# Patient Record
Sex: MALE | Race: WHITE | NOT HISPANIC OR LATINO
[De-identification: names, ages, dates, MRNs, and addresses within clinical notes are randomized per-mention and may not be internally consistent; named-entity substitution may affect disease eponyms.]

---

## 2018-10-23 ENCOUNTER — APPOINTMENT (OUTPATIENT)
Dept: FAMILY MEDICINE | Facility: CLINIC | Age: 68
End: 2018-10-23
Payer: MEDICARE

## 2018-10-23 VITALS
WEIGHT: 242 LBS | OXYGEN SATURATION: 96 % | BODY MASS INDEX: 32.78 KG/M2 | DIASTOLIC BLOOD PRESSURE: 82 MMHG | TEMPERATURE: 98.7 F | SYSTOLIC BLOOD PRESSURE: 120 MMHG | HEIGHT: 72 IN | HEART RATE: 81 BPM

## 2018-10-23 DIAGNOSIS — Z78.9 OTHER SPECIFIED HEALTH STATUS: ICD-10-CM

## 2018-10-23 PROCEDURE — 99203 OFFICE O/P NEW LOW 30 MIN: CPT

## 2018-10-23 NOTE — COUNSELING
[Weight management counseling provided] : Weight management [Healthy eating counseling provided] : healthy eating [Activity counseling provided] : activity [Keep Food Diary] : Keep food diary [Low Fat Diet] : Low fat diet [Decrease Portions] : Decrease food portions [Low Salt Diet] : Low salt diet [Walking] : Walking [Good understanding] : Patient has a good understanding of lifestyle changes and the steps needed to achieve self management goals

## 2018-10-23 NOTE — REVIEW OF SYSTEMS
[Muscle Pain] : muscle pain [Back Pain] : back pain [Joint Swelling] : joint swelling [Negative] : Heme/Lymph

## 2018-10-23 NOTE — HISTORY OF PRESENT ILLNESS
[FreeTextEntry8] : Encounter conducted in Polish.\par  cc: establish himself as a new patient , c/o back pain \par Patient came to  establish himself as a new patient , he  c/o back pain LS and  left hip for the past 1 week, denies injury, 4-6/10, dull , movement makes it worse. Recently patient has more sedentary life style. patient did not take any meds OTC. Patient denies CP,SOB,Abd pain. Last time seen by PCP last year.

## 2018-10-23 NOTE — PHYSICAL EXAM
[Normal Sclera/Conjunctiva] : normal sclera/conjunctiva [No Respiratory Distress] : no respiratory distress  [Clear to Auscultation] : lungs were clear to auscultation bilaterally [No Accessory Muscle Use] : no accessory muscle use [Normal Percussion] : the chest was normal to percussion [Normal Rate] : normal rate  [Soft] : abdomen soft [Non Tender] : non-tender [Non-distended] : non-distended [No Masses] : no abdominal mass palpated [No HSM] : no HSM [Normal Bowel Sounds] : normal bowel sounds [Normal Supraclavicular Nodes] : no supraclavicular lymphadenopathy [Normal Anterior Cervical Nodes] : no anterior cervical lymphadenopathy [No Joint Swelling] : no joint swelling [Grossly Normal Strength/Tone] : grossly normal strength/tone [Normal Gait] : normal gait [Coordination Grossly Intact] : coordination grossly intact [Alert and Oriented x3] : oriented to person, place, and time [de-identified] : obese

## 2018-10-25 LAB
25(OH)D3 SERPL-MCNC: 48.3 NG/ML
ALBUMIN SERPL ELPH-MCNC: 4.4 G/DL
ALP BLD-CCNC: 79 U/L
ALT SERPL-CCNC: 19 U/L
ANION GAP SERPL CALC-SCNC: 12 MMOL/L
APPEARANCE: CLEAR
AST SERPL-CCNC: 33 U/L
BASOPHILS # BLD AUTO: 0.03 K/UL
BASOPHILS NFR BLD AUTO: 0.4 %
BILIRUB SERPL-MCNC: 0.8 MG/DL
BILIRUBIN URINE: NEGATIVE
BLOOD URINE: NEGATIVE
BUN SERPL-MCNC: 13 MG/DL
CALCIUM SERPL-MCNC: 9.3 MG/DL
CHLORIDE SERPL-SCNC: 104 MMOL/L
CHOLEST SERPL-MCNC: 212 MG/DL
CHOLEST/HDLC SERPL: 3.9 RATIO
CO2 SERPL-SCNC: 27 MMOL/L
COLOR: YELLOW
CREAT SERPL-MCNC: 0.93 MG/DL
CREAT SPEC-SCNC: 212 MG/DL
EOSINOPHIL # BLD AUTO: 0.06 K/UL
EOSINOPHIL NFR BLD AUTO: 0.8 %
FOLATE SERPL-MCNC: 16.2 NG/ML
GLUCOSE QUALITATIVE U: NEGATIVE MG/DL
GLUCOSE SERPL-MCNC: 96 MG/DL
HAV IGM SER QL: NONREACTIVE
HBA1C MFR BLD HPLC: 5.3 %
HBV CORE IGM SER QL: NONREACTIVE
HBV SURFACE AG SER QL: NONREACTIVE
HCT VFR BLD CALC: 47 %
HCV AB SER QL: NONREACTIVE
HCV S/CO RATIO: 0.23 S/CO
HDLC SERPL-MCNC: 54 MG/DL
HGB BLD-MCNC: 15.7 G/DL
HIV1+2 AB SPEC QL IA.RAPID: NONREACTIVE
IMM GRANULOCYTES NFR BLD AUTO: 0.4 %
KETONES URINE: NEGATIVE
LDLC SERPL CALC-MCNC: 140 MG/DL
LEUKOCYTE ESTERASE URINE: NEGATIVE
LYMPHOCYTES # BLD AUTO: 1.75 K/UL
LYMPHOCYTES NFR BLD AUTO: 23.5 %
MAN DIFF?: NORMAL
MCHC RBC-ENTMCNC: 32.2 PG
MCHC RBC-ENTMCNC: 33.4 GM/DL
MCV RBC AUTO: 96.5 FL
MICROALBUMIN 24H UR DL<=1MG/L-MCNC: 1.8 MG/DL
MICROALBUMIN/CREAT 24H UR-RTO: 9 MG/G
MONOCYTES # BLD AUTO: 0.65 K/UL
MONOCYTES NFR BLD AUTO: 8.7 %
NEUTROPHILS # BLD AUTO: 4.93 K/UL
NEUTROPHILS NFR BLD AUTO: 66.2 %
NITRITE URINE: NEGATIVE
PH URINE: 7
PLATELET # BLD AUTO: 231 K/UL
POTASSIUM SERPL-SCNC: 4.5 MMOL/L
PROT SERPL-MCNC: 8.1 G/DL
PROTEIN URINE: NEGATIVE MG/DL
PSA FREE FLD-MCNC: 19.9
PSA FREE SERPL-MCNC: 0.6 NG/ML
PSA SERPL-MCNC: 3.01 NG/ML
PSA SERPL-MCNC: 3.01 NG/ML
RBC # BLD: 4.87 M/UL
RBC # FLD: 12.5 %
SODIUM SERPL-SCNC: 143 MMOL/L
SPECIFIC GRAVITY URINE: 1.03
T3FREE SERPL-MCNC: 2.81 PG/ML
T4 FREE SERPL-MCNC: 1.5 NG/DL
THYROPEROXIDASE AB SERPL IA-ACNC: 21.9 IU/ML
TRIGL SERPL-MCNC: 90 MG/DL
TSH SERPL-ACNC: 1.05 UIU/ML
URATE SERPL-MCNC: 6.5 MG/DL
UROBILINOGEN URINE: 1 MG/DL
VIT B12 SERPL-MCNC: 782 PG/ML
WBC # FLD AUTO: 7.45 K/UL

## 2018-11-20 ENCOUNTER — APPOINTMENT (OUTPATIENT)
Dept: FAMILY MEDICINE | Facility: CLINIC | Age: 68
End: 2018-11-20
Payer: MEDICARE

## 2018-11-20 VITALS
OXYGEN SATURATION: 98 % | RESPIRATION RATE: 16 BRPM | HEART RATE: 92 BPM | DIASTOLIC BLOOD PRESSURE: 78 MMHG | TEMPERATURE: 98.4 F | SYSTOLIC BLOOD PRESSURE: 152 MMHG

## 2018-11-20 PROCEDURE — 99214 OFFICE O/P EST MOD 30 MIN: CPT | Mod: 25

## 2018-11-20 PROCEDURE — 96372 THER/PROPH/DIAG INJ SC/IM: CPT

## 2018-11-20 RX ORDER — METHYLPRED ACET/NACL,ISO-OS/PF 40 MG/ML
40 VIAL (ML) INJECTION
Qty: 1 | Refills: 0 | Status: COMPLETED | OUTPATIENT
Start: 2018-11-20

## 2018-11-20 RX ADMIN — METHYLPREDNISOLONE ACETATE 0 MG/ML: 40 INJECTION, SUSPENSION INTRA-ARTICULAR; INTRALESIONAL; INTRAMUSCULAR; SOFT TISSUE at 00:00

## 2018-11-20 NOTE — HISTORY OF PRESENT ILLNESS
[FreeTextEntry1] : cc: f/u back pain , thyroid , blood pressure  [de-identified] : Patient came to f/u on his back pain - which is not improving, dull pain, worse with movement, sharp , 6/10. Patient denies CP,SOB, no abd pain, no BM or urination problem. Patient took Mobic, warm,cold compresses, no improvement. patient took in the past Hydrocodone PRN. Last blood work d/w the pt.  at length. needs Xanax to be renewed, takes PRN. patient needs medication to be renewed for his thyroid and blood pressure.

## 2018-11-20 NOTE — PHYSICAL EXAM
[No Respiratory Distress] : no respiratory distress  [Clear to Auscultation] : lungs were clear to auscultation bilaterally [No Accessory Muscle Use] : no accessory muscle use [Normal Percussion] : the chest was normal to percussion [Normal Rate] : normal rate  [Regular Rhythm] : with a regular rhythm [Normal S1, S2] : normal S1 and S2 [No Varicosities] : no varicosities [No Edema] : there was no peripheral edema [Soft] : abdomen soft [Non Tender] : non-tender [Non-distended] : non-distended [No Masses] : no abdominal mass palpated [No HSM] : no HSM [Normal Bowel Sounds] : normal bowel sounds [Normal Supraclavicular Nodes] : no supraclavicular lymphadenopathy [Normal Axillary Nodes] : no axillary lymphadenopathy [Normal Anterior Cervical Nodes] : no anterior cervical lymphadenopathy [No Joint Swelling] : no joint swelling [Grossly Normal Strength/Tone] : grossly normal strength/tone [Normal Gait] : normal gait [Coordination Grossly Intact] : coordination grossly intact [No Focal Deficits] : no focal deficits [Alert and Oriented x3] : oriented to person, place, and time [de-identified] : obese

## 2018-11-20 NOTE — COUNSELING
[Weight management counseling provided] : Weight management [Healthy eating counseling provided] : healthy eating [Activity counseling provided] : activity [Keep Food Diary] : Keep food diary [Low Fat Diet] : Low fat diet [Walking] : Walking [Good understanding] : Patient has a good understanding of lifestyle changes and the steps needed to achieve self management goals

## 2018-12-03 ENCOUNTER — FORM ENCOUNTER (OUTPATIENT)
Age: 68
End: 2018-12-03

## 2018-12-04 ENCOUNTER — APPOINTMENT (OUTPATIENT)
Dept: MRI IMAGING | Facility: CLINIC | Age: 68
End: 2018-12-04
Payer: MEDICARE

## 2018-12-04 ENCOUNTER — OUTPATIENT (OUTPATIENT)
Dept: OUTPATIENT SERVICES | Facility: HOSPITAL | Age: 68
LOS: 1 days | End: 2018-12-04
Payer: MEDICARE

## 2018-12-04 DIAGNOSIS — Z00.8 ENCOUNTER FOR OTHER GENERAL EXAMINATION: ICD-10-CM

## 2018-12-04 PROCEDURE — 72148 MRI LUMBAR SPINE W/O DYE: CPT | Mod: 26

## 2018-12-04 PROCEDURE — 72148 MRI LUMBAR SPINE W/O DYE: CPT

## 2018-12-10 ENCOUNTER — APPOINTMENT (OUTPATIENT)
Dept: ORTHOPEDIC SURGERY | Facility: CLINIC | Age: 68
End: 2018-12-10
Payer: MEDICARE

## 2018-12-10 VITALS
WEIGHT: 240 LBS | BODY MASS INDEX: 31.81 KG/M2 | HEIGHT: 73 IN | DIASTOLIC BLOOD PRESSURE: 89 MMHG | SYSTOLIC BLOOD PRESSURE: 151 MMHG | HEART RATE: 82 BPM

## 2018-12-10 PROCEDURE — 99204 OFFICE O/P NEW MOD 45 MIN: CPT

## 2018-12-10 NOTE — PHYSICAL EXAM
[Obese] : obese [Limited] : is limited [Poor Appearance] : well-appearing [Acute Distress] : not in acute distress [Abl Mood] : in a normal mood [Abl Affect] : with normal affect [Poor Coordination] : normal coordination [Disorientation] : oriented x 3 [Normal] : normal [Painful] : not painful [Summers's Sign] : negative Summers's sign [SLR] : negative straight leg raise [LE] : Sensory: Intact in bilateral lower extremities [1+] : left ankle jerk 1+ [Plantar Reflex Right Only] : absent on the right [Plantar Reflex Left Only] : absent on the left [DTR Reflexes Clonus Of Right Ankle (___ Beats)] : absent on the right [DTR Reflexes Clonus Of Left Ankle (___ Beats)] : absent on the left [DP] : dorsalis pedis 2+ and symmetric bilaterally [PT] : posterior tibial 2+ and symmetric bilaterally [FreeTextEntry2] : The pt is awake, alert and oriented to self, place and time, is comfortable and in no acute distress. Inspection of neck, back and lower extremities bilaterally reveals no rashes or ecchymotic lesions.  There is no obvious abnormal spinal curvature in the sagittal and coronal planes. There is no tenderness over the cervical, thoracic spine, or the paraspinal or upper and lower extremities musculature.Midline lumbar tenderness. There is no sacroiliac tenderness. No greater trochanteric tenderness bilaterally. No atrophy or abnormal movements noted in the upper or lower extremities. There is no swelling noted in the upper or lower extremities bilaterally. No cervical lymphadenopathy noted anteriorly. No joint laxity noted in the upper and lower extremity joints bilaterally.\par Hip range of motion is degrees internal rotation 30° external rotation without pain. Full range of motion of the shoulders bilaterally with no significant pain\par There is no groin pain with hip internal rotation and a negative SRINI test bilaterally.  [de-identified] : He can forward flex to his knees with some increased back pain. Extension is 30° with less pain. [de-identified] : EXAM: MR SPINE LUMBAR \par \par PROCEDURE DATE: 12/04/2018 \par \par \par INTERPRETATION: \par LUMBOSACRAL SPINE MRI \par \par CLINICAL INFORMATION: No back pain x2 months, worse with movement. Not \par improving. \par TECHNIQUE: Multiplanar, multisequence MR imaging was obtained of the \par lumbosacral spine. \par \par FINDINGS: \par \par DISC LEVEL EVALUATION: \par \par L1/L2: Disc bulge and bilateral facet arthrosis resulting in mild bilateral \par foraminal narrowing. No spinal canal narrowing. \par L2/L3: Disc bulge with narrowing of the left intervertebral disc space with \par osteophyte formation due to dextroconvexity and bilateral facet arthrosis \par and thickening of the ligamentum flavum resulting in mild left lateral \par recess narrowing and left foraminal narrowing. \par L3/L4: Diffuse disc bulge with leftward lateral osteophyte formation and \par bilateral facet arthrosis and thickening of the ligamentum flavum resulting \par mild spinal canal narrowing and mild right and moderate left foraminal \par narrowing. Disc narrows the bilateral lateral recesses, left greater than \par right. \par L4/L5: Disc osteophyte complex with right and leftward osteophyte formation \par and bilateral facet arthrosis, hypertrophic on the right, and thickening of \par the ligamentum flavum resulting in effacement of the right lateral recess \par and severe right foraminal narrowing, contacting the exiting right L4 nerve \par root. \par L5/S1: Disc bulge and severe right facet arthrosis, resulting in moderate to \par severe right foraminal narrowing. No left foraminal narrowing or spinal \par canal narrowing. \par \par SPINAL ALIGNMENT: There is dextroconvexity of the lumbar spine centered at \par L2-L3. There is tilting of the L5 vertebral body resulting in \par abutment/pseudoarticulation of the right L5 transverse process with the \par sacrum. There is mild rightward lateral listhesis of L4 on L5. There is mild \par anterolisthesis of L3 on L4 and mild retrolisthesis of L5 on S1. There is \par multilevel loss of intravertebral disc height with endplate degenerative \par changes and osteophyte formation. \par DISTAL CORD AND CONUS: Distal cord terminates at L1-L2. Distal cord and \par conus are unremarkable in appearance. \par SI JOINTS: Mild degenerative changes of the sacroiliac joints. \par MARROW: There is an incomplete horizontally oriented low signal focus within \par the superior aspect of the L5 vertebral body with surrounding edema, \par consistent with an incomplete fracture (series 2, image 11). There is no \par significant depression of the superior endplate. Degenerative edema is \par visualized within the superior left endplate of the L3 vertebral body. \par PARASPINAL MUSCLE AND SOFT TISSUES: Unremarkable. \par INTRAABDOMINAL/INTRAPELVIC SOFT TISSUES: Partially visualized bilateral \par renal cysts. \par \par IMPRESSION: \par Incomplete fracture/stress reaction of the right side of the L5 vertebral \par body with edema. No loss of vertebral body height. \par Dextrocurvature of the spine and lumbar spondylosis and facet arthrosis, \par most notably at L4-L5, resulting in severe right foraminal narrowing, and at \par L5/S1, resulting in moderate to severe right foraminal narrowing. \par \par RELL ALICEA M.D., ATTENDING RADIOLOGIST \par This document has been electronically signed. Dec 6 2018 1:16PM

## 2018-12-10 NOTE — CONSULT LETTER
[Dear  ___] : Dear  [unfilled], [I had the pleasure of evaluating your patient, [unfilled].] : I had the pleasure of evaluating your patient, [unfilled]. [FreeTextEntry2] : Kaila Spence [FreeTextEntry1] : Thank you for this referral. I have enclosed my note for your review. Please feel free to contact my office if you have additional questions regarding this patient.\par \par Regards,\par Israel Anguiano MD, FACS, FAAOS\par \par  of Orthopaedic Surgery\par [facility] [employer] [facility]\par Spinal Reconstruction Surgery\par Minimally Invasive Spinal Surgery\par [facility]

## 2018-12-10 NOTE — DISCUSSION/SUMMARY
[Medication Risks Reviewed] : Medication risks reviewed [de-identified] : The patient presents with atraumatic fracture of his L5 and edema in the L3 vertebral body suspicious for fracture. Given his age and his physical stature I'm concerned about this being pathologic fractures. I am recommending a bone scan to better assess his skeletal system and a referral to a hematologist oncologist was also provided to assess for any underlying malignancy and metastatic condition. He does not report any constitutional symptoms of present and has not reported any night pain. However given the presenting history I strongly recommended a tumor workup. For his back pain provided and a prescription for a lumbosacral orthosis.\par \par I will see him back after the imaging studies are available.\par \par The patient was educated regarding their condition, treatment options as well as prescribed course of treatment. \par Risks and benefits as well as alternatives to the proposed treatment were also provided to the patient \par They were given the opportunity to have all their questions answered to their satisfaction.\par \par Vital signs were reviewed with the patient and the patient was instructed to followup with their primary care provider for further management.\par \par Healthy lifestyle recommendations were also made including a tobacco free lifestyle, proper diet, and weight control.

## 2018-12-11 ENCOUNTER — RX RENEWAL (OUTPATIENT)
Age: 68
End: 2018-12-11

## 2018-12-12 ENCOUNTER — APPOINTMENT (OUTPATIENT)
Dept: FAMILY MEDICINE | Facility: CLINIC | Age: 68
End: 2018-12-12
Payer: MEDICARE

## 2018-12-12 VITALS
HEIGHT: 73 IN | WEIGHT: 240 LBS | TEMPERATURE: 98.7 F | SYSTOLIC BLOOD PRESSURE: 154 MMHG | OXYGEN SATURATION: 97 % | DIASTOLIC BLOOD PRESSURE: 82 MMHG | BODY MASS INDEX: 31.81 KG/M2 | HEART RATE: 88 BPM

## 2018-12-12 PROCEDURE — 99214 OFFICE O/P EST MOD 30 MIN: CPT

## 2018-12-12 RX ORDER — PREDNISONE 20 MG/1
20 TABLET ORAL DAILY
Qty: 5 | Refills: 0 | Status: COMPLETED | COMMUNITY
Start: 2018-11-20 | End: 2018-12-12

## 2018-12-12 NOTE — PHYSICAL EXAM
[No Respiratory Distress] : no respiratory distress  [Clear to Auscultation] : lungs were clear to auscultation bilaterally [No Accessory Muscle Use] : no accessory muscle use [Normal Percussion] : the chest was normal to percussion [Normal Rate] : normal rate  [Regular Rhythm] : with a regular rhythm [Normal S1, S2] : normal S1 and S2 [No Varicosities] : no varicosities [No Edema] : there was no peripheral edema [Soft] : abdomen soft [Non Tender] : non-tender [Non-distended] : non-distended [No Masses] : no abdominal mass palpated [No HSM] : no HSM [Normal Bowel Sounds] : normal bowel sounds [Normal Supraclavicular Nodes] : no supraclavicular lymphadenopathy [Normal Axillary Nodes] : no axillary lymphadenopathy [Normal Posterior Cervical Nodes] : no posterior cervical lymphadenopathy [Normal Anterior Cervical Nodes] : no anterior cervical lymphadenopathy [No Joint Swelling] : no joint swelling [Grossly Normal Strength/Tone] : grossly normal strength/tone [Normal Gait] : normal gait [Alert and Oriented x3] : oriented to person, place, and time [de-identified] : obese  [de-identified] : + LS spine tenderness

## 2018-12-12 NOTE — HISTORY OF PRESENT ILLNESS
[FreeTextEntry1] : cc: f/u on his elevated blood pressure, back pain  [de-identified] : Patient came to f/u on his Blood pressure - he increase Ramipril to 10 mg daily , denies HA,no CP , tolerates well. Patient seen by Ortho Spine - BAck pain -  Path Fx L5 -awaiting for bone scan,  malignancy work up needed, wearing  lumbosacral orthosis. Patient had colonoscopy 8-9 years ago, + on and off constipation, no diarrhea, no abd pain. Patient denies CP , SOB, abd pain. Patient still c/o back pain, sharp 8/10, worse when he changes position from sitting to standing - no bowel or urination changes.

## 2018-12-14 ENCOUNTER — CHART COPY (OUTPATIENT)
Age: 68
End: 2018-12-14

## 2018-12-17 ENCOUNTER — OUTPATIENT (OUTPATIENT)
Dept: OUTPATIENT SERVICES | Facility: HOSPITAL | Age: 68
LOS: 1 days | Discharge: HOME | End: 2018-12-17

## 2018-12-17 DIAGNOSIS — C80.1 MALIGNANT (PRIMARY) NEOPLASM, UNSPECIFIED: ICD-10-CM

## 2018-12-21 ENCOUNTER — APPOINTMENT (OUTPATIENT)
Dept: SURGERY | Facility: CLINIC | Age: 68
End: 2018-12-21
Payer: MEDICARE

## 2018-12-21 VITALS — BODY MASS INDEX: 29.84 KG/M2 | WEIGHT: 240 LBS | HEIGHT: 75 IN

## 2018-12-21 DIAGNOSIS — Z12.11 ENCOUNTER FOR SCREENING FOR MALIGNANT NEOPLASM OF COLON: ICD-10-CM

## 2018-12-21 PROCEDURE — 99202 OFFICE O/P NEW SF 15 MIN: CPT

## 2018-12-25 ENCOUNTER — FORM ENCOUNTER (OUTPATIENT)
Age: 68
End: 2018-12-25

## 2018-12-26 ENCOUNTER — OUTPATIENT (OUTPATIENT)
Dept: OUTPATIENT SERVICES | Facility: HOSPITAL | Age: 68
LOS: 1 days | End: 2018-12-26
Payer: COMMERCIAL

## 2018-12-26 ENCOUNTER — APPOINTMENT (OUTPATIENT)
Dept: CT IMAGING | Facility: IMAGING CENTER | Age: 68
End: 2018-12-26
Payer: MEDICARE

## 2018-12-26 ENCOUNTER — APPOINTMENT (OUTPATIENT)
Dept: RADIOLOGY | Facility: IMAGING CENTER | Age: 68
End: 2018-12-26
Payer: MEDICARE

## 2018-12-26 DIAGNOSIS — S32.059A UNSPECIFIED FRACTURE OF FIFTH LUMBAR VERTEBRA, INITIAL ENCOUNTER FOR CLOSED FRACTURE: ICD-10-CM

## 2018-12-26 PROCEDURE — 74177 CT ABD & PELVIS W/CONTRAST: CPT | Mod: 26

## 2018-12-26 PROCEDURE — 77080 DXA BONE DENSITY AXIAL: CPT | Mod: 26

## 2018-12-26 PROCEDURE — 71260 CT THORAX DX C+: CPT | Mod: 26

## 2018-12-26 PROCEDURE — 82565 ASSAY OF CREATININE: CPT

## 2018-12-26 PROCEDURE — 77080 DXA BONE DENSITY AXIAL: CPT

## 2018-12-26 PROCEDURE — 71260 CT THORAX DX C+: CPT

## 2018-12-26 PROCEDURE — 74177 CT ABD & PELVIS W/CONTRAST: CPT

## 2018-12-31 ENCOUNTER — OUTPATIENT (OUTPATIENT)
Dept: OUTPATIENT SERVICES | Facility: HOSPITAL | Age: 68
LOS: 1 days | End: 2018-12-31
Payer: COMMERCIAL

## 2018-12-31 ENCOUNTER — APPOINTMENT (OUTPATIENT)
Dept: NUCLEAR MEDICINE | Facility: IMAGING CENTER | Age: 68
End: 2018-12-31
Payer: MEDICARE

## 2018-12-31 DIAGNOSIS — S32.059A UNSPECIFIED FRACTURE OF FIFTH LUMBAR VERTEBRA, INITIAL ENCOUNTER FOR CLOSED FRACTURE: ICD-10-CM

## 2018-12-31 PROCEDURE — 78306 BONE IMAGING WHOLE BODY: CPT | Mod: 26

## 2018-12-31 PROCEDURE — 78320: CPT | Mod: 26

## 2018-12-31 PROCEDURE — 78999 UNLISTED MISC PX DX NUC MED: CPT

## 2018-12-31 PROCEDURE — 78306 BONE IMAGING WHOLE BODY: CPT

## 2018-12-31 PROCEDURE — A9561: CPT

## 2019-01-09 ENCOUNTER — APPOINTMENT (OUTPATIENT)
Dept: FAMILY MEDICINE | Facility: CLINIC | Age: 69
End: 2019-01-09

## 2019-01-14 ENCOUNTER — RESULT REVIEW (OUTPATIENT)
Age: 69
End: 2019-01-14

## 2019-01-14 ENCOUNTER — APPOINTMENT (OUTPATIENT)
Dept: HEMATOLOGY ONCOLOGY | Facility: CLINIC | Age: 69
End: 2019-01-14
Payer: MEDICARE

## 2019-01-14 VITALS
BODY MASS INDEX: 29.58 KG/M2 | SYSTOLIC BLOOD PRESSURE: 141 MMHG | WEIGHT: 237.88 LBS | DIASTOLIC BLOOD PRESSURE: 92 MMHG | HEIGHT: 75 IN | OXYGEN SATURATION: 96 % | TEMPERATURE: 98.3 F | HEART RATE: 83 BPM | RESPIRATION RATE: 17 BRPM

## 2019-01-14 LAB
BASOPHILS # BLD AUTO: 0.1 K/UL — SIGNIFICANT CHANGE UP (ref 0–0.2)
BASOPHILS NFR BLD AUTO: 1.1 % — SIGNIFICANT CHANGE UP (ref 0–2)
EOSINOPHIL # BLD AUTO: 0.1 K/UL — SIGNIFICANT CHANGE UP (ref 0–0.5)
EOSINOPHIL NFR BLD AUTO: 1.6 % — SIGNIFICANT CHANGE UP (ref 0–6)
HCT VFR BLD CALC: 45.3 % — SIGNIFICANT CHANGE UP (ref 39–50)
HGB BLD-MCNC: 15.4 G/DL — SIGNIFICANT CHANGE UP (ref 13–17)
LYMPHOCYTES # BLD AUTO: 2.4 K/UL — SIGNIFICANT CHANGE UP (ref 1–3.3)
LYMPHOCYTES # BLD AUTO: 30.1 % — SIGNIFICANT CHANGE UP (ref 13–44)
MCHC RBC-ENTMCNC: 31.9 PG — SIGNIFICANT CHANGE UP (ref 27–34)
MCHC RBC-ENTMCNC: 34 G/DL — SIGNIFICANT CHANGE UP (ref 32–36)
MCV RBC AUTO: 93.9 FL — SIGNIFICANT CHANGE UP (ref 80–100)
MONOCYTES # BLD AUTO: 0.8 K/UL — SIGNIFICANT CHANGE UP (ref 0–0.9)
MONOCYTES NFR BLD AUTO: 10 % — SIGNIFICANT CHANGE UP (ref 2–14)
NEUTROPHILS # BLD AUTO: 4.6 K/UL — SIGNIFICANT CHANGE UP (ref 1.8–7.4)
NEUTROPHILS NFR BLD AUTO: 57.3 % — SIGNIFICANT CHANGE UP (ref 43–77)
PLATELET # BLD AUTO: 224 K/UL — SIGNIFICANT CHANGE UP (ref 150–400)
RBC # BLD: 4.83 M/UL — SIGNIFICANT CHANGE UP (ref 4.2–5.8)
RBC # FLD: 11 % — SIGNIFICANT CHANGE UP (ref 10.3–14.5)
WBC # BLD: 8 K/UL — SIGNIFICANT CHANGE UP (ref 3.8–10.5)
WBC # FLD AUTO: 8 K/UL — SIGNIFICANT CHANGE UP (ref 3.8–10.5)

## 2019-01-14 PROCEDURE — 99204 OFFICE O/P NEW MOD 45 MIN: CPT

## 2019-01-15 LAB
ALBUMIN SERPL ELPH-MCNC: 4.4 G/DL
ALP BLD-CCNC: 78 U/L
ALT SERPL-CCNC: 14 U/L
ANION GAP SERPL CALC-SCNC: 11 MMOL/L
AST SERPL-CCNC: 19 U/L
BILIRUB SERPL-MCNC: 0.8 MG/DL
BUN SERPL-MCNC: 10 MG/DL
CALCIUM SERPL-MCNC: 9.5 MG/DL
CHLORIDE SERPL-SCNC: 101 MMOL/L
CO2 SERPL-SCNC: 29 MMOL/L
CREAT SERPL-MCNC: 0.93 MG/DL
DEPRECATED KAPPA LC FREE/LAMBDA SER: 1.19 RATIO
GLUCOSE SERPL-MCNC: 110 MG/DL
IGA SER QL IEP: 353 MG/DL
IGG SER QL IEP: 1312 MG/DL
IGM SER QL IEP: 135 MG/DL
KAPPA LC CSF-MCNC: 1.9 MG/DL
KAPPA LC SERPL-MCNC: 2.26 MG/DL
POTASSIUM SERPL-SCNC: 4.4 MMOL/L
PROT SERPL-MCNC: 7.4 G/DL
SODIUM SERPL-SCNC: 141 MMOL/L

## 2019-01-15 NOTE — HISTORY OF PRESENT ILLNESS
[de-identified] : 67yo M here for evaluation of vertebral fracture. \par \par Pt reports that his back has been hurting since October when he came back from [place]. Sitting to standing is difficult and causes sharp pain. No numbness or tingling. He had an MRI done 12/4/18: There is an incomplete horizontally oriented low signal focus within the superior aspect of the L5 vertebral body with surrounding edema, consistent with an incomplete fracture. There is no significant depression of the superior endplate. Degenerative edema is visualized within the superior left endplate of the L3 vertebral body.\par He saw Dr. Anguiano from ortho who is concerned about this being pathologic fractures. He has had CT C/A/P which was unremarkable except for an ascending aortic aneurysm. PSA 3.01. He is scheduled for colonoscopy on 2/5/19. \par \par He had a bone scan: Focus of increased tracer uptake in the right side of L5 vertebra likely correspond to incomplete fracture/stress reaction seen on MRI, and adjacent degenerative changes. No other osseous lesions are identified. Degenerative disease in the spine and major joints.\par \par Pt reports that he fell on marble steps onto his back in 2017. No imaging was done at that time. It would bother him periodically before.  \par He is a  for 18 years, carries luggage for passengers. Otherwise denies any heavy lifting. 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

## 2019-01-15 NOTE — PHYSICAL EXAM
[Restricted in physically strenuous activity but ambulatory and able to carry out work of a light or sedentary nature] : Status 1- Restricted in physically strenuous activity but ambulatory and able to carry out work of a light or sedentary nature, e.g., light house work, office work [Normal] : affect appropriate [de-identified] : midline tenderness lunbar spine

## 2019-01-15 NOTE — ASSESSMENT
[FreeTextEntry1] : 67yo M here for evaluation of vertebral fracture. \par CT C/A/P which was unremarkable except for an ascending aortic aneurysm. PSA 3.01. He is scheduled for colonoscopy on 2/5/19. Will check SPEP, quantitative immunoglobulins, immunofixation to assess for myeloma. If negative, consider bone biopsy at fracture site.

## 2019-01-17 LAB
ALBUMIN MFR SERPL ELPH: 57.1 %
ALBUMIN SERPL-MCNC: 4.2 G/DL
ALBUMIN/GLOB SERPL: 1.3 RATIO
ALPHA1 GLOB MFR SERPL ELPH: 3.7 %
ALPHA1 GLOB SERPL ELPH-MCNC: 0.3 G/DL
ALPHA2 GLOB MFR SERPL ELPH: 8.8 %
ALPHA2 GLOB SERPL ELPH-MCNC: 0.7 G/DL
B-GLOBULIN MFR SERPL ELPH: 12.5 %
B-GLOBULIN SERPL ELPH-MCNC: 0.9 G/DL
GAMMA GLOB FLD ELPH-MCNC: 1.3 G/DL
GAMMA GLOB MFR SERPL ELPH: 17.9 %
INTERPRETATION SERPL IEP-IMP: NORMAL
M PROTEIN SPEC IFE-MCNC: NORMAL
PROT SERPL-MCNC: 7.4 G/DL
PROT SERPL-MCNC: 7.4 G/DL

## 2019-02-04 ENCOUNTER — TRANSCRIPTION ENCOUNTER (OUTPATIENT)
Age: 69
End: 2019-02-04

## 2019-02-05 ENCOUNTER — OUTPATIENT (OUTPATIENT)
Dept: OUTPATIENT SERVICES | Facility: HOSPITAL | Age: 69
LOS: 1 days | End: 2019-02-05
Payer: MEDICARE

## 2019-02-05 DIAGNOSIS — Z12.11 ENCOUNTER FOR SCREENING FOR MALIGNANT NEOPLASM OF COLON: ICD-10-CM

## 2019-02-05 DIAGNOSIS — K59.00 CONSTIPATION, UNSPECIFIED: ICD-10-CM

## 2019-02-05 PROCEDURE — 45378 DIAGNOSTIC COLONOSCOPY: CPT

## 2019-02-05 PROCEDURE — G0121: CPT

## 2019-02-05 RX ORDER — SODIUM CHLORIDE 9 MG/ML
1000 INJECTION INTRAMUSCULAR; INTRAVENOUS; SUBCUTANEOUS
Qty: 0 | Refills: 0 | Status: DISCONTINUED | OUTPATIENT
Start: 2019-02-05 | End: 2019-02-20

## 2019-02-05 RX ADMIN — SODIUM CHLORIDE 75 MILLILITER(S): 9 INJECTION INTRAMUSCULAR; INTRAVENOUS; SUBCUTANEOUS at 13:41

## 2019-02-10 ENCOUNTER — RX RENEWAL (OUTPATIENT)
Age: 69
End: 2019-02-10

## 2019-06-26 ENCOUNTER — APPOINTMENT (OUTPATIENT)
Dept: ORTHOPEDIC SURGERY | Facility: CLINIC | Age: 69
End: 2019-06-26

## 2019-07-03 ENCOUNTER — APPOINTMENT (OUTPATIENT)
Dept: ORTHOPEDIC SURGERY | Facility: CLINIC | Age: 69
End: 2019-07-03
Payer: MEDICARE

## 2019-07-03 VITALS
HEIGHT: 75 IN | SYSTOLIC BLOOD PRESSURE: 155 MMHG | HEART RATE: 79 BPM | BODY MASS INDEX: 30.09 KG/M2 | WEIGHT: 242 LBS | DIASTOLIC BLOOD PRESSURE: 100 MMHG

## 2019-07-03 PROCEDURE — 72100 X-RAY EXAM L-S SPINE 2/3 VWS: CPT

## 2019-07-03 PROCEDURE — 99213 OFFICE O/P EST LOW 20 MIN: CPT

## 2019-07-03 NOTE — PHYSICAL EXAM
[Obese] : obese [Normal] : normal [Limited] : is limited [LE] : Sensory: Intact in bilateral lower extremities [1+] : left ankle jerk 1+ [DP] : dorsalis pedis 2+ and symmetric bilaterally [PT] : posterior tibial 2+ and symmetric bilaterally [Poor Appearance] : well-appearing [Acute Distress] : not in acute distress [Abl Mood] : in a normal mood [Abl Affect] : with normal affect [Poor Coordination] : normal coordination [Painful] : not painful [Disorientation] : oriented x 3 [Summers's Sign] : negative Summers's sign [Plantar Reflex Right Only] : absent on the right [SLR] : negative straight leg raise [Plantar Reflex Left Only] : absent on the left [DTR Reflexes Clonus Of Right Ankle (___ Beats)] : absent on the right [DTR Reflexes Clonus Of Left Ankle (___ Beats)] : absent on the left [FreeTextEntry2] : The pt is awake, alert and oriented to self, place and time, is comfortable and in no acute distress. Inspection of neck, back and lower extremities bilaterally reveals no rashes or ecchymotic lesions.  There is no obvious abnormal spinal curvature in the sagittal and coronal planes. There is no tenderness over the cervical, thoracic spine, or the paraspinal or upper and lower extremities musculature.Midline lumbar tenderness. There is no sacroiliac tenderness. No greater trochanteric tenderness bilaterally. No atrophy or abnormal movements noted in the upper or lower extremities. There is no swelling noted in the upper or lower extremities bilaterally. No cervical lymphadenopathy noted anteriorly. No joint laxity noted in the upper and lower extremity joints bilaterally.\par Hip range of motion is degrees internal rotation 30° external rotation without pain. Full range of motion of the shoulders bilaterally with no significant pain\par There is no groin pain with hip internal rotation and a negative SRINI test bilaterally.  [de-identified] : He can forward flex to his knees with some increased back pain. Extension is 30° with less pain. [de-identified] : AP and lateral image lumbar spine obtained today demonstrate right-sided lumbar scoliosis from T12-L5 which is essentially unchanged from prior imaging studies for normal lumbar lordosis with significant degeneration of a spinal for final levels with anterior osteophytes. No obvious acute interval changes.\par \par ______________\par \par EXAM: NM BONE IMG WHOLE BODY \par \par EXAM: NM BONE SPECT CT \par \par PROCEDURE DATE: 12/31/2018 \par \par INTERPRETATION: CLINICAL INFORMATION: 68 year old male with intermittent \par low back pain for 2 months, incomplete L5 fracture/stress reaction on MRI, \par referred for further evaluation \par \par RADIOPHARMACEUTICAL: 22.0 mCi Tc-99m HDP, I.V. \par \par TECHNIQUE: Whole-body planar images were obtained in the anterior and \par posterior projections approximately 2-3 hours after tracer injection. Static \par views of the chest in 4 oblique projections and SPECT/CT of the lumbar spine \par and pelvis were also performed. The CT protocol was optimized for SPECT \par attenuation correction and to provide anatomic detail for localization of \par SPECT abnormalities. The CT protocol was not designed to produce and cannot \par replace state-of- the-art diagnostic CT images with specific imaging \par protocols for different body parts and indications. \par \par COMPARISON: No previous bone scan for comparison \par \par FINDINGS: There is a focus of increased tracer accumulation corresponding to \par mild sclerosis in the right lateral vertebral body of L5 with adjacent \par degenerative changes on the CT component of the study. There is degenerative \par disease in multiple levels of the spine and major joints. There is \par physiologic tracer distribution in the remainder of the visualized skeleton. \par \par Both kidneys are visualized and appear symmetric. \par \par IMPRESSION: Bone scan demonstrates: \par \par Focus of increased tracer uptake in the right side of L5 vertebra likely \par correspond to incomplete fracture/stress reaction seen on MRI, and adjacent \par degenerative changes. \par \par No other osseous lesions are identified. \par \par Degenerative disease in the spine and major joints. \par \par MARNIE BABB M.D., NUCLEAR MEDICINE ATTENDING \par This document has been electronically signed. Dec 31 2018 2:22PM \par \par __________\par \par EXAM: MR SPINE LUMBAR \par \par PROCEDURE DATE: 12/04/2018 \par \par \par INTERPRETATION: \par LUMBOSACRAL SPINE MRI \par \par CLINICAL INFORMATION: No back pain x2 months, worse with movement. Not \par improving. \par TECHNIQUE: Multiplanar, multisequence MR imaging was obtained of the \par lumbosacral spine. \par \par FINDINGS: \par \par DISC LEVEL EVALUATION: \par \par L1/L2: Disc bulge and bilateral facet arthrosis resulting in mild bilateral \par foraminal narrowing. No spinal canal narrowing. \par L2/L3: Disc bulge with narrowing of the left intervertebral disc space with \par osteophyte formation due to dextroconvexity and bilateral facet arthrosis \par and thickening of the ligamentum flavum resulting in mild left lateral \par recess narrowing and left foraminal narrowing. \par L3/L4: Diffuse disc bulge with leftward lateral osteophyte formation and \par bilateral facet arthrosis and thickening of the ligamentum flavum resulting \par mild spinal canal narrowing and mild right and moderate left foraminal \par narrowing. Disc narrows the bilateral lateral recesses, left greater than \par right. \par L4/L5: Disc osteophyte complex with right and leftward osteophyte formation \par and bilateral facet arthrosis, hypertrophic on the right, and thickening of \par the ligamentum flavum resulting in effacement of the right lateral recess \par and severe right foraminal narrowing, contacting the exiting right L4 nerve \par root. \par L5/S1: Disc bulge and severe right facet arthrosis, resulting in moderate to \par severe right foraminal narrowing. No left foraminal narrowing or spinal \par canal narrowing. \par \par SPINAL ALIGNMENT: There is dextroconvexity of the lumbar spine centered at \par L2-L3. There is tilting of the L5 vertebral body resulting in \par abutment/pseudoarticulation of the right L5 transverse process with the \par sacrum. There is mild rightward lateral listhesis of L4 on L5. There is mild \par anterolisthesis of L3 on L4 and mild retrolisthesis of L5 on S1. There is \par multilevel loss of intravertebral disc height with endplate degenerative \par changes and osteophyte formation. \par DISTAL CORD AND CONUS: Distal cord terminates at L1-L2. Distal cord and \par conus are unremarkable in appearance. \par SI JOINTS: Mild degenerative changes of the sacroiliac joints. \par MARROW: There is an incomplete horizontally oriented low signal focus within \par the superior aspect of the L5 vertebral body with surrounding edema, \par consistent with an incomplete fracture (series 2, image 11). There is no \par significant depression of the superior endplate. Degenerative edema is \par visualized within the superior left endplate of the L3 vertebral body. \par PARASPINAL MUSCLE AND SOFT TISSUES: Unremarkable. \par INTRAABDOMINAL/INTRAPELVIC SOFT TISSUES: Partially visualized bilateral \par renal cysts. \par \par IMPRESSION: \par Incomplete fracture/stress reaction of the right side of the L5 vertebral \par body with edema. No loss of vertebral body height. \par Dextrocurvature of the spine and lumbar spondylosis and facet arthrosis, \par most notably at L4-L5, resulting in severe right foraminal narrowing, and at \par L5/S1, resulting in moderate to severe right foraminal narrowing. \par \par RELL ALICEA M.D., ATTENDING RADIOLOGIST \par This document has been electronically signed. Dec 6 2018 1:16PM \par \par ______\par \par EXAM: BONE DENSITY - CENTRAL \par \par PROCEDURE DATE: 12/26/2018 \par \par INTERPRETATION: Clinical indication: Spinal fracture. Screening for \par osteoporosis. \par \par Thank you for referring your patient for bone densitometry on the [employer] \par [employer]. The results are expressed as a T-score, or the standard deviation \par from the mean young normal value, which is the basis for the WHO diagnostic \par criteria for bone density. Please note that the criteria have not been \par validated for males or premenopausal females. \par \par COMPARISON: None. \par \par RESULTS: \par Femoral neck: -1.0, normal. \par Total hip: 0.1, normal. \par Spine: 1.2, normal. \par \par IMPRESSION: Normal. \par \par FRACTURE RISK: The risk of fracture is average. \par \par TREATMENT RECOMMENDATIONS: Based on NOF treatment guidelines medical \par therapy is not recommended at this time. \par \par All treatment decisions require clinical judgment and consideration of \par individual patient factors, including patient preferences, comorbidities, \par previous drug use, risk factors not captured in the FRAX model (e.g. \par frailty, falls, Vitamin D deficiency, increased bone turnover, interval \par significant decline in bone density) and possible under or over estimation \par of fracture risk by FRAX. \par \par In addition the NOF Guide recommends that FDA-approved medical therapies be \par considered in postmenopausal woman and men age >= 50 years with a: \par * hip or vertebral (clinical or morphometric) fracture. \par * T-score of <=-2.5 at the spine or hip. \par * 10 years fracture probability by FRAX of >= 3.0% for hip fracture or >= \par 20% for major osteoporotic fracture. \par \par FOLLOW-UP: A repeat examination is recommended in 3 to 5 years. \par \par AUGUST MCQUEEN M.D., ATTENDING RADIOLOGIST Phone #: [phone] \par This document has been electronically signed. Dec 26 2018 5:33PM

## 2019-07-03 NOTE — HISTORY OF PRESENT ILLNESS
[Worsening] : worsening [9] : a current pain level of 9/10 [de-identified] : Patient is here today for re evaluation on his chronic back pain.Patient went to oncology and bone density back to discuss his options.\par Still has right low back pain.\par Had hemeonc evaluation, apparently wnl.

## 2019-07-03 NOTE — DISCUSSION/SUMMARY
[Medication Risks Reviewed] : Medication risks reviewed [de-identified] : Recommend MRI lumbar spine to assess for resolution of L5 fracture. Workup was negative for oncologic pathology\par If fracture still persists recommend L5 kyphoplasty with ablation. If fracture resolved start PT\par Rx for percocet given\par \par [place]  (Presription Monitoring Program) registry reviewed for this patient

## 2019-07-17 ENCOUNTER — OUTPATIENT (OUTPATIENT)
Dept: OUTPATIENT SERVICES | Facility: HOSPITAL | Age: 69
LOS: 1 days | End: 2019-07-17
Payer: COMMERCIAL

## 2019-07-17 ENCOUNTER — APPOINTMENT (OUTPATIENT)
Dept: MRI IMAGING | Facility: CLINIC | Age: 69
End: 2019-07-17
Payer: MEDICARE

## 2019-07-17 DIAGNOSIS — S32.059A UNSPECIFIED FRACTURE OF FIFTH LUMBAR VERTEBRA, INITIAL ENCOUNTER FOR CLOSED FRACTURE: ICD-10-CM

## 2019-07-17 PROCEDURE — 72148 MRI LUMBAR SPINE W/O DYE: CPT

## 2019-07-17 PROCEDURE — 72148 MRI LUMBAR SPINE W/O DYE: CPT | Mod: 26

## 2019-08-05 ENCOUNTER — APPOINTMENT (OUTPATIENT)
Dept: ORTHOPEDIC SURGERY | Facility: CLINIC | Age: 69
End: 2019-08-05
Payer: MEDICARE

## 2019-08-05 VITALS
HEART RATE: 91 BPM | DIASTOLIC BLOOD PRESSURE: 84 MMHG | BODY MASS INDEX: 30.09 KG/M2 | SYSTOLIC BLOOD PRESSURE: 149 MMHG | WEIGHT: 242 LBS | HEIGHT: 75 IN

## 2019-08-05 PROCEDURE — 99214 OFFICE O/P EST MOD 30 MIN: CPT

## 2019-08-05 NOTE — DISCUSSION/SUMMARY
[Medication Risks Reviewed] : Medication risks reviewed [de-identified] : The patient did continues to complain of back pain. He is not interested in any spinal surgical intervention. MRI lumbar spine does not reveal any acute pathology but there is focal degenerative scoliosis and lumbar spine as well as degeneration foraminal stenosis. In the absence of any surgical interest by the patient I recommended continued nonsurgical management. Evaluation by a pain management specialist was recommended for continued medications as well as injections as appropriate. A refill of oxycodone was provided but I recommended that he see a pain management specialist for more medication since this office is a surgical practice and more long-term pain management can be pursued by a pain specialist.\par \par [place]  (Pres[place] Monitoring [place]) registry reviewed for this patient\par \par The patient was educated regarding their condition, treatment options as well as prescribed course of treatment. \par Risks and benefits as well as alternatives to the proposed treatment were also provided to the patient \par They were given the opportunity to have all their questions answered to their satisfaction.\par \par Vital signs were reviewed with the patient and the patient was instructed to followup with their primary care provider for further management.\par \par Healthy lifestyle recommendations were also made including a tobacco free lifestyle, proper diet, and weight control.

## 2019-08-05 NOTE — PHYSICAL EXAM
[Obese] : obese [Normal] : normal [Limited] : is limited [LE] : Sensory: Intact in bilateral lower extremities [1+] : left ankle jerk 1+ [DP] : dorsalis pedis 2+ and symmetric bilaterally [PT] : posterior tibial 2+ and symmetric bilaterally [de-identified] : EXAM: MR SPINE LUMBAR \par \par PROCEDURE DATE: 07/17/2019 \par \par INTERPRETATION: \par MR lumbar without gadolinium \par \par CLINICAL INFORMATION: Persistent RIGHT-sided low back pain for years \par Lumbar spondylosis. \par \par TECHNIQUE: Sagittal and axial T1-weighted images, sagittal STIR images, \par sagittal T2-weighted images and coronal T1 and axial T2-weighted images of \par the lumbar spine were obtained. \par \par FINDINGS: MRI dated 12/04/2018 is available for review. \par \par Lumbar vertebral alignment is significant for dextroscoliosis with apex at \par L2. Grade 1 anterior spondylolisthesis noted at L3-4 on a degenerative basis \par due to facet osteophytic hypertrophy. Lumbar vertebral body heights are \par maintained. Marrow signal intensity within lumbar vertebral bodies and \par posterior elements is unremarkable. No osseous expansion, epidural disease \par or paraspinal abnormality is found. \par \par Lumbar intervertebral discs show multilevel degenerative disc disease and \par spondylosis at L1-2 through L5-S1 with loss of disc height and signal within \par the nucleus pulposus and associated degenerative endplate changes. Disc \par bulges at these levels flatten the ventral thecal sac and narrow the \par BILATERAL neural foramina. Mild central stenosis noted at L3-4 and L4-5 on a \par degenerative basis due to disc bulge, facet osteophytic hypertrophy and \par redundancy of ligamentum flavum. Moderate RIGHT-sided L5-S1 facet \par osteophytic hypertrophy is noted. The distal cord maintains intact \par morphology. Distal cord signal intensity is preserved. The conus is \par normally positioned at the L1 level. Nerve roots of the cauda equina appear \par intact. \par \par \par IMPRESSION: Dextroscoliosis with apex at L2. Grade 1 anterior \par spondylolisthesis noted at L3-4 on a degenerative basis. Multilevel \par degenerative disc disease and spondylosis at L1-2 through L5-S1 with bulges \par at these levels that flatten the ventral thecal sac and narrow the BILATERAL \par neural foramina. Mild central stenosis noted at L3-4 and L4-5 on a \par degenerative basis. Moderate RIGHT-sided L5-S1 facet osteophytic hypertrophy. \par \par WAYNE HAY M.D., ATTENDING RADIOLOGIST \par This document has been electronically signed. Jul 20 2019 5:20PM  [Acute Distress] : not in acute distress [Poor Appearance] : well-appearing [Abl Mood] : in a normal mood [Abl Affect] : with normal affect [Disorientation] : oriented x 3 [Poor Coordination] : normal coordination [Painful] : not painful [Summers's Sign] : negative Summers's sign [SLR] : negative straight leg raise [Plantar Reflex Left Only] : absent on the left [Plantar Reflex Right Only] : absent on the right [DTR Reflexes Clonus Of Right Ankle (___ Beats)] : absent on the right [DTR Reflexes Clonus Of Left Ankle (___ Beats)] : absent on the left [de-identified] : He can forward flex to his knees with some increased back pain. Extension is 30° with less pain. [FreeTextEntry2] : The pt is awake, alert and oriented to self, place and time, is comfortable and in no acute distress. Inspection of neck, back and lower extremities bilaterally reveals no rashes or ecchymotic lesions.  There is no obvious abnormal spinal curvature in the sagittal and coronal planes. There is no tenderness over the cervical, thoracic spine, or the paraspinal or upper and lower extremities musculature.Midline lumbar tenderness. There is no sacroiliac tenderness. No greater trochanteric tenderness bilaterally. No atrophy or abnormal movements noted in the upper or lower extremities. There is no swelling noted in the upper or lower extremities bilaterally. No cervical lymphadenopathy noted anteriorly. No joint laxity noted in the upper and lower extremity joints bilaterally.\par Hip range of motion is degrees internal rotation 30° external rotation without pain. Full range of motion of the shoulders bilaterally with no significant pain\par There is no groin pain with hip internal rotation and a negative SRINI test bilaterally.

## 2019-08-05 NOTE — HISTORY OF PRESENT ILLNESS
[9] : a current pain level of 9/10 [Daily] : ~He/She~ states the symptoms seem to be occuring daily [de-identified] : any type of activity [de-identified] : Patient is here today to review mri lumbar spine 7/17/19. Is workinmg, Could not take medications  [de-identified] : oxycodone alert and oriented x 3

## 2020-08-05 ENCOUNTER — APPOINTMENT (OUTPATIENT)
Dept: FAMILY MEDICINE | Facility: CLINIC | Age: 70
End: 2020-08-05

## 2020-08-24 ENCOUNTER — LABORATORY RESULT (OUTPATIENT)
Age: 70
End: 2020-08-24

## 2020-08-24 ENCOUNTER — APPOINTMENT (OUTPATIENT)
Dept: FAMILY MEDICINE | Facility: CLINIC | Age: 70
End: 2020-08-24
Payer: MEDICARE

## 2020-08-24 ENCOUNTER — NON-APPOINTMENT (OUTPATIENT)
Age: 70
End: 2020-08-24

## 2020-08-24 VITALS — DIASTOLIC BLOOD PRESSURE: 98 MMHG | SYSTOLIC BLOOD PRESSURE: 148 MMHG

## 2020-08-24 VITALS
SYSTOLIC BLOOD PRESSURE: 154 MMHG | HEIGHT: 73 IN | HEART RATE: 83 BPM | RESPIRATION RATE: 16 BRPM | TEMPERATURE: 98.6 F | DIASTOLIC BLOOD PRESSURE: 104 MMHG | BODY MASS INDEX: 34.72 KG/M2 | WEIGHT: 262 LBS | OXYGEN SATURATION: 96 %

## 2020-08-24 DIAGNOSIS — Z23 ENCOUNTER FOR IMMUNIZATION: ICD-10-CM

## 2020-08-24 PROCEDURE — 36415 COLL VENOUS BLD VENIPUNCTURE: CPT

## 2020-08-24 PROCEDURE — G0009: CPT

## 2020-08-24 PROCEDURE — 96372 THER/PROPH/DIAG INJ SC/IM: CPT

## 2020-08-24 PROCEDURE — 99214 OFFICE O/P EST MOD 30 MIN: CPT | Mod: 25

## 2020-08-24 PROCEDURE — 90732 PPSV23 VACC 2 YRS+ SUBQ/IM: CPT

## 2020-08-24 PROCEDURE — G0439: CPT

## 2020-08-24 PROCEDURE — 93000 ELECTROCARDIOGRAM COMPLETE: CPT

## 2020-08-24 RX ORDER — POLYETHYLENE GLYCOL 3350, SODIUM SULFATE, SODIUM CHLORIDE, POTASSIUM CHLORIDE, ASCORBIC ACID, SODIUM ASCORBATE 7.5-2.691G
100 KIT ORAL
Qty: 1 | Refills: 0 | Status: COMPLETED | COMMUNITY
Start: 2018-12-21 | End: 2020-08-24

## 2020-08-24 RX ORDER — RAMIPRIL 10 MG/1
10 CAPSULE ORAL DAILY
Refills: 0 | Status: COMPLETED | COMMUNITY
Start: 2018-11-20 | End: 2020-08-24

## 2020-08-24 RX ORDER — OXYCODONE AND ACETAMINOPHEN 5; 325 MG/1; MG/1
5-325 TABLET ORAL EVERY 8 HOURS
Qty: 21 | Refills: 0 | Status: COMPLETED | COMMUNITY
Start: 2018-11-20 | End: 2020-08-24

## 2020-08-24 RX ORDER — CYANOCOBALAMIN 1000 UG/ML
1000 INJECTION INTRAMUSCULAR; SUBCUTANEOUS
Qty: 0 | Refills: 0 | Status: COMPLETED | OUTPATIENT
Start: 2020-08-24

## 2020-08-24 RX ADMIN — CYANOCOBALAMIN 0 MCG/ML: 1000 INJECTION INTRAMUSCULAR; SUBCUTANEOUS at 00:00

## 2020-08-25 LAB
25(OH)D3 SERPL-MCNC: 49.4 NG/ML
ALBUMIN SERPL ELPH-MCNC: 4.5 G/DL
ALP BLD-CCNC: 73 U/L
ALT SERPL-CCNC: 13 U/L
ANION GAP SERPL CALC-SCNC: 15 MMOL/L
APPEARANCE: CLEAR
AST SERPL-CCNC: 21 U/L
B BURGDOR AB SER-IMP: NEGATIVE
B BURGDOR IGG+IGM SER QL IB: NORMAL
B BURGDOR IGG+IGM SER QL: 0.76 INDEX
BASOPHILS # BLD AUTO: 0.06 K/UL
BASOPHILS NFR BLD AUTO: 0.7 %
BILIRUB SERPL-MCNC: 0.7 MG/DL
BILIRUBIN URINE: NEGATIVE
BLOOD URINE: ABNORMAL
BUN SERPL-MCNC: 14 MG/DL
CALCIUM SERPL-MCNC: 9.5 MG/DL
CHLORIDE SERPL-SCNC: 105 MMOL/L
CHOLEST SERPL-MCNC: 201 MG/DL
CHOLEST/HDLC SERPL: 3.3 RATIO
CO2 SERPL-SCNC: 21 MMOL/L
COLOR: YELLOW
CREAT SERPL-MCNC: 0.96 MG/DL
CREAT SPEC-SCNC: 200 MG/DL
EOSINOPHIL # BLD AUTO: 0.05 K/UL
EOSINOPHIL NFR BLD AUTO: 0.6 %
ESTIMATED AVERAGE GLUCOSE: 105 MG/DL
FOLATE SERPL-MCNC: 10.7 NG/ML
GLUCOSE QUALITATIVE U: NEGATIVE
GLUCOSE SERPL-MCNC: 102 MG/DL
HBA1C MFR BLD HPLC: 5.3 %
HCT VFR BLD CALC: 46 %
HDLC SERPL-MCNC: 61 MG/DL
HGB BLD-MCNC: 15.4 G/DL
IMM GRANULOCYTES NFR BLD AUTO: 0.5 %
KETONES URINE: NEGATIVE
LDLC SERPL CALC-MCNC: 128 MG/DL
LEUKOCYTE ESTERASE URINE: NEGATIVE
LYMPHOCYTES # BLD AUTO: 1.67 K/UL
LYMPHOCYTES NFR BLD AUTO: 19.2 %
MAN DIFF?: NORMAL
MCHC RBC-ENTMCNC: 32.9 PG
MCHC RBC-ENTMCNC: 33.5 GM/DL
MCV RBC AUTO: 98.3 FL
MICROALBUMIN 24H UR DL<=1MG/L-MCNC: 3.4 MG/DL
MICROALBUMIN/CREAT 24H UR-RTO: 17 MG/G
MONOCYTES # BLD AUTO: 0.78 K/UL
MONOCYTES NFR BLD AUTO: 9 %
NEUTROPHILS # BLD AUTO: 6.08 K/UL
NEUTROPHILS NFR BLD AUTO: 70 %
NITRITE URINE: NEGATIVE
PH URINE: 5.5
PLATELET # BLD AUTO: 195 K/UL
POTASSIUM SERPL-SCNC: 4.7 MMOL/L
PROT SERPL-MCNC: 7.4 G/DL
PROTEIN URINE: NORMAL
PSA SERPL-MCNC: 3.26 NG/ML
RBC # BLD: 4.68 M/UL
RBC # FLD: 12.1 %
SARS-COV-2 IGG SERPL IA-ACNC: <3.8 AU/ML
SARS-COV-2 IGG SERPL QL IA: NEGATIVE
SODIUM SERPL-SCNC: 141 MMOL/L
SPECIFIC GRAVITY URINE: 1.02
TRIGL SERPL-MCNC: 62 MG/DL
TSH SERPL-ACNC: 0.68 UIU/ML
URATE SERPL-MCNC: 7.4 MG/DL
UROBILINOGEN URINE: NORMAL
VIT B12 SERPL-MCNC: >2000 PG/ML
WBC # FLD AUTO: 8.68 K/UL

## 2020-08-25 RX ORDER — RAMIPRIL 5 MG/1
5 CAPSULE ORAL
Qty: 30 | Refills: 0 | Status: COMPLETED | COMMUNITY
Start: 2020-08-07

## 2020-08-28 PROBLEM — Z23 NEED FOR PNEUMOCOCCAL VACCINATION: Status: ACTIVE | Noted: 2020-08-24

## 2020-08-28 RX ORDER — OXYCODONE AND ACETAMINOPHEN 5; 325 MG/1; MG/1
5-325 TABLET ORAL
Qty: 30 | Refills: 0 | Status: COMPLETED | COMMUNITY
Start: 2019-07-03 | End: 2020-08-28

## 2020-08-28 NOTE — COUNSELING
[Fall prevention counseling provided] : Fall prevention counseling provided [No throw rugs] : No throw rugs [Adequate lighting] : Adequate lighting [Use recommended devices] : Use recommended devices [Use proper foot wear] : Use proper foot wear [Potential consequences of obesity discussed] : Potential consequences of obesity discussed [Benefits of weight loss discussed] : Benefits of weight loss discussed [Encouraged to increase physical activity] : Encouraged to increase physical activity [Encouraged to maintain food diary] : Encouraged to maintain food diary [Encouraged to use exercise tracking device] : Encouraged to use exercise tracking device [Weigh Self Weekly] : weigh self weekly [Decrease Portions] : decrease portions [Keep Food Diary] : keep food diary [____ min/wk Activity] : [unfilled] min/wk activity [Good understanding] : Patient has a good understanding of lifestyle changes and steps needed to achieve self management goal

## 2020-08-28 NOTE — REVIEW OF SYSTEMS
[Back Pain] : back pain [Negative] : Heme/Lymph [Joint Pain] : no joint pain [Joint Stiffness] : no joint stiffness [Muscle Pain] : no muscle pain [Joint Swelling] : no joint swelling [Muscle Weakness] : no muscle weakness

## 2020-08-28 NOTE — PHYSICAL EXAM
[No Acute Distress] : no acute distress [No Varicosities] : no varicosities [No Edema] : there was no peripheral edema [No CVA Tenderness] : no CVA  tenderness [No Spinal Tenderness] : no spinal tenderness [Deep Tendon Reflexes (DTR)] : deep tendon reflexes were 2+ and symmetric [Alert and Oriented x3] : oriented to person, place, and time [Normal] : affect was normal and insight and judgment were intact [de-identified] : erythematosus , macular rash on the face  [de-identified] : obese

## 2020-08-28 NOTE — HEALTH RISK ASSESSMENT
[Good] : ~his/her~  mood as  good [1 or 2 (0 pts)] : 1 or 2 (0 points) [Yes] : Yes [2 - 4 times a month (2 pts)] : 2-4 times a month (2 points) [No] : In the past 12 months have you used drugs other than those required for medical reasons? No [Never (0 pts)] : Never (0 points) [0] : 2) Feeling down, depressed, or hopeless: Not at all (0) [No falls in past year] : Patient reported no falls in the past year [Patient reported bone density results were normal] : Patient reported bone density results were normal [] :  [Feels Safe at Home] : Feels safe at home [Sexually Active] : sexually active [Independent] : managing medications [Smoke Detector] : smoke detector [Carbon Monoxide Detector] : carbon monoxide detector [Seat Belt] :  uses seat belt [Sunscreen] : uses sunscreen [With Patient/Caregiver] : With Patient/Caregiver [Aggressive treatment] : aggressive treatment [] : No [FreeTextEntry1] : back pain , skin problem  [Audit-CScore] : 2 [de-identified] : Ortho [de-identified] : No [de-identified] : walking  [de-identified] : regular  [[id number]] : 0 [Change in mental status noted] : No change in mental status noted [Language] : denies difficulty with language [Reports changes in hearing] : Reports no changes in hearing [Reports changes in dental health] : Reports no changes in dental health [Reports changes in vision] : Reports no changes in vision [ColonoscopyDate] : 02/19 [PapSmearDate] : NA [BoneDensityDate] : 12/18 [MammogramDate] : NA [HepatitisCDate] : 10/18 [HIVDate] : 10/18 [AdvancecareDate] : 08/20

## 2020-08-28 NOTE — HEALTH RISK ASSESSMENT
[Good] : ~his/her~  mood as  good [1 or 2 (0 pts)] : 1 or 2 (0 points) [2 - 4 times a month (2 pts)] : 2-4 times a month (2 points) [Yes] : Yes [No] : In the past 12 months have you used drugs other than those required for medical reasons? No [Never (0 pts)] : Never (0 points) [No falls in past year] : Patient reported no falls in the past year [0] : 1) Little interest or pleasure doing things: Not at all (0) [Patient reported bone density results were normal] : Patient reported bone density results were normal [] :  [Feels Safe at Home] : Feels safe at home [Sexually Active] : sexually active [Independent] : using transportation [Smoke Detector] : smoke detector [Carbon Monoxide Detector] : carbon monoxide detector [Seat Belt] :  uses seat belt [Sunscreen] : uses sunscreen [With Patient/Caregiver] : With Patient/Caregiver [Aggressive treatment] : aggressive treatment [] : No [FreeTextEntry1] : back pain , skin problem  [Audit-CScore] : 2 [de-identified] : No [de-identified] : Ortho [de-identified] : regular  [[id number]] : 0 [de-identified] : walking  [Change in mental status noted] : No change in mental status noted [Language] : denies difficulty with language [Reports changes in hearing] : Reports no changes in hearing [Reports changes in dental health] : Reports no changes in dental health [Reports changes in vision] : Reports no changes in vision [BoneDensityDate] : 12/18 [MammogramDate] : NA [ColonoscopyDate] : 02/19 [PapSmearDate] : NA [HepatitisCDate] : 10/18 [HIVDate] : 10/18 [AdvancecareDate] : 08/20

## 2020-08-28 NOTE — HISTORY OF PRESENT ILLNESS
[de-identified] : Patient came  for physical. Denies CP,SOB,Abd pain, no N,V,C,D.Pt c/o chronic back pain, seen Ortho in the past , no improvement , he c/o dull, LS pain, 6/10 - non radiating .Pt c/o rash on his face ,not improving , on and off worse. Patient feels Anxious due to COVID situation, NS,NH.   [FreeTextEntry1] : cc: physical , skin problem  , Anxiety

## 2020-08-28 NOTE — HISTORY OF PRESENT ILLNESS
[FreeTextEntry1] : cc: physical , skin problem  , Anxiety [de-identified] : Patient came  for physical. Denies CP,SOB,Abd pain, no N,V,C,D.Pt c/o chronic back pain, seen Ortho in the past , no improvement , he c/o dull, LS pain, 6/10 - non radiating .Pt c/o rash on his face ,not improving , on and off worse. Patient feels Anxious due to COVID situation, NS,NH.

## 2020-08-28 NOTE — REVIEW OF SYSTEMS
[Back Pain] : back pain [Negative] : Heme/Lymph [Joint Pain] : no joint pain [Muscle Pain] : no muscle pain [Joint Stiffness] : no joint stiffness [Joint Swelling] : no joint swelling [Muscle Weakness] : no muscle weakness

## 2020-08-28 NOTE — COUNSELING
[Fall prevention counseling provided] : Fall prevention counseling provided [No throw rugs] : No throw rugs [Adequate lighting] : Adequate lighting [Use proper foot wear] : Use proper foot wear [Use recommended devices] : Use recommended devices [Potential consequences of obesity discussed] : Potential consequences of obesity discussed [Benefits of weight loss discussed] : Benefits of weight loss discussed [Encouraged to increase physical activity] : Encouraged to increase physical activity [Encouraged to use exercise tracking device] : Encouraged to use exercise tracking device [Encouraged to maintain food diary] : Encouraged to maintain food diary [Weigh Self Weekly] : weigh self weekly [Decrease Portions] : decrease portions [Keep Food Diary] : keep food diary [____ min/wk Activity] : [unfilled] min/wk activity [Good understanding] : Patient has a good understanding of lifestyle changes and steps needed to achieve self management goal

## 2020-08-28 NOTE — DATA REVIEWED
[FreeTextEntry1] : last blood work d/w the pt at length\par \par \par EKG - NSR at 80 bpm,  + Q waves

## 2020-08-28 NOTE — PHYSICAL EXAM
[No Acute Distress] : no acute distress [No Varicosities] : no varicosities [No Edema] : there was no peripheral edema [No CVA Tenderness] : no CVA  tenderness [No Spinal Tenderness] : no spinal tenderness [Deep Tendon Reflexes (DTR)] : deep tendon reflexes were 2+ and symmetric [Alert and Oriented x3] : oriented to person, place, and time [Normal] : affect was normal and insight and judgment were intact [de-identified] : erythematosus , macular rash on the face  [de-identified] : obese

## 2020-09-10 ENCOUNTER — APPOINTMENT (OUTPATIENT)
Dept: CARDIOLOGY | Facility: CLINIC | Age: 70
End: 2020-09-10

## 2020-09-30 ENCOUNTER — APPOINTMENT (OUTPATIENT)
Dept: FAMILY MEDICINE | Facility: CLINIC | Age: 70
End: 2020-09-30

## 2020-10-28 ENCOUNTER — RX RENEWAL (OUTPATIENT)
Age: 70
End: 2020-10-28

## 2020-11-03 ENCOUNTER — APPOINTMENT (OUTPATIENT)
Dept: CARDIOLOGY | Facility: CLINIC | Age: 70
End: 2020-11-03
Payer: MEDICARE

## 2020-11-03 ENCOUNTER — NON-APPOINTMENT (OUTPATIENT)
Age: 70
End: 2020-11-03

## 2020-11-03 VITALS
SYSTOLIC BLOOD PRESSURE: 136 MMHG | RESPIRATION RATE: 16 BRPM | HEIGHT: 73 IN | WEIGHT: 249 LBS | OXYGEN SATURATION: 96 % | HEART RATE: 77 BPM | BODY MASS INDEX: 33 KG/M2 | TEMPERATURE: 97.2 F | DIASTOLIC BLOOD PRESSURE: 96 MMHG

## 2020-11-03 VITALS — SYSTOLIC BLOOD PRESSURE: 139 MMHG | DIASTOLIC BLOOD PRESSURE: 82 MMHG | HEART RATE: 76 BPM

## 2020-11-03 VITALS — DIASTOLIC BLOOD PRESSURE: 82 MMHG | SYSTOLIC BLOOD PRESSURE: 136 MMHG

## 2020-11-03 PROCEDURE — 93306 TTE W/DOPPLER COMPLETE: CPT

## 2020-11-03 PROCEDURE — 93000 ELECTROCARDIOGRAM COMPLETE: CPT

## 2020-11-03 PROCEDURE — 99204 OFFICE O/P NEW MOD 45 MIN: CPT

## 2020-11-03 PROCEDURE — 99072 ADDL SUPL MATRL&STAF TM PHE: CPT

## 2020-11-10 ENCOUNTER — APPOINTMENT (OUTPATIENT)
Dept: CARDIOLOGY | Facility: CLINIC | Age: 70
End: 2020-11-10
Payer: MEDICARE

## 2020-11-10 PROCEDURE — 93880 EXTRACRANIAL BILAT STUDY: CPT

## 2020-11-10 PROCEDURE — 99072 ADDL SUPL MATRL&STAF TM PHE: CPT

## 2020-11-15 ENCOUNTER — RX RENEWAL (OUTPATIENT)
Age: 70
End: 2020-11-15

## 2020-11-18 ENCOUNTER — APPOINTMENT (OUTPATIENT)
Dept: CARDIOLOGY | Facility: CLINIC | Age: 70
End: 2020-11-18
Payer: MEDICARE

## 2020-11-18 PROCEDURE — 93015 CV STRESS TEST SUPVJ I&R: CPT

## 2020-11-24 ENCOUNTER — APPOINTMENT (OUTPATIENT)
Dept: FAMILY MEDICINE | Facility: CLINIC | Age: 70
End: 2020-11-24
Payer: MEDICARE

## 2020-11-24 VITALS
OXYGEN SATURATION: 97 % | BODY MASS INDEX: 33.53 KG/M2 | HEIGHT: 73 IN | TEMPERATURE: 97.9 F | SYSTOLIC BLOOD PRESSURE: 142 MMHG | RESPIRATION RATE: 16 BRPM | HEART RATE: 84 BPM | DIASTOLIC BLOOD PRESSURE: 84 MMHG | WEIGHT: 253 LBS

## 2020-11-24 PROCEDURE — 99215 OFFICE O/P EST HI 40 MIN: CPT | Mod: 25

## 2020-11-24 PROCEDURE — 36415 COLL VENOUS BLD VENIPUNCTURE: CPT

## 2020-11-24 NOTE — HISTORY OF PRESENT ILLNESS
[FreeTextEntry1] : cc: f/u on his thyroid,  blood pressure, back pain , weight , blood in urine  [de-identified] : Patient presented  to f/u on his  pressure , he takes  Ramipril to 10 mg daily, BP controlled, Denies HA, CP.  Patient  c/o back pain 8/10 , , sharp 8/10, worse when he changes position from sitting to standing, pain radiates to hil LE on and off  - no bowel or urination changes, seen in the past by Ortho spine ,no recently, take Oxycodone PRN for severe pain 10/10 - needs renewal.Pt was seen by Cardiol recently.Patient gained  4 lb.patient did not repeat urine or did not get US renal and bladder- aware of importance to do so .

## 2020-11-24 NOTE — PHYSICAL EXAM
[No Respiratory Distress] : no respiratory distress  [Clear to Auscultation] : lungs were clear to auscultation bilaterally [No Accessory Muscle Use] : no accessory muscle use [Normal Percussion] : the chest was normal to percussion [Normal Rate] : normal rate  [Regular Rhythm] : with a regular rhythm [Normal S1, S2] : normal S1 and S2 [No Varicosities] : no varicosities [No Edema] : there was no peripheral edema [Soft] : abdomen soft [Non Tender] : non-tender [Non-distended] : non-distended [No Masses] : no abdominal mass palpated [No HSM] : no HSM [Normal Bowel Sounds] : normal bowel sounds [Normal Supraclavicular Nodes] : no supraclavicular lymphadenopathy [Normal Axillary Nodes] : no axillary lymphadenopathy [Normal Posterior Cervical Nodes] : no posterior cervical lymphadenopathy [Normal Anterior Cervical Nodes] : no anterior cervical lymphadenopathy [No Joint Swelling] : no joint swelling [Grossly Normal Strength/Tone] : grossly normal strength/tone [Normal Gait] : normal gait [Speech Grossly Normal] : speech grossly normal [Alert and Oriented x3] : oriented to person, place, and time [de-identified] : obese  [de-identified] : + LS spine tenderness

## 2020-11-24 NOTE — HEALTH RISK ASSESSMENT
[Yes] : Yes [2 - 4 times a month (2 pts)] : 2-4 times a month (2 points) [1 or 2 (0 pts)] : 1 or 2 (0 points) [Never (0 pts)] : Never (0 points) [] : No [Audit-CScore] : 2 [de-identified] : walking

## 2020-11-24 NOTE — COUNSELING
[Potential consequences of obesity discussed] : Potential consequences of obesity discussed [Benefits of weight loss discussed] : Benefits of weight loss discussed [Encouraged to maintain food diary] : Encouraged to maintain food diary [Encouraged to increase physical activity] : Encouraged to increase physical activity [Encouraged to use exercise tracking device] : Encouraged to use exercise tracking device [Weigh Self Weekly] : weigh self weekly [Decrease Portions] : decrease portions [____ min/wk Activity] : [unfilled] min/wk activity [Keep Food Diary] : keep food diary [Activity counseling provided] : activity [Walking] : Walking [Good understanding] : Patient has a good understanding of lifestyle changes and the steps needed to achieve self management goals

## 2020-11-25 LAB
ALBUMIN SERPL ELPH-MCNC: 4.4 G/DL
ALP BLD-CCNC: 75 U/L
ALT SERPL-CCNC: 12 U/L
ANION GAP SERPL CALC-SCNC: 11 MMOL/L
APPEARANCE: CLEAR
AST SERPL-CCNC: 21 U/L
BACTERIA: NEGATIVE
BASOPHILS # BLD AUTO: 0.05 K/UL
BASOPHILS NFR BLD AUTO: 0.6 %
BILIRUB SERPL-MCNC: 0.6 MG/DL
BILIRUBIN URINE: NEGATIVE
BLOOD URINE: NEGATIVE
BUN SERPL-MCNC: 15 MG/DL
CALCIUM SERPL-MCNC: 9.8 MG/DL
CHLORIDE SERPL-SCNC: 105 MMOL/L
CO2 SERPL-SCNC: 25 MMOL/L
COLOR: YELLOW
CREAT SERPL-MCNC: 0.99 MG/DL
EOSINOPHIL # BLD AUTO: 0.04 K/UL
EOSINOPHIL NFR BLD AUTO: 0.5 %
GLUCOSE QUALITATIVE U: NEGATIVE
GLUCOSE SERPL-MCNC: 92 MG/DL
HCT VFR BLD CALC: 44.9 %
HGB BLD-MCNC: 15.1 G/DL
HYALINE CASTS: 1 /LPF
IMM GRANULOCYTES NFR BLD AUTO: 0.6 %
KETONES URINE: NEGATIVE
LEUKOCYTE ESTERASE URINE: NEGATIVE
LYMPHOCYTES # BLD AUTO: 1.65 K/UL
LYMPHOCYTES NFR BLD AUTO: 20.9 %
MAN DIFF?: NORMAL
MCHC RBC-ENTMCNC: 33.3 PG
MCHC RBC-ENTMCNC: 33.6 GM/DL
MCV RBC AUTO: 98.9 FL
MICROSCOPIC-UA: NORMAL
MONOCYTES # BLD AUTO: 0.86 K/UL
MONOCYTES NFR BLD AUTO: 10.9 %
NEUTROPHILS # BLD AUTO: 5.25 K/UL
NEUTROPHILS NFR BLD AUTO: 66.5 %
NITRITE URINE: NEGATIVE
PH URINE: 6.5
PLATELET # BLD AUTO: 199 K/UL
POTASSIUM SERPL-SCNC: 5.1 MMOL/L
PROT SERPL-MCNC: 7.2 G/DL
PROTEIN URINE: NORMAL
RBC # BLD: 4.54 M/UL
RBC # FLD: 11.7 %
RED BLOOD CELLS URINE: 11 /HPF
SODIUM SERPL-SCNC: 141 MMOL/L
SPECIFIC GRAVITY URINE: 1.02
SQUAMOUS EPITHELIAL CELLS: 2 /HPF
T3FREE SERPL-MCNC: 2.67 PG/ML
T4 FREE SERPL-MCNC: 1.4 NG/DL
TSH SERPL-ACNC: 0.65 UIU/ML
UROBILINOGEN URINE: NORMAL
WBC # FLD AUTO: 7.9 K/UL
WHITE BLOOD CELLS URINE: 4 /HPF

## 2020-11-25 RX ORDER — OXYCODONE HYDROCHLORIDE 5 MG/1
5 CAPSULE ORAL 3 TIMES DAILY
Qty: 21 | Refills: 0 | Status: COMPLETED | COMMUNITY
Start: 2020-08-28 | End: 2020-11-25

## 2020-11-27 LAB — BACTERIA UR CULT: NORMAL

## 2020-12-02 ENCOUNTER — APPOINTMENT (OUTPATIENT)
Dept: ORTHOPEDIC SURGERY | Facility: CLINIC | Age: 70
End: 2020-12-02
Payer: MEDICARE

## 2020-12-02 VITALS
DIASTOLIC BLOOD PRESSURE: 83 MMHG | SYSTOLIC BLOOD PRESSURE: 162 MMHG | TEMPERATURE: 97.1 F | HEART RATE: 85 BPM | BODY MASS INDEX: 33.13 KG/M2 | WEIGHT: 250 LBS | HEIGHT: 73 IN

## 2020-12-02 DIAGNOSIS — M41.80 OTHER FORMS OF SCOLIOSIS, SITE UNSPECIFIED: ICD-10-CM

## 2020-12-02 DIAGNOSIS — M51.37 OTHER INTERVERTEBRAL DISC DEGENERATION, LUMBOSACRAL REGION: ICD-10-CM

## 2020-12-02 PROCEDURE — 99072 ADDL SUPL MATRL&STAF TM PHE: CPT

## 2020-12-02 PROCEDURE — 99214 OFFICE O/P EST MOD 30 MIN: CPT

## 2020-12-02 NOTE — PHYSICAL EXAM
[Obese] : obese [Normal] : normal [Limited] : is limited [LE] : Sensory: Intact in bilateral lower extremities [1+] : left ankle jerk 1+ [DP] : dorsalis pedis 2+ and symmetric bilaterally [PT] : posterior tibial 2+ and symmetric bilaterally [de-identified] : EXAM: MR SPINE LUMBAR \par \par PROCEDURE DATE: 07/17/2019 \par \par INTERPRETATION: \par MR lumbar without gadolinium \par \par CLINICAL INFORMATION: Persistent RIGHT-sided low back pain for years \par Lumbar spondylosis. \par \par TECHNIQUE: Sagittal and axial T1-weighted images, sagittal STIR images, \par sagittal T2-weighted images and coronal T1 and axial T2-weighted images of \par the lumbar spine were obtained. \par \par FINDINGS: MRI dated 12/04/2018 is available for review. \par \par Lumbar vertebral alignment is significant for dextroscoliosis with apex at \par L2. Grade 1 anterior spondylolisthesis noted at L3-4 on a degenerative basis \par due to facet osteophytic hypertrophy. Lumbar vertebral body heights are \par maintained. Marrow signal intensity within lumbar vertebral bodies and \par posterior elements is unremarkable. No osseous expansion, epidural disease \par or paraspinal abnormality is found. \par \par Lumbar intervertebral discs show multilevel degenerative disc disease and \par spondylosis at L1-2 through L5-S1 with loss of disc height and signal within \par the nucleus pulposus and associated degenerative endplate changes. Disc \par bulges at these levels flatten the ventral thecal sac and narrow the \par BILATERAL neural foramina. Mild central stenosis noted at L3-4 and L4-5 on a \par degenerative basis due to disc bulge, facet osteophytic hypertrophy and \par redundancy of ligamentum flavum. Moderate RIGHT-sided L5-S1 facet \par osteophytic hypertrophy is noted. The distal cord maintains intact \par morphology. Distal cord signal intensity is preserved. The conus is \par normally positioned at the L1 level. Nerve roots of the cauda equina appear \par intact. \par \par IMPRESSION: Dextroscoliosis with apex at L2. Grade 1 anterior \par spondylolisthesis noted at L3-4 on a degenerative basis. Multilevel \par degenerative disc disease and spondylosis at L1-2 through L5-S1 with bulges \par at these levels that flatten the ventral thecal sac and narrow the BILATERAL \par neural foramina. Mild central stenosis noted at L3-4 and L4-5 on a \par degenerative basis. Moderate RIGHT-sided L5-S1 facet osteophytic hypertrophy. \par \par WAYNE HAY M.D., ATTENDING RADIOLOGIST \par This document has been electronically signed. Jul 20 2019 5:20PM  [Poor Appearance] : well-appearing [Acute Distress] : not in acute distress [Abl Mood] : in a normal mood [Abl Affect] : with normal affect [Poor Coordination] : normal coordination [Disorientation] : oriented x 3 [Painful] : not painful [Summers's Sign] : negative Summers's sign [SLR] : negative straight leg raise [Plantar Reflex Right Only] : absent on the right [Plantar Reflex Left Only] : absent on the left [DTR Reflexes Clonus Of Right Ankle (___ Beats)] : absent on the right [DTR Reflexes Clonus Of Left Ankle (___ Beats)] : absent on the left [FreeTextEntry2] : The pt is awake, alert and oriented to self, place and time, is comfortable and in no acute distress. Inspection of neck, back and lower extremities bilaterally reveals no rashes or ecchymotic lesions.  There is no obvious abnormal spinal curvature in the sagittal and coronal planes. There is no tenderness over the cervical, thoracic spine, or the paraspinal or upper and lower extremities musculature.Midline lumbar tenderness. There is no sacroiliac tenderness. No greater trochanteric tenderness bilaterally. No atrophy or abnormal movements noted in the upper or lower extremities. There is no swelling noted in the upper or lower extremities bilaterally. No cervical lymphadenopathy noted anteriorly. No joint laxity noted in the upper and lower extremity joints bilaterally.\par Hip range of motion is degrees internal rotation 30° external rotation without pain. Full range of motion of the shoulders bilaterally with no significant pain\par There is no groin pain with hip internal rotation and a negative SRINI test bilaterally.  [de-identified] : He can forward flex to his knees with some increased back pain. Extension is 30° with less pain.

## 2020-12-02 NOTE — DISCUSSION/SUMMARY
[Medication Risks Reviewed] : Medication risks reviewed [de-identified] : The patient has a known diagnosis of disc degeneration primarily at the L4-5 and L5-S1 levels and this protrusions.  No obvious fractures have been noted in the past on MRI.  Given his current presentation primarily axial low back pain recommended continued conservative treatment.  He will follow-up with his pain management specialist for medications and injections if necessary.  A prescription for physical therapy was provided today.  Over the long-term he understands that a weight loss and a self-directed exercise program is the most beneficial for him.  He may eventually need lumbar spinal fusion surgery at L4-5 and L5-S1 with decompression if the symptoms persist.\par \par The patient was educated regarding their condition, treatment options as well as prescribed course of treatment. \par Risks and benefits as well as alternatives to the proposed treatment were also provided to the patient \par They were given the opportunity to have all their questions answered to their satisfaction.\par \par Vital signs were reviewed with the patient and the patient was instructed to followup with their primary care provider for further management.\par \par Healthy lifestyle recommendations were also made including a tobacco free lifestyle, proper diet, and weight control.

## 2020-12-02 NOTE — HISTORY OF PRESENT ILLNESS
[5] : a current pain level of 5/10 [Daily] : ~He/She~ states the symptoms seem to be occuring daily [Prolonged Sitting] : worsened by prolonged sitting [Sitting] : worsened by sitting [Rest] : relieved by rest [Stable] : stable [___ mths] : [unfilled] month(s) ago [de-identified] : Patient is here today due to chronic low back pain. Patient states no recent injury and his doctor recommended him to come here.\par Was in [place] for a few months, swam there\par [place] the [place] a couple of months ago\par Overall is a little better than last year\par Painkillers, on oxycodone [de-identified] : driving sleeping  [de-identified] : oxycodone

## 2020-12-09 ENCOUNTER — APPOINTMENT (OUTPATIENT)
Dept: ULTRASOUND IMAGING | Facility: CLINIC | Age: 70
End: 2020-12-09
Payer: MEDICARE

## 2020-12-09 ENCOUNTER — OUTPATIENT (OUTPATIENT)
Dept: OUTPATIENT SERVICES | Facility: HOSPITAL | Age: 70
LOS: 1 days | End: 2020-12-09
Payer: MEDICARE

## 2020-12-09 DIAGNOSIS — R31.29 OTHER MICROSCOPIC HEMATURIA: ICD-10-CM

## 2020-12-09 PROCEDURE — 76770 US EXAM ABDO BACK WALL COMP: CPT | Mod: 26

## 2020-12-09 PROCEDURE — 76770 US EXAM ABDO BACK WALL COMP: CPT

## 2020-12-16 PROBLEM — Z12.11 ENCOUNTER FOR SCREENING COLONOSCOPY: Status: RESOLVED | Noted: 2018-12-21 | Resolved: 2020-12-16

## 2020-12-22 ENCOUNTER — RX RENEWAL (OUTPATIENT)
Age: 70
End: 2020-12-22

## 2021-01-25 ENCOUNTER — APPOINTMENT (OUTPATIENT)
Dept: UROLOGY | Facility: CLINIC | Age: 71
End: 2021-01-25
Payer: MEDICARE

## 2021-01-25 VITALS — HEART RATE: 84 BPM | TEMPERATURE: 97.1 F | SYSTOLIC BLOOD PRESSURE: 151 MMHG | DIASTOLIC BLOOD PRESSURE: 92 MMHG

## 2021-01-25 DIAGNOSIS — R31.9 HEMATURIA, UNSPECIFIED: ICD-10-CM

## 2021-01-25 PROCEDURE — 99072 ADDL SUPL MATRL&STAF TM PHE: CPT

## 2021-01-25 PROCEDURE — 51798 US URINE CAPACITY MEASURE: CPT | Mod: 59

## 2021-01-25 PROCEDURE — 99204 OFFICE O/P NEW MOD 45 MIN: CPT | Mod: 25

## 2021-01-25 PROCEDURE — 51741 ELECTRO-UROFLOWMETRY FIRST: CPT

## 2021-01-25 RX ORDER — ALPRAZOLAM 0.25 MG/1
0.25 TABLET ORAL
Qty: 30 | Refills: 0 | Status: COMPLETED | COMMUNITY
Start: 2018-11-20 | End: 2021-01-25

## 2021-01-25 RX ORDER — DULOXETINE HYDROCHLORIDE 20 MG/1
20 CAPSULE, DELAYED RELEASE PELLETS ORAL
Qty: 90 | Refills: 3 | Status: COMPLETED | COMMUNITY
Start: 2020-08-24 | End: 2021-01-25

## 2021-01-25 RX ORDER — ALPRAZOLAM 0.25 MG/1
0.25 TABLET ORAL DAILY
Qty: 30 | Refills: 0 | Status: COMPLETED | COMMUNITY
Start: 2018-11-20 | End: 2021-01-25

## 2021-01-25 RX ORDER — OXYCODONE 10 MG/1
10 TABLET ORAL 3 TIMES DAILY
Qty: 30 | Refills: 0 | Status: COMPLETED | COMMUNITY
Start: 2020-11-24 | End: 2021-01-25

## 2021-01-25 RX ORDER — METRONIDAZOLE 7.5 MG/G
0.75 CREAM TOPICAL TWICE DAILY
Qty: 1 | Refills: 2 | Status: COMPLETED | COMMUNITY
Start: 2020-08-24 | End: 2021-01-25

## 2021-01-25 RX ORDER — ASPIRIN 81 MG/1
81 TABLET, COATED ORAL
Refills: 0 | Status: COMPLETED | COMMUNITY
Start: 2020-11-03 | End: 2021-01-25

## 2021-01-25 RX ORDER — MELOXICAM 15 MG/1
15 TABLET ORAL
Qty: 30 | Refills: 0 | Status: COMPLETED | COMMUNITY
Start: 2018-10-23 | End: 2021-01-25

## 2021-01-25 NOTE — ASSESSMENT
[FreeTextEntry1] : Hematuria\par \par Needs cystoscopy\par \par Needs CT urogram\par \par Total time of visit 60 min,  greater than 50% of that time was spent in counseling patient about the problems listed in plan and coordinating of care. Specifically causes, evaluation, treatment options, risks, complications, and benefits of  available therapies were discussed. Questions were answered. \par \par The submitted E/M billing level for this visit reflects the total time spent on the day of the visit including face-to-face time spent with the patient, non-face-to-face review of medical records and relevant information, documentation, and asynchronous communication with the patient after a visit via phone, email, or patient’s EHR portal after the visit.  \par The medical records reviewed are either scanned into the chart or reviewed with the patient using a patient’s electronic medical records portal for patients with records not available to [facility] via electronic transmission platforms from other institutions and labs. \par Time spend counseling and performing coordination of care was also included in determining the appropriate EM billing level.\par \par I have reviewed and verified information regarding the chief complaint and history recorded by the ancillary staff and/or the patient. I have independently reviewed and interpreted tests performed by other physicians and facilities as necessary. \par \par I have discussed with the patient differential diagnosis, reason for auxiliary tests if ordered, risks, benefits, alternatives, and complications of each form of therapy were discussed.\par

## 2021-01-25 NOTE — PHYSICAL EXAM
[General Appearance - Well Developed] : well developed [General Appearance - Well Nourished] : well nourished [Normal Appearance] : normal appearance [Well Groomed] : well groomed [General Appearance - In No Acute Distress] : no acute distress [Edema] : no peripheral edema [Respiration, Rhythm And Depth] : normal respiratory rhythm and effort [Exaggerated Use Of Accessory Muscles For Inspiration] : no accessory muscle use [Abdomen Soft] : soft [Abdomen Tenderness] : non-tender [Costovertebral Angle Tenderness] : no ~M costovertebral angle tenderness [Urethral Meatus] : meatus normal [Penis Abnormality] : normal uncircumcised penis [Scrotum] : the scrotum was normal [Testes Mass (___cm)] : there were no testicular masses [Prostate Size ___ gm] : prostate size [unfilled] gm [Normal Station and Gait] : the gait and station were normal for the patient's age [] : no rash [No Focal Deficits] : no focal deficits [Oriented To Time, Place, And Person] : oriented to person, place, and time [Affect] : the affect was normal [Mood] : the mood was normal [Not Anxious] : not anxious [No Palpable Adenopathy] : no palpable adenopathy [FreeTextEntry1] : enlarged prostate

## 2021-01-25 NOTE — HISTORY OF PRESENT ILLNESS
[FreeTextEntry1] : RACHEL PATEL, a 70 year old male, presented to the office for a follow up regarding his blood in the urine.\par \par Patient stated he had this problem many years ago and it went away and now was told it is back by his PCP.\par \par Patient states his urine is never discolored or red.\par \par No dysuria. 1 or 2x a night nocturia. \par \par Non smoker\par \par

## 2021-02-05 ENCOUNTER — RX RENEWAL (OUTPATIENT)
Age: 71
End: 2021-02-05

## 2021-02-12 ENCOUNTER — OUTPATIENT (OUTPATIENT)
Dept: OUTPATIENT SERVICES | Facility: HOSPITAL | Age: 71
LOS: 1 days | End: 2021-02-12
Payer: MEDICARE

## 2021-02-12 ENCOUNTER — RESULT REVIEW (OUTPATIENT)
Age: 71
End: 2021-02-12

## 2021-02-12 ENCOUNTER — APPOINTMENT (OUTPATIENT)
Dept: CT IMAGING | Facility: CLINIC | Age: 71
End: 2021-02-12
Payer: MEDICARE

## 2021-02-12 DIAGNOSIS — R31.9 HEMATURIA, UNSPECIFIED: ICD-10-CM

## 2021-02-12 DIAGNOSIS — N28.1 CYST OF KIDNEY, ACQUIRED: ICD-10-CM

## 2021-02-12 PROCEDURE — 74178 CT ABD&PLV WO CNTR FLWD CNTR: CPT | Mod: 26

## 2021-02-12 PROCEDURE — 82565 ASSAY OF CREATININE: CPT

## 2021-02-12 PROCEDURE — 74178 CT ABD&PLV WO CNTR FLWD CNTR: CPT

## 2021-02-16 ENCOUNTER — APPOINTMENT (OUTPATIENT)
Dept: UROLOGY | Facility: CLINIC | Age: 71
End: 2021-02-16
Payer: MEDICARE

## 2021-02-16 PROCEDURE — 99212 OFFICE O/P EST SF 10 MIN: CPT | Mod: 25

## 2021-02-16 PROCEDURE — 51741 ELECTRO-UROFLOWMETRY FIRST: CPT

## 2021-02-16 PROCEDURE — 99072 ADDL SUPL MATRL&STAF TM PHE: CPT

## 2021-02-16 PROCEDURE — 52000 CYSTOURETHROSCOPY: CPT

## 2021-02-16 PROCEDURE — 51798 US URINE CAPACITY MEASURE: CPT | Mod: 59

## 2021-03-15 ENCOUNTER — APPOINTMENT (OUTPATIENT)
Dept: FAMILY MEDICINE | Facility: CLINIC | Age: 71
End: 2021-03-15
Payer: MEDICARE

## 2021-03-15 ENCOUNTER — NON-APPOINTMENT (OUTPATIENT)
Age: 71
End: 2021-03-15

## 2021-03-15 VITALS
DIASTOLIC BLOOD PRESSURE: 80 MMHG | HEART RATE: 80 BPM | OXYGEN SATURATION: 98 % | RESPIRATION RATE: 15 BRPM | BODY MASS INDEX: 32.74 KG/M2 | SYSTOLIC BLOOD PRESSURE: 130 MMHG | HEIGHT: 73 IN | WEIGHT: 247 LBS | TEMPERATURE: 97.2 F

## 2021-03-15 DIAGNOSIS — Z00.00 ENCOUNTER FOR GENERAL ADULT MEDICAL EXAMINATION W/OUT ABNORMAL FINDINGS: ICD-10-CM

## 2021-03-15 DIAGNOSIS — U07.1 COVID-19: ICD-10-CM

## 2021-03-15 PROCEDURE — 99215 OFFICE O/P EST HI 40 MIN: CPT | Mod: 25

## 2021-03-15 PROCEDURE — 99072 ADDL SUPL MATRL&STAF TM PHE: CPT

## 2021-03-15 PROCEDURE — 36415 COLL VENOUS BLD VENIPUNCTURE: CPT

## 2021-03-15 NOTE — PHYSICAL EXAM
[Normal] : no acute distress, well nourished, well developed and well-appearing [Normal Percussion] : the chest was normal to percussion [Soft] : abdomen soft [Non Tender] : non-tender [Non-distended] : non-distended [No Masses] : no abdominal mass palpated [No HSM] : no HSM [Normal Bowel Sounds] : normal bowel sounds [Alert and Oriented x3] : oriented to person, place, and time

## 2021-03-15 NOTE — HEALTH RISK ASSESSMENT
[] : No [Yes] : Yes [2 - 4 times a month (2 pts)] : 2-4 times a month (2 points) [1 or 2 (0 pts)] : 1 or 2 (0 points) [Never (0 pts)] : Never (0 points) [Audit-CScore] : 2 [de-identified] : walking

## 2021-03-15 NOTE — HISTORY OF PRESENT ILLNESS
[FreeTextEntry1] : cc: f/u  thyroid,  pressure, back pain , weight  [de-identified] : Patient presented  to f/u on his blood  pressure, weight - lost 7 lb, thyroid, Doing well, l ,denies GODOY, CP.  Patient  c/o back pain  - no bowel or urination changes, seen in the past by Ortho spine ,no recently, takes Oxycodone PRN for severe pain 10/10 - needs renewal - patient has to go to [place] to take care of his mother, .Pt was seen by Cardiol and corrine MORTENSEN noted

## 2021-03-15 NOTE — REVIEW OF SYSTEMS
[Muscle Pain] : muscle pain [Back Pain] : back pain [Joint Swelling] : joint swelling [Negative] : Neurological

## 2021-03-21 DIAGNOSIS — R76.8 OTHER SPECIFIED ABNORMAL IMMUNOLOGICAL FINDINGS IN SERUM: ICD-10-CM

## 2021-03-21 LAB
25(OH)D3 SERPL-MCNC: 75.1 NG/ML
ALBUMIN SERPL ELPH-MCNC: 4.2 G/DL
ALP BLD-CCNC: 76 U/L
ALT SERPL-CCNC: 10 U/L
ANION GAP SERPL CALC-SCNC: 11 MMOL/L
APPEARANCE: CLEAR
AST SERPL-CCNC: 18 U/L
BASOPHILS # BLD AUTO: 0.03 K/UL
BASOPHILS NFR BLD AUTO: 0.4 %
BILIRUB SERPL-MCNC: 0.6 MG/DL
BILIRUBIN URINE: NEGATIVE
BLOOD URINE: NORMAL
BUN SERPL-MCNC: 13 MG/DL
CALCIUM SERPL-MCNC: 9.4 MG/DL
CHLORIDE SERPL-SCNC: 103 MMOL/L
CHOLEST SERPL-MCNC: 200 MG/DL
CO2 SERPL-SCNC: 25 MMOL/L
COLOR: NORMAL
COVID-19 NUCLEOCAPSID  GAM ANTIBODY INTERPRETATION: POSITIVE
COVID-19 SPIKE DOMAIN ANTIBODY INTERPRETATION: POSITIVE
CREAT SERPL-MCNC: 0.84 MG/DL
CREAT SPEC-SCNC: 64 MG/DL
EOSINOPHIL # BLD AUTO: 0.04 K/UL
EOSINOPHIL NFR BLD AUTO: 0.5 %
ESTIMATED AVERAGE GLUCOSE: 103 MG/DL
FOLATE SERPL-MCNC: 12.3 NG/ML
GLUCOSE QUALITATIVE U: NEGATIVE
GLUCOSE SERPL-MCNC: 93 MG/DL
HBA1C MFR BLD HPLC: 5.2 %
HCT VFR BLD CALC: 42.8 %
HDLC SERPL-MCNC: 62 MG/DL
HGB BLD-MCNC: 14.4 G/DL
IMM GRANULOCYTES NFR BLD AUTO: 0.7 %
KETONES URINE: NEGATIVE
LDLC SERPL CALC-MCNC: 115 MG/DL
LEUKOCYTE ESTERASE URINE: NEGATIVE
LYMPHOCYTES # BLD AUTO: 1.5 K/UL
LYMPHOCYTES NFR BLD AUTO: 17.9 %
MAN DIFF?: NORMAL
MCHC RBC-ENTMCNC: 33 PG
MCHC RBC-ENTMCNC: 33.6 GM/DL
MCV RBC AUTO: 97.9 FL
MICROALBUMIN 24H UR DL<=1MG/L-MCNC: 3.4 MG/DL
MICROALBUMIN/CREAT 24H UR-RTO: 53 MG/G
MONOCYTES # BLD AUTO: 0.79 K/UL
MONOCYTES NFR BLD AUTO: 9.4 %
NEUTROPHILS # BLD AUTO: 5.95 K/UL
NEUTROPHILS NFR BLD AUTO: 71.1 %
NITRITE URINE: NEGATIVE
NONHDLC SERPL-MCNC: 138 MG/DL
PH URINE: 6.5
PLATELET # BLD AUTO: 207 K/UL
POTASSIUM SERPL-SCNC: 4.2 MMOL/L
PROT SERPL-MCNC: 7.3 G/DL
PROTEIN URINE: NEGATIVE
RBC # BLD: 4.37 M/UL
RBC # FLD: 11.9 %
SARS-COV-2 AB SERPL IA-ACNC: 18.8 U/ML
SARS-COV-2 AB SERPL QL IA: 53 INDEX
SODIUM SERPL-SCNC: 139 MMOL/L
SPECIFIC GRAVITY URINE: 1.01
T3FREE SERPL-MCNC: 2.9 PG/ML
T4 FREE SERPL-MCNC: 1.7 NG/DL
THYROPEROXIDASE AB SERPL IA-ACNC: 23.8 IU/ML
TRIGL SERPL-MCNC: 118 MG/DL
TSH SERPL-ACNC: 1.12 UIU/ML
URATE SERPL-MCNC: 6.6 MG/DL
UROBILINOGEN URINE: NORMAL
VIT B12 SERPL-MCNC: 1141 PG/ML
WBC # FLD AUTO: 8.37 K/UL

## 2021-06-23 ENCOUNTER — NON-APPOINTMENT (OUTPATIENT)
Age: 71
End: 2021-06-23

## 2021-06-23 ENCOUNTER — APPOINTMENT (OUTPATIENT)
Dept: FAMILY MEDICINE | Facility: CLINIC | Age: 71
End: 2021-06-23
Payer: MEDICARE

## 2021-06-23 ENCOUNTER — EMERGENCY (EMERGENCY)
Facility: HOSPITAL | Age: 71
LOS: 1 days | Discharge: HOME | End: 2021-06-23
Attending: EMERGENCY MEDICINE | Admitting: EMERGENCY MEDICINE
Payer: COMMERCIAL

## 2021-06-23 VITALS
DIASTOLIC BLOOD PRESSURE: 80 MMHG | WEIGHT: 243 LBS | HEIGHT: 73 IN | OXYGEN SATURATION: 97 % | BODY MASS INDEX: 32.2 KG/M2 | SYSTOLIC BLOOD PRESSURE: 138 MMHG | HEART RATE: 90 BPM | RESPIRATION RATE: 15 BRPM | TEMPERATURE: 97.3 F

## 2021-06-23 VITALS
OXYGEN SATURATION: 96 % | TEMPERATURE: 98 F | HEIGHT: 75 IN | DIASTOLIC BLOOD PRESSURE: 91 MMHG | WEIGHT: 244.93 LBS | RESPIRATION RATE: 16 BRPM | HEART RATE: 83 BPM | SYSTOLIC BLOOD PRESSURE: 161 MMHG

## 2021-06-23 VITALS — RESPIRATION RATE: 16 BRPM | DIASTOLIC BLOOD PRESSURE: 95 MMHG | HEART RATE: 78 BPM | SYSTOLIC BLOOD PRESSURE: 153 MMHG

## 2021-06-23 DIAGNOSIS — R07.9 CHEST PAIN, UNSPECIFIED: ICD-10-CM

## 2021-06-23 DIAGNOSIS — E66.9 OBESITY, UNSPECIFIED: ICD-10-CM

## 2021-06-23 LAB
ALBUMIN SERPL ELPH-MCNC: 3.8 G/DL — SIGNIFICANT CHANGE UP (ref 3.3–5)
ALP SERPL-CCNC: 81 U/L — SIGNIFICANT CHANGE UP (ref 40–120)
ALT FLD-CCNC: 21 U/L — SIGNIFICANT CHANGE UP (ref 10–45)
ANION GAP SERPL CALC-SCNC: 9 MMOL/L — SIGNIFICANT CHANGE UP (ref 5–17)
AST SERPL-CCNC: 23 U/L — SIGNIFICANT CHANGE UP (ref 10–40)
BASOPHILS # BLD AUTO: 0.07 K/UL — SIGNIFICANT CHANGE UP (ref 0–0.2)
BASOPHILS NFR BLD AUTO: 0.7 % — SIGNIFICANT CHANGE UP (ref 0–2)
BILIRUB SERPL-MCNC: 0.8 MG/DL — SIGNIFICANT CHANGE UP (ref 0.2–1.2)
BUN SERPL-MCNC: 20 MG/DL — SIGNIFICANT CHANGE UP (ref 7–23)
CALCIUM SERPL-MCNC: 9 MG/DL — SIGNIFICANT CHANGE UP (ref 8.4–10.5)
CHLORIDE SERPL-SCNC: 103 MMOL/L — SIGNIFICANT CHANGE UP (ref 96–108)
CO2 SERPL-SCNC: 27 MMOL/L — SIGNIFICANT CHANGE UP (ref 22–31)
CREAT SERPL-MCNC: 1.03 MG/DL — SIGNIFICANT CHANGE UP (ref 0.5–1.3)
EOSINOPHIL # BLD AUTO: 0.06 K/UL — SIGNIFICANT CHANGE UP (ref 0–0.5)
EOSINOPHIL NFR BLD AUTO: 0.6 % — SIGNIFICANT CHANGE UP (ref 0–6)
GLUCOSE SERPL-MCNC: 102 MG/DL — HIGH (ref 70–99)
HCT VFR BLD CALC: 41.2 % — SIGNIFICANT CHANGE UP (ref 39–50)
HGB BLD-MCNC: 14.2 G/DL — SIGNIFICANT CHANGE UP (ref 13–17)
IMM GRANULOCYTES NFR BLD AUTO: 0.4 % — SIGNIFICANT CHANGE UP (ref 0–1.5)
LYMPHOCYTES # BLD AUTO: 1.74 K/UL — SIGNIFICANT CHANGE UP (ref 1–3.3)
LYMPHOCYTES # BLD AUTO: 18.1 % — SIGNIFICANT CHANGE UP (ref 13–44)
MCHC RBC-ENTMCNC: 33.6 PG — SIGNIFICANT CHANGE UP (ref 27–34)
MCHC RBC-ENTMCNC: 34.5 GM/DL — SIGNIFICANT CHANGE UP (ref 32–36)
MCV RBC AUTO: 97.6 FL — SIGNIFICANT CHANGE UP (ref 80–100)
MONOCYTES # BLD AUTO: 0.89 K/UL — SIGNIFICANT CHANGE UP (ref 0–0.9)
MONOCYTES NFR BLD AUTO: 9.3 % — SIGNIFICANT CHANGE UP (ref 2–14)
NEUTROPHILS # BLD AUTO: 6.79 K/UL — SIGNIFICANT CHANGE UP (ref 1.8–7.4)
NEUTROPHILS NFR BLD AUTO: 70.9 % — SIGNIFICANT CHANGE UP (ref 43–77)
NRBC # BLD: 0 /100 WBCS — SIGNIFICANT CHANGE UP (ref 0–0)
PLATELET # BLD AUTO: 199 K/UL — SIGNIFICANT CHANGE UP (ref 150–400)
POTASSIUM SERPL-MCNC: 4 MMOL/L — SIGNIFICANT CHANGE UP (ref 3.5–5.3)
POTASSIUM SERPL-SCNC: 4 MMOL/L — SIGNIFICANT CHANGE UP (ref 3.5–5.3)
PROT SERPL-MCNC: 7.8 G/DL — SIGNIFICANT CHANGE UP (ref 6–8.3)
RBC # BLD: 4.22 M/UL — SIGNIFICANT CHANGE UP (ref 4.2–5.8)
RBC # FLD: 12.2 % — SIGNIFICANT CHANGE UP (ref 10.3–14.5)
SODIUM SERPL-SCNC: 139 MMOL/L — SIGNIFICANT CHANGE UP (ref 135–145)
TROPONIN I SERPL-MCNC: <.017 NG/ML — LOW (ref 0.02–0.06)
WBC # BLD: 9.59 K/UL — SIGNIFICANT CHANGE UP (ref 3.8–10.5)
WBC # FLD AUTO: 9.59 K/UL — SIGNIFICANT CHANGE UP (ref 3.8–10.5)

## 2021-06-23 PROCEDURE — 74174 CTA ABD&PLVS W/CONTRAST: CPT | Mod: 26,MA

## 2021-06-23 PROCEDURE — 36415 COLL VENOUS BLD VENIPUNCTURE: CPT

## 2021-06-23 PROCEDURE — 85025 COMPLETE CBC W/AUTO DIFF WBC: CPT

## 2021-06-23 PROCEDURE — 99214 OFFICE O/P EST MOD 30 MIN: CPT | Mod: 25

## 2021-06-23 PROCEDURE — 99072 ADDL SUPL MATRL&STAF TM PHE: CPT

## 2021-06-23 PROCEDURE — 93010 ELECTROCARDIOGRAM REPORT: CPT

## 2021-06-23 PROCEDURE — 84484 ASSAY OF TROPONIN QUANT: CPT

## 2021-06-23 PROCEDURE — 74174 CTA ABD&PLVS W/CONTRAST: CPT | Mod: MA

## 2021-06-23 PROCEDURE — 93000 ELECTROCARDIOGRAM COMPLETE: CPT

## 2021-06-23 PROCEDURE — 99284 EMERGENCY DEPT VISIT MOD MDM: CPT | Mod: 25

## 2021-06-23 PROCEDURE — 71275 CT ANGIOGRAPHY CHEST: CPT | Mod: 26,MA

## 2021-06-23 PROCEDURE — 71275 CT ANGIOGRAPHY CHEST: CPT | Mod: MA

## 2021-06-23 PROCEDURE — 80053 COMPREHEN METABOLIC PANEL: CPT

## 2021-06-23 PROCEDURE — 93005 ELECTROCARDIOGRAM TRACING: CPT

## 2021-06-23 PROCEDURE — 99285 EMERGENCY DEPT VISIT HI MDM: CPT

## 2021-06-23 NOTE — ED PROVIDER NOTE - PROGRESS NOTE DETAILS
Spoke to Dr. AGGARWAL and discussed concern for malignancy in lung and kidney found on CTA. she will follow up with the pt in the AM and arrange follow up with the urologist and pulmonologist for evaluation. discussed results with pt. offered translation but pt became offended and stated he spoke english and doesn't need one

## 2021-06-23 NOTE — ED PROVIDER NOTE - NSFOLLOWUPINSTRUCTIONS_ED_ALL_ED_FT
Follow up with Dr. AGGARWAL tomorrow. Follow up with your urologist and pulmonologist to discuss your findings on CT which showed 8mm nodule in your lung and a growth on the kidney.     Stable 4.6 cm ascending thoracic aortic aneurysm without dissection.  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

## 2021-06-23 NOTE — ED PROVIDER NOTE - OBJECTIVE STATEMENT
71 yo male, hx htn, thoracic aortic aneurysm, sent to the ED by dr Sanchez for chest pain.  Patient co sharp chest pain which started at 8:30 am this morning radiating to his back.  Pain lasted about 4 hours and resolved on its own. Took two baby asa shortly after the pain started.    Known hx of thoracic aortic aneurysm, last checked on imaging 2018  at 4.5 cm.    Denies any numbness, tingling to the extremities, pain at this time, shortness of breath , n;v, or any other complaints. 69 yo male, hx htn, thoracic aortic aneurysm, sent to the ED by Dr. Sanchez for chest pain.  Patient co sharp chest pain which started at 8:30 am this morning radiating to his back.  Pain lasted about 4 hours and resolved on its own. Took two baby asa shortly after the pain started.    Known hx of thoracic aortic aneurysm, last checked on imaging 2018  at 4.5 cm.    Denies any numbness, tingling to the extremities, pain at this time, shortness of breath , n;v, or any other complaints. 69 yo male, hx htn, thoracic aortic aneurysm, sent to the ED by Dr. Sanchez for chest pain.  Patient co sharp chest pain which started at 8:30 am this morning radiating to his back.  Pain lasted about 4 hours and resolved on its own. Took two baby asa shortly after the pain started.   Reports the pain was with ROM of the body (twisting). Currently has lidocaine patch on.   Known hx of thoracic aortic aneurysm, last checked on imaging 2018  at 4.5 cm.    Denies any numbness, tingling to the extremities, pain at this time, shortness of breath , n;v, or any other complaints.

## 2021-06-23 NOTE — ED ADULT TRIAGE NOTE - CHIEF COMPLAINT QUOTE
Sent in by Dr Winslow for CT of chest. Pt started having chest pain this morning. Sent in by Dr Winslow for CT of chest. Pt started having chest pain this morning.  ISAR negative

## 2021-06-23 NOTE — ASSESSMENT
[FreeTextEntry1] : in lieu of concerning symptoms, chest pain with radiation to back, hx of aortic aneursym, advised prompt evaluation in ER. Called and notified [facility] of pt's arrival.

## 2021-06-23 NOTE — ED PROVIDER NOTE - CLINICAL SUMMARY MEDICAL DECISION MAKING FREE TEXT BOX
71 yo male, hx htn, thoracic aortic aneurysm, sent to the ED by Dr. Sanchez for chest pain.  Patient co sharp chest pain which started at 8:30 am this morning radiating to his back.  Pain lasted about 4 hours and resolved on its own. Took two baby asa shortly after the pain started.    Known hx of thoracic aortic aneurysm, last checked on imaging 2018  at 4.5 cm.    Denies any numbness, tingling to the extremities, pain at this time, shortness of breath , n;v, or any other complaints.  Due to hx and complaint, will check labs, EKG.,  CTA c/a/p 71 yo male, hx htn, thoracic aortic aneurysm, sent to the ED by Dr. Sanchez for chest pain.  Patient co sharp chest pain which started at 8:30 am this morning radiating to his back.  Pain lasted about 4 hours and resolved on its own. Took two baby asa shortly after the pain started.   Reports the pain was with ROM of the body (twisting). Currently has lidocaine patch on.   Known hx of thoracic aortic aneurysm, last checked on imaging 2018  at 4.5 cm.    Denies any numbness, tingling to the extremities, pain at this time, shortness of breath , n;v, or any other complaints.  Due to hx and complaint, will check labs, EKG.,  CTA c/a/p  Labs WNL. Patient signed out to incoming physician.  All decisions regarding the progression of care will be made at their discretion. F/U CTA.

## 2021-06-23 NOTE — ED ADULT NURSE NOTE - NSIMPLEMENTINTERV_GEN_ALL_ED
Implemented All Universal Safety Interventions:  [place] to call system. Call bell, personal items and telephone within reach. Instruct patient to call for assistance. Room bathroom lighting operational. Non-slip footwear when patient is off stretcher. Physically safe environment: no spills, clutter or unnecessary equipment. Stretcher in lowest position, wheels locked, appropriate side rails in place.

## 2021-06-23 NOTE — REVIEW OF SYSTEMS
[Chest Pain] : chest pain [Palpitations] : no palpitations [Shortness Of Breath] : no shortness of breath [Wheezing] : no wheezing [Cough] : no cough [Negative] : Heme/Lymph

## 2021-06-23 NOTE — PHYSICAL EXAM
[Well Nourished] : well nourished [EOMI] : extraocular movements intact [Supple] : supple [Clear to Auscultation] : lungs were clear to auscultation bilaterally [Normal S1, S2] : normal S1 and S2 [No Edema] : there was no peripheral edema [Non Tender] : non-tender [No Rash] : no rash [Normal Gait] : normal gait [Normal Affect] : the affect was normal [de-identified] : obese [de-identified] : pain in back

## 2021-06-23 NOTE — ED PROVIDER NOTE - ATTENDING CONTRIBUTION TO CARE
[place] with MONA Chang. 71 yo male, hx htn, thoracic aortic aneurysm, sent to the ED by Dr. Sanchez for chest pain.  Patient co sharp chest pain which started at 8:30 am this morning radiating to his back.  Pain lasted about 4 hours and resolved on its own. Took two baby asa shortly after the pain started.   Reports the pain was with ROM of the body (twisting). Currently has lidocaine patch on.   Known hx of thoracic aortic aneurysm, last checked on imaging 2018  at 4.5 cm.    Denies any numbness, tingling to the extremities, pain at this time, shortness of breath , n;v, or any other complaints.  Due to hx and complaint, will check labs, EKG.,  CTA c/a/p  Labs WNL. Patient signed out to incoming physician.  All decisions regarding the progression of care will be made at their discretion. F/U CTA.    I performed a face to face bedside interview with patient regarding history of present illness, review of symptoms and past medical history. I completed an independent physical exam.  I have discussed the patient's plan of care with Physician Assistant (PA). I agree with note as stated above, having amended the EMR as needed to reflect my findings.   This includes History of Present Illness, HIV, Past Medical/Surgical/Family/Social History, Allergies and Home Medications, Review of Systems, Physical Exam, and any Progress Notes during the time I functioned as the attending physician for this patient.

## 2021-06-23 NOTE — HISTORY OF PRESENT ILLNESS
[FreeTextEntry8] : acute chest pain, 8:30am this morning, radiating to the back, hx of thoracic aortic aneurysm, HTN\par \par Mr Lane Tucker is a 69 yo male presents today with wife for acute health concern. Pt reports that around 8:30am pt felt a sharp chest pain, radiating to the back, squeezing in nature, lingered for few minutes. Pt had called his PMD, recommended to take aspirin and go to ER. Pt reluctant to go to ER and thus here for evaluation. Pt reports the pain returned intermittently a few more minutes right after calling this office. Currently pain has subsided. Pt had an EKG which was baseline for pt, compared with previous EKG, no ST-T wave changes, no acute arrythmia. Pt has a long standing hx of thoracic aorta aneurysm, last imaged in 2018 at 4.5 cm, never followed up with vascular. Pt advised today in lieu of chest pain radiating to the back, and hx of thoracic aortic aneursym, concerned for enlargement, vs aortic disection. Pt advised to go to ER for prompt evaluation. Recommended as well to follow up with his cardiologist, Dr. Quezada. Pt states will go to [facility] ER.

## 2021-06-24 ENCOUNTER — APPOINTMENT (OUTPATIENT)
Dept: FAMILY MEDICINE | Facility: CLINIC | Age: 71
End: 2021-06-24
Payer: MEDICARE

## 2021-06-24 ENCOUNTER — NON-APPOINTMENT (OUTPATIENT)
Age: 71
End: 2021-06-24

## 2021-06-24 VITALS
HEART RATE: 79 BPM | BODY MASS INDEX: 31.81 KG/M2 | TEMPERATURE: 97.3 F | WEIGHT: 240 LBS | DIASTOLIC BLOOD PRESSURE: 80 MMHG | HEIGHT: 73 IN | OXYGEN SATURATION: 98 % | SYSTOLIC BLOOD PRESSURE: 140 MMHG | RESPIRATION RATE: 15 BRPM

## 2021-06-24 PROCEDURE — 99215 OFFICE O/P EST HI 40 MIN: CPT

## 2021-06-24 PROCEDURE — 99072 ADDL SUPL MATRL&STAF TM PHE: CPT

## 2021-06-25 ENCOUNTER — APPOINTMENT (OUTPATIENT)
Dept: PULMONOLOGY | Facility: CLINIC | Age: 71
End: 2021-06-25
Payer: MEDICARE

## 2021-06-25 VITALS
BODY MASS INDEX: 31.81 KG/M2 | SYSTOLIC BLOOD PRESSURE: 140 MMHG | RESPIRATION RATE: 15 BRPM | HEIGHT: 73 IN | WEIGHT: 240 LBS | DIASTOLIC BLOOD PRESSURE: 80 MMHG | TEMPERATURE: 97.8 F | HEART RATE: 81 BPM | OXYGEN SATURATION: 98 %

## 2021-06-25 PROBLEM — I10 ESSENTIAL (PRIMARY) HYPERTENSION: Chronic | Status: ACTIVE | Noted: 2021-06-23

## 2021-06-25 PROCEDURE — 99072 ADDL SUPL MATRL&STAF TM PHE: CPT

## 2021-06-25 PROCEDURE — 99204 OFFICE O/P NEW MOD 45 MIN: CPT

## 2021-06-25 NOTE — ASSESSMENT
[FreeTextEntry1] : 70 year old male Hx AAA, never smoker presents for evaluation 8 mm LORRIE GG nodule, enlarging on CTA\par \par Obtain PFT\par Obtain PET scan\par \par Follow up pending studies

## 2021-06-25 NOTE — HISTORY OF PRESENT ILLNESS
[de-identified] : Fu up from ed visit for chest pain. \par Had CTA  performed to evaluate patients thoracic Aneurysm.\par Aneurysm  is stable at 4.5 cm.\par Chest pain has resolved. He reported sharp chest pains that started at the sternum and radiated to his back. Pain was worse with twisting movements.\par \par Patient reports a significant amount of stress and anxiety. ARRON 7 -16. Exacerbated recently since mother's health has been deteriorating.Receptive to medical mgmt.\par \par Radiologist reported incidental findings of increasing lung nodule and renal mass. Masses described as suspicious for malignancy.\par He has a urologist and a pulmonologist was provided at visit to fu ASAP to r/o malignancy.\par He denies cough, sob, or weight loss.\par \par \par \par worsening anxiety\par

## 2021-06-25 NOTE — PROCEDURE
[FreeTextEntry1] : CT angio chest 6/23/21 personally reviewed Enlarging 8 mm LORRIE GG nodule \par Stable 4.6 cm  AAA\par mild expansion of right upper pole renal calyx, recommend Urology evaluation

## 2021-06-25 NOTE — HISTORY OF PRESENT ILLNESS
[Never] : never [TextBox_4] : Patient is a 70 year old male never smoker presents to [facility] for evaluation abnormal CT chest 6/23/21 revealing enlarging 8 mm LORRIE ground glass nodule.  Patient had CT angio done to evaluate ascending aortic aneurysm. He reports no respiratory symptoms.  He is here for these findings.

## 2021-06-28 ENCOUNTER — APPOINTMENT (OUTPATIENT)
Dept: UROLOGY | Facility: CLINIC | Age: 71
End: 2021-06-28
Payer: MEDICARE

## 2021-06-28 VITALS
SYSTOLIC BLOOD PRESSURE: 146 MMHG | HEART RATE: 84 BPM | OXYGEN SATURATION: 97 % | DIASTOLIC BLOOD PRESSURE: 89 MMHG | BODY MASS INDEX: 29.84 KG/M2 | TEMPERATURE: 97.3 F | WEIGHT: 240 LBS | HEIGHT: 75 IN

## 2021-06-28 LAB
APPEARANCE: CLEAR
BACTERIA: NEGATIVE
BILIRUB UR QL STRIP: NORMAL
BILIRUBIN URINE: NEGATIVE
BLOOD URINE: NORMAL
CLARITY UR: CLEAR
COLLECTION METHOD: NORMAL
COLOR: YELLOW
GLUCOSE QUALITATIVE U: NEGATIVE
GLUCOSE UR-MCNC: NORMAL
HCG UR QL: 0.2 EU/DL
HGB UR QL STRIP.AUTO: NORMAL
HYALINE CASTS: 2 /LPF
KETONES UR-MCNC: NORMAL
KETONES URINE: NEGATIVE
LEUKOCYTE ESTERASE UR QL STRIP: NORMAL
LEUKOCYTE ESTERASE URINE: NEGATIVE
MICROSCOPIC-UA: NORMAL
NITRITE UR QL STRIP: NORMAL
NITRITE URINE: NEGATIVE
PH UR STRIP: 5.5
PH URINE: 5.5
PROT UR STRIP-MCNC: NORMAL
PROTEIN URINE: NORMAL
PSA FREE FLD-MCNC: 14 %
PSA FREE SERPL-MCNC: 0.2 NG/ML
PSA SERPL-MCNC: 1.39 NG/ML
RED BLOOD CELLS URINE: 3 /HPF
SP GR UR STRIP: 1.02
SPECIFIC GRAVITY URINE: 1.02
SQUAMOUS EPITHELIAL CELLS: 2 /HPF
UROBILINOGEN URINE: NORMAL
WHITE BLOOD CELLS URINE: 8 /HPF

## 2021-06-28 PROCEDURE — 99215 OFFICE O/P EST HI 40 MIN: CPT

## 2021-06-28 PROCEDURE — 36415 COLL VENOUS BLD VENIPUNCTURE: CPT

## 2021-06-28 NOTE — HISTORY OF PRESENT ILLNESS
[FreeTextEntry1] : Here for follow up of microscopic hematuria \par - more hesitancy\par - not taking any alpha-blocker\par \par - since last visit \par - seen by pulmonologist \par - small lung nodules from 4 mm to 8 mm  awaiting PET \par \par aortic aneurysm - 4.6 cm now used to be 4.5 cm \par \par CT of abdomen in June 2021 as an incidental finding  showed minimum increase in size of simple cyst \par question about right upper tony attenuation and expansion on CT\par \par \par No blood in urine \par no UTI

## 2021-06-28 NOTE — ASSESSMENT
[FreeTextEntry1] : MEDINA/LUTS \par add alpha-blocker\par Continue proscar \par patient feels overall LUTS better \par \par Cr normal \par I have reviewed the CT scan from June 23 , 2021 angiogram and compared to urogram from Feb 2021 and CT of abdomen from 2018\par \par Patient has multiple renal cysts bilaterally with largest renal cyst on the left and interval increase over 3 years from \par   5.6 cm to 6.4  cm \par simple cyst by Bosniak criteria \par \par will repeat renal US in fall \par \par I am not convinced that the upper calyx increased attenuation is "real" versus averaging artifact. \par I will review with Dr. Canales - our urooncologist tomorrow \par \par \par Patient will have PET scan - TCC is 83% sensitive for TCC in upper tract by one study \par If PET positive will schedule upper tract ureteroscopy with possible biopsy \par \par will send for cytology \par \par reassured patient \par \par will check urine today \par \par defer to pulmonologist eval of lung nodules\par \par add alpha-blocker \par \par The submitted E/M billing level for this visit reflects the total time spent on the day of the visit including documentation in EMR, face-to-face time spent with the patient, non-face-to-face review of medical records and relevant information, review of laboratory results available via [facility] portal, as well using a patient’s electronic medical records portal for patients with records not available to [facility] directly. \par \par Time spend counseling and performing coordination of care was also included in determining the appropriate EM billing level.\par \par I have reviewed and verified information regarding the chief complaint and history recorded by the ancillary staff and/or the patient. I have independently reviewed and interpreted tests performed by other physicians and facilities as necessary. I have reviewed images pertinent to providing the care to  the patient.  \par \par I have discussed with the patient differential diagnosis, reason for auxiliary tests if ordered, risks, benefits, alternatives, and complications of each form of therapy included surgical therapy. Off label use of most of medications used in andrology was reviewed. \par \par \par \par  total time 45 min reviewing imaging studies

## 2021-07-06 ENCOUNTER — TRANSCRIPTION ENCOUNTER (OUTPATIENT)
Age: 71
End: 2021-07-06

## 2021-07-09 ENCOUNTER — APPOINTMENT (OUTPATIENT)
Age: 71
End: 2021-07-09
Payer: MEDICARE

## 2021-07-09 PROCEDURE — 94010 BREATHING CAPACITY TEST: CPT

## 2021-07-09 PROCEDURE — 94727 GAS DIL/WSHOT DETER LNG VOL: CPT

## 2021-07-09 PROCEDURE — 99072 ADDL SUPL MATRL&STAF TM PHE: CPT

## 2021-07-14 ENCOUNTER — APPOINTMENT (OUTPATIENT)
Dept: NUCLEAR MEDICINE | Facility: IMAGING CENTER | Age: 71
End: 2021-07-14
Payer: MEDICARE

## 2021-07-14 ENCOUNTER — OUTPATIENT (OUTPATIENT)
Dept: OUTPATIENT SERVICES | Facility: HOSPITAL | Age: 71
LOS: 1 days | End: 2021-07-14
Payer: MEDICARE

## 2021-07-14 DIAGNOSIS — Z00.8 ENCOUNTER FOR OTHER GENERAL EXAMINATION: ICD-10-CM

## 2021-07-14 DIAGNOSIS — R93.89 ABNORMAL FINDINGS ON DIAGNOSTIC IMAGING OF OTHER SPECIFIED BODY STRUCTURES: ICD-10-CM

## 2021-07-14 PROCEDURE — 78815 PET IMAGE W/CT SKULL-THIGH: CPT | Mod: 26,PI

## 2021-07-14 PROCEDURE — 78815 PET IMAGE W/CT SKULL-THIGH: CPT

## 2021-07-14 PROCEDURE — A9552: CPT

## 2021-07-16 ENCOUNTER — APPOINTMENT (OUTPATIENT)
Age: 71
End: 2021-07-16
Payer: MEDICARE

## 2021-07-16 VITALS
HEART RATE: 82 BPM | HEIGHT: 72 IN | OXYGEN SATURATION: 98 % | SYSTOLIC BLOOD PRESSURE: 130 MMHG | DIASTOLIC BLOOD PRESSURE: 74 MMHG | BODY MASS INDEX: 32.51 KG/M2 | WEIGHT: 240 LBS | TEMPERATURE: 97.7 F | RESPIRATION RATE: 16 BRPM

## 2021-07-16 DIAGNOSIS — R93.89 ABNORMAL FINDINGS ON DIAGNOSTIC IMAGING OF OTHER SPECIFIED BODY STRUCTURES: ICD-10-CM

## 2021-07-16 PROCEDURE — 99214 OFFICE O/P EST MOD 30 MIN: CPT

## 2021-07-16 PROCEDURE — 99072 ADDL SUPL MATRL&STAF TM PHE: CPT

## 2021-07-16 NOTE — ASSESSMENT
[FreeTextEntry1] : 70 year old male Hx AAA, never smoker presents for evaluation 8 mm LORRIE GG nodule, enlarging on CTA, non FDG avid concerning for slow growing malignancy \par \par Referral to Thoracic Surgery Dr. Verde August 20 2:30 \par \par Follow up Thoracic Surgery referral

## 2021-07-16 NOTE — PROCEDURE
[FreeTextEntry1] : CT angio chest 6/23/21 personally reviewed Enlarging 8 mm LORRIE GG nodule \par Stable 4.6 cm  AAA mild expansion of right upper pole renal calyx, recommend Urology evaluation\par \par PET CT 7/14/21 personally rviewed a non FDG avid 8 mm upper lobe pulmonary nodule increased in size since 2018 is concerning for slow growing malignancy no evidence of metastatic disease

## 2021-07-16 NOTE — HISTORY OF PRESENT ILLNESS
[Never] : never [TextBox_4] : Patient is a 70 year old male never smoker presents to [facility] for evaluation abnormal CT chest 6/23/21 revealing enlarging 8 mm LORRIE ground glass nodule.  Patient had CT angio done to evaluate ascending aortic aneurysm. He reports no respiratory symptoms.  He is here for these findings.

## 2021-07-18 PROBLEM — R91.1 PULMONARY NODULE, LEFT: Status: ACTIVE | Noted: 2021-07-18

## 2021-07-20 ENCOUNTER — NON-APPOINTMENT (OUTPATIENT)
Age: 71
End: 2021-07-20

## 2021-07-20 ENCOUNTER — APPOINTMENT (OUTPATIENT)
Dept: THORACIC SURGERY | Facility: CLINIC | Age: 71
End: 2021-07-20
Payer: MEDICARE

## 2021-07-20 VITALS
OXYGEN SATURATION: 96 % | WEIGHT: 240 LBS | HEIGHT: 75 IN | SYSTOLIC BLOOD PRESSURE: 135 MMHG | TEMPERATURE: 97.6 F | DIASTOLIC BLOOD PRESSURE: 82 MMHG | HEART RATE: 83 BPM | BODY MASS INDEX: 29.84 KG/M2

## 2021-07-20 DIAGNOSIS — R91.1 SOLITARY PULMONARY NODULE: ICD-10-CM

## 2021-07-20 PROCEDURE — 99205 OFFICE O/P NEW HI 60 MIN: CPT

## 2021-07-20 PROCEDURE — 99072 ADDL SUPL MATRL&STAF TM PHE: CPT

## 2021-07-20 RX ORDER — OXYCODONE 10 MG/1
10 TABLET ORAL 3 TIMES DAILY
Qty: 21 | Refills: 0 | Status: DISCONTINUED | COMMUNITY
Start: 2021-03-15 | End: 2021-07-20

## 2021-07-20 NOTE — HISTORY OF PRESENT ILLNESS
[FreeTextEntry1] : Mr. RACHEL PATEL, 70 year old male, never smoker, w/ hx of HTN, BPH, hypothyroidism, Vertebral fracture, Thoracic aortic aneurysm, who was found to have enlarging LORRIE ground glass nodule during CT evaluation of ascending aortic aneurysm. Referred to office by Dr. Lilly Yu.\par \par CT Angio Chest/Abd/Pelvis on 6/23/21:\par - 8 mm left upper lobe groundglass nodule, previously 4 mm in 2018. \par - Stable 4.6 cm ascending thoracic aortic aneurysm without dissection. \par - Increased attenuation and mild expansion of a right upper pole renal calyx. \par \par PFTs on 7/9/21: FVC 87%, FEV1 87%, DLCO 142%.\par \par PET/CT on 7/14/21:\par - Non-FDG-avid 8 mm left upper nodule is unchanged as compared to CT dated 6/23/2021, and is increased in size as compared to CT dated 12/26/2018, where it measures 0.5 cm (image 94).\par - Increased attenuation and mild expansion of a right upper pole renal calyx, unchanged as compared to CT dated 6/23/2021, not identified on CT dated 2/12/2021, is not well evaluated on PET due to physiologic excretion of radiotracer activity. CT urogram is recommended for further evaluation.\par \par OF NOTE: being followed by Dr. Lomas (URO) Last evaluated June 28, 2021.\par \par Presents to office today for surgical evaluation. Denies SOB, CP, cough.

## 2021-07-20 NOTE — ASSESSMENT
[FreeTextEntry1] : Mr. RACHEL PATEL, 70 year old male, never smoker, w/ hx of HTN, BPH, hypothyroidism, Vertebral fracture, Thoracic aortic aneurysm, who was found to have enlarging LORRIE ground glass nodule during CT evaluation of ascending aortic aneurysm. Referred to office by Dr. Lilly Yu.\par \par CT Angio Chest/Abd/Pelvis on 6/23/21:\par - 8 mm left upper lobe groundglass nodule, previously 4 mm in 2018. \par - Stable 4.6 cm ascending thoracic aortic aneurysm without dissection. \par - Increased attenuation and mild expansion of a right upper pole renal calyx. \par \par PFTs on 7/9/21: FVC 87%, FEV1 87%, DLCO 142%.\par \par PET/CT on 7/14/21:\par - Non-FDG-avid 8 mm left upper nodule is unchanged as compared to CT dated 6/23/2021, and is increased in size as compared to CT dated 12/26/2018, where it measures 0.5 cm (image 94).\par - Increased attenuation and mild expansion of a right upper pole renal calyx, unchanged as compared to CT dated 6/23/2021, not identified on CT dated 2/12/2021, is not well evaluated on PET due to physiologic excretion of radiotracer activity. CT urogram is recommended for further evaluation.\par \par I have reviewed the patient's medical records and diagnostic images at time of this office consultation and have made the following recommendation:\par 1. CT and PET scans reviewed, finding is suspicious for malignancy, discussed FNA (low yield) vs continuous CT (I do not recommend) monitoring vs surgical resection with patient and his wife, we agreed to proceed with Flex Bronch, Lt VATS Lung resection, with IR localization.\par 2. Brain MRI w/w/o contrast\par 3. Cardiac clearance, PST, COVID-testing prior to surgery.\par \par \par I personally performed the services described in the documentation, reviewed the documentation recorded by the scribe in my presence and it accurately and completely records my words and actions.\par \par I, Chente Ansari, KM, am scribing for and the presence of Dr. ELISE [facility], the following sections HISTORY OF PRESENT ILLNESS, PAST MEDICAL/FAMILY/SOCIAL HISTORY; REVIEW OF SYSTEMS; VITAL SIGNS; PHYSICAL EXAM; DISPOSITION.\par \par

## 2021-07-20 NOTE — CONSULT LETTER
[Consult Letter:] : I had the pleasure of evaluating your patient, [unfilled]. [( Thank you for referring [unfilled] for consultation for _____ )] : Thank you for referring [unfilled] for consultation for [unfilled] [Please see my note below.] : Please see my note below. [Consult Closing:] : Thank you very much for allowing me to participate in the care of this patient.  If you have any questions, please do not hesitate to contact me. [Sincerely,] : Sincerely, [FreeTextEntry2] : Dr. Lilly Yu (Pulm/Ref)\par Dr. Luis Lopez (Med)\par Dr. Storm Quezada (Cardiology)\par Dr. Joel Lomas (URO) [FreeTextEntry3] : Carter Verde MD, FACS \par Chief, Division of Thoracic Surgery \par Director, Minimally Invasive Thoracic Surgery \par Department of Cardiovascular and Thoracic Surgery \par [facility] \par , Cardiovascular and Thoracic Surgery\par

## 2021-07-20 NOTE — DATA REVIEWED
[FreeTextEntry1] : Independently reviewed the following imaging:\par - CT Angio Chest/Abdo/Pelvis on 6/23/21\par - PFTs on 7/9/21: FVC 87%, FEV1 87%, DLCO 142%.\par - PET/CT on 7/14/21

## 2021-07-27 ENCOUNTER — NON-APPOINTMENT (OUTPATIENT)
Age: 71
End: 2021-07-27

## 2021-07-27 ENCOUNTER — APPOINTMENT (OUTPATIENT)
Dept: INTERVENTIONAL RADIOLOGY/VASCULAR | Facility: CLINIC | Age: 71
End: 2021-07-27
Payer: MEDICARE

## 2021-07-27 ENCOUNTER — OUTPATIENT (OUTPATIENT)
Dept: OUTPATIENT SERVICES | Facility: HOSPITAL | Age: 71
LOS: 1 days | End: 2021-07-27
Payer: MEDICARE

## 2021-07-27 ENCOUNTER — APPOINTMENT (OUTPATIENT)
Dept: CARDIOLOGY | Facility: CLINIC | Age: 71
End: 2021-07-27
Payer: MEDICARE

## 2021-07-27 VITALS
OXYGEN SATURATION: 98 % | TEMPERATURE: 98.4 F | RESPIRATION RATE: 16 BRPM | HEART RATE: 76 BPM | HEIGHT: 75 IN | BODY MASS INDEX: 30.59 KG/M2 | DIASTOLIC BLOOD PRESSURE: 84 MMHG | SYSTOLIC BLOOD PRESSURE: 145 MMHG | WEIGHT: 246 LBS

## 2021-07-27 VITALS
SYSTOLIC BLOOD PRESSURE: 130 MMHG | DIASTOLIC BLOOD PRESSURE: 78 MMHG | WEIGHT: 250 LBS | RESPIRATION RATE: 16 BRPM | TEMPERATURE: 97 F | HEIGHT: 74 IN | OXYGEN SATURATION: 98 % | HEART RATE: 78 BPM

## 2021-07-27 VITALS — DIASTOLIC BLOOD PRESSURE: 70 MMHG | SYSTOLIC BLOOD PRESSURE: 122 MMHG | HEART RATE: 76 BPM

## 2021-07-27 DIAGNOSIS — R91.8 OTHER NONSPECIFIC ABNORMAL FINDING OF LUNG FIELD: ICD-10-CM

## 2021-07-27 DIAGNOSIS — R91.1 SOLITARY PULMONARY NODULE: ICD-10-CM

## 2021-07-27 DIAGNOSIS — G45.3 AMAUROSIS FUGAX: ICD-10-CM

## 2021-07-27 DIAGNOSIS — Z98.890 OTHER SPECIFIED POSTPROCEDURAL STATES: Chronic | ICD-10-CM

## 2021-07-27 LAB
ANION GAP SERPL CALC-SCNC: 15 MMOL/L — HIGH (ref 7–14)
BLD GP AB SCN SERPL QL: NEGATIVE — SIGNIFICANT CHANGE UP
BUN SERPL-MCNC: 20 MG/DL — SIGNIFICANT CHANGE UP (ref 7–23)
CALCIUM SERPL-MCNC: 9.6 MG/DL — SIGNIFICANT CHANGE UP (ref 8.4–10.5)
CHLORIDE SERPL-SCNC: 104 MMOL/L — SIGNIFICANT CHANGE UP (ref 98–107)
CO2 SERPL-SCNC: 20 MMOL/L — LOW (ref 22–31)
CREAT SERPL-MCNC: 1.15 MG/DL — SIGNIFICANT CHANGE UP (ref 0.5–1.3)
GLUCOSE SERPL-MCNC: 88 MG/DL — SIGNIFICANT CHANGE UP (ref 70–99)
HCT VFR BLD CALC: 41.8 % — SIGNIFICANT CHANGE UP (ref 39–50)
HGB BLD-MCNC: 14 G/DL — SIGNIFICANT CHANGE UP (ref 13–17)
MCHC RBC-ENTMCNC: 32.3 PG — SIGNIFICANT CHANGE UP (ref 27–34)
MCHC RBC-ENTMCNC: 33.5 GM/DL — SIGNIFICANT CHANGE UP (ref 32–36)
MCV RBC AUTO: 96.5 FL — SIGNIFICANT CHANGE UP (ref 80–100)
NRBC # BLD: 0 /100 WBCS — SIGNIFICANT CHANGE UP
NRBC # FLD: 0 K/UL — SIGNIFICANT CHANGE UP
PLATELET # BLD AUTO: 195 K/UL — SIGNIFICANT CHANGE UP (ref 150–400)
POTASSIUM SERPL-MCNC: 4.4 MMOL/L — SIGNIFICANT CHANGE UP (ref 3.5–5.3)
POTASSIUM SERPL-SCNC: 4.4 MMOL/L — SIGNIFICANT CHANGE UP (ref 3.5–5.3)
RBC # BLD: 4.33 M/UL — SIGNIFICANT CHANGE UP (ref 4.2–5.8)
RBC # FLD: 11.9 % — SIGNIFICANT CHANGE UP (ref 10.3–14.5)
RH IG SCN BLD-IMP: POSITIVE — SIGNIFICANT CHANGE UP
SODIUM SERPL-SCNC: 139 MMOL/L — SIGNIFICANT CHANGE UP (ref 135–145)
WBC # BLD: 9.3 K/UL — SIGNIFICANT CHANGE UP (ref 3.8–10.5)
WBC # FLD AUTO: 9.3 K/UL — SIGNIFICANT CHANGE UP (ref 3.8–10.5)

## 2021-07-27 PROCEDURE — 99443: CPT

## 2021-07-27 PROCEDURE — 93000 ELECTROCARDIOGRAM COMPLETE: CPT

## 2021-07-27 PROCEDURE — 99072 ADDL SUPL MATRL&STAF TM PHE: CPT

## 2021-07-27 PROCEDURE — 99214 OFFICE O/P EST MOD 30 MIN: CPT

## 2021-07-27 PROCEDURE — 93010 ELECTROCARDIOGRAM REPORT: CPT

## 2021-07-27 RX ORDER — ASPIRIN/CALCIUM CARB/MAGNESIUM 324 MG
1 TABLET ORAL
Qty: 0 | Refills: 0 | DISCHARGE

## 2021-07-27 RX ORDER — RAMIPRIL 5 MG
1 CAPSULE ORAL
Qty: 0 | Refills: 0 | DISCHARGE

## 2021-07-27 RX ORDER — SODIUM CHLORIDE 9 MG/ML
1000 INJECTION, SOLUTION INTRAVENOUS
Refills: 0 | Status: DISCONTINUED | OUTPATIENT
Start: 2021-08-04 | End: 2021-08-06

## 2021-07-27 NOTE — H&P PST ADULT - NSICDXPASTMEDICALHX_GEN_ALL_CORE_FT
PAST MEDICAL HISTORY:  AAA (abdominal aortic aneurysm) without rupture     HTN (hypertension)      PAST MEDICAL HISTORY:  Ascending aortic aneurysm     HTN (hypertension)     Hypothyroid     Obesity     Pulmonary nodule     Thoracic aortic aneurysm

## 2021-07-27 NOTE — H&P PST ADULT - NSICDXPROBLEM_GEN_ALL_CORE_FT
PROBLEM DIAGNOSES  Problem: Pulmonary nodules  Assessment and Plan:        PROBLEM DIAGNOSES  Problem: Pulmonary nodules  Assessment and Plan: Pt scheduled for surgery and preop instructions including instructions for taking Famotidine and for Chlorhexidine use in showering on the day of surgery, given verbally and with use of  written materials, and patient confirming understanding of such instructions using  teach back   method.  OR Booking notified of  no COVID vaccine   Pt has CC in chart   Pt to takeLevothyroxine , Ramipril am DOS

## 2021-07-27 NOTE — H&P PST ADULT - HISTORY OF PRESENT ILLNESS
Pt is a 71 yr old male scheduled for Flexible Bronchoscopy Left Video Assisted Thoracoscopy Lung resection with IR Localization with Dr Verde tentatively 8/4/21 - Pt found to have enlarging LORRIE nodule during CT evaluation of Ascending aortic aneurysm - Pt hx HTN, BPH, Hypothyroid , vertebral fracture , thoracic aortic aneurysm (stable) - Pt is poor historian   Pt admits to COVID 12/20   Pt has not had COVID vaccine - to have COVID preop test

## 2021-08-01 ENCOUNTER — APPOINTMENT (OUTPATIENT)
Dept: DISASTER EMERGENCY | Facility: CLINIC | Age: 71
End: 2021-08-01

## 2021-08-03 ENCOUNTER — TRANSCRIPTION ENCOUNTER (OUTPATIENT)
Age: 71
End: 2021-08-03

## 2021-08-03 PROBLEM — I71.2 THORACIC AORTIC ANEURYSM, WITHOUT RUPTURE: Chronic | Status: ACTIVE | Noted: 2021-07-27

## 2021-08-03 PROBLEM — E66.9 OBESITY, UNSPECIFIED: Chronic | Status: ACTIVE | Noted: 2021-07-27

## 2021-08-03 PROBLEM — E03.9 HYPOTHYROIDISM, UNSPECIFIED: Chronic | Status: ACTIVE | Noted: 2021-07-27

## 2021-08-04 ENCOUNTER — APPOINTMENT (OUTPATIENT)
Dept: THORACIC SURGERY | Facility: HOSPITAL | Age: 71
End: 2021-08-04
Payer: MEDICARE

## 2021-08-04 ENCOUNTER — INPATIENT (INPATIENT)
Facility: HOSPITAL | Age: 71
LOS: 6 days | Discharge: HOME | End: 2021-08-11
Attending: THORACIC SURGERY (CARDIOTHORACIC VASCULAR SURGERY) | Admitting: THORACIC SURGERY (CARDIOTHORACIC VASCULAR SURGERY)
Payer: MEDICARE

## 2021-08-04 ENCOUNTER — RESULT REVIEW (OUTPATIENT)
Age: 71
End: 2021-08-04

## 2021-08-04 VITALS
TEMPERATURE: 98 F | WEIGHT: 250 LBS | DIASTOLIC BLOOD PRESSURE: 79 MMHG | SYSTOLIC BLOOD PRESSURE: 128 MMHG | HEIGHT: 74 IN | RESPIRATION RATE: 16 BRPM | OXYGEN SATURATION: 96 % | HEART RATE: 78 BPM

## 2021-08-04 DIAGNOSIS — Z98.890 OTHER SPECIFIED POSTPROCEDURAL STATES: Chronic | ICD-10-CM

## 2021-08-04 DIAGNOSIS — R91.1 SOLITARY PULMONARY NODULE: ICD-10-CM

## 2021-08-04 LAB
RH IG SCN BLD-IMP: POSITIVE — SIGNIFICANT CHANGE UP
SARS-COV-2 N GENE NPH QL NAA+PROBE: NOT DETECTED

## 2021-08-04 PROCEDURE — 99233 SBSQ HOSP IP/OBS HIGH 50: CPT

## 2021-08-04 PROCEDURE — 88305 TISSUE EXAM BY PATHOLOGIST: CPT | Mod: 26

## 2021-08-04 PROCEDURE — 88313 SPECIAL STAINS GROUP 2: CPT | Mod: 26

## 2021-08-04 PROCEDURE — 32668 THORACOSCOPY W/W RESECT DIAG: CPT

## 2021-08-04 PROCEDURE — 32999 UNLISTED PX LUNGS & PLEURA: CPT | Mod: 26

## 2021-08-04 PROCEDURE — 88331 PATH CONSLTJ SURG 1 BLK 1SPC: CPT | Mod: 26

## 2021-08-04 PROCEDURE — 32674 THORACOSCOPY LYMPH NODE EXC: CPT

## 2021-08-04 PROCEDURE — 77012 CT SCAN FOR NEEDLE BIOPSY: CPT | Mod: 26

## 2021-08-04 PROCEDURE — 32663 THORACOSCOPY W/LOBECTOMY: CPT | Mod: LT

## 2021-08-04 PROCEDURE — 88307 TISSUE EXAM BY PATHOLOGIST: CPT | Mod: 26

## 2021-08-04 PROCEDURE — 71045 X-RAY EXAM CHEST 1 VIEW: CPT | Mod: 26

## 2021-08-04 PROCEDURE — 88309 TISSUE EXAM BY PATHOLOGIST: CPT | Mod: 26

## 2021-08-04 RX ORDER — HEPARIN SODIUM 5000 [USP'U]/ML
7500 INJECTION INTRAVENOUS; SUBCUTANEOUS EVERY 8 HOURS
Refills: 0 | Status: DISCONTINUED | OUTPATIENT
Start: 2021-08-04 | End: 2021-08-11

## 2021-08-04 RX ORDER — LEVOTHYROXINE SODIUM 125 MCG
25 TABLET ORAL DAILY
Refills: 0 | Status: DISCONTINUED | OUTPATIENT
Start: 2021-08-04 | End: 2021-08-11

## 2021-08-04 RX ORDER — FINASTERIDE 5 MG/1
5 TABLET, FILM COATED ORAL DAILY
Refills: 0 | Status: DISCONTINUED | OUTPATIENT
Start: 2021-08-04 | End: 2021-08-11

## 2021-08-04 RX ORDER — ONDANSETRON 8 MG/1
4 TABLET, FILM COATED ORAL EVERY 6 HOURS
Refills: 0 | Status: DISCONTINUED | OUTPATIENT
Start: 2021-08-04 | End: 2021-08-11

## 2021-08-04 RX ORDER — ALPRAZOLAM 0.25 MG
0.25 TABLET ORAL THREE TIMES A DAY
Refills: 0 | Status: DISCONTINUED | OUTPATIENT
Start: 2021-08-04 | End: 2021-08-06

## 2021-08-04 RX ORDER — METOCLOPRAMIDE HCL 10 MG
10 TABLET ORAL ONCE
Refills: 0 | Status: DISCONTINUED | OUTPATIENT
Start: 2021-08-04 | End: 2021-08-06

## 2021-08-04 RX ORDER — FENTANYL CITRATE 50 UG/ML
50 INJECTION INTRAVENOUS
Refills: 0 | Status: DISCONTINUED | OUTPATIENT
Start: 2021-08-04 | End: 2021-08-05

## 2021-08-04 RX ORDER — HYDROMORPHONE HYDROCHLORIDE 2 MG/ML
30 INJECTION INTRAMUSCULAR; INTRAVENOUS; SUBCUTANEOUS
Refills: 0 | Status: DISCONTINUED | OUTPATIENT
Start: 2021-08-04 | End: 2021-08-06

## 2021-08-04 RX ORDER — NALOXONE HYDROCHLORIDE 4 MG/.1ML
0.1 SPRAY NASAL
Refills: 0 | Status: DISCONTINUED | OUTPATIENT
Start: 2021-08-04 | End: 2021-08-06

## 2021-08-04 RX ORDER — GABAPENTIN 400 MG/1
100 CAPSULE ORAL ONCE
Refills: 0 | Status: COMPLETED | OUTPATIENT
Start: 2021-08-04 | End: 2021-08-04

## 2021-08-04 RX ORDER — ACETAMINOPHEN 500 MG
1000 TABLET ORAL ONCE
Refills: 0 | Status: COMPLETED | OUTPATIENT
Start: 2021-08-05 | End: 2021-08-05

## 2021-08-04 RX ORDER — HYDROMORPHONE HYDROCHLORIDE 2 MG/ML
0.5 INJECTION INTRAMUSCULAR; INTRAVENOUS; SUBCUTANEOUS
Refills: 0 | Status: DISCONTINUED | OUTPATIENT
Start: 2021-08-04 | End: 2021-08-06

## 2021-08-04 RX ORDER — ACETAMINOPHEN 500 MG
975 TABLET ORAL ONCE
Refills: 0 | Status: COMPLETED | OUTPATIENT
Start: 2021-08-04 | End: 2021-08-04

## 2021-08-04 RX ORDER — FAMOTIDINE 10 MG/ML
20 INJECTION INTRAVENOUS EVERY 12 HOURS
Refills: 0 | Status: DISCONTINUED | OUTPATIENT
Start: 2021-08-04 | End: 2021-08-11

## 2021-08-04 RX ORDER — ACETAMINOPHEN 500 MG
1000 TABLET ORAL ONCE
Refills: 0 | Status: COMPLETED | OUTPATIENT
Start: 2021-08-04 | End: 2021-08-04

## 2021-08-04 RX ORDER — ASPIRIN/CALCIUM CARB/MAGNESIUM 324 MG
81 TABLET ORAL DAILY
Refills: 0 | Status: DISCONTINUED | OUTPATIENT
Start: 2021-08-05 | End: 2021-08-11

## 2021-08-04 RX ORDER — ONDANSETRON 8 MG/1
4 TABLET, FILM COATED ORAL ONCE
Refills: 0 | Status: DISCONTINUED | OUTPATIENT
Start: 2021-08-04 | End: 2021-08-06

## 2021-08-04 RX ORDER — HEPARIN SODIUM 5000 [USP'U]/ML
5000 INJECTION INTRAVENOUS; SUBCUTANEOUS ONCE
Refills: 0 | Status: COMPLETED | OUTPATIENT
Start: 2021-08-04 | End: 2021-08-04

## 2021-08-04 RX ORDER — SENNA PLUS 8.6 MG/1
2 TABLET ORAL AT BEDTIME
Refills: 0 | Status: DISCONTINUED | OUTPATIENT
Start: 2021-08-04 | End: 2021-08-11

## 2021-08-04 RX ORDER — ACETAMINOPHEN 500 MG
1000 TABLET ORAL ONCE
Refills: 0 | Status: DISCONTINUED | OUTPATIENT
Start: 2021-08-05 | End: 2021-08-05

## 2021-08-04 RX ADMIN — SODIUM CHLORIDE 30 MILLILITER(S): 9 INJECTION, SOLUTION INTRAVENOUS at 16:46

## 2021-08-04 RX ADMIN — Medication 975 MILLIGRAM(S): at 10:42

## 2021-08-04 RX ADMIN — HEPARIN SODIUM 7500 UNIT(S): 5000 INJECTION INTRAVENOUS; SUBCUTANEOUS at 22:03

## 2021-08-04 RX ADMIN — HYDROMORPHONE HYDROCHLORIDE 0.5 MILLIGRAM(S): 2 INJECTION INTRAMUSCULAR; INTRAVENOUS; SUBCUTANEOUS at 22:01

## 2021-08-04 RX ADMIN — FAMOTIDINE 20 MILLIGRAM(S): 10 INJECTION INTRAVENOUS at 17:22

## 2021-08-04 RX ADMIN — HEPARIN SODIUM 5000 UNIT(S): 5000 INJECTION INTRAVENOUS; SUBCUTANEOUS at 10:41

## 2021-08-04 RX ADMIN — HYDROMORPHONE HYDROCHLORIDE 0.5 MILLIGRAM(S): 2 INJECTION INTRAMUSCULAR; INTRAVENOUS; SUBCUTANEOUS at 16:47

## 2021-08-04 RX ADMIN — Medication 400 MILLIGRAM(S): at 17:21

## 2021-08-04 RX ADMIN — GABAPENTIN 100 MILLIGRAM(S): 400 CAPSULE ORAL at 10:41

## 2021-08-04 RX ADMIN — HYDROMORPHONE HYDROCHLORIDE 30 MILLILITER(S): 2 INJECTION INTRAMUSCULAR; INTRAVENOUS; SUBCUTANEOUS at 19:20

## 2021-08-04 RX ADMIN — SODIUM CHLORIDE 30 MILLILITER(S): 9 INJECTION, SOLUTION INTRAVENOUS at 07:59

## 2021-08-04 RX ADMIN — Medication 0.25 MILLIGRAM(S): at 22:05

## 2021-08-04 RX ADMIN — FINASTERIDE 5 MILLIGRAM(S): 5 TABLET, FILM COATED ORAL at 22:03

## 2021-08-04 RX ADMIN — SENNA PLUS 2 TABLET(S): 8.6 TABLET ORAL at 22:04

## 2021-08-04 NOTE — ASU PATIENT PROFILE, ADULT - PMH
Ascending aortic aneurysm    HTN (hypertension)    Hypothyroid    Obesity    Pulmonary nodule    Thoracic aortic aneurysm

## 2021-08-04 NOTE — BRIEF OPERATIVE NOTE - OPERATION/FINDINGS
71M w/incidentally found enlarging Lorrie part-solid lesion who is now s/p IR-marking, flex bronch LORRIE wedge resection [frozen c/w NSCLC], completion LORRIE lobectomy w/MLND.

## 2021-08-04 NOTE — PATIENT PROFILE ADULT - DOMESTIC TRAVEL HIGH RISK QUESTION
Discharge criteria met. Post procedure dressing dry and intact. Sensory and motor function intact as per pre-procedure. Patient alert and oriented x3  Instructions and follow up reviewed with pt at patient at discharge. Discharged home transported by [phone] wheelchair, family waits in car per Covid-19 protocol.   Discharged @4033 No

## 2021-08-05 LAB
ANION GAP SERPL CALC-SCNC: 14 MMOL/L — SIGNIFICANT CHANGE UP (ref 7–14)
BUN SERPL-MCNC: 16 MG/DL — SIGNIFICANT CHANGE UP (ref 7–23)
CALCIUM SERPL-MCNC: 8.8 MG/DL — SIGNIFICANT CHANGE UP (ref 8.4–10.5)
CHLORIDE SERPL-SCNC: 99 MMOL/L — SIGNIFICANT CHANGE UP (ref 98–107)
CO2 SERPL-SCNC: 21 MMOL/L — LOW (ref 22–31)
CREAT SERPL-MCNC: 0.91 MG/DL — SIGNIFICANT CHANGE UP (ref 0.5–1.3)
GLUCOSE SERPL-MCNC: 132 MG/DL — HIGH (ref 70–99)
HCT VFR BLD CALC: 42.2 % — SIGNIFICANT CHANGE UP (ref 39–50)
HGB BLD-MCNC: 14.1 G/DL — SIGNIFICANT CHANGE UP (ref 13–17)
MAGNESIUM SERPL-MCNC: 1.8 MG/DL — SIGNIFICANT CHANGE UP (ref 1.6–2.6)
MCHC RBC-ENTMCNC: 32.9 PG — SIGNIFICANT CHANGE UP (ref 27–34)
MCHC RBC-ENTMCNC: 33.4 GM/DL — SIGNIFICANT CHANGE UP (ref 32–36)
MCV RBC AUTO: 98.4 FL — SIGNIFICANT CHANGE UP (ref 80–100)
NRBC # BLD: 0 /100 WBCS — SIGNIFICANT CHANGE UP
NRBC # FLD: 0 K/UL — SIGNIFICANT CHANGE UP
PHOSPHATE SERPL-MCNC: 4.3 MG/DL — SIGNIFICANT CHANGE UP (ref 2.5–4.5)
PLATELET # BLD AUTO: 224 K/UL — SIGNIFICANT CHANGE UP (ref 150–400)
POTASSIUM SERPL-MCNC: 4.3 MMOL/L — SIGNIFICANT CHANGE UP (ref 3.5–5.3)
POTASSIUM SERPL-SCNC: 4.3 MMOL/L — SIGNIFICANT CHANGE UP (ref 3.5–5.3)
RBC # BLD: 4.29 M/UL — SIGNIFICANT CHANGE UP (ref 4.2–5.8)
RBC # FLD: 11.8 % — SIGNIFICANT CHANGE UP (ref 10.3–14.5)
SODIUM SERPL-SCNC: 134 MMOL/L — LOW (ref 135–145)
WBC # BLD: 11.53 K/UL — HIGH (ref 3.8–10.5)
WBC # FLD AUTO: 11.53 K/UL — HIGH (ref 3.8–10.5)

## 2021-08-05 PROCEDURE — 71045 X-RAY EXAM CHEST 1 VIEW: CPT | Mod: 26

## 2021-08-05 PROCEDURE — 99233 SBSQ HOSP IP/OBS HIGH 50: CPT

## 2021-08-05 RX ORDER — ACETAMINOPHEN 500 MG
1000 TABLET ORAL ONCE
Refills: 0 | Status: COMPLETED | OUTPATIENT
Start: 2021-08-05 | End: 2021-08-06

## 2021-08-05 RX ORDER — LIDOCAINE 4 G/100G
1 CREAM TOPICAL ONCE
Refills: 0 | Status: DISCONTINUED | OUTPATIENT
Start: 2021-08-05 | End: 2021-08-05

## 2021-08-05 RX ORDER — GABAPENTIN 400 MG/1
100 CAPSULE ORAL EVERY 8 HOURS
Refills: 0 | Status: DISCONTINUED | OUTPATIENT
Start: 2021-08-05 | End: 2021-08-06

## 2021-08-05 RX ORDER — ACETAMINOPHEN 500 MG
1000 TABLET ORAL ONCE
Refills: 0 | Status: COMPLETED | OUTPATIENT
Start: 2021-08-05 | End: 2021-08-05

## 2021-08-05 RX ORDER — MAGNESIUM SULFATE 500 MG/ML
1 VIAL (ML) INJECTION ONCE
Refills: 0 | Status: COMPLETED | OUTPATIENT
Start: 2021-08-05 | End: 2021-08-05

## 2021-08-05 RX ORDER — LIDOCAINE 4 G/100G
1 CREAM TOPICAL ONCE
Refills: 0 | Status: COMPLETED | OUTPATIENT
Start: 2021-08-05 | End: 2021-08-05

## 2021-08-05 RX ORDER — KETOROLAC TROMETHAMINE 30 MG/ML
15 SYRINGE (ML) INJECTION ONCE
Refills: 0 | Status: DISCONTINUED | OUTPATIENT
Start: 2021-08-05 | End: 2021-08-05

## 2021-08-05 RX ADMIN — Medication 400 MILLIGRAM(S): at 10:48

## 2021-08-05 RX ADMIN — HYDROMORPHONE HYDROCHLORIDE 30 MILLILITER(S): 2 INJECTION INTRAMUSCULAR; INTRAVENOUS; SUBCUTANEOUS at 07:10

## 2021-08-05 RX ADMIN — Medication 81 MILLIGRAM(S): at 11:27

## 2021-08-05 RX ADMIN — GABAPENTIN 100 MILLIGRAM(S): 400 CAPSULE ORAL at 17:02

## 2021-08-05 RX ADMIN — Medication 15 MILLIGRAM(S): at 11:45

## 2021-08-05 RX ADMIN — Medication 15 MILLIGRAM(S): at 11:27

## 2021-08-05 RX ADMIN — SODIUM CHLORIDE 30 MILLILITER(S): 9 INJECTION, SOLUTION INTRAVENOUS at 10:17

## 2021-08-05 RX ADMIN — Medication 1000 MILLIGRAM(S): at 11:10

## 2021-08-05 RX ADMIN — FAMOTIDINE 20 MILLIGRAM(S): 10 INJECTION INTRAVENOUS at 17:23

## 2021-08-05 RX ADMIN — SENNA PLUS 2 TABLET(S): 8.6 TABLET ORAL at 23:09

## 2021-08-05 RX ADMIN — GABAPENTIN 100 MILLIGRAM(S): 400 CAPSULE ORAL at 23:09

## 2021-08-05 RX ADMIN — HEPARIN SODIUM 7500 UNIT(S): 5000 INJECTION INTRAVENOUS; SUBCUTANEOUS at 14:11

## 2021-08-05 RX ADMIN — HYDROMORPHONE HYDROCHLORIDE 30 MILLILITER(S): 2 INJECTION INTRAMUSCULAR; INTRAVENOUS; SUBCUTANEOUS at 19:39

## 2021-08-05 RX ADMIN — FINASTERIDE 5 MILLIGRAM(S): 5 TABLET, FILM COATED ORAL at 17:23

## 2021-08-05 RX ADMIN — LIDOCAINE 1 PATCH: 4 CREAM TOPICAL at 21:00

## 2021-08-05 RX ADMIN — HEPARIN SODIUM 7500 UNIT(S): 5000 INJECTION INTRAVENOUS; SUBCUTANEOUS at 05:30

## 2021-08-05 RX ADMIN — LIDOCAINE 1 PATCH: 4 CREAM TOPICAL at 22:00

## 2021-08-05 RX ADMIN — Medication 1000 MILLIGRAM(S): at 05:45

## 2021-08-05 RX ADMIN — FAMOTIDINE 20 MILLIGRAM(S): 10 INJECTION INTRAVENOUS at 05:30

## 2021-08-05 RX ADMIN — Medication 25 MICROGRAM(S): at 05:30

## 2021-08-05 RX ADMIN — Medication 400 MILLIGRAM(S): at 05:15

## 2021-08-05 RX ADMIN — LIDOCAINE 1 PATCH: 4 CREAM TOPICAL at 10:17

## 2021-08-05 RX ADMIN — HEPARIN SODIUM 7500 UNIT(S): 5000 INJECTION INTRAVENOUS; SUBCUTANEOUS at 23:13

## 2021-08-05 RX ADMIN — Medication 100 GRAM(S): at 06:07

## 2021-08-05 NOTE — DIETITIAN INITIAL EVALUATION ADULT. - HEIGHT FOR BMI (CENTIMETERS)
Pt reports sudden onset L flank pain started this am w/ nausea. Denies fever, dysuria. Denies or recent travel or sick contacts.    188

## 2021-08-05 NOTE — DIETITIAN INITIAL EVALUATION ADULT. - PERTINENT MEDS FT
MEDICATIONS  (STANDING):  ALPRAZolam 0.25 milliGRAM(s) Oral three times a day  aspirin enteric coated 81 milliGRAM(s) Oral daily  famotidine    Tablet 20 milliGRAM(s) Oral every 12 hours  finasteride 5 milliGRAM(s) Oral daily  heparin   Injectable 7500 Unit(s) SubCutaneous every 8 hours  HYDROmorphone PCA (1 mG/mL) 30 milliLiter(s) PCA Continuous PCA Continuous  lactated ringers. 1000 milliLiter(s) (30 mL/Hr) IV Continuous <Continuous>  levothyroxine 25 MICROGram(s) Oral daily  senna 2 Tablet(s) Oral at bedtime

## 2021-08-05 NOTE — DIETITIAN INITIAL EVALUATION ADULT. - OTHER INFO
72 y/o male admitted with dx LORRIE lung nodule s/p lobectomy. Visited with pt to obtain nutrition hx. Pt said he is eating well and denies food allergies, nausea/vomiting/diarrhea/constipation, or issues with chewing/swallowing; last BM 8/3. No weight changes PTA. Pt with BMI 32.1 indicating Obesity Classification I; discussed weight management once d/ag to assist with medical management of hx HTN. Suggest changing diet to Low Sodium in view of this medical condition. Pt without additional nutrition related issues or concerns at this time. RDN services to remain available as needed.

## 2021-08-06 ENCOUNTER — TRANSCRIPTION ENCOUNTER (OUTPATIENT)
Age: 71
End: 2021-08-06

## 2021-08-06 LAB
ANION GAP SERPL CALC-SCNC: 11 MMOL/L — SIGNIFICANT CHANGE UP (ref 7–14)
BUN SERPL-MCNC: 22 MG/DL — SIGNIFICANT CHANGE UP (ref 7–23)
CALCIUM SERPL-MCNC: 8.6 MG/DL — SIGNIFICANT CHANGE UP (ref 8.4–10.5)
CHLORIDE SERPL-SCNC: 101 MMOL/L — SIGNIFICANT CHANGE UP (ref 98–107)
CO2 SERPL-SCNC: 23 MMOL/L — SIGNIFICANT CHANGE UP (ref 22–31)
CREAT SERPL-MCNC: 0.97 MG/DL — SIGNIFICANT CHANGE UP (ref 0.5–1.3)
GLUCOSE SERPL-MCNC: 108 MG/DL — HIGH (ref 70–99)
HCT VFR BLD CALC: 38.2 % — LOW (ref 39–50)
HGB BLD-MCNC: 12.6 G/DL — LOW (ref 13–17)
MAGNESIUM SERPL-MCNC: 1.9 MG/DL — SIGNIFICANT CHANGE UP (ref 1.6–2.6)
MCHC RBC-ENTMCNC: 33 GM/DL — SIGNIFICANT CHANGE UP (ref 32–36)
MCHC RBC-ENTMCNC: 33 PG — SIGNIFICANT CHANGE UP (ref 27–34)
MCV RBC AUTO: 100 FL — SIGNIFICANT CHANGE UP (ref 80–100)
NRBC # BLD: 0 /100 WBCS — SIGNIFICANT CHANGE UP
NRBC # FLD: 0 K/UL — SIGNIFICANT CHANGE UP
PLATELET # BLD AUTO: 189 K/UL — SIGNIFICANT CHANGE UP (ref 150–400)
POTASSIUM SERPL-MCNC: 4 MMOL/L — SIGNIFICANT CHANGE UP (ref 3.5–5.3)
POTASSIUM SERPL-SCNC: 4 MMOL/L — SIGNIFICANT CHANGE UP (ref 3.5–5.3)
RBC # BLD: 3.82 M/UL — LOW (ref 4.2–5.8)
RBC # FLD: 11.8 % — SIGNIFICANT CHANGE UP (ref 10.3–14.5)
SODIUM SERPL-SCNC: 135 MMOL/L — SIGNIFICANT CHANGE UP (ref 135–145)
WBC # BLD: 9.78 K/UL — SIGNIFICANT CHANGE UP (ref 3.8–10.5)
WBC # FLD AUTO: 9.78 K/UL — SIGNIFICANT CHANGE UP (ref 3.8–10.5)

## 2021-08-06 PROCEDURE — 71045 X-RAY EXAM CHEST 1 VIEW: CPT | Mod: 26

## 2021-08-06 RX ORDER — OXYCODONE HYDROCHLORIDE 5 MG/1
10 TABLET ORAL
Refills: 0 | Status: DISCONTINUED | OUTPATIENT
Start: 2021-08-06 | End: 2021-08-11

## 2021-08-06 RX ORDER — GABAPENTIN 400 MG/1
200 CAPSULE ORAL EVERY 8 HOURS
Refills: 0 | Status: DISCONTINUED | OUTPATIENT
Start: 2021-08-06 | End: 2021-08-11

## 2021-08-06 RX ORDER — ACETAMINOPHEN 500 MG
1000 TABLET ORAL ONCE
Refills: 0 | Status: COMPLETED | OUTPATIENT
Start: 2021-08-06 | End: 2021-08-06

## 2021-08-06 RX ORDER — LIDOCAINE 4 G/100G
1 CREAM TOPICAL EVERY 24 HOURS
Refills: 0 | Status: DISCONTINUED | OUTPATIENT
Start: 2021-08-06 | End: 2021-08-06

## 2021-08-06 RX ORDER — KETOROLAC TROMETHAMINE 30 MG/ML
15 SYRINGE (ML) INJECTION EVERY 6 HOURS
Refills: 0 | Status: DISCONTINUED | OUTPATIENT
Start: 2021-08-06 | End: 2021-08-08

## 2021-08-06 RX ORDER — ALPRAZOLAM 0.25 MG
0.25 TABLET ORAL THREE TIMES A DAY
Refills: 0 | Status: DISCONTINUED | OUTPATIENT
Start: 2021-08-06 | End: 2021-08-11

## 2021-08-06 RX ORDER — ACETAMINOPHEN 500 MG
1000 TABLET ORAL ONCE
Refills: 0 | Status: DISCONTINUED | OUTPATIENT
Start: 2021-08-06 | End: 2021-08-06

## 2021-08-06 RX ORDER — POLYETHYLENE GLYCOL 3350 17 G/17G
17 POWDER, FOR SOLUTION ORAL DAILY
Refills: 0 | Status: DISCONTINUED | OUTPATIENT
Start: 2021-08-06 | End: 2021-08-11

## 2021-08-06 RX ORDER — IPRATROPIUM/ALBUTEROL SULFATE 18-103MCG
3 AEROSOL WITH ADAPTER (GRAM) INHALATION EVERY 6 HOURS
Refills: 0 | Status: DISCONTINUED | OUTPATIENT
Start: 2021-08-06 | End: 2021-08-11

## 2021-08-06 RX ORDER — LIDOCAINE 4 G/100G
1 CREAM TOPICAL EVERY 24 HOURS
Refills: 0 | Status: DISCONTINUED | OUTPATIENT
Start: 2021-08-06 | End: 2021-08-11

## 2021-08-06 RX ORDER — FUROSEMIDE 40 MG
10 TABLET ORAL ONCE
Refills: 0 | Status: COMPLETED | OUTPATIENT
Start: 2021-08-06 | End: 2021-08-06

## 2021-08-06 RX ORDER — OXYCODONE HYDROCHLORIDE 5 MG/1
5 TABLET ORAL
Refills: 0 | Status: DISCONTINUED | OUTPATIENT
Start: 2021-08-06 | End: 2021-08-11

## 2021-08-06 RX ORDER — LIDOCAINE 4 G/100G
1 CREAM TOPICAL DAILY
Refills: 0 | Status: DISCONTINUED | OUTPATIENT
Start: 2021-08-06 | End: 2021-08-11

## 2021-08-06 RX ADMIN — OXYCODONE HYDROCHLORIDE 10 MILLIGRAM(S): 5 TABLET ORAL at 12:31

## 2021-08-06 RX ADMIN — Medication 3 MILLILITER(S): at 16:18

## 2021-08-06 RX ADMIN — HEPARIN SODIUM 7500 UNIT(S): 5000 INJECTION INTRAVENOUS; SUBCUTANEOUS at 14:14

## 2021-08-06 RX ADMIN — GABAPENTIN 200 MILLIGRAM(S): 400 CAPSULE ORAL at 21:26

## 2021-08-06 RX ADMIN — SENNA PLUS 2 TABLET(S): 8.6 TABLET ORAL at 21:26

## 2021-08-06 RX ADMIN — GABAPENTIN 100 MILLIGRAM(S): 400 CAPSULE ORAL at 05:22

## 2021-08-06 RX ADMIN — Medication 1000 MILLIGRAM(S): at 12:00

## 2021-08-06 RX ADMIN — Medication 81 MILLIGRAM(S): at 11:29

## 2021-08-06 RX ADMIN — OXYCODONE HYDROCHLORIDE 10 MILLIGRAM(S): 5 TABLET ORAL at 21:26

## 2021-08-06 RX ADMIN — LIDOCAINE 1 PATCH: 4 CREAM TOPICAL at 11:30

## 2021-08-06 RX ADMIN — GABAPENTIN 200 MILLIGRAM(S): 400 CAPSULE ORAL at 14:14

## 2021-08-06 RX ADMIN — OXYCODONE HYDROCHLORIDE 10 MILLIGRAM(S): 5 TABLET ORAL at 16:34

## 2021-08-06 RX ADMIN — Medication 400 MILLIGRAM(S): at 17:34

## 2021-08-06 RX ADMIN — Medication 25 MICROGRAM(S): at 05:17

## 2021-08-06 RX ADMIN — Medication 3 MILLILITER(S): at 11:33

## 2021-08-06 RX ADMIN — Medication 15 MILLIGRAM(S): at 09:11

## 2021-08-06 RX ADMIN — OXYCODONE HYDROCHLORIDE 10 MILLIGRAM(S): 5 TABLET ORAL at 09:45

## 2021-08-06 RX ADMIN — OXYCODONE HYDROCHLORIDE 10 MILLIGRAM(S): 5 TABLET ORAL at 21:56

## 2021-08-06 RX ADMIN — HYDROMORPHONE HYDROCHLORIDE 30 MILLILITER(S): 2 INJECTION INTRAMUSCULAR; INTRAVENOUS; SUBCUTANEOUS at 07:48

## 2021-08-06 RX ADMIN — POLYETHYLENE GLYCOL 3350 17 GRAM(S): 17 POWDER, FOR SOLUTION ORAL at 11:30

## 2021-08-06 RX ADMIN — Medication 3 MILLILITER(S): at 22:16

## 2021-08-06 RX ADMIN — Medication 10 MILLIGRAM(S): at 09:10

## 2021-08-06 RX ADMIN — FAMOTIDINE 20 MILLIGRAM(S): 10 INJECTION INTRAVENOUS at 17:34

## 2021-08-06 RX ADMIN — OXYCODONE HYDROCHLORIDE 10 MILLIGRAM(S): 5 TABLET ORAL at 09:11

## 2021-08-06 RX ADMIN — LIDOCAINE 1 PATCH: 4 CREAM TOPICAL at 12:00

## 2021-08-06 RX ADMIN — LIDOCAINE 1 PATCH: 4 CREAM TOPICAL at 20:21

## 2021-08-06 RX ADMIN — FAMOTIDINE 20 MILLIGRAM(S): 10 INJECTION INTRAVENOUS at 05:22

## 2021-08-06 RX ADMIN — Medication 15 MILLIGRAM(S): at 13:00

## 2021-08-06 RX ADMIN — Medication 15 MILLIGRAM(S): at 12:31

## 2021-08-06 RX ADMIN — LIDOCAINE 1 PATCH: 4 CREAM TOPICAL at 00:52

## 2021-08-06 RX ADMIN — HEPARIN SODIUM 7500 UNIT(S): 5000 INJECTION INTRAVENOUS; SUBCUTANEOUS at 21:26

## 2021-08-06 RX ADMIN — FINASTERIDE 5 MILLIGRAM(S): 5 TABLET, FILM COATED ORAL at 17:35

## 2021-08-06 RX ADMIN — Medication 400 MILLIGRAM(S): at 05:10

## 2021-08-06 RX ADMIN — Medication 1000 MILLIGRAM(S): at 05:40

## 2021-08-06 RX ADMIN — Medication 400 MILLIGRAM(S): at 11:22

## 2021-08-06 RX ADMIN — Medication 15 MILLIGRAM(S): at 09:45

## 2021-08-06 RX ADMIN — HEPARIN SODIUM 7500 UNIT(S): 5000 INJECTION INTRAVENOUS; SUBCUTANEOUS at 05:18

## 2021-08-06 RX ADMIN — Medication 10 MILLIGRAM(S): at 12:32

## 2021-08-06 RX ADMIN — Medication 15 MILLIGRAM(S): at 17:35

## 2021-08-06 RX ADMIN — OXYCODONE HYDROCHLORIDE 10 MILLIGRAM(S): 5 TABLET ORAL at 16:20

## 2021-08-06 RX ADMIN — HYDROMORPHONE HYDROCHLORIDE 0.5 MILLIGRAM(S): 2 INJECTION INTRAMUSCULAR; INTRAVENOUS; SUBCUTANEOUS at 04:41

## 2021-08-06 RX ADMIN — OXYCODONE HYDROCHLORIDE 10 MILLIGRAM(S): 5 TABLET ORAL at 00:00

## 2021-08-06 NOTE — DISCHARGE NOTE PROVIDER - CARE PROVIDER_API CALL
Carter Verde)  Surgery; Thoracic Surgery  [phone] [place] [address], [place] [address]  Phone: [phone]  Fax: [phone]  Follow Up Time:    Carter Verde)  Surgery; Thoracic Surgery  [phone] [place] [address], [place] [address]  Phone: [phone]  Fax: [phone]  Follow Up Time: 2 weeks

## 2021-08-06 NOTE — DISCHARGE NOTE PROVIDER - NSDCFUADDAPPT_GEN_ALL_CORE_FT
Follow up with Dr. Verde in 2 weeks   Chest x-ray prior to appointment with Dr. Verde.   Follow up with primary care provider in one week  Follow up with Dr. Verde in 2 weeks [phone]  Have chest x-ray performed within 24hrs prior to follow-up appointment with Dr. Verde.   Left chest suture to be removed by Dr. Verde at follow-up appointment.  Follow up with primary care provider in one week

## 2021-08-06 NOTE — DISCHARGE NOTE PROVIDER - HOSPITAL COURSE
Pt is a 71 yr old male found to have enlarging LORRIE nodule during CT evaluation of Ascending aortic aneurysm - Pt hx HTN, BPH, Hypothyroid , vertebral fracture , thoracic aortic aneurysm (stable) - Pt is poor historian   Pt admits to COVID 12/20.  Patient s/p flex bronch, uniportal left vats, left upper lobe wedge resection with completion left upper lobectomy and MLND on 8/4/21.  Post op course, chest tube with air leak, patient stable for discharge home when air leak resolves and chest tube removed. Pt is a 71 yr old male found to have enlarging LORRIE nodule during CT evaluation of Ascending aortic aneurysm - Pt hx HTN, BPH, Hypothyroid , vertebral fracture , thoracic aortic aneurysm (stable) - Pt is poor historian   Pt admits to COVID 12/20.  Patient s/p flex bronch, uniportal left vats, left upper lobe wedge resection with completion left upper lobectomy and MLND on 8/4/21.  Post op course, chest tube with air leak and high chest tube output, patient given IV lasix to diuresis.  Patient stable for discharge home when chest tube output is less and air leak resolves and chest tube removed. 70 y/o male found to have an enlarging Left Upper Lobe nodule during CT evaluation of Ascending aortic aneurysm - Patient has medical history including HTN, BPH, Hypothyroid , vertebral fracture , thoracic aortic aneurysm (stable) - Pt is poor historian.  Pt also admits to COVID 12/20.  Patient presented to [facility] on 8/4/21, and underwent Flexible Bronchoscopy, Uniportal Left VATS, Left Upper Lobe Wedge Resection with completion Left Upper Lobectomy.  Post op course significant for chest tube with air leak and high chest tube output, but chest tube was removed on 8/11/21.  Patient discussed with Dr. Verde on multidisciplinary rounds this morning, and plan is for discharge home today.  He is hemodynamically stable and denies any chest pains or SOB. 72 y/o male found to have an enlarging Left Upper Lobe nodule during CT evaluation of Ascending aortic aneurysm - Patient has medical history including HTN, BPH, Hypothyroid , vertebral fracture , thoracic aortic aneurysm (stable) - Pt is poor historian.  Pt also admits to COVID 12/20.  Patient presented to [facility] on 8/4/21, and underwent Flexible Bronchoscopy, Uniportal Left VATS, Left Upper Lobe Wedge Resection with completion Left Upper Lobectomy.  Post op course significant for chest tube with air leak and high chest tube output, but chest tube was removed on 8/11/21.  CXR s/p chest tube removal reviewed with Radiology - no pneumothorax.  Patient discussed with Dr. Verde on multidisciplinary rounds this morning, and plan is for discharge home today.  He is hemodynamically stable and denies any chest pains or SOB.

## 2021-08-06 NOTE — DISCHARGE NOTE PROVIDER - NSDCMRMEDTOKEN_GEN_ALL_CORE_FT
ALPRAZolam 0.25 mg oral tablet: 1 tab(s) orally once a day  Izzy Aspirin: 81 mg  orally once a day LD 7/21  finasteride 5 mg oral tablet: 1 tab(s) orally once a day  levothyroxine 25 mcg (0.025 mg) oral tablet: 1 tab(s) orally once a day  oxyCODONE 10 mg oral tablet, extended release: 1 tab(s) orally every 8 hours as needed  ramipril 5 mg oral capsule: 1 cap(s) orally once a day  Vitamin D3 1250 mcg (50,000 intl units) oral capsule: 1 cap(s) orally once a week   ALPRAZolam 0.25 mg oral tablet: 1 tab(s) orally once a day  Izzy Aspirin: 81 mg  orally once a day LD 7/21  finasteride 5 mg oral tablet: 1 tab(s) orally once a day  levothyroxine 25 mcg (0.025 mg) oral tablet: 1 tab(s) orally once a day  oxyCODONE 5 mg oral tablet: 1 tab(s) orally every 4 to 6 hours, As Needed -for moderate pain MDD:6 tabs   ramipril 5 mg oral capsule: 1 cap(s) orally once a day  senna oral tablet: 2 tab(s) orally once a day MDD:2 tabs  Vitamin D3 1250 mcg (50,000 intl units) oral capsule: 1 cap(s) orally once a week

## 2021-08-06 NOTE — DISCHARGE NOTE PROVIDER - NSDCCPCAREPLAN_GEN_ALL_CORE_FT
PRINCIPAL DISCHARGE DIAGNOSIS  Diagnosis: Pulmonary nodules  Assessment and Plan of Treatment:

## 2021-08-06 NOTE — DISCHARGE NOTE PROVIDER - NSDCFUADDINST_GEN_ALL_CORE_FT
Please walk 4-5 x per day; increase as tolerated. You may climb stairs. Continue to use the incentive spirometer.   You may keep wounds uncovered. Please shower daily with soap and water. The suture will be removed in the office at the follow up appointment.   Please call the office at [phone] if you have fevers, chills, worsening shortness of breath, chest pain, warmth, redness or purulent discharge from the wound.  Continue to ambulate 4-5 x per day; increase as tolerated. You may climb stairs. Continue to use the incentive spirometer.   You may keep wounds uncovered. Please shower daily with soap and water.   Please call the office at [phone] if you have fever of 101oF or greater, chills, worsening shortness of breath, chest pain, warmth, redness or purulent discharge from the wound, or call 911 with any concerning symptom.

## 2021-08-07 LAB
ANION GAP SERPL CALC-SCNC: 13 MMOL/L — SIGNIFICANT CHANGE UP (ref 7–14)
BUN SERPL-MCNC: 23 MG/DL — SIGNIFICANT CHANGE UP (ref 7–23)
CALCIUM SERPL-MCNC: 8.8 MG/DL — SIGNIFICANT CHANGE UP (ref 8.4–10.5)
CHLORIDE SERPL-SCNC: 104 MMOL/L — SIGNIFICANT CHANGE UP (ref 98–107)
CO2 SERPL-SCNC: 22 MMOL/L — SIGNIFICANT CHANGE UP (ref 22–31)
CREAT SERPL-MCNC: 1.07 MG/DL — SIGNIFICANT CHANGE UP (ref 0.5–1.3)
GLUCOSE SERPL-MCNC: 121 MG/DL — HIGH (ref 70–99)
HCT VFR BLD CALC: 36 % — LOW (ref 39–50)
HGB BLD-MCNC: 11.8 G/DL — LOW (ref 13–17)
MCHC RBC-ENTMCNC: 32.5 PG — SIGNIFICANT CHANGE UP (ref 27–34)
MCHC RBC-ENTMCNC: 32.8 GM/DL — SIGNIFICANT CHANGE UP (ref 32–36)
MCV RBC AUTO: 99.2 FL — SIGNIFICANT CHANGE UP (ref 80–100)
NRBC # BLD: 0 /100 WBCS — SIGNIFICANT CHANGE UP
NRBC # FLD: 0 K/UL — SIGNIFICANT CHANGE UP
PLATELET # BLD AUTO: 186 K/UL — SIGNIFICANT CHANGE UP (ref 150–400)
POTASSIUM SERPL-MCNC: 4.4 MMOL/L — SIGNIFICANT CHANGE UP (ref 3.5–5.3)
POTASSIUM SERPL-SCNC: 4.4 MMOL/L — SIGNIFICANT CHANGE UP (ref 3.5–5.3)
RBC # BLD: 3.63 M/UL — LOW (ref 4.2–5.8)
RBC # FLD: 11.8 % — SIGNIFICANT CHANGE UP (ref 10.3–14.5)
SODIUM SERPL-SCNC: 139 MMOL/L — SIGNIFICANT CHANGE UP (ref 135–145)
WBC # BLD: 9.97 K/UL — SIGNIFICANT CHANGE UP (ref 3.8–10.5)
WBC # FLD AUTO: 9.97 K/UL — SIGNIFICANT CHANGE UP (ref 3.8–10.5)

## 2021-08-07 PROCEDURE — 71045 X-RAY EXAM CHEST 1 VIEW: CPT | Mod: 26

## 2021-08-07 RX ADMIN — LIDOCAINE 1 PATCH: 4 CREAM TOPICAL at 06:19

## 2021-08-07 RX ADMIN — OXYCODONE HYDROCHLORIDE 10 MILLIGRAM(S): 5 TABLET ORAL at 06:47

## 2021-08-07 RX ADMIN — Medication 3 MILLILITER(S): at 15:02

## 2021-08-07 RX ADMIN — OXYCODONE HYDROCHLORIDE 10 MILLIGRAM(S): 5 TABLET ORAL at 01:06

## 2021-08-07 RX ADMIN — Medication 15 MILLIGRAM(S): at 23:23

## 2021-08-07 RX ADMIN — LIDOCAINE 1 PATCH: 4 CREAM TOPICAL at 12:39

## 2021-08-07 RX ADMIN — OXYCODONE HYDROCHLORIDE 10 MILLIGRAM(S): 5 TABLET ORAL at 13:58

## 2021-08-07 RX ADMIN — HEPARIN SODIUM 7500 UNIT(S): 5000 INJECTION INTRAVENOUS; SUBCUTANEOUS at 06:16

## 2021-08-07 RX ADMIN — FAMOTIDINE 20 MILLIGRAM(S): 10 INJECTION INTRAVENOUS at 06:17

## 2021-08-07 RX ADMIN — HEPARIN SODIUM 7500 UNIT(S): 5000 INJECTION INTRAVENOUS; SUBCUTANEOUS at 21:15

## 2021-08-07 RX ADMIN — OXYCODONE HYDROCHLORIDE 10 MILLIGRAM(S): 5 TABLET ORAL at 00:36

## 2021-08-07 RX ADMIN — POLYETHYLENE GLYCOL 3350 17 GRAM(S): 17 POWDER, FOR SOLUTION ORAL at 12:38

## 2021-08-07 RX ADMIN — Medication 15 MILLIGRAM(S): at 12:37

## 2021-08-07 RX ADMIN — Medication 15 MILLIGRAM(S): at 06:17

## 2021-08-07 RX ADMIN — Medication 1 ENEMA: at 13:20

## 2021-08-07 RX ADMIN — Medication 3 MILLILITER(S): at 21:35

## 2021-08-07 RX ADMIN — Medication 15 MILLIGRAM(S): at 12:48

## 2021-08-07 RX ADMIN — Medication 81 MILLIGRAM(S): at 12:38

## 2021-08-07 RX ADMIN — OXYCODONE HYDROCHLORIDE 10 MILLIGRAM(S): 5 TABLET ORAL at 13:20

## 2021-08-07 RX ADMIN — Medication 15 MILLIGRAM(S): at 17:54

## 2021-08-07 RX ADMIN — GABAPENTIN 200 MILLIGRAM(S): 400 CAPSULE ORAL at 21:15

## 2021-08-07 RX ADMIN — FINASTERIDE 5 MILLIGRAM(S): 5 TABLET, FILM COATED ORAL at 17:54

## 2021-08-07 RX ADMIN — OXYCODONE HYDROCHLORIDE 10 MILLIGRAM(S): 5 TABLET ORAL at 21:15

## 2021-08-07 RX ADMIN — GABAPENTIN 200 MILLIGRAM(S): 400 CAPSULE ORAL at 06:17

## 2021-08-07 RX ADMIN — LIDOCAINE 1 PATCH: 4 CREAM TOPICAL at 00:45

## 2021-08-07 RX ADMIN — Medication 3 MILLILITER(S): at 09:08

## 2021-08-07 RX ADMIN — Medication 3 MILLILITER(S): at 04:02

## 2021-08-07 RX ADMIN — LIDOCAINE 1 PATCH: 4 CREAM TOPICAL at 19:35

## 2021-08-07 RX ADMIN — Medication 25 MICROGRAM(S): at 06:17

## 2021-08-07 RX ADMIN — OXYCODONE HYDROCHLORIDE 10 MILLIGRAM(S): 5 TABLET ORAL at 06:17

## 2021-08-07 RX ADMIN — GABAPENTIN 200 MILLIGRAM(S): 400 CAPSULE ORAL at 13:20

## 2021-08-07 RX ADMIN — OXYCODONE HYDROCHLORIDE 10 MILLIGRAM(S): 5 TABLET ORAL at 21:45

## 2021-08-07 RX ADMIN — HEPARIN SODIUM 7500 UNIT(S): 5000 INJECTION INTRAVENOUS; SUBCUTANEOUS at 13:19

## 2021-08-07 RX ADMIN — FAMOTIDINE 20 MILLIGRAM(S): 10 INJECTION INTRAVENOUS at 17:54

## 2021-08-08 PROCEDURE — 71045 X-RAY EXAM CHEST 1 VIEW: CPT | Mod: 26

## 2021-08-08 RX ADMIN — FINASTERIDE 5 MILLIGRAM(S): 5 TABLET, FILM COATED ORAL at 17:32

## 2021-08-08 RX ADMIN — LIDOCAINE 1 PATCH: 4 CREAM TOPICAL at 11:09

## 2021-08-08 RX ADMIN — GABAPENTIN 200 MILLIGRAM(S): 400 CAPSULE ORAL at 05:29

## 2021-08-08 RX ADMIN — LIDOCAINE 1 PATCH: 4 CREAM TOPICAL at 00:26

## 2021-08-08 RX ADMIN — OXYCODONE HYDROCHLORIDE 10 MILLIGRAM(S): 5 TABLET ORAL at 05:29

## 2021-08-08 RX ADMIN — Medication 15 MILLIGRAM(S): at 05:30

## 2021-08-08 RX ADMIN — LIDOCAINE 1 PATCH: 4 CREAM TOPICAL at 17:25

## 2021-08-08 RX ADMIN — FAMOTIDINE 20 MILLIGRAM(S): 10 INJECTION INTRAVENOUS at 17:32

## 2021-08-08 RX ADMIN — Medication 3 MILLILITER(S): at 10:55

## 2021-08-08 RX ADMIN — OXYCODONE HYDROCHLORIDE 10 MILLIGRAM(S): 5 TABLET ORAL at 05:59

## 2021-08-08 RX ADMIN — HEPARIN SODIUM 7500 UNIT(S): 5000 INJECTION INTRAVENOUS; SUBCUTANEOUS at 05:29

## 2021-08-08 RX ADMIN — Medication 3 MILLILITER(S): at 04:26

## 2021-08-08 RX ADMIN — Medication 81 MILLIGRAM(S): at 13:14

## 2021-08-08 RX ADMIN — POLYETHYLENE GLYCOL 3350 17 GRAM(S): 17 POWDER, FOR SOLUTION ORAL at 13:13

## 2021-08-08 RX ADMIN — OXYCODONE HYDROCHLORIDE 10 MILLIGRAM(S): 5 TABLET ORAL at 20:36

## 2021-08-08 RX ADMIN — GABAPENTIN 200 MILLIGRAM(S): 400 CAPSULE ORAL at 13:14

## 2021-08-08 RX ADMIN — Medication 25 MICROGRAM(S): at 05:29

## 2021-08-08 RX ADMIN — HEPARIN SODIUM 7500 UNIT(S): 5000 INJECTION INTRAVENOUS; SUBCUTANEOUS at 21:38

## 2021-08-08 RX ADMIN — LIDOCAINE 1 PATCH: 4 CREAM TOPICAL at 05:30

## 2021-08-08 RX ADMIN — HEPARIN SODIUM 7500 UNIT(S): 5000 INJECTION INTRAVENOUS; SUBCUTANEOUS at 13:14

## 2021-08-08 RX ADMIN — OXYCODONE HYDROCHLORIDE 10 MILLIGRAM(S): 5 TABLET ORAL at 21:06

## 2021-08-08 RX ADMIN — OXYCODONE HYDROCHLORIDE 5 MILLIGRAM(S): 5 TABLET ORAL at 13:14

## 2021-08-08 RX ADMIN — Medication 3 MILLILITER(S): at 16:12

## 2021-08-08 RX ADMIN — FAMOTIDINE 20 MILLIGRAM(S): 10 INJECTION INTRAVENOUS at 05:29

## 2021-08-08 RX ADMIN — GABAPENTIN 200 MILLIGRAM(S): 400 CAPSULE ORAL at 21:38

## 2021-08-08 RX ADMIN — Medication 3 MILLILITER(S): at 22:50

## 2021-08-09 PROCEDURE — 71045 X-RAY EXAM CHEST 1 VIEW: CPT | Mod: 26

## 2021-08-09 RX ORDER — POTASSIUM CHLORIDE 20 MEQ
20 PACKET (EA) ORAL ONCE
Refills: 0 | Status: COMPLETED | OUTPATIENT
Start: 2021-08-09 | End: 2021-08-09

## 2021-08-09 RX ORDER — FUROSEMIDE 40 MG
20 TABLET ORAL ONCE
Refills: 0 | Status: COMPLETED | OUTPATIENT
Start: 2021-08-09 | End: 2021-08-09

## 2021-08-09 RX ADMIN — Medication 3 MILLILITER(S): at 09:24

## 2021-08-09 RX ADMIN — LIDOCAINE 1 PATCH: 4 CREAM TOPICAL at 12:13

## 2021-08-09 RX ADMIN — Medication 20 MILLIEQUIVALENT(S): at 12:12

## 2021-08-09 RX ADMIN — GABAPENTIN 200 MILLIGRAM(S): 400 CAPSULE ORAL at 13:05

## 2021-08-09 RX ADMIN — OXYCODONE HYDROCHLORIDE 10 MILLIGRAM(S): 5 TABLET ORAL at 21:49

## 2021-08-09 RX ADMIN — HEPARIN SODIUM 7500 UNIT(S): 5000 INJECTION INTRAVENOUS; SUBCUTANEOUS at 05:46

## 2021-08-09 RX ADMIN — Medication 81 MILLIGRAM(S): at 12:11

## 2021-08-09 RX ADMIN — Medication 25 MICROGRAM(S): at 05:46

## 2021-08-09 RX ADMIN — FAMOTIDINE 20 MILLIGRAM(S): 10 INJECTION INTRAVENOUS at 17:40

## 2021-08-09 RX ADMIN — SENNA PLUS 2 TABLET(S): 8.6 TABLET ORAL at 21:44

## 2021-08-09 RX ADMIN — GABAPENTIN 200 MILLIGRAM(S): 400 CAPSULE ORAL at 05:46

## 2021-08-09 RX ADMIN — OXYCODONE HYDROCHLORIDE 10 MILLIGRAM(S): 5 TABLET ORAL at 22:20

## 2021-08-09 RX ADMIN — OXYCODONE HYDROCHLORIDE 5 MILLIGRAM(S): 5 TABLET ORAL at 09:50

## 2021-08-09 RX ADMIN — Medication 3 MILLILITER(S): at 15:21

## 2021-08-09 RX ADMIN — LIDOCAINE 1 PATCH: 4 CREAM TOPICAL at 18:58

## 2021-08-09 RX ADMIN — FINASTERIDE 5 MILLIGRAM(S): 5 TABLET, FILM COATED ORAL at 17:40

## 2021-08-09 RX ADMIN — Medication 3 MILLILITER(S): at 04:17

## 2021-08-09 RX ADMIN — Medication 20 MILLIGRAM(S): at 09:00

## 2021-08-09 RX ADMIN — HEPARIN SODIUM 7500 UNIT(S): 5000 INJECTION INTRAVENOUS; SUBCUTANEOUS at 13:04

## 2021-08-09 RX ADMIN — Medication 3 MILLILITER(S): at 21:50

## 2021-08-09 RX ADMIN — OXYCODONE HYDROCHLORIDE 5 MILLIGRAM(S): 5 TABLET ORAL at 08:59

## 2021-08-09 RX ADMIN — FAMOTIDINE 20 MILLIGRAM(S): 10 INJECTION INTRAVENOUS at 05:46

## 2021-08-09 RX ADMIN — HEPARIN SODIUM 7500 UNIT(S): 5000 INJECTION INTRAVENOUS; SUBCUTANEOUS at 21:44

## 2021-08-09 RX ADMIN — POLYETHYLENE GLYCOL 3350 17 GRAM(S): 17 POWDER, FOR SOLUTION ORAL at 17:26

## 2021-08-09 RX ADMIN — GABAPENTIN 200 MILLIGRAM(S): 400 CAPSULE ORAL at 21:44

## 2021-08-10 LAB
ALBUMIN FLD-MCNC: 2.3 G/DL — SIGNIFICANT CHANGE UP
B PERT IGG+IGM PNL SER: ABNORMAL
COLOR FLD: ABNORMAL
EOSINOPHIL # FLD: 25 % — SIGNIFICANT CHANGE UP
FLUID INTAKE SUBSTANCE CLASS: SIGNIFICANT CHANGE UP
FLUID SEGMENTED GRANULOCYTES: 0 % — SIGNIFICANT CHANGE UP
GLUCOSE FLD-MCNC: 94 MG/DL — SIGNIFICANT CHANGE UP
LDH SERPL L TO P-CCNC: 577 U/L — SIGNIFICANT CHANGE UP
LYMPHOCYTES # FLD: 63 % — SIGNIFICANT CHANGE UP
MONOS+MACROS # FLD: 12 % — SIGNIFICANT CHANGE UP
PROT FLD-MCNC: 3.8 G/DL — SIGNIFICANT CHANGE UP
RCV VOL RI: 9000 CELLS/UL — HIGH (ref 0–5)
SPECIMEN SOURCE FLD: SIGNIFICANT CHANGE UP
TOTAL NUCLEATED CELL COUNT, BODY FLUID: 1333 CELLS/UL — HIGH (ref 0–5)
TRIGL FLD-MCNC: 34 MG/DL — SIGNIFICANT CHANGE UP
TUBE TYPE: SIGNIFICANT CHANGE UP

## 2021-08-10 PROCEDURE — 71045 X-RAY EXAM CHEST 1 VIEW: CPT | Mod: 26

## 2021-08-10 RX ADMIN — OXYCODONE HYDROCHLORIDE 10 MILLIGRAM(S): 5 TABLET ORAL at 06:30

## 2021-08-10 RX ADMIN — OXYCODONE HYDROCHLORIDE 10 MILLIGRAM(S): 5 TABLET ORAL at 22:15

## 2021-08-10 RX ADMIN — FINASTERIDE 5 MILLIGRAM(S): 5 TABLET, FILM COATED ORAL at 17:51

## 2021-08-10 RX ADMIN — LIDOCAINE 1 PATCH: 4 CREAM TOPICAL at 00:15

## 2021-08-10 RX ADMIN — OXYCODONE HYDROCHLORIDE 10 MILLIGRAM(S): 5 TABLET ORAL at 21:45

## 2021-08-10 RX ADMIN — HEPARIN SODIUM 7500 UNIT(S): 5000 INJECTION INTRAVENOUS; SUBCUTANEOUS at 06:02

## 2021-08-10 RX ADMIN — Medication 25 MICROGRAM(S): at 06:02

## 2021-08-10 RX ADMIN — FAMOTIDINE 20 MILLIGRAM(S): 10 INJECTION INTRAVENOUS at 06:01

## 2021-08-10 RX ADMIN — Medication 3 MILLILITER(S): at 04:22

## 2021-08-10 RX ADMIN — HEPARIN SODIUM 7500 UNIT(S): 5000 INJECTION INTRAVENOUS; SUBCUTANEOUS at 13:33

## 2021-08-10 RX ADMIN — Medication 3 MILLILITER(S): at 15:55

## 2021-08-10 RX ADMIN — FAMOTIDINE 20 MILLIGRAM(S): 10 INJECTION INTRAVENOUS at 17:51

## 2021-08-10 RX ADMIN — OXYCODONE HYDROCHLORIDE 10 MILLIGRAM(S): 5 TABLET ORAL at 13:36

## 2021-08-10 RX ADMIN — GABAPENTIN 200 MILLIGRAM(S): 400 CAPSULE ORAL at 06:03

## 2021-08-10 RX ADMIN — Medication 3 MILLILITER(S): at 20:27

## 2021-08-10 RX ADMIN — LIDOCAINE 1 PATCH: 4 CREAM TOPICAL at 19:28

## 2021-08-10 RX ADMIN — OXYCODONE HYDROCHLORIDE 10 MILLIGRAM(S): 5 TABLET ORAL at 17:50

## 2021-08-10 RX ADMIN — GABAPENTIN 200 MILLIGRAM(S): 400 CAPSULE ORAL at 21:46

## 2021-08-10 RX ADMIN — GABAPENTIN 200 MILLIGRAM(S): 400 CAPSULE ORAL at 13:33

## 2021-08-10 RX ADMIN — OXYCODONE HYDROCHLORIDE 10 MILLIGRAM(S): 5 TABLET ORAL at 06:03

## 2021-08-10 RX ADMIN — LIDOCAINE 1 PATCH: 4 CREAM TOPICAL at 13:34

## 2021-08-10 RX ADMIN — Medication 3 MILLILITER(S): at 09:20

## 2021-08-10 RX ADMIN — HEPARIN SODIUM 7500 UNIT(S): 5000 INJECTION INTRAVENOUS; SUBCUTANEOUS at 21:46

## 2021-08-10 RX ADMIN — SENNA PLUS 2 TABLET(S): 8.6 TABLET ORAL at 21:45

## 2021-08-10 RX ADMIN — Medication 81 MILLIGRAM(S): at 13:32

## 2021-08-10 NOTE — PROGRESS NOTE ADULT - SUBJECTIVE AND OBJECTIVE BOX
RACHEL PATEL      71y   Male   [id number]         No Known Allergies             Daily Height in cm: 187.96 (04 Aug 2021 07:35)    Daily Drug Dosing Weight  Height (cm): 188 (04 Aug 2021 07:35)  Weight (kg): 113.4 (04 Aug 2021 07:35)  BMI (kg/m2): 32.1 (04 Aug 2021 07:35)  BSA (m2): 2.39 (04 Aug 2021 07:35)    HPI:  Pt is a 71 yr old male scheduled for Flexible Bronchoscopy Left Video Assisted Thoracoscopy Lung resection with IR Localization with Dr Verde tentatively 8/4/21 - Pt found to have enlarging LORRIE nodule during CT evaluation of Ascending aortic aneurysm - Pt hx HTN, BPH, Hypothyroid , vertebral fracture , thoracic aortic aneurysm (stable) - Pt is poor historian   Pt admits to COVID 12/20   Pt has not had COVID vaccine - to have COVID preop test   (27 Jul 2021 16:58)      Procedure:  IR-marking, flex bronch LORRIE wedge resection [frozen c/w NSCLC], completion LORRIE lobectomy w/MLND 8/4/2021                         Issues:              Lung nodule              Postop pain  Chest tube in place  Ascending aortic aneurysm  Hypothyroidism  HTN  BPH                 Home Medications:  ALPRAZolam 0.25 mg oral tablet: 1 tab(s) orally once a day (04 Aug 2021 07:55)  Izzy Aspirin: 81 mg  orally once a day LD 7/21 (04 Aug 2021 07:55)  finasteride 5 mg oral tablet: 1 tab(s) orally once a day (04 Aug 2021 07:55)  levothyroxine 25 mcg (0.025 mg) oral tablet: 1 tab(s) orally once a day (04 Aug 2021 07:55)  oxyCODONE 10 mg oral tablet, extended release: 1 tab(s) orally every 8 hours as needed (04 Aug 2021 07:55)  ramipril 5 mg oral capsule: 1 cap(s) orally once a day (04 Aug 2021 07:55)  Vitamin D3 1250 mcg (50,000 intl units) oral capsule: 1 cap(s) orally once a week (04 Aug 2021 07:55)      PAST MEDICAL & SURGICAL HISTORY:  HTN (hypertension)    Ascending aortic aneurysm    Thoracic aortic aneurysm    Obesity    Pulmonary nodule    Hypothyroid    H/O arthroscopy  right knee      Vital Signs Last 24 Hrs  T(C): 36.6 (04 Aug 2021 16:33), Max: 36.7 (04 Aug 2021 07:35)  T(F): 97.9 (04 Aug 2021 16:33), Max: 98 (04 Aug 2021 07:35)  HR: 94 (04 Aug 2021 16:33) (68 - 94)  BP: 152/90 (04 Aug 2021 16:33) (128/79 - 152/90)  BP(mean): 104 (04 Aug 2021 16:33) (104 - 104)  RR: 18 (04 Aug 2021 16:33) (16 - 18)  SpO2: 91% (04 Aug 2021 16:33) (91% - 97%)  I&O's Detail    CAPILLARY BLOOD GLUCOSE      Home Medications:  ALPRAZolam 0.25 mg oral tablet: 1 tab(s) orally once a day (04 Aug 2021 07:55)  Izzy Aspirin: 81 mg  orally once a day LD 7/21 (04 Aug 2021 07:55)  finasteride 5 mg oral tablet: 1 tab(s) orally once a day (04 Aug 2021 07:55)  levothyroxine 25 mcg (0.025 mg) oral tablet: 1 tab(s) orally once a day (04 Aug 2021 07:55)  oxyCODONE 10 mg oral tablet, extended release: 1 tab(s) orally every 8 hours as needed (04 Aug 2021 07:55)  ramipril 5 mg oral capsule: 1 cap(s) orally once a day (04 Aug 2021 07:55)  Vitamin D3 1250 mcg (50,000 intl units) oral capsule: 1 cap(s) orally once a week (04 Aug 2021 07:55)      MEDICATIONS  (STANDING):  HYDROmorphone PCA (1 mG/mL) 30 milliLiter(s) PCA Continuous PCA Continuous  lactated ringers. 1000 milliLiter(s) (30 mL/Hr) IV Continuous <Continuous>    MEDICATIONS  (PRN):  fentaNYL    Injectable 50 MICROGram(s) IV Push every 5 minutes PRN Severe Pain (7 - 10)  HYDROmorphone  Injectable 0.5 milliGRAM(s) IV Push every 10 minutes PRN Moderate Pain (4 - 6)  HYDROmorphone PCA (1 mG/mL) Rescue Clinician Bolus 0.5 milliGRAM(s) IV Push every 15 minutes PRN for Pain Scale GREATER THAN 6  metoclopramide Injectable 10 milliGRAM(s) IV Push once PRN Nausea and/or Vomiting  naloxone Injectable 0.1 milliGRAM(s) IV Push every 3 minutes PRN For ANY of the following changes in patient status:  A. RR LESS THAN 10 breaths per minute, B. Oxygen saturation LESS THAN 90%, C. Sedation score of 6  ondansetron Injectable 4 milliGRAM(s) IV Push every 6 hours PRN Nausea  ondansetron Injectable 4 milliGRAM(s) IV Push once PRN Nausea and/or Vomiting      Physical exam:                             General:               Pt is awake, alert,  appears to be in pain but not in  distress                                                  Neuro:                  Nonfocal                             Cardiovascular:   S1 & S2, regular                           Respiratory:         Air entry is fair and equal on both sides, has bilateral conducted sounds                           GI:                          Soft, nondistended and nontender, Bowel sounds active                            Ext:                        No cyanosis or edema     Labs:                                                                   CXR:      Plan:    General: 71yMale s/p R-marking, flex bronch LORRIE wedge resection [frozen c/w NSCLC], completion LORRIE lobectomy w / MLND 8/4/2021, experiencing  pain with deep breathing.                             Neuro:                                         Pain control with PCA  /  Tylenol PRN    Anxiety: Continue Xanax                            Cardiovascular:                                          HTN / Ascending aortic aneurysm: Continue hemodynamic monitoring and restart ACE                            Respiratory:                                         Pt is on 2L  nasal canula, wean off as tolerated                                          Comfortable, not in any distress.                                         Encourage incentive spirometry                                          Monitor chest tube output                                         Chest tube to  water seal, has small air leak                                                                 Continue bronchodilators, pulmonary toilet                            GI                                         On clear liquids, advance to regular diet as tolerated                                         Continue Zofran / Reglan for nausea - PRN	                                                                 Renal:                                         Continue LR  30cc/hr                                         Monitor I/Os and electrolytes                                                                                        Hem/ Onc:                                                                                  Monitor chest tube output &  signs of bleeding.                                          Follow CBC in AM                           Infectious disease:                                            Monitor for fever / leukocytosis.                                          All surgical incision / chest tube  sites look clean                            Endocrine      Hypothyroidism: On Synthroid      Continue Accu-Checks with coverage    Pt is on SQ Heparin and Venodyne boots for DVT prophylaxis.     Pertinent clinical, laboratory, radiographic, hemodynamic, echocardiographic, respiratory data, microbiologic data and chart were reviewed and analyzed frequently throughout the course of the day and night  Patient seen, examined and plan discussed with CT Surgeon Dr. Verde  / CTICU team during rounds.    Status discussed with patient /  updated plan of care          Simón Walters MD                                                                    
Subjective:  72 y/o male seen at bedside this AM. Patient doing well this morning but is discouraged by his extended post op surgery recovery and does not understand why he is still here with issues. Otherwise does not have sob, cp, nvd, general malaise.   Left CT remains in place with high output, serous in nature. No AL noted this AM.     Vital Signs:  Vital Signs Last 24 Hrs  T(C): 36.8 (08-10-21 @ 08:45), Max: 36.8 (08-10-21 @ 08:45)  T(F): 98.2 (08-10-21 @ 08:45), Max: 98.2 (08-10-21 @ 08:45)  HR: 85 (08-10-21 @ 08:45) (75 - 98)  BP: 145/74 (08-10-21 @ 08:45) (120/71 - 153/79)  RR: 18 (08-10-21 @ 08:45) (16 - 18)  SpO2: 95% (08-10-21 @ 08:45) (95% - 99%) on (O2)    Pertinent Physical Exam:  Telemetry/Alarms: NSR  General: WN/WD NAD  Neurology: Awake, nonfocal,  Respiratory: CTA B/L, No wheezing, rales, rhonchi  CV: RR  Abdominal: Soft, NT, ND +BS,   Extremities: No edema, + peripheral pulses  Incisions: CDI  Tubes: Left CT    Chest Tube: Left Side    Air Leak: Yes[] / No[x]    Drainage:480     I&O's Summary    09 Aug 2021 07:01  -  10 Aug 2021 07:00  --------------------------------------------------------  IN: 480 mL / OUT: 2630 mL / NET: -2150 mL        Relevant labs, radiology and Medications reviewed        CXR:   Pending    MEDICATIONS  (STANDING):  albuterol/ipratropium for Nebulization 3 milliLiter(s) Nebulizer every 6 hours  aspirin enteric coated 81 milliGRAM(s) Oral daily  famotidine    Tablet 20 milliGRAM(s) Oral every 12 hours  finasteride 5 milliGRAM(s) Oral daily  gabapentin 200 milliGRAM(s) Oral every 8 hours  heparin   Injectable 7500 Unit(s) SubCutaneous every 8 hours  levothyroxine 25 MICROGram(s) Oral daily  lidocaine   5% Patch 1 Patch Transdermal every 24 hours  lidocaine   5% Patch 1 Patch Transdermal daily  polyethylene glycol 3350 17 Gram(s) Oral daily  senna 2 Tablet(s) Oral at bedtime    MEDICATIONS  (PRN):  ALPRAZolam 0.25 milliGRAM(s) Oral three times a day PRN anxiety  bisacodyl Suppository 10 milliGRAM(s) Rectal daily PRN Constipation  ondansetron Injectable 4 milliGRAM(s) IV Push every 6 hours PRN Nausea  oxyCODONE    IR 10 milliGRAM(s) Oral every 3 hours PRN Severe Pain (7 - 10)  oxyCODONE    IR 5 milliGRAM(s) Oral every 3 hours PRN Moderate Pain (4 - 6)      Assessment  71y old  Male who presents with a chief complaint of Pulmonary nodule (05 Aug 2021 14:30)  Pt found to have enlarging LORRIE nodule during CT evaluation of Ascending aortic aneurysm - Pt hx HTN, BPH, Hypothyroid , vertebral fracture , thoracic aortic aneurysm (stable) - Pt is poor historian   Pt admits to COVID 12/20   Pt has not had COVID vaccine - to have COVID preop test   (27 Jul 2021 16:58)    Pt is s/p IR-marking, flex bronch LORRIE wedge resection [frozen c/w NSCLC], completion LORRIE lobectomy w/MLND 8/4/2021                       Post op: Left chest tube had an air leak which has now resolved; tube remains now for high output. PT received lasix x2 with good response. 8/10 High output, Will send pleural fluid studies.         PLAN  Neuro: Pain management. Well controlled on PO PRN oxy  Pulm: Encourage coughing, deep breathing and use of incentive spirometry. Daily CXR. Duonebs. S/P lasix with high chest tube output.  Cardio: Monitor telemetry/alarms. Cont with cardiac medication  GI: Tolerating diet. Continue stool softeners.  Renal: monitor urine output, supplement electrolytes as needed. BPH   Vasc: Heparin SC/SCDs for DVT prophylaxis  Heme: Stable H/H.   ID: Off antibiotics. Stable.  Therapy: OOB/ambulate  Tubes: Monitor Chest tube output. Send pleural fluid studies. r/o chylo although unlikely as fluid is clear  Disposition: Send pleural fluid studies. Maintain chest tube and monitor output  Discussed with Cardiothoracic Team at AM rounds.  
   Subjective: no acute complaints, pain controlled, pt OOB w minimal assist    Vital Signs:  Vital Signs Last 24 Hrs  T(C): 36.7 (08-09-21 @ 05:54), Max: 37.1 (08-08-21 @ 21:23)  T(F): 98 (08-09-21 @ 05:54), Max: 98.7 (08-08-21 @ 21:23)  HR: 84 (08-09-21 @ 05:54) (76 - 96)  BP: 137/76 (08-09-21 @ 05:54) (127/74 - 137/76)  RR: 16 (08-09-21 @ 05:54) (16 - 18)  SpO2: 97% (08-09-21 @ 05:54) (94% - 100%) on (O2)    Telemetry/Alarms:  General: WN/WD NAD  Neurology: Awake, nonfocal, CAMPOS x 4  Eyes: Scleras clear, PERRLA/ EOMI, Gross vision intact  ENT:Gross hearing intact, grossly patent pharynx, no stridor  Neck: Neck supple, trachea midline, No JVD,   Respiratory: CTA B/L, No wheezing, rales, rhonchi  CV: RRR, S1S2, no murmurs, rubs or gallops  Abdominal: Soft, NT, ND +BS,   Extremities: No edema, + peripheral pulses  Skin: No Rashes, Hematoma, Ecchymosis  Lymphatic: No Neck, axilla, groin LAD  Psych: Oriented x 3, normal affect  Incisions: c,d,i  Tubes: left chest tube on waterseal drained 345 with no air leak  Relevant labs, radiology and Medications reviewed            MEDICATIONS  (STANDING):  albuterol/ipratropium for Nebulization 3 milliLiter(s) Nebulizer every 6 hours  aspirin enteric coated 81 milliGRAM(s) Oral daily  famotidine    Tablet 20 milliGRAM(s) Oral every 12 hours  finasteride 5 milliGRAM(s) Oral daily  gabapentin 200 milliGRAM(s) Oral every 8 hours  heparin   Injectable 7500 Unit(s) SubCutaneous every 8 hours  levothyroxine 25 MICROGram(s) Oral daily  lidocaine   5% Patch 1 Patch Transdermal every 24 hours  lidocaine   5% Patch 1 Patch Transdermal daily  polyethylene glycol 3350 17 Gram(s) Oral daily  potassium chloride    Tablet ER 20 milliEquivalent(s) Oral once  senna 2 Tablet(s) Oral at bedtime    MEDICATIONS  (PRN):  ALPRAZolam 0.25 milliGRAM(s) Oral three times a day PRN anxiety  bisacodyl Suppository 10 milliGRAM(s) Rectal daily PRN Constipation  ondansetron Injectable 4 milliGRAM(s) IV Push every 6 hours PRN Nausea  oxyCODONE    IR 5 milliGRAM(s) Oral every 3 hours PRN Moderate Pain (4 - 6)  oxyCODONE    IR 10 milliGRAM(s) Oral every 3 hours PRN Severe Pain (7 - 10)    Pertinent Physical Exam  I&O's Summary    08 Aug 2021 07:01  -  09 Aug 2021 07:00  --------------------------------------------------------  IN: 480 mL / OUT: 695 mL / NET: -215 mL    09 Aug 2021 07:01  -  09 Aug 2021 09:10  --------------------------------------------------------  IN: 0 mL / OUT: 750 mL / NET: -750 mL        Assessment  Admitted with complaints of Patient is a 71y old  Male who presents with a chief complaint of Pulmonary nodule (05 Aug 2021 14:30)  Pt is a 71 yr old male scheduled for Flexible Bronchoscopy Left Video Assisted Thoracoscopy Lung resection with IR Localization with Dr Verde tentatively 8/4/21 - Pt found to have enlarging LORRIE nodule during CT evaluation of Ascending aortic aneurysm - Pt hx HTN, BPH, Hypothyroid , vertebral fracture , thoracic aortic aneurysm (stable) - Pt is poor historian   Pt admits to COVID 12/20   Pt has not had COVID vaccine - to have COVID preop test   (27 Jul 2021 16:58)    Pt is s/p IR-marking, flex bronch LORRIE wedge resection [frozen c/w NSCLC], completion LORRIE lobectomy w/MLND 8/4/2021                       Post op: LEft chest tube had an air leak which has now resolved; tube remains now for high output      PLAN  Neuro: Pain management  Pulm: Encourage coughing, deep breathing and use of incentive spirometry.  Daily CXR.   Cardio: Monitor telemetry/alarms  GI: Tolerating diet. Continue stool softeners.  Renal: monitor urine output, supplement electrolytes as needed  LASIX 20IVP x1 and KCl 20mEq  Vasc: Heparin SC/SCDs for DVT prophylaxis  Heme: Stable H/H. .   ID: Off antibiotics. Stable.  Therapy: OOB/ambulate  Tubes: Monitor Chest tube output  Disposition: Aim to D/C to home once chest tube output decreases  Discussed with Cardiothoracic Team at AM rounds.
Anesthesia Pain Management Service    SUBJECTIVE: Patient is doing well with IV PCA and no significant problems reported.    Pain Scale Score	At rest: _3__ 	With Activity: ___ 	[X ] Refer to charted pain scores    THERAPY:    [ ] IV PCA Morphine		[ ] 5 mg/mL	[ ] 1 mg/mL  [X ] IV PCA Hydromorphone	[ ] 5 mg/mL	[X ] 1 mg/mL  [ ] IV PCA Fentanyl		[ ] 50 micrograms/mL    Demand dose __0.2_ lockout __6_ (minutes) Continuous Rate _0__ Total: _2.6__  mg used (in past 24 hours)      MEDICATIONS  (STANDING):  ALPRAZolam 0.25 milliGRAM(s) Oral three times a day  aspirin enteric coated 81 milliGRAM(s) Oral daily  famotidine    Tablet 20 milliGRAM(s) Oral every 12 hours  finasteride 5 milliGRAM(s) Oral daily  heparin   Injectable 7500 Unit(s) SubCutaneous every 8 hours  HYDROmorphone PCA (1 mG/mL) 30 milliLiter(s) PCA Continuous PCA Continuous  lactated ringers. 1000 milliLiter(s) (30 mL/Hr) IV Continuous <Continuous>  levothyroxine 25 MICROGram(s) Oral daily  senna 2 Tablet(s) Oral at bedtime    MEDICATIONS  (PRN):  acetaminophen  IVPB .. 1000 milliGRAM(s) IV Intermittent once PRN Temp greater or equal to 38C (100.4F), Mild Pain (1 - 3)  HYDROmorphone  Injectable 0.5 milliGRAM(s) IV Push every 10 minutes PRN Moderate Pain (4 - 6)  HYDROmorphone PCA (1 mG/mL) Rescue Clinician Bolus 0.5 milliGRAM(s) IV Push every 15 minutes PRN for Pain Scale GREATER THAN 6  metoclopramide Injectable 10 milliGRAM(s) IV Push once PRN Nausea and/or Vomiting  naloxone Injectable 0.1 milliGRAM(s) IV Push every 3 minutes PRN For ANY of the following changes in patient status:  A. RR LESS THAN 10 breaths per minute, B. Oxygen saturation LESS THAN 90%, C. Sedation score of 6  ondansetron Injectable 4 milliGRAM(s) IV Push every 6 hours PRN Nausea  ondansetron Injectable 4 milliGRAM(s) IV Push once PRN Nausea and/or Vomiting      OBJECTIVE: laying in bed     Sedation Score:	[ X] Alert	[ ] Drowsy 	[ ] Arousable	[ ] Asleep	[ ] Unresponsive    Side Effects:	[X ] None	[ ] Nausea	[ ] Vomiting	[ ] Pruritus  		[ ] Other:    Vital Signs Last 24 Hrs  T(C): 36.5 (05 Aug 2021 08:00), Max: 36.7 (04 Aug 2021 20:00)  T(F): 97.7 (05 Aug 2021 08:00), Max: 98.1 (04 Aug 2021 20:00)  HR: 88 (05 Aug 2021 08:00) (73 - 94)  BP: 147/87 (05 Aug 2021 08:00) (115/69 - 152/90)  BP(mean): 98 (05 Aug 2021 08:00) (79 - 104)  RR: 19 (05 Aug 2021 08:00) (12 - 22)  SpO2: 92% (05 Aug 2021 08:00) (90% - 97%)    ASSESSMENT/ PLAN    Therapy to  be:	[ X] Continue   [ ] Discontinued   [ ] Change to prn Analgesics    Documentation and Verification of current medications:   [X] Done	[ ] Not done, not elligible    Comments: Recommend non-opioid adjuvant analgesics to be used when possible and when allowed by primary surgical team.    Progress Note written now but Patient was seen earlier.
RACHEL PATEL                     [id number]    HPI:  Pt is a 71 yr old male scheduled for Flexible Bronchoscopy Left Video Assisted Thoracoscopy Lung resection with IR Localization with Dr Verde tentatively 8/4/21 - Pt found to have enlarging LORRIE nodule during CT evaluation of Ascending aortic aneurysm - Pt hx HTN, BPH, Hypothyroid , vertebral fracture , thoracic aortic aneurysm (stable) - Pt is poor historian   Pt admits to COVID 12/20   Pt has not had COVID vaccine - to have COVID preop test   (27 Jul 2021 16:58)      Procedure:  IR-marking, flex bronch LORRIE wedge resection [frozen c/w NSCLC], completion LORRIE lobectomy w/MLND 8/4/2021                         Issues:              Lung nodule              Postop pain  Chest tube in place  Ascending aortic aneurysm  Hypothyroidism  HTN  BPH                 Home Medications:  ALPRAZolam 0.25 mg oral tablet: 1 tab(s) orally once a day (04 Aug 2021 07:55)  Izzy Aspirin: 81 mg  orally once a day LD 7/21 (04 Aug 2021 07:55)  finasteride 5 mg oral tablet: 1 tab(s) orally once a day (04 Aug 2021 07:55)  levothyroxine 25 mcg (0.025 mg) oral tablet: 1 tab(s) orally once a day (04 Aug 2021 07:55)  oxyCODONE 10 mg oral tablet, extended release: 1 tab(s) orally every 8 hours as needed (04 Aug 2021 07:55)  ramipril 5 mg oral capsule: 1 cap(s) orally once a day (04 Aug 2021 07:55)  Vitamin D3 1250 mcg (50,000 intl units) oral capsule: 1 cap(s) orally once a week (04 Aug 2021 07:55)        PAST MEDICAL & SURGICAL HISTORY:  HTN (hypertension)    Ascending aortic aneurysm    Thoracic aortic aneurysm    Obesity    Pulmonary nodule    Hypothyroid    H/O arthroscopy  right knee              VITAL SIGNS:  Vital Signs Last 24 Hrs  T(C): 36.5 (05 Aug 2021 08:00), Max: 36.7 (04 Aug 2021 20:00)  T(F): 97.7 (05 Aug 2021 08:00), Max: 98.1 (04 Aug 2021 20:00)  HR: 76 (05 Aug 2021 09:00) (73 - 94)  BP: 132/73 (05 Aug 2021 09:00) (115/69 - 152/90)  BP(mean): 85 (05 Aug 2021 09:00) (79 - 104)  RR: 18 (05 Aug 2021 09:00) (12 - 22)  SpO2: 91% (05 Aug 2021 09:00) (90% - 97%)    I/Os:   I&O's Detail    04 Aug 2021 07:01  -  05 Aug 2021 07:00  --------------------------------------------------------  IN:    IV PiggyBack: 300 mL    Lactated Ringers: 480 mL  Total IN: 780 mL    OUT:    Chest Tube (mL): 95 mL    Voided (mL): 1050 mL  Total OUT: 1145 mL    Total NET: -365 mL          CAPILLARY BLOOD GLUCOSE          =======================MEDICATIONS===================  MEDICATIONS  (STANDING):  ALPRAZolam 0.25 milliGRAM(s) Oral three times a day  aspirin enteric coated 81 milliGRAM(s) Oral daily  famotidine    Tablet 20 milliGRAM(s) Oral every 12 hours  finasteride 5 milliGRAM(s) Oral daily  heparin   Injectable 7500 Unit(s) SubCutaneous every 8 hours  HYDROmorphone PCA (1 mG/mL) 30 milliLiter(s) PCA Continuous PCA Continuous  lactated ringers. 1000 milliLiter(s) (30 mL/Hr) IV Continuous <Continuous>  levothyroxine 25 MICROGram(s) Oral daily  senna 2 Tablet(s) Oral at bedtime    MEDICATIONS  (PRN):  acetaminophen  IVPB .. 1000 milliGRAM(s) IV Intermittent once PRN Temp greater or equal to 38C (100.4F), Mild Pain (1 - 3)  HYDROmorphone  Injectable 0.5 milliGRAM(s) IV Push every 10 minutes PRN Moderate Pain (4 - 6)  HYDROmorphone PCA (1 mG/mL) Rescue Clinician Bolus 0.5 milliGRAM(s) IV Push every 15 minutes PRN for Pain Scale GREATER THAN 6  metoclopramide Injectable 10 milliGRAM(s) IV Push once PRN Nausea and/or Vomiting  naloxone Injectable 0.1 milliGRAM(s) IV Push every 3 minutes PRN For ANY of the following changes in patient status:  A. RR LESS THAN 10 breaths per minute, B. Oxygen saturation LESS THAN 90%, C. Sedation score of 6  ondansetron Injectable 4 milliGRAM(s) IV Push every 6 hours PRN Nausea  ondansetron Injectable 4 milliGRAM(s) IV Push once PRN Nausea and/or Vomiting        PHYSICAL EXAM============================  General:                         Awake, alert, not in any distress  Neuro:                            Moving all extremities to commands.   Respiratory:	Air entry fair and  bilateral conducted sounds                                           Effort even and unlabored.  CV:		Regular rate and rhythm. Normal S1/S2                                          Distal pulses present.  Abdomen:	                     Soft, non-distended. Bowel sounds present   Skin:		No rash.  Extremities:	Warm, no cyanosis or edema.  Palpable pulses    ============================LABS=========================                        14.1   11.53 )-----------( 224      ( 05 Aug 2021 04:45 )             42.2     08-05    134<L>  |  99  |  16  ----------------------------<  132<H>  4.3   |  21<L>  |  0.91    Ca    8.8      05 Aug 2021 04:45  Phos  4.3     08-05  Mg     1.80     08-05      A/P:  71yMale s/p R-marking, flex bronch LORRIE wedge resection [frozen c/w NSCLC], completion LORRIE lobectomy w / MLND 8/4/2021, experiencing  pain with deep breathing.                             Neuro:                                         Pain control with PCA  /  Tylenol PRN    Anxiety: Continue Xanax                            Cardiovascular:                                          HTN / Ascending aortic aneurysm: Continue hemodynamic monitoring and restart ACE                            Respiratory:                                         Pt is on 2L  nasal canula, wean off as tolerated                                          Comfortable, not in any distress.                                         Encourage incentive spirometry                                          Monitor chest tube output                                         Chest tube to  water seal.                                                                Continue bronchodilators, pulmonary toilet                            GI                                         On regular diet as tolerated                                         Continue Zofran / Reglan for nausea - PRN	                                                                 Renal:                                         Continue LR  30cc/hr                                         Monitor I/Os and electrolytes                                                                                        Hem/ Onc:                                                                                  Monitor chest tube output &  signs of bleeding.                                          Follow CBC in AM                           Infectious disease:                                            Monitor for fever / leukocytosis.                                          All surgical incision / chest tube  sites look clean                            Endocrine      Hypothyroidism: On Synthroid      Continue Accu-Checks with coverage    Pt is on SQ Heparin and Venodyne boots for DVT prophylaxis.     Pertinent clinical, laboratory, radiographic, hemodynamic, echocardiographic, respiratory data, microbiologic data and chart were reviewed and analyzed frequently throughout the course of the day and night  Patient seen, examined and plan discussed with CT Surgeon Dr. Verde  / CTICU team during rounds.    Status discussed with patient /  updated plan of care      Bill Dhillon DO, FACEP    
   Subjective: "I'm feeling so much better than I was. My pain is much much better" Pt states adequate pain relief. No CP or SOb. C/o back pain near CT. No N/v. c/o no BM, given Dulc supp yesterday with no relief. Does not want enema at this time. OOB and amb >200'    Vital Signs:  Vital Signs Last 24 Hrs  T(C): 36.8 (08-07-21 @ 06:13), Max: 37.5 (08-06-21 @ 16:58)  T(F): 98.3 (08-07-21 @ 06:13), Max: 99.5 (08-06-21 @ 16:58)  HR: 90 (08-07-21 @ 09:08) (74 - 98)  BP: 111/87 (08-07-21 @ 06:13) (111/87 - 134/71)  RR: 17 (08-07-21 @ 06:13) (17 - 18)  SpO2: 95% (08-07-21 @ 09:08) (94% - 98%) on (O2)    Telemetry/Alarms: Sr/ST  General: WN/WD NAD  Neurology: Awake, nonfocal, CAMPOS x 4  Eyes: Scleras clear, PERRLA/ EOMI, Gross vision intact  ENT:Gross hearing intact, grossly patent pharynx, no stridor  Neck: Neck supple, trachea midline, No JVD,   Respiratory: Slight dec BS bilat, improved from yesterday  CV: RRR, S1S2, no murmurs, rubs or gallops  Abdominal: Soft, NT, ND +BS, +flatus  Extremities: No edema, + peripheral pulses  Skin: No Rashes, Hematoma, Ecchymosis  Lymphatic: No Neck, axilla, groin LAD  Psych: Oriented x 3, normal affect  Incisions: Left VATS c/d/i.   Tubes: Left CT-280cc/24hrs on WS, + FEAL  Relevant labs, radiology and Medications reviewed           CXR with improved atelectasis. no obvious effusion or ptx             11.8   9.97  )-----------( 186      ( 07 Aug 2021 07:09 )             36.0     08-07    139  |  104  |  23  ----------------------------<  121<H>  4.4   |  22  |  1.07    Ca    8.8      07 Aug 2021 07:09  Mg     1.90     08-06        MEDICATIONS  (STANDING):  albuterol/ipratropium for Nebulization 3 milliLiter(s) Nebulizer every 6 hours  aspirin enteric coated 81 milliGRAM(s) Oral daily  famotidine    Tablet 20 milliGRAM(s) Oral every 12 hours  finasteride 5 milliGRAM(s) Oral daily  gabapentin 200 milliGRAM(s) Oral every 8 hours  heparin   Injectable 7500 Unit(s) SubCutaneous every 8 hours  ketorolac   Injectable 15 milliGRAM(s) IV Push every 6 hours  levothyroxine 25 MICROGram(s) Oral daily  lidocaine   5% Patch 1 Patch Transdermal every 24 hours  lidocaine   5% Patch 1 Patch Transdermal daily  polyethylene glycol 3350 17 Gram(s) Oral daily  senna 2 Tablet(s) Oral at bedtime    MEDICATIONS  (PRN):  ALPRAZolam 0.25 milliGRAM(s) Oral three times a day PRN anxiety  bisacodyl Suppository 10 milliGRAM(s) Rectal daily PRN Constipation  ondansetron Injectable 4 milliGRAM(s) IV Push every 6 hours PRN Nausea  oxyCODONE    IR 5 milliGRAM(s) Oral every 3 hours PRN Moderate Pain (4 - 6)  oxyCODONE    IR 10 milliGRAM(s) Oral every 3 hours PRN Severe Pain (7 - 10)    Pertinent Physical Exam  I&O's Summary    06 Aug 2021 07:01  -  07 Aug 2021 07:00  --------------------------------------------------------  IN: 150 mL / OUT: 1230 mL / NET: -1080 mL      Assessment  71y Male  w/ PAST MEDICAL & SURGICAL HISTORY:  HTN (hypertension)    Ascending aortic aneurysm    Thoracic aortic aneurysm    Obesity    Pulmonary nodule    Hypothyroid    H/O arthroscopy  right knee    admitted with complaints of Patient is a 71y old  Male who presents with a chief complaint of Lung surgery (06 Aug 2021 11:33)  Pt is a 71 yr old male scheduled for Flexible Bronchoscopy Left Video Assisted Thoracoscopy Lung resection with IR Localization with Dr Verde tentatively 8/4/21 - Pt found to have enlarging LORRIE nodule during CT evaluation of Ascending aortic aneurysm - Pt hx HTN, BPH, Hypothyroid , vertebral fracture , thoracic aortic aneurysm (stable) - Pt is poor historian   Pt admits to COVID 12/20   Pt has not had COVID vaccine - to have COVID preop test   (27 Jul 2021 16:58)      Procedure:  IR-marking, flex bronch LORRIE wedge resection [frozen c/w NSCLC], completion LORRIE lobectomy w/MLND 8/4/2021                         Issues:              Lung nodule              Postop pain  Chest tube in place  Ascending aortic aneurysm  Hypothyroidism  HTN  BPH  Post op pt with small air leak, poor pain control. 8/6- Given Lasix x 1, adjustment of pain regiment. CT kept for AL.       PLAN  Neuro: Pain management. Cont current regiment. Pain improved.   Pulm: Encourage coughing, deep breathing and use of incentive spirometry. Wean off supplemental oxygen as able. Daily CXR. cont nebs  Cardio: Monitor telemetry/alarms  GI: Tolerating diet. Continue stool softeners.   Renal: monitor urine output, supplement electrolytes as needed. S/p diuresis yesterday with increased CT output. I/o neg 1 liter. CXR improved, Will observe.   Vasc: Heparin SC/SCDs for DVT prophylaxis  Heme: Stable H/H. .   ID: Off antibiotics. Stable.  Therapy: OOB/ambulate  Tubes: Monitor Chest tube output and air leak  Disposition: Aim to D/C to home once CT removed  Discussed with Cardiothoracic Team at AM rounds.  
Anesthesia Pain Management Service    SUBJECTIVE: Pt now off IV PCA without problems reported.    Therapy:	  [ X] IV PCA	   [ ] Epidural           [ ] s/p Spinal Opoid              [ ] Postpartum infusion	  [ ] Patient controlled regional anesthesia (PCRA)    [ ] prn Analgesics    Allergies    No Known Allergies    Intolerances      MEDICATIONS  (STANDING):  acetaminophen  IVPB .. 1000 milliGRAM(s) IV Intermittent once  acetaminophen  IVPB .. 1000 milliGRAM(s) IV Intermittent once  albuterol/ipratropium for Nebulization 3 milliLiter(s) Nebulizer every 6 hours  aspirin enteric coated 81 milliGRAM(s) Oral daily  famotidine    Tablet 20 milliGRAM(s) Oral every 12 hours  finasteride 5 milliGRAM(s) Oral daily  gabapentin 200 milliGRAM(s) Oral every 8 hours  heparin   Injectable 7500 Unit(s) SubCutaneous every 8 hours  ketorolac   Injectable 15 milliGRAM(s) IV Push every 6 hours  levothyroxine 25 MICROGram(s) Oral daily  lidocaine   5% Patch 1 Patch Transdermal every 24 hours  lidocaine   5% Patch 1 Patch Transdermal daily  polyethylene glycol 3350 17 Gram(s) Oral daily  senna 2 Tablet(s) Oral at bedtime    MEDICATIONS  (PRN):  ALPRAZolam 0.25 milliGRAM(s) Oral three times a day PRN anxiety  bisacodyl Suppository 10 milliGRAM(s) Rectal daily PRN Constipation  ondansetron Injectable 4 milliGRAM(s) IV Push every 6 hours PRN Nausea  oxyCODONE    IR 5 milliGRAM(s) Oral every 3 hours PRN Moderate Pain (4 - 6)  oxyCODONE    IR 10 milliGRAM(s) Oral every 3 hours PRN Severe Pain (7 - 10)      OBJECTIVE:   [X] No new signs     [ ] Other:    Side Effects:  [X ] None			[ ] Other:    Assessment of Catheter Site:		[ ] Intact		[ ] Other:    ASSESSMENT/PLAN  [ ] Continue current therapy    [X ] Therapy changed to:    [ ] IV PCA       [ ] Epidural     [ X] prn Analgesics     Comments: PRN Oral/IV opioids and/or non-opioid adjuvant analgesics to be used at this point.    Progress Note written now but Patient was seen earlier.
Subjective: 72 y/o male s/p vats with left chest tube in place with AL and high output.     Vital Signs:  Vital Signs Last 24 Hrs  T(C): 36.6 (08-08-21 @ 05:25), Max: 37.2 (08-07-21 @ 21:12)  T(F): 97.9 (08-08-21 @ 05:25), Max: 99 (08-07-21 @ 21:12)  HR: 87 (08-08-21 @ 05:25) (73 - 100)  BP: 133/68 (08-08-21 @ 05:25) (133/68 - 152/78)  RR: 16 (08-08-21 @ 05:25) (16 - 18)  SpO2: 96% (08-08-21 @ 05:25) (94% - 99%) on (O2)    Pertinent Physical Exam:  Telemetry/Alarms: NSR   General: WN/WD NAD  Neurology: Awake, nonfocal  Eyes: Scleras clear, PERRLA/ EOMI, Gross vision intact  ENT:Gross hearing intact, grossly patent pharynx, no stridor  CV: RRR, S1S2  Abdominal: Soft, NT, ND +BS,   Extremities: No edema, + peripheral pulses  Incisions: CDI  Tubes: Left CT    Chest Tube: Left Side    Air Leak: Yes[x] / No[]    Drainage: 255     I&O's Summary    07 Aug 2021 07:01  -  08 Aug 2021 07:00  --------------------------------------------------------  IN: 480 mL / OUT: 1505 mL / NET: -1025 mL        Relevant labs, radiology and Medications reviewed                        11.8   9.97  )-----------( 186      ( 07 Aug 2021 07:09 )             36.0     08-07    139  |  104  |  23  ----------------------------<  121<H>  4.4   |  22  |  1.07    Ca    8.8      07 Aug 2021 07:09      CXR: pending    MEDICATIONS  (STANDING):  albuterol/ipratropium for Nebulization 3 milliLiter(s) Nebulizer every 6 hours  aspirin enteric coated 81 milliGRAM(s) Oral daily  famotidine    Tablet 20 milliGRAM(s) Oral every 12 hours  finasteride 5 milliGRAM(s) Oral daily  gabapentin 200 milliGRAM(s) Oral every 8 hours  heparin   Injectable 7500 Unit(s) SubCutaneous every 8 hours  levothyroxine 25 MICROGram(s) Oral daily  lidocaine   5% Patch 1 Patch Transdermal every 24 hours  lidocaine   5% Patch 1 Patch Transdermal daily  polyethylene glycol 3350 17 Gram(s) Oral daily  senna 2 Tablet(s) Oral at bedtime    MEDICATIONS  (PRN):  ALPRAZolam 0.25 milliGRAM(s) Oral three times a day PRN anxiety  bisacodyl Suppository 10 milliGRAM(s) Rectal daily PRN Constipation  ondansetron Injectable 4 milliGRAM(s) IV Push every 6 hours PRN Nausea  oxyCODONE    IR 5 milliGRAM(s) Oral every 3 hours PRN Moderate Pain (4 - 6)  oxyCODONE    IR 10 milliGRAM(s) Oral every 3 hours PRN Severe Pain (7 - 10)      Assessment  Admitted with complaints of Patient is a 71y old  Male who presents with a chief complaint of Pulmonary nodule (05 Aug 2021 14:30)  Pt is a 71 yr old male scheduled for Flexible Bronchoscopy Left Video Assisted Thoracoscopy Lung resection with IR Localization with Dr Verde tentatively 8/4/21 - Pt found to have enlarging LORRIE nodule during CT evaluation of Ascending aortic aneurysm - Pt hx HTN, BPH, Hypothyroid , vertebral fracture , thoracic aortic aneurysm (stable) - Pt is poor historian   Pt admits to COVID 12/20   Pt has not had COVID vaccine - to have COVID preop test   (27 Jul 2021 16:58)    Pt is s/p IR-marking, flex bronch LORRIE wedge resection [frozen c/w NSCLC], completion LORRIE lobectomy w/MLND 8/4/2021                       Post op: LEft chest tube with AL and high output, maintain to ws      PLAN  Neuro: Pain management  Pulm: Encourage coughing, deep breathing and use of incentive spirometry.  Daily CXR.   Cardio: Monitor telemetry/alarms  GI: Tolerating diet. Continue stool softeners.  Renal: monitor urine output, supplement electrolytes as needed  Vasc: Heparin SC/SCDs for DVT prophylaxis  Heme: Stable H/H. .   ID: Off antibiotics. Stable.  Therapy: OOB/ambulate  Tubes: Monitor Chest tube output  Disposition: Aim to D/C to home once chest tube al resolved and output decreased.   Discussed with Cardiothoracic Team at AM rounds.  
   Subjective: "I had such a horrible night. I had so much pain, I couldn't sleep" Pt states pain much improved since changes made this am to pain regiment. NO CP or SOB. Using IS to [phone]. OOB ad fredi to chair and BR. C/o no BM x 2-3 days.     Vital Signs:  Vital Signs Last 24 Hrs  T(C): 37 (08-06-21 @ 05:00), Max: 37.1 (08-05-21 @ 20:52)  T(F): 98.6 (08-06-21 @ 05:00), Max: 98.8 (08-05-21 @ 20:52)  HR: 76 (08-06-21 @ 05:00) (76 - 84)  BP: 123/76 (08-06-21 @ 05:00) (102/70 - 135/78)  RR: 16 (08-06-21 @ 05:00) (16 - 23)  SpO2: 97% (08-06-21 @ 05:00) (92% - 97%) on (O2)    Telemetry/Alarms:  General: WN/WD NAD  Neurology: Awake, nonfocal, CAMPOS x 4  Eyes: Scleras clear, PERRLA/ EOMI, Gross vision intact  ENT:Gross hearing intact, grossly patent pharynx, no stridor  Neck: Neck supple, trachea midline, No JVD,   Respiratory: Dec BS to bilat bases.   CV: RRR, S1S2, no murmurs, rubs or gallops  Abdominal: Soft, NT, ND +BS, no BM but + flatus.   Extremities: No edema, + peripheral pulses  Skin: No Rashes, Hematoma, Ecchymosis  Lymphatic: No Neck, axilla, groin LAD  Psych: Oriented x 3, normal affect  Incisions: Left VATS c/d/i.   Tubes: Left CT-197cc/24hrs on Waterseal, + sml FEAL   Relevant labs, radiology and Medications reviewed           CXR stable, sml apical ptx seen.              12.6   9.78  )-----------( 189      ( 06 Aug 2021 07:18 )             38.2     08-06    135  |  101  |  22  ----------------------------<  108<H>  4.0   |  23  |  0.97    Ca    8.6      06 Aug 2021 07:18  Phos  4.3     08-05  Mg     1.90     08-06        MEDICATIONS  (STANDING):  acetaminophen  IVPB .. 1000 milliGRAM(s) IV Intermittent once  albuterol/ipratropium for Nebulization 3 milliLiter(s) Nebulizer every 6 hours  aspirin enteric coated 81 milliGRAM(s) Oral daily  famotidine    Tablet 20 milliGRAM(s) Oral every 12 hours  finasteride 5 milliGRAM(s) Oral daily  gabapentin 200 milliGRAM(s) Oral every 8 hours  heparin   Injectable 7500 Unit(s) SubCutaneous every 8 hours  ketorolac   Injectable 15 milliGRAM(s) IV Push every 6 hours  levothyroxine 25 MICROGram(s) Oral daily  lidocaine   5% Patch 1 Patch Transdermal every 24 hours  lidocaine   5% Patch 1 Patch Transdermal daily  polyethylene glycol 3350 17 Gram(s) Oral daily  senna 2 Tablet(s) Oral at bedtime    MEDICATIONS  (PRN):  ALPRAZolam 0.25 milliGRAM(s) Oral three times a day PRN anxiety  bisacodyl Suppository 10 milliGRAM(s) Rectal daily PRN Constipation  ondansetron Injectable 4 milliGRAM(s) IV Push every 6 hours PRN Nausea  oxyCODONE    IR 5 milliGRAM(s) Oral every 3 hours PRN Moderate Pain (4 - 6)  oxyCODONE    IR 10 milliGRAM(s) Oral every 3 hours PRN Severe Pain (7 - 10)    Pertinent Physical Exam  I&O's Summary    05 Aug 2021 07:01  -  06 Aug 2021 07:00  --------------------------------------------------------  IN: 610 mL / OUT: 1647 mL / NET: -1037 mL    06 Aug 2021 07:01  -  06 Aug 2021 11:33  --------------------------------------------------------  IN: 0 mL / OUT: 610 mL / NET: -610 mL      Marital Status:  (   )    (   ) Single    (   )    (  )       Assessment  71y Male  w/ PAST MEDICAL & SURGICAL HISTORY:  HTN (hypertension)    Ascending aortic aneurysm    Thoracic aortic aneurysm    Obesity    Pulmonary nodule    Hypothyroid    H/O arthroscopy  right knee    admitted with complaints of Patient is a 71y old  Male who presents with a chief complaint of Pulmonary nodule (05 Aug 2021 14:30)  Pt is a 71 yr old male scheduled for Flexible Bronchoscopy Left Video Assisted Thoracoscopy Lung resection with IR Localization with Dr Verde tentatively 8/4/21 - Pt found to have enlarging LORRIE nodule during CT evaluation of Ascending aortic aneurysm - Pt hx HTN, BPH, Hypothyroid , vertebral fracture , thoracic aortic aneurysm (stable) - Pt is poor historian   Pt admits to COVID 12/20   Pt has not had COVID vaccine - to have COVID preop test   (27 Jul 2021 16:58)      Procedure:  IR-marking, flex bronch LORRIE wedge resection [frozen c/w NSCLC], completion LORRIE lobectomy w/MLND 8/4/2021                         Issues:              Lung nodule              Postop pain  Chest tube in place  Ascending aortic aneurysm  Hypothyroidism  HTN  BPH  Post op pt with small air leak, poor pain control.     PLAN  Neuro: Pain management. Will dc PCA and add toradol, oxycodone, increase neurontin. IV Ofirmev x 2 today.   Pulm: Encourage coughing, deep breathing and use of incentive spirometry. Wean off supplemental oxygen as able.  Add nebs for pulm toilet. Should improve once pain level improved. Daily CXR.  Lasix 10mg IV x 1 per Dr. Verde for diuresis  Cardio: Monitor telemetry/alarms  GI: Tolerating diet. Continue stool softeners. Will give Dulc supp today  Renal: monitor urine output, supplement electrolytes as needed  Vasc: Heparin SC/SCDs for DVT prophylaxis  Heme: Stable H/H. .   ID: Off antibiotics. Stable.  Therapy: OOB/ambulate  Tubes: Monitor Chest tube output and air leak. Continue to waterseal.   Disposition: Aim to D/C to home once CT removed.   Discussed with Cardiothoracic Team at AM rounds.

## 2021-08-11 ENCOUNTER — TRANSCRIPTION ENCOUNTER (OUTPATIENT)
Age: 71
End: 2021-08-11

## 2021-08-11 VITALS
TEMPERATURE: 98 F | OXYGEN SATURATION: 97 % | DIASTOLIC BLOOD PRESSURE: 85 MMHG | SYSTOLIC BLOOD PRESSURE: 175 MMHG | HEART RATE: 86 BPM | RESPIRATION RATE: 20 BRPM

## 2021-08-11 LAB — SURGICAL PATHOLOGY STUDY: SIGNIFICANT CHANGE UP

## 2021-08-11 PROCEDURE — 71045 X-RAY EXAM CHEST 1 VIEW: CPT | Mod: 26

## 2021-08-11 PROCEDURE — 71045 X-RAY EXAM CHEST 1 VIEW: CPT | Mod: 26,77

## 2021-08-11 RX ORDER — LISINOPRIL 2.5 MG/1
10 TABLET ORAL ONCE
Refills: 0 | Status: COMPLETED | OUTPATIENT
Start: 2021-08-11 | End: 2021-08-11

## 2021-08-11 RX ORDER — OXYCODONE HYDROCHLORIDE 5 MG/1
1 TABLET ORAL
Qty: 0 | Refills: 0 | DISCHARGE

## 2021-08-11 RX ORDER — OXYCODONE HYDROCHLORIDE 5 MG/1
1 TABLET ORAL
Qty: 30 | Refills: 0
Start: 2021-08-11 | End: 2021-08-15

## 2021-08-11 RX ORDER — SENNA PLUS 8.6 MG/1
2 TABLET ORAL
Qty: 14 | Refills: 0
Start: 2021-08-11 | End: 2021-08-17

## 2021-08-11 RX ADMIN — Medication 81 MILLIGRAM(S): at 11:29

## 2021-08-11 RX ADMIN — FAMOTIDINE 20 MILLIGRAM(S): 10 INJECTION INTRAVENOUS at 06:10

## 2021-08-11 RX ADMIN — HEPARIN SODIUM 7500 UNIT(S): 5000 INJECTION INTRAVENOUS; SUBCUTANEOUS at 13:31

## 2021-08-11 RX ADMIN — OXYCODONE HYDROCHLORIDE 10 MILLIGRAM(S): 5 TABLET ORAL at 06:41

## 2021-08-11 RX ADMIN — LIDOCAINE 1 PATCH: 4 CREAM TOPICAL at 11:29

## 2021-08-11 RX ADMIN — LIDOCAINE 1 PATCH: 4 CREAM TOPICAL at 01:20

## 2021-08-11 RX ADMIN — Medication 25 MICROGRAM(S): at 06:10

## 2021-08-11 RX ADMIN — Medication 3 MILLILITER(S): at 09:37

## 2021-08-11 RX ADMIN — Medication 3 MILLILITER(S): at 03:37

## 2021-08-11 RX ADMIN — GABAPENTIN 200 MILLIGRAM(S): 400 CAPSULE ORAL at 13:31

## 2021-08-11 RX ADMIN — LISINOPRIL 10 MILLIGRAM(S): 2.5 TABLET ORAL at 10:21

## 2021-08-11 RX ADMIN — OXYCODONE HYDROCHLORIDE 10 MILLIGRAM(S): 5 TABLET ORAL at 06:09

## 2021-08-11 RX ADMIN — HEPARIN SODIUM 7500 UNIT(S): 5000 INJECTION INTRAVENOUS; SUBCUTANEOUS at 06:10

## 2021-08-11 RX ADMIN — GABAPENTIN 200 MILLIGRAM(S): 400 CAPSULE ORAL at 06:09

## 2021-08-11 NOTE — DISCHARGE NOTE NURSING/CASE MANAGEMENT/SOCIAL WORK - NSDCFUADDAPPT_GEN_ALL_CORE_FT
Follow up with Dr. Verde in 2 weeks [phone]  Have chest x-ray performed within 24hrs prior to follow-up appointment with Dr. Verde.   Left chest suture to be removed by Dr. Verde at follow-up appointment.  Follow up with primary care provider in one week

## 2021-08-11 NOTE — DISCHARGE NOTE NURSING/CASE MANAGEMENT/SOCIAL WORK - PATIENT PORTAL LINK FT
You can access the FollowMyHealth Patient Portal offered by [facility] by registering at the following website: [email or web address]. By joining [employer]’s FollowMyHealth portal, you will also be able to view your health information using other applications (apps) compatible with our system.

## 2021-08-11 NOTE — DISCHARGE NOTE NURSING/CASE MANAGEMENT/SOCIAL WORK - NSDCPNINST_GEN_ALL_CORE
Follow all discharge instructions and follow up with HCP in two weeks. Take medications as prescribed.

## 2021-08-16 ENCOUNTER — NON-APPOINTMENT (OUTPATIENT)
Age: 71
End: 2021-08-16

## 2021-08-17 ENCOUNTER — APPOINTMENT (OUTPATIENT)
Dept: THORACIC SURGERY | Facility: CLINIC | Age: 71
End: 2021-08-17
Payer: MEDICARE

## 2021-08-17 ENCOUNTER — RESULT REVIEW (OUTPATIENT)
Age: 71
End: 2021-08-17

## 2021-08-17 ENCOUNTER — APPOINTMENT (OUTPATIENT)
Dept: RADIOLOGY | Facility: HOSPITAL | Age: 71
End: 2021-08-17

## 2021-08-17 ENCOUNTER — OUTPATIENT (OUTPATIENT)
Dept: OUTPATIENT SERVICES | Facility: HOSPITAL | Age: 71
LOS: 1 days | End: 2021-08-17
Payer: MEDICARE

## 2021-08-17 VITALS
HEIGHT: 75 IN | DIASTOLIC BLOOD PRESSURE: 83 MMHG | SYSTOLIC BLOOD PRESSURE: 123 MMHG | WEIGHT: 240 LBS | OXYGEN SATURATION: 96 % | RESPIRATION RATE: 18 BRPM | BODY MASS INDEX: 29.84 KG/M2 | HEART RATE: 84 BPM

## 2021-08-17 DIAGNOSIS — R91.1 SOLITARY PULMONARY NODULE: ICD-10-CM

## 2021-08-17 DIAGNOSIS — Z98.890 OTHER SPECIFIED POSTPROCEDURAL STATES: Chronic | ICD-10-CM

## 2021-08-17 PROCEDURE — 99024 POSTOP FOLLOW-UP VISIT: CPT

## 2021-08-17 PROCEDURE — 71046 X-RAY EXAM CHEST 2 VIEWS: CPT | Mod: 26

## 2021-08-17 RX ORDER — TAMSULOSIN HYDROCHLORIDE 0.4 MG/1
0.4 CAPSULE ORAL
Qty: 90 | Refills: 3 | Status: DISCONTINUED | COMMUNITY
Start: 2021-06-28 | End: 2021-08-17

## 2021-09-09 ENCOUNTER — APPOINTMENT (OUTPATIENT)
Dept: FAMILY MEDICINE | Facility: CLINIC | Age: 71
End: 2021-09-09
Payer: MEDICARE

## 2021-09-09 VITALS
HEART RATE: 80 BPM | OXYGEN SATURATION: 98 % | SYSTOLIC BLOOD PRESSURE: 134 MMHG | TEMPERATURE: 97.2 F | HEIGHT: 75 IN | RESPIRATION RATE: 18 BRPM | WEIGHT: 234 LBS | DIASTOLIC BLOOD PRESSURE: 84 MMHG | BODY MASS INDEX: 29.09 KG/M2

## 2021-09-09 DIAGNOSIS — Z11.59 ENCOUNTER FOR SCREENING FOR OTHER VIRAL DISEASES: ICD-10-CM

## 2021-09-09 PROCEDURE — 99214 OFFICE O/P EST MOD 30 MIN: CPT | Mod: 25

## 2021-09-09 PROCEDURE — G0439: CPT

## 2021-09-10 PROBLEM — Z11.59 ENCOUNTER FOR SCREENING FOR OTHER VIRAL DISEASES: Status: ACTIVE | Noted: 2020-08-24

## 2021-09-10 NOTE — REVIEW OF SYSTEMS
[Back Pain] : back pain [Anxiety] : anxiety [Negative] : Neurological [Suicidal] : not suicidal [Insomnia] : no insomnia [Depression] : no depression

## 2021-09-10 NOTE — HEALTH RISK ASSESSMENT
[Good] : ~his/her~  mood as  good [No] : In the past 12 months have you used drugs other than those required for medical reasons? No [No falls in past year] : Patient reported no falls in the past year [0] : 2) Feeling down, depressed, or hopeless: Not at all (0) [Patient reported colonoscopy was normal] : Patient reported colonoscopy was normal [FreeTextEntry1] : back pain  [] : No [Audit-CScore] : 0 [de-identified] : walking  [de-identified] : regular  [CCL7Vcgxa] : 0 [MammogramDate] : NA [PapSmearDate] : NA [BoneDensityDate] : NA [ColonoscopyDate] : 02/19 [HIVDate] : 10/18

## 2021-09-10 NOTE — PHYSICAL EXAM
[No Varicosities] : no varicosities [No Edema] : there was no peripheral edema [Declined Rectal Exam] : declined rectal exam [No CVA Tenderness] : no CVA  tenderness [No Spinal Tenderness] : no spinal tenderness [Normal Gait] : normal gait [Alert and Oriented x3] : oriented to person, place, and time [Normal] : affect was normal and insight and judgment were intact [de-identified] : declined - done by [facility]

## 2021-09-10 NOTE — HISTORY OF PRESENT ILLNESS
[FreeTextEntry1] : cc:f/u [facility] d/c  08/11, physical , lung Ca, thyroid, back pain , aortic aneurysm , wilfredo  pain  [de-identified] : Encounter conducted in [place].\par \par 72 y/o male cam efo rf/u - s/p lung resection , d/c from the [facility] 08/21 -he was  found to have an enlarging Left Upper Lobe nodule during CT  evaluation of Ascending aortic aneurysm - Patient has medical history including \par HTN, BPH, Hypothyroid , vertebral fracture , thoracic aortic aneurysm (stable) \par - Pt is poor historian.  Pt also admits to COVID 12/20.  Patient presented to \[facility] on 8/4/21, and underwent Flexible Bronchoscopy, Uniportal Left \par VATS, Left Upper Lobe Wedge Resection with completion Left Upper Lobectomy. \par He is doing well, deneis CP,SOB,no abd pain, c/o back pain, on and off, f/u back specialist, needs meds renewal .\par Had a long d/w the pt re: COVID vacc that he needs to get it.Pt feels Anxious on and off, NS,NH , needs meds , refuses therapy

## 2021-09-27 LAB
25(OH)D3 SERPL-MCNC: 85.4 NG/ML
ALBUMIN SERPL ELPH-MCNC: 4.4 G/DL
ALP BLD-CCNC: 87 U/L
ALT SERPL-CCNC: 10 U/L
ANION GAP SERPL CALC-SCNC: 12 MMOL/L
APPEARANCE: CLEAR
AST SERPL-CCNC: 17 U/L
BASOPHILS # BLD AUTO: 0.04 K/UL
BASOPHILS NFR BLD AUTO: 0.5 %
BILIRUB SERPL-MCNC: 1 MG/DL
BILIRUBIN URINE: NEGATIVE
BLOOD URINE: ABNORMAL
BUN SERPL-MCNC: 13 MG/DL
CALCIUM SERPL-MCNC: 9.7 MG/DL
CHLORIDE SERPL-SCNC: 102 MMOL/L
CHOLEST SERPL-MCNC: 227 MG/DL
CO2 SERPL-SCNC: 25 MMOL/L
COLOR: YELLOW
COVID-19 NUCLEOCAPSID  GAM ANTIBODY INTERPRETATION: POSITIVE
COVID-19 SPIKE DOMAIN ANTIBODY INTERPRETATION: POSITIVE
CREAT SERPL-MCNC: 0.97 MG/DL
CREAT SPEC-SCNC: 228 MG/DL
EOSINOPHIL # BLD AUTO: 0.07 K/UL
EOSINOPHIL NFR BLD AUTO: 0.9 %
GLUCOSE QUALITATIVE U: NEGATIVE
GLUCOSE SERPL-MCNC: 97 MG/DL
HCT VFR BLD CALC: 41.1 %
HDLC SERPL-MCNC: 49 MG/DL
HGB BLD-MCNC: 14 G/DL
IMM GRANULOCYTES NFR BLD AUTO: 0.5 %
KETONES URINE: NEGATIVE
LDLC SERPL CALC-MCNC: 152 MG/DL
LEUKOCYTE ESTERASE URINE: NEGATIVE
LYMPHOCYTES # BLD AUTO: 1.66 K/UL
LYMPHOCYTES NFR BLD AUTO: 21.6 %
MAN DIFF?: NORMAL
MCHC RBC-ENTMCNC: 32.5 PG
MCHC RBC-ENTMCNC: 34.1 GM/DL
MCV RBC AUTO: 95.4 FL
MICROALBUMIN 24H UR DL<=1MG/L-MCNC: 16.4 MG/DL
MICROALBUMIN/CREAT 24H UR-RTO: 72 MG/G
MONOCYTES # BLD AUTO: 0.94 K/UL
MONOCYTES NFR BLD AUTO: 12.2 %
NEUTROPHILS # BLD AUTO: 4.94 K/UL
NEUTROPHILS NFR BLD AUTO: 64.3 %
NITRITE URINE: NEGATIVE
NONHDLC SERPL-MCNC: 178 MG/DL
PH URINE: 6
PLATELET # BLD AUTO: 216 K/UL
POTASSIUM SERPL-SCNC: 4.4 MMOL/L
PROT SERPL-MCNC: 7.3 G/DL
PROTEIN URINE: ABNORMAL
RBC # BLD: 4.31 M/UL
RBC # FLD: 11.4 %
SARS-COV-2 AB SERPL IA-ACNC: 17.7 U/ML
SARS-COV-2 AB SERPL QL IA: 27.7 INDEX
SODIUM SERPL-SCNC: 139 MMOL/L
SPECIFIC GRAVITY URINE: 1.02
TRIGL SERPL-MCNC: 126 MG/DL
TSH SERPL-ACNC: 0.63 UIU/ML
URATE SERPL-MCNC: 6.5 MG/DL
UROBILINOGEN URINE: NORMAL
WBC # FLD AUTO: 7.69 K/UL

## 2021-09-28 ENCOUNTER — APPOINTMENT (OUTPATIENT)
Dept: UROLOGY | Facility: CLINIC | Age: 71
End: 2021-09-28
Payer: MEDICARE

## 2021-09-28 DIAGNOSIS — R31.9 HEMATURIA, UNSPECIFIED: ICD-10-CM

## 2021-09-28 PROCEDURE — 99215 OFFICE O/P EST HI 40 MIN: CPT

## 2021-09-28 PROCEDURE — 76775 US EXAM ABDO BACK WALL LIM: CPT

## 2021-10-04 PROBLEM — R31.9 BLOOD IN URINE: Status: ACTIVE | Noted: 2021-01-25

## 2021-10-04 LAB — URINE CYTOLOGY: NORMAL

## 2021-10-04 NOTE — HISTORY OF PRESENT ILLNESS
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 Verde \par - \par \par microscopic hematuria\par \par cytology ordered and reviewed \par atypical cells \par needs cystoscopy and upper ureteroscopy with possible biopsy and laser fulguration \par \par

## 2021-10-04 NOTE — ASSESSMENT
[FreeTextEntry1] : Microscopic hematuria \par - intermittent \par - multicystic kidneys bilateral \par - ultrasound today showed stable symple cysts \par - due to atypical cytology will schedule cystoscopy, bladder biopsy, bilateral ureteroscopy, retrograde, possible biopsy, selective cytology\par \par - mucinous adenocarcinoma of the left upper lobe - resected\par \par Mild dyspnea \par able to perform daily activities \par \par BPH - stable on Proscar and alpha-blocker \par \par had cysto in office which showed enlarged prostate with enlarged vessels\par \par I have reviewed CT/nuc\par Mild expansion of right upper pole calyx \par \par doubt renal / intrarenal tumor as a reason for microscopic hematuria but will proceed with further evaluation \par \par \par The submitted E/M billing level for this visit reflects the total time spent on the day of the visit including documentation in EMR, face-to-face time spent with the patient, non-face-to-face review of medical records and relevant information, review of laboratory results available via [facility] portal, as well using a patient’s electronic medical records portal for patients with records not available to [facility] directly. \par \par Time spend counseling and performing coordination of care was also included in determining the appropriate EM billing level.\par \par I have reviewed and verified information regarding the chief complaint and history recorded by the ancillary staff and/or the patient. I have independently reviewed and interpreted tests performed by other physicians and facilities as necessary. I have reviewed images pertinent to providing the care to  the patient.  \par \par I have discussed with the patient differential diagnosis, reason for auxiliary tests if ordered, risks, benefits, alternatives, and complications of each form of therapy included surgical therapy. Off label use of most of medications used in andrology was reviewed. \par \par \par total time reviewing chart and coordination of care 50 min

## 2021-10-19 ENCOUNTER — OUTPATIENT (OUTPATIENT)
Dept: OUTPATIENT SERVICES | Facility: HOSPITAL | Age: 71
LOS: 1 days | End: 2021-10-19
Payer: MEDICARE

## 2021-10-19 ENCOUNTER — APPOINTMENT (OUTPATIENT)
Dept: CT IMAGING | Facility: IMAGING CENTER | Age: 71
End: 2021-10-19
Payer: MEDICARE

## 2021-10-19 DIAGNOSIS — C80.1 MALIGNANT (PRIMARY) NEOPLASM, UNSPECIFIED: ICD-10-CM

## 2021-10-19 DIAGNOSIS — Z98.890 OTHER SPECIFIED POSTPROCEDURAL STATES: Chronic | ICD-10-CM

## 2021-10-19 PROCEDURE — 71250 CT THORAX DX C-: CPT | Mod: 26

## 2021-10-19 PROCEDURE — 71250 CT THORAX DX C-: CPT

## 2021-10-26 ENCOUNTER — APPOINTMENT (OUTPATIENT)
Dept: THORACIC SURGERY | Facility: CLINIC | Age: 71
End: 2021-10-26
Payer: MEDICARE

## 2021-10-26 VITALS
SYSTOLIC BLOOD PRESSURE: 142 MMHG | RESPIRATION RATE: 17 BRPM | TEMPERATURE: 97.8 F | BODY MASS INDEX: 29.34 KG/M2 | WEIGHT: 236 LBS | OXYGEN SATURATION: 97 % | HEIGHT: 75 IN | HEART RATE: 79 BPM | DIASTOLIC BLOOD PRESSURE: 81 MMHG

## 2021-10-26 PROCEDURE — 99024 POSTOP FOLLOW-UP VISIT: CPT

## 2021-10-27 ENCOUNTER — APPOINTMENT (OUTPATIENT)
Dept: FAMILY MEDICINE | Facility: CLINIC | Age: 71
End: 2021-10-27
Payer: MEDICARE

## 2021-10-27 VITALS
RESPIRATION RATE: 15 BRPM | TEMPERATURE: 97.3 F | OXYGEN SATURATION: 98 % | HEART RATE: 94 BPM | WEIGHT: 237 LBS | HEIGHT: 75 IN | DIASTOLIC BLOOD PRESSURE: 82 MMHG | SYSTOLIC BLOOD PRESSURE: 136 MMHG | BODY MASS INDEX: 29.47 KG/M2

## 2021-10-27 DIAGNOSIS — E67.8 OTHER SPECIFIED HYPERALIMENTATION: ICD-10-CM

## 2021-10-27 PROCEDURE — 36415 COLL VENOUS BLD VENIPUNCTURE: CPT

## 2021-10-27 PROCEDURE — 99215 OFFICE O/P EST HI 40 MIN: CPT | Mod: 25

## 2021-10-27 NOTE — PHYSICAL EXAM
[Normal] : no acute distress, well nourished, well developed and well-appearing [No Acute Distress] : no acute distress [Normal Outer Ear/Nose] : the outer ears and nose were normal in appearance [No JVD] : no jugular venous distention [No Respiratory Distress] : no respiratory distress  [Normal Percussion] : the chest was normal to percussion [Normal Rate] : normal rate  [Normal S1, S2] : normal S1 and S2 [No Murmur] : no murmur heard [No Varicosities] : no varicosities [No Edema] : there was no peripheral edema [Soft] : abdomen soft [Non Tender] : non-tender [Non-distended] : non-distended [No Masses] : no abdominal mass palpated [No HSM] : no HSM [Normal Bowel Sounds] : normal bowel sounds [No CVA Tenderness] : no CVA  tenderness [No Joint Swelling] : no joint swelling [No Rash] : no rash [Normal Gait] : normal gait [No Focal Deficits] : no focal deficits [Alert and Oriented x3] : oriented to person, place, and time

## 2021-10-27 NOTE — HEALTH RISK ASSESSMENT
[Yes] : Yes [2 - 4 times a month (2 pts)] : 2-4 times a month (2 points) [1 or 2 (0 pts)] : 1 or 2 (0 points) [Never (0 pts)] : Never (0 points) [] : No [Audit-CScore] : 2 [de-identified] : walking

## 2021-10-27 NOTE — HISTORY OF PRESENT ILLNESS
[FreeTextEntry1] : cc: f/u  thyroid,  pressure, back pain , his weight , vt D level  [de-identified] : Patient presented  to f/u on his blood  pressure, weight - gained 1 lb, thyroid, Doing well he denies HA, CP.  Patient  c/o back pain  - no bowel or urination changes, seen in the past by Ortho spine ,no recently, takes Oxycodone PRN for severe pain 10/10 - needs renewal - patient has to go to [place] to take care of his mother, .Pt was seen by Cardiol and  , corrine noted.Pt f/u with Sx and Pulmon s/p lung Cancer.Pt stopped ALL vit D supl

## 2021-10-31 LAB — 24R-OH-CALCIDIOL SERPL-MCNC: 66.6 PG/ML

## 2022-01-18 NOTE — DISCHARGE NOTE PROVIDER - NSRESEARCHGRANT_MLMHIDDEN_GEN_A_CORE
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

## 2022-01-31 ENCOUNTER — OUTPATIENT (OUTPATIENT)
Dept: OUTPATIENT SERVICES | Facility: HOSPITAL | Age: 72
LOS: 1 days | End: 2022-01-31
Payer: MEDICARE

## 2022-01-31 VITALS
HEIGHT: 75 IN | WEIGHT: 240.08 LBS | TEMPERATURE: 98 F | SYSTOLIC BLOOD PRESSURE: 107 MMHG | DIASTOLIC BLOOD PRESSURE: 73 MMHG | HEART RATE: 79 BPM | OXYGEN SATURATION: 96 % | RESPIRATION RATE: 18 BRPM

## 2022-01-31 DIAGNOSIS — R93.5 ABNORMAL FINDINGS ON DIAGNOSTIC IMAGING OF OTHER ABDOMINAL REGIONS, INCLUDING RETROPERITONEUM: ICD-10-CM

## 2022-01-31 DIAGNOSIS — Z98.890 OTHER SPECIFIED POSTPROCEDURAL STATES: Chronic | ICD-10-CM

## 2022-01-31 DIAGNOSIS — Z01.818 ENCOUNTER FOR OTHER PREPROCEDURAL EXAMINATION: ICD-10-CM

## 2022-01-31 DIAGNOSIS — R31.9 HEMATURIA, UNSPECIFIED: ICD-10-CM

## 2022-01-31 DIAGNOSIS — N40.1 BENIGN PROSTATIC HYPERPLASIA WITH LOWER URINARY TRACT SYMPTOMS: ICD-10-CM

## 2022-01-31 PROCEDURE — 80048 BASIC METABOLIC PNL TOTAL CA: CPT

## 2022-01-31 PROCEDURE — 85027 COMPLETE CBC AUTOMATED: CPT

## 2022-01-31 PROCEDURE — 87086 URINE CULTURE/COLONY COUNT: CPT

## 2022-01-31 PROCEDURE — G0463: CPT

## 2022-01-31 RX ORDER — ALPRAZOLAM 0.25 MG
1 TABLET ORAL
Qty: 0 | Refills: 0 | DISCHARGE

## 2022-01-31 RX ORDER — SODIUM CHLORIDE 9 MG/ML
1000 INJECTION, SOLUTION INTRAVENOUS
Refills: 0 | Status: DISCONTINUED | OUTPATIENT
Start: 2022-02-09 | End: 2022-02-23

## 2022-01-31 NOTE — H&P PST ADULT - NSICDXPASTMEDICALHX_GEN_ALL_CORE_FT
PAST MEDICAL HISTORY:  Ascending aortic aneurysm     BPH without obstruction/lower urinary tract symptoms     COVID-19 12/2020 loss of smell no hosp    Hearing loss     History of low back pain     HTN (hypertension)     Hypothyroid     Obesity     Pulmonary nodule removed 8/4/2021 early stage no chemo VAT    Thoracic aortic aneurysm

## 2022-01-31 NOTE — H&P PST ADULT - HISTORY OF PRESENT ILLNESS
71yr old male  71yr old male with hx of bilateral renal cysts. Pt recently had LORRIE removed for mucinous adenocarcinoma VAT surgery 8/4/2021.  Pt had abnormal Dx imaging and hx of hematuria with atypical cells. Now coming in for cystoscopy bilateral ureteroscopy  selective cystology biopsy of renal pelvis or ureter lesion Possible laser destruction of renal pelvis/ureter lesion. hx sig for  Aortic aneurysm unchanged CT in chart, HTN obesity .Medical eval by Dr Spence    Note; covid test 2/6/22 On license of [facility]

## 2022-01-31 NOTE — H&P PST ADULT - PROBLEM SELECTOR PLAN 1
coming in for cystoscopy bilateral ureteroscopy selective cystology biopsy renal pelvis or ureter lesion  Possible laser destruction of renal pelvis/ureter lesion

## 2022-01-31 NOTE — H&P PST ADULT - FALL HARM RISK - UNIVERSAL INTERVENTIONS
Bed in lowest position, wheels locked, appropriate side rails in place/Call bell, personal items and telephone in reach/Instruct patient to call for assistance before getting out of bed or chair/Non-slip footwear when patient is out of bed/[place] to call system/Physically safe environment - no spills, clutter or unnecessary equipment/Purposeful Proactive Rounding/Room/bathroom lighting operational, light cord in reach

## 2022-02-01 ENCOUNTER — APPOINTMENT (OUTPATIENT)
Dept: FAMILY MEDICINE | Facility: CLINIC | Age: 72
End: 2022-02-01
Payer: COMMERCIAL

## 2022-02-01 VITALS
DIASTOLIC BLOOD PRESSURE: 74 MMHG | WEIGHT: 238 LBS | RESPIRATION RATE: 17 BRPM | OXYGEN SATURATION: 98 % | BODY MASS INDEX: 29.59 KG/M2 | TEMPERATURE: 97.6 F | HEART RATE: 79 BPM | HEIGHT: 75 IN | SYSTOLIC BLOOD PRESSURE: 124 MMHG

## 2022-02-01 DIAGNOSIS — M84.48XA PATHOLOGICAL FRACTURE, OTHER SITE, INITIAL ENCOUNTER FOR FRACTURE: ICD-10-CM

## 2022-02-01 DIAGNOSIS — N40.1 BENIGN PROSTATIC HYPERPLASIA WITH LOWER URINARY TRACT SYMPMS: ICD-10-CM

## 2022-02-01 DIAGNOSIS — Z86.79 PERSONAL HISTORY OF OTHER DISEASES OF THE CIRCULATORY SYSTEM: ICD-10-CM

## 2022-02-01 DIAGNOSIS — N28.1 CYST OF KIDNEY, ACQUIRED: ICD-10-CM

## 2022-02-01 DIAGNOSIS — N13.8 BENIGN PROSTATIC HYPERPLASIA WITH LOWER URINARY TRACT SYMPMS: ICD-10-CM

## 2022-02-01 PROCEDURE — 99215 OFFICE O/P EST HI 40 MIN: CPT | Mod: 25

## 2022-02-01 PROCEDURE — 36415 COLL VENOUS BLD VENIPUNCTURE: CPT

## 2022-02-01 RX ORDER — SENNOSIDES 8.6 MG TABLETS 8.6 MG/1
8.6 TABLET ORAL
Qty: 14 | Refills: 0 | Status: COMPLETED | COMMUNITY
Start: 2021-08-11

## 2022-02-01 RX ORDER — OXYCODONE 5 MG/1
5 TABLET ORAL
Qty: 30 | Refills: 0 | Status: COMPLETED | COMMUNITY
Start: 2021-08-11

## 2022-02-01 NOTE — ASSESSMENT
[High Risk Surgery - Intraperitoneal, Intrathoracic or Supringuinal Vascular Procedures] : High Risk Surgery - Intraperitoneal, Intrathoracic or Supringuinal Vascular Procedures - No (0) [Ischemic Heart Disease] : Ischemic Heart Disease - No (0) [Congestive Heart Failure] : Congestive Heart Failure - No (0) [Prior Cerebrovascular Accident or TIA] : Prior Cerebrovascular Accident or TIA - No (0) [Creatinine >= 2mg/dL (1 Point)] : Creatinine >= 2mg/dL - No (0) [Insulin-dependent Diabetic (1 Point)] : Insulin-dependent Diabetic - No (0) [0] : 0 , RCRI Class: I, Risk of Post-Op Cardiac Complications: 3.9%, 95% CI for Risk Estimate: 2.8% - 5.4% [Patient Optimized for Surgery] : Patient optimized for surgery [Modify anti-platelet treatment prior to procedure] : Modify anti-platelet treatment prior to procedure [Continue medications as is] : Continue current medications [As per surgery] : as per surgery [FreeTextEntry4] : Patient is medically optimized for the proposed procedure.  [FreeTextEntry5] : NA [FreeTextEntry6] : hold for 7 days

## 2022-02-01 NOTE — HISTORY OF PRESENT ILLNESS
[No Pertinent Cardiac History] : no history of aortic stenosis, atrial fibrillation, coronary artery disease, recent myocardial infarction, or implantable device/pacemaker [No Adverse Anesthesia Reaction] : no adverse anesthesia reaction in self or family member [Chronic Anticoagulation] : no chronic anticoagulation [Chronic Kidney Disease] : no chronic kidney disease [Diabetes] : no diabetes [(Patient denies any chest pain, claudication, dyspnea on exertion, orthopnea, palpitations or syncope)] : Patient denies any chest pain, claudication, dyspnea on exertion, orthopnea, palpitations or syncope [Excellent (>10 METs)] : Excellent (>10 METs) [FreeTextEntry1] : Cystoscopy, bilateral ureteroscopy\par selective cystology biopsy of renal pelvis or ureter lesion Possible laser\par destruction of renal pelvis/ureter lesion [FreeTextEntry2] : 02/09/2022 [FreeTextEntry3] : Dr.Darius Lomas  [FreeTextEntry4] : PAtient deneis CP,SOB, no abd pain, no fever no chills.  [FreeTextEntry5] : Hx of Lung Ca

## 2022-02-02 ENCOUNTER — OUTPATIENT (OUTPATIENT)
Dept: OUTPATIENT SERVICES | Facility: HOSPITAL | Age: 72
LOS: 1 days | End: 2022-02-02
Payer: MEDICARE

## 2022-02-02 ENCOUNTER — APPOINTMENT (OUTPATIENT)
Dept: RADIOLOGY | Facility: HOSPITAL | Age: 72
End: 2022-02-02

## 2022-02-02 ENCOUNTER — APPOINTMENT (OUTPATIENT)
Dept: RADIOLOGY | Facility: IMAGING CENTER | Age: 72
End: 2022-02-02

## 2022-02-02 ENCOUNTER — RESULT REVIEW (OUTPATIENT)
Age: 72
End: 2022-02-02

## 2022-02-02 DIAGNOSIS — Z98.890 OTHER SPECIFIED POSTPROCEDURAL STATES: Chronic | ICD-10-CM

## 2022-02-02 DIAGNOSIS — C80.1 MALIGNANT (PRIMARY) NEOPLASM, UNSPECIFIED: ICD-10-CM

## 2022-02-02 PROBLEM — R91.1 SOLITARY PULMONARY NODULE: Chronic | Status: ACTIVE | Noted: 2021-07-27

## 2022-02-02 PROBLEM — U07.1 COVID-19: Chronic | Status: ACTIVE | Noted: 2022-01-31

## 2022-02-02 PROBLEM — H91.90 UNSPECIFIED HEARING LOSS, UNSPECIFIED EAR: Chronic | Status: ACTIVE | Noted: 2022-01-31

## 2022-02-02 PROBLEM — Z87.39 PERSONAL HISTORY OF OTHER DISEASES OF THE MUSCULOSKELETAL SYSTEM AND CONNECTIVE TISSUE: Chronic | Status: ACTIVE | Noted: 2022-01-31

## 2022-02-02 PROBLEM — N40.0 BENIGN PROSTATIC HYPERPLASIA WITHOUT LOWER URINARY TRACT SYMPTOMS: Chronic | Status: ACTIVE | Noted: 2022-01-31

## 2022-02-02 LAB
25(OH)D3 SERPL-MCNC: 77.9 NG/ML
T3FREE SERPL-MCNC: 2.78 PG/ML
T4 FREE SERPL-MCNC: 1.8 NG/DL
THYROPEROXIDASE AB SERPL IA-ACNC: <10 IU/ML
TSH SERPL-ACNC: 0.3 UIU/ML
VIT B12 SERPL-MCNC: 1174 PG/ML

## 2022-02-02 PROCEDURE — 71046 X-RAY EXAM CHEST 2 VIEWS: CPT | Mod: 26

## 2022-02-04 ENCOUNTER — RX RENEWAL (OUTPATIENT)
Age: 72
End: 2022-02-04

## 2022-02-06 ENCOUNTER — OUTPATIENT (OUTPATIENT)
Dept: OUTPATIENT SERVICES | Facility: HOSPITAL | Age: 72
LOS: 1 days | End: 2022-02-06
Payer: MEDICARE

## 2022-02-06 DIAGNOSIS — Z11.52 ENCOUNTER FOR SCREENING FOR COVID-19: ICD-10-CM

## 2022-02-06 DIAGNOSIS — Z98.890 OTHER SPECIFIED POSTPROCEDURAL STATES: Chronic | ICD-10-CM

## 2022-02-06 LAB — SARS-COV-2 RNA SPEC QL NAA+PROBE: SIGNIFICANT CHANGE UP

## 2022-02-06 PROCEDURE — C9803: CPT

## 2022-02-06 PROCEDURE — U0005: CPT

## 2022-02-06 PROCEDURE — U0003: CPT

## 2022-02-08 ENCOUNTER — APPOINTMENT (OUTPATIENT)
Dept: THORACIC SURGERY | Facility: CLINIC | Age: 72
End: 2022-02-08
Payer: COMMERCIAL

## 2022-02-08 ENCOUNTER — NON-APPOINTMENT (OUTPATIENT)
Age: 72
End: 2022-02-08

## 2022-02-08 ENCOUNTER — TRANSCRIPTION ENCOUNTER (OUTPATIENT)
Age: 72
End: 2022-02-08

## 2022-02-08 VITALS
OXYGEN SATURATION: 97 % | HEART RATE: 84 BPM | RESPIRATION RATE: 14 BRPM | DIASTOLIC BLOOD PRESSURE: 95 MMHG | SYSTOLIC BLOOD PRESSURE: 159 MMHG

## 2022-02-08 DIAGNOSIS — G89.18 OTHER ACUTE POSTPROCEDURAL PAIN: ICD-10-CM

## 2022-02-08 PROCEDURE — 99214 OFFICE O/P EST MOD 30 MIN: CPT

## 2022-02-08 NOTE — CONSULT LETTER
[FreeTextEntry2] : Dr. Lilly Yu (Pulm/Ref)\par Dr. Luis Lopez (Med)\par Dr. Storm Quezada (Cardiology)\par Dr. Joel Lomas (URO) \par \par

## 2022-02-08 NOTE — ASSESSMENT
[FreeTextEntry1] : Mr. RACHEL PATEL, 71 year old male, never smoker, w/ hx of HTN, BPH, hypothyroidism, Vertebral fracture, Thoracic aortic aneurysm, who was found to have enlarging LORRIE ground glass nodule during CT evaluation of ascending aortic aneurysm. Referred to office by Dr. Lilly Yu.\par \par Now, s/p Flexible bronch, uniportal left VATS, pneumonolysis, wedge resection left upper lobe x1,LULobectomy, MLND on 8/4/2021. Path demonstrating: Invasive mucinous adenocarcinoma; G3; 1cm; Single focus; All LN (0/16) and margins negative for tumor; pT1a pN0 (Stage IA1)\par \par I have independently reviewed the medical records and imaging at the time of this office consultation, and discussed the following interpretations and recommendations with the patient:\par 1. CXR stable, retrun in 3 months with CT Chest w/o contrast. \par \par I personally performed the services described in the documentation, reviewed the documentation recorded by the scribe in my presence and it accurately and completely records my words and actions.\par \par I, PRATIBHA Gan, am scribing for and in the presence of ALY Valencia, the following sections HISTORY OF PRESENT ILLNESS, PAST MEDICAL/FAMILY/SOCIAL HISTORY; REVIEW OF SYSTEMS; VITAL SIGNS; PHYSICAL EXAM; DISPOSITION.\par

## 2022-02-08 NOTE — HISTORY OF PRESENT ILLNESS
[FreeTextEntry1] : Mr. RACHEL PATEL, 71 year old male, never smoker, w/ hx of HTN, BPH, hypothyroidism, Vertebral fracture, Thoracic aortic aneurysm, who was found to have enlarging LRORIE ground glass nodule during CT evaluation of ascending aortic aneurysm. Referred to office by Dr. Lilly Yu.\par \par CT Angio Chest/Abd/Pelvis on 6/23/21:\par - 8 mm left upper lobe groundglass nodule, previously 4 mm in 2018. \par - Stable 4.6 cm ascending thoracic aortic aneurysm without dissection. \par - Increased attenuation and mild expansion of a right upper pole renal calyx. \par \par PFTs on 7/9/21: FVC 87%, FEV1 87%, DLCO 142%.\par \par PET/CT on 7/14/21:\par - Non-FDG-avid 8 mm left upper nodule is unchanged as compared to CT dated 6/23/2021, and is increased in size as compared to CT dated 12/26/2018, where it measures 0.5 cm (image 94).\par - Increased attenuation and mild expansion of a right upper pole renal calyx, unchanged as compared to CT dated 6/23/2021, not identified on CT dated 2/12/2021, is not well evaluated on PET due to physiologic excretion of radiotracer activity. CT urogram is recommended for further evaluation.\par \par CT Chest on 10/19/21:\par - Status post left upper lobectomy with expected postsurgical changes. Trace left pleural effusion.\par - No new or enlarging nodule.\par \par CXR on 2/2/21: Status post LEFT upper lobe lobectomy.\par No radiographic evidence of active chest disease.\par \par S/p Flexible bronch, uniportal left VATS, pneumonolysis, wedge resection left upper lobe x1,LULobectomy, MLND on 8/4/2021. Path demonstrating: Invasive mucinous adenocarcinoma; G3; 1cm; Single focus; All LN (0/16) and margins negative for tumor; pT1a pN0 (Stage IA1)\par \par Patient presents to office for follow up. Patient denies worsening shortness of breath, cough, chest pain, fever, chills, unintentional weight loss, hemoptysis.

## 2022-02-09 ENCOUNTER — OUTPATIENT (OUTPATIENT)
Dept: OUTPATIENT SERVICES | Facility: HOSPITAL | Age: 72
LOS: 1 days | Discharge: HOME | End: 2022-02-09
Payer: MEDICARE

## 2022-02-09 ENCOUNTER — APPOINTMENT (OUTPATIENT)
Dept: UROLOGY | Facility: HOSPITAL | Age: 72
End: 2022-02-09

## 2022-02-09 ENCOUNTER — RESULT REVIEW (OUTPATIENT)
Age: 72
End: 2022-02-09

## 2022-02-09 VITALS
TEMPERATURE: 97 F | RESPIRATION RATE: 17 BRPM | HEART RATE: 89 BPM | DIASTOLIC BLOOD PRESSURE: 54 MMHG | OXYGEN SATURATION: 100 % | SYSTOLIC BLOOD PRESSURE: 109 MMHG

## 2022-02-09 VITALS
OXYGEN SATURATION: 97 % | HEIGHT: 75 IN | RESPIRATION RATE: 16 BRPM | WEIGHT: 240.08 LBS | DIASTOLIC BLOOD PRESSURE: 84 MMHG | SYSTOLIC BLOOD PRESSURE: 133 MMHG | TEMPERATURE: 98 F

## 2022-02-09 DIAGNOSIS — Z98.890 OTHER SPECIFIED POSTPROCEDURAL STATES: Chronic | ICD-10-CM

## 2022-02-09 DIAGNOSIS — R93.5 ABNORMAL FINDINGS ON DIAGNOSTIC IMAGING OF OTHER ABDOMINAL REGIONS, INCLUDING RETROPERITONEUM: ICD-10-CM

## 2022-02-09 DIAGNOSIS — N40.1 BENIGN PROSTATIC HYPERPLASIA WITH LOWER URINARY TRACT SYMPTOMS: ICD-10-CM

## 2022-02-09 DIAGNOSIS — R31.9 HEMATURIA, UNSPECIFIED: ICD-10-CM

## 2022-02-09 DIAGNOSIS — Z79.2 LONG TERM (CURRENT) USE OF ANTIBIOTICS: ICD-10-CM

## 2022-02-09 PROCEDURE — 76000 FLUOROSCOPY <1 HR PHYS/QHP: CPT

## 2022-02-09 PROCEDURE — 74420 UROGRAPHY RTRGR +-KUB: CPT | Mod: 26

## 2022-02-09 PROCEDURE — 88305 TISSUE EXAM BY PATHOLOGIST: CPT | Mod: 26

## 2022-02-09 PROCEDURE — C1758: CPT

## 2022-02-09 PROCEDURE — 52332 CYSTOSCOPY AND TREATMENT: CPT | Mod: RT

## 2022-02-09 PROCEDURE — 88112 CYTOPATH CELL ENHANCE TECH: CPT | Mod: 26

## 2022-02-09 PROCEDURE — C2617: CPT

## 2022-02-09 PROCEDURE — 88112 CYTOPATH CELL ENHANCE TECH: CPT

## 2022-02-09 PROCEDURE — 88305 TISSUE EXAM BY PATHOLOGIST: CPT

## 2022-02-09 PROCEDURE — 52005 CYSTO W/URTRL CATHJ: CPT | Mod: LT,59

## 2022-02-09 PROCEDURE — C1769: CPT

## 2022-02-09 DEVICE — URETERAL CATH OPEN END 5FR 70CM: Type: IMPLANTABLE DEVICE | Status: FUNCTIONAL

## 2022-02-09 DEVICE — IMPLANTABLE DEVICE: Type: IMPLANTABLE DEVICE | Status: FUNCTIONAL

## 2022-02-09 DEVICE — GUIDEWIRE STD ANG .035IN 150: Type: IMPLANTABLE DEVICE | Status: FUNCTIONAL

## 2022-02-09 DEVICE — STENT URET INLAY OPTIMA 6FRX28CM: Type: IMPLANTABLE DEVICE | Status: FUNCTIONAL

## 2022-02-09 DEVICE — GUIDEWIRE SENSOR DUAL-FLEX NITINOL STRAIGHT .035" X 150CM: Type: IMPLANTABLE DEVICE | Status: FUNCTIONAL

## 2022-02-09 RX ORDER — CEFAZOLIN SODIUM 1 G
2000 VIAL (EA) INJECTION ONCE
Refills: 0 | Status: COMPLETED | OUTPATIENT
Start: 2022-02-09 | End: 2022-02-09

## 2022-02-09 RX ORDER — LIDOCAINE HCL 20 MG/ML
0.2 VIAL (ML) INJECTION ONCE
Refills: 0 | Status: COMPLETED | OUTPATIENT
Start: 2022-02-09 | End: 2022-02-09

## 2022-02-09 RX ADMIN — SODIUM CHLORIDE 100 MILLILITER(S): 9 INJECTION, SOLUTION INTRAVENOUS at 06:10

## 2022-02-09 NOTE — ASU DISCHARGE PLAN (ADULT/PEDIATRIC) - CARE PROVIDER_API CALL
Joel Lomas; PhD)  Urology  [address] [place] [address], [address]  [place] [address]  Phone: [phone]  Fax: [phone]  Established Patient  Follow Up Time: 1 week

## 2022-02-09 NOTE — ASU PATIENT PROFILE, ADULT - AS SC BRADEN FRICTION
Problem: Pain:  Goal: Pain level will decrease  Description: Pain level will decrease  7/12/2020 0112 by [facility]  Outcome: Met This Shift  7/11/2020 2217 by [facility]  Outcome: Met This Shift  Goal: Control of acute pain  Description: Control of acute pain  7/12/2020 0112 by [facility]  Outcome: Met This Shift  7/11/2020 2217 by [facility]  Outcome: Met This Shift  Goal: Control of chronic pain  Description: Control of chronic pain  7/12/2020 0112 by [facility]  Outcome: Met This Shift  7/11/2020 2217 by [facility]  Outcome: Met This Shift     Problem: Falls - Risk of:  Goal: Will remain free from falls  Description: Will remain free from falls  7/12/2020 0112 by [facility]  Outcome: Met This Shift  7/11/2020 2217 by [facility]  Outcome: Met This Shift  Goal: Absence of physical injury  Description: Absence of physical injury  7/12/2020 0112 by [facility]  Outcome: Met This Shift  7/11/2020 2217 by [facility]  Outcome: Met This Shift     Problem: Skin Integrity:  Goal: Will show no infection signs and symptoms  Description: Will show no infection signs and symptoms  [facility]12/2020 [facility]  Outcome: Met This Shift  7/11/2020 2217 by [facility]  Outcome: Met This Shift  Goal: Absence of new skin breakdown  Description: Absence of new skin breakdown  7/12/2020 0112 by [facility]  Outcome: Met This Shift  7/11/2020 2217 by [facility]  Outcome: Met This Shift     Problem: Restraint Use - Nonviolent/Non-Self-Destructive Behavior:  Goal: Absence of restraint indications  Description: Absence of restraint indications  7/12/2020 0112 by [facility]  Outcome: Met This Shift  7/11/2020 2217 by [facility]  Outcome: Met This Shift  Goal: Absence of restraint-related injury  Description: Absence of restraint-related injury  7/12/2020 0112 by [facility]  Outcome: Met This Shift  7/11/2020 2217 by [facility]  Outcome: Met This Shift     Problem: Confusion - Acute:  Goal: Absence of continued neurological deterioration signs and symptoms  Description: Absence of continued neurological deterioration signs and symptoms  Outcome: Met This Shift  Goal: Mental status will be restored to baseline  Description: Mental status will be restored to baseline  Outcome: Met This Shift     Problem: Discharge Planning:  Goal: Ability to perform activities of daily living will improve  Description: Ability to perform activities of daily living will improve  Outcome: Met This Shift  Goal: Participates in care planning  Description: Participates in care planning  Outcome: Met This Shift     Problem: Injury - Risk of, Physical Injury:  Goal: Will remain free from falls  Description: Will remain free from falls  7/12/2020 0112 by [facility]  Outcome: Met This Shift  7/11/2020 2217 by [facility]  Outcome: Met This Shift  Goal: Absence of physical injury  Description: Absence of physical injury  7/12/2020 0112 by [facility]  Outcome: Met This Shift  7/11/2020 2217 by [facility]  Outcome: Met This Shift     Problem: Mood - Altered:  Goal: Mood stable  Description: Mood stable  Outcome: Met This Shift  Goal: Absence of abusive behavior  Description: Absence of abusive behavior  Outcome: Met This Shift  Goal: Verbalizations of feeling emotionally comfortable while being cared for will increase  Description: Verbalizations of feeling emotionally comfortable while being cared for will increase  Outcome: Met This Shift     Problem: Psychomotor Activity - Altered:  Goal: Absence of psychomotor disturbance signs and symptoms  Description: Absence of psychomotor disturbance signs and symptoms  Outcome: Met This Shift     Problem: Sensory Perception - Impaired:  Goal: Demonstrations of improved sensory functioning will increase  Description: Demonstrations of improved sensory functioning will increase  Outcome:  Met This Shift  Goal: Decrease in sensory misperception frequency  Description: Decrease in sensory misperception frequency  Outcome: Met This Shift  Goal: Able to refrain from responding to false sensory perceptions  Description: Able to refrain from responding to false sensory perceptions  Outcome: Met This Shift  Goal: Demonstrates accurate environmental perceptions  Description: Demonstrates accurate environmental perceptions  Outcome: Met This Shift  Goal: Able to distinguish between reality-based and nonreality-based thinking  Description: Able to distinguish between reality-based and nonreality-based thinking  Outcome: Met This Shift  Goal: Able to interrupt nonreality-based thinking  Description: Able to interrupt nonreality-based thinking  Outcome: Met This Shift     Problem: Sleep Pattern Disturbance:  Goal: Appears well-rested  Description: Appears well-rested  Outcome: Met This Shift (3) no apparent problem

## 2022-02-09 NOTE — ASU PREOP CHECKLIST - AS BP NONINV SITE
Rai Reeves is a 56year old male. HPI:   Patient presents with:  Hepatitis      The patient is a 56-year-old male who has a history of chronic hepatitis C infection.   Reports he has known about this since he was in [employer], approximately 50 years (90 Base) MCG/ACT Inhalation Aero Soln INHALE 2 PUFFS BY MOUTH FOUR TIMES DAILY AS NEEDED Disp: 18 g Rfl: 2   Mometasone Furo-Formoterol Fum (DULERA) 200-5 MCG/ACT Inhalation Aerosol Inhale 2 puffs into the lungs 2 (two) times daily. Has COPD.   Advair and Treatment will depend on genotype.           Orders This Visit:    Orders Placed This Encounter      Hepatitis C RNA Qnt w/ Reflex to Genotype      Hep A AB, Total      Hep B Core AB, Tot      Hepatitis B Surface Antibody      Hepatitis B Surface Antigen right upper arm

## 2022-02-09 NOTE — ASU PATIENT PROFILE, ADULT - FALL HARM RISK - UNIVERSAL INTERVENTIONS
Bed in lowest position, wheels locked, appropriate side rails in place/Call bell, personal items and telephone in reach/Instruct patient to call for assistance before getting out of bed or chair/Non-slip footwear when patient is out of bed/[place] to call system/Physically safe environment - no spills, clutter or unnecessary equipment/Purposeful Proactive Rounding/Room/bathroom lighting operational, light cord in reach

## 2022-02-11 LAB — NON-GYNECOLOGICAL CYTOLOGY STUDY: SIGNIFICANT CHANGE UP

## 2022-02-15 ENCOUNTER — NON-APPOINTMENT (OUTPATIENT)
Age: 72
End: 2022-02-15

## 2022-02-15 DIAGNOSIS — N28.89 OTHER SPECIFIED DISORDERS OF KIDNEY AND URETER: ICD-10-CM

## 2022-02-17 ENCOUNTER — APPOINTMENT (OUTPATIENT)
Dept: UROLOGY | Facility: CLINIC | Age: 72
End: 2022-02-17

## 2022-02-17 ENCOUNTER — APPOINTMENT (OUTPATIENT)
Dept: UROLOGY | Facility: CLINIC | Age: 72
End: 2022-02-17
Payer: COMMERCIAL

## 2022-02-17 ENCOUNTER — NON-APPOINTMENT (OUTPATIENT)
Age: 72
End: 2022-02-17

## 2022-02-17 DIAGNOSIS — R31.29 OTHER MICROSCOPIC HEMATURIA: ICD-10-CM

## 2022-02-17 DIAGNOSIS — R93.5 ABNORMAL FINDINGS ON DIAGNOSTIC IMAGING OF OTHER ABDOMINAL REGIONS, INCLUDING RETROPERITONEUM: ICD-10-CM

## 2022-02-17 DIAGNOSIS — N28.89 OTHER SPECIFIED DISORDERS OF KIDNEY AND URETER: ICD-10-CM

## 2022-02-17 PROCEDURE — ZZZZZ: CPT

## 2022-02-17 PROCEDURE — 99214 OFFICE O/P EST MOD 30 MIN: CPT | Mod: 95

## 2022-02-18 RX ORDER — SODIUM CHLORIDE 9 MG/ML
1000 INJECTION, SOLUTION INTRAVENOUS
Refills: 0 | Status: DISCONTINUED | OUTPATIENT
Start: 2022-02-23 | End: 2022-03-10

## 2022-02-19 ENCOUNTER — OUTPATIENT (OUTPATIENT)
Dept: OUTPATIENT SERVICES | Facility: HOSPITAL | Age: 72
LOS: 1 days | End: 2022-02-19
Payer: MEDICARE

## 2022-02-19 DIAGNOSIS — Z11.52 ENCOUNTER FOR SCREENING FOR COVID-19: ICD-10-CM

## 2022-02-19 DIAGNOSIS — Z98.890 OTHER SPECIFIED POSTPROCEDURAL STATES: Chronic | ICD-10-CM

## 2022-02-19 LAB — SARS-COV-2 RNA SPEC QL NAA+PROBE: SIGNIFICANT CHANGE UP

## 2022-02-19 PROCEDURE — C9803: CPT

## 2022-02-19 PROCEDURE — U0005: CPT

## 2022-02-19 PROCEDURE — U0003: CPT

## 2022-02-22 ENCOUNTER — TRANSCRIPTION ENCOUNTER (OUTPATIENT)
Age: 72
End: 2022-02-22

## 2022-02-23 ENCOUNTER — OUTPATIENT (OUTPATIENT)
Dept: OUTPATIENT SERVICES | Facility: HOSPITAL | Age: 72
LOS: 1 days | End: 2022-02-23
Payer: MEDICARE

## 2022-02-23 ENCOUNTER — APPOINTMENT (OUTPATIENT)
Dept: UROLOGY | Facility: HOSPITAL | Age: 72
End: 2022-02-23

## 2022-02-23 ENCOUNTER — RESULT REVIEW (OUTPATIENT)
Age: 72
End: 2022-02-23

## 2022-02-23 VITALS
HEIGHT: 75 IN | TEMPERATURE: 98 F | HEART RATE: 85 BPM | DIASTOLIC BLOOD PRESSURE: 85 MMHG | WEIGHT: 240.08 LBS | OXYGEN SATURATION: 98 % | RESPIRATION RATE: 16 BRPM | SYSTOLIC BLOOD PRESSURE: 152 MMHG

## 2022-02-23 VITALS
SYSTOLIC BLOOD PRESSURE: 126 MMHG | TEMPERATURE: 97 F | HEART RATE: 76 BPM | RESPIRATION RATE: 5 BRPM | DIASTOLIC BLOOD PRESSURE: 98 MMHG | OXYGEN SATURATION: 99 %

## 2022-02-23 DIAGNOSIS — Z98.890 OTHER SPECIFIED POSTPROCEDURAL STATES: Chronic | ICD-10-CM

## 2022-02-23 DIAGNOSIS — R93.5 ABNORMAL FINDINGS ON DIAGNOSTIC IMAGING OF OTHER ABDOMINAL REGIONS, INCLUDING RETROPERITONEUM: ICD-10-CM

## 2022-02-23 PROCEDURE — C1758: CPT

## 2022-02-23 PROCEDURE — C1769: CPT

## 2022-02-23 PROCEDURE — 88305 TISSUE EXAM BY PATHOLOGIST: CPT

## 2022-02-23 PROCEDURE — 76000 FLUOROSCOPY <1 HR PHYS/QHP: CPT

## 2022-02-23 PROCEDURE — 52341 CYSTO W/URETER STRICTURE TX: CPT | Mod: XU

## 2022-02-23 PROCEDURE — 88305 TISSUE EXAM BY PATHOLOGIST: CPT | Mod: 26

## 2022-02-23 PROCEDURE — 52354 CYSTOURETERO W/BIOPSY: CPT | Mod: RT

## 2022-02-23 PROCEDURE — 74420 UROGRAPHY RTRGR +-KUB: CPT | Mod: 26

## 2022-02-23 PROCEDURE — 52354 CYSTOURETERO W/BIOPSY: CPT

## 2022-02-23 DEVICE — GUIDEWIRE SENSOR DUAL-FLEX NITINOL STRAIGHT .035" X 150CM: Type: IMPLANTABLE DEVICE | Status: FUNCTIONAL

## 2022-02-23 DEVICE — URETERAL CATH OPEN END 5FR 70CM: Type: IMPLANTABLE DEVICE | Status: FUNCTIONAL

## 2022-02-23 DEVICE — IMPLANTABLE DEVICE: Type: IMPLANTABLE DEVICE | Status: FUNCTIONAL

## 2022-02-23 RX ORDER — ONDANSETRON 8 MG/1
4 TABLET, FILM COATED ORAL ONCE
Refills: 0 | Status: DISCONTINUED | OUTPATIENT
Start: 2022-02-23 | End: 2022-03-10

## 2022-02-23 RX ORDER — CEFAZOLIN SODIUM 1 G
2000 VIAL (EA) INJECTION ONCE
Refills: 0 | Status: COMPLETED | OUTPATIENT
Start: 2022-02-23 | End: 2022-02-23

## 2022-02-23 RX ORDER — CEPHALEXIN 500 MG
1 CAPSULE ORAL
Qty: 6 | Refills: 0
Start: 2022-02-23 | End: 2022-02-24

## 2022-02-23 RX ORDER — OXYCODONE HYDROCHLORIDE 5 MG/1
5 TABLET ORAL ONCE
Refills: 0 | Status: DISCONTINUED | OUTPATIENT
Start: 2022-02-23 | End: 2022-02-23

## 2022-02-23 RX ORDER — LIDOCAINE HCL 20 MG/ML
0.2 VIAL (ML) INJECTION ONCE
Refills: 0 | Status: COMPLETED | OUTPATIENT
Start: 2022-02-23 | End: 2022-02-23

## 2022-02-23 RX ORDER — HYDROMORPHONE HYDROCHLORIDE 2 MG/ML
0.5 INJECTION INTRAMUSCULAR; INTRAVENOUS; SUBCUTANEOUS
Refills: 0 | Status: DISCONTINUED | OUTPATIENT
Start: 2022-02-23 | End: 2022-02-23

## 2022-02-23 RX ADMIN — SODIUM CHLORIDE 100 MILLILITER(S): 9 INJECTION, SOLUTION INTRAVENOUS at 12:45

## 2022-02-23 NOTE — ASU PATIENT PROFILE, ADULT - FALL HARM RISK - UNIVERSAL INTERVENTIONS
Bed in lowest position, wheels locked, appropriate side rails in place/Call bell, personal items and telephone in reach/Instruct patient to call for assistance before getting out of bed or chair/Non-slip footwear when patient is out of bed/[place] to call system/Physically safe environment - no spills, clutter or unnecessary equipment/Purposeful Proactive Rounding/Room/bathroom lighting operational, light cord in reach

## 2022-02-23 NOTE — ASU DISCHARGE PLAN (ADULT/PEDIATRIC) - ASU DC SPECIAL INSTRUCTIONSFT
Discharge Instructions: Ureteroscopy    · General: It is common to have blood in the urine after your procedure. It may be pink or even red; inform your doctor if you have a significant amount of clot in the urine or if you are unable to void at all. The urine may clear and then become bloody again especially as you are more physically active.    · Bathing: You may shower or bathe.    · Diet: You may resume your regular diet and regular medication regimen.    · Pain: You may take Tylenol (acetaminophen) 650-975mg and/or Motrin (ibuprofen) 400-600mg, available over the counter, for pain every 6 hours as needed. Do not exceed 4000 milligrams of Tylenol (acetaminophen) daily. You may alternate these medications such that you take either one every 3 hours.    · Stool softeners: Do not allow yourself to become constipated as this may increase your bother from the stent and/or straining may cause bleeding. Take stool softeners (ex. Colace) or a laxative (ex. Senekot, ExLax), available over the counter, if needed.    · Activity: No heavy lifting or strenuous exercise until you are evaluated at your post-operative appointment. Otherwise, you may return to your usual level of activity.    · Anticoagulation: You may restart your aspirin after 48 yours    · Follow-up: If you did not already schedule your post-operative appointment, please call your urologist to schedule a follow-up appointment.    · Call your urologist if: You have any bleeding that does not stop, inability to void >8 hours, fever over 100.4 F, chills, persistent nausea/vomiting, or if your pain is not controlled on your discharge pain medications.

## 2022-02-23 NOTE — ASU DISCHARGE PLAN (ADULT/PEDIATRIC) - CARE PROVIDER_API CALL
Joel Lomas (MD; PhD)  Urology  [address], [address] [facility] [address] [facility] [address]  [place] [address]  Phone: [phone]  Fax: [phone]  Follow Up Time: Routine

## 2022-02-23 NOTE — ASU DISCHARGE PLAN (ADULT/PEDIATRIC) - NS MD DC FALL RISK RISK
For information on Fall & Injury Prevention, visit: [email or web address] OR  [email or web address] OR  https://www.cdc.gov/steadi/patient.html

## 2022-02-23 NOTE — BRIEF OPERATIVE NOTE - NSICDXBRIEFPROCEDURE_GEN_ALL_CORE_FT
PROCEDURES:  Cystoureteroscopy, with retrograde pyelogram and kidney biopsy 23-Feb-2022 15:20:16  Dennis Matthews

## 2022-02-25 LAB — SURGICAL PATHOLOGY STUDY: SIGNIFICANT CHANGE UP

## 2022-02-28 ENCOUNTER — NON-APPOINTMENT (OUTPATIENT)
Age: 72
End: 2022-02-28

## 2022-03-01 DIAGNOSIS — K59.00 CONSTIPATION, UNSPECIFIED: ICD-10-CM

## 2022-03-02 ENCOUNTER — OUTPATIENT (OUTPATIENT)
Dept: OUTPATIENT SERVICES | Facility: HOSPITAL | Age: 72
LOS: 1 days | End: 2022-03-02
Payer: COMMERCIAL

## 2022-03-02 ENCOUNTER — APPOINTMENT (OUTPATIENT)
Dept: CT IMAGING | Facility: IMAGING CENTER | Age: 72
End: 2022-03-02
Payer: COMMERCIAL

## 2022-03-02 DIAGNOSIS — Z98.890 OTHER SPECIFIED POSTPROCEDURAL STATES: Chronic | ICD-10-CM

## 2022-03-02 DIAGNOSIS — N28.89 OTHER SPECIFIED DISORDERS OF KIDNEY AND URETER: ICD-10-CM

## 2022-03-02 PROCEDURE — 74178 CT ABD&PLV WO CNTR FLWD CNTR: CPT | Mod: 26

## 2022-03-02 PROCEDURE — 82565 ASSAY OF CREATININE: CPT

## 2022-03-02 PROCEDURE — 74178 CT ABD&PLV WO CNTR FLWD CNTR: CPT

## 2022-03-24 ENCOUNTER — APPOINTMENT (OUTPATIENT)
Dept: UROLOGY | Facility: CLINIC | Age: 72
End: 2022-03-24
Payer: MEDICARE

## 2022-03-24 PROCEDURE — 99214 OFFICE O/P EST MOD 30 MIN: CPT

## 2022-03-24 PROCEDURE — 88112 CYTOPATH CELL ENHANCE TECH: CPT | Mod: 26

## 2022-03-27 LAB — URINE CYTOLOGY: NORMAL

## 2022-05-03 ENCOUNTER — OUTPATIENT (OUTPATIENT)
Dept: OUTPATIENT SERVICES | Facility: HOSPITAL | Age: 72
LOS: 1 days | End: 2022-05-03
Payer: COMMERCIAL

## 2022-05-03 ENCOUNTER — APPOINTMENT (OUTPATIENT)
Dept: CT IMAGING | Facility: IMAGING CENTER | Age: 72
End: 2022-05-03
Payer: COMMERCIAL

## 2022-05-03 DIAGNOSIS — Z98.890 OTHER SPECIFIED POSTPROCEDURAL STATES: Chronic | ICD-10-CM

## 2022-05-03 DIAGNOSIS — Z00.8 ENCOUNTER FOR OTHER GENERAL EXAMINATION: ICD-10-CM

## 2022-05-03 DIAGNOSIS — C80.1 MALIGNANT (PRIMARY) NEOPLASM, UNSPECIFIED: ICD-10-CM

## 2022-05-03 PROCEDURE — 71250 CT THORAX DX C-: CPT

## 2022-05-03 PROCEDURE — 71250 CT THORAX DX C-: CPT | Mod: 26

## 2022-05-09 ENCOUNTER — OUTPATIENT (OUTPATIENT)
Dept: OUTPATIENT SERVICES | Facility: HOSPITAL | Age: 72
LOS: 1 days | End: 2022-05-09
Payer: MEDICARE

## 2022-05-09 VITALS
OXYGEN SATURATION: 98 % | SYSTOLIC BLOOD PRESSURE: 134 MMHG | HEART RATE: 78 BPM | HEIGHT: 74 IN | WEIGHT: 246.04 LBS | TEMPERATURE: 97 F | DIASTOLIC BLOOD PRESSURE: 78 MMHG | RESPIRATION RATE: 16 BRPM

## 2022-05-09 DIAGNOSIS — R06.02 SHORTNESS OF BREATH: ICD-10-CM

## 2022-05-09 DIAGNOSIS — E03.9 HYPOTHYROIDISM, UNSPECIFIED: ICD-10-CM

## 2022-05-09 DIAGNOSIS — Z98.890 OTHER SPECIFIED POSTPROCEDURAL STATES: Chronic | ICD-10-CM

## 2022-05-09 DIAGNOSIS — C65.9 MALIGNANT NEOPLASM OF UNSPECIFIED RENAL PELVIS: ICD-10-CM

## 2022-05-09 DIAGNOSIS — C64.9 MALIGNANT NEOPLASM OF UNSPECIFIED KIDNEY, EXCEPT RENAL PELVIS: ICD-10-CM

## 2022-05-09 DIAGNOSIS — I10 ESSENTIAL (PRIMARY) HYPERTENSION: ICD-10-CM

## 2022-05-09 DIAGNOSIS — G47.33 OBSTRUCTIVE SLEEP APNEA (ADULT) (PEDIATRIC): ICD-10-CM

## 2022-05-09 LAB
ALBUMIN SERPL ELPH-MCNC: 4.4 G/DL — SIGNIFICANT CHANGE UP (ref 3.3–5)
ALP SERPL-CCNC: 72 U/L — SIGNIFICANT CHANGE UP (ref 40–120)
ALT FLD-CCNC: 13 U/L — SIGNIFICANT CHANGE UP (ref 4–41)
ANION GAP SERPL CALC-SCNC: 12 MMOL/L — SIGNIFICANT CHANGE UP (ref 7–14)
AST SERPL-CCNC: 18 U/L — SIGNIFICANT CHANGE UP (ref 4–40)
BILIRUB SERPL-MCNC: 0.5 MG/DL — SIGNIFICANT CHANGE UP (ref 0.2–1.2)
BLD GP AB SCN SERPL QL: NEGATIVE — SIGNIFICANT CHANGE UP
BUN SERPL-MCNC: 21 MG/DL — SIGNIFICANT CHANGE UP (ref 7–23)
CALCIUM SERPL-MCNC: 9.6 MG/DL — SIGNIFICANT CHANGE UP (ref 8.4–10.5)
CHLORIDE SERPL-SCNC: 105 MMOL/L — SIGNIFICANT CHANGE UP (ref 98–107)
CO2 SERPL-SCNC: 24 MMOL/L — SIGNIFICANT CHANGE UP (ref 22–31)
CREAT SERPL-MCNC: 1.17 MG/DL — SIGNIFICANT CHANGE UP (ref 0.5–1.3)
EGFR: 66 ML/MIN/1.73M2 — SIGNIFICANT CHANGE UP
GLUCOSE SERPL-MCNC: 96 MG/DL — SIGNIFICANT CHANGE UP (ref 70–99)
HCT VFR BLD CALC: 40 % — SIGNIFICANT CHANGE UP (ref 39–50)
HGB BLD-MCNC: 13.5 G/DL — SIGNIFICANT CHANGE UP (ref 13–17)
MCHC RBC-ENTMCNC: 32.1 PG — SIGNIFICANT CHANGE UP (ref 27–34)
MCHC RBC-ENTMCNC: 33.8 GM/DL — SIGNIFICANT CHANGE UP (ref 32–36)
MCV RBC AUTO: 95 FL — SIGNIFICANT CHANGE UP (ref 80–100)
NRBC # BLD: 0 /100 WBCS — SIGNIFICANT CHANGE UP
NRBC # FLD: 0 K/UL — SIGNIFICANT CHANGE UP
PLATELET # BLD AUTO: 213 K/UL — SIGNIFICANT CHANGE UP (ref 150–400)
POTASSIUM SERPL-MCNC: 4.3 MMOL/L — SIGNIFICANT CHANGE UP (ref 3.5–5.3)
POTASSIUM SERPL-SCNC: 4.3 MMOL/L — SIGNIFICANT CHANGE UP (ref 3.5–5.3)
PROT SERPL-MCNC: 7.6 G/DL — SIGNIFICANT CHANGE UP (ref 6–8.3)
RBC # BLD: 4.21 M/UL — SIGNIFICANT CHANGE UP (ref 4.2–5.8)
RBC # FLD: 12.3 % — SIGNIFICANT CHANGE UP (ref 10.3–14.5)
RH IG SCN BLD-IMP: POSITIVE — SIGNIFICANT CHANGE UP
SODIUM SERPL-SCNC: 141 MMOL/L — SIGNIFICANT CHANGE UP (ref 135–145)
WBC # BLD: 7.44 K/UL — SIGNIFICANT CHANGE UP (ref 3.8–10.5)
WBC # FLD AUTO: 7.44 K/UL — SIGNIFICANT CHANGE UP (ref 3.8–10.5)

## 2022-05-09 PROCEDURE — 93010 ELECTROCARDIOGRAM REPORT: CPT

## 2022-05-09 RX ORDER — ALPRAZOLAM 0.25 MG
1 TABLET ORAL
Qty: 0 | Refills: 0 | DISCHARGE

## 2022-05-09 RX ORDER — RAMIPRIL 5 MG
1 CAPSULE ORAL
Qty: 0 | Refills: 0 | DISCHARGE

## 2022-05-09 RX ORDER — FINASTERIDE 5 MG/1
1 TABLET, FILM COATED ORAL
Qty: 0 | Refills: 0 | DISCHARGE

## 2022-05-09 NOTE — H&P PST ADULT - PROBLEM SELECTOR PLAN 4
Pt c/o of shortness of breath on exertion at times.  Requesting cardiology evaluation due to low mets and abnormal EKG at PST.  Pt has cardiology appointment on 05/11/2022. Pt c/o of shortness of breath on exertion .  Requesting cardiology evaluation due to low mets and abnormal EKG at PST.  Pt has cardiology appointment on 05/11/2022.

## 2022-05-09 NOTE — H&P PST ADULT - GENITOURINARY COMMENTS
Pre op dx- malignant neoplasm of unspecified renal pelvis Pt's wife  says they found malignancy in his right kidney. Pt's wife  says they found kidney mass. Biopsy done which showed malignancy in his right kidney.

## 2022-05-09 NOTE — H&P PST ADULT - ACTIVITY
Walks 1 to 2 blocks, climbs 1 flight of stairs, ADLs Walks 1 to 2 blocks, climbs 1 flight of stairs, ADLs, house chores

## 2022-05-09 NOTE — H&P PST ADULT - PROBLEM SELECTOR PROBLEM 1
Abnormal findings on diagnostic imaging of other abdominal regions, including retroperitoneum Malignant neoplasm of unspecified kidney, except renal pelvis

## 2022-05-09 NOTE — H&P PST ADULT - NSICDXPASTMEDICALHX_GEN_ALL_CORE_FT
PAST MEDICAL HISTORY:  Ascending aortic aneurysm     BPH without obstruction/lower urinary tract symptoms     COVID-19 12/2020 loss of smell no hosp    Hearing loss     History of low back pain     HTN (hypertension)     Hypothyroid     Obesity     Pulmonary nodule removed 8/4/2021 early stage no chemo VAT    Thoracic aortic aneurysm      PAST MEDICAL HISTORY:  Ascending aortic aneurysm     BPH without obstruction/lower urinary tract symptoms     COVID-19 12/2020 loss of smell no hosp    Hearing loss     History of low back pain     HTN (hypertension)     Hypothyroid     Malignant neoplasm of unspecified kidney, except renal pelvis     Obesity     Pulmonary nodule removed 8/4/2021 early stage no chemo VAT    Thoracic aortic aneurysm

## 2022-05-09 NOTE — H&P PST ADULT - OTHER CARE PROVIDERS
Dr Mynor Chaudhari  ( cardiologist) Dr Mynor Chaudhari  [phone]  ( cardiologist) Dr Mynor Chaudhari  [phone]  -Cardiologist

## 2022-05-09 NOTE — H&P PST ADULT - HISTORY OF PRESENT ILLNESS
71 year old male with pre op dx malignant neoplasm of unspecified renal pelvis is scheduled for right nephrectomy  71 year old Polish speaking male with pre op dx malignant neoplasm of unspecified renal pelvis is scheduled for right nephrectomy  71 year old Polish speaking male with pre op dx of malignant neoplasm of unspecified renal pelvis is scheduled for right nephrectomy .

## 2022-05-09 NOTE — H&P PST ADULT - PROBLEM SELECTOR PLAN 1
coming in for cystoscopy bilateral ureteroscopy selective cystology biopsy renal pelvis or ureter lesion  Possible laser destruction of renal pelvis/ureter lesion Patient tentatively scheduled for right nephrectomy on 05/20/2022.  Pre-op instructions provided. Pt given verbal and written instructions with teach back on chlorhexidine wash and pepcid. Pt verbalized understanding with return demonstration.   Pt strongly advised to follow up with surgeon to discuss COVID testing requirements prior to procedure.

## 2022-05-09 NOTE — H&P PST ADULT - SYMPTOMS
dyspnea at times. Hx of Thoracic aortic aneurysm/dyspnea Hx of Thoracic aortic aneurysm/dyspnea Hx -  aortic aneurysm/dyspnea

## 2022-05-11 ENCOUNTER — APPOINTMENT (OUTPATIENT)
Dept: CARDIOLOGY | Facility: CLINIC | Age: 72
End: 2022-05-11
Payer: MEDICARE

## 2022-05-11 ENCOUNTER — NON-APPOINTMENT (OUTPATIENT)
Age: 72
End: 2022-05-11

## 2022-05-11 VITALS — DIASTOLIC BLOOD PRESSURE: 76 MMHG | HEART RATE: 76 BPM | SYSTOLIC BLOOD PRESSURE: 140 MMHG

## 2022-05-11 VITALS
BODY MASS INDEX: 30.59 KG/M2 | TEMPERATURE: 97.6 F | HEIGHT: 75 IN | HEART RATE: 76 BPM | RESPIRATION RATE: 16 BRPM | OXYGEN SATURATION: 98 % | WEIGHT: 246 LBS

## 2022-05-11 PROCEDURE — 93000 ELECTROCARDIOGRAM COMPLETE: CPT

## 2022-05-11 PROCEDURE — 99214 OFFICE O/P EST MOD 30 MIN: CPT

## 2022-05-16 ENCOUNTER — APPOINTMENT (OUTPATIENT)
Dept: FAMILY MEDICINE | Facility: CLINIC | Age: 72
End: 2022-05-16
Payer: MEDICARE

## 2022-05-16 VITALS
TEMPERATURE: 97.3 F | OXYGEN SATURATION: 98 % | BODY MASS INDEX: 30.84 KG/M2 | HEART RATE: 80 BPM | SYSTOLIC BLOOD PRESSURE: 122 MMHG | RESPIRATION RATE: 15 BRPM | HEIGHT: 75 IN | WEIGHT: 248 LBS | DIASTOLIC BLOOD PRESSURE: 74 MMHG

## 2022-05-16 PROCEDURE — 99215 OFFICE O/P EST HI 40 MIN: CPT

## 2022-05-16 RX ORDER — BISACODYL 5 MG/1
5 TABLET ORAL DAILY
Qty: 10 | Refills: 0 | Status: COMPLETED | COMMUNITY
Start: 2022-03-01 | End: 2022-05-16

## 2022-05-16 RX ORDER — HYDROCODONE BITARTRATE AND ACETAMINOPHEN 5; 325 MG/1; MG/1
5-325 TABLET ORAL
Qty: 12 | Refills: 0 | Status: COMPLETED | COMMUNITY
Start: 2022-02-09 | End: 2022-05-16

## 2022-05-16 RX ORDER — CELECOXIB 400 MG/1
400 CAPSULE ORAL
Qty: 5 | Refills: 0 | Status: COMPLETED | COMMUNITY
Start: 2022-02-08 | End: 2022-05-16

## 2022-05-16 RX ORDER — SULFAMETHOXAZOLE AND TRIMETHOPRIM 800; 160 MG/1; MG/1
800-160 TABLET ORAL TWICE DAILY
Qty: 20 | Refills: 0 | Status: COMPLETED | COMMUNITY
Start: 2022-02-09 | End: 2022-05-16

## 2022-05-16 RX ORDER — DULOXETINE HYDROCHLORIDE 20 MG/1
20 CAPSULE, DELAYED RELEASE PELLETS ORAL
Qty: 30 | Refills: 2 | Status: COMPLETED | COMMUNITY
Start: 2021-06-24 | End: 2022-05-16

## 2022-05-16 NOTE — HISTORY OF PRESENT ILLNESS
[No Pertinent Cardiac History] : no history of aortic stenosis, atrial fibrillation, coronary artery disease, recent myocardial infarction, or implantable device/pacemaker [No Adverse Anesthesia Reaction] : no adverse anesthesia reaction in self or family member [Chronic Anticoagulation] : no chronic anticoagulation [Chronic Kidney Disease] : no chronic kidney disease [Diabetes] : no diabetes [FreeTextEntry1] : Right nephrectomy  [FreeTextEntry2] : 05/20/2022 [FreeTextEntry3] : Dr.Steckel Crystal [FreeTextEntry4] : PAtient deneis CP,SOB,no abd pain. PST completed, Pt was seen and cleared by the Cardiologist.

## 2022-05-16 NOTE — ASSESSMENT
[Patient Optimized for Surgery] : Patient optimized for surgery [Modify medications prior to procedure] : Modify medications prior to procedure [As per surgery] : as per surgery [High Risk Surgery - Intraperitoneal, Intrathoracic or Supringuinal Vascular Procedures] : High Risk Surgery - Intraperitoneal, Intrathoracic or Supringuinal Vascular Procedures - Yes (1) [Ischemic Heart Disease] : Ischemic Heart Disease - No (0) [Congestive Heart Failure] : Congestive Heart Failure - No (0) [Prior Cerebrovascular Accident or TIA] : Prior Cerebrovascular Accident or TIA - No (0) [Creatinine >= 2mg/dL (1 Point)] : Creatinine >= 2mg/dL - No (0) [Insulin-dependent Diabetic (1 Point)] : Insulin-dependent Diabetic - No (0) [1] : 1 , RCRI Class: II, Risk of Post-Op Cardiac Complications: 6.0%, 95% CI for Risk Estimate: 4.9% - 7.4% [FreeTextEntry4] : PAtient is medically optimized for the proposed procedure.  [FreeTextEntry5] : NA [FreeTextEntry6] : hold ASA for 7 days [FreeTextEntry7] : hold supplements and  vitamins, continue rest of the medication

## 2022-05-17 ENCOUNTER — APPOINTMENT (OUTPATIENT)
Dept: THORACIC SURGERY | Facility: CLINIC | Age: 72
End: 2022-05-17
Payer: MEDICARE

## 2022-05-17 VITALS
RESPIRATION RATE: 18 BRPM | WEIGHT: 245 LBS | BODY MASS INDEX: 30.46 KG/M2 | DIASTOLIC BLOOD PRESSURE: 74 MMHG | HEIGHT: 75 IN | HEART RATE: 90 BPM | OXYGEN SATURATION: 96 % | SYSTOLIC BLOOD PRESSURE: 124 MMHG

## 2022-05-17 PROCEDURE — 99214 OFFICE O/P EST MOD 30 MIN: CPT

## 2022-05-17 NOTE — ASSESSMENT
[FreeTextEntry1] : Mr. RACHEL PATEL, 71 year old male, never smoker, w/ hx of HTN, BPH, hypothyroidism, Vertebral fracture, Thoracic aortic aneurysm, who was found to have enlarging LORRIE ground glass nodule during CT evaluation of ascending aortic aneurysm. Referred to office by Dr. Lilly Yu.\par \par S/p Flexible bronch, uniportal left VATS, pneumonolysis, wedge resection left upper lobe x1,LULobectomy, MLND on 8/4/2021. Path demonstrating: Invasive mucinous adenocarcinoma; G3; 1cm; Single focus; All LN (0/16) and margins negative for tumor; pT1a pN0 (Stage IA1)\par \par I have reviewed the patient's medical records and diagnostic images at time of this office consultation and have made the following recommendation:\par 1. CT Chest on 5/3/22 reviewed, no evidence of recurrence, recommended patient to return to office in 3mo w/ CXR.\par \par \par I personally performed the services described in the documentation, reviewed the documentation recorded by the scribe in my presence and it accurately and completely records my words and actions.\par \par I, Chente Ansari NP, am scribing for and the presence of IAIN ValenciaIM, the following sections HISTORY OF PRESENT ILLNESS, PAST MEDICAL/FAMILY/SOCIAL HISTORY; REVIEW OF SYSTEMS; VITAL SIGNS; PHYSICAL EXAM; DISPOSITION.\par \par

## 2022-05-17 NOTE — HISTORY OF PRESENT ILLNESS
[FreeTextEntry1] : Mr. RACHEL PATEL, 71 year old male, never smoker, w/ hx of HTN, BPH, hypothyroidism, Vertebral fracture, Thoracic aortic aneurysm, who was found to have enlarging LORRIE ground glass nodule during CT evaluation of ascending aortic aneurysm. Referred to office by Dr. Lilly uY.\par \par CT Angio Chest/Abd/Pelvis on 6/23/21:\par - 8 mm left upper lobe groundglass nodule, previously 4 mm in 2018. \par - Stable 4.6 cm ascending thoracic aortic aneurysm without dissection. \par - Increased attenuation and mild expansion of a right upper pole renal calyx. \par \par PFTs on 7/9/21: FVC 87%, FEV1 87%, DLCO 142%.\par \par PET/CT on 7/14/21:\par - Non-FDG-avid 8 mm left upper nodule is unchanged as compared to CT dated 6/23/2021, and is increased in size as compared to CT dated 12/26/2018, where it measures 0.5 cm (image 94).\par - Increased attenuation and mild expansion of a right upper pole renal calyx, unchanged as compared to CT dated 6/23/2021, not identified on CT dated 2/12/2021, is not well evaluated on PET due to physiologic excretion of radiotracer activity. CT urogram is recommended for further evaluation.\par \par CT Chest on 10/19/21:\par - Status post left upper lobectomy with expected postsurgical changes. Trace left pleural effusion.\par - No new or enlarging nodule.\par \par CXR on 2/2/21: Status post LEFT upper lobe lobectomy.\par No radiographic evidence of active chest disease.\par \par S/p Flexible bronch, uniportal left VATS, pneumonolysis, wedge resection left upper lobe x1,LULobectomy, MLND on 8/4/2021. Path demonstrating: Invasive mucinous adenocarcinoma; G3; 1cm; Single focus; All LN (0/16) and margins negative for tumor; pT1a pN0 (Stage IA1)\par \par CT Chest on 5/3/22:\par - Stable distortion of the left hemithorax from left upper lobectomy. No endobronchial lesion. \par - Stable 2 mm subpleural nodule left lower lobe (2-39). No pleural effusion.\par \par Patient is here today for a follow up. Admits to SOB on exertion and snoring.\par

## 2022-05-17 NOTE — CONSULT LETTER
[Consult Letter:] : I had the pleasure of evaluating your patient, [unfilled]. [Please see my note below.] : Please see my note below. [Consult Closing:] : Thank you very much for allowing me to participate in the care of this patient.  If you have any questions, please do not hesitate to contact me. [Sincerely,] : Sincerely, [FreeTextEntry2] : Dr. Lilly Yu (Pulm/Ref)\par Dr. Luis Lopez (Med)\par Dr. Storm Quezada (Cardiology)\par Dr. Joel Lomas (URO)  [FreeTextEntry3] : Carter Verde MD, FACS \par Chief, Division of Thoracic Surgery \par Director, Minimally Invasive Thoracic Surgery \par Department of Cardiovascular and Thoracic Surgery \par [facility] \par , Cardiovascular and Thoracic Surgery\par

## 2022-05-19 ENCOUNTER — TRANSCRIPTION ENCOUNTER (OUTPATIENT)
Age: 72
End: 2022-05-19

## 2022-05-19 NOTE — ASU PATIENT PROFILE, ADULT - NSICDXPASTMEDICALHX_GEN_ALL_CORE_FT
PAST MEDICAL HISTORY:  Ascending aortic aneurysm     BPH without obstruction/lower urinary tract symptoms     COVID-19 12/2020 loss of smell no hosp    Hearing loss     History of low back pain     HTN (hypertension)     Hypothyroid     Malignant neoplasm of unspecified kidney, except renal pelvis     Obesity     Pulmonary nodule removed 8/4/2021 early stage no chemo VAT    Thoracic aortic aneurysm

## 2022-05-19 NOTE — ASU PATIENT PROFILE, ADULT - FALL HARM RISK - UNIVERSAL INTERVENTIONS
Bed in lowest position, wheels locked, appropriate side rails in place/Call bell, personal items and telephone in reach/Instruct patient to call for assistance before getting out of bed or chair/Non-slip footwear when patient is out of bed/[place] to call system/Physically safe environment - no spills, clutter or unnecessary equipment/Purposeful Proactive Rounding/Room/bathroom lighting operational, light cord in reach

## 2022-05-20 ENCOUNTER — APPOINTMENT (OUTPATIENT)
Dept: UROLOGY | Facility: HOSPITAL | Age: 72
End: 2022-05-20

## 2022-05-20 ENCOUNTER — TRANSCRIPTION ENCOUNTER (OUTPATIENT)
Age: 72
End: 2022-05-20

## 2022-05-20 ENCOUNTER — RESULT REVIEW (OUTPATIENT)
Age: 72
End: 2022-05-20

## 2022-05-20 ENCOUNTER — INPATIENT (INPATIENT)
Facility: HOSPITAL | Age: 72
LOS: 4 days | Discharge: HOME | End: 2022-05-25
Attending: UROLOGY | Admitting: UROLOGY
Payer: MEDICARE

## 2022-05-20 VITALS
HEIGHT: 74 IN | TEMPERATURE: 99 F | HEART RATE: 71 BPM | OXYGEN SATURATION: 97 % | RESPIRATION RATE: 16 BRPM | DIASTOLIC BLOOD PRESSURE: 70 MMHG | SYSTOLIC BLOOD PRESSURE: 129 MMHG | WEIGHT: 246.04 LBS

## 2022-05-20 DIAGNOSIS — C65.9 MALIGNANT NEOPLASM OF UNSPECIFIED RENAL PELVIS: ICD-10-CM

## 2022-05-20 DIAGNOSIS — Z29.9 ENCOUNTER FOR PROPHYLACTIC MEASURES, UNSPECIFIED: ICD-10-CM

## 2022-05-20 DIAGNOSIS — I71.2 THORACIC AORTIC ANEURYSM, WITHOUT RUPTURE: ICD-10-CM

## 2022-05-20 DIAGNOSIS — N17.9 ACUTE KIDNEY FAILURE, UNSPECIFIED: ICD-10-CM

## 2022-05-20 DIAGNOSIS — Z98.890 OTHER SPECIFIED POSTPROCEDURAL STATES: Chronic | ICD-10-CM

## 2022-05-20 LAB
ANION GAP SERPL CALC-SCNC: 13 MMOL/L — SIGNIFICANT CHANGE UP (ref 7–14)
APPEARANCE: CLEAR
BACTERIA UR CULT: NORMAL
BACTERIA: NEGATIVE
BILIRUBIN URINE: NEGATIVE
BLOOD URINE: ABNORMAL
BUN SERPL-MCNC: 22 MG/DL — SIGNIFICANT CHANGE UP (ref 7–23)
CALCIUM SERPL-MCNC: 8.4 MG/DL — SIGNIFICANT CHANGE UP (ref 8.4–10.5)
CHLORIDE SERPL-SCNC: 104 MMOL/L — SIGNIFICANT CHANGE UP (ref 98–107)
CO2 SERPL-SCNC: 20 MMOL/L — LOW (ref 22–31)
COLOR: NORMAL
CREAT SERPL-MCNC: 1.48 MG/DL — HIGH (ref 0.5–1.3)
EGFR: 50 ML/MIN/1.73M2 — LOW
GAS PNL BLDA: SIGNIFICANT CHANGE UP
GLUCOSE QUALITATIVE U: NEGATIVE
GLUCOSE SERPL-MCNC: 157 MG/DL — HIGH (ref 70–99)
HCT VFR BLD CALC: 36.9 % — LOW (ref 39–50)
HGB BLD-MCNC: 12.4 G/DL — LOW (ref 13–17)
HYALINE CASTS: 0 /LPF
KETONES URINE: NEGATIVE
LEUKOCYTE ESTERASE URINE: NEGATIVE
MCHC RBC-ENTMCNC: 32.7 PG — SIGNIFICANT CHANGE UP (ref 27–34)
MCHC RBC-ENTMCNC: 33.6 GM/DL — SIGNIFICANT CHANGE UP (ref 32–36)
MCV RBC AUTO: 97.4 FL — SIGNIFICANT CHANGE UP (ref 80–100)
MICROSCOPIC-UA: NORMAL
NITRITE URINE: NEGATIVE
NRBC # BLD: 0 /100 WBCS — SIGNIFICANT CHANGE UP
NRBC # FLD: 0 K/UL — SIGNIFICANT CHANGE UP
PH URINE: 6
PLATELET # BLD AUTO: 174 K/UL — SIGNIFICANT CHANGE UP (ref 150–400)
POTASSIUM SERPL-MCNC: 4.4 MMOL/L — SIGNIFICANT CHANGE UP (ref 3.5–5.3)
POTASSIUM SERPL-SCNC: 4.4 MMOL/L — SIGNIFICANT CHANGE UP (ref 3.5–5.3)
PROTEIN URINE: ABNORMAL
RBC # BLD: 3.79 M/UL — LOW (ref 4.2–5.8)
RBC # FLD: 11.9 % — SIGNIFICANT CHANGE UP (ref 10.3–14.5)
RED BLOOD CELLS URINE: 2 /HPF
SODIUM SERPL-SCNC: 137 MMOL/L — SIGNIFICANT CHANGE UP (ref 135–145)
SPECIFIC GRAVITY URINE: 1.02
SQUAMOUS EPITHELIAL CELLS: 2 /HPF
UROBILINOGEN URINE: NORMAL
WBC # BLD: 17.66 K/UL — HIGH (ref 3.8–10.5)
WBC # FLD AUTO: 17.66 K/UL — HIGH (ref 3.8–10.5)
WHITE BLOOD CELLS URINE: 17 /HPF

## 2022-05-20 PROCEDURE — 88305 TISSUE EXAM BY PATHOLOGIST: CPT | Mod: 26

## 2022-05-20 PROCEDURE — 88307 TISSUE EXAM BY PATHOLOGIST: CPT | Mod: 26

## 2022-05-20 PROCEDURE — 88313 SPECIAL STAINS GROUP 2: CPT | Mod: 26

## 2022-05-20 PROCEDURE — 88341 IMHCHEM/IMCYTCHM EA ADD ANTB: CPT | Mod: 26,59

## 2022-05-20 PROCEDURE — 99222 1ST HOSP IP/OBS MODERATE 55: CPT

## 2022-05-20 PROCEDURE — 50236 REMOVAL OF KIDNEY & URETER: CPT | Mod: RT

## 2022-05-20 PROCEDURE — 88342 IMHCHEM/IMCYTCHM 1ST ANTB: CPT | Mod: 26,59

## 2022-05-20 PROCEDURE — 88360 TUMOR IMMUNOHISTOCHEM/MANUAL: CPT | Mod: 26

## 2022-05-20 DEVICE — URETERAL STENT PERCUFLEX PLUS 6FR 26CM: Type: IMPLANTABLE DEVICE | Status: FUNCTIONAL

## 2022-05-20 DEVICE — LIGATING CLIPS WECK HORIZON LARGE (ORANGE) 24: Type: IMPLANTABLE DEVICE | Status: FUNCTIONAL

## 2022-05-20 DEVICE — SURGICEL 2 X 14": Type: IMPLANTABLE DEVICE | Status: FUNCTIONAL

## 2022-05-20 DEVICE — STAPLER COVIDIEN TRI-STAPLE 60MM TAN RELOAD: Type: IMPLANTABLE DEVICE | Status: FUNCTIONAL

## 2022-05-20 DEVICE — GUIDEWIRE SENSOR DUAL-FLEX NITINOL STRAIGHT .038" X 150CM: Type: IMPLANTABLE DEVICE | Status: FUNCTIONAL

## 2022-05-20 DEVICE — URETERAL CATH OPEN END 5FR 70CM: Type: IMPLANTABLE DEVICE | Status: FUNCTIONAL

## 2022-05-20 DEVICE — LIGATING CLIPS WECK HORIZON MEDIUM (BLUE) 24: Type: IMPLANTABLE DEVICE | Status: FUNCTIONAL

## 2022-05-20 RX ORDER — CEFTRIAXONE 500 MG/1
INJECTION, POWDER, FOR SOLUTION INTRAMUSCULAR; INTRAVENOUS
Refills: 0 | Status: COMPLETED | OUTPATIENT
Start: 2022-05-20 | End: 2022-05-24

## 2022-05-20 RX ORDER — SODIUM CHLORIDE 9 MG/ML
1000 INJECTION, SOLUTION INTRAVENOUS
Refills: 0 | Status: DISCONTINUED | OUTPATIENT
Start: 2022-05-20 | End: 2022-05-23

## 2022-05-20 RX ORDER — HYDROMORPHONE HYDROCHLORIDE 2 MG/ML
0.5 INJECTION INTRAMUSCULAR; INTRAVENOUS; SUBCUTANEOUS
Refills: 0 | Status: DISCONTINUED | OUTPATIENT
Start: 2022-05-20 | End: 2022-05-20

## 2022-05-20 RX ORDER — SODIUM CHLORIDE 9 MG/ML
1000 INJECTION, SOLUTION INTRAVENOUS
Refills: 0 | Status: DISCONTINUED | OUTPATIENT
Start: 2022-05-20 | End: 2022-05-20

## 2022-05-20 RX ORDER — LABETALOL HCL 100 MG
5 TABLET ORAL ONCE
Refills: 0 | Status: COMPLETED | OUTPATIENT
Start: 2022-05-20 | End: 2022-05-20

## 2022-05-20 RX ORDER — ONDANSETRON 8 MG/1
4 TABLET, FILM COATED ORAL ONCE
Refills: 0 | Status: DISCONTINUED | OUTPATIENT
Start: 2022-05-20 | End: 2022-05-20

## 2022-05-20 RX ORDER — ONDANSETRON 8 MG/1
4 TABLET, FILM COATED ORAL EVERY 6 HOURS
Refills: 0 | Status: DISCONTINUED | OUTPATIENT
Start: 2022-05-20 | End: 2022-05-25

## 2022-05-20 RX ORDER — OXYCODONE HYDROCHLORIDE 5 MG/1
10 TABLET ORAL EVERY 4 HOURS
Refills: 0 | Status: DISCONTINUED | OUTPATIENT
Start: 2022-05-20 | End: 2022-05-20

## 2022-05-20 RX ORDER — NALBUPHINE HYDROCHLORIDE 10 MG/ML
2.5 INJECTION, SOLUTION INTRAMUSCULAR; INTRAVENOUS; SUBCUTANEOUS EVERY 6 HOURS
Refills: 0 | Status: DISCONTINUED | OUTPATIENT
Start: 2022-05-20 | End: 2022-05-24

## 2022-05-20 RX ORDER — SENNA PLUS 8.6 MG/1
2 TABLET ORAL AT BEDTIME
Refills: 0 | Status: DISCONTINUED | OUTPATIENT
Start: 2022-05-20 | End: 2022-05-25

## 2022-05-20 RX ORDER — LEVOTHYROXINE SODIUM 125 MCG
25 TABLET ORAL DAILY
Refills: 0 | Status: DISCONTINUED | OUTPATIENT
Start: 2022-05-20 | End: 2022-05-25

## 2022-05-20 RX ORDER — HYDROMORPHONE HYDROCHLORIDE 2 MG/ML
5 INJECTION INTRAMUSCULAR; INTRAVENOUS; SUBCUTANEOUS
Refills: 0 | Status: DISCONTINUED | OUTPATIENT
Start: 2022-05-20 | End: 2022-05-24

## 2022-05-20 RX ORDER — NALOXONE HYDROCHLORIDE 4 MG/.1ML
0.1 SPRAY NASAL
Refills: 0 | Status: DISCONTINUED | OUTPATIENT
Start: 2022-05-20 | End: 2022-05-25

## 2022-05-20 RX ORDER — FINASTERIDE 5 MG/1
5 TABLET, FILM COATED ORAL DAILY
Refills: 0 | Status: DISCONTINUED | OUTPATIENT
Start: 2022-05-20 | End: 2022-05-25

## 2022-05-20 RX ORDER — CEFTRIAXONE 500 MG/1
1000 INJECTION, POWDER, FOR SOLUTION INTRAMUSCULAR; INTRAVENOUS EVERY 24 HOURS
Refills: 0 | Status: COMPLETED | OUTPATIENT
Start: 2022-05-21 | End: 2022-05-23

## 2022-05-20 RX ORDER — ACETAMINOPHEN 500 MG
1000 TABLET ORAL EVERY 6 HOURS
Refills: 0 | Status: COMPLETED | OUTPATIENT
Start: 2022-05-20 | End: 2022-05-21

## 2022-05-20 RX ORDER — HYDROMORPHONE HYDROCHLORIDE 2 MG/ML
250 INJECTION INTRAMUSCULAR; INTRAVENOUS; SUBCUTANEOUS
Refills: 0 | Status: DISCONTINUED | OUTPATIENT
Start: 2022-05-20 | End: 2022-05-21

## 2022-05-20 RX ORDER — CEFTRIAXONE 500 MG/1
1000 INJECTION, POWDER, FOR SOLUTION INTRAMUSCULAR; INTRAVENOUS ONCE
Refills: 0 | Status: COMPLETED | OUTPATIENT
Start: 2022-05-20 | End: 2022-05-20

## 2022-05-20 RX ORDER — HEPARIN SODIUM 5000 [USP'U]/ML
5000 INJECTION INTRAVENOUS; SUBCUTANEOUS EVERY 8 HOURS
Refills: 0 | Status: DISCONTINUED | OUTPATIENT
Start: 2022-05-20 | End: 2022-05-25

## 2022-05-20 RX ORDER — HYDROMORPHONE HYDROCHLORIDE 2 MG/ML
1 INJECTION INTRAMUSCULAR; INTRAVENOUS; SUBCUTANEOUS
Refills: 0 | Status: DISCONTINUED | OUTPATIENT
Start: 2022-05-20 | End: 2022-05-20

## 2022-05-20 RX ORDER — ASPIRIN/CALCIUM CARB/MAGNESIUM 324 MG
81 TABLET ORAL DAILY
Refills: 0 | Status: DISCONTINUED | OUTPATIENT
Start: 2022-05-20 | End: 2022-05-25

## 2022-05-20 RX ORDER — OXYCODONE HYDROCHLORIDE 5 MG/1
5 TABLET ORAL EVERY 4 HOURS
Refills: 0 | Status: DISCONTINUED | OUTPATIENT
Start: 2022-05-20 | End: 2022-05-20

## 2022-05-20 RX ADMIN — Medication 5 MILLIGRAM(S): at 16:22

## 2022-05-20 RX ADMIN — HYDROMORPHONE HYDROCHLORIDE 250 MILLILITER(S): 2 INJECTION INTRAMUSCULAR; INTRAVENOUS; SUBCUTANEOUS at 15:36

## 2022-05-20 RX ADMIN — CEFTRIAXONE 1000 MILLIGRAM(S): 500 INJECTION, POWDER, FOR SOLUTION INTRAMUSCULAR; INTRAVENOUS at 14:34

## 2022-05-20 RX ADMIN — HYDROMORPHONE HYDROCHLORIDE 250 MILLILITER(S): 2 INJECTION INTRAMUSCULAR; INTRAVENOUS; SUBCUTANEOUS at 17:57

## 2022-05-20 RX ADMIN — Medication 81 MILLIGRAM(S): at 18:31

## 2022-05-20 RX ADMIN — Medication 5 MILLIGRAM(S): at 16:34

## 2022-05-20 RX ADMIN — Medication 400 MILLIGRAM(S): at 23:31

## 2022-05-20 RX ADMIN — HYDROMORPHONE HYDROCHLORIDE 250 MILLILITER(S): 2 INJECTION INTRAMUSCULAR; INTRAVENOUS; SUBCUTANEOUS at 15:10

## 2022-05-20 RX ADMIN — SODIUM CHLORIDE 150 MILLILITER(S): 9 INJECTION, SOLUTION INTRAVENOUS at 18:12

## 2022-05-20 RX ADMIN — HEPARIN SODIUM 5000 UNIT(S): 5000 INJECTION INTRAVENOUS; SUBCUTANEOUS at 21:31

## 2022-05-20 RX ADMIN — Medication 400 MILLIGRAM(S): at 18:12

## 2022-05-20 RX ADMIN — FINASTERIDE 5 MILLIGRAM(S): 5 TABLET, FILM COATED ORAL at 18:31

## 2022-05-20 RX ADMIN — HYDROMORPHONE HYDROCHLORIDE 250 MILLILITER(S): 2 INJECTION INTRAMUSCULAR; INTRAVENOUS; SUBCUTANEOUS at 20:23

## 2022-05-20 NOTE — CONSULT NOTE ADULT - ASSESSMENT
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

## 2022-05-20 NOTE — CONSULT NOTE ADULT - SUBJECTIVE AND OBJECTIVE BOX
CHIEF COMPLAINT: Patient is a 71y old  Male who presents with a chief complaint of ' I am having a right kidney removal" (09 May 2022 12:19)    HPI: 71 year old male with hypothyroidism, HTN, BPH, LORRIE pulmonary nodule (s/p wedge resection/ left upper lobectomy 8/2021 path revealing invasive mucinous adenocarcinoma margins negative, follows with thoracic surgery), thoracic aneurysm (4.7cm stable on 5/2022 CT, monitored as outpatient), renal pelvis ca now presented for R nephrectomy s/p OR 5/20/22. Post-op feels comfortable. Denies nausea, vomiting, chest pain, shortness of breath. Reports he has the sensation to urinate- informed patient that michel catheter in place.     Allergies  No Known Allergies    HOME MEDICATIONS: [x] Reviewed    MEDICATIONS  (STANDING):  acetaminophen   IVPB .. 1000 milliGRAM(s) IV Intermittent every 6 hours  aspirin  chewable 81 milliGRAM(s) Oral daily  cefTRIAXone   IVPB      finasteride 5 milliGRAM(s) Oral daily  heparin   Injectable 5000 Unit(s) SubCutaneous every 8 hours  hydromorphone (10 MICROgram(s)/mL) + bupivacaine 0.0625% in 0.9% Sodium Chloride PCEA 250 milliLiter(s) Epidural PCA Continuous  lactated ringers. 1000 milliLiter(s) (150 mL/Hr) IV Continuous <Continuous>  levothyroxine 25 MICROGram(s) Oral daily    MEDICATIONS  (PRN):  HYDROmorphone  Injectable 0.5 milliGRAM(s) IV Push every 10 minutes PRN Moderate Pain (4 - 6)  HYDROmorphone  Injectable 1 milliGRAM(s) IV Push every 10 minutes PRN Severe Pain (7 - 10)  hydromorphone (10 MICROgram(s)/mL) + bupivacaine 0.0625% in 0.9% Sodium Chloride PCEA Rescue Clinician  Bolus 5 milliLiter(s) Epidural every 15 minutes PRN for Pain Score greater than 6  nalbuphine Injectable 2.5 milliGRAM(s) IV Push every 6 hours PRN Pruritus  naloxone Injectable 0.1 milliGRAM(s) IV Push every 3 minutes PRN For ANY of the following changes in patient status:  A. RR LESS THAN 10 breaths per minute, B. Oxygen saturation LESS THAN 90%, C. Sedation score of 6  ondansetron Injectable 4 milliGRAM(s) IV Push every 6 hours PRN Nausea  ondansetron Injectable 4 milliGRAM(s) IV Push once PRN Nausea and/or Vomiting  senna 2 Tablet(s) Oral at bedtime PRN Constipation    PAST MEDICAL & SURGICAL HISTORY:  HTN (hypertension)  Ascending aortic aneurysm  Thoracic aortic aneurysm  Obesity  Pulmonary nodule  removed 8/4/2021 early stage no chemo VAT  Hypothyroid  COVID-19  12/2020 loss of smell no hosp  History of low back pain  BPH without obstruction/lower urinary tract symptoms  Hearing loss  Malignant neoplasm of unspecified kidney, except renal pelvis  H/O arthroscopy  right knee  [X] Reviewed     SOCIAL HISTORY:  Social EtOH, no tobacco or drugs.     FAMILY HISTORY:  [x] No pertinent family history in first degree relatives     REVIEW OF SYSTEMS:  [x] All other ROS negative  [  ] Unable to obtain due to poor mental status  Vital Signs Last 24 Hrs  T(C): 36.8 (20 May 2022 14:15), Max: 37.2 (20 May 2022 07:46)  T(F): 98.2 (20 May 2022 14:15), Max: 99 (20 May 2022 07:46)  HR: 86 (20 May 2022 14:45) (71 - 97)  BP: 143/82 (20 May 2022 14:45) (129/70 - 166/88)  BP(mean): 96 (20 May 2022 14:45) (92 - 112)  RR: 19 (20 May 2022 14:45) (16 - 20)  SpO2: 92% (20 May 2022 14:45) (92% - 97%)    PHYSICAL EXAM:    GENERAL: NAD, well-groomed, well-developed  HEAD:  Atraumatic, Normocephalic  EYES: conjunctiva and sclera clear  ENMT: Moist mucous membranes  RESPIRATORY: Clear to auscultation bilaterally; No rales, rhonchi, wheezing, or rubs  CARDIOVASCULAR: Regular rate and rhythm; normal s1, s2  GASTROINTESTINAL: Soft, Nontender, Nondistended, +surgical incisions clean/dressed  GENITOURINARY: michel in place  EXTREMITIES:  2+ Peripheral Pulses, No clubbing, cyanosis, or edema  NERVOUS SYSTEM: Moving all 4 extremities  SKIN: No rashes    LABS:                        12.4   17.66 )-----------( 174      ( 20 May 2022 13:40 )             36.9     05-20    137  |  104  |  22  ----------------------------<  157<H>  4.4   |  20<L>  |  1.48<H>    Ca    8.4      20 May 2022 12:55      CAPILLARY BLOOD GLUCOSE              [x] Care Discussed with Consultants/Other Providers: Urology PA - discussed

## 2022-05-20 NOTE — PATIENT PROFILE ADULT - FALL HARM RISK - UNIVERSAL INTERVENTIONS
Bed in lowest position, wheels locked, appropriate side rails in place/Call bell, personal items and telephone in reach/Instruct patient to call for assistance before getting out of bed or chair/Non-slip footwear when patient is out of bed/[place] to call system/Physically safe environment - no spills, clutter or unnecessary equipment/Purposeful Proactive Rounding/Room/bathroom lighting operational, light cord in reach

## 2022-05-20 NOTE — BRIEF OPERATIVE NOTE - NSICDXBRIEFPOSTOP_GEN_ALL_CORE_FT
POST-OP DIAGNOSIS:  Malignant neoplasm of unspecified kidney, except renal pelvis 21-May-2022 22:10:03  Ariel Dang

## 2022-05-20 NOTE — BRIEF OPERATIVE NOTE - NSICDXBRIEFPROCEDURE_GEN_ALL_CORE_FT
PROCEDURES:  Right nephroureterectomy 21-May-2022 22:10:33  Ariel Dang   PROCEDURES:  Right nephroureterectomy 21-May-2022 22:10:33  Ariel Dang  Cystoscopy with replacement of left ureteral stent 21-May-2022 23:19:21  Ariel Dang

## 2022-05-20 NOTE — BRIEF OPERATIVE NOTE - OPERATION/FINDINGS
Right kidney and ureter taken via two incision approach, ureter taken at level of insertion into detrusor muscle  low urine output during case, left stent placed under fluoroscopic guidance

## 2022-05-20 NOTE — BRIEF OPERATIVE NOTE - NSICDXBRIEFPREOP_GEN_ALL_CORE_FT
PRE-OP DIAGNOSIS:  Malignant neoplasm of unspecified kidney, except renal pelvis 21-May-2022 22:10:06  Ariel Dang

## 2022-05-20 NOTE — CONSULT NOTE ADULT - PROBLEM SELECTOR RECOMMENDATION 3
Hold ace inhibitor as above given cr bump  -c/w pain control as could exacerbate HTN  - consider starting amlodipine 5mg qd if BP elevated while ACEi on hold

## 2022-05-20 NOTE — CHART NOTE - NSCHARTNOTEFT_GEN_A_CORE
Post op Check: 72 yo M POD #0 open right nephroureterectomy, placement of left ureteral stent, had some low UO during the case, now with good UO    Pt seen and examined c/o feeling like he has to void. Pain is controlled. Denies SOB/CP/N/V.     Vital Signs Last 24 Hrs  T(C): 36.8 (20 May 2022 14:15), Max: 37.2 (20 May 2022 07:46)  T(F): 98.2 (20 May 2022 14:15), Max: 99 (20 May 2022 07:46)  HR: 83 (20 May 2022 15:45) (71 - 97)  BP: 160/91 (20 May 2022 15:00) (129/70 - 166/88)  BP(mean): 107 (20 May 2022 15:00) (92 - 112)  RR: 18 (20 May 2022 15:45) (14 - 20)  SpO2: 97% (20 May 2022 15:45) (92% - 98%)    I&O's Summary  Michel: 350 yellow  20 May 2022 07:01  -  20 May 2022 16:05  --------------------------------------------------------  IN: 0 mL / OUT: 350 mL / NET: -350 mL        Physical Exam  Gen: NAD  Pulm: No respiratory distress  CV: Reg  Abd: Dressings c/d/i, obese, softly distended, NT  : Michel secured  Venodynes: In place                          12.4   17.66 )-----------( 174      ( 20 May 2022 13:40 )             36.9       05-20    137  |  104  |  22  ----------------------------<  157<H>  4.4   |  20<L>  |  1.48<H>    Ca    8.4      20 May 2022 12:55          Plan:   IVF: LR @ 150  Diet: NPO  Labs: Reviewed, AM ordered  Abx: CTX  Ramipril on hold, will add amlodipine if needed per medicine  Continue PCEA  Continue michel  Strict I&Os  Antiemetics as needed  DVT prophylaxis/OOB  Incentive spirometry  Post op Check: 72 yo M POD #0 open right nephroureterectomy, placement of left ureteral stent, had some low UO during the case, now with good UO    Pt seen and examined c/o feeling like he has to void. Pain is controlled. Denies SOB/CP/N/V.     Vital Signs Last 24 Hrs  T(C): 36.8 (20 May 2022 14:15), Max: 37.2 (20 May 2022 07:46)  T(F): 98.2 (20 May 2022 14:15), Max: 99 (20 May 2022 07:46)  HR: 83 (20 May 2022 15:45) (71 - 97)  BP: 160/91 (20 May 2022 15:00) (129/70 - 166/88)  BP(mean): 107 (20 May 2022 15:00) (92 - 112)  RR: 18 (20 May 2022 15:45) (14 - 20)  SpO2: 97% (20 May 2022 15:45) (92% - 98%)    I&O's Summary  Michel: 350 yellow    Physical Exam  Gen: NAD  Pulm: No respiratory distress  CV: Reg  Abd: Dressings c/d/i, obese, softly distended, NT  : Michel secured  Venodynes: In place                          12.4   17.66 )-----------( 174      ( 20 May 2022 13:40 )             36.9       05-20    137  |  104  |  22  ----------------------------<  157<H>  4.4   |  20<L>  |  1.48<H>    Ca    8.4      20 May 2022 12:55          Plan:   IVF: LR @ 150  Diet: NPO  Labs: Reviewed, AM ordered  Abx: CTX  Ramipril on hold, will add amlodipine if needed per medicine  Continue PCEA  Continue michel  Strict I&Os  Antiemetics as needed  DVT prophylaxis/OOB  Incentive spirometry

## 2022-05-21 LAB
ANION GAP SERPL CALC-SCNC: 12 MMOL/L — SIGNIFICANT CHANGE UP (ref 7–14)
APTT BLD: 27.2 SEC — SIGNIFICANT CHANGE UP (ref 27–36.3)
BUN SERPL-MCNC: 26 MG/DL — HIGH (ref 7–23)
CALCIUM SERPL-MCNC: 8.3 MG/DL — LOW (ref 8.4–10.5)
CHLORIDE SERPL-SCNC: 100 MMOL/L — SIGNIFICANT CHANGE UP (ref 98–107)
CO2 SERPL-SCNC: 24 MMOL/L — SIGNIFICANT CHANGE UP (ref 22–31)
CREAT SERPL-MCNC: 1.74 MG/DL — HIGH (ref 0.5–1.3)
EGFR: 41 ML/MIN/1.73M2 — LOW
GLUCOSE SERPL-MCNC: 106 MG/DL — HIGH (ref 70–99)
HCT VFR BLD CALC: 35.3 % — LOW (ref 39–50)
HGB BLD-MCNC: 12 G/DL — LOW (ref 13–17)
INR BLD: 1.14 RATIO — SIGNIFICANT CHANGE UP (ref 0.88–1.16)
MCHC RBC-ENTMCNC: 32.9 PG — SIGNIFICANT CHANGE UP (ref 27–34)
MCHC RBC-ENTMCNC: 34 GM/DL — SIGNIFICANT CHANGE UP (ref 32–36)
MCV RBC AUTO: 96.7 FL — SIGNIFICANT CHANGE UP (ref 80–100)
NRBC # BLD: 0 /100 WBCS — SIGNIFICANT CHANGE UP
NRBC # FLD: 0 K/UL — SIGNIFICANT CHANGE UP
PLATELET # BLD AUTO: 175 K/UL — SIGNIFICANT CHANGE UP (ref 150–400)
POTASSIUM SERPL-MCNC: 4.2 MMOL/L — SIGNIFICANT CHANGE UP (ref 3.5–5.3)
POTASSIUM SERPL-SCNC: 4.2 MMOL/L — SIGNIFICANT CHANGE UP (ref 3.5–5.3)
PROTHROM AB SERPL-ACNC: 13.2 SEC — SIGNIFICANT CHANGE UP (ref 10.5–13.4)
RBC # BLD: 3.65 M/UL — LOW (ref 4.2–5.8)
RBC # FLD: 12 % — SIGNIFICANT CHANGE UP (ref 10.3–14.5)
SODIUM SERPL-SCNC: 136 MMOL/L — SIGNIFICANT CHANGE UP (ref 135–145)
WBC # BLD: 11.88 K/UL — HIGH (ref 3.8–10.5)
WBC # FLD AUTO: 11.88 K/UL — HIGH (ref 3.8–10.5)

## 2022-05-21 PROCEDURE — 99232 SBSQ HOSP IP/OBS MODERATE 35: CPT

## 2022-05-21 RX ORDER — HYDROMORPHONE HYDROCHLORIDE 2 MG/ML
0.5 INJECTION INTRAMUSCULAR; INTRAVENOUS; SUBCUTANEOUS
Refills: 0 | Status: DISCONTINUED | OUTPATIENT
Start: 2022-05-21 | End: 2022-05-24

## 2022-05-21 RX ORDER — ACETAMINOPHEN 500 MG
1000 TABLET ORAL ONCE
Refills: 0 | Status: DISCONTINUED | OUTPATIENT
Start: 2022-05-21 | End: 2022-05-24

## 2022-05-21 RX ORDER — LIDOCAINE 4 G/100G
1 CREAM TOPICAL EVERY 24 HOURS
Refills: 0 | Status: COMPLETED | OUTPATIENT
Start: 2022-05-21 | End: 2022-05-25

## 2022-05-21 RX ORDER — HYDROMORPHONE HYDROCHLORIDE 2 MG/ML
250 INJECTION INTRAMUSCULAR; INTRAVENOUS; SUBCUTANEOUS
Refills: 0 | Status: DISCONTINUED | OUTPATIENT
Start: 2022-05-21 | End: 2022-05-24

## 2022-05-21 RX ADMIN — HYDROMORPHONE HYDROCHLORIDE 250 MILLILITER(S): 2 INJECTION INTRAMUSCULAR; INTRAVENOUS; SUBCUTANEOUS at 08:25

## 2022-05-21 RX ADMIN — NALBUPHINE HYDROCHLORIDE 2.5 MILLIGRAM(S): 10 INJECTION, SOLUTION INTRAMUSCULAR; INTRAVENOUS; SUBCUTANEOUS at 09:45

## 2022-05-21 RX ADMIN — SODIUM CHLORIDE 150 MILLILITER(S): 9 INJECTION, SOLUTION INTRAVENOUS at 03:43

## 2022-05-21 RX ADMIN — CEFTRIAXONE 100 MILLIGRAM(S): 500 INJECTION, POWDER, FOR SOLUTION INTRAMUSCULAR; INTRAVENOUS at 14:22

## 2022-05-21 RX ADMIN — NALBUPHINE HYDROCHLORIDE 2.5 MILLIGRAM(S): 10 INJECTION, SOLUTION INTRAMUSCULAR; INTRAVENOUS; SUBCUTANEOUS at 18:28

## 2022-05-21 RX ADMIN — Medication 400 MILLIGRAM(S): at 13:18

## 2022-05-21 RX ADMIN — HYDROMORPHONE HYDROCHLORIDE 250 MILLILITER(S): 2 INJECTION INTRAMUSCULAR; INTRAVENOUS; SUBCUTANEOUS at 20:23

## 2022-05-21 RX ADMIN — HYDROMORPHONE HYDROCHLORIDE 5 MILLILITER(S): 2 INJECTION INTRAMUSCULAR; INTRAVENOUS; SUBCUTANEOUS at 11:56

## 2022-05-21 RX ADMIN — LIDOCAINE 1 PATCH: 4 CREAM TOPICAL at 13:30

## 2022-05-21 RX ADMIN — NALBUPHINE HYDROCHLORIDE 2.5 MILLIGRAM(S): 10 INJECTION, SOLUTION INTRAMUSCULAR; INTRAVENOUS; SUBCUTANEOUS at 03:14

## 2022-05-21 RX ADMIN — HEPARIN SODIUM 5000 UNIT(S): 5000 INJECTION INTRAVENOUS; SUBCUTANEOUS at 14:24

## 2022-05-21 RX ADMIN — Medication 1000 MILLIGRAM(S): at 13:48

## 2022-05-21 RX ADMIN — SODIUM CHLORIDE 150 MILLILITER(S): 9 INJECTION, SOLUTION INTRAVENOUS at 09:44

## 2022-05-21 RX ADMIN — Medication 25 MICROGRAM(S): at 06:27

## 2022-05-21 RX ADMIN — HEPARIN SODIUM 5000 UNIT(S): 5000 INJECTION INTRAVENOUS; SUBCUTANEOUS at 22:08

## 2022-05-21 RX ADMIN — Medication 81 MILLIGRAM(S): at 13:19

## 2022-05-21 RX ADMIN — FINASTERIDE 5 MILLIGRAM(S): 5 TABLET, FILM COATED ORAL at 13:19

## 2022-05-21 RX ADMIN — HEPARIN SODIUM 5000 UNIT(S): 5000 INJECTION INTRAVENOUS; SUBCUTANEOUS at 06:27

## 2022-05-21 RX ADMIN — HYDROMORPHONE HYDROCHLORIDE 250 MILLILITER(S): 2 INJECTION INTRAMUSCULAR; INTRAVENOUS; SUBCUTANEOUS at 13:36

## 2022-05-21 RX ADMIN — HYDROMORPHONE HYDROCHLORIDE 5 MILLILITER(S): 2 INJECTION INTRAMUSCULAR; INTRAVENOUS; SUBCUTANEOUS at 13:33

## 2022-05-21 RX ADMIN — Medication 400 MILLIGRAM(S): at 06:27

## 2022-05-21 RX ADMIN — LIDOCAINE 1 PATCH: 4 CREAM TOPICAL at 19:52

## 2022-05-21 NOTE — PROGRESS NOTE ADULT - SUBJECTIVE AND OBJECTIVE BOX
Anesthesia Pain Management Service: Day _2_ of Epidural    SUBJECTIVE: Patient is complaining of lots of pain at surgical site.   Pain Scale Score: 9/10  Refer to charted pain scores    THERAPY:  [x ] Epidural Bupivacaine 0.0625% and Hydromorphone  		[ X] 10 micrograms/mL	[ ] 5 micrograms/mL  [ ] Epidural Bupivacaine 0.0625% and Fentanyl - 2 micrograms/mL  [ ] Epidural Ropivacaine 0.1% plain – 1 mg/mL  [ ] Patient Controlled Regional Anesthesia (PCRA) Ropivacaine  		[ ] 0.2%			[ ] 0.1%    Demand dose __3_ lockout __15_ (minutes) Continuous Rate _4__ Total: _116.50___ ml used (in past 24 hours)      MEDICATIONS  (STANDING):  acetaminophen   IVPB .. 1000 milliGRAM(s) IV Intermittent once  aspirin  chewable 81 milliGRAM(s) Oral daily  cefTRIAXone   IVPB      cefTRIAXone   IVPB 1000 milliGRAM(s) IV Intermittent every 24 hours  finasteride 5 milliGRAM(s) Oral daily  heparin   Injectable 5000 Unit(s) SubCutaneous every 8 hours  hydromorphone (10 MICROgram(s)/mL) + bupivacaine 0.0625% in 0.9% Sodium Chloride PCEA 250 milliLiter(s) Epidural PCA Continuous  lactated ringers. 1000 milliLiter(s) (150 mL/Hr) IV Continuous <Continuous>  levothyroxine 25 MICROGram(s) Oral daily  lidocaine   4% Patch 1 Patch Transdermal every 24 hours    MEDICATIONS  (PRN):  HYDROmorphone  Injectable 0.5 milliGRAM(s) IV Push every 3 hours PRN Severe breakthrough  Pain (7 - 10)  hydromorphone (10 MICROgram(s)/mL) + bupivacaine 0.0625% in 0.9% Sodium Chloride PCEA Rescue Clinician  Bolus 5 milliLiter(s) Epidural every 15 minutes PRN for Pain Score greater than 6  nalbuphine Injectable 2.5 milliGRAM(s) IV Push every 6 hours PRN Pruritus  naloxone Injectable 0.1 milliGRAM(s) IV Push every 3 minutes PRN For ANY of the following changes in patient status:  A. RR LESS THAN 10 breaths per minute, B. Oxygen saturation LESS THAN 90%, C. Sedation score of 6  ondansetron Injectable 4 milliGRAM(s) IV Push every 6 hours PRN Nausea  senna 2 Tablet(s) Oral at bedtime PRN Constipation      OBJECTIVE:  Patient is sitting up in chair.     Assessment of Catheter Site:	[ ] Left	[ ] Right  [x ] Epidural 	[ ] Femoral	      [ ] Saphenous   [ ] Supraclavicular   [ ] Other:    [x ] Dressing intact	[x ] Site non-tender	[ x] Site without erythema, discharge, edema  [x ] Epidural tubing and connection checked	[x] Gross neurological exam within normal limits  [ ] Catheter removed – tip intact		[ ] Afebrile  	[ ] Febrile: ___   [ X] see Temp under VS below)    PT/INR - ( 21 May 2022 05:38 )   PT: 13.2 sec;   INR: 1.14 ratio         PTT - ( 21 May 2022 05:38 )  PTT:27.2 sec                      12.0   11.88 )-----------( 175      ( 21 May 2022 05:38 )             35.3     Vital Signs Last 24 Hrs  T(C): 37.2 (05-21-22 @ 13:27), Max: 37.2 (05-20-22 @ 18:01)  T(F): 98.9 (05-21-22 @ 13:27), Max: 98.9 (05-20-22 @ 18:01)  HR: 81 (05-21-22 @ 13:27) (73 - 86)  BP: 129/73 (05-21-22 @ 13:27) (105/61 - 151/83)  BP(mean): --  RR: 18 (05-21-22 @ 13:27) (17 - 19)  SpO2: 97% (05-21-22 @ 13:27) (93% - 98%)      Sedation Score:	[x ] Alert	[ ] Drowsy	[ ] Arousable	[ ] Asleep	[ ] Unresponsive    Side Effects:	[x ] None	[ ] Nausea	[ ] Vomiting	[ ] Pruritus  		[ ] Weakness		[ ] Numbness	[ ] Other:    ASSESSMENT/ PLAN:    Therapy to  be:	[x ] Continue   [ ] Discontinued   [ ] Change to prn Analgesics    Documentation and Verification of current medications:  [ X ] Done	[ ] Not done, not eligible, reason:    Comments:  Called RN to bedside, vitals done and patient given rescue clinician bolus x1, and increased continuous rate to 6ml/hr.  Informed RN to give another clinician bolus if need and increase continuous rate as tolerated.  Patient also ordered IV Dilaudid PRN for severe breakthrough pain. Recommend non-opioid adjuvant analgesics to be used when possible and when allowed by primary surgical team.      Progress Note written now but Patient was seen nevaeh

## 2022-05-21 NOTE — PROGRESS NOTE ADULT - SUBJECTIVE AND OBJECTIVE BOX
ANESTHESIA POSTOP CHECK    71y Male POSTOP DAY 1 S/P     Vital Signs Last 24 Hrs  T(C): 37.2 (21 May 2022 13:27), Max: 37.2 (20 May 2022 18:01)  T(F): 98.9 (21 May 2022 13:27), Max: 98.9 (20 May 2022 18:01)  HR: 81 (21 May 2022 13:27) (71 - 86)  BP: 129/73 (21 May 2022 13:27) (105/61 - 156/92)  BP(mean): 94 (20 May 2022 17:15) (77 - 109)  RR: 18 (21 May 2022 13:27) (17 - 20)  SpO2: 97% (21 May 2022 13:27) (93% - 100%)  I&O's Summary    20 May 2022 07:01  -  21 May 2022 07:00  --------------------------------------------------------  IN: 650 mL / OUT: 2055 mL / NET: -1405 mL    21 May 2022 07:01  -  21 May 2022 16:23  --------------------------------------------------------  IN: 0 mL / OUT: 350 mL / NET: -350 mL        [X ] NO APPARENT ANESTHESIA COMPLICATIONS

## 2022-05-21 NOTE — PROGRESS NOTE ADULT - SUBJECTIVE AND OBJECTIVE BOX
Overnight events:  None    Subjective:  Pt c/o incisional pain not well controlled w/ PCEA, no N/V, no flatus yet    Objective:    Vital signs  T(C): , Max: 37.2 (05-20-22 @ 18:01)  HR: 77 (05-21-22 @ 10:09)  BP: 105/61 (05-21-22 @ 10:09)  SpO2: 93% (05-21-22 @ 10:09)  Wt(kg): --    Output   Gil: 1425 yellow  05-20 @ 07:01  -  05-21 @ 07:00  --------------------------------------------------------  IN: 650 mL / OUT: 2055 mL / NET: -1405 mL    05-21 @ 07:01  -  05-21 @ 11:02  --------------------------------------------------------  IN: 0 mL / OUT: 200 mL / NET: -200 mL        Gen: NAD, sitting up comfortably in chair  Abd      Labs                        12.0   11.88 )-----------( 175      ( 21 May 2022 05:38 )             35.3     21 May 2022 05:38    136    |  100    |  26     ----------------------------<  106    4.2     |  24     |  1.74     Ca    8.3        21 May 2022 05:38        OR Cx: pending

## 2022-05-21 NOTE — PROGRESS NOTE ADULT - PROBLEM SELECTOR PLAN 1
S/p nephrectomy 5/20/22  - still reporting abdominal pain, on PCA, follow up pain service recommendations  - continued management per Urology

## 2022-05-21 NOTE — PROGRESS NOTE ADULT - PROBLEM SELECTOR PLAN 2
Cr 1.17 -> 1.48 ->1.74  - Would continue to hold ace inhibitor  - remains on IVF  - avoid nephrotoxins and NSAIDs if able  - continue to trend closely

## 2022-05-21 NOTE — PROGRESS NOTE ADULT - SUBJECTIVE AND OBJECTIVE BOX
Patient is a 71y old  Male who presents with a chief complaint of Nephrectomy (20 May 2022 14:54)    SUBJECTIVE / OVERNIGHT EVENTS: No acute events. Still with abdominal discomfort, on PCA. No nausea, vomiting, chest pain, shortness of breath, cough, dizziness, headache, fever, chills.       MEDICATIONS  (STANDING):  acetaminophen   IVPB .. 1000 milliGRAM(s) IV Intermittent every 6 hours  acetaminophen   IVPB .. 1000 milliGRAM(s) IV Intermittent once  aspirin  chewable 81 milliGRAM(s) Oral daily  cefTRIAXone   IVPB      cefTRIAXone   IVPB 1000 milliGRAM(s) IV Intermittent every 24 hours  finasteride 5 milliGRAM(s) Oral daily  heparin   Injectable 5000 Unit(s) SubCutaneous every 8 hours  hydromorphone (10 MICROgram(s)/mL) + bupivacaine 0.0625% in 0.9% Sodium Chloride PCEA 250 milliLiter(s) Epidural PCA Continuous  lactated ringers. 1000 milliLiter(s) (150 mL/Hr) IV Continuous <Continuous>  levothyroxine 25 MICROGram(s) Oral daily  lidocaine   4% Patch 1 Patch Transdermal every 24 hours    MEDICATIONS  (PRN):  HYDROmorphone  Injectable 0.5 milliGRAM(s) IV Push every 3 hours PRN Severe breakthrough  Pain (7 - 10)  hydromorphone (10 MICROgram(s)/mL) + bupivacaine 0.0625% in 0.9% Sodium Chloride PCEA Rescue Clinician  Bolus 5 milliLiter(s) Epidural every 15 minutes PRN for Pain Score greater than 6  nalbuphine Injectable 2.5 milliGRAM(s) IV Push every 6 hours PRN Pruritus  naloxone Injectable 0.1 milliGRAM(s) IV Push every 3 minutes PRN For ANY of the following changes in patient status:  A. RR LESS THAN 10 breaths per minute, B. Oxygen saturation LESS THAN 90%, C. Sedation score of 6  ondansetron Injectable 4 milliGRAM(s) IV Push every 6 hours PRN Nausea  senna 2 Tablet(s) Oral at bedtime PRN Constipation    CAPILLARY BLOOD GLUCoSE      I&O's Summary    20 May 2022 07:01  -  21 May 2022 07:00  --------------------------------------------------------  IN: 650 mL / OUT: 2055 mL / NET: -1405 mL    21 May 2022 07:01  -  21 May 2022 13:01  --------------------------------------------------------  IN: 0 mL / OUT: 200 mL / NET: -200 mL    PHYSICAL EXAM:  Vital Signs Last 24 Hrs  T(C): 36.9 (21 May 2022 10:09), Max: 37.2 (20 May 2022 18:01)  T(F): 98.5 (21 May 2022 10:09), Max: 98.9 (20 May 2022 18:01)  HR: 77 (21 May 2022 10:09) (71 - 97)  BP: 105/61 (21 May 2022 10:09) (105/61 - 167/93)  BP(mean): 94 (20 May 2022 17:15) (77 - 112)  RR: 19 (21 May 2022 10:09) (14 - 20)  SpO2: 93% (21 May 2022 10:09) (92% - 100%)    GENERAL: NAD, well-groomed, well-developed  HEAD:  Atraumatic, Normocephalic  EYES: conjunctiva and sclera clear  ENMT: Moist mucous membranes  RESPIRATORY: Clear to auscultation bilaterally; No rales, rhonchi, wheezing, or rubs  CARDIOVASCULAR: Regular rate and rhythm; normal s1, s2  GASTROINTESTINAL: Soft, Nontender, mild distension  GENITOURINARY: michel in place  EXTREMITIES:  2+ Peripheral Pulses, No clubbing, cyanosis, or edema  NERVOUS SYSTEM: Moving all 4 extremities  SKIN: No rashes      LABS:                        12.0   11.88 )-----------( 175      ( 21 May 2022 05:38 )             35.3     05-21    136  |  100  |  26<H>  ----------------------------<  106<H>  4.2   |  24  |  1.74<H>    Ca    8.3<L>      21 May 2022 05:38    PT/INR - ( 21 May 2022 05:38 )   PT: 13.2 sec;   INR: 1.14 ratio      PTT - ( 21 May 2022 05:38 )  PTT:27.2 sec    COORDINATION OF CARE:  Care Discussed with Consultants/Other Providers [Y- Urology PA]

## 2022-05-22 LAB
ANION GAP SERPL CALC-SCNC: 10 MMOL/L — SIGNIFICANT CHANGE UP (ref 7–14)
APTT BLD: 26.7 SEC — LOW (ref 27–36.3)
BLD GP AB SCN SERPL QL: NEGATIVE — SIGNIFICANT CHANGE UP
BUN SERPL-MCNC: 24 MG/DL — HIGH (ref 7–23)
CALCIUM SERPL-MCNC: 8.3 MG/DL — LOW (ref 8.4–10.5)
CHLORIDE SERPL-SCNC: 103 MMOL/L — SIGNIFICANT CHANGE UP (ref 98–107)
CO2 SERPL-SCNC: 24 MMOL/L — SIGNIFICANT CHANGE UP (ref 22–31)
CREAT SERPL-MCNC: 1.73 MG/DL — HIGH (ref 0.5–1.3)
EGFR: 41 ML/MIN/1.73M2 — LOW
GLUCOSE SERPL-MCNC: 102 MG/DL — HIGH (ref 70–99)
HCT VFR BLD CALC: 33.4 % — LOW (ref 39–50)
HGB BLD-MCNC: 11 G/DL — LOW (ref 13–17)
INR BLD: 1.21 RATIO — HIGH (ref 0.88–1.16)
MCHC RBC-ENTMCNC: 32.4 PG — SIGNIFICANT CHANGE UP (ref 27–34)
MCHC RBC-ENTMCNC: 32.9 GM/DL — SIGNIFICANT CHANGE UP (ref 32–36)
MCV RBC AUTO: 98.5 FL — SIGNIFICANT CHANGE UP (ref 80–100)
NRBC # BLD: 0 /100 WBCS — SIGNIFICANT CHANGE UP
NRBC # FLD: 0 K/UL — SIGNIFICANT CHANGE UP
PLATELET # BLD AUTO: 171 K/UL — SIGNIFICANT CHANGE UP (ref 150–400)
POTASSIUM SERPL-MCNC: 4.4 MMOL/L — SIGNIFICANT CHANGE UP (ref 3.5–5.3)
POTASSIUM SERPL-SCNC: 4.4 MMOL/L — SIGNIFICANT CHANGE UP (ref 3.5–5.3)
PROTHROM AB SERPL-ACNC: 14.1 SEC — HIGH (ref 10.5–13.4)
RBC # BLD: 3.39 M/UL — LOW (ref 4.2–5.8)
RBC # FLD: 12 % — SIGNIFICANT CHANGE UP (ref 10.3–14.5)
RH IG SCN BLD-IMP: POSITIVE — SIGNIFICANT CHANGE UP
SODIUM SERPL-SCNC: 137 MMOL/L — SIGNIFICANT CHANGE UP (ref 135–145)
WBC # BLD: 10.88 K/UL — HIGH (ref 3.8–10.5)
WBC # FLD AUTO: 10.88 K/UL — HIGH (ref 3.8–10.5)

## 2022-05-22 PROCEDURE — 99232 SBSQ HOSP IP/OBS MODERATE 35: CPT

## 2022-05-22 RX ORDER — GABAPENTIN 400 MG/1
300 CAPSULE ORAL EVERY 8 HOURS
Refills: 0 | Status: DISCONTINUED | OUTPATIENT
Start: 2022-05-22 | End: 2022-05-25

## 2022-05-22 RX ADMIN — HYDROMORPHONE HYDROCHLORIDE 5 MILLILITER(S): 2 INJECTION INTRAMUSCULAR; INTRAVENOUS; SUBCUTANEOUS at 19:11

## 2022-05-22 RX ADMIN — Medication 25 MICROGRAM(S): at 05:43

## 2022-05-22 RX ADMIN — HYDROMORPHONE HYDROCHLORIDE 250 MILLILITER(S): 2 INJECTION INTRAMUSCULAR; INTRAVENOUS; SUBCUTANEOUS at 04:17

## 2022-05-22 RX ADMIN — Medication 81 MILLIGRAM(S): at 12:50

## 2022-05-22 RX ADMIN — GABAPENTIN 300 MILLIGRAM(S): 400 CAPSULE ORAL at 21:44

## 2022-05-22 RX ADMIN — HEPARIN SODIUM 5000 UNIT(S): 5000 INJECTION INTRAVENOUS; SUBCUTANEOUS at 13:47

## 2022-05-22 RX ADMIN — SODIUM CHLORIDE 150 MILLILITER(S): 9 INJECTION, SOLUTION INTRAVENOUS at 17:24

## 2022-05-22 RX ADMIN — HEPARIN SODIUM 5000 UNIT(S): 5000 INJECTION INTRAVENOUS; SUBCUTANEOUS at 21:43

## 2022-05-22 RX ADMIN — FINASTERIDE 5 MILLIGRAM(S): 5 TABLET, FILM COATED ORAL at 12:50

## 2022-05-22 RX ADMIN — HEPARIN SODIUM 5000 UNIT(S): 5000 INJECTION INTRAVENOUS; SUBCUTANEOUS at 05:43

## 2022-05-22 RX ADMIN — CEFTRIAXONE 100 MILLIGRAM(S): 500 INJECTION, POWDER, FOR SOLUTION INTRAMUSCULAR; INTRAVENOUS at 13:46

## 2022-05-22 RX ADMIN — GABAPENTIN 300 MILLIGRAM(S): 400 CAPSULE ORAL at 13:47

## 2022-05-22 RX ADMIN — HYDROMORPHONE HYDROCHLORIDE 250 MILLILITER(S): 2 INJECTION INTRAMUSCULAR; INTRAVENOUS; SUBCUTANEOUS at 08:06

## 2022-05-22 NOTE — PROGRESS NOTE ADULT - SUBJECTIVE AND OBJECTIVE BOX
Anesthesia Pain Management Service: Day _2_ of Epidural    SUBJECTIVE: Patient doing well with PCEA and no problems.  Pain Scale Score:   Refer to charted pain scores    THERAPY:  [x ] Epidural Bupivacaine 0.0625% and Hydromorphone  		[x ] 10 micrograms/mL	[ ] 5 micrograms/mL  [ ] Epidural Bupivacaine 0.0625% and Fentanyl - 2 micrograms/mL  [ ] Epidural Ropivacaine 0.1% plain – 1 mg/mL  [ ] Patient Controlled Regional Anesthesia (PCRA) Ropivacaine  		[ ] 0.2%			[ ] 0.1%    Demand dose __3_ lockout __15_ (minutes) Continuous Rate _4__ Total: _121cc__ Daily      MEDICATIONS  (STANDING):  acetaminophen   IVPB .. 1000 milliGRAM(s) IV Intermittent once  aspirin  chewable 81 milliGRAM(s) Oral daily  cefTRIAXone   IVPB      cefTRIAXone   IVPB 1000 milliGRAM(s) IV Intermittent every 24 hours  finasteride 5 milliGRAM(s) Oral daily  heparin   Injectable 5000 Unit(s) SubCutaneous every 8 hours  hydromorphone (10 MICROgram(s)/mL) + bupivacaine 0.0625% in 0.9% Sodium Chloride PCEA 250 milliLiter(s) Epidural PCA Continuous  lactated ringers. 1000 milliLiter(s) (150 mL/Hr) IV Continuous <Continuous>  levothyroxine 25 MICROGram(s) Oral daily  lidocaine   4% Patch 1 Patch Transdermal every 24 hours    MEDICATIONS  (PRN):  HYDROmorphone  Injectable 0.5 milliGRAM(s) IV Push every 3 hours PRN Severe breakthrough  Pain (7 - 10)  hydromorphone (10 MICROgram(s)/mL) + bupivacaine 0.0625% in 0.9% Sodium Chloride PCEA Rescue Clinician  Bolus 5 milliLiter(s) Epidural every 15 minutes PRN for Pain Score greater than 6  nalbuphine Injectable 2.5 milliGRAM(s) IV Push every 6 hours PRN Pruritus  naloxone Injectable 0.1 milliGRAM(s) IV Push every 3 minutes PRN For ANY of the following changes in patient status:  A. RR LESS THAN 10 breaths per minute, B. Oxygen saturation LESS THAN 90%, C. Sedation score of 6  ondansetron Injectable 4 milliGRAM(s) IV Push every 6 hours PRN Nausea  senna 2 Tablet(s) Oral at bedtime PRN Constipation      OBJECTIVE:    Assessment of Catheter Site:	[ ] Left	[ ] Right  [x ] Epidural 	[ ] Femoral	      [ ] Saphenous   [ ] Supraclavicular   [ ] Other:    [x ] Dressing intact	[x ] Site non-tender	[ x] Site without erythema, discharge, edema  [x ] Epidural tubing and connection checked	[x] Gross neurological exam within normal limits  [ ] Catheter removed – tip intact		[x ] Afebrile	[ ] Febrile: ___    PT/INR - ( 22 May 2022 05:45 )   PT: 14.1 sec;   INR: 1.21 ratio         PTT - ( 22 May 2022 05:45 )  PTT:26.7 sec                      11.0   10.88 )-----------( 171      ( 22 May 2022 05:45 )             33.4     Vital Signs Last 24 Hrs  T(C): 36.9 (05-22-22 @ 05:38), Max: 37.2 (05-21-22 @ 13:27)  T(F): 98.5 (05-22-22 @ 05:38), Max: 99 (05-21-22 @ 22:03)  HR: 82 (05-22-22 @ 05:38) (73 - 86)  BP: 140/55 (05-22-22 @ 05:38) (105/61 - 140/55)  BP(mean): --  RR: 18 (05-22-22 @ 05:38) (18 - 19)  SpO2: 96% (05-22-22 @ 05:38) (93% - 98%)      Sedation Score:	[x ] Alert	[ ] Drowsy	[ ] Arousable	[ ] Asleep	[ ] Unresponsive    Side Effects:	[x ] None	[ ] Nausea	[ ] Vomiting	[ ] Pruritus  		[ ] Weakness		[ ] Numbness	[ ] Other:    ASSESSMENT/ PLAN:    Therapy to  be:	[x ] Continue   [ ] Discontinued   [ ] Change to prn Analgesics    Documentation and Verification of current medications:  [ X ] Done	[ ] Not done, not eligible, reason:    Comments: Doing OK with epidural and may continue. If coags remain elevated consider vitK

## 2022-05-22 NOTE — PROGRESS NOTE ADULT - SUBJECTIVE AND OBJECTIVE BOX
Overnight events:  2L nc for mild desat.     Still with abdominal/incisional pain this AM. No flatus yet.     Objective:  Vital Signs Last 24 Hrs  T(C): 36.9 (22 May 2022 05:38), Max: 37.2 (21 May 2022 13:27)  T(F): 98.5 (22 May 2022 05:38), Max: 99 (21 May 2022 22:03)  HR: 82 (22 May 2022 05:38) (73 - 86)  BP: 140/55 (22 May 2022 05:38) (105/61 - 140/55)  BP(mean): --  RR: 18 (22 May 2022 05:38) (18 - 19)  SpO2: 96% (22 May 2022 05:38) (93% - 98%)    05-21-22 @ 07:01  -  05-22-22 @ 07:00  --------------------------------------------------------  IN: 0 mL / OUT: 1600 mL / NET: -1600 mL    05-22-22 @ 07:01  -  05-22-22 @ 09:40  --------------------------------------------------------  IN: 0 mL / OUT: 350 mL / NET: -350 mL        Gen: NAD  Abd S / mildly distended / appropriately tender  [facility]    Labs      CBC (05-22 @ 05:45)                              11.0<L>                         10.88<H>  )----------------(  171        --    % Neutrophils, --    % Lymphocytes, ANC: --                                  33.4<L>              CBC (05-21 @ 05:38)                              12.0<L>                         11.88<H>  )----------------(  175        --    % Neutrophils, --    % Lymphocytes, ANC: --                                  35.3<L>                BMP (05-22 @ 05:45)             137     |  103     |  24<H> 		Ca++ --      Ca 8.3<L>             ---------------------------------( 102<H>		Mg --                 4.4     |  24      |  1.73<H>			Ph --      BMP (05-21 @ 05:38)             136     |  100     |  26<H> 		Ca++ --      Ca 8.3<L>             ---------------------------------( 106<H>		Mg --                 4.2     |  24      |  1.74<H>			Ph --          Coags (05-22 @ 05:45)  aPTT 26.7<L> / INR 1.21<H> / PT 14.1<H>  Coags (05-21 @ 05:38)  aPTT 27.2 / INR 1.14 / PT 13.2

## 2022-05-22 NOTE — PROGRESS NOTE ADULT - SUBJECTIVE AND OBJECTIVE BOX
Patient is a 71y old  Male who presents with a chief complaint of Nephrectomy (21 May 2022 13:00)    SUBJECTIVE / OVERNIGHT EVENTS: No acute events. Still with abdominal discomfort at incisional site. No nausea, vomiting, fever, chills, chest pain, shortness of breath, rash.     MEDICATIONS  (STANDING):  acetaminophen   IVPB .. 1000 milliGRAM(s) IV Intermittent once  aspirin  chewable 81 milliGRAM(s) Oral daily  cefTRIAXone   IVPB      cefTRIAXone   IVPB 1000 milliGRAM(s) IV Intermittent every 24 hours  finasteride 5 milliGRAM(s) Oral daily  gabapentin 300 milliGRAM(s) Oral every 8 hours  heparin   Injectable 5000 Unit(s) SubCutaneous every 8 hours  hydromorphone (10 MICROgram(s)/mL) + bupivacaine 0.0625% in 0.9% Sodium Chloride PCEA 250 milliLiter(s) Epidural PCA Continuous  lactated ringers. 1000 milliLiter(s) (150 mL/Hr) IV Continuous <Continuous>  levothyroxine 25 MICROGram(s) Oral daily  lidocaine   4% Patch 1 Patch Transdermal every 24 hours    MEDICATIONS  (PRN):  HYDROmorphone  Injectable 0.5 milliGRAM(s) IV Push every 3 hours PRN Severe breakthrough  Pain (7 - 10)  hydromorphone (10 MICROgram(s)/mL) + bupivacaine 0.0625% in 0.9% Sodium Chloride PCEA Rescue Clinician  Bolus 5 milliLiter(s) Epidural every 15 minutes PRN for Pain Score greater than 6  nalbuphine Injectable 2.5 milliGRAM(s) IV Push every 6 hours PRN Pruritus  naloxone Injectable 0.1 milliGRAM(s) IV Push every 3 minutes PRN For ANY of the following changes in patient status:  A. RR LESS THAN 10 breaths per minute, B. Oxygen saturation LESS THAN 90%, C. Sedation score of 6  ondansetron Injectable 4 milliGRAM(s) IV Push every 6 hours PRN Nausea  senna 2 Tablet(s) Oral at bedtime PRN Constipation    CAPILLARY BLOOD GLUCOSE    I&O's Summary    21 May 2022 07:01  -  22 May 2022 07:00  --------------------------------------------------------  IN: 0 mL / OUT: 1600 mL / NET: -1600 mL    22 May 2022 07:01  -  22 May 2022 12:26  --------------------------------------------------------  IN: 0 mL / OUT: 350 mL / NET: -350 mL    PHYSICAL EXAM:  Vital Signs Last 24 Hrs  T(C): 36.6 (22 May 2022 10:57), Max: 37.2 (21 May 2022 13:27)  T(F): 97.9 (22 May 2022 10:57), Max: 99 (21 May 2022 22:03)  HR: 92 (22 May 2022 10:57) (80 - 92)  BP: 120/69 (22 May 2022 10:57) (118/64 - 140/55)  BP(mean): --  RR: 18 (22 May 2022 10:57) (18 - 18)  SpO2: 95% (22 May 2022 10:57) (95% - 98%)    GENERAL: NAD, well-groomed, well-developed  HEAD:  Atraumatic, Normocephalic  EYES: conjunctiva and sclera clear  ENMT: Moist mucous membranes  RESPIRATORY: Clear to auscultation bilaterally; No rales, rhonchi, wheezing, or rubs  CARDIOVASCULAR: Regular rate and rhythm; normal s1, s2  GASTROINTESTINAL: Soft, Nontender, nondistended, right sided surgical incision, clean no drainage  GENITOURINARY: michel in place  EXTREMITIES:  2+ Peripheral Pulses, No clubbing, cyanosis, or edema  NERVOUS SYSTEM: Moving all 4 extremities  SKIN: No rashes    LABS:                        11.0   10.88 )-----------( 171      ( 22 May 2022 05:45 )             33.4     05-22    137  |  103  |  24<H>  ----------------------------<  102<H>  4.4   |  24  |  1.73<H>    Ca    8.3<L>      22 May 2022 05:45      PT/INR - ( 22 May 2022 05:45 )   PT: 14.1 sec;   INR: 1.21 ratio       PTT - ( 22 May 2022 05:45 )  PTT:26.7 sec      COORDINATION OF CARE:  Care Discussed with Consultants/Other Providers [Y- urology team]

## 2022-05-22 NOTE — PROGRESS NOTE ADULT - PROBLEM SELECTOR PLAN 2
Cr 1.17 -> 1.48 ->1.74 -> 1.73  - Would continue to hold ace inhibitor  - remains on IVF  - avoid nephrotoxins and NSAIDs if able  - continue to trend closely

## 2022-05-23 LAB
ANION GAP SERPL CALC-SCNC: 11 MMOL/L — SIGNIFICANT CHANGE UP (ref 7–14)
APTT BLD: 28.6 SEC — SIGNIFICANT CHANGE UP (ref 27–36.3)
BUN SERPL-MCNC: 20 MG/DL — SIGNIFICANT CHANGE UP (ref 7–23)
CALCIUM SERPL-MCNC: 8.3 MG/DL — LOW (ref 8.4–10.5)
CHLORIDE SERPL-SCNC: 100 MMOL/L — SIGNIFICANT CHANGE UP (ref 98–107)
CO2 SERPL-SCNC: 23 MMOL/L — SIGNIFICANT CHANGE UP (ref 22–31)
CREAT SERPL-MCNC: 1.58 MG/DL — HIGH (ref 0.5–1.3)
EGFR: 46 ML/MIN/1.73M2 — LOW
GLUCOSE SERPL-MCNC: 85 MG/DL — SIGNIFICANT CHANGE UP (ref 70–99)
HCT VFR BLD CALC: 33.2 % — LOW (ref 39–50)
HGB BLD-MCNC: 10.9 G/DL — LOW (ref 13–17)
INR BLD: 1.12 RATIO — SIGNIFICANT CHANGE UP (ref 0.88–1.16)
MCHC RBC-ENTMCNC: 32 PG — SIGNIFICANT CHANGE UP (ref 27–34)
MCHC RBC-ENTMCNC: 32.8 GM/DL — SIGNIFICANT CHANGE UP (ref 32–36)
MCV RBC AUTO: 97.4 FL — SIGNIFICANT CHANGE UP (ref 80–100)
NRBC # BLD: 0 /100 WBCS — SIGNIFICANT CHANGE UP
NRBC # FLD: 0 K/UL — SIGNIFICANT CHANGE UP
PLATELET # BLD AUTO: 158 K/UL — SIGNIFICANT CHANGE UP (ref 150–400)
POTASSIUM SERPL-MCNC: 3.9 MMOL/L — SIGNIFICANT CHANGE UP (ref 3.5–5.3)
POTASSIUM SERPL-SCNC: 3.9 MMOL/L — SIGNIFICANT CHANGE UP (ref 3.5–5.3)
PROTHROM AB SERPL-ACNC: 13 SEC — SIGNIFICANT CHANGE UP (ref 10.5–13.4)
RBC # BLD: 3.41 M/UL — LOW (ref 4.2–5.8)
RBC # FLD: 11.8 % — SIGNIFICANT CHANGE UP (ref 10.3–14.5)
SODIUM SERPL-SCNC: 134 MMOL/L — LOW (ref 135–145)
WBC # BLD: 9.23 K/UL — SIGNIFICANT CHANGE UP (ref 3.8–10.5)
WBC # FLD AUTO: 9.23 K/UL — SIGNIFICANT CHANGE UP (ref 3.8–10.5)

## 2022-05-23 PROCEDURE — 99233 SBSQ HOSP IP/OBS HIGH 50: CPT

## 2022-05-23 RX ORDER — CEFTRIAXONE 500 MG/1
1000 INJECTION, POWDER, FOR SOLUTION INTRAMUSCULAR; INTRAVENOUS EVERY 24 HOURS
Refills: 0 | Status: DISCONTINUED | OUTPATIENT
Start: 2022-05-24 | End: 2022-05-25

## 2022-05-23 RX ORDER — SODIUM CHLORIDE 9 MG/ML
1000 INJECTION, SOLUTION INTRAVENOUS
Refills: 0 | Status: DISCONTINUED | OUTPATIENT
Start: 2022-05-23 | End: 2022-05-24

## 2022-05-23 RX ADMIN — LIDOCAINE 1 PATCH: 4 CREAM TOPICAL at 12:40

## 2022-05-23 RX ADMIN — HYDROMORPHONE HYDROCHLORIDE 250 MILLILITER(S): 2 INJECTION INTRAMUSCULAR; INTRAVENOUS; SUBCUTANEOUS at 08:11

## 2022-05-23 RX ADMIN — GABAPENTIN 300 MILLIGRAM(S): 400 CAPSULE ORAL at 05:40

## 2022-05-23 RX ADMIN — Medication 81 MILLIGRAM(S): at 12:40

## 2022-05-23 RX ADMIN — HEPARIN SODIUM 5000 UNIT(S): 5000 INJECTION INTRAVENOUS; SUBCUTANEOUS at 13:56

## 2022-05-23 RX ADMIN — LIDOCAINE 1 PATCH: 4 CREAM TOPICAL at 19:00

## 2022-05-23 RX ADMIN — Medication 25 MICROGRAM(S): at 05:40

## 2022-05-23 RX ADMIN — SODIUM CHLORIDE 150 MILLILITER(S): 9 INJECTION, SOLUTION INTRAVENOUS at 12:39

## 2022-05-23 RX ADMIN — SODIUM CHLORIDE 75 MILLILITER(S): 9 INJECTION, SOLUTION INTRAVENOUS at 16:13

## 2022-05-23 RX ADMIN — CEFTRIAXONE 100 MILLIGRAM(S): 500 INJECTION, POWDER, FOR SOLUTION INTRAMUSCULAR; INTRAVENOUS at 14:01

## 2022-05-23 RX ADMIN — GABAPENTIN 300 MILLIGRAM(S): 400 CAPSULE ORAL at 13:55

## 2022-05-23 RX ADMIN — HEPARIN SODIUM 5000 UNIT(S): 5000 INJECTION INTRAVENOUS; SUBCUTANEOUS at 23:10

## 2022-05-23 RX ADMIN — HEPARIN SODIUM 5000 UNIT(S): 5000 INJECTION INTRAVENOUS; SUBCUTANEOUS at 05:40

## 2022-05-23 RX ADMIN — GABAPENTIN 300 MILLIGRAM(S): 400 CAPSULE ORAL at 23:10

## 2022-05-23 RX ADMIN — HYDROMORPHONE HYDROCHLORIDE 250 MILLILITER(S): 2 INJECTION INTRAMUSCULAR; INTRAVENOUS; SUBCUTANEOUS at 20:07

## 2022-05-23 RX ADMIN — HYDROMORPHONE HYDROCHLORIDE 250 MILLILITER(S): 2 INJECTION INTRAMUSCULAR; INTRAVENOUS; SUBCUTANEOUS at 17:07

## 2022-05-23 RX ADMIN — FINASTERIDE 5 MILLIGRAM(S): 5 TABLET, FILM COATED ORAL at 12:40

## 2022-05-23 NOTE — PROGRESS NOTE ADULT - PROBLEM SELECTOR PLAN 2
Cr 1.17 -> 1.48 ->1.74 -> 1.73-->1.58  - Creatinine improving   -Continue to hold ace inhibitor  - remains on IVF- LR at 150/hr  - avoid nephrotoxins and NSAIDs if able  - continue to trend closely

## 2022-05-23 NOTE — PROGRESS NOTE ADULT - SUBJECTIVE AND OBJECTIVE BOX
Anesthesia Pain Management Service- Attending Addendum    SUBJECTIVE: Pt doing well with PCEA without problems reported.    Therapy:	  [ ] IV PCA	   [ X] Epidural           [ ] s/p Spinal Opoid              [ ] Postpartum infusion	  [ ] Patient controlled regional anesthesia (PCRA)    [ ] prn Analgesics    Allergies    No Known Allergies    Intolerances      MEDICATIONS  (STANDING):  acetaminophen   IVPB .. 1000 milliGRAM(s) IV Intermittent once  aspirin  chewable 81 milliGRAM(s) Oral daily  cefTRIAXone   IVPB      cefTRIAXone   IVPB 1000 milliGRAM(s) IV Intermittent every 24 hours  finasteride 5 milliGRAM(s) Oral daily  gabapentin 300 milliGRAM(s) Oral every 8 hours  heparin   Injectable 5000 Unit(s) SubCutaneous every 8 hours  hydromorphone (10 MICROgram(s)/mL) + bupivacaine 0.0625% in 0.9% Sodium Chloride PCEA 250 milliLiter(s) Epidural PCA Continuous  lactated ringers. 1000 milliLiter(s) (150 mL/Hr) IV Continuous <Continuous>  levothyroxine 25 MICROGram(s) Oral daily  lidocaine   4% Patch 1 Patch Transdermal every 24 hours    MEDICATIONS  (PRN):  HYDROmorphone  Injectable 0.5 milliGRAM(s) IV Push every 3 hours PRN Severe breakthrough  Pain (7 - 10)  hydromorphone (10 MICROgram(s)/mL) + bupivacaine 0.0625% in 0.9% Sodium Chloride PCEA Rescue Clinician  Bolus 5 milliLiter(s) Epidural every 15 minutes PRN for Pain Score greater than 6  nalbuphine Injectable 2.5 milliGRAM(s) IV Push every 6 hours PRN Pruritus  naloxone Injectable 0.1 milliGRAM(s) IV Push every 3 minutes PRN For ANY of the following changes in patient status:  A. RR LESS THAN 10 breaths per minute, B. Oxygen saturation LESS THAN 90%, C. Sedation score of 6  ondansetron Injectable 4 milliGRAM(s) IV Push every 6 hours PRN Nausea  senna 2 Tablet(s) Oral at bedtime PRN Constipation      OBJECTIVE:   [X] No new signs     [ ] Other:    Side Effects:  [X ] None			[ ] Other:    Assessment of Catheter Site:		[ X] Intact		[ ] Other:    ASSESSMENT/PLAN  [ X] Continue current therapy    [ ] Therapy changed to:    [ ] IV PCA       [ ] Epidural     [ ] prn Analgesics     Comments: Continue current PCEA settings.    Note written after patient seen

## 2022-05-23 NOTE — PROGRESS NOTE ADULT - SUBJECTIVE AND OBJECTIVE BOX
Anesthesia Pain Management Service: Post Op Day _3_ of Epidural    SUBJECTIVE: Patient doing well with PCEA and no problems. Patient denies headache, numbness and tingling.  Pain Scale Score: 6/10   Refer to charted pain scores    THERAPY:  [x ] Epidural Bupivacaine 0.0625% and Hydromorphone  		[ X] 10 micrograms/mL	[ ] 5 micrograms/mL  [ ] Epidural Bupivacaine 0.0625% and Fentanyl - 2 micrograms/mL  [ ] Epidural Ropivacaine 0.1% plain – 1 mg/mL  [ ] Patient Controlled Regional Anesthesia (PCRA) Ropivacaine  		[ ] 0.2%			[ ] 0.1%    Demand dose __3_ lockout __15_ (minutes) Continuous Rate _6__ Total: _86.2___ ml used (in past 24 hours)      MEDICATIONS  (STANDING):  acetaminophen   IVPB .. 1000 milliGRAM(s) IV Intermittent once  aspirin  chewable 81 milliGRAM(s) Oral daily  cefTRIAXone   IVPB      cefTRIAXone   IVPB 1000 milliGRAM(s) IV Intermittent every 24 hours  finasteride 5 milliGRAM(s) Oral daily  gabapentin 300 milliGRAM(s) Oral every 8 hours  heparin   Injectable 5000 Unit(s) SubCutaneous every 8 hours  hydromorphone (10 MICROgram(s)/mL) + bupivacaine 0.0625% in 0.9% Sodium Chloride PCEA 250 milliLiter(s) Epidural PCA Continuous  lactated ringers. 1000 milliLiter(s) (150 mL/Hr) IV Continuous <Continuous>  levothyroxine 25 MICROGram(s) Oral daily  lidocaine   4% Patch 1 Patch Transdermal every 24 hours    MEDICATIONS  (PRN):  HYDROmorphone  Injectable 0.5 milliGRAM(s) IV Push every 3 hours PRN Severe breakthrough  Pain (7 - 10)  hydromorphone (10 MICROgram(s)/mL) + bupivacaine 0.0625% in 0.9% Sodium Chloride PCEA Rescue Clinician  Bolus 5 milliLiter(s) Epidural every 15 minutes PRN for Pain Score greater than 6  nalbuphine Injectable 2.5 milliGRAM(s) IV Push every 6 hours PRN Pruritus  naloxone Injectable 0.1 milliGRAM(s) IV Push every 3 minutes PRN For ANY of the following changes in patient status:  A. RR LESS THAN 10 breaths per minute, B. Oxygen saturation LESS THAN 90%, C. Sedation score of 6  ondansetron Injectable 4 milliGRAM(s) IV Push every 6 hours PRN Nausea  senna 2 Tablet(s) Oral at bedtime PRN Constipation      OBJECTIVE: Patient laying in bed.    Assessment of Catheter Site:	[ ] Left	[ ] Right  [x ] Epidural 	[ ] Femoral	      [ ] Saphenous   [ ] Supraclavicular   [ ] Other:    [x ] Dressing intact	[x ] Site non-tender	[ x] Site without erythema, discharge, edema  [x ] Epidural tubing and connection checked	[x] Gross neurological exam within normal limits  [ ] Catheter removed – tip intact		[ ] Afebrile  	[ ] Febrile: ___   [ X] see Temp under VS below)    PT/INR - ( 23 May 2022 05:34 )   PT: 13.0 sec;   INR: 1.12 ratio         PTT - ( 23 May 2022 05:34 )  PTT:28.6 sec                      10.9   9.23  )-----------( 158      ( 23 May 2022 05:34 )             33.2     Vital Signs Last 24 Hrs  T(C): 37.2 (05-23-22 @ 09:46), Max: 37.9 (05-22-22 @ 18:26)  T(F): 99 (05-23-22 @ 09:46), Max: 100.2 (05-22-22 @ 18:26)  HR: 81 (05-23-22 @ 09:46) (79 - 95)  BP: 132/70 (05-23-22 @ 09:46) (114/71 - 157/79)  BP(mean): --  RR: 18 (05-23-22 @ 09:46) (18 - 20)  SpO2: 95% (05-23-22 @ 09:46) (87% - 100%)      Sedation Score:	[x ] Alert	[ ] Drowsy	[ ] Arousable	[ ] Asleep	[ ] Unresponsive    Side Effects:	[x ] None	[ ] Nausea	[ ] Vomiting	[ ] Pruritus  		[ ] Weakness		[ ] Numbness	[ ] Other:    ASSESSMENT/ PLAN:    Therapy to  be:	[x ] Continue   [ ] Discontinued   [ ] Change to prn Analgesics    Documentation and Verification of current medications:  [ X ] Done	[ ] Not done, not eligible, reason:    Comments: Doing OK with epidural and may continue.    Progress Note written now but Patient was seen earlier.

## 2022-05-23 NOTE — PROGRESS NOTE ADULT - SUBJECTIVE AND OBJECTIVE BOX
POD #3  Afeb 142/75 79 96%RA    Pt has no c/o  Abd- soft NT ND; + flatus      wound C&D  Gil 1L  Has PCEA

## 2022-05-23 NOTE — PROGRESS NOTE ADULT - PROBLEM SELECTOR PLAN 1
S/p nephrectomy 5/20/22  - still reporting abdominal pain, on PCEA, follow up pain service recommendations  - continued management per Urology  - Reviewed vitals- O2 sats of 87% on 5/22- Placed on 2 L Oxygen with saturation in % S/p nephrectomy 5/20/22  - still reporting abdominal pain, on PCEA, follow up pain service recommendations  - On clear liquid diet   - continued management per Urology  - Reviewed vitals- O2 sats of 87% on 5/22- Placed on 2 L Oxygen with saturation in %  - Appears comfortable. RS- clear, no wheezing. Attempt to wean as tolerated

## 2022-05-23 NOTE — PROGRESS NOTE ADULT - SUBJECTIVE AND OBJECTIVE BOX
Iveth Weinerira  Hospitalist  Pager- [phone]    Patient is a 71y old  Male who presents with a chief complaint of Nephrectomy (21 May 2022 13:00)    SUBJECTIVE / OVERNIGHT EVENTS: No acute events. Ambulating int he hallway  C/o Soreness at incisional site on walking, coughing or passing gas  No nausea, vomiting, fever, chills, chest pain, shortness of breath, rash.       MEDICATIONS  (STANDING):  acetaminophen   IVPB .. 1000 milliGRAM(s) IV Intermittent once  aspirin  chewable 81 milliGRAM(s) Oral daily  cefTRIAXone   IVPB      cefTRIAXone   IVPB 1000 milliGRAM(s) IV Intermittent every 24 hours  finasteride 5 milliGRAM(s) Oral daily  gabapentin 300 milliGRAM(s) Oral every 8 hours  heparin   Injectable 5000 Unit(s) SubCutaneous every 8 hours  hydromorphone (10 MICROgram(s)/mL) + bupivacaine 0.0625% in 0.9% Sodium Chloride PCEA 250 milliLiter(s) Epidural PCA Continuous  lactated ringers. 1000 milliLiter(s) (150 mL/Hr) IV Continuous <Continuous>  levothyroxine 25 MICROGram(s) Oral daily  lidocaine   4% Patch 1 Patch Transdermal every 24 hours    MEDICATIONS  (PRN):  HYDROmorphone  Injectable 0.5 milliGRAM(s) IV Push every 3 hours PRN Severe breakthrough  Pain (7 - 10)  hydromorphone (10 MICROgram(s)/mL) + bupivacaine 0.0625% in 0.9% Sodium Chloride PCEA Rescue Clinician  Bolus 5 milliLiter(s) Epidural every 15 minutes PRN for Pain Score greater than 6  nalbuphine Injectable 2.5 milliGRAM(s) IV Push every 6 hours PRN Pruritus  naloxone Injectable 0.1 milliGRAM(s) IV Push every 3 minutes PRN For ANY of the following changes in patient status:  A. RR LESS THAN 10 breaths per minute, B. Oxygen saturation LESS THAN 90%, C. Sedation score of 6  ondansetron Injectable 4 milliGRAM(s) IV Push every 6 hours PRN Nausea  senna 2 Tablet(s) Oral at bedtime PRN Constipation      CAPILLARY BLOOD GLUCOSE        PHYSICAL EXAM:    Vital Signs Last 24 Hrs  T(C): 37.2 (23 May 2022 09:46), Max: 37.9 (22 May 2022 18:26)  T(F): 99 (23 May 2022 09:46), Max: 100.2 (22 May 2022 18:26)  HR: 81 (23 May 2022 09:46) (79 - 95)  BP: 132/70 (23 May 2022 09:46) (114/71 - 157/79)  BP(mean): --  RR: 18 (23 May 2022 09:46) (18 - 20)  SpO2: 95% (23 May 2022 09:46) (87% - 100%)    GENERAL: NAD, well-groomed, well-developed  HEAD:  Atraumatic, Normocephalic  EYES: conjunctiva and sclera clear  ENMT: Moist mucous membranes  RESPIRATORY: Clear to auscultation bilaterally; No rales, rhonchi, wheezing, or rubs  CARDIOVASCULAR: Regular rate and rhythm; normal s1, s2  GASTROINTESTINAL: Soft, Nontender, nondistended, right sided surgical incision, clean no drainage  GENITOURINARY: michel in place  EXTREMITIES:  2+ Peripheral Pulses, No clubbing, cyanosis, or edema  NERVOUS SYSTEM: Moving all 4 extremities  SKIN: No rashes    LABS:                                          10.9   9.23  )-----------( 158      ( 23 May 2022 05:34 )             33.2     05-23    134<L>  |  100  |  20  ----------------------------<  85  3.9   |  23  |  1.58<H>    Ca    8.3<L>      23 May 2022 05:34        COORDINATION OF CARE:  Care Discussed with Consultants/Other Providers [Y- urology team]

## 2022-05-24 LAB
ANION GAP SERPL CALC-SCNC: 10 MMOL/L — SIGNIFICANT CHANGE UP (ref 7–14)
APTT BLD: 27.1 SEC — SIGNIFICANT CHANGE UP (ref 27–36.3)
BUN SERPL-MCNC: 16 MG/DL — SIGNIFICANT CHANGE UP (ref 7–23)
CALCIUM SERPL-MCNC: 8.3 MG/DL — LOW (ref 8.4–10.5)
CHLORIDE SERPL-SCNC: 102 MMOL/L — SIGNIFICANT CHANGE UP (ref 98–107)
CO2 SERPL-SCNC: 24 MMOL/L — SIGNIFICANT CHANGE UP (ref 22–31)
CREAT SERPL-MCNC: 1.51 MG/DL — HIGH (ref 0.5–1.3)
EGFR: 48 ML/MIN/1.73M2 — LOW
GLUCOSE SERPL-MCNC: 118 MG/DL — HIGH (ref 70–99)
HCT VFR BLD CALC: 31.7 % — LOW (ref 39–50)
HGB BLD-MCNC: 10.6 G/DL — LOW (ref 13–17)
INR BLD: 1.19 RATIO — HIGH (ref 0.88–1.16)
MAGNESIUM SERPL-MCNC: 1.7 MG/DL — SIGNIFICANT CHANGE UP (ref 1.6–2.6)
MCHC RBC-ENTMCNC: 32.4 PG — SIGNIFICANT CHANGE UP (ref 27–34)
MCHC RBC-ENTMCNC: 33.4 GM/DL — SIGNIFICANT CHANGE UP (ref 32–36)
MCV RBC AUTO: 96.9 FL — SIGNIFICANT CHANGE UP (ref 80–100)
NRBC # BLD: 0 /100 WBCS — SIGNIFICANT CHANGE UP
NRBC # FLD: 0 K/UL — SIGNIFICANT CHANGE UP
PHOSPHATE SERPL-MCNC: 2.3 MG/DL — LOW (ref 2.5–4.5)
PLATELET # BLD AUTO: 153 K/UL — SIGNIFICANT CHANGE UP (ref 150–400)
POTASSIUM SERPL-MCNC: 3.7 MMOL/L — SIGNIFICANT CHANGE UP (ref 3.5–5.3)
POTASSIUM SERPL-SCNC: 3.7 MMOL/L — SIGNIFICANT CHANGE UP (ref 3.5–5.3)
PROTHROM AB SERPL-ACNC: 13.8 SEC — HIGH (ref 10.5–13.4)
RBC # BLD: 3.27 M/UL — LOW (ref 4.2–5.8)
RBC # FLD: 11.5 % — SIGNIFICANT CHANGE UP (ref 10.3–14.5)
SODIUM SERPL-SCNC: 136 MMOL/L — SIGNIFICANT CHANGE UP (ref 135–145)
WBC # BLD: 7.19 K/UL — SIGNIFICANT CHANGE UP (ref 3.8–10.5)
WBC # FLD AUTO: 7.19 K/UL — SIGNIFICANT CHANGE UP (ref 3.8–10.5)

## 2022-05-24 PROCEDURE — 99233 SBSQ HOSP IP/OBS HIGH 50: CPT

## 2022-05-24 RX ORDER — OXYCODONE HYDROCHLORIDE 5 MG/1
5 TABLET ORAL
Refills: 0 | Status: DISCONTINUED | OUTPATIENT
Start: 2022-05-24 | End: 2022-05-25

## 2022-05-24 RX ORDER — ACETAMINOPHEN 500 MG
650 TABLET ORAL EVERY 6 HOURS
Refills: 0 | Status: DISCONTINUED | OUTPATIENT
Start: 2022-05-24 | End: 2022-05-25

## 2022-05-24 RX ORDER — OXYCODONE HYDROCHLORIDE 5 MG/1
10 TABLET ORAL
Refills: 0 | Status: DISCONTINUED | OUTPATIENT
Start: 2022-05-24 | End: 2022-05-25

## 2022-05-24 RX ADMIN — GABAPENTIN 300 MILLIGRAM(S): 400 CAPSULE ORAL at 21:53

## 2022-05-24 RX ADMIN — HYDROMORPHONE HYDROCHLORIDE 250 MILLILITER(S): 2 INJECTION INTRAMUSCULAR; INTRAVENOUS; SUBCUTANEOUS at 08:15

## 2022-05-24 RX ADMIN — Medication 25 MICROGRAM(S): at 06:00

## 2022-05-24 RX ADMIN — OXYCODONE HYDROCHLORIDE 10 MILLIGRAM(S): 5 TABLET ORAL at 11:57

## 2022-05-24 RX ADMIN — OXYCODONE HYDROCHLORIDE 10 MILLIGRAM(S): 5 TABLET ORAL at 14:47

## 2022-05-24 RX ADMIN — Medication 650 MILLIGRAM(S): at 19:16

## 2022-05-24 RX ADMIN — HEPARIN SODIUM 5000 UNIT(S): 5000 INJECTION INTRAVENOUS; SUBCUTANEOUS at 21:54

## 2022-05-24 RX ADMIN — OXYCODONE HYDROCHLORIDE 10 MILLIGRAM(S): 5 TABLET ORAL at 10:57

## 2022-05-24 RX ADMIN — Medication 81 MILLIGRAM(S): at 13:36

## 2022-05-24 RX ADMIN — CEFTRIAXONE 100 MILLIGRAM(S): 500 INJECTION, POWDER, FOR SOLUTION INTRAMUSCULAR; INTRAVENOUS at 13:35

## 2022-05-24 RX ADMIN — OXYCODONE HYDROCHLORIDE 10 MILLIGRAM(S): 5 TABLET ORAL at 21:59

## 2022-05-24 RX ADMIN — Medication 650 MILLIGRAM(S): at 23:48

## 2022-05-24 RX ADMIN — LIDOCAINE 1 PATCH: 4 CREAM TOPICAL at 05:58

## 2022-05-24 RX ADMIN — GABAPENTIN 300 MILLIGRAM(S): 400 CAPSULE ORAL at 06:00

## 2022-05-24 RX ADMIN — FINASTERIDE 5 MILLIGRAM(S): 5 TABLET, FILM COATED ORAL at 18:45

## 2022-05-24 RX ADMIN — OXYCODONE HYDROCHLORIDE 10 MILLIGRAM(S): 5 TABLET ORAL at 23:00

## 2022-05-24 RX ADMIN — OXYCODONE HYDROCHLORIDE 10 MILLIGRAM(S): 5 TABLET ORAL at 18:44

## 2022-05-24 RX ADMIN — GABAPENTIN 300 MILLIGRAM(S): 400 CAPSULE ORAL at 13:36

## 2022-05-24 RX ADMIN — HEPARIN SODIUM 5000 UNIT(S): 5000 INJECTION INTRAVENOUS; SUBCUTANEOUS at 13:36

## 2022-05-24 RX ADMIN — HEPARIN SODIUM 5000 UNIT(S): 5000 INJECTION INTRAVENOUS; SUBCUTANEOUS at 06:00

## 2022-05-24 RX ADMIN — Medication 650 MILLIGRAM(S): at 13:36

## 2022-05-24 RX ADMIN — OXYCODONE HYDROCHLORIDE 10 MILLIGRAM(S): 5 TABLET ORAL at 15:47

## 2022-05-24 NOTE — PROGRESS NOTE ADULT - SUBJECTIVE AND OBJECTIVE BOX
Anesthesia Pain Management Service: Post Op Day 4__ of Epidural    SUBJECTIVE: Patient doing well with PCEA and no problems. Denies headache, numbness and tingling.  Pain Scale Score: 4/10   Refer to charted pain scores     THERAPY:  [x ] Epidural Bupivacaine 0.0625% and Hydromorphone  		[X ] 10 micrograms/mL	[ ] 5 micrograms/mL  [ ] Epidural Bupivacaine 0.0625% and Fentanyl - 2 micrograms/mL  [ ] Epidural Ropivacaine 0.1% plain – 1 mg/mL  [ ] Patient Controlled Regional Anesthesia (PCRA) Ropivacaine  		[ ] 0.2%			[ ] 0.1%    Demand dose __3_ lockout __15_ (minutes) Continuous Rate _6__ Total: _187.5__ Daily      MEDICATIONS  (STANDING):  acetaminophen     Tablet .. 650 milliGRAM(s) Oral every 6 hours  aspirin  chewable 81 milliGRAM(s) Oral daily  cefTRIAXone   IVPB 1000 milliGRAM(s) IV Intermittent every 24 hours  dextrose 5% + sodium chloride 0.45%. 1000 milliLiter(s) (75 mL/Hr) IV Continuous <Continuous>  finasteride 5 milliGRAM(s) Oral daily  gabapentin 300 milliGRAM(s) Oral every 8 hours  heparin   Injectable 5000 Unit(s) SubCutaneous every 8 hours  levothyroxine 25 MICROGram(s) Oral daily  lidocaine   4% Patch 1 Patch Transdermal every 24 hours    MEDICATIONS  (PRN):  naloxone Injectable 0.1 milliGRAM(s) IV Push every 3 minutes PRN For ANY of the following changes in patient status:  A. RR LESS THAN 10 breaths per minute, B. Oxygen saturation LESS THAN 90%, C. Sedation score of 6  ondansetron Injectable 4 milliGRAM(s) IV Push every 6 hours PRN Nausea  oxyCODONE    IR 5 milliGRAM(s) Oral every 3 hours PRN Moderate Pain (4 - 6)  oxyCODONE    IR 10 milliGRAM(s) Oral every 3 hours PRN Severe Pain (7 - 10)  senna 2 Tablet(s) Oral at bedtime PRN Constipation      OBJECTIVE: Patient sitting up in chair.    Assessment of Catheter Site:	[ ] Left	[ ] Right  [x ] Epidural 	[ ] Femoral	      [ ] Saphenous   [ ] Supraclavicular   [ ] Other:    [x ] Dressing intact	[x ] Site non-tender	[ x] Site without erythema, discharge, edema  [x ] Epidural tubing and connection checked	[x] Gross neurological exam within normal limits  [X ] Catheter removed – tip intact		[ ] Afebrile	  [ ] Febrile: ___       [ X] see Temp under VS below)    PT/INR - ( 24 May 2022 05:39 )   PT: 13.8 sec;   INR: 1.19 ratio         PTT - ( 24 May 2022 05:39 )  PTT:27.1 sec                      10.6   7.19  )-----------( 153      ( 24 May 2022 05:39 )             31.7     Vital Signs Last 24 Hrs  T(C): 36.9 (05-24-22 @ 05:54), Max: 37.5 (05-23-22 @ 13:45)  T(F): 98.5 (05-24-22 @ 05:54), Max: 99.5 (05-23-22 @ 13:45)  HR: 73 (05-24-22 @ 05:54) (73 - 85)  BP: 115/74 (05-24-22 @ 05:54) (115/74 - 150/79)  BP(mean): --  RR: 16 (05-24-22 @ 05:54) (16 - 18)  SpO2: 98% (05-24-22 @ 05:54) (86% - 99%)      Sedation Score:	[x ] Alert	[ ] Drowsy	[ ] Arousable	[ ] Asleep	[ ] Unresponsive    Side Effects:	[x ] None	[ ] Nausea	[ ] Vomiting	[ ] Pruritus  		[ ] Weakness		[ ] Numbness	[ ] Other:    ASSESSMENT/ PLAN:    Therapy to  be:	[ ] Continue   [ X] Discontinued   [ X] Change to prn Analgesics    Documentation and Verification of current medications:  [ X ] Done	[ ] Not done, not eligible, reason:    Comments: Discussed patient with primary team. PCEA discontinued Changed to IV/oral opioid and/or non-opioid Adjuvant analgesics to be used at this point.    Progress Note written now but Patient was seen earlier.

## 2022-05-24 NOTE — PROGRESS NOTE ADULT - PROBLEM SELECTOR PLAN 1
S/p nephrectomy 5/20/22  Intermittent abdominal pain  on PCEA, follow up pain service recommendations  Diet advanced to regular   continued management per Urology    Reviewed vitals- O2 sats of 87% on 5/22- Placed on 2 L Oxygen with saturation in %  Was weaned off O2 yday evening, satting 95-99% on RA, was placed on O2 overnight as sats dropped to 86% on RA. S/p nephrectomy 5/20/22  Intermittent abdominal pain  PCEA dced , follow up pain service recommendations  Diet advanced to regular   continued management per Urology    Reviewed vitals- O2 sats of 87% on 5/22- Placed on 2 L Oxygen with saturation in %  Was weaned off O2 yday evening, satting 95-99% on RA, was placed on O2 overnight as sats dropped to 86% on RA. Ws dced this AM as pt satting 99% on RA.  Denies SOB, chest pain, Ambulating without discomfort    DW RN- pt was heard snoring, and hence pulse ox was checked at night   pt reports history of snoring, and occasional awakening in the nights. Has not been tested for sleep Apnea. Advised pt to FU with PMD/Sleep clinic for evaluation of sleep apena.   [facility] PA

## 2022-05-24 NOTE — PROGRESS NOTE ADULT - SUBJECTIVE AND OBJECTIVE BOX
Iveth Dykes  Hospitalist  Pager- [phone]    Patient is a 71y old  Male who presents with a chief complaint of Nephrectomy (21 May 2022 13:00)    SUBJECTIVE / OVERNIGHT EVENTS:       MEDICATIONS  (STANDING):  acetaminophen   IVPB .. 1000 milliGRAM(s) IV Intermittent once  aspirin  chewable 81 milliGRAM(s) Oral daily  cefTRIAXone   IVPB 1000 milliGRAM(s) IV Intermittent every 24 hours  dextrose 5% + sodium chloride 0.45%. 1000 milliLiter(s) (75 mL/Hr) IV Continuous <Continuous>  finasteride 5 milliGRAM(s) Oral daily  gabapentin 300 milliGRAM(s) Oral every 8 hours  heparin   Injectable 5000 Unit(s) SubCutaneous every 8 hours  hydromorphone (10 MICROgram(s)/mL) + bupivacaine 0.0625% in 0.9% Sodium Chloride PCEA 250 milliLiter(s) Epidural PCA Continuous  levothyroxine 25 MICROGram(s) Oral daily  lidocaine   4% Patch 1 Patch Transdermal every 24 hours    MEDICATIONS  (PRN):  HYDROmorphone  Injectable 0.5 milliGRAM(s) IV Push every 3 hours PRN Severe breakthrough  Pain (7 - 10)  hydromorphone (10 MICROgram(s)/mL) + bupivacaine 0.0625% in 0.9% Sodium Chloride PCEA Rescue Clinician  Bolus 5 milliLiter(s) Epidural every 15 minutes PRN for Pain Score greater than 6  nalbuphine Injectable 2.5 milliGRAM(s) IV Push every 6 hours PRN Pruritus  naloxone Injectable 0.1 milliGRAM(s) IV Push every 3 minutes PRN For ANY of the following changes in patient status:  A. RR LESS THAN 10 breaths per minute, B. Oxygen saturation LESS THAN 90%, C. Sedation score of 6  ondansetron Injectable 4 milliGRAM(s) IV Push every 6 hours PRN Nausea  senna 2 Tablet(s) Oral at bedtime PRN Constipation      CAPILLARY BLOOD GLUCOSE        PHYSICAL EXAM:    Vital Signs Last 24 Hrs  T(C): 36.9 (24 May 2022 05:54), Max: 37.5 (23 May 2022 13:45)  T(F): 98.5 (24 May 2022 05:54), Max: 99.5 (23 May 2022 13:45)  HR: 73 (24 May 2022 05:54) (73 - 85)  BP: 115/74 (24 May 2022 05:54) (115/74 - 150/79)  BP(mean): --  RR: 16 (24 May 2022 05:54) (16 - 18)  SpO2: 98% (24 May 2022 05:54) (86% - 99%)    GENERAL: NAD, well-groomed, well-developed  HEAD:  Atraumatic, Normocephalic  EYES: conjunctiva and sclera clear  ENMT: Moist mucous membranes  RESPIRATORY: Clear to auscultation bilaterally; No rales, rhonchi, wheezing, or rubs  CARDIOVASCULAR: Regular rate and rhythm; normal s1, s2  GASTROINTESTINAL: Soft, Nontender, nondistended, right sided surgical incision, clean no drainage  GENITOURINARY: michel in place  EXTREMITIES:  2+ Peripheral Pulses, No clubbing, cyanosis, or edema  NERVOUS SYSTEM: Moving all 4 extremities  SKIN: No rashes    LABS:                                      10.6   7.19  )-----------( 153      ( 24 May 2022 05:39 )             31.7         05-24    136  |  102  |  16  ----------------------------<  118<H>  3.7   |  24  |  1.51<H>    Ca    8.3<L>      24 May 2022 05:39  Phos  2.3     05-24  Mg     1.70     05-24      COORDINATION OF CARE:  Care Discussed with Consultants/Other Providers [Y- urology team] Iveth Weinreira  Hospitalist  Pager- [phone]    Patient is a 71y old  Male who presents with a chief complaint of Nephrectomy (21 May 2022 13:00)    SUBJECTIVE / OVERNIGHT EVENTS:  Pt seen and examined by candida yin this AM  Pain  in lower abdomen- 8/10, mostly on ambulation, coughing       MEDICATIONS  (STANDING):  acetaminophen   IVPB .. 1000 milliGRAM(s) IV Intermittent once  aspirin  chewable 81 milliGRAM(s) Oral daily  cefTRIAXone   IVPB 1000 milliGRAM(s) IV Intermittent every 24 hours  dextrose 5% + sodium chloride 0.45%. 1000 milliLiter(s) (75 mL/Hr) IV Continuous <Continuous>  finasteride 5 milliGRAM(s) Oral daily  gabapentin 300 milliGRAM(s) Oral every 8 hours  heparin   Injectable 5000 Unit(s) SubCutaneous every 8 hours  hydromorphone (10 MICROgram(s)/mL) + bupivacaine 0.0625% in 0.9% Sodium Chloride PCEA 250 milliLiter(s) Epidural PCA Continuous  levothyroxine 25 MICROGram(s) Oral daily  lidocaine   4% Patch 1 Patch Transdermal every 24 hours    MEDICATIONS  (PRN):  HYDROmorphone  Injectable 0.5 milliGRAM(s) IV Push every 3 hours PRN Severe breakthrough  Pain (7 - 10)  hydromorphone (10 MICROgram(s)/mL) + bupivacaine 0.0625% in 0.9% Sodium Chloride PCEA Rescue Clinician  Bolus 5 milliLiter(s) Epidural every 15 minutes PRN for Pain Score greater than 6  nalbuphine Injectable 2.5 milliGRAM(s) IV Push every 6 hours PRN Pruritus  naloxone Injectable 0.1 milliGRAM(s) IV Push every 3 minutes PRN For ANY of the following changes in patient status:  A. RR LESS THAN 10 breaths per minute, B. Oxygen saturation LESS THAN 90%, C. Sedation score of 6  ondansetron Injectable 4 milliGRAM(s) IV Push every 6 hours PRN Nausea  senna 2 Tablet(s) Oral at bedtime PRN Constipation      CAPILLARY BLOOD GLUCOSE        PHYSICAL EXAM:    Vital Signs Last 24 Hrs  T(C): 36.9 (24 May 2022 05:54), Max: 37.5 (23 May 2022 13:45)  T(F): 98.5 (24 May 2022 05:54), Max: 99.5 (23 May 2022 13:45)  HR: 73 (24 May 2022 05:54) (73 - 85)  BP: 115/74 (24 May 2022 05:54) (115/74 - 150/79)  BP(mean): --  RR: 16 (24 May 2022 05:54) (16 - 18)  SpO2: 98% (24 May 2022 05:54) (86% - 99%)    GENERAL: NAD, well-groomed, well-developed  HEAD:  Atraumatic, Normocephalic  EYES: conjunctiva and sclera clear  ENMT: Moist mucous membranes  RESPIRATORY: Clear to auscultation bilaterally; No rales, rhonchi, wheezing, or rubs  CARDIOVASCULAR: Regular rate and rhythm; normal s1, s2  GASTROINTESTINAL: Soft, Nontender, nondistended, right sided surgical incision, clean no drainage  GENITOURINARY: Cordero dced   EXTREMITIES:  2+ Peripheral Pulses, No clubbing, cyanosis, or edema  NERVOUS SYSTEM: Moving all 4 extremities  SKIN: No rashes    LABS:                                      10.6   7.19  )-----------( 153      ( 24 May 2022 05:39 )             31.7         05-24    136  |  102  |  16  ----------------------------<  118<H>  3.7   |  24  |  1.51<H>    Ca    8.3<L>      24 May 2022 05:39  Phos  2.3     05-24  Mg     1.70     05-24      COORDINATION OF CARE:  Care Discussed with Consultants/Other Providers [Y- urology team]

## 2022-05-24 NOTE — PROGRESS NOTE ADULT - SUBJECTIVE AND OBJECTIVE BOX
Anesthesia Pain Management Service- Attending Addendum    SUBJECTIVE: Pt doing well with PCEA without problems reported.    Therapy:	  [ ] IV PCA	   [ X] Epidural           [ ] s/p Spinal Opoid              [ ] Postpartum infusion	  [ ] Patient controlled regional anesthesia (PCRA)    [ ] prn Analgesics    Allergies    No Known Allergies    Intolerances      MEDICATIONS  (STANDING):  acetaminophen     Tablet .. 650 milliGRAM(s) Oral every 6 hours  aspirin  chewable 81 milliGRAM(s) Oral daily  cefTRIAXone   IVPB 1000 milliGRAM(s) IV Intermittent every 24 hours  finasteride 5 milliGRAM(s) Oral daily  gabapentin 300 milliGRAM(s) Oral every 8 hours  heparin   Injectable 5000 Unit(s) SubCutaneous every 8 hours  levothyroxine 25 MICROGram(s) Oral daily  lidocaine   4% Patch 1 Patch Transdermal every 24 hours    MEDICATIONS  (PRN):  naloxone Injectable 0.1 milliGRAM(s) IV Push every 3 minutes PRN For ANY of the following changes in patient status:  A. RR LESS THAN 10 breaths per minute, B. Oxygen saturation LESS THAN 90%, C. Sedation score of 6  ondansetron Injectable 4 milliGRAM(s) IV Push every 6 hours PRN Nausea  oxyCODONE    IR 5 milliGRAM(s) Oral every 3 hours PRN Moderate Pain (4 - 6)  oxyCODONE    IR 10 milliGRAM(s) Oral every 3 hours PRN Severe Pain (7 - 10)  senna 2 Tablet(s) Oral at bedtime PRN Constipation      OBJECTIVE:   [X] No new signs     [ ] Other:    Side Effects:  [X ] None			[ ] Other:    Assessment of Catheter Site:		[ ] Intact		[ X] Other: Discontinued    ASSESSMENT/PLAN  [ ] Continue current therapy    [ x] Therapy changed to:    [ ] IV PCA       [ ] Epidural     [ x] prn Analgesics     Comments: Epidural discontinued, PRN analgesics     Note written after patient seen

## 2022-05-24 NOTE — PROGRESS NOTE ADULT - PROBLEM SELECTOR PLAN 2
Cr 1.17 -> 1.48 ->1.74 -> 1.73-->1.58  Creatinine improving   Continue to hold ace inhibitor  remains on IVF- decreased to 75/hr  Avoid nephrotoxins and NSAIDs if able  Continue to trend closely

## 2022-05-24 NOTE — PROGRESS NOTE ADULT - SUBJECTIVE AND OBJECTIVE BOX
POD #4  Afeb 126/63 78 95%RA    Pt has no c/o  Abd- soft NT ND ; +flatus    wounds C&D  Cordero 700  Has PCEA

## 2022-05-25 ENCOUNTER — TRANSCRIPTION ENCOUNTER (OUTPATIENT)
Age: 72
End: 2022-05-25

## 2022-05-25 VITALS
SYSTOLIC BLOOD PRESSURE: 121 MMHG | OXYGEN SATURATION: 96 % | TEMPERATURE: 99 F | HEART RATE: 69 BPM | DIASTOLIC BLOOD PRESSURE: 67 MMHG | RESPIRATION RATE: 17 BRPM

## 2022-05-25 LAB
ANION GAP SERPL CALC-SCNC: 11 MMOL/L — SIGNIFICANT CHANGE UP (ref 7–14)
BUN SERPL-MCNC: 19 MG/DL — SIGNIFICANT CHANGE UP (ref 7–23)
CALCIUM SERPL-MCNC: 8.5 MG/DL — SIGNIFICANT CHANGE UP (ref 8.4–10.5)
CHLORIDE SERPL-SCNC: 101 MMOL/L — SIGNIFICANT CHANGE UP (ref 98–107)
CO2 SERPL-SCNC: 25 MMOL/L — SIGNIFICANT CHANGE UP (ref 22–31)
CREAT SERPL-MCNC: 1.68 MG/DL — HIGH (ref 0.5–1.3)
EGFR: 43 ML/MIN/1.73M2 — LOW
GLUCOSE SERPL-MCNC: 103 MG/DL — HIGH (ref 70–99)
HCT VFR BLD CALC: 32.9 % — LOW (ref 39–50)
HGB BLD-MCNC: 10.7 G/DL — LOW (ref 13–17)
MCHC RBC-ENTMCNC: 32.1 PG — SIGNIFICANT CHANGE UP (ref 27–34)
MCHC RBC-ENTMCNC: 32.5 GM/DL — SIGNIFICANT CHANGE UP (ref 32–36)
MCV RBC AUTO: 98.8 FL — SIGNIFICANT CHANGE UP (ref 80–100)
NRBC # BLD: 0 /100 WBCS — SIGNIFICANT CHANGE UP
NRBC # FLD: 0 K/UL — SIGNIFICANT CHANGE UP
PLATELET # BLD AUTO: 171 K/UL — SIGNIFICANT CHANGE UP (ref 150–400)
POTASSIUM SERPL-MCNC: 3.6 MMOL/L — SIGNIFICANT CHANGE UP (ref 3.5–5.3)
POTASSIUM SERPL-SCNC: 3.6 MMOL/L — SIGNIFICANT CHANGE UP (ref 3.5–5.3)
RBC # BLD: 3.33 M/UL — LOW (ref 4.2–5.8)
RBC # FLD: 11.4 % — SIGNIFICANT CHANGE UP (ref 10.3–14.5)
SODIUM SERPL-SCNC: 137 MMOL/L — SIGNIFICANT CHANGE UP (ref 135–145)
WBC # BLD: 6.75 K/UL — SIGNIFICANT CHANGE UP (ref 3.8–10.5)
WBC # FLD AUTO: 6.75 K/UL — SIGNIFICANT CHANGE UP (ref 3.8–10.5)

## 2022-05-25 PROCEDURE — 99232 SBSQ HOSP IP/OBS MODERATE 35: CPT

## 2022-05-25 RX ORDER — ASPIRIN/CALCIUM CARB/MAGNESIUM 324 MG
81 TABLET ORAL
Qty: 0 | Refills: 0 | DISCHARGE

## 2022-05-25 RX ORDER — CEPHALEXIN 500 MG
1 CAPSULE ORAL
Qty: 20 | Refills: 0
Start: 2022-05-25 | End: 2022-05-29

## 2022-05-25 RX ORDER — OXYCODONE HYDROCHLORIDE 5 MG/1
1 TABLET ORAL
Qty: 12 | Refills: 0
Start: 2022-05-25 | End: 2022-05-27

## 2022-05-25 RX ORDER — RAMIPRIL 5 MG
1 CAPSULE ORAL
Qty: 0 | Refills: 0 | DISCHARGE

## 2022-05-25 RX ORDER — POLYETHYLENE GLYCOL 3350 17 G/17G
17 POWDER, FOR SOLUTION ORAL ONCE
Refills: 0 | Status: COMPLETED | OUTPATIENT
Start: 2022-05-25 | End: 2022-05-25

## 2022-05-25 RX ADMIN — Medication 25 MICROGRAM(S): at 05:49

## 2022-05-25 RX ADMIN — OXYCODONE HYDROCHLORIDE 10 MILLIGRAM(S): 5 TABLET ORAL at 02:29

## 2022-05-25 RX ADMIN — OXYCODONE HYDROCHLORIDE 10 MILLIGRAM(S): 5 TABLET ORAL at 06:11

## 2022-05-25 RX ADMIN — FINASTERIDE 5 MILLIGRAM(S): 5 TABLET, FILM COATED ORAL at 11:45

## 2022-05-25 RX ADMIN — Medication 650 MILLIGRAM(S): at 17:39

## 2022-05-25 RX ADMIN — OXYCODONE HYDROCHLORIDE 10 MILLIGRAM(S): 5 TABLET ORAL at 01:23

## 2022-05-25 RX ADMIN — HEPARIN SODIUM 5000 UNIT(S): 5000 INJECTION INTRAVENOUS; SUBCUTANEOUS at 05:50

## 2022-05-25 RX ADMIN — OXYCODONE HYDROCHLORIDE 10 MILLIGRAM(S): 5 TABLET ORAL at 05:49

## 2022-05-25 RX ADMIN — OXYCODONE HYDROCHLORIDE 10 MILLIGRAM(S): 5 TABLET ORAL at 11:46

## 2022-05-25 RX ADMIN — GABAPENTIN 300 MILLIGRAM(S): 400 CAPSULE ORAL at 14:40

## 2022-05-25 RX ADMIN — Medication 650 MILLIGRAM(S): at 05:53

## 2022-05-25 RX ADMIN — Medication 81 MILLIGRAM(S): at 11:46

## 2022-05-25 RX ADMIN — OXYCODONE HYDROCHLORIDE 10 MILLIGRAM(S): 5 TABLET ORAL at 18:21

## 2022-05-25 RX ADMIN — OXYCODONE HYDROCHLORIDE 10 MILLIGRAM(S): 5 TABLET ORAL at 17:39

## 2022-05-25 RX ADMIN — OXYCODONE HYDROCHLORIDE 10 MILLIGRAM(S): 5 TABLET ORAL at 12:14

## 2022-05-25 RX ADMIN — CEFTRIAXONE 100 MILLIGRAM(S): 500 INJECTION, POWDER, FOR SOLUTION INTRAMUSCULAR; INTRAVENOUS at 14:40

## 2022-05-25 RX ADMIN — Medication 650 MILLIGRAM(S): at 11:46

## 2022-05-25 RX ADMIN — GABAPENTIN 300 MILLIGRAM(S): 400 CAPSULE ORAL at 05:50

## 2022-05-25 RX ADMIN — POLYETHYLENE GLYCOL 3350 17 GRAM(S): 17 POWDER, FOR SOLUTION ORAL at 12:19

## 2022-05-25 NOTE — DISCHARGE NOTE PROVIDER - CARE PROVIDER_API CALL
Bonilla Canales)  Urology  [address] [place] [address], [place] [address]  Phone: [phone]  Fax: [phone]  Follow Up Time:

## 2022-05-25 NOTE — DISCHARGE NOTE NURSING/CASE MANAGEMENT/SOCIAL WORK - NSDCPNINST_GEN_ALL_CORE
Notify Dr Canales if you experience any increase in pain not relieved with medication, any redness, drainage or swelling around incision, any persistent nausea vomiting or any fever >100.5.  drink plenty of fluids.  No heavy lifting or straining.  Use over the counter stool softeners to assist with constipation which can be a side effect of narcotic pain medication.  Complete your course of antibiotics as prescribed.

## 2022-05-25 NOTE — PROGRESS NOTE ADULT - SUBJECTIVE AND OBJECTIVE BOX
POD #5  Afeb 135/70 70 96%RA    Pt has no c/o  Abd- soft NT ND; + flatus     incision C&D  Void - 450  Janey reg diet  Ambulating

## 2022-05-25 NOTE — PROGRESS NOTE ADULT - SUBJECTIVE AND OBJECTIVE BOX
Iveth Weinerira  Hospitalist  Pager- [phone]    Patient is a 71y old  Male who presents with a chief complaint of Nephrectomy (21 May 2022 13:00)    SUBJECTIVE / OVERNIGHT EVENTS:  Pt seen and examined by candida yin this AM  Pain  in lower abdomen- 8/10, mostly on ambulation, coughing     MEDICATIONS  (STANDING):  acetaminophen     Tablet .. 650 milliGRAM(s) Oral every 6 hours  aspirin  chewable 81 milliGRAM(s) Oral daily  cefTRIAXone   IVPB 1000 milliGRAM(s) IV Intermittent every 24 hours  finasteride 5 milliGRAM(s) Oral daily  gabapentin 300 milliGRAM(s) Oral every 8 hours  heparin   Injectable 5000 Unit(s) SubCutaneous every 8 hours  levothyroxine 25 MICROGram(s) Oral daily  lidocaine   4% Patch 1 Patch Transdermal every 24 hours    MEDICATIONS  (PRN):  naloxone Injectable 0.1 milliGRAM(s) IV Push every 3 minutes PRN For ANY of the following changes in patient status:  A. RR LESS THAN 10 breaths per minute, B. Oxygen saturation LESS THAN 90%, C. Sedation score of 6  ondansetron Injectable 4 milliGRAM(s) IV Push every 6 hours PRN Nausea  oxyCODONE    IR 5 milliGRAM(s) Oral every 3 hours PRN Moderate Pain (4 - 6)  oxyCODONE    IR 10 milliGRAM(s) Oral every 3 hours PRN Severe Pain (7 - 10)  senna 2 Tablet(s) Oral at bedtime PRN Constipation    CAPILLARY BLOOD GLUCOSE        PHYSICAL EXAM:    Vital Signs Last 24 Hrs  T(C): 36.7 (25 May 2022 05:38), Max: 36.9 (24 May 2022 10:06)  T(F): 98.1 (25 May 2022 05:38), Max: 98.4 (24 May 2022 10:06)  HR: 70 (25 May 2022 05:38) (70 - 80)  BP: 135/70 (25 May 2022 05:38) (128/67 - 147/71)  BP(mean): --  RR: 18 (25 May 2022 05:38) (16 - 18)  SpO2: 96% (25 May 2022 05:38) (95% - 99%)      GENERAL: NAD, well-groomed, well-developed  HEAD:  Atraumatic, Normocephalic  EYES: conjunctiva and sclera clear  ENMT: Moist mucous membranes  RESPIRATORY: Clear to auscultation bilaterally; No rales, rhonchi, wheezing, or rubs  CARDIOVASCULAR: Regular rate and rhythm; normal s1, s2  GASTROINTESTINAL: Soft, Nontender, nondistended, right sided surgical incision, clean no drainage  GENITOURINARY: Cordero dced   EXTREMITIES:  2+ Peripheral Pulses, No clubbing, cyanosis, or edema  NERVOUS SYSTEM: Moving all 4 extremities  SKIN: No rashes    LABS:                                            10.7   6.75  )-----------( 171      ( 25 May 2022 05:42 )             32.9       05-25    137  |  101  |  19  ----------------------------<  103<H>  3.6   |  25  |  1.68<H>    Ca    8.5      25 May 2022 05:42  Phos  2.3     05-24  Mg     1.70     05-24          COORDINATION OF CARE:  Care Discussed with Consultants/Other Providers [Y- urology team] Iveth Dykes  Hospitalist  Pager- [phone]    Patient is a 71y old  Male who presents with a chief complaint of Nephrectomy (21 May 2022 13:00)    SUBJECTIVE / OVERNIGHT EVENTS:  Pt seen and examined by me   Pain  in lower abdomen- 8/10, mostly on ambulation, coughing, not as intense as before   PCEA on 5/24,On  Oxy PRN   No BM since day of surgery.Passing gas, ambulating in the hallway  Tolerating PO       MEDICATIONS  (STANDING):  acetaminophen     Tablet .. 650 milliGRAM(s) Oral every 6 hours  aspirin  chewable 81 milliGRAM(s) Oral daily  cefTRIAXone   IVPB 1000 milliGRAM(s) IV Intermittent every 24 hours  finasteride 5 milliGRAM(s) Oral daily  gabapentin 300 milliGRAM(s) Oral every 8 hours  heparin   Injectable 5000 Unit(s) SubCutaneous every 8 hours  levothyroxine 25 MICROGram(s) Oral daily  lidocaine   4% Patch 1 Patch Transdermal every 24 hours    MEDICATIONS  (PRN):  naloxone Injectable 0.1 milliGRAM(s) IV Push every 3 minutes PRN For ANY of the following changes in patient status:  A. RR LESS THAN 10 breaths per minute, B. Oxygen saturation LESS THAN 90%, C. Sedation score of 6  ondansetron Injectable 4 milliGRAM(s) IV Push every 6 hours PRN Nausea  oxyCODONE    IR 5 milliGRAM(s) Oral every 3 hours PRN Moderate Pain (4 - 6)  oxyCODONE    IR 10 milliGRAM(s) Oral every 3 hours PRN Severe Pain (7 - 10)  senna 2 Tablet(s) Oral at bedtime PRN Constipation    CAPILLARY BLOOD GLUCOSE        PHYSICAL EXAM:    Vital Signs Last 24 Hrs  T(C): 36.7 (25 May 2022 05:38), Max: 36.9 (24 May 2022 10:06)  T(F): 98.1 (25 May 2022 05:38), Max: 98.4 (24 May 2022 10:06)  HR: 70 (25 May 2022 05:38) (70 - 80)  BP: 135/70 (25 May 2022 05:38) (128/67 - 147/71)  BP(mean): --  RR: 18 (25 May 2022 05:38) (16 - 18)  SpO2: 96% (25 May 2022 05:38) (95% - 99%)      GENERAL: NAD, well-groomed, well-developed  HEAD:  Atraumatic, Normocephalic  EYES: conjunctiva and sclera clear  ENMT: Moist mucous membranes  RESPIRATORY: Clear to auscultation bilaterally; No rales, rhonchi, wheezing, or rubs  CARDIOVASCULAR: Regular rate and rhythm; normal s1, s2  GASTROINTESTINAL: Soft, Nontender, nondistended, right sided surgical incision, clean no drainage  GENITOURINARY: Cordero dced   EXTREMITIES:  2+ Peripheral Pulses, No clubbing, cyanosis, or edema  NERVOUS SYSTEM: Moving all 4 extremities  SKIN: No rashes    LABS:                                            10.7   6.75  )-----------( 171      ( 25 May 2022 05:42 )             32.9       05-25    137  |  101  |  19  ----------------------------<  103<H>  3.6   |  25  |  1.68<H>    Ca    8.5      25 May 2022 05:42  Phos  2.3     05-24  Mg     1.70     05-24          COORDINATION OF CARE:  Care Discussed with Consultants/Other Providers [Y- urology team]

## 2022-05-25 NOTE — PROGRESS NOTE ADULT - ASSESSMENT
70 yo M s/p right nephroureterectomy, left ureteral stent placement on 5/20/2022, low UO postop, GAMAL    5/21: UO improved, no N/V, no flatus yet  5/23 - still NPO; passed gas; has PCEA    Plan:  -AM labs   -continue CTX  -continue michel  -continue PCEA  -NPO   -f/u medicine  -DVT prophy, IS, OOB, ambulate  
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
72 yo M s/p right nephroureterectomy, left ureteral stent placement on 5/20/2022, low UO postop, GAMAL    5/21: UO improved, no N/V, no flatus yet    Plan:  -AM labs reviewed, Cr plateau 1.7 (1.7 << 1.1)  -continue CTX  -continue michel  -continue PCEA, ?breakthrough, +gabapentin; appreciate pain medicine recommendations  -NPO until GIF  -f/u medicine  -DVT prophy, IS, OOB, ambulate  
72 yo M s/p right nephroureterectomy, left ureteral stent placement on 5/20/2022, low UO postop, GAMAL    5/21: UO improved, no N/V, no flatus yet  5/23 - still NPO; passed gas; has PCEA; lesvia clears later on  5/24 - reg diet today; d/c PCEA; removed michel    Plan:  -AM labs   - d/c michel  - d/c PCEA  - reg diet  -f/u medicine  -DVT prophy, IS, OOB, ambulate  
72 yo M s/p right nephroureterectomy, left ureteral stent placement on 5/20/2022, low UO postop, GAMAL    5/21: UO improved, no N/V, no flatus yet  5/23 - still NPO; passed gas; has PCEA; lesvia clears later on  5/24 - reg diet today; d/c PCEA; removed michel; voiding  5/25 - lesvia reg diet; voiding well; OOB    Plan:  - home today  
72 yo M s/p right nephroureterectomy, left ureteral stent placement on 5/20/2022, low UO postop, GAMAL    5/21: UO improved, no N/V, no flatus yet    Plan:  -AM labs reviewed  -continue IVF  -continue michel  -continue PCEA will d/w pain svc change in dosage  -continue NPO  -f/u OR Cx  -f/u medicine  -DVT prophy, IS, OOB, ambulate  
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

## 2022-05-25 NOTE — PROGRESS NOTE ADULT - PROVIDER SPECIALTY LIST ADULT
Pain Medicine
Anesthesia
Pain Medicine
Urology
Hospitalist

## 2022-05-25 NOTE — PROGRESS NOTE ADULT - PROBLEM SELECTOR PLAN 5
4.7cm stable on 5/2022 CT, continued follow up as outpatient.

## 2022-05-25 NOTE — PROGRESS NOTE ADULT - PROBLEM SELECTOR PLAN 3
BP range acceptable, continue to monitor  Home med- Ramipril- on hold given GAMAL
BP controlled, continue to monitor  - hold ACEi given GAMAL
BP range acceptable, continue to monitor  - hold ACEi given GAMAL
BP range acceptable, continue to monitor  Home med- Ramipril- on hold given GAMAL
BP range acceptable, continue to monitor  - hold ACEi given GAMAL

## 2022-05-25 NOTE — DISCHARGE NOTE PROVIDER - NSDCDCMDCOMP_GEN_ALL_CORE
How Severe Is Your Skin Lesion?: moderate
Have Your Skin Lesions Been Treated?: not been treated
Is This A New Presentation, Or A Follow-Up?: Skin Lesions
This document is complete and the patient is ready for discharge.

## 2022-05-25 NOTE — PROGRESS NOTE ADULT - PROBLEM SELECTOR PROBLEM 1
Carcinoma of renal pelvis

## 2022-05-25 NOTE — DISCHARGE NOTE PROVIDER - HOSPITAL COURSE
Pt had open R. nephro-ureterectomy 5 days ago; did well; PCEA off since yesterday; pain is controlled; passed gas and lesvia reg diet; labs stable; ambulating; voiding well; home today.

## 2022-05-25 NOTE — DISCHARGE NOTE NURSING/CASE MANAGEMENT/SOCIAL WORK - PATIENT PORTAL LINK FT
You can access the FollowMyHealth Patient Portal offered by [facility] by registering at the following website: [email or web address]. By joining [facility]’s FollowMyHealth portal, you will also be able to view your health information using other applications (apps) compatible with our system.

## 2022-05-25 NOTE — PROGRESS NOTE ADULT - PROBLEM SELECTOR PLAN 1
S/p nephrectomy 5/20/22  Intermittent abdominal pain  PCEA dced 5/24.  On Oxy IR PRN, Tylenol. Also on Gabapentin   Diet advanced to regular   continued management per Urology    Reviewed vitals- O2 sats of 87% on 5/22- Placed on 2 L Oxygen with saturation in %  Was weaned off O2 yday evening, satting 95-99% on RA, was placed on O2 overnight as sats dropped to 86% on RA. Ws dced this AM as pt satting 99% on RA.  Denies SOB, chest pain, Ambulating without discomfort    DW RN- pt was heard snoring, and hence pulse ox was checked at night   pt reports history of snoring, and occasional awakening in the nights. Has not been tested for sleep Apnea. Advised pt to FU with PMD/Sleep clinic for evaluation of sleep apena.   [facility] Urology PA S/p nephrectomy 5/20/22  Intermittent abdominal pain  PCEA dced 5/24.  On Oxy IR PRN, Tylenol. Also on Gabapentin   Diet advanced to regular   continued management per Urology    Reviewed vitals- O2 sats of 87% on 5/22- Placed on 2 L Oxygen with saturation in %  Was weaned off O2 yday evening, satting 95-99% on RA, was placed on O2 overnight on 5/24  as sats dropped to 86% on RA. Ws dced this AM as pt satting 99% on RA.  Denies SOB, chest pain, Ambulating without discomfort    DW RN- pt was heard snoring, and hence pulse ox was checked at night   pt reports history of snoring, and occasional awakening in the nights. Has not been tested for sleep Apnea. Advised pt to FU with PMD/Sleep clinic for evaluation of sleep apnea.   [facility] PA

## 2022-05-25 NOTE — PROGRESS NOTE ADULT - PROBLEM SELECTOR PLAN 2
Cr 1.17 -> 1.48 ->1.74 -> 1.73-->1.58-->1.68  Creatinine improving   Continue to hold ace inhibitor  IVF dced   Avoid nephrotoxins and NSAIDs if able  Continue to trend closely Cr 1.17 -> 1.48 ->1.74 -> 1.73-->1.58-->1.68  Creatinine stable   Continue to hold ace inhibitor  IVF dced   Avoid nephrotoxins and NSAIDs if able  Continue to trend closely

## 2022-05-25 NOTE — PROGRESS NOTE ADULT - PROBLEM SELECTOR PLAN 6
DVT ppx: HSQ. DVT ppx: HSQ.  DC  meds dw Urology PA- Advise to continue to  hold Rampiril ( SBP in 110s, creatinine slightly higher today)   Advise pt FU PMD for BP monitoring and restart as per BP needs/ BMP FU

## 2022-05-25 NOTE — DISCHARGE NOTE PROVIDER - NSDCMRMEDTOKEN_GEN_ALL_CORE_FT
Izzy Aspirin: 81 mg  orally once a day   LD 05/13/2022  finasteride 5 mg oral tablet: 1 tab(s) orally once a day-6pm  kelp: 1 tab(s) orally once a day-last dose 05/13/22  levothyroxine 25 mcg (0.025 mg) oral tablet: 1 tab(s) orally once a day  ramipril 10 mg oral tablet: 1 tab(s) orally once a day  Vitamin D3 1250 mcg (50,000 intl units) oral capsule: 1 cap(s) orally once a week   aspirin 81 mg oral tablet: 1 tab(s) orally once a day  cephalexin 500 mg oral tablet: 1 tab(s) orally 4 times a day   finasteride 5 mg oral tablet: 1 tab(s) orally once a day-6pm  kelp: 1 tab(s) orally once a day-last dose 05/13/22  levothyroxine 25 mcg (0.025 mg) oral tablet: 1 tab(s) orally once a day  oxyCODONE 5 mg oral tablet: 1 tab(s) orally every 6 hours, As Needed MDD:4  ramipril 10 mg oral tablet: 1 tab(s) orally once a day  Tylenol 325 mg oral tablet: 3 tab(s) orally every 6 hours, As Needed  Vitamin D3 1250 mcg (50,000 intl units) oral capsule: 1 cap(s) orally once a week   aspirin 81 mg oral tablet: 1 tab(s) orally once a day  cephalexin 500 mg oral tablet: 1 tab(s) orally 4 times a day   finasteride 5 mg oral tablet: 1 tab(s) orally once a day-6pm  kelp: 1 tab(s) orally once a day-last dose 05/13/22  levothyroxine 25 mcg (0.025 mg) oral tablet: 1 tab(s) orally once a day  oxyCODONE 5 mg oral tablet: 1 tab(s) orally every 6 hours, As Needed MDD:4  Tylenol 325 mg oral tablet: 3 tab(s) orally every 6 hours, As Needed  Vitamin D3 1250 mcg (50,000 intl units) oral capsule: 1 cap(s) orally once a week

## 2022-05-25 NOTE — DISCHARGE NOTE PROVIDER - NSDCCPCAREPLAN_GEN_ALL_CORE_FT
PRINCIPAL DISCHARGE DIAGNOSIS  Diagnosis: Malignant neoplasm of unspecified kidney, except renal pelvis  Assessment and Plan of Treatment: Drink plenty of fluids.  No heavy lifting (greater than 10 pounds) or straining for 4 to 6 weeks.  You may shower, just pat white strips dry, they will fall off in a few weeks.   's  office  will call to schedule a follow up appointment.  Call the office if you have fever greater than 101, difficulty urinating, pain not relieved with pain medication, nausea/vomiting.         PRINCIPAL DISCHARGE DIAGNOSIS  Diagnosis: Malignant neoplasm of unspecified kidney, except renal pelvis  Assessment and Plan of Treatment: Drink plenty of fluids.  No heavy lifting (greater than 10 pounds) or straining for 4 to 6 weeks.  You may shower, just pat white strips dry, they will fall off in a few weeks.  Do not take ramipril for your blood pressure; it has not been high and ramipril sometimes is not good for kidney function; check with your PCP for a different med if needed.   's  office  will call to schedule a follow up appointment.  Call the office if you have fever greater than 101, difficulty urinating, pain not relieved with pain medication, nausea/vomiting.

## 2022-05-25 NOTE — PROGRESS NOTE ADULT - PROBLEM SELECTOR PROBLEM 2
GAMAL (acute kidney injury)

## 2022-05-25 NOTE — DISCHARGE NOTE NURSING/CASE MANAGEMENT/SOCIAL WORK - NSDCPECAREGIVERED_GEN_ALL_CORE
carenotes on nephrectomy, pain after surgery, d/c medications, side effects and incision action plan

## 2022-05-25 NOTE — PROGRESS NOTE ADULT - PROBLEM SELECTOR PROBLEM 5
Aortic aneurysm, thoracic

## 2022-05-31 ENCOUNTER — NON-APPOINTMENT (OUTPATIENT)
Age: 72
End: 2022-05-31

## 2022-06-04 LAB — SURGICAL PATHOLOGY STUDY: SIGNIFICANT CHANGE UP

## 2022-06-06 PROBLEM — C64.9 MALIGNANT NEOPLASM OF UNSPECIFIED KIDNEY, EXCEPT RENAL PELVIS: Chronic | Status: ACTIVE | Noted: 2022-05-09

## 2022-06-15 ENCOUNTER — APPOINTMENT (OUTPATIENT)
Dept: UROLOGY | Facility: CLINIC | Age: 72
End: 2022-06-15
Payer: MEDICARE

## 2022-06-15 PROCEDURE — 99024 POSTOP FOLLOW-UP VISIT: CPT

## 2022-06-15 PROCEDURE — 88112 CYTOPATH CELL ENHANCE TECH: CPT | Mod: 26

## 2022-06-16 LAB
APPEARANCE: ABNORMAL
BACTERIA: NEGATIVE
BILIRUBIN URINE: NEGATIVE
BLOOD URINE: ABNORMAL
COLOR: YELLOW
GLUCOSE QUALITATIVE U: NEGATIVE
HYALINE CASTS: 2 /LPF
KETONES URINE: NEGATIVE
LEUKOCYTE ESTERASE URINE: NEGATIVE
MICROSCOPIC-UA: NORMAL
NITRITE URINE: NEGATIVE
PH URINE: 5.5
PROTEIN URINE: ABNORMAL
RED BLOOD CELLS URINE: 168 /HPF
SPECIFIC GRAVITY URINE: 1.01
SQUAMOUS EPITHELIAL CELLS: 0 /HPF
UROBILINOGEN URINE: NORMAL
WHITE BLOOD CELLS URINE: 6 /HPF

## 2022-06-17 LAB — URINE CYTOLOGY: NORMAL

## 2022-07-06 ENCOUNTER — OUTPATIENT (OUTPATIENT)
Dept: OUTPATIENT SERVICES | Facility: HOSPITAL | Age: 72
LOS: 1 days | End: 2022-07-06

## 2022-07-06 VITALS
DIASTOLIC BLOOD PRESSURE: 83 MMHG | OXYGEN SATURATION: 99 % | SYSTOLIC BLOOD PRESSURE: 129 MMHG | TEMPERATURE: 97 F | HEART RATE: 82 BPM | HEIGHT: 73 IN | WEIGHT: 240.97 LBS | RESPIRATION RATE: 18 BRPM

## 2022-07-06 DIAGNOSIS — Z90.5 ACQUIRED ABSENCE OF KIDNEY: Chronic | ICD-10-CM

## 2022-07-06 DIAGNOSIS — C65.9 MALIGNANT NEOPLASM OF UNSPECIFIED RENAL PELVIS: ICD-10-CM

## 2022-07-06 DIAGNOSIS — Z90.2 ACQUIRED ABSENCE OF LUNG [PART OF]: Chronic | ICD-10-CM

## 2022-07-06 DIAGNOSIS — Z98.890 OTHER SPECIFIED POSTPROCEDURAL STATES: Chronic | ICD-10-CM

## 2022-07-06 LAB
ALBUMIN SERPL ELPH-MCNC: 4.5 G/DL — SIGNIFICANT CHANGE UP (ref 3.3–5)
ALP SERPL-CCNC: 80 U/L — SIGNIFICANT CHANGE UP (ref 40–120)
ALT FLD-CCNC: 7 U/L — SIGNIFICANT CHANGE UP (ref 4–41)
ANION GAP SERPL CALC-SCNC: 13 MMOL/L — SIGNIFICANT CHANGE UP (ref 7–14)
AST SERPL-CCNC: 16 U/L — SIGNIFICANT CHANGE UP (ref 4–40)
BILIRUB SERPL-MCNC: 0.8 MG/DL — SIGNIFICANT CHANGE UP (ref 0.2–1.2)
BUN SERPL-MCNC: 23 MG/DL — SIGNIFICANT CHANGE UP (ref 7–23)
CALCIUM SERPL-MCNC: 9.5 MG/DL — SIGNIFICANT CHANGE UP (ref 8.4–10.5)
CHLORIDE SERPL-SCNC: 102 MMOL/L — SIGNIFICANT CHANGE UP (ref 98–107)
CO2 SERPL-SCNC: 24 MMOL/L — SIGNIFICANT CHANGE UP (ref 22–31)
CREAT SERPL-MCNC: 1.56 MG/DL — HIGH (ref 0.5–1.3)
EGFR: 47 ML/MIN/1.73M2 — LOW
GLUCOSE SERPL-MCNC: 111 MG/DL — HIGH (ref 70–99)
HCT VFR BLD CALC: 39.3 % — SIGNIFICANT CHANGE UP (ref 39–50)
HGB BLD-MCNC: 13 G/DL — SIGNIFICANT CHANGE UP (ref 13–17)
MCHC RBC-ENTMCNC: 30.5 PG — SIGNIFICANT CHANGE UP (ref 27–34)
MCHC RBC-ENTMCNC: 33.1 GM/DL — SIGNIFICANT CHANGE UP (ref 32–36)
MCV RBC AUTO: 92.3 FL — SIGNIFICANT CHANGE UP (ref 80–100)
NRBC # BLD: 0 /100 WBCS — SIGNIFICANT CHANGE UP
NRBC # FLD: 0 K/UL — SIGNIFICANT CHANGE UP
PLATELET # BLD AUTO: 249 K/UL — SIGNIFICANT CHANGE UP (ref 150–400)
POTASSIUM SERPL-MCNC: 4.2 MMOL/L — SIGNIFICANT CHANGE UP (ref 3.5–5.3)
POTASSIUM SERPL-SCNC: 4.2 MMOL/L — SIGNIFICANT CHANGE UP (ref 3.5–5.3)
PROT SERPL-MCNC: 7.9 G/DL — SIGNIFICANT CHANGE UP (ref 6–8.3)
RBC # BLD: 4.26 M/UL — SIGNIFICANT CHANGE UP (ref 4.2–5.8)
RBC # FLD: 12.3 % — SIGNIFICANT CHANGE UP (ref 10.3–14.5)
SODIUM SERPL-SCNC: 139 MMOL/L — SIGNIFICANT CHANGE UP (ref 135–145)
WBC # BLD: 7.7 K/UL — SIGNIFICANT CHANGE UP (ref 3.8–10.5)
WBC # FLD AUTO: 7.7 K/UL — SIGNIFICANT CHANGE UP (ref 3.8–10.5)

## 2022-07-06 RX ORDER — SODIUM CHLORIDE 9 MG/ML
1000 INJECTION, SOLUTION INTRAVENOUS
Refills: 0 | Status: DISCONTINUED | OUTPATIENT
Start: 2022-07-22 | End: 2022-08-05

## 2022-07-06 RX ORDER — CHOLECALCIFEROL (VITAMIN D3) 125 MCG
1 CAPSULE ORAL
Qty: 0 | Refills: 0 | DISCHARGE

## 2022-07-06 RX ORDER — ASPIRIN/CALCIUM CARB/MAGNESIUM 324 MG
1 TABLET ORAL
Qty: 0 | Refills: 0 | DISCHARGE

## 2022-07-06 RX ORDER — ACETAMINOPHEN 500 MG
3 TABLET ORAL
Qty: 0 | Refills: 0 | DISCHARGE

## 2022-07-06 RX ORDER — OXYCODONE HYDROCHLORIDE 5 MG/1
1 TABLET ORAL
Qty: 0 | Refills: 0 | DISCHARGE

## 2022-07-06 NOTE — H&P PST ADULT - NEGATIVE GASTROINTESTINAL SYMPTOMS
no nausea/no vomiting/no diarrhea/no constipation no nausea/no vomiting/no diarrhea/no constipation/no abdominal pain/no melena/no jaundice/no hiccoughs

## 2022-07-06 NOTE — H&P PST ADULT - HISTORY OF PRESENT ILLNESS
71 year old Polish speaking male with pre op dx of malignant neoplasm of unspecified renal pelvis is scheduled for right nephrectomy . 71 year old Polish speaking male     with pre op dx of malignant neoplasm of unspecified renal pelvis is     scheduled for right nephrectomy . 71 year old male with H/O: Hypothyroidism, HTN, BPH, malignant neoplasm of unspecified renal pelvis s/p right nephrectomy (05/2022) presents to PST for pre op evaluation in preparation for bladder biopsy

## 2022-07-06 NOTE — H&P PST ADULT - NEGATIVE GENERAL GENITOURINARY SYMPTOMS
no hematuria no hematuria/no flank pain L/no flank pain R/no urine discoloration/no bladder infections/normal urinary frequency

## 2022-07-06 NOTE — H&P PST ADULT - NSICDXPASTSURGICALHX_GEN_ALL_CORE_FT
PAST SURGICAL HISTORY:  H/O arthroscopy right knee     PAST SURGICAL HISTORY:  H/O arthroscopy right knee    S/p nephrectomy right;  05/2022    S/P partial lobectomy of lung left upper;  08/04/2021

## 2022-07-06 NOTE — H&P PST ADULT - PROBLEM SELECTOR PLAN 1
Schedule for bladder biopsy tentatively on 07/22/2022. Pre op instructions, famotidine given and explained. Pt verbalized understanding.  Covid test pre op

## 2022-07-06 NOTE — H&P PST ADULT - BMI (KG/M2)
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

## 2022-07-06 NOTE — H&P PST ADULT - RESPIRATORY
clear to auscultation bilaterally/no wheezes/no rales/no rhonchi/no respiratory distress/no use of accessory muscles/airway patent/breath sounds equal/good air movement/respirations non-labored/no intercostal retractions

## 2022-07-06 NOTE — H&P PST ADULT - NEGATIVE CARDIOVASCULAR SYMPTOMS
no chest pain/no palpitations no chest pain/no palpitations/no dyspnea on exertion/no orthopnea/no paroxysmal nocturnal dyspnea/no peripheral edema no chest pain/no palpitations/no dyspnea on exertion/no orthopnea/no paroxysmal nocturnal dyspnea/no peripheral edema/no claudication

## 2022-07-06 NOTE — H&P PST ADULT - NEGATIVE PSYCHIATRIC SYMPTOMS
no suicidal ideation/no depression/no anxiety no suicidal ideation/no depression/no anxiety/no memory loss/no paranoia

## 2022-07-07 LAB
CULTURE RESULTS: NO GROWTH — SIGNIFICANT CHANGE UP
SPECIMEN SOURCE: SIGNIFICANT CHANGE UP

## 2022-07-13 ENCOUNTER — APPOINTMENT (OUTPATIENT)
Dept: FAMILY MEDICINE | Facility: CLINIC | Age: 72
End: 2022-07-13

## 2022-07-13 VITALS
TEMPERATURE: 97.1 F | BODY MASS INDEX: 29.84 KG/M2 | SYSTOLIC BLOOD PRESSURE: 136 MMHG | OXYGEN SATURATION: 98 % | DIASTOLIC BLOOD PRESSURE: 84 MMHG | HEART RATE: 102 BPM | HEIGHT: 75 IN | WEIGHT: 240 LBS | RESPIRATION RATE: 18 BRPM

## 2022-07-13 PROCEDURE — 99215 OFFICE O/P EST HI 40 MIN: CPT

## 2022-07-13 RX ORDER — CEPHALEXIN 500 MG/1
500 CAPSULE ORAL
Qty: 20 | Refills: 0 | Status: COMPLETED | COMMUNITY
Start: 2022-05-25

## 2022-07-13 NOTE — ASSESSMENT
[High Risk Surgery - Intraperitoneal, Intrathoracic or Supringuinal Vascular Procedures] : High Risk Surgery - Intraperitoneal, Intrathoracic or Supringuinal Vascular Procedures - No (0) [Ischemic Heart Disease] : Ischemic Heart Disease - No (0) [Congestive Heart Failure] : Congestive Heart Failure - No (0) [Prior Cerebrovascular Accident or TIA] : Prior Cerebrovascular Accident or TIA - No (0) [Creatinine >= 2mg/dL (1 Point)] : Creatinine >= 2mg/dL - No (0) [Insulin-dependent Diabetic (1 Point)] : Insulin-dependent Diabetic - No (0) [0] : 0 , RCRI Class: I, Risk of Post-Op Cardiac Complications: 3.9%, 95% CI for Risk Estimate: 2.8% - 5.4% [Patient Optimized for Surgery] : Patient optimized for surgery [As per surgery] : as per surgery [FreeTextEntry4] : Patient is medically optimized for the proposed procedure.  [FreeTextEntry5] : NA [FreeTextEntry6] : hold 1 week prior [FreeTextEntry7] : hold all supplement 7 days prior, cont rest of his meds

## 2022-07-13 NOTE — HISTORY OF PRESENT ILLNESS
[No Pertinent Pulmonary History] : no history of asthma, COPD, sleep apnea, or smoking [No Adverse Anesthesia Reaction] : no adverse anesthesia reaction in self or family member [Chronic Kidney Disease] : chronic kidney disease [Chronic Anticoagulation] : no chronic anticoagulation [Diabetes] : no diabetes [(Patient denies any chest pain, claudication, dyspnea on exertion, orthopnea, palpitations or syncope)] : Patient denies any chest pain, claudication, dyspnea on exertion, orthopnea, palpitations or syncope [Excellent (>10 METs)] : Excellent (>10 METs) [Anti-Platelet Agents: _____] : Anti-Platelet Agents: [unfilled] [FreeTextEntry1] : Cystoscopy and bladder biopsy   [FreeTextEntry2] : 07/22/2022 [FreeTextEntry3] : Dr. Canales Job [FreeTextEntry4] : Encounter conducted in Polish.\par Patient deneis CP,SOB,abd pain, he c/o chronic back pain, worse since last surgery, deneis BM or  urination changes .S/p nephrectomy 05/22, doing well  [FreeTextEntry5] : Aortic aneurysm

## 2022-07-13 NOTE — COUNSELING
[Weight management counseling provided] : Weight management [Healthy eating counseling provided] : healthy eating [Activity counseling provided] : activity [Target Wt Loss Goal ___] : Target weight loss goal [unfilled] lbs [Low Fat Diet] : Low fat diet

## 2022-07-13 NOTE — PHYSICAL EXAM
[No Varicosities] : no varicosities [No Edema] : there was no peripheral edema [No CVA Tenderness] : no CVA  tenderness [No Spinal Tenderness] : no spinal tenderness [No Joint Swelling] : no joint swelling [Grossly Normal Strength/Tone] : grossly normal strength/tone [Normal] : affect was normal and insight and judgment were intact

## 2022-07-13 NOTE — REVIEW OF SYSTEMS
[Muscle Pain] : muscle pain [Back Pain] : back pain [Joint Swelling] : joint swelling [Negative] : Psychiatric [Joint Pain] : no joint pain [Muscle Weakness] : no muscle weakness

## 2022-07-20 LAB — SARS-COV-2 N GENE NPH QL NAA+PROBE: NOT DETECTED

## 2022-07-21 ENCOUNTER — TRANSCRIPTION ENCOUNTER (OUTPATIENT)
Age: 72
End: 2022-07-21

## 2022-07-21 NOTE — ASU PATIENT PROFILE, ADULT - NSICDXPASTSURGICALHX_GEN_ALL_CORE_FT
PAST SURGICAL HISTORY:  H/O arthroscopy right knee    S/p nephrectomy right;  05/2022    S/P partial lobectomy of lung left upper;  08/04/2021

## 2022-07-21 NOTE — ASU PATIENT PROFILE, ADULT - FALL HARM RISK - UNIVERSAL INTERVENTIONS
Bed in lowest position, wheels locked, appropriate side rails in place/Call bell, personal items and telephone in reach/Instruct patient to call for assistance before getting out of bed or chair/Non-slip footwear when patient is out of bed/[place] to call system/Physically safe environment - no spills, clutter or unnecessary equipment/Purposeful Proactive Rounding/Room/bathroom lighting operational, light cord in reach

## 2022-07-22 ENCOUNTER — APPOINTMENT (OUTPATIENT)
Dept: UROLOGY | Facility: HOSPITAL | Age: 72
End: 2022-07-22

## 2022-07-22 ENCOUNTER — RESULT REVIEW (OUTPATIENT)
Age: 72
End: 2022-07-22

## 2022-07-22 ENCOUNTER — OUTPATIENT (OUTPATIENT)
Dept: OUTPATIENT SERVICES | Facility: HOSPITAL | Age: 72
LOS: 1 days | Discharge: HOME | End: 2022-07-22

## 2022-07-22 ENCOUNTER — TRANSCRIPTION ENCOUNTER (OUTPATIENT)
Age: 72
End: 2022-07-22

## 2022-07-22 VITALS
SYSTOLIC BLOOD PRESSURE: 139 MMHG | OXYGEN SATURATION: 98 % | DIASTOLIC BLOOD PRESSURE: 84 MMHG | RESPIRATION RATE: 18 BRPM | HEART RATE: 60 BPM

## 2022-07-22 VITALS
HEART RATE: 75 BPM | RESPIRATION RATE: 16 BRPM | TEMPERATURE: 98 F | SYSTOLIC BLOOD PRESSURE: 129 MMHG | DIASTOLIC BLOOD PRESSURE: 81 MMHG | HEIGHT: 73 IN | WEIGHT: 240.97 LBS | OXYGEN SATURATION: 98 %

## 2022-07-22 DIAGNOSIS — Z98.890 OTHER SPECIFIED POSTPROCEDURAL STATES: Chronic | ICD-10-CM

## 2022-07-22 DIAGNOSIS — Z90.5 ACQUIRED ABSENCE OF KIDNEY: Chronic | ICD-10-CM

## 2022-07-22 DIAGNOSIS — C65.9 MALIGNANT NEOPLASM OF UNSPECIFIED RENAL PELVIS: ICD-10-CM

## 2022-07-22 DIAGNOSIS — Z90.2 ACQUIRED ABSENCE OF LUNG [PART OF]: Chronic | ICD-10-CM

## 2022-07-22 LAB
GRAM STN FLD: SIGNIFICANT CHANGE UP
SPECIMEN SOURCE: SIGNIFICANT CHANGE UP

## 2022-07-22 PROCEDURE — 52235 CYSTOSCOPY AND TREATMENT: CPT | Mod: 79

## 2022-07-22 PROCEDURE — 88307 TISSUE EXAM BY PATHOLOGIST: CPT | Mod: 26

## 2022-07-22 PROCEDURE — 88112 CYTOPATH CELL ENHANCE TECH: CPT | Mod: 26

## 2022-07-22 RX ORDER — FENTANYL CITRATE 50 UG/ML
50 INJECTION INTRAVENOUS
Refills: 0 | Status: DISCONTINUED | OUTPATIENT
Start: 2022-07-22 | End: 2022-07-22

## 2022-07-22 RX ORDER — ONDANSETRON 8 MG/1
4 TABLET, FILM COATED ORAL ONCE
Refills: 0 | Status: DISCONTINUED | OUTPATIENT
Start: 2022-07-22 | End: 2022-08-05

## 2022-07-22 RX ORDER — CEPHALEXIN 500 MG
1 CAPSULE ORAL
Qty: 12 | Refills: 0
Start: 2022-07-22 | End: 2022-07-24

## 2022-07-22 RX ORDER — OXYCODONE HYDROCHLORIDE 5 MG/1
5 TABLET ORAL ONCE
Refills: 0 | Status: DISCONTINUED | OUTPATIENT
Start: 2022-07-22 | End: 2022-07-22

## 2022-07-22 RX ORDER — ASPIRIN/CALCIUM CARB/MAGNESIUM 324 MG
1 TABLET ORAL
Qty: 0 | Refills: 0 | DISCHARGE

## 2022-07-22 RX ORDER — SODIUM CHLORIDE 9 MG/ML
1000 INJECTION, SOLUTION INTRAVENOUS
Refills: 0 | Status: DISCONTINUED | OUTPATIENT
Start: 2022-07-22 | End: 2022-08-05

## 2022-07-22 RX ORDER — OXYCODONE HYDROCHLORIDE 5 MG/1
10 TABLET ORAL ONCE
Refills: 0 | Status: DISCONTINUED | OUTPATIENT
Start: 2022-07-22 | End: 2022-07-22

## 2022-07-22 NOTE — BRIEF OPERATIVE NOTE - NSICDXBRIEFPROCEDURE_GEN_ALL_CORE_FT
PROCEDURES:  Cystourethroscopy with bladder tumor resection, small 22-Jul-2022 11:13:54  Sandor Lund

## 2022-07-22 NOTE — ASU DISCHARGE PLAN (ADULT/PEDIATRIC) - NURSING INSTRUCTIONS
DO NOT take any Tylenol (Acetaminophen) or narcotics containing Tylenol until after  4:20 pm . You received Tylenol during your operation and it can cause damage to your liver if too much is taken within a 24 hour time period.

## 2022-07-22 NOTE — ASU DISCHARGE PLAN (ADULT/PEDIATRIC) - ASU DC SPECIAL INSTRUCTIONSFT
GENERAL: It is common to have blood in your urine after your procedure. It may be pink or even red; inform your doctor if you have a significant amount of clot in the urine or if you are unable to void at all or if your catheter stops draining. The urine may clear and then become bloody again especially as you are more physically active. It is not uncommon to have some burning when you urinate, this will gradually improve.   BATHING: You may shower or bathe.  DIET: You may resume your regular diet and regular medication regimen.  PAIN: You may take Tylenol (acetaminophen) 650-975mg and/or Motrin (ibuprofen) 400-600mg, both available over the counter, for pain every 6 hours as needed. Do not exceed 4000mg of Tylenol (acetaminophen) daily. You may alternate these medications such that you take one or the other every 3 hours for around the clock pain coverage.  ANTIBIOTICS: You may be given a prescription for an antibiotic, please take this medication as instructed and be sure to complete the entire course.  STOOL SOFTENERS: Do not allow yourself to become constipated as straining may cause bleeding. Take stool softeners or a laxative (ex. Miralax, Colace, Senokot, ExLax, etc), available over the counter, if needed.  ACTIVITY: No heavy lifting or strenuous exercise until you are evaluated at your post-operative appointment. Otherwise, you may return to your usual level of physical activity.  ANTICOAGULATION: If you are taking any blood thinning medications, please discuss with your urologist prior to restarting these medications unless otherwise specified. Dr Canales will call regarding the pathology results, you may resume aspirin after discussion with him.   FOLLOW-UP: If you did not already schedule your post-operative appointment, please call your urologist to schedule and follow-up appointment.  CALL YOUR UROLOGIST IF: You have any bleeding that does not stop, inability to void >8 hours, fever over 100.4 F, chills, persistent nausea/vomiting, changes in your incision concerning for infection, or if your pain is not controlled on your discharge pain medications.

## 2022-07-22 NOTE — ASU DISCHARGE PLAN (ADULT/PEDIATRIC) - CARE PROVIDER_API CALL
Bonilla Canales)  Urology  [address] [place] [address], [place] [address]  Phone: [phone]  Fax: [phone]  Follow Up Time:

## 2022-07-24 LAB
CULTURE RESULTS: NO GROWTH — SIGNIFICANT CHANGE UP
SPECIMEN SOURCE: SIGNIFICANT CHANGE UP

## 2022-07-25 LAB — NON-GYNECOLOGICAL CYTOLOGY STUDY: SIGNIFICANT CHANGE UP

## 2022-07-28 LAB — SURGICAL PATHOLOGY STUDY: SIGNIFICANT CHANGE UP

## 2022-08-03 LAB
CULTURE RESULTS: SIGNIFICANT CHANGE UP
SPECIMEN SOURCE: SIGNIFICANT CHANGE UP

## 2022-08-17 ENCOUNTER — APPOINTMENT (OUTPATIENT)
Dept: FAMILY MEDICINE | Facility: CLINIC | Age: 72
End: 2022-08-17

## 2022-08-17 VITALS
OXYGEN SATURATION: 98 % | HEART RATE: 80 BPM | TEMPERATURE: 97 F | RESPIRATION RATE: 16 BRPM | SYSTOLIC BLOOD PRESSURE: 118 MMHG | DIASTOLIC BLOOD PRESSURE: 74 MMHG | WEIGHT: 242 LBS | HEIGHT: 75 IN | BODY MASS INDEX: 30.09 KG/M2

## 2022-08-17 PROCEDURE — 36415 COLL VENOUS BLD VENIPUNCTURE: CPT

## 2022-08-17 PROCEDURE — 99215 OFFICE O/P EST HI 40 MIN: CPT | Mod: 25

## 2022-08-17 RX ORDER — ALPRAZOLAM 0.25 MG/1
0.25 TABLET ORAL TWICE DAILY
Qty: 30 | Refills: 0 | Status: COMPLETED | COMMUNITY
Start: 2021-06-24 | End: 2022-08-17

## 2022-08-18 LAB
ALBUMIN SERPL ELPH-MCNC: 4.4 G/DL
ALP BLD-CCNC: 80 U/L
ALT SERPL-CCNC: 7 U/L
ANION GAP SERPL CALC-SCNC: 10 MMOL/L
AST SERPL-CCNC: 13 U/L
BASOPHILS # BLD AUTO: 0.05 K/UL
BASOPHILS NFR BLD AUTO: 0.6 %
BILIRUB SERPL-MCNC: 0.4 MG/DL
BUN SERPL-MCNC: 20 MG/DL
CALCIUM SERPL-MCNC: 9 MG/DL
CHLORIDE SERPL-SCNC: 106 MMOL/L
CHOLEST SERPL-MCNC: 206 MG/DL
CO2 SERPL-SCNC: 22 MMOL/L
CREAT SERPL-MCNC: 1.56 MG/DL
EGFR: 46 ML/MIN/1.73M2
EOSINOPHIL # BLD AUTO: 0.06 K/UL
EOSINOPHIL NFR BLD AUTO: 0.8 %
GLUCOSE SERPL-MCNC: 93 MG/DL
HCT VFR BLD CALC: 37.8 %
HDLC SERPL-MCNC: 54 MG/DL
HGB BLD-MCNC: 12.2 G/DL
IMM GRANULOCYTES NFR BLD AUTO: 0.5 %
LDLC SERPL CALC-MCNC: 109 MG/DL
LYMPHOCYTES # BLD AUTO: 1.54 K/UL
LYMPHOCYTES NFR BLD AUTO: 19.7 %
MAN DIFF?: NORMAL
MCHC RBC-ENTMCNC: 31.1 PG
MCHC RBC-ENTMCNC: 32.3 GM/DL
MCV RBC AUTO: 96.4 FL
MONOCYTES # BLD AUTO: 0.83 K/UL
MONOCYTES NFR BLD AUTO: 10.6 %
NEUTROPHILS # BLD AUTO: 5.28 K/UL
NEUTROPHILS NFR BLD AUTO: 67.8 %
NONHDLC SERPL-MCNC: 152 MG/DL
PLATELET # BLD AUTO: 228 K/UL
POTASSIUM SERPL-SCNC: 4.5 MMOL/L
PROT SERPL-MCNC: 7 G/DL
RBC # BLD: 3.92 M/UL
RBC # FLD: 13.6 %
SODIUM SERPL-SCNC: 138 MMOL/L
T3FREE SERPL-MCNC: 2.61 PG/ML
T4 FREE SERPL-MCNC: 1.5 NG/DL
TRIGL SERPL-MCNC: 214 MG/DL
TSH SERPL-ACNC: 0.68 UIU/ML
WBC # FLD AUTO: 7.8 K/UL

## 2022-08-18 NOTE — HISTORY OF PRESENT ILLNESS
[FreeTextEntry1] : cc: f/u  thyroid,  pressure, back pain , renal Ca,  vit D level  [de-identified] : Patient presented  to f/u on his blood  pressure, vit D  level   thyroid, He is s/o nephrectomy, renal Cancer, He is doing well he denies HA, CP. S/p recent cystoscopy , waiting for the results.   f/u recommended ASAP.  Patient  c/o chronic back pain  on and off, need meds renewal. - no bowel or urination changes.

## 2022-08-18 NOTE — COUNSELING
[Fall prevention counseling provided] : Fall prevention counseling provided [Adequate lighting] : Adequate lighting [No throw rugs] : No throw rugs [Use proper foot wear] : Use proper foot wear [Use recommended devices] : Use recommended devices [Potential consequences of obesity discussed] : Potential consequences of obesity discussed [Benefits of weight loss discussed] : Benefits of weight loss discussed [Encouraged to maintain food diary] : Encouraged to maintain food diary [Encouraged to increase physical activity] : Encouraged to increase physical activity [Encouraged to use exercise tracking device] : Encouraged to use exercise tracking device [Weigh Self Weekly] : weigh self weekly [Decrease Portions] : decrease portions [____ min/wk Activity] : [unfilled] min/wk activity [Keep Food Diary] : keep food diary [Activity counseling provided] : activity [Walking] : Walking [Good understanding] : Patient has a good understanding of lifestyle changes and the steps needed to achieve self management goals

## 2022-08-18 NOTE — HEALTH RISK ASSESSMENT
[Yes] : Yes [2 - 4 times a month (2 pts)] : 2-4 times a month (2 points) [1 or 2 (0 pts)] : 1 or 2 (0 points) [Never (0 pts)] : Never (0 points) [Audit-CScore] : 2 [de-identified] : walking

## 2022-08-18 NOTE — PHYSICAL EXAM
[Normal] : no acute distress, well nourished, well developed and well-appearing [No Acute Distress] : no acute distress [No Respiratory Distress] : no respiratory distress  [Normal Percussion] : the chest was normal to percussion [Normal Rate] : normal rate  [Normal S1, S2] : normal S1 and S2 [No Murmur] : no murmur heard [No Varicosities] : no varicosities [No Edema] : there was no peripheral edema [Soft] : abdomen soft [Non Tender] : non-tender [Non-distended] : non-distended [No Masses] : no abdominal mass palpated [No HSM] : no HSM [Normal Bowel Sounds] : normal bowel sounds [No CVA Tenderness] : no CVA  tenderness [No Joint Swelling] : no joint swelling [No Rash] : no rash [Normal Gait] : normal gait [No Focal Deficits] : no focal deficits [Alert and Oriented x3] : oriented to person, place, and time

## 2022-08-18 NOTE — END OF VISIT
helmet [Time Spent: ___ minutes] : I have spent [unfilled] minutes of time on the encounter. [>50% of the face to face encounter time was spent on counseling and/or coordination of care for ___] : Greater than 50% of the face to face encounter time was spent on counseling and/or coordination of care for [unfilled]

## 2022-08-18 NOTE — REVIEW OF SYSTEMS
[Muscle Pain] : muscle pain [Back Pain] : back pain [Joint Swelling] : no joint swelling [Negative] : Heme/Lymph

## 2022-08-23 ENCOUNTER — APPOINTMENT (OUTPATIENT)
Dept: RADIOLOGY | Facility: HOSPITAL | Age: 72
End: 2022-08-23

## 2022-08-23 ENCOUNTER — APPOINTMENT (OUTPATIENT)
Dept: THORACIC SURGERY | Facility: CLINIC | Age: 72
End: 2022-08-23

## 2022-08-23 ENCOUNTER — OUTPATIENT (OUTPATIENT)
Dept: OUTPATIENT SERVICES | Facility: HOSPITAL | Age: 72
LOS: 1 days | End: 2022-08-23

## 2022-08-23 ENCOUNTER — RESULT REVIEW (OUTPATIENT)
Age: 72
End: 2022-08-23

## 2022-08-23 VITALS
OXYGEN SATURATION: 96 % | RESPIRATION RATE: 16 BRPM | SYSTOLIC BLOOD PRESSURE: 143 MMHG | DIASTOLIC BLOOD PRESSURE: 79 MMHG | BODY MASS INDEX: 29.84 KG/M2 | WEIGHT: 240 LBS | HEART RATE: 87 BPM | HEIGHT: 75 IN

## 2022-08-23 DIAGNOSIS — Z90.2 ACQUIRED ABSENCE OF LUNG [PART OF]: Chronic | ICD-10-CM

## 2022-08-23 DIAGNOSIS — Z90.5 ACQUIRED ABSENCE OF KIDNEY: Chronic | ICD-10-CM

## 2022-08-23 DIAGNOSIS — C80.1 MALIGNANT (PRIMARY) NEOPLASM, UNSPECIFIED: ICD-10-CM

## 2022-08-23 DIAGNOSIS — Z98.890 OTHER SPECIFIED POSTPROCEDURAL STATES: Chronic | ICD-10-CM

## 2022-08-23 PROCEDURE — 99214 OFFICE O/P EST MOD 30 MIN: CPT

## 2022-08-23 PROCEDURE — 71046 X-RAY EXAM CHEST 2 VIEWS: CPT | Mod: 26

## 2022-08-23 NOTE — CONSULT LETTER
[Dear  ___] : Dear  [unfilled], [Courtesy Letter:] : I had the pleasure of seeing your patient, [unfilled], in my office today. [Please see my note below.] : Please see my note below. [Sincerely,] : Sincerely, [FreeTextEntry2] : Dr. Lilly Yu (Pulm/Ref)\par Dr. Luis Lopez (Med)\par Dr. Storm Quezada (Cardiology)\par Dr. Joel Lomas (URO)  [FreeTextEntry3] : Carter Verde MD, FACS \par Chief, Division of Thoracic Surgery \par Director, Minimally Invasive Thoracic Surgery \par Department of Cardiovascular and Thoracic Surgery \par [facility] \par , Cardiovascular and Thoracic Surgery\par

## 2022-08-23 NOTE — HISTORY OF PRESENT ILLNESS
[FreeTextEntry1] : Mr. RACHEL PATEL, 72 year old male, never smoker, w/ hx of HTN, BPH, hypothyroidism, Vertebral fracture, Thoracic aortic aneurysm, who was found to have enlarging LORRIE ground glass nodule during CT evaluation of ascending aortic aneurysm. Referred to office by Dr. Lilly Yu.\par \par CT Angio Chest/Abd/Pelvis on 6/23/21:\par - 8 mm left upper lobe groundglass nodule, previously 4 mm in 2018. \par - Stable 4.6 cm ascending thoracic aortic aneurysm without dissection. \par - Increased attenuation and mild expansion of a right upper pole renal calyx. \par \par PFTs on 7/9/21: FVC 87%, FEV1 87%, DLCO 142%.\par \par PET/CT on 7/14/21:\par - Non-FDG-avid 8 mm left upper nodule is unchanged as compared to CT dated 6/23/2021, and is increased in size as compared to CT dated 12/26/2018, where it measures 0.5 cm (image 94).\par - Increased attenuation and mild expansion of a right upper pole renal calyx, unchanged as compared to CT dated 6/23/2021, not identified on CT dated 2/12/2021, is not well evaluated on PET due to physiologic excretion of radiotracer activity. CT urogram is recommended for further evaluation.\par \par CT Chest on 10/19/21:\par - Status post left upper lobectomy with expected postsurgical changes. Trace left pleural effusion.\par - No new or enlarging nodule.\par \par CXR on 2/2/21: Status post LEFT upper lobe lobectomy.\par No radiographic evidence of active chest disease.\par \par S/p Flexible bronch, uniportal left VATS, pneumonolysis, wedge resection left upper lobe x1,LULobectomy, MLND on 8/4/2021. Path demonstrating: Invasive mucinous adenocarcinoma; G3; 1cm; Single focus; All LN (0/16) and margins negative for tumor; pT1a pN0 (Stage IA1)\par \par CT Chest on 5/3/22:\par - Stable distortion of the left hemithorax from left upper lobectomy. No endobronchial lesion. \par - Stable 2 mm subpleural nodule left lower lobe (2-39). No pleural effusion.\par \par CXR today reviewed, stable. \par \par Patient is here today for a follow up. Patient denies worsening shortness of breath, cough, chest pain, fever, chills, unintentional weight loss, hemoptysis. \par

## 2022-08-23 NOTE — ASSESSMENT
[FreeTextEntry1] : Mr. RACHEL PATEL, 72 year old male, never smoker, w/ hx of HTN, BPH, hypothyroidism, Vertebral fracture, Thoracic aortic aneurysm, who was found to have enlarging LORRIE ground glass nodule during CT evaluation of ascending aortic aneurysm. Referred to office by Dr. Lilly Yu.\par \par S/p Flexible bronch, uniportal left VATS, pneumonolysis, wedge resection left upper lobe x1,LULobectomy, MLND on 8/4/2021. Path demonstrating: Invasive mucinous adenocarcinoma; G3; 1cm; Single focus; All LN (0/16) and margins negative for tumor; pT1a pN0 (Stage IA1)\par \par I have reviewed the patient's medical records and diagnostic images at time of this office consultation and have made the following recommendation:\par 1. CXR from today reviewed, no evidence of recurrence, recommended patient to return to office in 3mo w/ CT Chest\par \par I personally performed the services described in the documentation, reviewed the documentation recorded by the scribe in my presence and it accurately and completely records my words and actions.\par \par I, PRATIBHA Gan, am scribing for and in the presence of ALY Valencia, the following sections HISTORY OF PRESENT ILLNESS, PAST MEDICAL/FAMILY/SOCIAL HISTORY; REVIEW OF SYSTEMS; VITAL SIGNS; PHYSICAL EXAM; DISPOSITION.\par  \par \par \par 
Statement Selected

## 2022-08-25 ENCOUNTER — APPOINTMENT (OUTPATIENT)
Dept: FAMILY MEDICINE | Facility: CLINIC | Age: 72
End: 2022-08-25

## 2022-08-25 ENCOUNTER — LABORATORY RESULT (OUTPATIENT)
Age: 72
End: 2022-08-25

## 2022-08-25 VITALS
BODY MASS INDEX: 29.84 KG/M2 | RESPIRATION RATE: 16 BRPM | DIASTOLIC BLOOD PRESSURE: 85 MMHG | HEART RATE: 76 BPM | WEIGHT: 240 LBS | HEIGHT: 75 IN | SYSTOLIC BLOOD PRESSURE: 149 MMHG | OXYGEN SATURATION: 97 % | TEMPERATURE: 97.7 F

## 2022-08-25 PROCEDURE — 99214 OFFICE O/P EST MOD 30 MIN: CPT | Mod: 25

## 2022-08-25 PROCEDURE — 36415 COLL VENOUS BLD VENIPUNCTURE: CPT

## 2022-08-29 LAB
ALBUMIN SERPL ELPH-MCNC: 4.6 G/DL
ALP BLD-CCNC: 82 U/L
ALT SERPL-CCNC: 9 U/L
ANION GAP SERPL CALC-SCNC: 12 MMOL/L
APPEARANCE: CLEAR
AST SERPL-CCNC: 17 U/L
BASOPHILS # BLD AUTO: 0.04 K/UL
BASOPHILS NFR BLD AUTO: 0.5 %
BILIRUB SERPL-MCNC: 0.8 MG/DL
BILIRUBIN URINE: NEGATIVE
BLOOD URINE: NEGATIVE
BUN SERPL-MCNC: 23 MG/DL
CALCIUM SERPL-MCNC: 9.4 MG/DL
CHLORIDE SERPL-SCNC: 102 MMOL/L
CO2 SERPL-SCNC: 23 MMOL/L
COLOR: NORMAL
CREAT SERPL-MCNC: 1.53 MG/DL
EGFR: 47 ML/MIN/1.73M2
EOSINOPHIL # BLD AUTO: 0.08 K/UL
EOSINOPHIL NFR BLD AUTO: 1.1 %
FERRITIN SERPL-MCNC: 165 NG/ML
FOLATE SERPL-MCNC: >20 NG/ML
GLUCOSE QUALITATIVE U: NEGATIVE
GLUCOSE SERPL-MCNC: 97 MG/DL
HCT VFR BLD CALC: 40.8 %
HGB BLD-MCNC: 13.1 G/DL
IMM GRANULOCYTES NFR BLD AUTO: 0.5 %
IRON SATN MFR SERPL: 42 %
IRON SERPL-MCNC: 132 UG/DL
KETONES URINE: NEGATIVE
LEUKOCYTE ESTERASE URINE: ABNORMAL
LYMPHOCYTES # BLD AUTO: 1.67 K/UL
LYMPHOCYTES NFR BLD AUTO: 22.6 %
MAN DIFF?: NORMAL
MCHC RBC-ENTMCNC: 31.9 PG
MCHC RBC-ENTMCNC: 32.1 GM/DL
MCV RBC AUTO: 99.3 FL
MONOCYTES # BLD AUTO: 0.77 K/UL
MONOCYTES NFR BLD AUTO: 10.4 %
NEUTROPHILS # BLD AUTO: 4.79 K/UL
NEUTROPHILS NFR BLD AUTO: 64.9 %
NITRITE URINE: NEGATIVE
PH URINE: 6.5
PLATELET # BLD AUTO: 234 K/UL
POTASSIUM SERPL-SCNC: 5 MMOL/L
PROT SERPL-MCNC: 7.5 G/DL
PROTEIN URINE: NEGATIVE
RBC # BLD: 4.11 M/UL
RBC # BLD: 4.11 M/UL
RBC # FLD: 13.5 %
RETICS # AUTO: 1.9 %
RETICS AGGREG/RBC NFR: 79.7 K/UL
SODIUM SERPL-SCNC: 137 MMOL/L
SPECIFIC GRAVITY URINE: 1.01
TIBC SERPL-MCNC: 317 UG/DL
TRANSFERRIN SERPL-MCNC: 251 MG/DL
UIBC SERPL-MCNC: 185 UG/DL
UROBILINOGEN URINE: NORMAL
VIT B12 SERPL-MCNC: 877 PG/ML
WBC # FLD AUTO: 7.39 K/UL

## 2022-08-29 NOTE — HEALTH RISK ASSESSMENT
[Former] : Former [Yes] : Yes [2 - 4 times a month (2 pts)] : 2-4 times a month (2 points) [1 or 2 (0 pts)] : 1 or 2 (0 points) [Never (0 pts)] : Never (0 points) [Audit-CScore] : 2 [de-identified] : walking

## 2022-08-29 NOTE — PHYSICAL EXAM
[Normal] : no acute distress, well nourished, well developed and well-appearing [No Acute Distress] : no acute distress [No Respiratory Distress] : no respiratory distress  [Normal Percussion] : the chest was normal to percussion [Normal Rate] : normal rate  [Normal S1, S2] : normal S1 and S2 [No Murmur] : no murmur heard [No Edema] : there was no peripheral edema [Soft] : abdomen soft [Non Tender] : non-tender [Non-distended] : non-distended [No Masses] : no abdominal mass palpated [No HSM] : no HSM [Normal Bowel Sounds] : normal bowel sounds [Normal Gait] : normal gait [No Focal Deficits] : no focal deficits [Alert and Oriented x3] : oriented to person, place, and time

## 2022-08-29 NOTE — HISTORY OF PRESENT ILLNESS
[FreeTextEntry1] : cc: f/u abnormal blood work ,Hx of  renal Ca, Anemia  [de-identified] : Encounter conducted in Polish.\par Patient presented  to f/u on his renal function -  s/p right  nephrectomy, renal Cancer, He is doing well he denies HA, CP.,SOB. S/p recent cystoscopy , waiting for the results.   f/u recommended ASAP.

## 2022-08-29 NOTE — REVIEW OF SYSTEMS
[Muscle Pain] : muscle pain [Back Pain] : back pain [Negative] : Heme/Lymph [Joint Swelling] : no joint swelling

## 2022-10-03 NOTE — ASU PATIENT PROFILE, ADULT - NSALCOHOLPROBLEMSRELYN_GEN_A_CORE_SD
Random blood sugar was 391, bicarb 16, anion gap 18, BHB= 0.45. Consistent with a possible LORENZO presentation. Hemoglobin A1c= 8    --Labs pending for insulin antibodies, C-peptide  --Fenofibrate 67mg, lantus 20mg, lispro 5 TIC AC.   --Needs diabetic education  --May trial oral medication prior to discharge and outpatient follow-up  --Cardiology mentions adding SGLT-inh which I cannot order per pharmacy.    no

## 2022-11-28 ENCOUNTER — RX RENEWAL (OUTPATIENT)
Age: 72
End: 2022-11-28

## 2022-12-19 ENCOUNTER — APPOINTMENT (OUTPATIENT)
Dept: CT IMAGING | Facility: IMAGING CENTER | Age: 72
End: 2022-12-19

## 2022-12-19 ENCOUNTER — OUTPATIENT (OUTPATIENT)
Dept: OUTPATIENT SERVICES | Facility: HOSPITAL | Age: 72
LOS: 1 days | End: 2022-12-19
Payer: MEDICARE

## 2022-12-19 DIAGNOSIS — C80.1 MALIGNANT (PRIMARY) NEOPLASM, UNSPECIFIED: ICD-10-CM

## 2022-12-19 DIAGNOSIS — Z90.2 ACQUIRED ABSENCE OF LUNG [PART OF]: Chronic | ICD-10-CM

## 2022-12-19 DIAGNOSIS — Z98.890 OTHER SPECIFIED POSTPROCEDURAL STATES: Chronic | ICD-10-CM

## 2022-12-19 DIAGNOSIS — Z90.5 ACQUIRED ABSENCE OF KIDNEY: Chronic | ICD-10-CM

## 2022-12-19 PROCEDURE — 71250 CT THORAX DX C-: CPT

## 2022-12-19 PROCEDURE — 71250 CT THORAX DX C-: CPT | Mod: 26

## 2022-12-20 ENCOUNTER — APPOINTMENT (OUTPATIENT)
Dept: THORACIC SURGERY | Facility: CLINIC | Age: 72
End: 2022-12-20

## 2022-12-20 VITALS
OXYGEN SATURATION: 97 % | WEIGHT: 240 LBS | BODY MASS INDEX: 29.84 KG/M2 | DIASTOLIC BLOOD PRESSURE: 95 MMHG | HEIGHT: 75 IN | RESPIRATION RATE: 16 BRPM | SYSTOLIC BLOOD PRESSURE: 166 MMHG | HEART RATE: 71 BPM

## 2022-12-20 PROCEDURE — 99214 OFFICE O/P EST MOD 30 MIN: CPT

## 2022-12-20 NOTE — CONSULT LETTER
[Dear  ___] : Dear  [unfilled], [Courtesy Letter:] : I had the pleasure of seeing your patient, [unfilled], in my office today. [Please see my note below.] : Please see my note below. [Sincerely,] : Sincerely, [FreeTextEntry2] : Dr. Lilly Yu (Pulm/Ref)\par Dr. uLis Lopez (Med)\par Dr. Storm Quezada (Cardiology)\par Dr. Joel Lomas (URO)  [FreeTextEntry3] : Carter Verde MD, FACS \par Chief, Division of Thoracic Surgery \par Director, Minimally Invasive Thoracic Surgery \par Department of Cardiovascular and Thoracic Surgery \par [facility] \par , Cardiovascular and Thoracic Surgery\par

## 2022-12-20 NOTE — HISTORY OF PRESENT ILLNESS
[FreeTextEntry1] : Mr. RACHEL PATEL, 72 year old male, never smoker, w/ hx of HTN, BPH, hypothyroidism, Vertebral fracture, Thoracic aortic aneurysm, who was found to have enlarging LORRIE ground glass nodule during CT evaluation of ascending aortic aneurysm. Referred to office by Dr. Lilly Yu.\par \par CT Angio Chest/Abd/Pelvis on 6/23/21:\par - 8 mm left upper lobe groundglass nodule, previously 4 mm in 2018. \par - Stable 4.6 cm ascending thoracic aortic aneurysm without dissection. \par - Increased attenuation and mild expansion of a right upper pole renal calyx. \par \par PFTs on 7/9/21: FVC 87%, FEV1 87%, DLCO 142%.\par \par PET/CT on 7/14/21:\par - Non-FDG-avid 8 mm left upper nodule is unchanged as compared to CT dated 6/23/2021, and is increased in size as compared to CT dated 12/26/2018, where it measures 0.5 cm (image 94).\par - Increased attenuation and mild expansion of a right upper pole renal calyx, unchanged as compared to CT dated 6/23/2021, not identified on CT dated 2/12/2021, is not well evaluated on PET due to physiologic excretion of radiotracer activity. CT urogram is recommended for further evaluation.\par \par CT Chest on 10/19/21:\par - Status post left upper lobectomy with expected postsurgical changes. Trace left pleural effusion.\par - No new or enlarging nodule.\par \par CXR on 2/2/21: Status post LEFT upper lobe lobectomy.\par No radiographic evidence of active chest disease.\par \par S/p Flexible bronch, uniportal left VATS, pneumonolysis, wedge resection left upper lobe x1,LULobectomy, MLND on 8/4/2021. Path demonstrating: Invasive mucinous adenocarcinoma; G3; 1cm; Single focus; All LN (0/16) and margins negative for tumor; pT1a pN0 (Stage IA1)\par \par CT Chest on 5/3/22:\par - Stable distortion of the left hemithorax from left upper lobectomy. No endobronchial lesion. \par - Stable 2 mm subpleural nodule left lower lobe (2-39). No pleural effusion.\par \par CT Chest on 12/19/22:\par - New right lower lobe subpleural 3 mm nodule (2-124).\par - Stable 2 mm subpleural left lower lobe nodule (2-45). \par - Right lower lobe calcified granuloma. No pleural effusion.\par \par Patient is here today for a follow up. Patient denies shortness of breath, cough, chest pain, fever, chills.

## 2022-12-20 NOTE — ASSESSMENT
[FreeTextEntry1] : Mr. RACHEL PATEL, 72 year old male, never smoker, w/ hx of HTN, BPH, hypothyroidism, Vertebral fracture, Thoracic aortic aneurysm, who was found to have enlarging LORRIE ground glass nodule during CT evaluation of ascending aortic aneurysm. Referred to office by Dr. Lilly Yu.\par \par S/p Flexible bronch, uniportal left VATS, pneumonolysis, wedge resection left upper lobe x1,LULobectomy, MLND on 8/4/2021. Path demonstrating: Invasive mucinous adenocarcinoma; G3; 1cm; Single focus; All LN (0/16) and margins negative for tumor; pT1a pN0 (Stage IA1)\par \par I have reviewed the patient's medical records and diagnostic images at time of this office consultation and have made the following recommendation:\par 1. CT chest reviewed and explained to patient, New 3 mm right lower lobe nodule, I recommended patient to return to office in 3 months with CT Chest without contrast. \par \par I, IAIN ValenciaIM, personally performed the evaluation and management (E/M) services for this established patient who follow up today with an existing condition.  That E/M includes conducting the examination, assessing all new/exacerbated/existing conditions, and establishing a plan of care.  Today, my ACP, YESENIA Tracy-C, was here to observe my evaluation and management services for this existing condition to be followed going forward.

## 2023-01-10 ENCOUNTER — APPOINTMENT (OUTPATIENT)
Dept: UROLOGY | Facility: CLINIC | Age: 73
End: 2023-01-10
Payer: MEDICARE

## 2023-01-10 DIAGNOSIS — R35.0 FREQUENCY OF MICTURITION: ICD-10-CM

## 2023-01-10 PROCEDURE — 52000 CYSTOURETHROSCOPY: CPT

## 2023-01-10 PROCEDURE — 88112 CYTOPATH CELL ENHANCE TECH: CPT | Mod: 26

## 2023-01-10 PROCEDURE — 99213 OFFICE O/P EST LOW 20 MIN: CPT | Mod: 25

## 2023-01-10 PROCEDURE — 99213 OFFICE O/P EST LOW 20 MIN: CPT | Mod: 57

## 2023-01-11 LAB
APPEARANCE: CLEAR
BACTERIA: NEGATIVE
BILIRUBIN URINE: NEGATIVE
BLOOD URINE: NEGATIVE
COLOR: NORMAL
GLUCOSE QUALITATIVE U: NEGATIVE
HYALINE CASTS: 0 /LPF
KETONES URINE: NEGATIVE
LEUKOCYTE ESTERASE URINE: NEGATIVE
MICROSCOPIC-UA: NORMAL
NITRITE URINE: NEGATIVE
PH URINE: 6
PROTEIN URINE: NEGATIVE
RED BLOOD CELLS URINE: 0 /HPF
SPECIFIC GRAVITY URINE: 1.01
SQUAMOUS EPITHELIAL CELLS: 0 /HPF
UROBILINOGEN URINE: NORMAL
WHITE BLOOD CELLS URINE: 1 /HPF

## 2023-01-12 LAB
BACTERIA UR CULT: NORMAL
URINE CYTOLOGY: NORMAL

## 2023-01-23 ENCOUNTER — APPOINTMENT (OUTPATIENT)
Dept: FAMILY MEDICINE | Facility: CLINIC | Age: 73
End: 2023-01-23
Payer: MEDICARE

## 2023-01-23 ENCOUNTER — NON-APPOINTMENT (OUTPATIENT)
Age: 73
End: 2023-01-23

## 2023-01-23 VITALS
HEART RATE: 73 BPM | RESPIRATION RATE: 18 BRPM | BODY MASS INDEX: 30.71 KG/M2 | HEIGHT: 75 IN | TEMPERATURE: 97.3 F | WEIGHT: 247 LBS | SYSTOLIC BLOOD PRESSURE: 144 MMHG | DIASTOLIC BLOOD PRESSURE: 84 MMHG | OXYGEN SATURATION: 98 %

## 2023-01-23 DIAGNOSIS — D41.4 NEOPLASM OF UNCERTAIN BEHAVIOR OF BLADDER: ICD-10-CM

## 2023-01-23 PROCEDURE — 36415 COLL VENOUS BLD VENIPUNCTURE: CPT

## 2023-01-23 PROCEDURE — 99215 OFFICE O/P EST HI 40 MIN: CPT | Mod: 25

## 2023-01-23 PROCEDURE — 93000 ELECTROCARDIOGRAM COMPLETE: CPT

## 2023-01-23 RX ORDER — OXYCODONE AND ACETAMINOPHEN 10; 325 MG/1; MG/1
10-325 TABLET ORAL TWICE DAILY
Qty: 30 | Refills: 0 | Status: COMPLETED | COMMUNITY
Start: 2022-06-15 | End: 2023-01-23

## 2023-01-23 RX ORDER — AMLODIPINE BESYLATE 10 MG/1
10 TABLET ORAL
Qty: 90 | Refills: 0 | Status: COMPLETED | COMMUNITY
Start: 2022-08-29 | End: 2023-01-23

## 2023-01-23 NOTE — PHYSICAL EXAM
[No Varicosities] : no varicosities [No Edema] : there was no peripheral edema [No CVA Tenderness] : no CVA  tenderness [No Spinal Tenderness] : no spinal tenderness [No Joint Swelling] : no joint swelling [Grossly Normal Strength/Tone] : grossly normal strength/tone [Normal] : the outer ears and nose were normal in appearance and the oropharynx was normal

## 2023-01-23 NOTE — RESULTS/DATA
[ECG Reviewed] : reviewed [Normal] : The 12 - lead ECG is normal [No Acute Ischemia] : No acute ischemia [Ventricular Rate___] : ventricular rate is [unfilled] beats per minute [No Interval Change] : no interval change

## 2023-01-24 LAB
25(OH)D3 SERPL-MCNC: 98.4 NG/ML
ALBUMIN SERPL ELPH-MCNC: 4.2 G/DL
ALP BLD-CCNC: 75 U/L
ALT SERPL-CCNC: 6 U/L
ANION GAP SERPL CALC-SCNC: 12 MMOL/L
APPEARANCE: CLEAR
APTT BLD: 30.2 SEC
AST SERPL-CCNC: 14 U/L
BASOPHILS # BLD AUTO: 0.05 K/UL
BASOPHILS NFR BLD AUTO: 0.6 %
BILIRUB SERPL-MCNC: 0.7 MG/DL
BILIRUBIN URINE: NEGATIVE
BLOOD URINE: NEGATIVE
BUN SERPL-MCNC: 18 MG/DL
CALCIUM SERPL-MCNC: 9.7 MG/DL
CHLORIDE SERPL-SCNC: 105 MMOL/L
CO2 SERPL-SCNC: 22 MMOL/L
COLOR: NORMAL
CREAT SERPL-MCNC: 1.44 MG/DL
EGFR: 51 ML/MIN/1.73M2
EOSINOPHIL # BLD AUTO: 0.06 K/UL
EOSINOPHIL NFR BLD AUTO: 0.7 %
GLUCOSE QUALITATIVE U: NEGATIVE
GLUCOSE SERPL-MCNC: 95 MG/DL
HCT VFR BLD CALC: 41.7 %
HGB BLD-MCNC: 13.5 G/DL
IMM GRANULOCYTES NFR BLD AUTO: 0.5 %
INR PPP: 1.04 RATIO
KETONES URINE: NEGATIVE
LEUKOCYTE ESTERASE URINE: NEGATIVE
LYMPHOCYTES # BLD AUTO: 1.56 K/UL
LYMPHOCYTES NFR BLD AUTO: 19.1 %
MAN DIFF?: NORMAL
MCHC RBC-ENTMCNC: 31.5 PG
MCHC RBC-ENTMCNC: 32.4 GM/DL
MCV RBC AUTO: 97.2 FL
MONOCYTES # BLD AUTO: 0.75 K/UL
MONOCYTES NFR BLD AUTO: 9.2 %
NEUTROPHILS # BLD AUTO: 5.71 K/UL
NEUTROPHILS NFR BLD AUTO: 69.9 %
NITRITE URINE: NEGATIVE
PH URINE: 6
PLATELET # BLD AUTO: 191 K/UL
POTASSIUM SERPL-SCNC: 4.8 MMOL/L
PROT SERPL-MCNC: 7.3 G/DL
PROTEIN URINE: NEGATIVE
PT BLD: 12.1 SEC
RBC # BLD: 4.29 M/UL
RBC # FLD: 12.2 %
SODIUM SERPL-SCNC: 139 MMOL/L
SPECIFIC GRAVITY URINE: 1.01
T3FREE SERPL-MCNC: 2.58 PG/ML
T4 FREE SERPL-MCNC: 1.5 NG/DL
TSH SERPL-ACNC: 0.95 UIU/ML
UROBILINOGEN URINE: NORMAL
WBC # FLD AUTO: 8.17 K/UL

## 2023-01-24 NOTE — REVIEW OF SYSTEMS
[Negative] : Heme/Lymph [Joint Pain] : no joint pain [Muscle Pain] : no muscle pain [Muscle Weakness] : no muscle weakness [Back Pain] : no back pain [Joint Swelling] : no joint swelling

## 2023-01-24 NOTE — ADDENDUM
[FreeTextEntry1] : 01/24/2023\par Blood work,urine results noted. Patient was recommended to stop Vit .D supplements.\par Patient is medially optimized for the procedure.

## 2023-01-24 NOTE — ASSESSMENT
[High Risk Surgery - Intraperitoneal, Intrathoracic or Supringuinal Vascular Procedures] : High Risk Surgery - Intraperitoneal, Intrathoracic or Supringuinal Vascular Procedures - No (0) [Ischemic Heart Disease] : Ischemic Heart Disease - No (0) [Congestive Heart Failure] : Congestive Heart Failure - No (0) [Prior Cerebrovascular Accident or TIA] : Prior Cerebrovascular Accident or TIA - No (0) [Creatinine >= 2mg/dL (1 Point)] : Creatinine >= 2mg/dL - No (0) [Insulin-dependent Diabetic (1 Point)] : Insulin-dependent Diabetic - No (0) [0] : 0 , RCRI Class: I, Risk of Post-Op Cardiac Complications: 3.9%, 95% CI for Risk Estimate: 2.8% - 5.4% [As per surgery] : as per surgery [Patient Requires Further Testing] : Patient requires further testing [Other: _____] : [unfilled] [FreeTextEntry5] : NA [FreeTextEntry6] : ASA hold 1 week prior [FreeTextEntry7] : hold all supplement 7 days prior, cont rest of the meds

## 2023-01-24 NOTE — HISTORY OF PRESENT ILLNESS
[No Pertinent Pulmonary History] : no history of asthma, COPD, sleep apnea, or smoking [No Adverse Anesthesia Reaction] : no adverse anesthesia reaction in self or family member [Chronic Kidney Disease] : chronic kidney disease [(Patient denies any chest pain, claudication, dyspnea on exertion, orthopnea, palpitations or syncope)] : Patient denies any chest pain, claudication, dyspnea on exertion, orthopnea, palpitations or syncope [Excellent (>10 METs)] : Excellent (>10 METs) [Anti-Platelet Agents: _____] : Anti-Platelet Agents: [unfilled] [Chronic Anticoagulation] : no chronic anticoagulation [Diabetes] : no diabetes [FreeTextEntry1] : Cystoscopy and polypectomy [FreeTextEntry2] : 02/03/2023 [FreeTextEntry3] : Dr. Ally Crystal [FreeTextEntry4] : Encounter conducted in Polish.\par Patient deneis CP,SOB,abd pain, no fever, no chills.S/p nephrectomy 05/22, doing well. [FreeTextEntry5] : Aortic aneurysm

## 2023-01-25 ENCOUNTER — OUTPATIENT (OUTPATIENT)
Dept: OUTPATIENT SERVICES | Facility: HOSPITAL | Age: 73
LOS: 1 days | End: 2023-01-25

## 2023-01-25 VITALS
RESPIRATION RATE: 16 BRPM | DIASTOLIC BLOOD PRESSURE: 82 MMHG | TEMPERATURE: 98 F | WEIGHT: 246.92 LBS | OXYGEN SATURATION: 97 % | SYSTOLIC BLOOD PRESSURE: 133 MMHG | HEIGHT: 74 IN | HEART RATE: 71 BPM

## 2023-01-25 DIAGNOSIS — Z90.5 ACQUIRED ABSENCE OF KIDNEY: Chronic | ICD-10-CM

## 2023-01-25 DIAGNOSIS — E03.9 HYPOTHYROIDISM, UNSPECIFIED: ICD-10-CM

## 2023-01-25 DIAGNOSIS — Z90.2 ACQUIRED ABSENCE OF LUNG [PART OF]: Chronic | ICD-10-CM

## 2023-01-25 DIAGNOSIS — R31.9 HEMATURIA, UNSPECIFIED: ICD-10-CM

## 2023-01-25 DIAGNOSIS — G47.33 OBSTRUCTIVE SLEEP APNEA (ADULT) (PEDIATRIC): ICD-10-CM

## 2023-01-25 DIAGNOSIS — I10 ESSENTIAL (PRIMARY) HYPERTENSION: ICD-10-CM

## 2023-01-25 DIAGNOSIS — Z98.890 OTHER SPECIFIED POSTPROCEDURAL STATES: Chronic | ICD-10-CM

## 2023-01-25 DIAGNOSIS — D49.4 NEOPLASM OF UNSPECIFIED BEHAVIOR OF BLADDER: ICD-10-CM

## 2023-01-25 RX ORDER — LEVOTHYROXINE SODIUM 125 MCG
1 TABLET ORAL
Qty: 0 | Refills: 0 | DISCHARGE

## 2023-01-25 RX ORDER — FINASTERIDE 5 MG/1
1 TABLET, FILM COATED ORAL
Qty: 0 | Refills: 0 | DISCHARGE

## 2023-01-25 RX ORDER — ASCORBIC ACID 60 MG
1 TABLET,CHEWABLE ORAL
Qty: 0 | Refills: 0 | DISCHARGE

## 2023-01-25 RX ORDER — RAMIPRIL 5 MG
1 CAPSULE ORAL
Qty: 0 | Refills: 0 | DISCHARGE

## 2023-01-25 RX ORDER — OXYCODONE HYDROCHLORIDE 5 MG/1
1 TABLET ORAL
Qty: 0 | Refills: 0 | DISCHARGE

## 2023-01-25 RX ORDER — SODIUM CHLORIDE 9 MG/ML
1000 INJECTION, SOLUTION INTRAVENOUS
Refills: 0 | Status: DISCONTINUED | OUTPATIENT
Start: 2023-02-03 | End: 2023-02-17

## 2023-01-25 NOTE — H&P PST ADULT - CARDIOVASCULAR
details… normal/regular rate and rhythm/S1 S2 present/no murmur/no pedal edema normal/regular rate and rhythm/S1 S2 present/no murmur/no pedal edema/murmur

## 2023-01-25 NOTE — H&P PST ADULT - GASTROINTESTINAL
normal/soft/nontender/nondistended/normal active bowel sounds/no organomegaly/no palpable ruth details…

## 2023-01-25 NOTE — H&P PST ADULT - NSICDXPASTSURGICALHX_GEN_ALL_CORE_FT
PAST SURGICAL HISTORY:  H/O arthroscopy right knee    History of biopsy of bladder     S/p nephrectomy right;  05/2022    S/P partial lobectomy of lung left upper;  08/04/2021

## 2023-01-25 NOTE — H&P PST ADULT - GENITOURINARY MALE
hx bladder tumors/normal external genitalia/no hernia/no discharge/no mass/no tenderness/no ulcer/no penile lesion/no palpable testicular mass/no scrotal mass

## 2023-01-25 NOTE — H&P PST ADULT - NEGATIVE ENMT SYMPTOMS
no hearing difficulty/no ear pain/no tinnitus/no vertigo/no sinus symptoms/no nasal congestion/no post-nasal discharge/no nose bleeds

## 2023-01-25 NOTE — H&P PST ADULT - PROBLEM SELECTOR PLAN 1
Scheduled for TURBT on 2/3/23  Pre-op instructions provided. Pt given verbal and written instructions with teach back on Pepcid. Pt verbalized understanding with return demonstration.   Pending labs and last cardiac note  Covid testing scheduled prior to surgery as per patient.

## 2023-01-25 NOTE — H&P PST ADULT - HISTORY OF PRESENT ILLNESS
72 year old male with H/O: Hypothyroidism, HTN, BPH, malignant neoplasm of unspecified renal pelvis s/p right nephrectomy (05/2022) presents to PST for pre op evaluation in preparation for TURBT tentatively on 2/3/23

## 2023-01-25 NOTE — H&P PST ADULT - NEGATIVE GENERAL GENITOURINARY SYMPTOMS
no flank pain L/no flank pain R/no urine discoloration/no bladder infections/normal urinary frequency

## 2023-01-31 NOTE — PHYSICAL EXAM
[No Acute Distress] : no acute distress [Normal Oropharynx] : normal oropharynx [Normal Appearance] : normal appearance [No Neck Mass] : no neck mass [Normal Rate/Rhythm] : normal rate/rhythm [Normal S1, S2] : normal s1, s2 [No Murmurs] : no murmurs [No Resp Distress] : no resp distress [Clear to Auscultation Bilaterally] : clear to auscultation bilaterally [No Abnormalities] : no abnormalities [Benign] : benign [Normal Gait] : normal gait [No Clubbing] : no clubbing [No Cyanosis] : no cyanosis [No Edema] : no edema [FROM] : FROM [Normal Color/ Pigmentation] : normal color/ pigmentation [No Focal Deficits] : no focal deficits [Oriented x3] : oriented x3 [Normal Affect] : normal affect Include Pregnancy/Lactation Warning?: No

## 2023-02-02 ENCOUNTER — TRANSCRIPTION ENCOUNTER (OUTPATIENT)
Age: 73
End: 2023-02-02

## 2023-02-03 ENCOUNTER — TRANSCRIPTION ENCOUNTER (OUTPATIENT)
Age: 73
End: 2023-02-03

## 2023-02-03 ENCOUNTER — RESULT REVIEW (OUTPATIENT)
Age: 73
End: 2023-02-03

## 2023-02-03 ENCOUNTER — OUTPATIENT (OUTPATIENT)
Dept: OUTPATIENT SERVICES | Facility: HOSPITAL | Age: 73
LOS: 1 days | Discharge: HOME | End: 2023-02-03
Payer: MEDICARE

## 2023-02-03 ENCOUNTER — APPOINTMENT (OUTPATIENT)
Dept: UROLOGY | Facility: HOSPITAL | Age: 73
End: 2023-02-03

## 2023-02-03 VITALS
TEMPERATURE: 98 F | OXYGEN SATURATION: 96 % | SYSTOLIC BLOOD PRESSURE: 142 MMHG | WEIGHT: 246.92 LBS | HEART RATE: 68 BPM | HEIGHT: 74 IN | DIASTOLIC BLOOD PRESSURE: 72 MMHG | RESPIRATION RATE: 16 BRPM

## 2023-02-03 VITALS
DIASTOLIC BLOOD PRESSURE: 79 MMHG | HEART RATE: 87 BPM | OXYGEN SATURATION: 100 % | RESPIRATION RATE: 16 BRPM | SYSTOLIC BLOOD PRESSURE: 139 MMHG | TEMPERATURE: 98 F

## 2023-02-03 DIAGNOSIS — Z98.890 OTHER SPECIFIED POSTPROCEDURAL STATES: Chronic | ICD-10-CM

## 2023-02-03 DIAGNOSIS — R31.9 HEMATURIA, UNSPECIFIED: ICD-10-CM

## 2023-02-03 DIAGNOSIS — Z90.5 ACQUIRED ABSENCE OF KIDNEY: Chronic | ICD-10-CM

## 2023-02-03 DIAGNOSIS — Z90.2 ACQUIRED ABSENCE OF LUNG [PART OF]: Chronic | ICD-10-CM

## 2023-02-03 PROCEDURE — 52240 CYSTOSCOPY AND TREATMENT: CPT

## 2023-02-03 PROCEDURE — 88307 TISSUE EXAM BY PATHOLOGIST: CPT | Mod: 26

## 2023-02-03 PROCEDURE — 88341 IMHCHEM/IMCYTCHM EA ADD ANTB: CPT | Mod: 26

## 2023-02-03 PROCEDURE — 88342 IMHCHEM/IMCYTCHM 1ST ANTB: CPT | Mod: 26

## 2023-02-03 PROCEDURE — 88112 CYTOPATH CELL ENHANCE TECH: CPT | Mod: 26

## 2023-02-03 RX ORDER — CEPHALEXIN 500 MG
1 CAPSULE ORAL
Qty: 10 | Refills: 0
Start: 2023-02-03 | End: 2023-02-07

## 2023-02-03 RX ORDER — HYDROMORPHONE HYDROCHLORIDE 2 MG/ML
0.5 INJECTION INTRAMUSCULAR; INTRAVENOUS; SUBCUTANEOUS
Refills: 0 | Status: DISCONTINUED | OUTPATIENT
Start: 2023-02-03 | End: 2023-02-03

## 2023-02-03 RX ORDER — ONDANSETRON 8 MG/1
4 TABLET, FILM COATED ORAL ONCE
Refills: 0 | Status: DISCONTINUED | OUTPATIENT
Start: 2023-02-03 | End: 2023-02-17

## 2023-02-03 RX ORDER — SODIUM CHLORIDE 9 MG/ML
1000 INJECTION, SOLUTION INTRAVENOUS
Refills: 0 | Status: DISCONTINUED | OUTPATIENT
Start: 2023-02-03 | End: 2023-02-17

## 2023-02-03 NOTE — ASU DISCHARGE PLAN (ADULT/PEDIATRIC) - NURSING INSTRUCTIONS
No DO NOT take any Tylenol (Acetaminophen) or narcotics containing Tylenol until after  _2:30 pm_____ . You received Tylenol during your operation and it can cause damage to your liver if too much is taken within a 24 hour time period. As per Dr Canales remove catheter as instructed tomorrow. If any pain, large amounts of bright red blood in catheter, catheter not draining notify MD.,  Supplies given and written instructions on care and removal of catheter.

## 2023-02-03 NOTE — ASU DISCHARGE PLAN (ADULT/PEDIATRIC) - NS MD DC FALL RISK RISK
For information on Fall & Injury Prevention, visit: [email or web address] OR  [email or web address] OR  https://www.cdc.gov/steadi/patient.html

## 2023-02-03 NOTE — ASU DISCHARGE PLAN (ADULT/PEDIATRIC) - ASU DC SPECIAL INSTRUCTIONSFT
CATHETER: The nurses will review instructions and care before you go home.  GENERAL: It is common to have blood in your urine after your procedure. It may be pink or even red; inform your doctor if you have a significant amount of clot in the urine or if you are unable to void at all or if your catheter stops draining. The urine may clear and then become bloody again especially as you are more physically active. It is not uncommon to have some burning when you urinate, this will gradually improve. With a catheter in place, it is not uncommon to have occasional leakage or urine or blood around the catheter. Please call your urologist if this is excessive and/or the urine is not draining through the catheter into the bag.  BATHING: Shower only until catheter is removed.  DIET: You may resume your regular diet and regular medication regimen.  PAIN: You may take Tylenol (acetaminophen) 650-975mg and/or Motrin (ibuprofen) 400-600mg, both available over the counter, for pain every 6 hours as needed. Do not exceed 4000mg of Tylenol (acetaminophen) daily. You may alternate these medications such that you take one or the other every 3 hours for around the clock pain coverage.  ANTIBIOTICS: You have been given a prescription for an antibiotic, please take this medication as instructed and be sure to complete the entire course.  STOOL SOFTENERS: Do not allow yourself to become constipated as straining may cause bleeding. Take stool softeners or a laxative (ex. Miralax, Colace, Senokot, ExLax, etc), available over the counter, if needed.  ACTIVITY: No heavy lifting or strenuous exercise until you are evaluated at your post-operative appointment. Otherwise, you may return to your usual level of physical activity.  FOLLOW-UP: Please follow up with Dr. Canales for catheter removal.  CALL YOUR UROLOGIST IF: You have any bleeding that does not stop, inability to void >8 hours, fever over 100.4 F, chills, persistent nausea/vomiting, changes in your incision concerning for infection, or if your pain is not controlled on your discharge pain medications.

## 2023-02-03 NOTE — ASU DISCHARGE PLAN (ADULT/PEDIATRIC) - CARE PROVIDER_API CALL
Bonilla Canales)  Urology  [address] [place] [address], [place] [address]  Phone: [phone]  Fax: [phone]  Established Patient  Follow Up Time:

## 2023-02-04 LAB
CULTURE RESULTS: NO GROWTH — SIGNIFICANT CHANGE UP
SPECIMEN SOURCE: SIGNIFICANT CHANGE UP

## 2023-02-13 LAB — SURGICAL PATHOLOGY STUDY: SIGNIFICANT CHANGE UP

## 2023-02-14 LAB — NON-GYNECOLOGICAL CYTOLOGY STUDY: SIGNIFICANT CHANGE UP

## 2023-03-12 NOTE — ASSESSMENT
[FreeTextEntry1] : Stressed the importance to fu with both pulmonary and Urology to further work up incidental nodules. Patient aware that nodules are suspicious for malignancy. Office staff was able to assist patient with making appointment with pulmonologist ASAP.\par \par Anxiety- will restart cymbalta and xanax prn  sparingly for acute anxiety. Patient to fu with pcp in 2-3 weeks\par \par Chest pain/ thoracic aneurysm - fu with cardiology LIMITED RANGE OF MOTION

## 2023-03-20 ENCOUNTER — APPOINTMENT (OUTPATIENT)
Dept: CT IMAGING | Facility: IMAGING CENTER | Age: 73
End: 2023-03-20

## 2023-03-20 ENCOUNTER — OUTPATIENT (OUTPATIENT)
Dept: OUTPATIENT SERVICES | Facility: HOSPITAL | Age: 73
LOS: 1 days | End: 2023-03-20
Payer: MEDICARE

## 2023-03-20 DIAGNOSIS — Z90.2 ACQUIRED ABSENCE OF LUNG [PART OF]: Chronic | ICD-10-CM

## 2023-03-20 DIAGNOSIS — Z90.5 ACQUIRED ABSENCE OF KIDNEY: Chronic | ICD-10-CM

## 2023-03-20 DIAGNOSIS — Z98.890 OTHER SPECIFIED POSTPROCEDURAL STATES: Chronic | ICD-10-CM

## 2023-03-20 DIAGNOSIS — C80.1 MALIGNANT (PRIMARY) NEOPLASM, UNSPECIFIED: ICD-10-CM

## 2023-03-20 PROCEDURE — 71250 CT THORAX DX C-: CPT | Mod: 26

## 2023-03-20 PROCEDURE — 71250 CT THORAX DX C-: CPT

## 2023-03-22 ENCOUNTER — APPOINTMENT (OUTPATIENT)
Dept: FAMILY MEDICINE | Facility: CLINIC | Age: 73
End: 2023-03-22
Payer: MEDICARE

## 2023-03-22 VITALS
BODY MASS INDEX: 30.59 KG/M2 | TEMPERATURE: 97.6 F | HEIGHT: 75 IN | DIASTOLIC BLOOD PRESSURE: 84 MMHG | OXYGEN SATURATION: 98 % | RESPIRATION RATE: 16 BRPM | WEIGHT: 246 LBS | HEART RATE: 75 BPM | SYSTOLIC BLOOD PRESSURE: 136 MMHG

## 2023-03-22 DIAGNOSIS — L71.9 ROSACEA, UNSPECIFIED: ICD-10-CM

## 2023-03-22 PROCEDURE — 99215 OFFICE O/P EST HI 40 MIN: CPT

## 2023-03-23 PROBLEM — L71.9 ROSACEA: Status: ACTIVE | Noted: 2020-08-24

## 2023-03-23 NOTE — HISTORY OF PRESENT ILLNESS
[FreeTextEntry1] : cc: f/u  thyroid,  blood pressure, back pain , renal Ca,  vit D level  [de-identified] : Patient presented  to f/u on his blood  pressure, vit D  level   thyroid, He is s/p nephrectomy, renal Cancer s/p cystoscopy - bladder polyp removal. Path results were d/w the pt at length -   f/u recommended ASA re; further treatment   Patient  c/o chronic back pain  on and off, need meds renewal. - deneis  bowel or urination changes.  He is doing well, he denies HA, CP , no abd pain.

## 2023-03-23 NOTE — PHYSICAL EXAM
[Normal] : no joint swelling and grossly normal strength and tone [No Acute Distress] : no acute distress [No Respiratory Distress] : no respiratory distress  [Normal Percussion] : the chest was normal to percussion [Normal Rate] : normal rate  [Normal S1, S2] : normal S1 and S2 [No Murmur] : no murmur heard [No Varicosities] : no varicosities [No Edema] : there was no peripheral edema [Soft] : abdomen soft [Non Tender] : non-tender [Non-distended] : non-distended [No Masses] : no abdominal mass palpated [No HSM] : no HSM [Normal Bowel Sounds] : normal bowel sounds [No CVA Tenderness] : no CVA  tenderness [No Joint Swelling] : no joint swelling [Normal Gait] : normal gait [No Focal Deficits] : no focal deficits [Alert and Oriented x3] : oriented to person, place, and time [de-identified] : erythematosus rash

## 2023-03-23 NOTE — HEALTH RISK ASSESSMENT
[Yes] : Yes [2 - 4 times a month (2 pts)] : 2-4 times a month (2 points) [1 or 2 (0 pts)] : 1 or 2 (0 points) [Never (0 pts)] : Never (0 points) [No falls in past year] : Patient reported no falls in the past year [0] : 2) Feeling down, depressed, or hopeless: Not at all (0) [PHQ-2 Negative - No further assessment needed] : PHQ-2 Negative - No further assessment needed [Audit-CScore] : 2 [de-identified] : walking  [de-identified] : regular  [[id number]] : 0 [With Patient/Caregiver] : , with patient/caregiver [Aggressive treatment] : aggressive treatment [AdvancecareDate] : 03/23 [Never] : Never

## 2023-03-23 NOTE — DISCUSSION/SUMMARY
[FreeTextEntry1] : Encounter conducted in Polish.\par pt informed re: still elevated Cr. He needs to stop Ramipril, start Norvasc 10mg,  and Nephrol eval recommended. Anemia resolved

## 2023-03-28 ENCOUNTER — APPOINTMENT (OUTPATIENT)
Dept: THORACIC SURGERY | Facility: CLINIC | Age: 73
End: 2023-03-28
Payer: MEDICARE

## 2023-03-28 VITALS
SYSTOLIC BLOOD PRESSURE: 143 MMHG | DIASTOLIC BLOOD PRESSURE: 84 MMHG | HEIGHT: 75 IN | RESPIRATION RATE: 17 BRPM | HEART RATE: 74 BPM | BODY MASS INDEX: 29.84 KG/M2 | OXYGEN SATURATION: 97 % | WEIGHT: 240 LBS

## 2023-03-28 PROCEDURE — 99214 OFFICE O/P EST MOD 30 MIN: CPT

## 2023-03-31 NOTE — CONSULT LETTER
[Dear  ___] : Dear  [unfilled], [Courtesy Letter:] : I had the pleasure of seeing your patient, [unfilled], in my office today. [Please see my note below.] : Please see my note below. [Sincerely,] : Sincerely, [FreeTextEntry2] : Dr. Lilly Yu (Pulm/Ref)\par Dr. Luis Lopez (Med)\par Dr. Storm Quezada (Cardiology)\par Dr. Joel Lomas (URO)  [FreeTextEntry3] : Carter Verde MD, FACS \par Chief, Division of Thoracic Surgery \par Director, Minimally Invasive Thoracic Surgery \par Department of Cardiovascular and Thoracic Surgery \par [facility] \par , Cardiovascular and Thoracic Surgery\par

## 2023-03-31 NOTE — ASSESSMENT
[FreeTextEntry1] : Mr. RACHEL PATEL, 72 year old male, never smoker, w/ hx of HTN, BPH, hypothyroidism, Vertebral fracture, Thoracic aortic aneurysm, who was found to have enlarging LORRIE ground glass nodule during CT evaluation of ascending aortic aneurysm. Referred to office by Dr. Lilly Yu.\par \par S/p Flexible bronch, uniportal left VATS, pneumonolysis, wedge resection left upper lobe x1,LULobectomy, MLND on 8/4/2021. Path demonstrating: Invasive mucinous adenocarcinoma; G3; 1cm; Single focus; All LN (0/16) and margins negative for tumor; pT1a pN0 (Stage IA1)\par \par Patient presents today with follow up scan. \par \par I have reviewed the patient's medical records and diagnostic images at time of this office consultation and have made the following recommendation:\par 1. CT Chest reviewed with patient, stable scan. I recommend he returns to office in 4 months with repeat CT Chest without contrast.\par 2. Continue follow up with PCP.\par \par \par I, IAIN ValenciaIM, personally performed the evaluation and management (E/M) services for this established patient. That E/M includes conducting the examination, assessing all new/exacerbated conditions, and establishing a new plan of care. Today, my ACP, Derick Melgoza NP, was here to observe my evaluation and management services for this new problem/exacerbated condition to be followed going forward.\par \par

## 2023-03-31 NOTE — HISTORY OF PRESENT ILLNESS
[FreeTextEntry1] : Mr. RACHEL PATEL, 72 year old male, never smoker, w/ hx of HTN, BPH, hypothyroidism, Vertebral fracture, Thoracic aortic aneurysm, who was found to have enlarging LORRIE ground glass nodule during CT evaluation of ascending aortic aneurysm. Referred to office by Dr. Lilly Yu.\par \par CT Angio Chest/Abd/Pelvis on 6/23/21:\par - 8 mm left upper lobe groundglass nodule, previously 4 mm in 2018. \par - Stable 4.6 cm ascending thoracic aortic aneurysm without dissection. \par - Increased attenuation and mild expansion of a right upper pole renal calyx. \par \par PFTs on 7/9/21: FVC 87%, FEV1 87%, DLCO 142%.\par \par PET/CT on 7/14/21:\par - Non-FDG-avid 8 mm left upper nodule is unchanged as compared to CT dated 6/23/2021, and is increased in size as compared to CT dated 12/26/2018, where it measures 0.5 cm (image 94).\par - Increased attenuation and mild expansion of a right upper pole renal calyx, unchanged as compared to CT dated 6/23/2021, not identified on CT dated 2/12/2021, is not well evaluated on PET due to physiologic excretion of radiotracer activity. CT urogram is recommended for further evaluation.\par \par CT Chest on 10/19/21:\par - Status post left upper lobectomy with expected postsurgical changes. Trace left pleural effusion.\par - No new or enlarging nodule.\par \par CXR on 2/2/21: Status post LEFT upper lobe lobectomy.\par No radiographic evidence of active chest disease.\par \par S/p Flexible bronch, uniportal left VATS, pneumonolysis, wedge resection left upper lobe x1,LULobectomy, MLND on 8/4/2021. Path demonstrating: Invasive mucinous adenocarcinoma; G3; 1cm; Single focus; All LN (0/16) and margins negative for tumor; pT1a pN0 (Stage IA1)\par \par CT Chest on 5/3/22:\par - Stable distortion of the left hemithorax from left upper lobectomy. No endobronchial lesion. \par - Stable 2 mm subpleural nodule left lower lobe (2-39). No pleural effusion.\par \par CT Chest on 12/19/22:\par - New right lower lobe subpleural 3 mm nodule (2-124).\par - Stable 2 mm subpleural left lower lobe nodule (2-45). \par - Right lower lobe calcified granuloma. No pleural effusion.\par \par CT Chest on 3/20/23:\par - s/p LULobectomy\par - Unchanged 2 mm subpleural nodule in the LLL (2-42)\par -  Resolved previously new nodule in the RLL \par - New 4 mm solid nodule in the RLL posterior basal segment (2-126)\par - RLL calcified granuloma\par - Unchanged dilated approximate 4.7 cm midascending aorta. Normal variant anatomy with persistent left-sided SVC.\par - Unchanged too small to characterize hypodensities in the liver. Right nephrectomy. Partially included left renal cyst.\par - Multilevel spondylosis.\par \par OF NOTE: 5/22/23: s/p open Right nephro-ureterectomy for clear cell renal carcinoma? \par \par Patient is here today for a follow up. Overall, he reports to be feeling well. Denies any chest pain, shortness of breath, cough, or hemoptysis.

## 2023-04-21 ENCOUNTER — LABORATORY RESULT (OUTPATIENT)
Age: 73
End: 2023-04-21

## 2023-04-23 NOTE — ASU PREOP CHECKLIST - RESPIRATORY RATE (BREATHS/MIN)
Pt is a 33year old female admitted to -A. Patient presents with:  Scheduled Induction     Pt is  39w0d intra-uterine pregnancy. History obtained, consents signed. Oriented to room, staff, and plan of care. 16

## 2023-04-25 LAB
25(OH)D3 SERPL-MCNC: 91.1 NG/ML
ALBUMIN SERPL ELPH-MCNC: 4.5 G/DL
ALP BLD-CCNC: 90 U/L
ALT SERPL-CCNC: 9 U/L
ANION GAP SERPL CALC-SCNC: 13 MMOL/L
APPEARANCE: CLEAR
AST SERPL-CCNC: 12 U/L
BASOPHILS # BLD AUTO: 0.06 K/UL
BASOPHILS NFR BLD AUTO: 0.7 %
BILIRUB SERPL-MCNC: 0.6 MG/DL
BILIRUBIN URINE: NEGATIVE
BLOOD URINE: ABNORMAL
BUN SERPL-MCNC: 35 MG/DL
CALCIUM SERPL-MCNC: 9.3 MG/DL
CHLORIDE SERPL-SCNC: 106 MMOL/L
CHOLEST SERPL-MCNC: 198 MG/DL
CO2 SERPL-SCNC: 23 MMOL/L
COLOR: YELLOW
CREAT SERPL-MCNC: 2.12 MG/DL
EGFR: 32 ML/MIN/1.73M2
EOSINOPHIL # BLD AUTO: 0.1 K/UL
EOSINOPHIL NFR BLD AUTO: 1.2 %
GLUCOSE QUALITATIVE U: NEGATIVE MG/DL
GLUCOSE SERPL-MCNC: 102 MG/DL
HCT VFR BLD CALC: 38.8 %
HDLC SERPL-MCNC: 47 MG/DL
HGB BLD-MCNC: 12.7 G/DL
IMM GRANULOCYTES NFR BLD AUTO: 0.4 %
KETONES URINE: NEGATIVE MG/DL
LDLC SERPL CALC-MCNC: 127 MG/DL
LEUKOCYTE ESTERASE URINE: ABNORMAL
LYMPHOCYTES # BLD AUTO: 1.81 K/UL
LYMPHOCYTES NFR BLD AUTO: 22.3 %
MAN DIFF?: NORMAL
MCHC RBC-ENTMCNC: 32.1 PG
MCHC RBC-ENTMCNC: 32.7 GM/DL
MCV RBC AUTO: 98 FL
MONOCYTES # BLD AUTO: 0.83 K/UL
MONOCYTES NFR BLD AUTO: 10.2 %
NEUTROPHILS # BLD AUTO: 5.3 K/UL
NEUTROPHILS NFR BLD AUTO: 65.2 %
NITRITE URINE: NEGATIVE
NONHDLC SERPL-MCNC: 151 MG/DL
PH URINE: 5.5
PLATELET # BLD AUTO: 183 K/UL
POTASSIUM SERPL-SCNC: 4.8 MMOL/L
PROT SERPL-MCNC: 7.2 G/DL
PROTEIN URINE: NEGATIVE MG/DL
PSA SERPL-MCNC: 1.46 NG/ML
RBC # BLD: 3.96 M/UL
RBC # FLD: 11.5 %
SODIUM SERPL-SCNC: 142 MMOL/L
SPECIFIC GRAVITY URINE: 1.01
T3FREE SERPL-MCNC: 2.31 PG/ML
T4 FREE SERPL-MCNC: 1.6 NG/DL
THYROPEROXIDASE AB SERPL IA-ACNC: 17.5 IU/ML
TRIGL SERPL-MCNC: 121 MG/DL
TSH SERPL-ACNC: 2.16 UIU/ML
UROBILINOGEN URINE: 0.2 MG/DL
VIT B12 SERPL-MCNC: 939 PG/ML
WBC # FLD AUTO: 8.13 K/UL

## 2023-05-15 ENCOUNTER — OUTPATIENT (OUTPATIENT)
Dept: OUTPATIENT SERVICES | Facility: HOSPITAL | Age: 73
LOS: 1 days | End: 2023-05-15
Payer: MEDICARE

## 2023-05-15 ENCOUNTER — APPOINTMENT (OUTPATIENT)
Dept: ULTRASOUND IMAGING | Facility: CLINIC | Age: 73
End: 2023-05-15
Payer: MEDICARE

## 2023-05-15 DIAGNOSIS — C67.9 MALIGNANT NEOPLASM OF BLADDER, UNSPECIFIED: ICD-10-CM

## 2023-05-15 DIAGNOSIS — Z98.890 OTHER SPECIFIED POSTPROCEDURAL STATES: Chronic | ICD-10-CM

## 2023-05-15 DIAGNOSIS — Z00.8 ENCOUNTER FOR OTHER GENERAL EXAMINATION: ICD-10-CM

## 2023-05-15 DIAGNOSIS — Z90.2 ACQUIRED ABSENCE OF LUNG [PART OF]: Chronic | ICD-10-CM

## 2023-05-15 DIAGNOSIS — N18.31 CHRONIC KIDNEY DISEASE, STAGE 3A: ICD-10-CM

## 2023-05-15 DIAGNOSIS — Z90.5 ACQUIRED ABSENCE OF KIDNEY: Chronic | ICD-10-CM

## 2023-05-15 PROCEDURE — 76770 US EXAM ABDO BACK WALL COMP: CPT | Mod: 26

## 2023-05-15 PROCEDURE — 76770 US EXAM ABDO BACK WALL COMP: CPT

## 2023-05-18 ENCOUNTER — LABORATORY RESULT (OUTPATIENT)
Age: 73
End: 2023-05-18

## 2023-05-19 ENCOUNTER — TRANSCRIPTION ENCOUNTER (OUTPATIENT)
Age: 73
End: 2023-05-19

## 2023-05-19 ENCOUNTER — NON-APPOINTMENT (OUTPATIENT)
Age: 73
End: 2023-05-19

## 2023-05-19 LAB
ANION GAP SERPL CALC-SCNC: 16 MMOL/L
APPEARANCE: CLEAR
BILIRUBIN URINE: NEGATIVE
BLOOD URINE: ABNORMAL
BUN SERPL-MCNC: 73 MG/DL
CALCIUM SERPL-MCNC: 9 MG/DL
CHLORIDE SERPL-SCNC: 107 MMOL/L
CO2 SERPL-SCNC: 18 MMOL/L
COLOR: YELLOW
CREAT SERPL-MCNC: 6.03 MG/DL
CREAT SPEC-SCNC: 69 MG/DL
EGFR: 9 ML/MIN/1.73M2
GLUCOSE QUALITATIVE U: NEGATIVE MG/DL
GLUCOSE SERPL-MCNC: 106 MG/DL
HCT VFR BLD CALC: 33.6 %
HGB BLD-MCNC: 11.2 G/DL
KETONES URINE: NEGATIVE MG/DL
LEUKOCYTE ESTERASE URINE: ABNORMAL
MAGNESIUM SERPL-MCNC: 2 MG/DL
MCHC RBC-ENTMCNC: 32.3 PG
MCHC RBC-ENTMCNC: 33.3 GM/DL
MCV RBC AUTO: 96.8 FL
MICROALBUMIN 24H UR DL<=1MG/L-MCNC: 7.5 MG/DL
MICROALBUMIN/CREAT 24H UR-RTO: 108 MG/G
NITRITE URINE: NEGATIVE
PH URINE: 6
PHOSPHATE SERPL-MCNC: 5.8 MG/DL
PLATELET # BLD AUTO: 330 K/UL
POTASSIUM SERPL-SCNC: 5.7 MMOL/L
PROTEIN URINE: 30 MG/DL
RBC # BLD: 3.47 M/UL
RBC # FLD: 11.2 %
SODIUM SERPL-SCNC: 141 MMOL/L
SPECIFIC GRAVITY URINE: 1.01
UROBILINOGEN URINE: 0.2 MG/DL
WBC # FLD AUTO: 11 K/UL

## 2023-05-20 ENCOUNTER — INPATIENT (INPATIENT)
Facility: HOSPITAL | Age: 73
LOS: 3 days | Discharge: HOME | DRG: 660 | End: 2023-05-24
Attending: HOSPITALIST | Admitting: HOSPITALIST
Payer: MEDICARE

## 2023-05-20 VITALS
OXYGEN SATURATION: 98 % | SYSTOLIC BLOOD PRESSURE: 158 MMHG | HEIGHT: 75 IN | HEART RATE: 98 BPM | TEMPERATURE: 97 F | RESPIRATION RATE: 20 BRPM | WEIGHT: 240.08 LBS | DIASTOLIC BLOOD PRESSURE: 98 MMHG

## 2023-05-20 DIAGNOSIS — Z90.2 ACQUIRED ABSENCE OF LUNG [PART OF]: Chronic | ICD-10-CM

## 2023-05-20 DIAGNOSIS — N17.9 ACUTE KIDNEY FAILURE, UNSPECIFIED: ICD-10-CM

## 2023-05-20 DIAGNOSIS — N40.0 BENIGN PROSTATIC HYPERPLASIA WITHOUT LOWER URINARY TRACT SYMPTOMS: ICD-10-CM

## 2023-05-20 DIAGNOSIS — E87.5 HYPERKALEMIA: ICD-10-CM

## 2023-05-20 DIAGNOSIS — E03.9 HYPOTHYROIDISM, UNSPECIFIED: ICD-10-CM

## 2023-05-20 DIAGNOSIS — Z90.5 ACQUIRED ABSENCE OF KIDNEY: Chronic | ICD-10-CM

## 2023-05-20 DIAGNOSIS — C68.9 MALIGNANT NEOPLASM OF URINARY ORGAN, UNSPECIFIED: ICD-10-CM

## 2023-05-20 DIAGNOSIS — Z29.9 ENCOUNTER FOR PROPHYLACTIC MEASURES, UNSPECIFIED: ICD-10-CM

## 2023-05-20 DIAGNOSIS — Z98.890 OTHER SPECIFIED POSTPROCEDURAL STATES: Chronic | ICD-10-CM

## 2023-05-20 DIAGNOSIS — I10 ESSENTIAL (PRIMARY) HYPERTENSION: ICD-10-CM

## 2023-05-20 DIAGNOSIS — E87.20 ACIDOSIS, UNSPECIFIED: ICD-10-CM

## 2023-05-20 LAB
ALBUMIN SERPL ELPH-MCNC: 3.6 G/DL — SIGNIFICANT CHANGE UP (ref 3.3–5)
ALBUMIN SERPL ELPH-MCNC: 4 G/DL — SIGNIFICANT CHANGE UP (ref 3.3–5)
ALP SERPL-CCNC: 70 U/L — SIGNIFICANT CHANGE UP (ref 40–120)
ALP SERPL-CCNC: 82 U/L — SIGNIFICANT CHANGE UP (ref 40–120)
ALT FLD-CCNC: 6 U/L — LOW (ref 10–45)
ALT FLD-CCNC: 7 U/L — LOW (ref 10–45)
ANION GAP SERPL CALC-SCNC: 18 MMOL/L — HIGH (ref 5–17)
ANION GAP SERPL CALC-SCNC: 19 MMOL/L — HIGH (ref 5–17)
APPEARANCE UR: CLEAR — SIGNIFICANT CHANGE UP
AST SERPL-CCNC: 12 U/L — SIGNIFICANT CHANGE UP (ref 10–40)
AST SERPL-CCNC: 12 U/L — SIGNIFICANT CHANGE UP (ref 10–40)
BACTERIA # UR AUTO: NEGATIVE — SIGNIFICANT CHANGE UP
BASE EXCESS BLDV CALC-SCNC: -8.1 MMOL/L — LOW (ref -2–3)
BASE EXCESS BLDV CALC-SCNC: -8.1 MMOL/L — LOW (ref -2–3)
BASOPHILS # BLD AUTO: 0.05 K/UL — SIGNIFICANT CHANGE UP (ref 0–0.2)
BASOPHILS NFR BLD AUTO: 0.5 % — SIGNIFICANT CHANGE UP (ref 0–2)
BILIRUB SERPL-MCNC: 0.4 MG/DL — SIGNIFICANT CHANGE UP (ref 0.2–1.2)
BILIRUB SERPL-MCNC: 0.4 MG/DL — SIGNIFICANT CHANGE UP (ref 0.2–1.2)
BILIRUB UR-MCNC: NEGATIVE — SIGNIFICANT CHANGE UP
BLD GP AB SCN SERPL QL: NEGATIVE — SIGNIFICANT CHANGE UP
BUN SERPL-MCNC: 81 MG/DL — HIGH (ref 7–23)
BUN SERPL-MCNC: 82 MG/DL — HIGH (ref 7–23)
CA-I SERPL-SCNC: 1.19 MMOL/L — SIGNIFICANT CHANGE UP (ref 1.15–1.33)
CA-I SERPL-SCNC: 1.21 MMOL/L — SIGNIFICANT CHANGE UP (ref 1.15–1.33)
CALCIUM SERPL-MCNC: 8.9 MG/DL — SIGNIFICANT CHANGE UP (ref 8.4–10.5)
CALCIUM SERPL-MCNC: 9 MG/DL — SIGNIFICANT CHANGE UP (ref 8.4–10.5)
CHLORIDE BLDV-SCNC: 108 MMOL/L — SIGNIFICANT CHANGE UP (ref 96–108)
CHLORIDE BLDV-SCNC: 109 MMOL/L — HIGH (ref 96–108)
CHLORIDE SERPL-SCNC: 107 MMOL/L — SIGNIFICANT CHANGE UP (ref 96–108)
CHLORIDE SERPL-SCNC: 107 MMOL/L — SIGNIFICANT CHANGE UP (ref 96–108)
CO2 BLDV-SCNC: 19 MMOL/L — LOW (ref 22–26)
CO2 BLDV-SCNC: 19 MMOL/L — LOW (ref 22–26)
CO2 SERPL-SCNC: 14 MMOL/L — LOW (ref 22–31)
CO2 SERPL-SCNC: 15 MMOL/L — LOW (ref 22–31)
COLOR SPEC: SIGNIFICANT CHANGE UP
CREAT ?TM UR-MCNC: 65 MG/DL — SIGNIFICANT CHANGE UP
CREAT SERPL-MCNC: 6.85 MG/DL — HIGH (ref 0.5–1.3)
CREAT SERPL-MCNC: 7.29 MG/DL — HIGH (ref 0.5–1.3)
DIFF PNL FLD: ABNORMAL
EGFR: 7 ML/MIN/1.73M2 — LOW
EGFR: 8 ML/MIN/1.73M2 — LOW
EOSINOPHIL # BLD AUTO: 0.07 K/UL — SIGNIFICANT CHANGE UP (ref 0–0.5)
EOSINOPHIL NFR BLD AUTO: 0.7 % — SIGNIFICANT CHANGE UP (ref 0–6)
EPI CELLS # UR: 1 /HPF — SIGNIFICANT CHANGE UP
GAS PNL BLDV: 135 MMOL/L — LOW (ref 136–145)
GAS PNL BLDV: 136 MMOL/L — SIGNIFICANT CHANGE UP (ref 136–145)
GAS PNL BLDV: SIGNIFICANT CHANGE UP
GLUCOSE BLDC GLUCOMTR-MCNC: 135 MG/DL — HIGH (ref 70–99)
GLUCOSE BLDC GLUCOMTR-MCNC: 200 MG/DL — HIGH (ref 70–99)
GLUCOSE BLDV-MCNC: 89 MG/DL — SIGNIFICANT CHANGE UP (ref 70–99)
GLUCOSE BLDV-MCNC: 93 MG/DL — SIGNIFICANT CHANGE UP (ref 70–99)
GLUCOSE SERPL-MCNC: 88 MG/DL — SIGNIFICANT CHANGE UP (ref 70–99)
GLUCOSE SERPL-MCNC: 92 MG/DL — SIGNIFICANT CHANGE UP (ref 70–99)
GLUCOSE UR QL: NEGATIVE — SIGNIFICANT CHANGE UP
HCO3 BLDV-SCNC: 18 MMOL/L — LOW (ref 22–29)
HCO3 BLDV-SCNC: 18 MMOL/L — LOW (ref 22–29)
HCT VFR BLD CALC: 33.3 % — LOW (ref 39–50)
HCT VFR BLDA CALC: 32 % — LOW (ref 39–51)
HCT VFR BLDA CALC: 35 % — LOW (ref 39–51)
HGB BLD CALC-MCNC: 10.5 G/DL — LOW (ref 12.6–17.4)
HGB BLD CALC-MCNC: 11.6 G/DL — LOW (ref 12.6–17.4)
HGB BLD-MCNC: 11.1 G/DL — LOW (ref 13–17)
IMM GRANULOCYTES NFR BLD AUTO: 0.6 % — SIGNIFICANT CHANGE UP (ref 0–0.9)
KETONES UR-MCNC: NEGATIVE — SIGNIFICANT CHANGE UP
LACTATE BLDV-MCNC: 0.8 MMOL/L — SIGNIFICANT CHANGE UP (ref 0.5–2)
LACTATE BLDV-MCNC: 1 MMOL/L — SIGNIFICANT CHANGE UP (ref 0.5–2)
LEUKOCYTE ESTERASE UR-ACNC: NEGATIVE — SIGNIFICANT CHANGE UP
LIDOCAIN IGE QN: 42 U/L — SIGNIFICANT CHANGE UP (ref 7–60)
LYMPHOCYTES # BLD AUTO: 1.47 K/UL — SIGNIFICANT CHANGE UP (ref 1–3.3)
LYMPHOCYTES # BLD AUTO: 14 % — SIGNIFICANT CHANGE UP (ref 13–44)
MAGNESIUM SERPL-MCNC: 1.9 MG/DL — SIGNIFICANT CHANGE UP (ref 1.6–2.6)
MCHC RBC-ENTMCNC: 31.8 PG — SIGNIFICANT CHANGE UP (ref 27–34)
MCHC RBC-ENTMCNC: 33.3 GM/DL — SIGNIFICANT CHANGE UP (ref 32–36)
MCV RBC AUTO: 95.4 FL — SIGNIFICANT CHANGE UP (ref 80–100)
MONOCYTES # BLD AUTO: 0.88 K/UL — SIGNIFICANT CHANGE UP (ref 0–0.9)
MONOCYTES NFR BLD AUTO: 8.4 % — SIGNIFICANT CHANGE UP (ref 2–14)
NEUTROPHILS # BLD AUTO: 8 K/UL — HIGH (ref 1.8–7.4)
NEUTROPHILS NFR BLD AUTO: 75.8 % — SIGNIFICANT CHANGE UP (ref 43–77)
NITRITE UR-MCNC: NEGATIVE — SIGNIFICANT CHANGE UP
NRBC # BLD: 0 /100 WBCS — SIGNIFICANT CHANGE UP (ref 0–0)
NT-PROBNP SERPL-SCNC: 1702 PG/ML — HIGH (ref 0–300)
OSMOLALITY UR: 234 MOS/KG — LOW (ref 300–900)
PCO2 BLDV: 37 MMHG — LOW (ref 42–55)
PCO2 BLDV: 37 MMHG — LOW (ref 42–55)
PH BLDV: 7.29 — LOW (ref 7.32–7.43)
PH BLDV: 7.29 — LOW (ref 7.32–7.43)
PH UR: 6 — SIGNIFICANT CHANGE UP (ref 5–8)
PLATELET # BLD AUTO: 298 K/UL — SIGNIFICANT CHANGE UP (ref 150–400)
PO2 BLDV: 28 MMHG — SIGNIFICANT CHANGE UP (ref 25–45)
PO2 BLDV: 36 MMHG — SIGNIFICANT CHANGE UP (ref 25–45)
POTASSIUM BLDV-SCNC: 5.6 MMOL/L — HIGH (ref 3.5–5.1)
POTASSIUM BLDV-SCNC: 6.1 MMOL/L — HIGH (ref 3.5–5.1)
POTASSIUM SERPL-MCNC: 5.7 MMOL/L — HIGH (ref 3.5–5.3)
POTASSIUM SERPL-MCNC: 5.9 MMOL/L — HIGH (ref 3.5–5.3)
POTASSIUM SERPL-SCNC: 5.7 MMOL/L — HIGH (ref 3.5–5.3)
POTASSIUM SERPL-SCNC: 5.9 MMOL/L — HIGH (ref 3.5–5.3)
POTASSIUM UR-SCNC: 19 MMOL/L — SIGNIFICANT CHANGE UP
PROT ?TM UR-MCNC: 14 MG/DL — HIGH (ref 0–12)
PROT SERPL-MCNC: 7 G/DL — SIGNIFICANT CHANGE UP (ref 6–8.3)
PROT SERPL-MCNC: 8.1 G/DL — SIGNIFICANT CHANGE UP (ref 6–8.3)
PROT UR-MCNC: ABNORMAL
PROT/CREAT UR-RTO: 0.2 RATIO — SIGNIFICANT CHANGE UP (ref 0–0.2)
RBC # BLD: 3.49 M/UL — LOW (ref 4.2–5.8)
RBC # FLD: 10.9 % — SIGNIFICANT CHANGE UP (ref 10.3–14.5)
RBC CASTS # UR COMP ASSIST: 1 /HPF — SIGNIFICANT CHANGE UP (ref 0–4)
RH IG SCN BLD-IMP: POSITIVE — SIGNIFICANT CHANGE UP
SAO2 % BLDV: 37.8 % — LOW (ref 67–88)
SAO2 % BLDV: 52.5 % — LOW (ref 67–88)
SODIUM SERPL-SCNC: 140 MMOL/L — SIGNIFICANT CHANGE UP (ref 135–145)
SODIUM SERPL-SCNC: 140 MMOL/L — SIGNIFICANT CHANGE UP (ref 135–145)
SODIUM UR-SCNC: 38 MMOL/L — SIGNIFICANT CHANGE UP
SP GR SPEC: 1.01 — LOW (ref 1.01–1.02)
TROPONIN T, HIGH SENSITIVITY RESULT: 37 NG/L — SIGNIFICANT CHANGE UP (ref 0–51)
UROBILINOGEN FLD QL: NEGATIVE — SIGNIFICANT CHANGE UP
WBC # BLD: 10.53 K/UL — HIGH (ref 3.8–10.5)
WBC # FLD AUTO: 10.53 K/UL — HIGH (ref 3.8–10.5)
WBC UR QL: 3 /HPF — SIGNIFICANT CHANGE UP (ref 0–5)

## 2023-05-20 PROCEDURE — 71045 X-RAY EXAM CHEST 1 VIEW: CPT | Mod: 26

## 2023-05-20 PROCEDURE — 76770 US EXAM ABDO BACK WALL COMP: CPT | Mod: 26

## 2023-05-20 PROCEDURE — 99291 CRITICAL CARE FIRST HOUR: CPT

## 2023-05-20 PROCEDURE — 74176 CT ABD & PELVIS W/O CONTRAST: CPT | Mod: 26,MA

## 2023-05-20 PROCEDURE — 99223 1ST HOSP IP/OBS HIGH 75: CPT

## 2023-05-20 PROCEDURE — 99222 1ST HOSP IP/OBS MODERATE 55: CPT | Mod: GC

## 2023-05-20 RX ORDER — ALBUTEROL 90 UG/1
10 AEROSOL, METERED ORAL ONCE
Refills: 0 | Status: DISCONTINUED | OUTPATIENT
Start: 2023-05-20 | End: 2023-05-20

## 2023-05-20 RX ORDER — SODIUM ZIRCONIUM CYCLOSILICATE 10 G/10G
5 POWDER, FOR SUSPENSION ORAL ONCE
Refills: 0 | Status: DISCONTINUED | OUTPATIENT
Start: 2023-05-20 | End: 2023-05-20

## 2023-05-20 RX ORDER — FUROSEMIDE 40 MG
20 TABLET ORAL ONCE
Refills: 0 | Status: COMPLETED | OUTPATIENT
Start: 2023-05-20 | End: 2023-05-20

## 2023-05-20 RX ORDER — SODIUM ZIRCONIUM CYCLOSILICATE 10 G/10G
5 POWDER, FOR SUSPENSION ORAL ONCE
Refills: 0 | Status: COMPLETED | OUTPATIENT
Start: 2023-05-20 | End: 2023-05-20

## 2023-05-20 RX ORDER — DEXTROSE 50 % IN WATER 50 %
50 SYRINGE (ML) INTRAVENOUS ONCE
Refills: 0 | Status: COMPLETED | OUTPATIENT
Start: 2023-05-20 | End: 2023-05-20

## 2023-05-20 RX ORDER — SODIUM BICARBONATE 1 MEQ/ML
0.21 SYRINGE (ML) INTRAVENOUS
Qty: 150 | Refills: 0 | Status: DISCONTINUED | OUTPATIENT
Start: 2023-05-20 | End: 2023-05-21

## 2023-05-20 RX ORDER — CALCIUM GLUCONATE 100 MG/ML
2 VIAL (ML) INTRAVENOUS ONCE
Refills: 0 | Status: COMPLETED | OUTPATIENT
Start: 2023-05-20 | End: 2023-05-20

## 2023-05-20 RX ORDER — INSULIN HUMAN 100 [IU]/ML
5 INJECTION, SOLUTION SUBCUTANEOUS ONCE
Refills: 0 | Status: COMPLETED | OUTPATIENT
Start: 2023-05-20 | End: 2023-05-20

## 2023-05-20 RX ORDER — CHOLECALCIFEROL (VITAMIN D3) 125 MCG
1 CAPSULE ORAL
Qty: 0 | Refills: 0 | DISCHARGE

## 2023-05-20 RX ORDER — SODIUM CHLORIDE 9 MG/ML
500 INJECTION INTRAMUSCULAR; INTRAVENOUS; SUBCUTANEOUS ONCE
Refills: 0 | Status: COMPLETED | OUTPATIENT
Start: 2023-05-20 | End: 2023-05-20

## 2023-05-20 RX ORDER — LEVOTHYROXINE SODIUM 125 MCG
25 TABLET ORAL DAILY
Refills: 0 | Status: DISCONTINUED | OUTPATIENT
Start: 2023-05-20 | End: 2023-05-24

## 2023-05-20 RX ORDER — SODIUM ZIRCONIUM CYCLOSILICATE 10 G/10G
10 POWDER, FOR SUSPENSION ORAL ONCE
Refills: 0 | Status: COMPLETED | OUTPATIENT
Start: 2023-05-20 | End: 2023-05-20

## 2023-05-20 RX ORDER — FINASTERIDE 5 MG/1
5 TABLET, FILM COATED ORAL DAILY
Refills: 0 | Status: DISCONTINUED | OUTPATIENT
Start: 2023-05-20 | End: 2023-05-24

## 2023-05-20 RX ADMIN — Medication 2 GRAM(S): at 19:21

## 2023-05-20 RX ADMIN — Medication 200 GRAM(S): at 15:47

## 2023-05-20 RX ADMIN — INSULIN HUMAN 5 UNIT(S): 100 INJECTION, SOLUTION SUBCUTANEOUS at 23:06

## 2023-05-20 RX ADMIN — SODIUM ZIRCONIUM CYCLOSILICATE 5 GRAM(S): 10 POWDER, FOR SUSPENSION ORAL at 16:33

## 2023-05-20 RX ADMIN — Medication 150 MEQ/KG/HR: at 18:51

## 2023-05-20 RX ADMIN — Medication 20 MILLIGRAM(S): at 16:33

## 2023-05-20 RX ADMIN — SODIUM CHLORIDE 500 MILLILITER(S): 9 INJECTION INTRAMUSCULAR; INTRAVENOUS; SUBCUTANEOUS at 19:21

## 2023-05-20 RX ADMIN — Medication 50 MILLILITER(S): at 16:23

## 2023-05-20 RX ADMIN — INSULIN HUMAN 5 UNIT(S): 100 INJECTION, SOLUTION SUBCUTANEOUS at 16:23

## 2023-05-20 RX ADMIN — SODIUM ZIRCONIUM CYCLOSILICATE 10 GRAM(S): 10 POWDER, FOR SUSPENSION ORAL at 23:06

## 2023-05-20 RX ADMIN — SODIUM CHLORIDE 500 MILLILITER(S): 9 INJECTION INTRAMUSCULAR; INTRAVENOUS; SUBCUTANEOUS at 16:27

## 2023-05-20 RX ADMIN — Medication 50 MILLILITER(S): at 23:06

## 2023-05-20 NOTE — H&P ADULT - ASSESSMENT
72M PMH HTN, hypothyroidism, pulmonary nodule s/p VATS LORRIE (8/2021), s/p R nephrectomy 5/22 (RCC) now w/ CKD, s/p TURBT 2/14/23 showing high grade invasive urothelial carcinoma who presents to the ED at the recommendation of his PMD due to abnormal lab testing, found to be in renal failure w/ hyperkalemia. Imaging revealing L hydroureteronephrosis w/ filling defect in the ureter, likely in the setting of malignancy pending urologic intervention.

## 2023-05-20 NOTE — ED ADULT NURSE REASSESSMENT NOTE - NS ED NURSE REASSESS COMMENT FT1
pt's Cordero leaking around catheter. Cordero removed. PETRONA Hooker notified and will contact Urology.

## 2023-05-20 NOTE — ED ADULT NURSE NOTE - OBJECTIVE STATEMENT
Patient is a 72 year old male sent in from his PCP due to abnormal lab. Patient reports going to his PCP for a routine check up and lab work then got a call from his PCP that his potassium was elevated. On assessment A&Ox4, breathing comfortably on room air, NSR on the cardiac monitor, abdomen soft, moving all extremities. Patient denies headache, dizziness, chest pain, palpitations, cough, SOB, abdominal pain, n/v/d, urinary symptoms, fevers, chills, weakness at this time.

## 2023-05-20 NOTE — PATIENT PROFILE ADULT - FALL HARM RISK - UNIVERSAL INTERVENTIONS
Bed in lowest position, wheels locked, appropriate side rails in place/Call bell, personal items and telephone in reach/Instruct patient to call for assistance before getting out of bed or chair/Non-slip footwear when patient is out of bed/[place] to call system/Physically safe environment - no spills, clutter or unnecessary equipment/Purposeful Proactive Rounding/Room/bathroom lighting operational, light cord in reach

## 2023-05-20 NOTE — H&P ADULT - PROBLEM SELECTOR PLAN 1
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 No current indication for urgent HD but will monitor closely.  - Admit to telemetry  - Nephrology on board, appreciate recs  - Continue bicarb gtt  - q4-6hr BMPs for now  - Shift potassium prn  - Maintain Cordero for strict Is/Os  - Avoid nephrotoxins  - Dose medications per eGFR  - Urology on board, keep NPO for urologic intervention Patient w/ history of R nephrectomy 2/2 RCC & recent TURBT showing urothelial cancer presenting w/ acute renal failure w/ hyperkalemia & L hydroureteronephrosis w/ filling defect in the L ureter, likely i/s/o malignancy. SCr ~1.5 at baseline since 8/22. No current indication for urgent HD but will monitor closely.  - Admit to telemetry  - Nephrology on board, appreciate recs  - Continue bicarb gtt  - q4-6hr BMPs for now  - Shift potassium prn  - Maintain Cordero for strict Is/Os  - Avoid nephrotoxins  - Dose medications per eGFR  - Urology on board, keep NPO for urologic intervention

## 2023-05-20 NOTE — ED PROVIDER NOTE - OBJECTIVE STATEMENT
Attending Nettie Bates: 72-year-old male with history of prior kidney removal, high blood pressure presenting after was called by primary care doctor for elevated potassium.  Patient states has been feeling well denies any shortness of breath, difficulty breathing or abdominal pain.  Went for routine blood work and was called by his primary care physician and was told his potassium level has been high.  Denies any new foods.  Denies any difficulty urinating but does report some urinary frequency.  No recent coughs, colds or fevers. 71yo M pmh HTN, R nephrectomy (2/2 malignant neoplasm 5/22), Pulm nodule s/p VATS LORRIE (8/2021) - Since emergency department for abnormal lab values.  Upon reviewing chart it was found that patient had an azotemia and hyperkalemia on labs drawn 2 days ago.  Patient reports has been feeling well with no complaints and at baseline.  Reports urinating well without pain.  Denies any fevers or chills and otherwise is at baseline.  ROS otherwise negative.  Reports compliance to medications    Attending Nettie Bates: 72-year-old male with history of prior kidney removal, high blood pressure presenting after was called by primary care doctor for elevated potassium.  Patient states has been feeling well denies any shortness of breath, difficulty breathing or abdominal pain.  Went for routine blood work and was called by his primary care physician and was told his potassium level has been high.  Denies any new foods.  Denies any difficulty urinating but does report some urinary frequency.  No recent coughs, colds or fevers.

## 2023-05-20 NOTE — PATIENT PROFILE ADULT - FUNCTIONAL ASSESSMENT - BASIC MOBILITY 6.
4-calculated by average/Not able to assess (calculate score using [facility] averaging method)

## 2023-05-20 NOTE — ED ADULT TRIAGE NOTE - CHIEF COMPLAINT QUOTE
Patient had blood work done last Thursday and received a call from PMD to come to ED for an abnormal lab results(Hyperkalemia). h/o 1 kidney.

## 2023-05-20 NOTE — CONSULT NOTE ADULT - SUBJECTIVE AND OBJECTIVE BOX
HPI  71 yo male w/ PMH HTN, R nephrectomy (2/2 malignant neoplasm ), TURBT, Pulm nodule s/p VATS LORRIE (2021) presenting to ED for abnormal lab values. Upon reviewing chart it was found that patient had an azotemia and hyperkalemia on labs drawn 2 days ago. Patient reports has been feeling well with no complaints and at baseline. Reports urinating well without pain. Denies any fevers or chills and otherwise is at baseline. ROS otherwise negative. Reports compliance to medications.    Urology consulted for GAMAL and hydronephrosis seen on CT in solitary L kidney. Denies dysuria, fevers, nausea, vomiting. Able to urinate.      PAST MEDICAL & SURGICAL HISTORY:  HTN (hypertension)      Ascending aortic aneurysm      Thoracic aortic aneurysm      Obesity      Pulmonary nodule  removed 2021 early stage no chemo VAT      Hypothyroid      COVID-19  2020 loss of smell no hosp      History of low back pain      BPH without obstruction/lower urinary tract symptoms      Hearing loss      Malignant neoplasm of unspecified kidney, except renal pelvis      H/O arthroscopy  right knee      S/p nephrectomy  right;  2022      S/P partial lobectomy of lung  left upper;  2021      History of biopsy of bladder          MEDICATIONS  (STANDING):  sodium bicarbonate  Infusion 0.207 mEq/kG/Hr (150 mL/Hr) IV Continuous <Continuous>    MEDICATIONS  (PRN):      FAMILY HISTORY:      Allergies    No Known Allergies    Intolerances        SOCIAL HISTORY:    REVIEW OF SYSTEMS:   Otherwise negative as stated in HPI    Physical Exam  Vital signs  T(C): 36.9 (23 @ 19:20), Max: 36.9 (23 @ 19:20)  HR: 79 (23 @ 19:20)  BP: 164/87 (23 @ 19:20)  SpO2: 98% (23 @ 19:20)  Wt(kg): --    Output      Gen: NAD  Pulm: No respiratory distress  Abd: Soft, nontender, nondistended  : Voiding spontaneously        LABS:       @ 19:52    WBC --    / Hct --    / SCr 7.29      @ 15:25    WBC 10.53 / Hct 33.3  / SCr 6.85         140  |  107  |  82<H>  ----------------------------<  92  5.7<H>   |  15<L>  |  7.29<H>    Ca    8.9      20 May 2023 19:52  Mg     1.9         TPro  7.0  /  Alb  3.6  /  TBili  0.4  /  DBili  x   /  AST  12  /  ALT  6<L>  /  AlkPhos  70        Urinalysis Basic - ( 20 May 2023 16:45 )    Color: Light Yellow / Appearance: Clear / S.008 / pH: x  Gluc: x / Ketone: Negative  / Bili: Negative / Urobili: Negative   Blood: x / Protein: Trace / Nitrite: Negative   Leuk Esterase: Negative / RBC: 1 /hpf / WBC 3 /HPF   Sq Epi: x / Non Sq Epi: x / Bacteria: Negative        Urine Cx: Pending      RADIOLOGY:          ACC: [id number] EXAM: CT ABDOMEN AND PELVIS ORDERED BY: KAREN TORRES    PROCEDURE DATE: 2023        INTERPRETATION: CLINICAL INFORMATION: Renal cancer status post nephrectomy. Hyperkalemia.    COMPARISON: CT 3/2/2022    CONTRAST/COMPLICATIONS:  IV Contrast: NONE  Oral Contrast: NONE  Complications: None reported at time of study completion    PROCEDURE:  CT of the Abdomen and Pelvis was performed.  Sagittal and coronal reformats were performed.    FINDINGS:  LOWER CHEST: Within normal limits.    LIVER: A few scattered hepatic cysts without change.  BILE DUCTS: Normal caliber.  GALLBLADDER: Within normal limits.  SPLEEN: Within normal limits.  PANCREAS: Within normal limits.  ADRENALS: Within normal limits.  KIDNEYS/URETERS: Right nephroureterectomy. Left renal cysts. Moderate to severe left hydroureteronephrosis secondary to a filling defect within the mid left ureter (3:74), neoplasm until proven otherwise. Clot may have a similar appearance.    BLADDER: Within normal limits.  REPRODUCTIVE ORGANS: Prostate within normal limits.    BOWEL: No bowel obstruction. Colonic diverticulosis. Appendix is normal.  PERITONEUM: No ascites.  VESSELS: Atherosclerotic changes.  RETROPERITONEUM/LYMPH NODES: No lymphadenopathy.  ABDOMINAL WALL: Within normal limits.  BONES: Thoracolumbar scoliosis and degenerative change.    IMPRESSION:  Moderate to severe left hydroureteronephrosis to the level of a filling defect within the left mid ureter, neoplasm until proven otherwise.

## 2023-05-20 NOTE — H&P ADULT - PROBLEM SELECTOR PLAN 2
Patient w/ recent TURBT w/ biopsy showing high grade invasive urothelial carcinoma. CTAP w/ L hydroureteronephrosis w/ filling defect in the ureter, likely i/s/o urothelial carcinoma. Follows w/ Dr. Bonilla Canales outpatient.  - Urology on board  - Keep NPO for urologic intervention

## 2023-05-20 NOTE — CONSULT NOTE ADULT - ASSESSMENT
71 yo male w/ PMH HTN, R nephrectomy (2/2 malignant neoplasm 5/22), TURBT, Pulm nodule s/p VATS LORRIE (8/2021) presenting to ED for hyperkalemia, Cr 6. CT shows L hydronephrosis with filling defect in mid ureter.    - NPO  - L ureteral stent placement today vs. tomorrow  - Appreciate medical and nephrology recommendations    Case discussed with Dr. Byrd
Pt with metabolic acidosis, hyperkalemia in setting of GAMAL on CKD, likely due to urinary obstruction.

## 2023-05-20 NOTE — CONSULT NOTE ADULT - SUBJECTIVE AND OBJECTIVE BOX
[facility] DIVISION OF KIDNEY DISEASES AND HYPERTENSION -- [phone]  -- INITIAL CONSULT NOTE  --------------------------------------------------------------------------------  HPI: 73 yo male w/ PMH CKD in setting of solitary L kidney s/p R nephrectomy 2/2 malignant neoplasm (05/2022), HTN, TURBT, Pulm nodule s/p VATS LORRIE (8/2021), Hypothyroidism presenting to ED for abnormal outpatient lab values concerning for elevated serum potassium of 5.7 and Scr of 6.07 on 5/18. In the ED, found to have a serum potassium of 5.9 with a SCO2 of 14 and SCr of 6.85. Nephrology consulted for hyperkalemia, metabolic acidosis and GAMAL on CKD.    Per [facility] review, he has had a ranging SCr of 1.4-2 since May 2022, most recent SCr of 2.12 on 4/21/23. CT in the ED demonstrated L hydronephrosis with filling defect in mid ureter.    Pt seen and examined at bedside, spouse present. He states he "feels fine" and "would not be here if my doctor did not tell me to come". He and his wife state he had his kidney removed in 2022 due to a "tumor in the tube from the kidney to the bladder but the kidney itself was fine". He states he has not been told he has kidney disease and does not follow with a nephrologist. He does regularly follow with Urology. He denies any recent fevers, chills, nausea, vomiting, shortness of breath, leg swelling or trouble urinating. He states he was recently diagnosed with a UTI and given antibiotics but unsure of which one.      PAST HISTORY  --------------------------------------------------------------------------------  PAST MEDICAL & SURGICAL HISTORY:  HTN (hypertension)  Ascending aortic aneurysm  Thoracic aortic aneurysm  Obesity  Pulmonary nodule  removed 8/4/2021 early stage no chemo VAT  Hypothyroid  COVID-19  12/2020 loss of smell no hosp  History of low back pain  BPH without obstruction/lower urinary tract symptoms  Hearing loss  Malignant neoplasm of unspecified kidney, except renal pelvis  H/O arthroscopy  right knee  S/p nephrectomy  right;  05/2022  S/P partial lobectomy of lung  left upper;  08/04/2021  History of biopsy of bladder    FAMILY HISTORY: None     PAST SOCIAL HISTORY: Non smoker, +ETOH use    ALLERGIES & MEDICATIONS  --------------------------------------------------------------------------------  Allergies  No Known Allergies  Intolerances    Standing Inpatient Medications  finasteride 5 milliGRAM(s) Oral daily  levothyroxine 25 MICROGram(s) Oral daily  sodium bicarbonate  Infusion 0.207 mEq/kG/Hr IV Continuous <Continuous>    PRN Inpatient Medications    REVIEW OF SYSTEMS  --------------------------------------------------------------------------------  Gen: No fevers/chills  Head/Eyes/Ears: No HA  Respiratory: No dyspnea, cough  CV: No chest pain  GI: No abdominal pain, diarrhea  : No dysuria, hematuria  MSK: No edema  Skin: No rash  Heme: No easy bruising or bleeding    All other systems were reviewed and are negative, except as noted.    VITALS/PHYSICAL EXAM  --------------------------------------------------------------------------------  T(C): 36.6 (05-20-23 @ 22:37), Max: 36.9 (05-20-23 @ 19:20)  HR: 76 (05-20-23 @ 22:37) (76 - 98)  BP: 179/82 (05-20-23 @ 22:37) (158/98 - 182/98)  RR: 18 (05-20-23 @ 19:20) (15 - 20)  SpO2: 98% (05-20-23 @ 19:20) (98% - 99%)  Wt(kg): --  Height (cm): 190.5 (05-20-23 @ 14:28)  Weight (kg): 108.9 (05-20-23 @ 14:28)  BMI (kg/m2): 30 (05-20-23 @ 14:28)  BSA (m2): 2.37 (05-20-23 @ 14:28)    Physical Exam:  	Gen: Sitting up in bed and in NAD  	HEENT: Anicteric  	Pulm: CTA B/L  	CV: S1S2+  	Abd: Soft, +BS    	Ext: No LE edema B/L  	Neuro: Awake and alert   	Skin: Warm and dry    LABS/STUDIES  --------------------------------------------------------------------------------              11.1   10.53 >-----------<  298      [05-20-23 @ 15:25]              33.3     140  |  107  |  82  ----------------------------<  92      [05-20-23 @ 19:52]  5.7   |  15  |  7.29        Ca     8.9     [05-20-23 @ 19:52]      Mg     1.9     [05-20-23 @ 15:25]    TPro  7.0  /  Alb  3.6  /  TBili  0.4  /  DBili  x   /  AST  12  /  ALT  6   /  AlkPhos  70  [05-20-23 @ 19:52]    CK 69      [05-20-23 @ 19:52]    Creatinine Trend:  SCr 7.29 [05-20 @ 19:52]  SCr 6.85 [05-20 @ 15:25]    Urinalysis - [05-20-23 @ 16:45]      Color Light Yellow / Appearance Clear / SG 1.008 / pH 6.0      Gluc Negative / Ketone Negative  / Bili Negative / Urobili Negative       Blood Large / Protein Trace / Leuk Est Negative / Nitrite Negative      RBC 1 / WBC 3 / Hyaline  / Gran  / Sq Epi  / Non Sq Epi  / Bacteria Negative    Urine Creatinine 65      [05-20-23 @ 16:45]  Urine Protein 14      [05-20-23 @ 16:45]  Urine Sodium 38      [05-20-23 @ 16:45]  Urine Potassium 19      [05-20-23 @ 16:45]  Urine Osmolality 234      [05-20-23 @ 16:45]

## 2023-05-20 NOTE — H&P ADULT - HISTORY OF PRESENT ILLNESS
72M PMH HTN, hypothyroidism, pulmonary nodule s/p VATS LORRIE (8/2021), s/p R nephrectomy 5/22 (RCC) now w/ CKD, s/p TURBT 2/14/23 showing high grade invasive urothelial carcinoma who presents to the ED at the recommendation of his PMD due to abnormal lab testing. He had recent follow up with his PMD who did routine lab work that showed elevated creatinine from baseline. He went for repeated labs that showed SCr ~6 w/ hyperkalemia and he was called to report to the emergency room. He denies recent difficulty urinating, decreased or increased urination, dysuria. Denies fevers, chills, SOB, cough, chest pain, palpitations, abdominal pain, N/V/D, constipation, lower extremity swelling.     In the ED, he was afebrile, HR 80s-90s, BP 150s-180s systolic, RR 15-20, O2 sat 98-99% on RA. Labs notable for WBC 10.53, hgb 11.1, platelets 298. K 5.9, bicarb 14, AG 19, BUN/Cr 81/6.85. VBG 7.29/37/36/18. EKG w/ NSR 82 w/ 1st degree AV block & left axis deviation. CTAP w/ moderate to severe L hydroureteronephrosis to the level of a filling defect within the L mid ureter, neoplasm until proven otherwise.  He received 5u insulin & dextrose, albuterol 10mg via nebulizer, 2g calcium gluconate, 10g lokelma. He was started on a bicarb infusion. Repeat labs w/ K 5.7, bicarb 15, AG 18, BUN/Cr 82/7.29. He was assessed by urology & nephrology. He was admitted to medicine for further management.

## 2023-05-20 NOTE — ED ADULT TRIAGE NOTE - NS ED NURSE BANDS TYPE
[Fully active, able to carry on all pre-disease performance without restriction] : Status 0 - Fully active, able to carry on all pre-disease performance without restriction [Normal] : normal appearance, no rash, nodules, vesicles, ulcers, erythema [de-identified] : Bilateral mastectomies with PRICILA flap reconstructions. Significant concavity in right upper chest; No chest wall masses or axillary lymphadenopathy [de-identified] : Transverse scar from PRICILA flap procedure lower abdomen Name band;

## 2023-05-20 NOTE — CONSULT NOTE ADULT - PROBLEM SELECTOR RECOMMENDATION 9
Pt with GAMAL on CKD in the setting of likely urinary obstruction as noted by CT scan done in the ED demonstrating L hydronephrosis with mid ureter obstruction. Urology has been consulted, tentatively plan for cystoscopy with L ureteral stent placement on 5/21/23. His CKD is in setting of hx of HTN and solitary L kidney s/p R nephrectomy in 5/2022. Per [facility] review, he has had a ranging SCr of 1.4-2 since May 2022, most recent SCr of 2.12 on 4/21/23. In the ED, SCr elevated at 6.07, now 7.29. Per nursing and discussion with primary team, initial michel placed in the ED was leaking thus it was removed. Recommend replace michel catheter as soon as possible. UA as above, trace protein, large blood and 1 RBC noted. Consider CPK level. Renal US on 5/15/23 demonstrated Left kidney: 14.6 cm. No renal mass or calculi. Cysts measure up to 7.2 cm. Moderate hydronephrosis. Urinary bladder: The bladder volume is 173 mL. The post void residual is 4 mL. The prostate volume is 25 mL.    Continue IV bicarbonate in D5W. Continue medical management of hyperkalemia (see below).  Check serum phosphorus level.  Consult to urology as above, note reviewed.   Will need to consider HD if renal failure continues to worsen. Discussed with the patient that he may require RRT/HD, risks and benefits associated with RRT/HD explained at length. HD consent obtained and kept in patients chart.  Monitor labs and urine output. Avoid NSAIDs, ACEI/ARBS, RCA and nephrotoxins. Dose medications as per eGFR.

## 2023-05-20 NOTE — ED PROVIDER NOTE - PHYSICAL EXAMINATION
Attending Nettie Bates: Gen: NAD, heent: atrauamtic,  mmm, op pink, uvula midline, neck; nttp, no nuchal rigidity, chest: nttp, no crepitus, cv: rrr, no murmurs, lungs: ctab, abd: soft, nontender, nondistended, no peritoneal signs, no guarding, ext: wwp,  skin: no rash, neuro: awake and alert, following commands, speech clear, sensation and strength intact, no focal deficits

## 2023-05-20 NOTE — CONSULT NOTE ADULT - CONSULT REASON
Hyperkalemia, metabolic acidosis in setting of acute on chronic renal failure due to urinary obstruction
Hydronephrosis, GAMAL

## 2023-05-20 NOTE — ED PROVIDER NOTE - PROGRESS NOTE DETAILS
Labs demonstrate hypokalemia and renal failure with a creatinine of 6.85 and BUN of 81.  POCUS demonstrates 250 cc in the bladder, patient has a mild acidemia as well Attending Nettie Bates: Reviewed plan prior labs and imaging.  Patient with previous creatinine in the twos prior to a couple of days ago when he had his blood work drawn and at that time was in the sixes.  Patient does endorse having a little bit of left sided abdominal pain.  Point-of-care ultrasound performed showing moderate hydronephrosis as well as hydroureter.  Patient denies any history of ureteral stones.  Reviewed ultrasound performed a couple of days ago which also showed moderate hydronephrosis.  Consider possible ureteral stone and in the setting of 1 kidney could lead to acute renal failure.  Bladder scan performed showing approximately 250 cc of urine in the bladder making a postobstructive cause less likely.  Patient on monitor and will be treated for hyperkalemia.  CT called to expedite as a stone as etiology of renal failure will need emergent urological consultation.  We will continue to monitor potassium level.  Patient and family member updated on results. Attending Nettie Bates: reviewed prior charts pt with h/o prior lung ca with vats. Attending Nettie Bates: spoke with radiology resident, do not seen large stone attending is looking at it. urology and nephrology aware. pt is NPO Attending Nettie Bates: spoke with urology will take for stent. recommend medicine admission

## 2023-05-20 NOTE — CHART NOTE - NSCHARTNOTEFT_GEN_A_CORE
Discussed case with ED staff. Repeat STAT labs recommended in setting of hyperkalemia, metabolic acidosis and acute renal failure. Add on CPK level as well (large blood but 1 RBC on UA). Per ED staff, patient will be evaluated by Urology as well, there is a high likelihood of an obstructive urinary process, CT scan was pending at this time. Recommend IV bicarbonate with D5W and insert michel catheter unless directed otherwise by Urology. Monitor labs and urine output closely, avoid nephrotoxins, ACEi/ARBs, NSAIDs at this time. Dose medications per eGFR.     If you have any questions, please feel free to contact me  Binh Trimble  Nephrology Fellow  [phone]/[employer] Teams  (After 5pm or on weekends please page the on-call fellow)

## 2023-05-20 NOTE — CONSULT NOTE ADULT - PROBLEM SELECTOR RECOMMENDATION 3
Pt with metabolic acidosis in setting of GAMAL on CKD likely due to urinary obstruction. SCO2 on admission of 14, initiated on IV bicarbonate as above. It has slightly improved to 15. VBG with a pH of 7.29. Continue IV bicarb and further management as above. Recommend to continue to follow SCO2 and pH.    Assessment and plan was discussed with primary team, nursing staff and attending on call.

## 2023-05-20 NOTE — H&P ADULT - NSHPPHYSICALEXAM_GEN_ALL_CORE
VITALS:   T(C): 36.6 (05-20-23 @ 22:37), Max: 36.9 (05-20-23 @ 19:20)  HR: 76 (05-20-23 @ 22:37) (76 - 98)  BP: 179/82 (05-20-23 @ 22:37) (158/98 - 182/98)  RR: 18 (05-20-23 @ 19:20) (15 - 20)  SpO2: 98% (05-20-23 @ 19:20) (98% - 99%)    GENERAL: NAD, lying in bed comfortably  HEAD:  Atraumatic, Normocephalic  EYES: EOMI, PERRLA, conjunctiva and sclera clear  ENT: Moist mucous membranes  NECK: Supple, No JVD  CHEST/LUNG: Clear to auscultation bilaterally; No rales, rhonchi, wheezing, or rubs. Unlabored respirations  HEART: Regular rate and rhythm; No murmurs, rubs, or gallops  ABDOMEN: BSx4; Soft, nontender, nondistended  EXTREMITIES:  2+ Peripheral Pulses, brisk capillary refill. No clubbing, cyanosis, or edema  NERVOUS SYSTEM:  A&Ox3, no focal deficits   SKIN: No rashes or lesions  Psych: Normal speech, normal behavior, normal affect

## 2023-05-20 NOTE — ED ADULT NURSE NOTE - NSFALLUNIVINTERV_ED_ALL_ED
Bed/Stretcher in lowest position, wheels locked, appropriate side rails in place/Call bell, personal items and telephone in reach/Instruct patient to call for assistance before getting out of bed/chair/stretcher/Non-slip footwear applied when patient is off stretcher/[place] to call system/Physically safe environment - no spills, clutter or unnecessary equipment/Purposeful proactive rounding/Room/bathroom lighting operational, light cord in reach

## 2023-05-20 NOTE — H&P ADULT - ATTENDING COMMENTS
72M PMH HTN, hypothyroidism, pulmonary nodule s/p VATS LORRIE (8/2021), s/p R nephrectomy 5/22 (RCC) w/ CKD, s/p TURBT 2/14/23 showing high grade invasive urothelial carcinoma p/w acute  renal failure , labs w/ creatinine 7  and K 5.9 , imaging w/ severe L hydroureteronephrosis w/ filling defect in the ureter highly suspicious for malignant lesion , patient evaluated by   and planned for ureteral stent placement in OR soon, also evaluated by Nephrology for possible Dialysis , currently treating hyperk , w/ hyperk cocktail and bicarb infusion

## 2023-05-20 NOTE — ED PROVIDER NOTE - CLINICAL SUMMARY MEDICAL DECISION MAKING FREE TEXT BOX
Attending Nettie Bates: 72 yto male with multiple medical issues presenting with concern for abnormal labs. upon arrival pt hemodynamically stable. blood work sent showing evidence of sig hyperkalemia and renal failure. pt with one kidney. pocus performed showed moderate hydonephrosis, hydroureter. no evidence of sig urinary retention as cause of renal failure. with hydro concern for possible obstructive cause, consider malignancyvs stone. ct scan ordered to further evaluate. ct showed evidence of possible mass compressing ureter. nephrology and urology consulted. pt treated for hyperkalemia. will continue to monitor. d/w uro who request medicine admission and will need surgery tonight

## 2023-05-20 NOTE — CONSULT NOTE ADULT - PROBLEM SELECTOR RECOMMENDATION 2
Pt. with hyperkalemia in setting of GAMAL on CKD likely due to urinary obstruction. Serum potassium upon admission was 5.9. Hyperkalemia has slightly responded to medical management with Dextrose, Insulin, Calcium gluconate and IV bicarbonate. Replace michel catheter as noted above. Continue medical management as above. He may require RRT as noted above. Recommend serial EKGs as well. Low potassium diet advised. Monitor serum potassium

## 2023-05-20 NOTE — H&P ADULT - PROBLEM SELECTOR PROBLEM 5
Continued Stay Review    Date: 4/29/2021                          Current Patient Class: INPT  Current Level of Care: TELE    HPI:52 y o  female initially admitted [facility] on 4/23 D/T CNS LYMPHOMA  Assessment/Plan:noted to have dysphagia and also decreased p o  Intake  Currently alert and oriented times 2-3   follow commands  Moves all extremities spontaneously  Has some delirium episodes during the day and night  IVF  IV dexamethasone  heparin  started on methotrexate and rituximab on 04/27  FLomax  IV analgesics  PICC line for chemotherapy  plan to proceed with chemotherapy  advance diet with dysphagia 2 and thin liquids; needs assistance with feeds      Vital Signs:   04/29/21 07:10:33  98 6 °F (37 °C)  71  --  148/86  107  100 %   04/28/21 21:23:54  --  71  --  151/90  110  99 %   04/28/21 18:43:56  98 2 °F (36 8 °C)  73  18  160/92  115  100 %   04/28/21 15:57:39  97 6 °F (36 4 °C)  71  18  164/92  116  99 %               99 %               --                                 04/27/21 2330  --  65  --  161/101Abnormal   121  99 %   04/27/21 2300  --  58  --  170/106Abnormal   127  100 %                                 04/27/21 2140  97 9 °F (36 6 °C)  74  --  --  --  100 %   04/27/21 2130  99 2 °F (37 3 °C)  96  18  154/90  --  98 %   04/27/21 2100  --  81  --  140/88  105  99 %                                                               04/27/21 1830  --  89  --  141/88  106  100 %   04/27/21 16:59:04  99 3 °F (37 4 °C)  94  18  125/83  97  97 %   04/27/21 08:16:56  97 7 °F (36 5 °C)  76  18  126/82  97  100 %   04/27/21 08:16:04  97 7 °F (36 5 °C)  76  18  126/82  97  99 %     4/26:98 4-79-/90      Pertinent Labs/Diagnostic Results:       Results from last 7 days   Lab Units 04/29/21  0555 04/28/21  0518 04/27/21  1043 04/26/21  0552 04/25/21  0620   WBC Thousand/uL 7 92 12 97* 11 99* 8 57 7 19   HEMOGLOBIN g/dL 10 9* 12 4 12 9 12 0 11 5   HEMATOCRIT % 31 8* 36 5 37 2 35 1 33 8*   PLATELETS Thousands/uL 143* 172 181 161 187   BANDS PCT %  --   --   --   --  1         Results from last 7 days   Lab Units 04/29/21  0555 04/28/21  0518 04/27/21  0458 04/26/21  0552 04/25/21  0620 04/24/21  0837   SODIUM mmol/L 137 136 134* 135* 137 136   POTASSIUM mmol/L 3 1* 3 6 3 8 3 9 3 7 4 1   CHLORIDE mmol/L 98* 104 102 103 106 105   CO2 mmol/L 34* 25 22 25 23 26   ANION GAP mmol/L 5 7 10 7 8 5   BUN mg/dL 14 18 18 19 18 25   CREATININE mg/dL 0 51* 0 46* 0 48* 0 54* 0 59* 0 62   EGFR ml/min/1 73sq m 109 113 112 107 104 103   CALCIUM mg/dL 8 9 10 0 9 8 9 4 9 4 10 2*   MAGNESIUM mg/dL  --   --   --   --   --  2 1   PHOSPHORUS mg/dL 2 7  --   --   --   --   --      Results from last 7 days   Lab Units 04/24/21  0837   AST U/L 9   ALT U/L 18   ALK PHOS U/L 63   TOTAL PROTEIN g/dL 6 9   ALBUMIN g/dL 3 6   TOTAL BILIRUBIN mg/dL 0 36     Results from last 7 days   Lab Units 04/29/21  0755 04/28/21  2121 04/28/21  1705 04/28/21  1208 04/28/21  0831 04/27/21  2033 04/27/21  1707 04/27/21  1143 04/27/21  1126 04/27/21  0823   POC GLUCOSE mg/dl 145* 202* 245* 182* 160* 252* 180* 173* 195* 120     Results from last 7 days   Lab Units 04/29/21  0555 04/28/21  0518 04/27/21  0458 04/26/21  0552 04/25/21  0620 04/24/21  0837   GLUCOSE RANDOM mg/dL 161* 131 162* 145* 160* 165*     4/27 CXR:PICC line tip in the superior vena cava, approximately 3 5 to 4 cm above the cavoatrial junction  4/27 IR PICC:Status-post repositioning of a 5 Croatian central venous catheter under fluoroscopic guidance with its tip at the cavoatrial junction      Medications:   Scheduled Medications:  amLODIPine, 5 mg, Oral, Daily  dexamethasone, 4 mg, Intravenous, 4x Daily  famotidine, 20 mg, Oral, BID  heparin (porcine), 5,000 Units, Subcutaneous, Q8H MEÑO  insulin lispro, 1-6 Units, Subcutaneous, TID AC  lidocaine, 1 patch, Topical, Daily  methotrexate (PF) IVPB, 5,000 mg, Intravenous, Once  ondansetron with dexamethasone IVPB, , Intravenous, Once  senna-docusate sodium, 1 tablet, Oral, BID  tamsulosin, 0 4 mg, Oral, Daily With Dinner      Continuous IV Infusions:  sodium bicarbonate infusion, 150 mL/hr, Intravenous, Continuous    dextrose 5 % and sodium chloride 0 45 % infusion   Rate: 50 mL/hr Dose: 50 mL/hr  Freq: Continuous Route: IV  Indications of Use: IV Hydration  Last Dose: Stopped (04/27/21 4085)  Start: 04/23/21 1815 End: 04/27/21 2240    PRN Meds:  acetaminophen, 488 mg, Oral, Q6H PRN  albuterol, 2 puff, Inhalation, Q6H PRN  ondansetron, 4 mg, Intravenous, Q6H PRN 4/26 X 1  sodium chloride, 20 mL/hr, Intravenous, Once PRN  sodium chloride, 20 mL/hr, Intravenous, Once PRN    4/26: DYSPHAGIA DIET    Discharge Plan: [facility]    Network Utilization Review Department  ATTENTION: Please call with any questions or concerns to [phone] and carefully listen to the prompts so that you are directed to the right person  All voicemails are confidential   Casi Ramos all requests for admission clinical reviews, approved or denied determinations and any other requests to dedicated fax number below belonging to the campus where the patient is receiving treatment   List of dedicated fax numbers for the Facilities:  [address] DENIALS (Administrative/Medical Necessity) [phone]   [address] (Maternity/NICU/Pediatrics) [phone] [address] [place] [address] [place] [address] [address] [facility] [place] [address] [phone] Hypothyroidism

## 2023-05-20 NOTE — ED PROVIDER NOTE - ATTENDING CONTRIBUTION TO CARE
Attending MD Nettie Btaes:  I personally have seen and examined this patient.  Resident note reviewed and agree on plan of care and except where noted.  See HPI, PE, and MDM for details.

## 2023-05-20 NOTE — ED ADULT NURSE REASSESSMENT NOTE - NS ED NURSE REASSESS COMMENT FT1
16 Fr coudet Cordero catheter inserted using sterile technique. Second RN present to confirm sterility. Bedside drainage to gravity. Stat lock in place. 300 cc clear ilya urine drained to gravity on insertion. pt tolerated procedure well. 16 Fr coude Cordero catheter inserted using sterile technique. Second RN present to confirm sterility. Bedside drainage to gravity. Stat lock in place. 300 cc clear ilya urine drained to gravity on insertion. pt tolerated procedure well.

## 2023-05-21 ENCOUNTER — TRANSCRIPTION ENCOUNTER (OUTPATIENT)
Age: 73
End: 2023-05-21

## 2023-05-21 LAB
ANION GAP SERPL CALC-SCNC: 14 MMOL/L — SIGNIFICANT CHANGE UP (ref 5–17)
ANION GAP SERPL CALC-SCNC: 15 MMOL/L — SIGNIFICANT CHANGE UP (ref 5–17)
ANION GAP SERPL CALC-SCNC: 19 MMOL/L — HIGH (ref 5–17)
BASE EXCESS BLDV CALC-SCNC: -3.5 MMOL/L — LOW (ref -2–3)
BASE EXCESS BLDV CALC-SCNC: -4.1 MMOL/L — LOW (ref -2–3)
BASOPHILS # BLD AUTO: 0.02 K/UL — SIGNIFICANT CHANGE UP (ref 0–0.2)
BASOPHILS NFR BLD AUTO: 0.2 % — SIGNIFICANT CHANGE UP (ref 0–2)
BUN SERPL-MCNC: 72 MG/DL — HIGH (ref 7–23)
BUN SERPL-MCNC: 76 MG/DL — HIGH (ref 7–23)
BUN SERPL-MCNC: 80 MG/DL — HIGH (ref 7–23)
CA-I SERPL-SCNC: 1.22 MMOL/L — SIGNIFICANT CHANGE UP (ref 1.15–1.33)
CA-I SERPL-SCNC: 1.29 MMOL/L — SIGNIFICANT CHANGE UP (ref 1.15–1.33)
CALCIUM SERPL-MCNC: 8.9 MG/DL — SIGNIFICANT CHANGE UP (ref 8.4–10.5)
CALCIUM SERPL-MCNC: 9.3 MG/DL — SIGNIFICANT CHANGE UP (ref 8.4–10.5)
CALCIUM SERPL-MCNC: 9.4 MG/DL — SIGNIFICANT CHANGE UP (ref 8.4–10.5)
CHLORIDE BLDV-SCNC: 108 MMOL/L — SIGNIFICANT CHANGE UP (ref 96–108)
CHLORIDE BLDV-SCNC: 108 MMOL/L — SIGNIFICANT CHANGE UP (ref 96–108)
CHLORIDE SERPL-SCNC: 103 MMOL/L — SIGNIFICANT CHANGE UP (ref 96–108)
CHLORIDE SERPL-SCNC: 106 MMOL/L — SIGNIFICANT CHANGE UP (ref 96–108)
CHLORIDE SERPL-SCNC: 108 MMOL/L — SIGNIFICANT CHANGE UP (ref 96–108)
CO2 BLDV-SCNC: 23 MMOL/L — SIGNIFICANT CHANGE UP (ref 22–26)
CO2 BLDV-SCNC: 23 MMOL/L — SIGNIFICANT CHANGE UP (ref 22–26)
CO2 SERPL-SCNC: 18 MMOL/L — LOW (ref 22–31)
CO2 SERPL-SCNC: 20 MMOL/L — LOW (ref 22–31)
CO2 SERPL-SCNC: 23 MMOL/L — SIGNIFICANT CHANGE UP (ref 22–31)
CREAT SERPL-MCNC: 6.15 MG/DL — HIGH (ref 0.5–1.3)
CREAT SERPL-MCNC: 6.7 MG/DL — HIGH (ref 0.5–1.3)
CREAT SERPL-MCNC: 7.15 MG/DL — HIGH (ref 0.5–1.3)
CULTURE RESULTS: NO GROWTH — SIGNIFICANT CHANGE UP
EGFR: 7 ML/MIN/1.73M2 — LOW
EGFR: 8 ML/MIN/1.73M2 — LOW
EGFR: 9 ML/MIN/1.73M2 — LOW
EOSINOPHIL # BLD AUTO: 0.02 K/UL — SIGNIFICANT CHANGE UP (ref 0–0.5)
EOSINOPHIL NFR BLD AUTO: 0.2 % — SIGNIFICANT CHANGE UP (ref 0–6)
GAS PNL BLDV: 139 MMOL/L — SIGNIFICANT CHANGE UP (ref 136–145)
GAS PNL BLDV: 140 MMOL/L — SIGNIFICANT CHANGE UP (ref 136–145)
GAS PNL BLDV: SIGNIFICANT CHANGE UP
GLUCOSE BLDC GLUCOMTR-MCNC: 126 MG/DL — HIGH (ref 70–99)
GLUCOSE BLDV-MCNC: 113 MG/DL — HIGH (ref 70–99)
GLUCOSE BLDV-MCNC: 141 MG/DL — HIGH (ref 70–99)
GLUCOSE SERPL-MCNC: 114 MG/DL — HIGH (ref 70–99)
GLUCOSE SERPL-MCNC: 135 MG/DL — HIGH (ref 70–99)
GLUCOSE SERPL-MCNC: 163 MG/DL — HIGH (ref 70–99)
HCO3 BLDV-SCNC: 22 MMOL/L — SIGNIFICANT CHANGE UP (ref 22–29)
HCO3 BLDV-SCNC: 22 MMOL/L — SIGNIFICANT CHANGE UP (ref 22–29)
HCT VFR BLD CALC: 29.3 % — LOW (ref 39–50)
HCT VFR BLD CALC: 32 % — LOW (ref 39–50)
HCT VFR BLDA CALC: 30 % — LOW (ref 39–51)
HCT VFR BLDA CALC: 34 % — LOW (ref 39–51)
HCV AB S/CO SERPL IA: 0.24 S/CO — SIGNIFICANT CHANGE UP (ref 0–0.99)
HCV AB SERPL-IMP: SIGNIFICANT CHANGE UP
HGB BLD CALC-MCNC: 11.2 G/DL — LOW (ref 12.6–17.4)
HGB BLD CALC-MCNC: 9.9 G/DL — LOW (ref 12.6–17.4)
HGB BLD-MCNC: 10.1 G/DL — LOW (ref 13–17)
HGB BLD-MCNC: 10.8 G/DL — LOW (ref 13–17)
IMM GRANULOCYTES NFR BLD AUTO: 0.6 % — SIGNIFICANT CHANGE UP (ref 0–0.9)
LACTATE BLDV-MCNC: 0.7 MMOL/L — SIGNIFICANT CHANGE UP (ref 0.5–2)
LACTATE BLDV-MCNC: 1.2 MMOL/L — SIGNIFICANT CHANGE UP (ref 0.5–2)
LYMPHOCYTES # BLD AUTO: 0.66 K/UL — LOW (ref 1–3.3)
LYMPHOCYTES # BLD AUTO: 7 % — LOW (ref 13–44)
MAGNESIUM SERPL-MCNC: 1.7 MG/DL — SIGNIFICANT CHANGE UP (ref 1.6–2.6)
MAGNESIUM SERPL-MCNC: 1.8 MG/DL — SIGNIFICANT CHANGE UP (ref 1.6–2.6)
MAGNESIUM SERPL-MCNC: 2 MG/DL — SIGNIFICANT CHANGE UP (ref 1.6–2.6)
MCHC RBC-ENTMCNC: 31.6 PG — SIGNIFICANT CHANGE UP (ref 27–34)
MCHC RBC-ENTMCNC: 31.9 PG — SIGNIFICANT CHANGE UP (ref 27–34)
MCHC RBC-ENTMCNC: 33.8 GM/DL — SIGNIFICANT CHANGE UP (ref 32–36)
MCHC RBC-ENTMCNC: 34.5 GM/DL — SIGNIFICANT CHANGE UP (ref 32–36)
MCV RBC AUTO: 91.6 FL — SIGNIFICANT CHANGE UP (ref 80–100)
MCV RBC AUTO: 94.4 FL — SIGNIFICANT CHANGE UP (ref 80–100)
MONOCYTES # BLD AUTO: 0.16 K/UL — SIGNIFICANT CHANGE UP (ref 0–0.9)
MONOCYTES NFR BLD AUTO: 1.7 % — LOW (ref 2–14)
MRSA PCR RESULT.: SIGNIFICANT CHANGE UP
NEUTROPHILS # BLD AUTO: 8.45 K/UL — HIGH (ref 1.8–7.4)
NEUTROPHILS NFR BLD AUTO: 90.3 % — HIGH (ref 43–77)
NRBC # BLD: 0 /100 WBCS — SIGNIFICANT CHANGE UP (ref 0–0)
NRBC # BLD: 0 /100 WBCS — SIGNIFICANT CHANGE UP (ref 0–0)
PCO2 BLDV: 41 MMHG — LOW (ref 42–55)
PCO2 BLDV: 41 MMHG — LOW (ref 42–55)
PH BLDV: 7.33 — SIGNIFICANT CHANGE UP (ref 7.32–7.43)
PH BLDV: 7.34 — SIGNIFICANT CHANGE UP (ref 7.32–7.43)
PHOSPHATE SERPL-MCNC: 5 MG/DL — HIGH (ref 2.5–4.5)
PHOSPHATE SERPL-MCNC: 5.8 MG/DL — HIGH (ref 2.5–4.5)
PHOSPHATE SERPL-MCNC: 5.9 MG/DL — HIGH (ref 2.5–4.5)
PLATELET # BLD AUTO: 272 K/UL — SIGNIFICANT CHANGE UP (ref 150–400)
PLATELET # BLD AUTO: 281 K/UL — SIGNIFICANT CHANGE UP (ref 150–400)
PO2 BLDV: 22 MMHG — LOW (ref 25–45)
PO2 BLDV: 40 MMHG — SIGNIFICANT CHANGE UP (ref 25–45)
POTASSIUM BLDV-SCNC: 5.2 MMOL/L — HIGH (ref 3.5–5.1)
POTASSIUM BLDV-SCNC: 5.6 MMOL/L — HIGH (ref 3.5–5.1)
POTASSIUM SERPL-MCNC: 5.1 MMOL/L — SIGNIFICANT CHANGE UP (ref 3.5–5.3)
POTASSIUM SERPL-MCNC: 5.2 MMOL/L — SIGNIFICANT CHANGE UP (ref 3.5–5.3)
POTASSIUM SERPL-MCNC: 5.4 MMOL/L — HIGH (ref 3.5–5.3)
POTASSIUM SERPL-SCNC: 5.1 MMOL/L — SIGNIFICANT CHANGE UP (ref 3.5–5.3)
POTASSIUM SERPL-SCNC: 5.2 MMOL/L — SIGNIFICANT CHANGE UP (ref 3.5–5.3)
POTASSIUM SERPL-SCNC: 5.4 MMOL/L — HIGH (ref 3.5–5.3)
RBC # BLD: 3.2 M/UL — LOW (ref 4.2–5.8)
RBC # BLD: 3.39 M/UL — LOW (ref 4.2–5.8)
RBC # FLD: 10.8 % — SIGNIFICANT CHANGE UP (ref 10.3–14.5)
RBC # FLD: 10.9 % — SIGNIFICANT CHANGE UP (ref 10.3–14.5)
S AUREUS DNA NOSE QL NAA+PROBE: SIGNIFICANT CHANGE UP
SAO2 % BLDV: 30.2 % — LOW (ref 67–88)
SAO2 % BLDV: 68.8 % — SIGNIFICANT CHANGE UP (ref 67–88)
SODIUM SERPL-SCNC: 141 MMOL/L — SIGNIFICANT CHANGE UP (ref 135–145)
SODIUM SERPL-SCNC: 142 MMOL/L — SIGNIFICANT CHANGE UP (ref 135–145)
SODIUM SERPL-SCNC: 143 MMOL/L — SIGNIFICANT CHANGE UP (ref 135–145)
SPECIMEN SOURCE: SIGNIFICANT CHANGE UP
UUN UR-MCNC: 338 MG/DL — SIGNIFICANT CHANGE UP
WBC # BLD: 6.2 K/UL — SIGNIFICANT CHANGE UP (ref 3.8–10.5)
WBC # BLD: 9.37 K/UL — SIGNIFICANT CHANGE UP (ref 3.8–10.5)
WBC # FLD AUTO: 6.2 K/UL — SIGNIFICANT CHANGE UP (ref 3.8–10.5)
WBC # FLD AUTO: 9.37 K/UL — SIGNIFICANT CHANGE UP (ref 3.8–10.5)

## 2023-05-21 PROCEDURE — 88112 CYTOPATH CELL ENHANCE TECH: CPT | Mod: 26

## 2023-05-21 PROCEDURE — 99231 SBSQ HOSP IP/OBS SF/LOW 25: CPT | Mod: GC

## 2023-05-21 PROCEDURE — 99223 1ST HOSP IP/OBS HIGH 75: CPT | Mod: GC

## 2023-05-21 PROCEDURE — 99233 SBSQ HOSP IP/OBS HIGH 50: CPT

## 2023-05-21 DEVICE — GUIDEWIRE SENSOR DUAL-FLEX NITINOL STRAIGHT .035" X 150CM: Type: IMPLANTABLE DEVICE | Site: LEFT | Status: FUNCTIONAL

## 2023-05-21 DEVICE — GLDWIRE 0.038INX150CM 3CM TIP: Type: IMPLANTABLE DEVICE | Site: LEFT | Status: FUNCTIONAL

## 2023-05-21 DEVICE — URETERAL CATH OPEN END 5FR 70CM: Type: IMPLANTABLE DEVICE | Site: LEFT | Status: FUNCTIONAL

## 2023-05-21 DEVICE — STENT URET INLAY OPTIMA 6FRX26CM: Type: IMPLANTABLE DEVICE | Site: LEFT | Status: FUNCTIONAL

## 2023-05-21 RX ORDER — SODIUM BICARBONATE 1 MEQ/ML
0.21 SYRINGE (ML) INTRAVENOUS
Qty: 150 | Refills: 0 | Status: DISCONTINUED | OUTPATIENT
Start: 2023-05-21 | End: 2023-05-22

## 2023-05-21 RX ORDER — HYDROMORPHONE HYDROCHLORIDE 2 MG/ML
0.25 INJECTION INTRAMUSCULAR; INTRAVENOUS; SUBCUTANEOUS
Refills: 0 | Status: DISCONTINUED | OUTPATIENT
Start: 2023-05-21 | End: 2023-05-21

## 2023-05-21 RX ORDER — HEPARIN SODIUM 5000 [USP'U]/ML
5000 INJECTION INTRAVENOUS; SUBCUTANEOUS EVERY 8 HOURS
Refills: 0 | Status: DISCONTINUED | OUTPATIENT
Start: 2023-05-21 | End: 2023-05-24

## 2023-05-21 RX ORDER — CHLORHEXIDINE GLUCONATE 213 G/1000ML
1 SOLUTION TOPICAL DAILY
Refills: 0 | Status: DISCONTINUED | OUTPATIENT
Start: 2023-05-21 | End: 2023-05-24

## 2023-05-21 RX ORDER — ACETAMINOPHEN 500 MG
650 TABLET ORAL EVERY 6 HOURS
Refills: 0 | Status: DISCONTINUED | OUTPATIENT
Start: 2023-05-21 | End: 2023-05-24

## 2023-05-21 RX ORDER — ONDANSETRON 8 MG/1
4 TABLET, FILM COATED ORAL ONCE
Refills: 0 | Status: DISCONTINUED | OUTPATIENT
Start: 2023-05-21 | End: 2023-05-21

## 2023-05-21 RX ADMIN — CHLORHEXIDINE GLUCONATE 1 APPLICATION(S): 213 SOLUTION TOPICAL at 11:16

## 2023-05-21 RX ADMIN — Medication 150 MEQ/KG/HR: at 12:12

## 2023-05-21 RX ADMIN — Medication 25 MICROGRAM(S): at 05:42

## 2023-05-21 RX ADMIN — FINASTERIDE 5 MILLIGRAM(S): 5 TABLET, FILM COATED ORAL at 11:16

## 2023-05-21 RX ADMIN — HEPARIN SODIUM 5000 UNIT(S): 5000 INJECTION INTRAVENOUS; SUBCUTANEOUS at 21:56

## 2023-05-21 RX ADMIN — Medication 150 MEQ/KG/HR: at 04:31

## 2023-05-21 RX ADMIN — Medication 150 MEQ/KG/HR: at 19:40

## 2023-05-21 RX ADMIN — HEPARIN SODIUM 5000 UNIT(S): 5000 INJECTION INTRAVENOUS; SUBCUTANEOUS at 05:42

## 2023-05-21 RX ADMIN — HEPARIN SODIUM 5000 UNIT(S): 5000 INJECTION INTRAVENOUS; SUBCUTANEOUS at 14:04

## 2023-05-21 RX ADMIN — Medication 200 GRAM(S): at 00:05

## 2023-05-21 NOTE — PROGRESS NOTE ADULT - PROBLEM SELECTOR PLAN 6
DVT ppx: hold for now pending intervention  Diet: NPO  Dispo: Pending intervention DVT ppx: hold for now given hematuria  Diet: regular  Dispo: pending clinical course (improvement of renal function)

## 2023-05-21 NOTE — DISCHARGE NOTE PROVIDER - HOSPITAL COURSE
HPI: 72M PMH HTN, hypothyroidism, pulmonary nodule s/p VATS LORRIE (8/2021), s/p R nephrectomy 5/22 (RCC) now w/ CKD, s/p TURBT 2/14/23 showing high grade invasive urothelial carcinoma who presents to the ED at the recommendation of his PMD due to abnormal lab testing. He had recent follow up with his PMD who did routine lab work that showed elevated creatinine from baseline. He went for repeated labs that showed SCr ~6 w/ hyperkalemia and he was called to report to the emergency room. He denies recent difficulty urinating, decreased or increased urination, dysuria. Denies fevers, chills, SOB, cough, chest pain, palpitations, abdominal pain, N/V/D, constipation, lower extremity swelling.     In the ED, he was afebrile, HR 80s-90s, BP 150s-180s systolic, RR 15-20, O2 sat 98-99% on RA. Labs notable for WBC 10.53, hgb 11.1, platelets 298. K 5.9, bicarb 14, AG 19, BUN/Cr 81/6.85. VBG 7.29/37/36/18. EKG w/ NSR 82 w/ 1st degree AV block & left axis deviation. CTAP w/ moderate to severe L hydroureteronephrosis to the level of a filling defect within the L mid ureter, neoplasm until proven otherwise.  He received 5u insulin & dextrose, albuterol 10mg via nebulizer, 2g calcium gluconate, 10g lokelma. He was started on a bicarb infusion. Repeat labs w/ K 5.7, bicarb 15, AG 18, BUN/Cr 82/7.29. He was assessed by urology & nephrology. He was admitted to medicine for further management.   EKG notable for LBBB.    Hospital Course:  Imaging revealing L hydroureteronephrosis w/ filling defect in the ureter, likely in the setting of malignancy, and underwent left ureteral stent placement on 5/21. Labs remarkable for acute renal failure (Cr > 7) complicated by hyperkalemia and metabolic acidosis. Nephrology following for ARF, initially managed medically with sodium bicarbonate infusion, and hyperkalemia managed with calcium gluconate, lokelma and insulin/D50. as needed.     Nephrology team recommends ____________________    Urology team recommends ______________________    **NOTE: DOCUMENT IS INCOMPLETE AND WILL BE UPDATED DURING HOSPITAL COURSE**   HPI: 72M PMH HTN, hypothyroidism, pulmonary nodule s/p VATS LORRIE (8/2021), s/p R nephrectomy 5/22 (RCC) now w/ CKD, s/p TURBT 2/14/23 showing high grade invasive urothelial carcinoma who presents to the ED at the recommendation of his PMD due to abnormal lab testing. He had recent follow up with his PMD who did routine lab work that showed elevated creatinine from baseline. He went for repeated labs that showed SCr ~6 w/ hyperkalemia and he was called to report to the emergency room. He denies recent difficulty urinating, decreased or increased urination, dysuria. Denies fevers, chills, SOB, cough, chest pain, palpitations, abdominal pain, N/V/D, constipation, lower extremity swelling.     In the ED, he was afebrile, HR 80s-90s, BP 150s-180s systolic, RR 15-20, O2 sat 98-99% on RA. Labs notable for WBC 10.53, hgb 11.1, platelets 298. K 5.9, bicarb 14, AG 19, BUN/Cr 81/6.85. VBG 7.29/37/36/18. EKG w/ NSR 82 w/ 1st degree AV block & left axis deviation. CTAP w/ moderate to severe L hydroureteronephrosis to the level of a filling defect within the L mid ureter, neoplasm until proven otherwise.  He received 5u insulin & dextrose, albuterol 10mg via nebulizer, 2g calcium gluconate, 10g lokelma. He was started on a bicarb infusion. Repeat labs w/ K 5.7, bicarb 15, AG 18, BUN/Cr 82/7.29. He was assessed by urology & nephrology. He was admitted to medicine for further management.   EKG notable for LBBB.    Hospital Course:  Pt underwent urgent ureteral stent placement overnight on 5/21. Admission labs consistent w/ acute renal failure (Cr > 7) complicated by hyperkalemia and metabolic acidosis. Cr noted to subsequently improve post-stent and hyperkalemia was monitored for improvement. Nephrology followed for ARF which was initially managed with sodium bicarbonate gtt and PRN management of hyperkalemia w/ calc gluconate, lokelma, and insulin/D50. On further assessment nephrology recommended _____. Urology recommended _____.    **NOTE: DOCUMENT IS INCOMPLETE AND WILL BE UPDATED DURING HOSPITAL COURSE**   HPI: 72M PMH HTN, hypothyroidism, pulmonary nodule s/p VATS LORRIE (8/2021), s/p R nephrectomy 5/22 (RCC) now w/ CKD, s/p TURBT 2/14/23 showing high grade invasive urothelial carcinoma who presents to the ED at the recommendation of his PMD due to abnormal lab testing. He had recent follow up with his PMD who did routine lab work that showed elevated creatinine from baseline. He went for repeated labs that showed SCr ~6 w/ hyperkalemia and he was called to report to the emergency room. He denies recent difficulty urinating, decreased or increased urination, dysuria. Denies fevers, chills, SOB, cough, chest pain, palpitations, abdominal pain, N/V/D, constipation, lower extremity swelling.     In the ED, he was afebrile, HR 80s-90s, BP 150s-180s systolic, RR 15-20, O2 sat 98-99% on RA. Labs notable for WBC 10.53, hgb 11.1, platelets 298. K 5.9, bicarb 14, AG 19, BUN/Cr 81/6.85. VBG 7.29/37/36/18. EKG w/ NSR 82 w/ 1st degree AV block & left axis deviation. CTAP w/ moderate to severe L hydroureteronephrosis to the level of a filling defect within the L mid ureter, neoplasm until proven otherwise.  He received 5u insulin & dextrose, albuterol 10mg via nebulizer, 2g calcium gluconate, 10g lokelma. He was started on a bicarb infusion. Repeat labs w/ K 5.7, bicarb 15, AG 18, BUN/Cr 82/7.29. He was assessed by urology & nephrology. He was admitted to medicine for further management.   EKG notable for LBBB.    Hospital Course:  Pt underwent urgent ureteral stent placement overnight on 5/21. Admission labs consistent w/ acute renal failure (Cr > 7) complicated by hyperkalemia and metabolic acidosis. Cr noted to subsequently improve post-stent and hyperkalemia was monitored for improvement. Nephrology followed for ARF which was initially managed with sodium bicarbonate gtt and PRN management of hyperkalemia w/ calc gluconate, lokelma, and insulin/D50. On further assessment nephrology recommended followup in 3-4 weeks with labs drawn before visit. Urology recommended followup ASAP with Dr Marte.    He is being discharged with a michel catheter and has been taught how to care for it. At this point he is stable and medically optimized for discharge with close urology and nephrology followup.      HPI: 72M PMH HTN, hypothyroidism, pulmonary nodule s/p VATS LORRIE (8/2021), s/p R nephrectomy 5/22 (RCC) now w/ CKD, s/p TURBT 2/14/23 showing high grade invasive urothelial carcinoma who presents to the ED at the recommendation of his PMD due to abnormal lab testing. He had recent follow up with his PMD who did routine lab work that showed elevated creatinine from baseline. He went for repeated labs that showed SCr ~6 w/ hyperkalemia and he was called to report to the emergency room. He denies recent difficulty urinating, decreased or increased urination, dysuria. Denies fevers, chills, SOB, cough, chest pain, palpitations, abdominal pain, N/V/D, constipation, lower extremity swelling.     In the ED, he was afebrile, HR 80s-90s, BP 150s-180s systolic, RR 15-20, O2 sat 98-99% on RA. Labs notable for WBC 10.53, hgb 11.1, platelets 298. K 5.9, bicarb 14, AG 19, BUN/Cr 81/6.85. VBG 7.29/37/36/18. EKG w/ NSR 82 w/ 1st degree AV block & left axis deviation. CTAP w/ moderate to severe L hydroureteronephrosis to the level of a filling defect within the L mid ureter, neoplasm until proven otherwise.  He received 5u insulin & dextrose, albuterol 10mg via nebulizer, 2g calcium gluconate, 10g lokelma. He was started on a bicarb infusion. Repeat labs w/ K 5.7, bicarb 15, AG 18, BUN/Cr 82/7.29. He was assessed by urology & nephrology. He was admitted to medicine for further management.   EKG notable for LBBB.    Hospital Course:  Pt underwent urgent ureteral stent placement overnight on 5/21. Admission labs consistent w/ acute renal failure (Cr > 7) complicated by hyperkalemia and metabolic acidosis. Cr noted to subsequently improve post-stent and hyperkalemia was monitored for improvement. Nephrology followed for ARF which was initially managed with sodium bicarbonate gtt and PRN management of hyperkalemia w/ calc gluconate, lokelma, and insulin/D50. On further assessment nephrology recommended followup in 3-4 weeks with labs drawn before visit. Urology recommended followup ASAP with Dr Rosanna barr made for May 31st. Urine sample cytopathology showing HIGH GRADE UROTHELIAL CARCINOMA consistent with prior samples, per urology no need for oncology evaluation inpatient, can follow up outpatient.    He is being discharged with a michel catheter and has been taught how to care for it. At this point he is stable and medically optimized for discharge with close urology and nephrology followup.      HPI: 72M PMH HTN, hypothyroidism, pulmonary nodule s/p VATS LORRIE (8/2021), s/p R nephrectomy 5/22 (RCC) now w/ CKD, s/p TURBT 2/14/23 showing high grade invasive urothelial carcinoma who presents to the ED at the recommendation of his PMD due to abnormal lab testing. He had recent follow up with his PMD who did routine lab work that showed elevated creatinine from baseline. He went for repeated labs that showed SCr ~6 w/ hyperkalemia and he was called to report to the emergency room. He denies recent difficulty urinating, decreased or increased urination, dysuria. Denies fevers, chills, SOB, cough, chest pain, palpitations, abdominal pain, N/V/D, constipation, lower extremity swelling.     In the ED, he was afebrile, HR 80s-90s, BP 150s-180s systolic, RR 15-20, O2 sat 98-99% on RA. Labs notable for WBC 10.53, hgb 11.1, platelets 298. K 5.9, bicarb 14, AG 19, BUN/Cr 81/6.85. VBG 7.29/37/36/18. EKG w/ NSR 82 w/ 1st degree AV block & left axis deviation. CTAP w/ moderate to severe L hydroureteronephrosis to the level of a filling defect within the L mid ureter, neoplasm until proven otherwise.  He received 5u insulin & dextrose, albuterol 10mg via nebulizer, 2g calcium gluconate, 10g lokelma. He was started on a bicarb infusion. Repeat labs w/ K 5.7, bicarb 15, AG 18, BUN/Cr 82/7.29. He was assessed by urology & nephrology. He was admitted to medicine for further management.   EKG notable for LBBB.    Hospital Course:  Pt underwent urgent ureteral stent placement overnight on 5/21. Admission labs consistent w/ acute renal failure (Cr > 7) complicated by hyperkalemia and metabolic acidosis. Cr noted to subsequently improve post-stent and hyperkalemia was monitored for improvement. Nephrology followed for ARF which was initially managed with sodium bicarbonate gtt and PRN management of hyperkalemia w/ calc gluconate, lokelma, and insulin/D50. On further assessment nephrology recommended followup in 3-4 weeks with labs drawn before visit. Urology recommended followup ASAP with Dr Rosanna barr made for May 31st. Urine sample cytopathology showing HIGH GRADE UROTHELIAL CARCINOMA consistent with prior samples, per urology no need for oncology evaluation inpatient, can follow up outpatient.    Patient's home ramipril 10mg was held on admission d/t GAMAL. Ramipril will be held on discharge since BP stable (120s/70s) and GAMAL still present (Cr 2.8, baseline 1.5). Pt should follow up with PCP or nephrologist to restart blood pressure agents as needed.    He is being discharged with a michel catheter and has been taught how to care for it. At this point he is stable and medically optimized for discharge with close urology and nephrology followup.

## 2023-05-21 NOTE — DISCHARGE NOTE PROVIDER - NSDCFUADDAPPT_GEN_ALL_CORE_FT
APPTS ARE READY TO BE MADE: [ x] YES    Best Family or Patient Contact (if needed):    Additional Information about above appointments (if needed):    1:  Dr. Canales  2:  Dr. Montoya or Dr. Mccall from medical oncology  3: ALBERTO Spence    Other comments or requests:    APPTS ARE READY TO BE MADE: [ x] YES    Best Family or Patient Contact (if needed):    Additional Information about above appointments (if needed):    1:  Dr. Canales  2:  Dr. Montoya or Dr. Mccall from medical oncology  3: PMD - Bebe    Other comments or requests:   Pt is imelda with Bebe on 6/22 at 11a, [facility] location.  Pt is imelda with Dr. Canales 5/31 at 1:50p, [address] [facility] location.   Pt is imelda with Dr. Aguilar on 7/14 at 9:20am, [address] location - pt is awaiting call back from office for sooner appt.

## 2023-05-21 NOTE — PACU DISCHARGE NOTE - NSPTMEETSDISCHCRITERIADT_GEN_A_CORE
21-May-2023 03:19 Is This A New Presentation, Or A Follow-Up?: Scars How Severe Is Your Scar?: moderate Additional History: Patient got plastic surgery done on her face in 01/2020 and is unsatisfied with the scars remaining. Patient would like to discuss cosmetic treatment options

## 2023-05-21 NOTE — DISCHARGE NOTE PROVIDER - NSDCCPTREATMENT_GEN_ALL_CORE_FT
PRINCIPAL PROCEDURE  Procedure: CT abdomen pelvis  Findings and Treatment: PROCEDURE:  CT of the Abdomen and Pelvis was performed.  Sagittal and coronal reformats were performed.  FINDINGS:  LOWER CHEST: Within normal limits.  LIVER: A few scattered hepatic cysts without change.  BILE DUCTS: Normal caliber.  GALLBLADDER: Within normal limits.  SPLEEN: Within normal limits.  PANCREAS: Within normal limits.  ADRENALS: Within normal limits.  KIDNEYS/URETERS: Right nephroureterectomy. Left renal cysts. Moderate to   severe left hydroureteronephrosis secondary to a filling defect within   the mid left ureter (3:74), neoplasm until proven otherwise. Clot may   have a similar appearance.  BLADDER: Within normal limits.  REPRODUCTIVE ORGANS: Prostate within normal limits.  BOWEL: No bowel obstruction. Colonic diverticulosis. Appendix is normal.  PERITONEUM: No ascites.  VESSELS: Atherosclerotic changes.  RETROPERITONEUM/LYMPH NODES: No lymphadenopathy.  ABDOMINAL WALL: Within normal limits.  BONES: Thoracolumbar scoliosis and degenerative change.  IMPRESSION:  Moderate to severe left hydroureteronephrosis to the level of a filling   defect within the left mid ureter, neoplasm until proven otherwise.  --- End of Report ---

## 2023-05-21 NOTE — DISCHARGE NOTE PROVIDER - PROVIDER TOKENS
PROVIDER:[TOKEN:[[id number]],FOLLOWUP:[1 week]] PROVIDER:[TOKEN:[[id number]],SCHEDULEDAPPT:[05/31/2023],SCHEDULEDAPPTTIME:[02:00 PM],ESTABLISHEDPATIENT:[T]] PROVIDER:[TOKEN:[[id number]],SCHEDULEDAPPT:[05/31/2023],SCHEDULEDAPPTTIME:[02:00 PM],ESTABLISHEDPATIENT:[T]],PROVIDER:[TOKEN:[[id number]],FOLLOWUP:[2 weeks]],PROVIDER:[TOKEN:[[id number]],FOLLOWUP:[2 weeks]],PROVIDER:[TOKEN:[[id number]],FOLLOWUP:[1 week]]

## 2023-05-21 NOTE — PROGRESS NOTE ADULT - PROBLEM SELECTOR PLAN 1
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 SCr ~1.5 at baseline since 8/22. No current indication for urgent HD but will monitor closely.  - Admit to telemetry  - Nephrology on board, appreciate recs  - Continue bicarb gtt  - q4-6hr BMPs for now  - Shift potassium prn  - Maintain Cordero for strict Is/Os  - Avoid nephrotoxins  - Dose medications per eGFR  - Urology on board, keep NPO for urologic intervention Patient w/ history of R nephrectomy 2/2 RCC & recent TURBT showing urothelial cancer presenting w/ acute renal failure w/ hyperkalemia & L hydroureteronephrosis w/ filling defect in the L ureter, likely i/s/o malignancy. SCr ~1.5 at baseline since 8/22. No current indication for urgent HD but will monitor closely.  - s/p ureteral stenting 5/21, Cr slowly improving  - Nephrology on board, appreciate recs  - Continue bicarb gtt for now  - ~q8hr BMPs for now; continue monitoring on tele  - Shift potassium prn  - Maintain Cordero for strict IOs  - Avoid nephrotoxins  - Dose medications per eGFR  - Urology on board, keep NPO for urologic intervention

## 2023-05-21 NOTE — DISCHARGE NOTE PROVIDER - CARE PROVIDER_API CALL
Bonilla Canales)  Urology  [address] [place] [address], [place] [address]  Phone: [phone]  Fax: [phone]  Follow Up Time: 1 week   Bonilla Canales)  Urology  [address] [place] [address], [place] [address]  Phone: [phone]  Fax: [phone]  Established Patient  Scheduled Appointment: 05/31/2023 02:00 PM   Bonilla Canales)  Urology  [address] [place] [address], [address]  [place] [address]  Phone: [phone]  Fax: [phone]  Established Patient  Scheduled Appointment: 05/31/2023 02:00 PM    Joss Montoya)  Hematology; Internal Medicine; Medical Oncology  [address] [place] [address]  Phone: [phone]  Fax: [phone]  Follow Up Time: 2 weeks    Akin Mccall; PhD)  Hematology; Internal Medicine; Medical Oncology  [address] [place] [address]  Phone: [phone]  Fax: [phone]  Follow Up Time: 2 weeks    Kaila Spence  Family Medicine  [address] [place] [address]  Phone: [phone]  Fax: [phone]  Follow Up Time: 1 week

## 2023-05-21 NOTE — PROGRESS NOTE ADULT - ASSESSMENT
A/P: 72y Male s/p L stent  DVT prophylaxis/OOB  Incentive spirometry  Strict I&O's  Analgesia and antiemetics as needed  Diet  Trend Cr.   Care per medical team

## 2023-05-21 NOTE — DISCHARGE NOTE PROVIDER - NSDCFUSCHEDAPPT_GEN_ALL_CORE_FT
[facility]  UROLOGY [address]  Scheduled Appointment: 05/24/2023    Bonilla Canales  [facility]  UROLOGY [address]  Scheduled Appointment: 05/24/2023    Kaila Spence  [facility]  [facility] [address]  Scheduled Appointment: 06/22/2023    [facility]  CATSCAN [address]  Scheduled Appointment: 07/18/2023    Carter Verde  [facility]  [address]  Scheduled Appointment: 07/25/2023     Bonilla Canales  [facility]  NSUHOP URP-Urology  Scheduled Appointment: 05/24/2023    [facility]  UROLOGY [address]  Scheduled Appointment: 05/31/2023    Bonilla Canales  [facility]  UROLOGY [address]  Scheduled Appointment: 05/31/2023    Kaila Spence  [facility]  FAMILYMED [address]  Scheduled Appointment: 06/22/2023    [facility]  CATSCAN [address]  Scheduled Appointment: 07/18/2023    Carter Verde  [facility]  [address]  Scheduled Appointment: 07/25/2023     [facility]  UROLOGY [address]  Scheduled Appointment: 05/31/2023    Bonilla Canales  [facility]  UROLOGY [address]  Scheduled Appointment: 05/31/2023    Kaila Spence  [facility]  [facility] [address]  Scheduled Appointment: 06/22/2023    [facility]  CATSCAN [address]  Scheduled Appointment: 07/18/2023    Carter Verde  [facility]  [address]  Scheduled Appointment: 07/25/2023     [facility]  UROLOGY [address]  Scheduled Appointment: 05/31/2023    Bonilla Canales  [facility]  UROLOGY [address]  Scheduled Appointment: 05/31/2023    Kaila Spence  [facility]  FAMILYMED [address]  Scheduled Appointment: 06/22/2023    Gema Aguilar  [facility]  NEPHRO [address]  Scheduled Appointment: 07/14/2023    [facility]  CATSCAN [address]  Scheduled Appointment: 07/18/2023    Carter Verde  [facility]  [address]  Scheduled Appointment: 07/25/2023

## 2023-05-21 NOTE — DISCHARGE NOTE PROVIDER - CARE PROVIDERS DIRECT ADDRESSES
[email or web address]

## 2023-05-21 NOTE — DISCHARGE NOTE PROVIDER - NSFOLLOWUPCLINICS_GEN_ALL_ED_FT
[facility]  Nephrology  [address] [place] [address], 2nd [place] [address]  Phone: [phone]  Fax:   Follow Up Time: 1 month

## 2023-05-21 NOTE — PROGRESS NOTE ADULT - PROBLEM SELECTOR PLAN 2
Patient w/ recent TURBT w/ biopsy showing high grade invasive urothelial carcinoma. CTAP w/ L hydroureteronephrosis w/ filling defect in the ureter, likely i/s/o urothelial carcinoma. Follows w/ Dr. Bonilla Canales outpatient.  - Urology on board  - Keep NPO for urologic intervention Patient w/ recent TURBT w/ biopsy showing high grade invasive urothelial carcinoma. CTAP w/ L hydroureteronephrosis w/ filling defect in the ureter, likely i/s/o urothelial carcinoma. Follows w/ Dr. Bonilla Canales outpatient.  - Urology on board  - Outpatient onc follow-up

## 2023-05-21 NOTE — DISCHARGE NOTE PROVIDER - NSDCMRMEDTOKEN_GEN_ALL_CORE_FT
finasteride 5 mg oral tablet: 1 tab(s) orally once a day  levothyroxine 25 mcg (0.025 mg) oral tablet: 1 tab(s) orally once a day  ramipril 10 mg oral capsule: 1 cap(s) orally once a day   finasteride 5 mg oral tablet: 1 tab(s) orally once a day  levothyroxine 25 mcg (0.025 mg) oral tablet: 1 tab(s) orally once a day

## 2023-05-21 NOTE — PROGRESS NOTE ADULT - SUBJECTIVE AND OBJECTIVE BOX
DAILY PROGRESS NOTE:         24 hr events:  s/p ureteral stent    Objective:    Vital Signs Last 24 Hrs  T(C): 36.7 (21 May 2023 11:11), Max: 36.9 (20 May 2023 19:20)  T(F): 98 (21 May 2023 11:11), Max: 98.4 (20 May 2023 19:20)  HR: 72 (21 May 2023 11:11) (66 - 98)  BP: 129/87 (21 May 2023 11:11) (129/87 - 198/89)  BP(mean): 109 (21 May 2023 03:30) (99 - 128)  RR: 18 (21 May 2023 11:11) (15 - 20)  SpO2: 97% (21 May 2023 11:11) (95% - 100%)    Parameters below as of 21 May 2023 11:11  Patient On (Oxygen Delivery Method): room air        I&O's Detail    20 May 2023 07:01  -  21 May 2023 07:00  --------------------------------------------------------  IN:  Total IN: 0 mL    OUT:    Indwelling Catheter - Urethral (mL): 1450 mL    Voided (mL): 200 mL  Total OUT: 1650 mL    Total NET: -1650 mL      21 May 2023 07:01  -  21 May 2023 13:55  --------------------------------------------------------  IN:    Oral Fluid: 560 mL  Total IN: 560 mL    OUT:  Total OUT: 0 mL    Total NET: 560 mL          Physical Exam:    General: NAD, well-nourished  Resp: Breathing comfortably on RA  CV: regular rate   : dilute red urine       Laboratory Results:                          10.8   9.37  )-----------( 281      ( 21 May 2023 06:43 )             32.0     05-21    143  |  106  |  76<H>  ----------------------------<  135<H>  5.4<H>   |  18<L>  |  6.70<H>    Ca    9.4      21 May 2023 06:43  Phos  5.8       Mg     1.8         TPro  7.0  /  Alb  3.6  /  TBili  0.4  /  DBili  x   /  AST  12  /  ALT  6<L>  /  AlkPhos  70        Urinalysis Basic - ( 20 May 2023 16:45 )    Color: Light Yellow / Appearance: Clear / S.008 / pH: x  Gluc: x / Ketone: Negative  / Bili: Negative / Urobili: Negative   Blood: x / Protein: Trace / Nitrite: Negative   Leuk Esterase: Negative / RBC: 1 /hpf / WBC 3 /HPF   Sq Epi: x / Non Sq Epi: x / Bacteria: Negative

## 2023-05-21 NOTE — PROGRESS NOTE ADULT - SUBJECTIVE AND OBJECTIVE BOX
Internal Medicine   Rigo Hernandez | PGY-1    OVERNIGHT EVENTS: No acute overnight events.    SUBJECTIVE: Feels well, reports no significant pain. Denies fever, chest pain, shortness of breath. Notes blood in his urine since the procedure.    MEDICATIONS  (STANDING):  chlorhexidine 2% Cloths 1 Application(s) Topical daily  finasteride 5 milliGRAM(s) Oral daily  heparin   Injectable 5000 Unit(s) SubCutaneous every 8 hours  levothyroxine 25 MICROGram(s) Oral daily  sodium bicarbonate  Infusion 0.207 mEq/kG/Hr (150 mL/Hr) IV Continuous <Continuous>    MEDICATIONS  (PRN):  acetaminophen     Tablet .. 650 milliGRAM(s) Oral every 6 hours PRN Mild Pain (1 - 3)    VITALS:  T(F): 98 (05-21-23 @ 11:11), Max: 98.4 (05-20-23 @ 19:20)  HR: 72 (05-21-23 @ 11:11) (66 - 98)  BP: 129/87 (05-21-23 @ 11:11) (129/87 - 198/89)  BP(mean): 109 (05-21-23 @ 03:30) (99 - 128)  RR: 18 (05-21-23 @ 11:11) (15 - 20)  SpO2: 97% (05-21-23 @ 11:11) (95% - 100%)    PHYSICAL EXAM:   GENERAL: NAD, lying in bed comfortably, alert and conversational, pleasant  HEAD: Atraumatic, normocephalic  EYES: EOMI, conjunctiva and sclera clear, no nystagmus noted  ENT: Moist mucous membranes  CHEST/LUNG: Clear to auscultation bilaterally; no rales, rhonchi, wheezing, or rubs; unlabored respirations  HEART: Regular rate and rhythm; no murmurs, rubs, or gallops, normal S1/S2  ABDOMEN: Normal bowel sounds; soft, nontender, nondistended; no CVA tenderness  : Cordero bag w/ blood-tinged urine  EXTREMITIES: No pitting edema in bilateral ankles; no clubbing or cyanosis  MSK: No gross deformities noted   NEURO: No focal deficits   SKIN: No rashes or lesions  PSYCH: Normal mood, affect     LABS:                      10.8   9.37  )-----------( 281      ( 21 May 2023 06:43 )             32.0     05-21  143  |  106  |  76<H>  ----------------------------<  135<H>  5.4<H>   |  18<L>  |  6.70<H>    Ca    9.4      21 May 2023 06:43  Phos  5.8     05-21  Mg     1.8     05-21    TPro  7.0  /  Alb  3.6  /  TBili  0.4  /  DBili  x   /  AST  12  /  ALT  6<L>  /  AlkPhos  70  05-20    CARDIAC MARKERS ( 20 May 2023 19:52 )  x     / x     / 69 U/L / x     / x        Creatinine Trend: 6.70<--, 7.15<--, 7.29<--, 6.85<--    I&O's Summary  20 May 2023 07:01  -  21 May 2023 07:00  --------------------------------------------------------  IN: 0 mL / OUT: 1650 mL / NET: -1650 mL    21 May 2023 07:01  -  21 May 2023 13:55  --------------------------------------------------------  IN: 560 mL / OUT: 0 mL / NET: 560 mL

## 2023-05-21 NOTE — DISCHARGE NOTE PROVIDER - NSDCCPCAREPLAN_GEN_ALL_CORE_FT
PRINCIPAL DISCHARGE DIAGNOSIS  Diagnosis: Acute kidney injury superimposed on CKD  Assessment and Plan of Treatment: You were sent to the hospital due to worsening kidney function. You were found to have significant kidney damage which caused an increase in acid and potassium in your blood, and you were treated with medications to help correct these abnormalities in your blood. You had a CT scan which showed a filling defect in your left ureter and a swollen left ureter and kidney. You were evluated by Urology who placed a stent in your left ureter to relieve the obstruction and allow urine to flow from your kidney to your bladder.   The procedure showed small bladder polyps, and you will need more workup with a procedure called ureteroscopy outpatient to determine the cause of your left ureter obstruction, so you should follow up with Urologist Dr. Canales after leaving the hospital.  The Urology team recommend _____________________-  The Nephrology team evaluated to see if you needed dialysis and recommended ____________________-     PRINCIPAL DISCHARGE DIAGNOSIS  Diagnosis: Acute kidney injury superimposed on CKD  Assessment and Plan of Treatment: You were sent to the hospital for lab abnormalities and were found to be in acute renal failure (a severe kidney injury). Of note, you were found to have high amounts of acid andn potassium in your blood as a result of this, which was managed with medications, and CT imaging of your urinary tract showed an obstructed ureter and enlarged kidney (due to obstructed drainage of urine). You were evaluated by urology who urgently placed a stent in your ureter to relieve the obstruction and restore the flow of urine to your bladder. You will need to follow up with Dr. Canales/urology for further workup of this obstruction and also to evaluate small bladder polyps which were seen during cystoscopy. In the interim, the urology team recommended ____. You were also evaluated by our nephrology team given your severely impaired kidney function, which slowly improved with time - they recommended ____.     PRINCIPAL DISCHARGE DIAGNOSIS  Diagnosis: Acute kidney injury superimposed on CKD  Assessment and Plan of Treatment: You were sent to the hospital for lab abnormalities and were found to be in acute renal failure (a severe kidney injury). Of note, you were found to have high amounts of acid and potassium in your blood as a result of this, which was managed with medications, and CT imaging of your urinary tract showed an obstructed ureter and enlarged kidney (due to obstructed drainage of urine). You were evaluated by urology who  placed a stent in your ureter to relieve the obstruction and restore the flow of urine to your bladder. You will need to follow up with Dr. Canales for further workup of this obstruction and also to evaluate small bladder polyps which were seen during cystoscopy.  You were also evaluated by our nephrology team given your severely impaired kidney function, which slowly improved with time - they recommended seeing them in the office in 3-4 weeks. Have blood tests drawn in 2 weeks so that the nephrologist can see the results.     PRINCIPAL DISCHARGE DIAGNOSIS  Diagnosis: Acute kidney injury superimposed on CKD  Assessment and Plan of Treatment: You were sent to the hospital for lab abnormalities and were found to be in acute renal failure (a severe kidney injury). Of note, you were found to have high amounts of acid and potassium in your blood as a result of this, which was managed with medications, and CT imaging of your urinary tract showed an obstructed ureter and enlarged kidney (due to obstructed drainage of urine). You were evaluated by urology who  placed a stent in your ureter to relieve the obstruction and restore the flow of urine to your bladder. You will need to follow up with Dr. Canales for further workup of this obstruction and also to evaluate small bladder polyps which were seen during cystoscopy.  You were also evaluated by our nephrology team given your severely impaired kidney function, which slowly improved with time - they recommended seeing them in the office in 3-4 weeks. Have blood tests drawn in 2 weeks so that the nephrologist can see the results.  Your home ramipril 10mg was held on admission. You should follow up with PCP or nephrologist to restart blood pressure agents as needed.

## 2023-05-21 NOTE — PROGRESS NOTE ADULT - SUBJECTIVE AND OBJECTIVE BOX
Post op Check    Pt seen and examined without complaints. Pain is controlled. Denies SOB/CP/N/V.     Vital Signs Last 24 Hrs  T(C): 36.4 (21 May 2023 04:09), Max: 36.9 (20 May 2023 19:20)  T(F): 97.5 (21 May 2023 04:09), Max: 98.4 (20 May 2023 19:20)  HR: 95 (21 May 2023 04:09) (66 - 98)  BP: 154/83 (21 May 2023 04:24) (137/71 - 198/89)  BP(mean): 109 (21 May 2023 03:30) (99 - 128)  RR: 18 (21 May 2023 04:09) (15 - 20)  SpO2: 95% (21 May 2023 04:09) (95% - 100%)    Parameters below as of 21 May 2023 04:09  Patient On (Oxygen Delivery Method): room air        I&O's Summary    20 May 2023 07:01  -  21 May 2023 05:35  --------------------------------------------------------  IN: 0 mL / OUT: 650 mL / NET: -650 mL        Physical Exam  Gen: NAD  Pulm: No respiratory distress, no subcostal retractions  CV: RRR, no JVD  Abd: Soft, NT, ND                            11.1   10.53 )-----------( 298      ( 20 May 2023 15:25 )             33.3       05-21    142  |  108  |  80<H>  ----------------------------<  114<H>  5.1   |  20<L>  |  7.15<H>    Ca    9.3      21 May 2023 00:54  Phos  5.9     05-21  Mg     2.0     05-21    TPro  7.0  /  Alb  3.6  /  TBili  0.4  /  DBili  x   /  AST  12  /  ALT  6<L>  /  AlkPhos  70  05-20      A/P: 72y Male s/p L stent  DVT prophylaxis/OOB  Incentive spirometry  Strict I&O's  Analgesia and antiemetics as needed  Diet  AM labs   - - -

## 2023-05-22 ENCOUNTER — NON-APPOINTMENT (OUTPATIENT)
Age: 73
End: 2023-05-22

## 2023-05-22 LAB
ANION GAP SERPL CALC-SCNC: 14 MMOL/L — SIGNIFICANT CHANGE UP (ref 5–17)
ANION GAP SERPL CALC-SCNC: 20 MMOL/L — HIGH (ref 5–17)
BUN SERPL-MCNC: 55 MG/DL — HIGH (ref 7–23)
BUN SERPL-MCNC: 66 MG/DL — HIGH (ref 7–23)
CALCIUM SERPL-MCNC: 8.6 MG/DL — SIGNIFICANT CHANGE UP (ref 8.4–10.5)
CALCIUM SERPL-MCNC: 8.7 MG/DL — SIGNIFICANT CHANGE UP (ref 8.4–10.5)
CHLORIDE SERPL-SCNC: 98 MMOL/L — SIGNIFICANT CHANGE UP (ref 96–108)
CHLORIDE SERPL-SCNC: 99 MMOL/L — SIGNIFICANT CHANGE UP (ref 96–108)
CO2 SERPL-SCNC: 29 MMOL/L — SIGNIFICANT CHANGE UP (ref 22–31)
CO2 SERPL-SCNC: 31 MMOL/L — SIGNIFICANT CHANGE UP (ref 22–31)
CREAT SERPL-MCNC: 4.47 MG/DL — HIGH (ref 0.5–1.3)
CREAT SERPL-MCNC: 5.6 MG/DL — HIGH (ref 0.5–1.3)
EGFR: 10 ML/MIN/1.73M2 — LOW
EGFR: 13 ML/MIN/1.73M2 — LOW
GLUCOSE SERPL-MCNC: 106 MG/DL — HIGH (ref 70–99)
GLUCOSE SERPL-MCNC: 124 MG/DL — HIGH (ref 70–99)
HCT VFR BLD CALC: 29.5 % — LOW (ref 39–50)
HCT VFR BLD CALC: 31.5 % — LOW (ref 39–50)
HGB BLD-MCNC: 10 G/DL — LOW (ref 13–17)
HGB BLD-MCNC: 10.3 G/DL — LOW (ref 13–17)
MAGNESIUM SERPL-MCNC: 1.4 MG/DL — LOW (ref 1.6–2.6)
MAGNESIUM SERPL-MCNC: 1.6 MG/DL — SIGNIFICANT CHANGE UP (ref 1.6–2.6)
MCHC RBC-ENTMCNC: 31.3 PG — SIGNIFICANT CHANGE UP (ref 27–34)
MCHC RBC-ENTMCNC: 31.6 PG — SIGNIFICANT CHANGE UP (ref 27–34)
MCHC RBC-ENTMCNC: 32.7 GM/DL — SIGNIFICANT CHANGE UP (ref 32–36)
MCHC RBC-ENTMCNC: 33.9 GM/DL — SIGNIFICANT CHANGE UP (ref 32–36)
MCV RBC AUTO: 93.4 FL — SIGNIFICANT CHANGE UP (ref 80–100)
MCV RBC AUTO: 95.7 FL — SIGNIFICANT CHANGE UP (ref 80–100)
NON-GYNECOLOGICAL CYTOLOGY STUDY: SIGNIFICANT CHANGE UP
NRBC # BLD: 0 /100 WBCS — SIGNIFICANT CHANGE UP (ref 0–0)
NRBC # BLD: 0 /100 WBCS — SIGNIFICANT CHANGE UP (ref 0–0)
PHOSPHATE SERPL-MCNC: 4.5 MG/DL — SIGNIFICANT CHANGE UP (ref 2.5–4.5)
PHOSPHATE SERPL-MCNC: 5 MG/DL — HIGH (ref 2.5–4.5)
PLATELET # BLD AUTO: 248 K/UL — SIGNIFICANT CHANGE UP (ref 150–400)
PLATELET # BLD AUTO: 259 K/UL — SIGNIFICANT CHANGE UP (ref 150–400)
POTASSIUM SERPL-MCNC: 3.9 MMOL/L — SIGNIFICANT CHANGE UP (ref 3.5–5.3)
POTASSIUM SERPL-MCNC: 5 MMOL/L — SIGNIFICANT CHANGE UP (ref 3.5–5.3)
POTASSIUM SERPL-SCNC: 3.9 MMOL/L — SIGNIFICANT CHANGE UP (ref 3.5–5.3)
POTASSIUM SERPL-SCNC: 5 MMOL/L — SIGNIFICANT CHANGE UP (ref 3.5–5.3)
RBC # BLD: 3.16 M/UL — LOW (ref 4.2–5.8)
RBC # BLD: 3.29 M/UL — LOW (ref 4.2–5.8)
RBC # FLD: 10.9 % — SIGNIFICANT CHANGE UP (ref 10.3–14.5)
RBC # FLD: 11 % — SIGNIFICANT CHANGE UP (ref 10.3–14.5)
SODIUM SERPL-SCNC: 144 MMOL/L — SIGNIFICANT CHANGE UP (ref 135–145)
SODIUM SERPL-SCNC: 147 MMOL/L — HIGH (ref 135–145)
WBC # BLD: 8.05 K/UL — SIGNIFICANT CHANGE UP (ref 3.8–10.5)
WBC # BLD: 8.2 K/UL — SIGNIFICANT CHANGE UP (ref 3.8–10.5)
WBC # FLD AUTO: 8.05 K/UL — SIGNIFICANT CHANGE UP (ref 3.8–10.5)
WBC # FLD AUTO: 8.2 K/UL — SIGNIFICANT CHANGE UP (ref 3.8–10.5)

## 2023-05-22 PROCEDURE — 99233 SBSQ HOSP IP/OBS HIGH 50: CPT | Mod: GC

## 2023-05-22 PROCEDURE — 99231 SBSQ HOSP IP/OBS SF/LOW 25: CPT | Mod: GC

## 2023-05-22 RX ORDER — SIMETHICONE 80 MG/1
80 TABLET, CHEWABLE ORAL THREE TIMES A DAY
Refills: 0 | Status: DISCONTINUED | OUTPATIENT
Start: 2023-05-22 | End: 2023-05-24

## 2023-05-22 RX ORDER — SODIUM CHLORIDE 9 MG/ML
1000 INJECTION, SOLUTION INTRAVENOUS
Refills: 0 | Status: DISCONTINUED | OUTPATIENT
Start: 2023-05-22 | End: 2023-05-24

## 2023-05-22 RX ADMIN — HEPARIN SODIUM 5000 UNIT(S): 5000 INJECTION INTRAVENOUS; SUBCUTANEOUS at 21:54

## 2023-05-22 RX ADMIN — SODIUM CHLORIDE 100 MILLILITER(S): 9 INJECTION, SOLUTION INTRAVENOUS at 21:55

## 2023-05-22 RX ADMIN — CHLORHEXIDINE GLUCONATE 1 APPLICATION(S): 213 SOLUTION TOPICAL at 11:36

## 2023-05-22 RX ADMIN — SODIUM CHLORIDE 100 MILLILITER(S): 9 INJECTION, SOLUTION INTRAVENOUS at 12:42

## 2023-05-22 RX ADMIN — Medication 25 MICROGRAM(S): at 05:45

## 2023-05-22 RX ADMIN — FINASTERIDE 5 MILLIGRAM(S): 5 TABLET, FILM COATED ORAL at 11:35

## 2023-05-22 RX ADMIN — HEPARIN SODIUM 5000 UNIT(S): 5000 INJECTION INTRAVENOUS; SUBCUTANEOUS at 05:45

## 2023-05-22 RX ADMIN — SIMETHICONE 80 MILLIGRAM(S): 80 TABLET, CHEWABLE ORAL at 21:54

## 2023-05-22 RX ADMIN — Medication 150 MEQ/KG/HR: at 03:28

## 2023-05-22 RX ADMIN — HEPARIN SODIUM 5000 UNIT(S): 5000 INJECTION INTRAVENOUS; SUBCUTANEOUS at 13:18

## 2023-05-22 NOTE — PROGRESS NOTE ADULT - PROBLEM SELECTOR PLAN 2
Resolved. Pt. was with hyperkalemia in setting of GAMAL on CKD likely due to urinary obstruction. Serum potassium upon admission was 5.9. Hyperkalemia has responded to medical management with Dextrose, Insulin, Calcium gluconate and IV bicarbonate as well as obstruction relief. michel catheter as noted above. He may require RRT as noted above. Low potassium diet advised. Monitor serum potassium.

## 2023-05-22 NOTE — PROGRESS NOTE ADULT - ASSESSMENT
Pt with metabolic acidosis, hyperkalemia in setting of GAMAL on CKD, likely due to urinary obstruction.

## 2023-05-22 NOTE — PROGRESS NOTE ADULT - SUBJECTIVE AND OBJECTIVE BOX
Internal Medicine Progress Note    Patient is a 72y old  Male who presents with a chief complaint of Acute renal failure (22 May 2023 07:52)    OVERNIGHT EVENTS/SUBJECTIVE:    OBJECTIVE:  Vital Signs Last 24 Hrs  T(C): 36.8 (22 May 2023 04:00), Max: 36.8 (22 May 2023 04:00)  T(F): 98.2 (22 May 2023 04:00), Max: 98.2 (22 May 2023 04:00)  HR: 69 (22 May 2023 04:00) (69 - 81)  BP: 146/72 (22 May 2023 04:00) (129/87 - 164/84)  BP(mean): --  RR: 18 (22 May 2023 04:00) (18 - 18)  SpO2: 97% (22 May 2023 04:00) (96% - 97%)    Parameters below as of 22 May 2023 04:00  Patient On (Oxygen Delivery Method): room air      I&O's Detail    21 May 2023 07:01  -  22 May 2023 07:00  --------------------------------------------------------  IN:    Oral Fluid: 1040 mL  Total IN: 1040 mL    OUT:    Indwelling Catheter - Urethral (mL): 6150 mL  Total OUT: 6150 mL    Total NET: -5110 mL        Daily     Daily   Physical Exam:  General: NAD, resting comfortably in bed  Neuro: A&Ox4, 5/5 strength in all ext  HEENT: NC/AT, EOMI, normal hearing, oral mucosa moist, no oral lesions noted, no pharyngeal erythema, uvula midline  Neck: supple, thyroid not enlarged, no LAD  Resp: Breathing comfortably on RA, LCTA b/l  CV: Normal sinus rhythm, S1 and S2, no r/m/g  Abd: soft, non-distended, non-tender. No HSM.  Ext: ROMIx4, no edema, +2 pulses bilaterally  Skin: Warm and dry, no rashes or discolorations  Psych: Appropriate affect    Medications:  MEDICATIONS  (STANDING):  chlorhexidine 2% Cloths 1 Application(s) Topical daily  finasteride 5 milliGRAM(s) Oral daily  heparin   Injectable 5000 Unit(s) SubCutaneous every 8 hours  levothyroxine 25 MICROGram(s) Oral daily  sodium bicarbonate  Infusion 0.207 mEq/kG/Hr (150 mL/Hr) IV Continuous <Continuous>    MEDICATIONS  (PRN):  acetaminophen     Tablet .. 650 milliGRAM(s) Oral every 6 hours PRN Mild Pain (1 - 3)      Labs:                        10.0   8.20  )-----------( 248      ( 22 May 2023 06:50 )             29.5     05-    147<H>  |  98  |  66<H>  ----------------------------<  124<H>  3.9   |  29  |  5.60<H>    Ca    8.6      22 May 2023 06:50  Phos  5.0       Mg     1.6         TPro  7.0  /  Alb  3.6  /  TBili  0.4  /  DBili  x   /  AST  12  /  ALT  6<L>  /  AlkPhos  70        Urinalysis Basic - ( 20 May 2023 16:45 )    Color: Light Yellow / Appearance: Clear / S.008 / pH: x  Gluc: x / Ketone: Negative  / Bili: Negative / Urobili: Negative   Blood: x / Protein: Trace / Nitrite: Negative   Leuk Esterase: Negative / RBC: 1 /hpf / WBC 3 /HPF   Sq Epi: x / Non Sq Epi: x / Bacteria: Negative          Radiology: Internal Medicine Progress Note    Patient is a 72y old  Male who presents with a chief complaint of Acute renal failure (22 May 2023 07:52)    OVERNIGHT EVENTS/SUBJECTIVE: No overnight events. Pt says he feels overall well; denies fever, cp, sob, abd pain, headache.     OBJECTIVE:  Vital Signs Last 24 Hrs  T(C): 36.8 (22 May 2023 04:00), Max: 36.8 (22 May 2023 04:00)  T(F): 98.2 (22 May 2023 04:00), Max: 98.2 (22 May 2023 04:00)  HR: 69 (22 May 2023 04:00) (69 - 81)  BP: 146/72 (22 May 2023 04:00) (129/87 - 164/84)  BP(mean): --  RR: 18 (22 May 2023 04:00) (18 - 18)  SpO2: 97% (22 May 2023 04:00) (96% - 97%)    Parameters below as of 22 May 2023 04:00  Patient On (Oxygen Delivery Method): room air      I&O's Detail    21 May 2023 07:01  -  22 May 2023 07:00  --------------------------------------------------------  IN:    Oral Fluid: 1040 mL  Total IN: 1040 mL    OUT:    Indwelling Catheter - Urethral (mL): 6150 mL  Total OUT: 6150 mL    Total NET: -5110 mL        Daily     Daily   Physical Exam:  General: NAD, resting comfortably in bed  Neuro: A&Ox4, no gross deficits   HEENT: NC/AT, EOMI, normal hearing, oral mucosa moist  Resp: Breathing comfortably on RA, LCTA b/l  CV: Normal sinus rhythm, S1 and S2, no r/m/g  Abd: soft, non-distended, non-tender  : blood tinged urine in michel   Ext:  no edema, +2 pulses bilaterally  Skin: Warm and dry, no rashes or discolorations  Psych: Appropriate affect    Medications:  MEDICATIONS  (STANDING):  chlorhexidine 2% Cloths 1 Application(s) Topical daily  finasteride 5 milliGRAM(s) Oral daily  heparin   Injectable 5000 Unit(s) SubCutaneous every 8 hours  levothyroxine 25 MICROGram(s) Oral daily  sodium bicarbonate  Infusion 0.207 mEq/kG/Hr (150 mL/Hr) IV Continuous <Continuous>    MEDICATIONS  (PRN):  acetaminophen     Tablet .. 650 milliGRAM(s) Oral every 6 hours PRN Mild Pain (1 - 3)      Labs:                        10.0   8.20  )-----------( 248      ( 22 May 2023 06:50 )             29.5     05-    147<H>  |  98  |  66<H>  ----------------------------<  124<H>  3.9   |  29  |  5.60<H>    Ca    8.6      22 May 2023 06:50  Phos  5.0       Mg     1.6         TPro  7.0  /  Alb  3.6  /  TBili  0.4  /  DBili  x   /  AST  12  /  ALT  6<L>  /  AlkPhos  70        Urinalysis Basic - ( 20 May 2023 16:45 )    Color: Light Yellow / Appearance: Clear / S.008 / pH: x  Gluc: x / Ketone: Negative  / Bili: Negative / Urobili: Negative   Blood: x / Protein: Trace / Nitrite: Negative   Leuk Esterase: Negative / RBC: 1 /hpf / WBC 3 /HPF   Sq Epi: x / Non Sq Epi: x / Bacteria: Negative          Radiology:

## 2023-05-22 NOTE — PROGRESS NOTE ADULT - SUBJECTIVE AND OBJECTIVE BOX
DAILY PROGRESS NOTE:         24 hr events:  Cr continues to improve     Objective:    Vital Signs Last 24 Hrs  T(C): 36.6 (22 May 2023 11:), Max: 36.8 (22 May 2023 04:00)  T(F): 97.9 (22 May 2023 11:), Max: 98.2 (22 May 2023 04:00)  HR: 86 (22 May 2023 11:) (69 - 86)  BP: 136/88 (22 May 2023 11:) (136/78 - 164/84)  BP(mean): --  RR: 18 (22 May 2023 11:) (18 - 18)  SpO2: 96% (22 May 2023 11:) (96% - 97%)    Parameters below as of 22 May 2023 11:22  Patient On (Oxygen Delivery Method): room air        I&O's Detail    21 May 2023 07:01  -  22 May 2023 07:00  --------------------------------------------------------  IN:    Oral Fluid: 1040 mL  Total IN: 1040 mL    OUT:    Indwelling Catheter - Urethral (mL): 6150 mL  Total OUT: 6150 mL    Total NET: -5110 mL      22 May 2023 07:01  -  22 May 2023 13:22  --------------------------------------------------------  IN:    Oral Fluid: 560 mL  Total IN: 560 mL    OUT:  Total OUT: 0 mL    Total NET: 560 mL          Physical Exam:    General: NAD, well-nourished  Resp: Breathing comfortably on RA  CV: regular rate   : red tinged urine in michel tubing       Laboratory Results:                          10.0   8.20  )-----------( 248      ( 22 May 2023 06:50 )             29.5     05-22    147<H>  |  98  |  66<H>  ----------------------------<  124<H>  3.9   |  29  |  5.60<H>    Ca    8.6      22 May 2023 06:50  Phos  5.0     -  Mg     1.6         TPro  7.0  /  Alb  3.6  /  TBili  0.4  /  DBili  x   /  AST  12  /  ALT  6<L>  /  AlkPhos  70        Urinalysis Basic - ( 20 May 2023 16:45 )    Color: Light Yellow / Appearance: Clear / S.008 / pH: x  Gluc: x / Ketone: Negative  / Bili: Negative / Urobili: Negative   Blood: x / Protein: Trace / Nitrite: Negative   Leuk Esterase: Negative / RBC: 1 /hpf / WBC 3 /HPF   Sq Epi: x / Non Sq Epi: x / Bacteria: Negative

## 2023-05-22 NOTE — PROGRESS NOTE ADULT - PROBLEM SELECTOR PLAN 1
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 SCr ~1.5 at baseline since 8/22. No current indication for urgent HD but will monitor closely.  - s/p ureteral stenting 5/21, Cr slowly improving  - Nephrology on board, appreciate recs  - Continue bicarb gtt for now  - ~q8hr BMPs for now; continue monitoring on tele  - Shift potassium prn  - Maintain Cordero for strict IOs  - Avoid nephrotoxins  - Dose medications per eGFR  - Urology on board, keep NPO for urologic intervention

## 2023-05-22 NOTE — PROGRESS NOTE ADULT - PROBLEM SELECTOR PLAN 1
Pt with GAMAL on CKD in the setting of likely urinary obstruction as noted by CT scan done in the ED demonstrating L hydronephrosis with mid ureter obstruction. Urology has been consulted, tentatively plan for cystoscopy with L ureteral stent placement on 5/21/23. His CKD is in setting of hx of HTN and solitary L kidney s/p R nephrectomy in 5/2022. Per [facility] review, he has had a ranging SCr of 1.4-2 since May 2022, most recent SCr of 2.12 on 4/21/23. In the ED, SCr elevated at 6.07, peaked at 7.29 now 5.6. With Cordero. UA as above, trace protein, large blood and 1 RBC noted. Renal US on 5/15/23 demonstrated Left kidney: 14.6 cm. No renal mass or calculi. Cysts measure up to 7.2 cm. Moderate hydronephrosis. Urinary bladder: The bladder volume is 173 mL. The post void residual is 4 mL. The prostate volume is 25 mL.    Phos acceptable  Consult to urology as above, note reviewed.   Will need to consider HD if renal failure continues to worsen. Discussed with the patient that he may require RRT/HD, risks and benefits associated with RRT/HD explained at length. HD consent obtained and kept in patients chart.  Monitor labs and urine output. Avoid NSAIDs, ACEI/ARBS, RCA and nephrotoxins. Dose medications as per eGFR. Pt with GAMAL on CKD in the setting of likely urinary obstruction as noted by CT scan done in the ED demonstrating L hydronephrosis with mid ureter obstruction. Urology has been consulted, tentatively plan for cystoscopy with L ureteral stent placement on 5/21/23. His CKD is in setting of hx of HTN and solitary L kidney s/p R nephrectomy in 5/2022. Per [facility] review, he has had a ranging SCr of 1.4-2 since May 2022, most recent SCr of 2.12 on 4/21/23. In the ED, SCr elevated at 6.07, peaked at 7.29 now 5.6. With Cordero. UA as above, trace protein, large blood and 1 RBC noted. Renal US on 5/15/23 demonstrated Left kidney: 14.6 cm. No renal mass or calculi. Cysts measure up to 7.2 cm. Moderate hydronephrosis. Urinary bladder: The bladder volume is 173 mL. The post void residual is 4 mL. The prostate volume is 25 mL.    Phos acceptable  Consult to urology as above, note reviewed.   Will need to consider HD if renal failure continues to worsen. Discussed with the patient that he may require RRT/HD, risks and benefits associated with RRT/HD explained at length. HD consent obtained and kept in patients chart.  Monitor labs and urine output. Avoid NSAIDs, ACEI/ARBS, RCA and nephrotoxins. Dose medications as per eGFR.    Monitor for post-obstructive diuresis and electrolyte replacement needs.

## 2023-05-22 NOTE — PROGRESS NOTE ADULT - SUBJECTIVE AND OBJECTIVE BOX
[facility] DIVISION OF KIDNEY DISEASES AND HYPERTENSION -- FOLLOW UP NOTE  --------------------------------------------------------------------------------  HPI: 71 yo male w/ PMH CKD in setting of solitary L kidney s/p R nephrectomy 2/2 malignant neoplasm (05/2022), HTN, TURBT, Pulm nodule s/p VATS LORRIE (8/2021), Hypothyroidism presenting to ED for abnormal outpatient lab values concerning for elevated serum potassium of 5.7 and Scr of 6.07 on 5/18. In the ED, found to have a serum potassium of 5.9 with a SCO2 of 14 and SCr of 6.85. Nephrology consulted for hyperkalemia, metabolic acidosis and GAMAL on CKD. Per [facility] review, he has had a ranging SCr of 1.4-2 since May 2022, most recent SCr of 2.12 on 4/21/23. CT in the ED demonstrated L hydronephrosis with filling defect in mid ureter.    Pt. had L. ureteral stent placed yesterday. Pt. seen this AM, on Bicarb gtt, denies any acute complaints. Cordero with strawberry colored urine.       PAST HISTORY  --------------------------------------------------------------------------------  No significant changes to PMH, PSH, FHx, SHx, unless otherwise noted    ALLERGIES & MEDICATIONS  --------------------------------------------------------------------------------  Allergies    No Known Allergies    Intolerances      Standing Inpatient Medications  chlorhexidine 2% Cloths 1 Application(s) Topical daily  finasteride 5 milliGRAM(s) Oral daily  heparin   Injectable 5000 Unit(s) SubCutaneous every 8 hours  levothyroxine 25 MICROGram(s) Oral daily  sodium bicarbonate  Infusion 0.207 mEq/kG/Hr IV Continuous <Continuous>    PRN Inpatient Medications  acetaminophen     Tablet .. 650 milliGRAM(s) Oral every 6 hours PRN      REVIEW OF SYSTEMS  --------------------------------------------------------------------------------  As per HPI    VITALS/PHYSICAL EXAM  --------------------------------------------------------------------------------  T(C): 36.8 (05-22-23 @ 04:00), Max: 36.8 (05-22-23 @ 04:00)  HR: 69 (05-22-23 @ 04:00) (69 - 81)  BP: 146/72 (05-22-23 @ 04:00) (129/87 - 164/84)  RR: 18 (05-22-23 @ 04:00) (18 - 18)  SpO2: 97% (05-22-23 @ 04:00) (96% - 97%)  Wt(kg): --  Height (cm): 190.5 (05-21-23 @ 01:15)  Weight (kg): 108.9 (05-21-23 @ 01:15)  BMI (kg/m2): 30 (05-21-23 @ 01:15)  BSA (m2): 2.37 (05-21-23 @ 01:15)      05-21-23 @ 07:01  -  05-22-23 @ 07:00  --------------------------------------------------------  IN: 1040 mL / OUT: 6150 mL / NET: -5110 mL      Physical Exam:  	Gen: Lying flat in bed and in NAD  	HEENT: Anicteric  	Pulm: CTA B/L  	CV: S1S2+  	Abd: Soft, +BS    	Ext: No LE edema B/L  	Neuro: Awake and alert   	Skin: Warm and dry        LABS/STUDIES  --------------------------------------------------------------------------------              10.0   8.20  >-----------<  248      [05-22-23 @ 06:50]              29.5     147  |  98  |  66  ----------------------------<  124      [05-22-23 @ 06:50]  3.9   |  29  |  5.60        Ca     8.6     [05-22-23 @ 06:50]      Mg     1.6     [05-22-23 @ 06:50]      Phos  5.0     [05-22-23 @ 06:50]    TPro  7.0  /  Alb  3.6  /  TBili  0.4  /  DBili  x   /  AST  12  /  ALT  6   /  AlkPhos  70  [05-20-23 @ 19:52]      CK 69      [05-20-23 @ 19:52]    Creatinine Trend:  SCr 5.60 [05-22 @ 06:50]  SCr 6.15 [05-21 @ 14:12]  SCr 6.70 [05-21 @ 06:43]  SCr 7.15 [05-21 @ 00:54]  SCr 7.29 [05-20 @ 19:52]    Urinalysis - [05-20-23 @ 16:45]      Color Light Yellow / Appearance Clear / SG 1.008 / pH 6.0      Gluc Negative / Ketone Negative  / Bili Negative / Urobili Negative       Blood Large / Protein Trace / Leuk Est Negative / Nitrite Negative      RBC 1 / WBC 3 / Hyaline  / Gran  / Sq Epi  / Non Sq Epi  / Bacteria Negative    Urine Creatinine 65      [05-20-23 @ 16:45]  Urine Protein 14      [05-20-23 @ 16:45]  Urine Sodium 38      [05-20-23 @ 16:45]  Urine Urea Nitrogen 338      [05-20-23 @ 16:45]  Urine Potassium 19      [05-20-23 @ 16:45]  Urine Osmolality 234      [05-20-23 @ 16:45]      HCV 0.24, Nonreact      [05-21-23 @ 06:43]

## 2023-05-22 NOTE — PROGRESS NOTE ADULT - ASSESSMENT
A/P: 72y Male s/p L stent  DVT prophylaxis/OOB  Incentive spirometry  Strict I&O's  Analgesia and antiemetics as needed  Diet  Trend Cr.   Care per medical team  71 yo male w/ PMH HTN, R nephrectomy (2/2 malignant neoplasm 5/22), TURBT, Pulm nodule s/p VATS LORRIE (8/2021) presenting to ED for hyperkalemia, Cr 6. CT shows L hydronephrosis with filling defect in mid ureter. Now s/p cysto, L stents.   --Continues to have high UOP overnight.   -Monitor patient for post-obstructive diuresis.  -Maintain strict intake and output  -POD = >200cc of urine/hr for 3 or more consecutive hours  -Allow patient to drink to thirst.  Keep two pitchers of water at the bedside at all times.  -Check q6 BMPs to monitor electrolytes until UOP normalizes  -Continue Cordero catheter     Care per medical team

## 2023-05-22 NOTE — PROGRESS NOTE ADULT - PROBLEM SELECTOR PLAN 6
DVT ppx: hold for now given hematuria  Diet: regular  Dispo: pending clinical course (improvement of renal function)

## 2023-05-22 NOTE — PROGRESS NOTE ADULT - PROBLEM SELECTOR PLAN 2
Patient w/ recent TURBT w/ biopsy showing high grade invasive urothelial carcinoma. CTAP w/ L hydroureteronephrosis w/ filling defect in the ureter, likely i/s/o urothelial carcinoma. Follows w/ Dr. Bonilla Canales outpatient.  - Urology on board  - Outpatient onc follow-up

## 2023-05-23 LAB
ANION GAP SERPL CALC-SCNC: 14 MMOL/L — SIGNIFICANT CHANGE UP (ref 5–17)
BUN SERPL-MCNC: 53 MG/DL — HIGH (ref 7–23)
CALCIUM SERPL-MCNC: 8.8 MG/DL — SIGNIFICANT CHANGE UP (ref 8.4–10.5)
CHLORIDE SERPL-SCNC: 99 MMOL/L — SIGNIFICANT CHANGE UP (ref 96–108)
CO2 SERPL-SCNC: 31 MMOL/L — SIGNIFICANT CHANGE UP (ref 22–31)
CREAT SERPL-MCNC: 4.06 MG/DL — HIGH (ref 0.5–1.3)
EGFR: 15 ML/MIN/1.73M2 — LOW
GLUCOSE SERPL-MCNC: 92 MG/DL — SIGNIFICANT CHANGE UP (ref 70–99)
HCT VFR BLD CALC: 32 % — LOW (ref 39–50)
HGB BLD-MCNC: 10.4 G/DL — LOW (ref 13–17)
MAGNESIUM SERPL-MCNC: 1.3 MG/DL — LOW (ref 1.6–2.6)
MCHC RBC-ENTMCNC: 31.3 PG — SIGNIFICANT CHANGE UP (ref 27–34)
MCHC RBC-ENTMCNC: 32.5 GM/DL — SIGNIFICANT CHANGE UP (ref 32–36)
MCV RBC AUTO: 96.4 FL — SIGNIFICANT CHANGE UP (ref 80–100)
NRBC # BLD: 0 /100 WBCS — SIGNIFICANT CHANGE UP (ref 0–0)
PHOSPHATE SERPL-MCNC: 4.1 MG/DL — SIGNIFICANT CHANGE UP (ref 2.5–4.5)
PLATELET # BLD AUTO: 254 K/UL — SIGNIFICANT CHANGE UP (ref 150–400)
POTASSIUM SERPL-MCNC: 3.7 MMOL/L — SIGNIFICANT CHANGE UP (ref 3.5–5.3)
POTASSIUM SERPL-SCNC: 3.7 MMOL/L — SIGNIFICANT CHANGE UP (ref 3.5–5.3)
RBC # BLD: 3.32 M/UL — LOW (ref 4.2–5.8)
RBC # FLD: 11 % — SIGNIFICANT CHANGE UP (ref 10.3–14.5)
SODIUM SERPL-SCNC: 144 MMOL/L — SIGNIFICANT CHANGE UP (ref 135–145)
WBC # BLD: 7.54 K/UL — SIGNIFICANT CHANGE UP (ref 3.8–10.5)
WBC # FLD AUTO: 7.54 K/UL — SIGNIFICANT CHANGE UP (ref 3.8–10.5)

## 2023-05-23 PROCEDURE — 99232 SBSQ HOSP IP/OBS MODERATE 35: CPT | Mod: GC

## 2023-05-23 PROCEDURE — 99233 SBSQ HOSP IP/OBS HIGH 50: CPT | Mod: GC

## 2023-05-23 RX ORDER — SODIUM CHLORIDE 9 MG/ML
1000 INJECTION, SOLUTION INTRAVENOUS
Refills: 0 | Status: DISCONTINUED | OUTPATIENT
Start: 2023-05-23 | End: 2023-05-24

## 2023-05-23 RX ORDER — MAGNESIUM SULFATE 500 MG/ML
2 VIAL (ML) INJECTION ONCE
Refills: 0 | Status: COMPLETED | OUTPATIENT
Start: 2023-05-23 | End: 2023-05-23

## 2023-05-23 RX ORDER — POLYETHYLENE GLYCOL 3350 17 G/17G
17 POWDER, FOR SOLUTION ORAL DAILY
Refills: 0 | Status: DISCONTINUED | OUTPATIENT
Start: 2023-05-23 | End: 2023-05-24

## 2023-05-23 RX ADMIN — SIMETHICONE 80 MILLIGRAM(S): 80 TABLET, CHEWABLE ORAL at 21:53

## 2023-05-23 RX ADMIN — HEPARIN SODIUM 5000 UNIT(S): 5000 INJECTION INTRAVENOUS; SUBCUTANEOUS at 21:53

## 2023-05-23 RX ADMIN — SODIUM CHLORIDE 100 MILLILITER(S): 9 INJECTION, SOLUTION INTRAVENOUS at 12:17

## 2023-05-23 RX ADMIN — FINASTERIDE 5 MILLIGRAM(S): 5 TABLET, FILM COATED ORAL at 11:38

## 2023-05-23 RX ADMIN — SIMETHICONE 80 MILLIGRAM(S): 80 TABLET, CHEWABLE ORAL at 05:17

## 2023-05-23 RX ADMIN — CHLORHEXIDINE GLUCONATE 1 APPLICATION(S): 213 SOLUTION TOPICAL at 11:42

## 2023-05-23 RX ADMIN — HEPARIN SODIUM 5000 UNIT(S): 5000 INJECTION INTRAVENOUS; SUBCUTANEOUS at 05:17

## 2023-05-23 RX ADMIN — HEPARIN SODIUM 5000 UNIT(S): 5000 INJECTION INTRAVENOUS; SUBCUTANEOUS at 13:27

## 2023-05-23 RX ADMIN — POLYETHYLENE GLYCOL 3350 17 GRAM(S): 17 POWDER, FOR SOLUTION ORAL at 13:32

## 2023-05-23 RX ADMIN — Medication 25 GRAM(S): at 08:48

## 2023-05-23 RX ADMIN — SIMETHICONE 80 MILLIGRAM(S): 80 TABLET, CHEWABLE ORAL at 13:25

## 2023-05-23 RX ADMIN — Medication 25 MICROGRAM(S): at 05:17

## 2023-05-23 NOTE — PROGRESS NOTE ADULT - SUBJECTIVE AND OBJECTIVE BOX
[facility] DIVISION OF KIDNEY DISEASES AND HYPERTENSION -- FOLLOW UP NOTE  --------------------------------------------------------------------------------  HPI: 73 yo male w/ PMH CKD in setting of solitary L kidney s/p R nephrectomy 2/2 malignant neoplasm (05/2022), HTN, TURBT, Pulm nodule s/p VATS LORRIE (8/2021), Hypothyroidism presenting to ED for abnormal outpatient lab values concerning for elevated serum potassium of 5.7 and Scr of 6.07 on 5/18. In the ED, found to have a serum potassium of 5.9 with a SCO2 of 14 and SCr of 6.85. Nephrology consulted for hyperkalemia, metabolic acidosis and GAMAL on CKD. Per [facility] review, he has had a ranging SCr of 1.4-2 since May 2022, most recent SCr of 2.12 on 4/21/23. CT in the ED demonstrated L hydronephrosis with filling defect in mid ureter.    Pt. seen this AM, on LR, acute events overnight. Cordero with strawberry colored urine.     PAST HISTORY  --------------------------------------------------------------------------------  No significant changes to PMH, PSH, FHx, SHx, unless otherwise noted    ALLERGIES & MEDICATIONS  --------------------------------------------------------------------------------  Allergies    No Known Allergies    Intolerances      Standing Inpatient Medications  chlorhexidine 2% Cloths 1 Application(s) Topical daily  finasteride 5 milliGRAM(s) Oral daily  heparin   Injectable 5000 Unit(s) SubCutaneous every 8 hours  lactated ringers. 1000 milliLiter(s) IV Continuous <Continuous>  levothyroxine 25 MICROGram(s) Oral daily  magnesium sulfate  IVPB 2 Gram(s) IV Intermittent once  simethicone 80 milliGRAM(s) Chew three times a day    PRN Inpatient Medications  acetaminophen     Tablet .. 650 milliGRAM(s) Oral every 6 hours PRN      REVIEW OF SYSTEMS  --------------------------------------------------------------------------------  As per HPI    VITALS/PHYSICAL EXAM  --------------------------------------------------------------------------------  T(C): 36.7 (05-23-23 @ 04:30), Max: 36.9 (05-22-23 @ 20:30)  HR: 72 (05-23-23 @ 04:30) (72 - 86)  BP: 144/79 (05-23-23 @ 04:30) (128/81 - 144/79)  RR: 18 (05-23-23 @ 04:30) (18 - 18)  SpO2: 95% (05-23-23 @ 04:30) (95% - 96%)  Wt(kg): --        05-22-23 @ 07:01  -  05-23-23 @ 07:00  --------------------------------------------------------  IN: 1960 mL / OUT: 4400 mL / NET: -2440 mL      Physical Exam:  	Gen: Lying flat in bed and in NAD  	HEENT: Anicteric  	Pulm: CTA B/L  	CV: S1S2+  	Abd: Soft, +BS    	Ext: No LE edema B/L  	Neuro: Asleep  	Skin: Warm and dry      LABS/STUDIES  --------------------------------------------------------------------------------              10.4   7.54  >-----------<  254      [05-23-23 @ 07:09]              32.0     144  |  99  |  53  ----------------------------<  92      [05-23-23 @ 07:09]  3.7   |  31  |  4.06        Ca     8.8     [05-23-23 @ 07:09]      Mg     1.3     [05-23-23 @ 07:09]      Phos  4.1     [05-23-23 @ 07:09]        Creatinine Trend:  SCr 4.06 [05-23 @ 07:09]  SCr 4.47 [05-22 @ 22:51]  SCr 5.60 [05-22 @ 06:50]  SCr 6.15 [05-21 @ 14:12]  SCr 6.70 [05-21 @ 06:43]    Urinalysis - [05-20-23 @ 16:45]      Color Light Yellow / Appearance Clear / SG 1.008 / pH 6.0      Gluc Negative / Ketone Negative  / Bili Negative / Urobili Negative       Blood Large / Protein Trace / Leuk Est Negative / Nitrite Negative      RBC 1 / WBC 3 / Hyaline  / Gran  / Sq Epi  / Non Sq Epi  / Bacteria Negative    Urine Creatinine 65      [05-20-23 @ 16:45]  Urine Protein 14      [05-20-23 @ 16:45]  Urine Sodium 38      [05-20-23 @ 16:45]  Urine Urea Nitrogen 338      [05-20-23 @ 16:45]  Urine Potassium 19      [05-20-23 @ 16:45]  Urine Osmolality 234      [05-20-23 @ 16:45]      HCV 0.24, Nonreact      [05-21-23 @ 06:43]

## 2023-05-23 NOTE — PROGRESS NOTE ADULT - PROBLEM SELECTOR PLAN 1
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 SCr ~1.5 at baseline since 8/22. No current indication for urgent HD but will monitor closely.  - s/p ureteral stenting 5/21, Cr slowly improving  - Nephrology on board, appreciate recs  - Continue bicarb gtt for now  - ~q8hr BMPs for now; continue monitoring on tele  - Shift potassium prn  - Maintain Cordero for strict IOs  - Avoid nephrotoxins  - Dose medications per eGFR  - Urology on board, keep NPO for urologic intervention Patient w/ history of R nephrectomy 2/2 RCC & recent TURBT showing urothelial cancer presenting w/ acute renal failure w/ hyperkalemia & L hydroureteronephrosis w/ filling defect in the L ureter, likely i/s/o malignancy. SCr ~1.5 at baseline since 8/22. No current indication for urgent HD but will monitor closely.  - s/p ureteral stenting 5/21, Cr slowly improving  - Nephrology on board, appreciate recs  - Continue bicarb gtt for now  - ~q8hr BMPs for now; continue monitoring on tele  - Shift potassium prn  - Maintain Cordero for strict IOs  - Avoid nephrotoxins  - Dose medications per eGFR  - Urology on board  - will f/u with nephro and uro - OK to discharge per both services

## 2023-05-23 NOTE — PROGRESS NOTE ADULT - PROBLEM SELECTOR PLAN 1
Pt with GAMAL on CKD in the setting of likely urinary obstruction as noted by CT scan done in the ED demonstrating L hydronephrosis with mid ureter obstruction. Urology has been consulted, tentatively plan for cystoscopy with L ureteral stent placement on 5/21/23. His CKD is in setting of hx of HTN and solitary L kidney s/p R nephrectomy in 5/2022. Per [facility] review, he has had a ranging SCr of 1.4-2 since May 2022, most recent SCr of 2.12 on 4/21/23. In the ED, SCr elevated at 6.07, peaked at 7.29 now 4.0. With Cordero. UA as above, trace protein, large blood and 1 RBC noted. Renal US on 5/15/23 demonstrated Left kidney: 14.6 cm. No renal mass or calculi. Cysts measure up to 7.2 cm. Moderate hydronephrosis. Urinary bladder: The bladder volume is 173 mL. The post void residual is 4 mL. The prostate volume is 25 mL.    Phos acceptable  Consult to urology as above, note reviewed.   Will need to consider HD if renal failure continues to worsen. Discussed with the patient that he may require RRT/HD, risks and benefits associated with RRT/HD explained at length. HD consent obtained and kept in patients chart.  Monitor labs and urine output. Avoid NSAIDs, ACEI/ARBS, RCA and nephrotoxins. Dose medications as per eGFR.    Monitor for post-obstructive diuresis and electrolyte replacement needs.

## 2023-05-23 NOTE — PROGRESS NOTE ADULT - SUBJECTIVE AND OBJECTIVE BOX
Internal Medicine Progress Note    Patient is a 72y old  Male who presents with a chief complaint of Acute renal failure (22 May 2023 13:21)    OVERNIGHT EVENTS/SUBJECTIVE:    OBJECTIVE:  Vital Signs Last 24 Hrs  T(C): 36.7 (23 May 2023 04:30), Max: 36.9 (22 May 2023 20:30)  T(F): 98.1 (23 May 2023 04:30), Max: 98.4 (22 May 2023 20:30)  HR: 72 (23 May 2023 04:30) (72 - 86)  BP: 144/79 (23 May 2023 04:30) (128/81 - 144/79)  BP(mean): --  RR: 18 (23 May 2023 04:30) (18 - 18)  SpO2: 95% (23 May 2023 04:30) (95% - 96%)    Parameters below as of 23 May 2023 04:30  Patient On (Oxygen Delivery Method): room air      I&O's Detail    22 May 2023 07:01  -  23 May 2023 07:00  --------------------------------------------------------  IN:    Lactated Ringers: 800 mL    Oral Fluid: 560 mL    Sodium Bicarbonate: 600 mL  Total IN: 1960 mL    OUT:    Indwelling Catheter - Urethral (mL): 4400 mL  Total OUT: 4400 mL    Total NET: -2440 mL        Daily     Daily   Physical Exam:  General: NAD, resting comfortably in bed  Neuro: A&Ox4, 5/5 strength in all ext  HEENT: NC/AT, EOMI, normal hearing, oral mucosa moist, no oral lesions noted, no pharyngeal erythema, uvula midline  Neck: supple, thyroid not enlarged, no LAD  Resp: Breathing comfortably on RA, LCTA b/l  CV: Normal sinus rhythm, S1 and S2, no r/m/g  Abd: soft, non-distended, non-tender. No HSM.  Ext: ROMIx4, no edema, +2 pulses bilaterally  Skin: Warm and dry, no rashes or discolorations  Psych: Appropriate affect    Medications:  MEDICATIONS  (STANDING):  chlorhexidine 2% Cloths 1 Application(s) Topical daily  finasteride 5 milliGRAM(s) Oral daily  heparin   Injectable 5000 Unit(s) SubCutaneous every 8 hours  lactated ringers. 1000 milliLiter(s) (100 mL/Hr) IV Continuous <Continuous>  levothyroxine 25 MICROGram(s) Oral daily  simethicone 80 milliGRAM(s) Chew three times a day    MEDICATIONS  (PRN):  acetaminophen     Tablet .. 650 milliGRAM(s) Oral every 6 hours PRN Mild Pain (1 - 3)      Labs:                        10.3   8.05  )-----------( 259      ( 22 May 2023 22:51 )             31.5     05-22    144  |  99  |  55<H>  ----------------------------<  106<H>  5.0   |  31  |  4.47<H>    Ca    8.7      22 May 2023 22:51  Phos  4.5     05-22  Mg     1.4     05-22              Radiology: Internal Medicine Progress Note    Patient is a 72y old  Male who presents with a chief complaint of Acute renal failure (22 May 2023 13:21)    OVERNIGHT EVENTS/SUBJECTIVE: No overnight events. Pt feels very well today with no complaints; denies fever, ha, cp, sob, abd pain, dysuria, n/v/d.     OBJECTIVE:  Vital Signs Last 24 Hrs  T(C): 36.7 (23 May 2023 04:30), Max: 36.9 (22 May 2023 20:30)  T(F): 98.1 (23 May 2023 04:30), Max: 98.4 (22 May 2023 20:30)  HR: 72 (23 May 2023 04:30) (72 - 86)  BP: 144/79 (23 May 2023 04:30) (128/81 - 144/79)  BP(mean): --  RR: 18 (23 May 2023 04:30) (18 - 18)  SpO2: 95% (23 May 2023 04:30) (95% - 96%)    Parameters below as of 23 May 2023 04:30  Patient On (Oxygen Delivery Method): room air      I&O's Detail    22 May 2023 07:01  -  23 May 2023 07:00  --------------------------------------------------------  IN:    Lactated Ringers: 800 mL    Oral Fluid: 560 mL    Sodium Bicarbonate: 600 mL  Total IN: 1960 mL    OUT:    Indwelling Catheter - Urethral (mL): 4400 mL  Total OUT: 4400 mL    Total NET: -2440 mL        Daily     Daily   Physical Exam:  General: NAD, resting comfortably in bed  Neuro: A&Ox4, no gross deficits   HEENT: NC/AT, EOMI, normal hearing, oral mucosa moist  Resp: Breathing comfortably on RA, LCTA b/l  CV: Normal sinus rhythm, S1 and S2, no r/m/g  Abd: soft, non-distended, non-tender. Reddish urine in michel   Ext: no edema, +2 pulses bilaterally  Skin: Warm and dry, no rashes or discolorations  Psych: Appropriate affect    Medications:  MEDICATIONS  (STANDING):  chlorhexidine 2% Cloths 1 Application(s) Topical daily  finasteride 5 milliGRAM(s) Oral daily  heparin   Injectable 5000 Unit(s) SubCutaneous every 8 hours  lactated ringers. 1000 milliLiter(s) (100 mL/Hr) IV Continuous <Continuous>  levothyroxine 25 MICROGram(s) Oral daily  simethicone 80 milliGRAM(s) Chew three times a day    MEDICATIONS  (PRN):  acetaminophen     Tablet .. 650 milliGRAM(s) Oral every 6 hours PRN Mild Pain (1 - 3)      Labs:                        10.3   8.05  )-----------( 259      ( 22 May 2023 22:51 )             31.5     05-22    144  |  99  |  55<H>  ----------------------------<  106<H>  5.0   |  31  |  4.47<H>    Ca    8.7      22 May 2023 22:51  Phos  4.5     05-22  Mg     1.4     05-22              Radiology:

## 2023-05-23 NOTE — PROGRESS NOTE ADULT - PROBLEM SELECTOR PLAN 6
DVT ppx: hold for now given hematuria  Diet: regular  Dispo: pending clinical course (improvement of renal function) DVT ppx: hold for now given hematuria  Diet: regular  Dispo: likely 5/24, needs michel teaching

## 2023-05-23 NOTE — PROVIDER CONTACT NOTE (OTHER) - ASSESSMENT
2nd attempt [facility] apt request     [facility]  [address]  [phone]  [address]     Unable to contact patient. Will try again.
Pt alert & oriented x 4. Denies chest pain, sob, palpitations. Pt was in the bathroom. Pt has a hx of 7 bts of PATs
Patient asymptomatic., A & O x 4. BP- 130/81, HR- 76

## 2023-05-23 NOTE — PROVIDER CONTACT NOTE (OTHER) - BACKGROUND
admitted for acute renal failure
72M PMH HTN, hypothyroidism, pulmonary nodule s/p VATS LORRIE (8/2021), s/p R nephrectomy 5/22 (RCC) now w/ CKD. Admitted for GAMAL

## 2023-05-23 NOTE — PROGRESS NOTE ADULT - ASSESSMENT
Pt with metabolic acidosis, hyperkalemia in setting of GAMAL on CKD, likely due to urinary obstruction.  Calm

## 2023-05-24 ENCOUNTER — TRANSCRIPTION ENCOUNTER (OUTPATIENT)
Age: 73
End: 2023-05-24

## 2023-05-24 ENCOUNTER — APPOINTMENT (OUTPATIENT)
Dept: UROLOGY | Facility: CLINIC | Age: 73
End: 2023-05-24

## 2023-05-24 VITALS
SYSTOLIC BLOOD PRESSURE: 147 MMHG | HEART RATE: 77 BPM | DIASTOLIC BLOOD PRESSURE: 87 MMHG | OXYGEN SATURATION: 98 % | RESPIRATION RATE: 18 BRPM

## 2023-05-24 LAB
ANION GAP SERPL CALC-SCNC: 16 MMOL/L — SIGNIFICANT CHANGE UP (ref 5–17)
BUN SERPL-MCNC: 42 MG/DL — HIGH (ref 7–23)
CALCIUM SERPL-MCNC: 8.3 MG/DL — LOW (ref 8.4–10.5)
CHLORIDE SERPL-SCNC: 101 MMOL/L — SIGNIFICANT CHANGE UP (ref 96–108)
CO2 SERPL-SCNC: 25 MMOL/L — SIGNIFICANT CHANGE UP (ref 22–31)
CREAT SERPL-MCNC: 2.98 MG/DL — HIGH (ref 0.5–1.3)
EGFR: 21 ML/MIN/1.73M2 — LOW
GLUCOSE SERPL-MCNC: 92 MG/DL — SIGNIFICANT CHANGE UP (ref 70–99)
HCT VFR BLD CALC: 29.2 % — LOW (ref 39–50)
HGB BLD-MCNC: 9.6 G/DL — LOW (ref 13–17)
IRON SATN MFR SERPL: 105 UG/DL — SIGNIFICANT CHANGE UP (ref 45–165)
IRON SATN MFR SERPL: 54 % — SIGNIFICANT CHANGE UP (ref 16–55)
MAGNESIUM SERPL-MCNC: 1.6 MG/DL — SIGNIFICANT CHANGE UP (ref 1.6–2.6)
MCHC RBC-ENTMCNC: 31.7 PG — SIGNIFICANT CHANGE UP (ref 27–34)
MCHC RBC-ENTMCNC: 32.9 GM/DL — SIGNIFICANT CHANGE UP (ref 32–36)
MCV RBC AUTO: 96.4 FL — SIGNIFICANT CHANGE UP (ref 80–100)
NRBC # BLD: 0 /100 WBCS — SIGNIFICANT CHANGE UP (ref 0–0)
PHOSPHATE SERPL-MCNC: 3.4 MG/DL — SIGNIFICANT CHANGE UP (ref 2.5–4.5)
PLATELET # BLD AUTO: 233 K/UL — SIGNIFICANT CHANGE UP (ref 150–400)
POTASSIUM SERPL-MCNC: 4.1 MMOL/L — SIGNIFICANT CHANGE UP (ref 3.5–5.3)
POTASSIUM SERPL-SCNC: 4.1 MMOL/L — SIGNIFICANT CHANGE UP (ref 3.5–5.3)
RBC # BLD: 3.03 M/UL — LOW (ref 4.2–5.8)
RBC # FLD: 10.9 % — SIGNIFICANT CHANGE UP (ref 10.3–14.5)
SODIUM SERPL-SCNC: 142 MMOL/L — SIGNIFICANT CHANGE UP (ref 135–145)
TIBC SERPL-MCNC: 195 UG/DL — LOW (ref 220–430)
UIBC SERPL-MCNC: 90 UG/DL — LOW (ref 110–370)
WBC # BLD: 8 K/UL — SIGNIFICANT CHANGE UP (ref 3.8–10.5)
WBC # FLD AUTO: 8 K/UL — SIGNIFICANT CHANGE UP (ref 3.8–10.5)

## 2023-05-24 PROCEDURE — 83935 ASSAY OF URINE OSMOLALITY: CPT

## 2023-05-24 PROCEDURE — 83540 ASSAY OF IRON: CPT

## 2023-05-24 PROCEDURE — 71045 X-RAY EXAM CHEST 1 VIEW: CPT

## 2023-05-24 PROCEDURE — 85014 HEMATOCRIT: CPT

## 2023-05-24 PROCEDURE — 76770 US EXAM ABDO BACK WALL COMP: CPT

## 2023-05-24 PROCEDURE — 84484 ASSAY OF TROPONIN QUANT: CPT

## 2023-05-24 PROCEDURE — 84540 ASSAY OF URINE/UREA-N: CPT

## 2023-05-24 PROCEDURE — 87641 MR-STAPH DNA AMP PROBE: CPT

## 2023-05-24 PROCEDURE — 96375 TX/PRO/DX INJ NEW DRUG ADDON: CPT

## 2023-05-24 PROCEDURE — 84156 ASSAY OF PROTEIN URINE: CPT

## 2023-05-24 PROCEDURE — 82947 ASSAY GLUCOSE BLOOD QUANT: CPT

## 2023-05-24 PROCEDURE — 36415 COLL VENOUS BLD VENIPUNCTURE: CPT

## 2023-05-24 PROCEDURE — 84300 ASSAY OF URINE SODIUM: CPT

## 2023-05-24 PROCEDURE — 87640 STAPH A DNA AMP PROBE: CPT

## 2023-05-24 PROCEDURE — 80048 BASIC METABOLIC PNL TOTAL CA: CPT

## 2023-05-24 PROCEDURE — 82803 BLOOD GASES ANY COMBINATION: CPT

## 2023-05-24 PROCEDURE — 99285 EMERGENCY DEPT VISIT HI MDM: CPT

## 2023-05-24 PROCEDURE — C1769: CPT

## 2023-05-24 PROCEDURE — 76000 FLUOROSCOPY <1 HR PHYS/QHP: CPT

## 2023-05-24 PROCEDURE — 82550 ASSAY OF CK (CPK): CPT

## 2023-05-24 PROCEDURE — 96361 HYDRATE IV INFUSION ADD-ON: CPT

## 2023-05-24 PROCEDURE — 82962 GLUCOSE BLOOD TEST: CPT

## 2023-05-24 PROCEDURE — 84133 ASSAY OF URINE POTASSIUM: CPT

## 2023-05-24 PROCEDURE — 84100 ASSAY OF PHOSPHORUS: CPT

## 2023-05-24 PROCEDURE — 86900 BLOOD TYPING SEROLOGIC ABO: CPT

## 2023-05-24 PROCEDURE — 83550 IRON BINDING TEST: CPT

## 2023-05-24 PROCEDURE — 83880 ASSAY OF NATRIURETIC PEPTIDE: CPT

## 2023-05-24 PROCEDURE — 85025 COMPLETE CBC W/AUTO DIFF WBC: CPT

## 2023-05-24 PROCEDURE — 86901 BLOOD TYPING SEROLOGIC RH(D): CPT

## 2023-05-24 PROCEDURE — 87086 URINE CULTURE/COLONY COUNT: CPT

## 2023-05-24 PROCEDURE — 84295 ASSAY OF SERUM SODIUM: CPT

## 2023-05-24 PROCEDURE — 99232 SBSQ HOSP IP/OBS MODERATE 35: CPT | Mod: GC

## 2023-05-24 PROCEDURE — C1758: CPT

## 2023-05-24 PROCEDURE — 74176 CT ABD & PELVIS W/O CONTRAST: CPT | Mod: MA

## 2023-05-24 PROCEDURE — 86803 HEPATITIS C AB TEST: CPT

## 2023-05-24 PROCEDURE — 85018 HEMOGLOBIN: CPT

## 2023-05-24 PROCEDURE — 82570 ASSAY OF URINE CREATININE: CPT

## 2023-05-24 PROCEDURE — 83690 ASSAY OF LIPASE: CPT

## 2023-05-24 PROCEDURE — 86850 RBC ANTIBODY SCREEN: CPT

## 2023-05-24 PROCEDURE — 80053 COMPREHEN METABOLIC PANEL: CPT

## 2023-05-24 PROCEDURE — 96374 THER/PROPH/DIAG INJ IV PUSH: CPT

## 2023-05-24 PROCEDURE — 84132 ASSAY OF SERUM POTASSIUM: CPT

## 2023-05-24 PROCEDURE — 82330 ASSAY OF CALCIUM: CPT

## 2023-05-24 PROCEDURE — 82435 ASSAY OF BLOOD CHLORIDE: CPT

## 2023-05-24 PROCEDURE — C2617: CPT

## 2023-05-24 PROCEDURE — 81001 URINALYSIS AUTO W/SCOPE: CPT

## 2023-05-24 PROCEDURE — 88112 CYTOPATH CELL ENHANCE TECH: CPT

## 2023-05-24 PROCEDURE — 85027 COMPLETE CBC AUTOMATED: CPT

## 2023-05-24 PROCEDURE — 83735 ASSAY OF MAGNESIUM: CPT

## 2023-05-24 PROCEDURE — 99239 HOSP IP/OBS DSCHRG MGMT >30: CPT | Mod: GC

## 2023-05-24 PROCEDURE — 83605 ASSAY OF LACTIC ACID: CPT

## 2023-05-24 RX ORDER — RAMIPRIL 5 MG
1 CAPSULE ORAL
Qty: 0 | Refills: 0 | DISCHARGE

## 2023-05-24 RX ADMIN — SIMETHICONE 80 MILLIGRAM(S): 80 TABLET, CHEWABLE ORAL at 13:58

## 2023-05-24 RX ADMIN — HEPARIN SODIUM 5000 UNIT(S): 5000 INJECTION INTRAVENOUS; SUBCUTANEOUS at 05:45

## 2023-05-24 RX ADMIN — Medication 25 MICROGRAM(S): at 05:45

## 2023-05-24 RX ADMIN — SIMETHICONE 80 MILLIGRAM(S): 80 TABLET, CHEWABLE ORAL at 05:45

## 2023-05-24 RX ADMIN — HEPARIN SODIUM 5000 UNIT(S): 5000 INJECTION INTRAVENOUS; SUBCUTANEOUS at 13:58

## 2023-05-24 RX ADMIN — FINASTERIDE 5 MILLIGRAM(S): 5 TABLET, FILM COATED ORAL at 11:14

## 2023-05-24 RX ADMIN — CHLORHEXIDINE GLUCONATE 1 APPLICATION(S): 213 SOLUTION TOPICAL at 11:14

## 2023-05-24 RX ADMIN — POLYETHYLENE GLYCOL 3350 17 GRAM(S): 17 POWDER, FOR SOLUTION ORAL at 11:14

## 2023-05-24 NOTE — PROGRESS NOTE ADULT - PROVIDER SPECIALTY LIST ADULT
Urology
Nephrology
Urology
Urology
Internal Medicine
Internal Medicine
Nephrology
Nephrology
Internal Medicine
Internal Medicine

## 2023-05-24 NOTE — PROGRESS NOTE ADULT - PROBLEM SELECTOR PLAN 1
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 SCr ~1.5 at baseline since 8/22. No current indication for urgent HD but will monitor closely.  - s/p ureteral stenting 5/21, Cr slowly improving  - Nephrology on board, appreciate recs  - Continue bicarb gtt for now  - ~q8hr BMPs for now; continue monitoring on tele  - Shift potassium prn  - Maintain Cordero for strict IOs  - Avoid nephrotoxins  - Dose medications per eGFR  - Urology on board  - will f/u with nephro and uro - OK to discharge per both services

## 2023-05-24 NOTE — PROGRESS NOTE ADULT - PROBLEM SELECTOR PLAN 3
- Continue finasteride  - Maintain Cordero for now
Resolved. Pt with metabolic acidosis in setting of GAMAL on CKD likely due to urinary obstruction. SCO2 on admission of 14, initiated on IV bicarbonate as above. It has improved to 31. Blood gas with a pH of 7.33. On LR. No need for bicarb supplementation. Recommend to continue to follow SCO2 and pH.      If you have any questions, please feel free to contact me  Carter Reina  Nephrology Fellow  [phone]; Prefer [employer] TEAMS  (After 5pm or on weekends please page the on-call fellow)
Resolved. Pt with metabolic acidosis in setting of GAMAL on CKD likely due to urinary obstruction. SCO2 on admission of 14, initiated on IV bicarbonate as above. It has improved to 31. Blood gas with a pH of 7.33. On LR. No need for bicarb supplementation. Recommend to continue to follow SCO2 and pH.    Upon discharge, for appointment scheduling please email Nephrology at [email or web address]    If you have any questions, please feel free to contact me  Carter Reina  Nephrology Fellow  [phone]; Prefer [employer] TEAMS  (After 5pm or on weekends please page the on-call fellow)
- Continue finasteride  - Maintain Cordero for now
- Continue finasteride  - Maintain Cordero for now
Pt with metabolic acidosis in setting of GAMAL on CKD likely due to urinary obstruction. SCO2 on admission of 14, initiated on IV bicarbonate as above. It has improved to 29. Blood gas with a pH of 7.33. Can stop IV bicarb and further management as above. Start Bicitra on 5/23 pending labs. Recommend to continue to follow SCO2 and pH.      If you have any questions, please feel free to contact me  Carter Reina  Nephrology Fellow  [phone]; Prefer [employer] TEAMS  (After 5pm or on weekends please page the on-call fellow)
- Continue finasteride  - Maintain Cordero for now

## 2023-05-24 NOTE — PROGRESS NOTE ADULT - SUBJECTIVE AND OBJECTIVE BOX
[facility] DIVISION OF KIDNEY DISEASES AND HYPERTENSION -- FOLLOW UP NOTE  --------------------------------------------------------------------------------  HPI: 73 yo male w/ PMH CKD in setting of solitary L kidney s/p R nephrectomy 2/2 malignant neoplasm (05/2022), HTN, TURBT, Pulm nodule s/p VATS LORRIE (8/2021), Hypothyroidism presenting to ED for abnormal outpatient lab values concerning for elevated serum potassium of 5.7 and Scr of 6.07 on 5/18. In the ED, found to have a serum potassium of 5.9 with a SCO2 of 14 and SCr of 6.85. Nephrology consulted for hyperkalemia, metabolic acidosis and GAMAL on CKD. Per [facility] review, he has had a ranging SCr of 1.4-2 since May 2022, most recent SCr of 2.12 on 4/21/23. CT in the ED demonstrated L hydronephrosis with filling defect in mid ureter.    Pt. seen this AM, off IVF, acute events overnight. Cordero with strawberry colored urine.       PAST HISTORY  --------------------------------------------------------------------------------  No significant changes to PMH, PSH, FHx, SHx, unless otherwise noted    ALLERGIES & MEDICATIONS  --------------------------------------------------------------------------------  Allergies    No Known Allergies    Intolerances      Standing Inpatient Medications  chlorhexidine 2% Cloths 1 Application(s) Topical daily  finasteride 5 milliGRAM(s) Oral daily  heparin   Injectable 5000 Unit(s) SubCutaneous every 8 hours  lactated ringers. 1000 milliLiter(s) IV Continuous <Continuous>  lactated ringers. 1000 milliLiter(s) IV Continuous <Continuous>  levothyroxine 25 MICROGram(s) Oral daily  polyethylene glycol 3350 17 Gram(s) Oral daily  simethicone 80 milliGRAM(s) Chew three times a day    PRN Inpatient Medications  acetaminophen     Tablet .. 650 milliGRAM(s) Oral every 6 hours PRN      REVIEW OF SYSTEMS  --------------------------------------------------------------------------------  As per HPI    VITALS/PHYSICAL EXAM  --------------------------------------------------------------------------------  T(C): 36.6 (05-24-23 @ 04:18), Max: 37 (05-23-23 @ 20:19)  HR: 69 (05-24-23 @ 04:18) (69 - 90)  BP: 151/90 (05-24-23 @ 04:18) (145/86 - 163/96)  RR: 18 (05-24-23 @ 04:18) (18 - 18)  SpO2: 95% (05-24-23 @ 04:18) (95% - 98%)  Wt(kg): --        05-23-23 @ 07:01  -  05-24-23 @ 07:00  --------------------------------------------------------  IN: 1940 mL / OUT: 4800 mL / NET: -2860 mL      Physical Exam:  	Gen: Lying flat in bed and in NAD  	HEENT: Anicteric  	Pulm: CTA B/L  	CV: S1S2+  	Abd: Soft, +BS    	Ext: No LE edema B/L  	Neuro: Asleep  	Skin: Warm and dry        LABS/STUDIES  --------------------------------------------------------------------------------              9.6    8.00  >-----------<  233      [05-24-23 @ 08:16]              29.2     142  |  101  |  42  ----------------------------<  92      [05-24-23 @ 08:17]  4.1   |  25  |  2.98        Ca     8.3     [05-24-23 @ 08:17]      Mg     1.6     [05-24-23 @ 08:17]      Phos  3.4     [05-24-23 @ 08:17]        Creatinine Trend:  SCr 2.98 [05-24 @ 08:17]  SCr 4.06 [05-23 @ 07:09]  SCr 4.47 [05-22 @ 22:51]  SCr 5.60 [05-22 @ 06:50]  SCr 6.15 [05-21 @ 14:12]    Urinalysis - [05-20-23 @ 16:45]      Color Light Yellow / Appearance Clear / SG 1.008 / pH 6.0      Gluc Negative / Ketone Negative  / Bili Negative / Urobili Negative       Blood Large / Protein Trace / Leuk Est Negative / Nitrite Negative      RBC 1 / WBC 3 / Hyaline  / Gran  / Sq Epi  / Non Sq Epi  / Bacteria Negative    Urine Creatinine 65      [05-20-23 @ 16:45]  Urine Protein 14      [05-20-23 @ 16:45]  Urine Sodium 38      [05-20-23 @ 16:45]  Urine Urea Nitrogen 338      [05-20-23 @ 16:45]  Urine Potassium 19      [05-20-23 @ 16:45]  Urine Osmolality 234      [05-20-23 @ 16:45]      HCV 0.24, Nonreact      [05-21-23 @ 06:43]

## 2023-05-24 NOTE — PROGRESS NOTE ADULT - PROBLEM SELECTOR PLAN 2
Patient w/ recent TURBT w/ biopsy showing high grade invasive urothelial carcinoma. CTAP w/ L hydroureteronephrosis w/ filling defect in the ureter, likely i/s/o urothelial carcinoma. Follows w/ Dr. Bonilla Canales outpatient.  - Urology on board  - Outpatient onc follow-up Patient w/ recent TURBT w/ biopsy showing high grade invasive urothelial carcinoma. CTAP w/ L hydroureteronephrosis w/ filling defect in the ureter, likely i/s/o urothelial carcinoma. Follows w/ Dr. Bonilla Canales outpatient.  - Urology on board  - Outpatient onc follow-up set up with Dr Ally cohn 5/31 2pm

## 2023-05-24 NOTE — PROGRESS NOTE ADULT - PROBLEM SELECTOR PROBLEM 2
Urothelial carcinoma
Hyperkalemia
Urothelial carcinoma

## 2023-05-24 NOTE — PROGRESS NOTE ADULT - PROBLEM SELECTOR PLAN 4
- Hold home ramipril  - Monitor BP for now pending urologic intervention

## 2023-05-24 NOTE — PROGRESS NOTE ADULT - REASON FOR ADMISSION
Acute renal failure

## 2023-05-24 NOTE — PROGRESS NOTE ADULT - PROBLEM SELECTOR PROBLEM 3
BPH (benign prostatic hyperplasia)
Metabolic acidosis
BPH (benign prostatic hyperplasia)

## 2023-05-24 NOTE — PROGRESS NOTE ADULT - SUBJECTIVE AND OBJECTIVE BOX
Internal Medicine Progress Note    Patient is a 72y old  Male who presents with a chief complaint of Acute renal failure (23 May 2023 08:34)    OVERNIGHT EVENTS/SUBJECTIVE:    OBJECTIVE:  Vital Signs Last 24 Hrs  T(C): 36.6 (24 May 2023 04:18), Max: 37 (23 May 2023 20:19)  T(F): 97.8 (24 May 2023 04:18), Max: 98.6 (23 May 2023 20:19)  HR: 69 (24 May 2023 04:18) (69 - 90)  BP: 151/90 (24 May 2023 04:18) (145/86 - 163/96)  BP(mean): --  RR: 18 (24 May 2023 04:18) (18 - 18)  SpO2: 95% (24 May 2023 04:18) (95% - 98%)    Parameters below as of 24 May 2023 04:18  Patient On (Oxygen Delivery Method): room air      I&O's Detail    23 May 2023 07:01  -  24 May 2023 07:00  --------------------------------------------------------  IN:    Lactated Ringers: 500 mL    Lactated Ringers: 700 mL    Oral Fluid: 740 mL  Total IN: 1940 mL    OUT:    Indwelling Catheter - Urethral (mL): 4800 mL  Total OUT: 4800 mL    Total NET: -2860 mL        Daily     Daily   Physical Exam:  General: NAD, resting comfortably in bed  Neuro: A&Ox4, 5/5 strength in all ext  HEENT: NC/AT, EOMI, normal hearing, oral mucosa moist, no oral lesions noted, no pharyngeal erythema, uvula midline  Neck: supple, thyroid not enlarged, no LAD  Resp: Breathing comfortably on RA, LCTA b/l  CV: Normal sinus rhythm, S1 and S2, no r/m/g  Abd: soft, non-distended, non-tender. No HSM.  Ext: ROMIx4, no edema, +2 pulses bilaterally  Skin: Warm and dry, no rashes or discolorations  Psych: Appropriate affect    Medications:  MEDICATIONS  (STANDING):  chlorhexidine 2% Cloths 1 Application(s) Topical daily  finasteride 5 milliGRAM(s) Oral daily  heparin   Injectable 5000 Unit(s) SubCutaneous every 8 hours  lactated ringers. 1000 milliLiter(s) (100 mL/Hr) IV Continuous <Continuous>  lactated ringers. 1000 milliLiter(s) (100 mL/Hr) IV Continuous <Continuous>  levothyroxine 25 MICROGram(s) Oral daily  polyethylene glycol 3350 17 Gram(s) Oral daily  simethicone 80 milliGRAM(s) Chew three times a day    MEDICATIONS  (PRN):  acetaminophen     Tablet .. 650 milliGRAM(s) Oral every 6 hours PRN Mild Pain (1 - 3)      Labs:                        10.4   7.54  )-----------( 254      ( 23 May 2023 07:09 )             32.0     05-23    144  |  99  |  53<H>  ----------------------------<  92  3.7   |  31  |  4.06<H>    Ca    8.8      23 May 2023 07:09  Phos  4.1     05-23  Mg     1.3     05-23              Radiology: Internal Medicine Progress Note    Patient is a 72y old  Male who presents with a chief complaint of Acute renal failure (23 May 2023 08:34)    OVERNIGHT EVENTS/SUBJECTIVE: No overnight events. Pt feels well and thinks his urine got lighter. Endorses constipation. Denies fever, dysuria, n/v/d, ha, cp, sob, abd pain.     OBJECTIVE:  Vital Signs Last 24 Hrs  T(C): 36.6 (24 May 2023 04:18), Max: 37 (23 May 2023 20:19)  T(F): 97.8 (24 May 2023 04:18), Max: 98.6 (23 May 2023 20:19)  HR: 69 (24 May 2023 04:18) (69 - 90)  BP: 151/90 (24 May 2023 04:18) (145/86 - 163/96)  BP(mean): --  RR: 18 (24 May 2023 04:18) (18 - 18)  SpO2: 95% (24 May 2023 04:18) (95% - 98%)    Parameters below as of 24 May 2023 04:18  Patient On (Oxygen Delivery Method): room air      I&O's Detail    23 May 2023 07:01  -  24 May 2023 07:00  --------------------------------------------------------  IN:    Lactated Ringers: 500 mL    Lactated Ringers: 700 mL    Oral Fluid: 740 mL  Total IN: 1940 mL    OUT:    Indwelling Catheter - Urethral (mL): 4800 mL  Total OUT: 4800 mL    Total NET: -2860 mL        Daily     Daily   Physical Exam:  General: NAD, resting comfortably in bed  Neuro: A&Ox3, no gross deficits   HEENT: NC/AT, EOMI, normal hearing, oral mucosa moist, no oral lesions noted  Resp: Breathing comfortably on RA, LCTA b/l  CV: Normal sinus rhythm, S1 and S2, no r/m/g  Abd: soft, non-distended, non-tender.  Ext:  no edema, +2 pulses bilaterally  Skin: Warm and dry, no rashes or discolorations  Psych: Appropriate affect  : michel with pink urine, lighter than yesterday.    Medications:  MEDICATIONS  (STANDING):  chlorhexidine 2% Cloths 1 Application(s) Topical daily  finasteride 5 milliGRAM(s) Oral daily  heparin   Injectable 5000 Unit(s) SubCutaneous every 8 hours  lactated ringers. 1000 milliLiter(s) (100 mL/Hr) IV Continuous <Continuous>  lactated ringers. 1000 milliLiter(s) (100 mL/Hr) IV Continuous <Continuous>  levothyroxine 25 MICROGram(s) Oral daily  polyethylene glycol 3350 17 Gram(s) Oral daily  simethicone 80 milliGRAM(s) Chew three times a day    MEDICATIONS  (PRN):  acetaminophen     Tablet .. 650 milliGRAM(s) Oral every 6 hours PRN Mild Pain (1 - 3)      Labs:                        10.4   7.54  )-----------( 254      ( 23 May 2023 07:09 )             32.0     05-23    144  |  99  |  53<H>  ----------------------------<  92  3.7   |  31  |  4.06<H>    Ca    8.8      23 May 2023 07:09  Phos  4.1     05-23  Mg     1.3     05-23              Radiology:

## 2023-05-24 NOTE — PROGRESS NOTE ADULT - PROBLEM SELECTOR PLAN 1
Pt with GAMAL on CKD in the setting of likely urinary obstruction as noted by CT scan done in the ED demonstrating L hydronephrosis with mid ureter obstruction. Urology has been consulted, tentatively plan for cystoscopy with L ureteral stent placement on 5/21/23. His CKD is in setting of hx of HTN and solitary L kidney s/p R nephrectomy in 5/2022. Per [facility] review, he has had a ranging SCr of 1.4-2 since May 2022, most recent SCr of 2.12 on 4/21/23. In the ED, SCr elevated at 6.07, peaked at 7.29 now 2.98. With Cordero. UA as above, trace protein, large blood and 1 RBC noted. Renal US on 5/15/23 demonstrated Left kidney: 14.6 cm. No renal mass or calculi. Cysts measure up to 7.2 cm. Moderate hydronephrosis. Urinary bladder: The bladder volume is 173 mL. The post void residual is 4 mL. The prostate volume is 25 mL.    Phos acceptable  Consult to urology as above, note reviewed.   Discussed with the patient that he may require RRT/HD, risks and benefits associated with RRT/HD explained at length. HD consent obtained and kept in patients chart. No need for RRT currently.  Monitor labs and urine output. Avoid NSAIDs, ACEI/ARBS, RCA and nephrotoxins. Dose medications as per eGFR.    Monitor for post-obstructive diuresis and electrolyte replacement needs.

## 2023-05-24 NOTE — PROGRESS NOTE ADULT - PROBLEM SELECTOR PROBLEM 1
Acute kidney injury superimposed on CKD
Acute kidney injury superimposed on CKD
Acute renal failure
Acute renal failure
Acute kidney injury superimposed on CKD
Acute renal failure
Acute renal failure

## 2023-05-24 NOTE — DISCHARGE NOTE NURSING/CASE MANAGEMENT/SOCIAL WORK - PATIENT PORTAL LINK FT
You can access the FollowMyHealth Patient Portal offered by [facility] by registering at the following website: [email or web address]. By joining [facility]’s FollowMyHealth portal, you will also be able to view your health information using other applications (apps) compatible with our system.

## 2023-05-24 NOTE — PROGRESS NOTE ADULT - ATTENDING COMMENTS
Patient was seen and examined today  doing better, his apatite is good   Cr trending down slowly   monitor Urine out put   Patient was informed that he will need to follow up with Dr Canales as he was found to have small bladder polyps during cystoscopy, Also further work up is needed with ureteroscopy to R/O urothelial carcinoma in the left ureter
monitor urine output   Cr trending down   will need to follow up with Dr Canales after discharge for the bladder tumor and URS to the left kidney
71 yo male w/ PMH CKD in setting of solitary L kidney s/p R nephrectomy 2/2 malignant neoplasm (05/2022), HTN, TURBT, Pulm nodule s/p VATS LORRIE (8/2021), Hypothyroidism presenting to ED for abnormal outpatient lab values concerning for elevated serum potassium of 5.7 and Scr of 6.07 on 5/18. In the ED, found to have a serum potassium of 5.9 with a SCO2 of 14 and SCr of 6.85. Nephrology consulted for hyperkalemia, metabolic acidosis and GAMAL on CKD. Per [facility] review, he has had a ranging SCr of 1.4-2 since May 2022, most recent SCr of 2.12 on 4/21/23. CT in the ED demonstrated L hydronephrosis with filling defect in mid ureter.  Alert, conversant, draining LIGHTER colored urine.  NO SOB. No uremic symptoms.  Cr continues to decline  1.  ARF on CKD--gratifying response with relief of obstruction.  HYPOTONIC fluid supplementation, PO now sufficient.  NON oliguric, no HD required.  Cr decline c/w obstruction relief AND adequate replacement fluid.  F/U 2-4 weeks renal clinic [phone]  2.  Obstructive uropathy--appreciate urology input and particularly admonition to increase fluid intake.  Trend for evidence of clots if hematuria worsens.  Likely malignancy related  3.  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    4.  Acidosis--improved.  Given normalization of K+ not requiring HCO3 infusion.  CHLORIDE containing IV fluid if administered.  Trend VBG  5.  Hypomagnesemia--repletion.  Likely tubular dysfunction related losses attributable to 1+2 resolution    discussed with med team, attending  Wilton Laguna MD  contact me on TEAMS
73 yo male w/ PMH CKD in setting of solitary L kidney s/p R nephrectomy 2/2 malignant neoplasm (05/2022), HTN, TURBT, Pulm nodule s/p VATS LORRIE (8/2021), Hypothyroidism presenting to ED for abnormal outpatient lab values concerning for elevated serum potassium of 5.7 and Scr of 6.07 on 5/18. In the ED, found to have a serum potassium of 5.9 with a SCO2 of 14 and SCr of 6.85. Nephrology consulted for hyperkalemia, metabolic acidosis and GAMAL on CKD. Per [facility] review, he has had a ranging SCr of 1.4-2 since May 2022, most recent SCr of 2.12 on 4/21/23. CT in the ED demonstrated L hydronephrosis with filling defect in mid ureter.  Alert, conversant, draining LIGHTER cranberry colored urine.  NO SOB. No uremic symptoms  1.  ARF on CKD--gratifying response with relief of obstruction.  HYPOTONIC fluid supplementation, PO, IV if needed keeping I>os.  NON oliguric, no HD required.  Cr decline c/w obstruction relief AND adequate replacemnt fluid  2.  Obstructive uropathy--appreciate urology input and particularly admonition to increase fluid intake.  Trend for evidence of clots if hematuria worsens  3.  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    4.  Acidosis--improved.  Given normalization of K+ not requiring HCO3 infusion.  CHLORIDE containing IV fluid if administered.  Trend VBG  5.  Hypomagnesemia--repletion.  Likely tubular dysfunction related losses attributable to 1+2 resolution    discussed with med team, attending  Wilton Laguna MD  contact me on TEAMS
73 yo male w/ PMH CKD in setting of solitary L kidney s/p R nephrectomy 2/2 malignant neoplasm (05/2022), HTN, TURBT, Pulm nodule s/p VATS LORRIE (8/2021), Hypothyroidism presenting to ED for abnormal outpatient lab values concerning for elevated serum potassium of 5.7 and Scr of 6.07 on 5/18. In the ED, found to have a serum potassium of 5.9 with a SCO2 of 14 and SCr of 6.85. Nephrology consulted for hyperkalemia, metabolic acidosis and GAMAL on CKD. Per [facility] review, he has had a ranging SCr of 1.4-2 since May 2022, most recent SCr of 2.12 on 4/21/23. CT in the ED demonstrated L hydronephrosis with filling defect in mid ureter.  Alert, conversant, draining cranberry colored urine.  NO SOB.  Roomate keeping him up at night  1.  ARF on CKD--gratifying response with relief of obstruction.  HYPOTONIC fluid supplementation, PO, IV if needed keeping I>os.  NON oliguric, no HD required  2.  Obstructive uropathy--appreciate urology input and particularly admonition to increase fluid intake.  Trend for evidence of clots if hematuria worsens  3.  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    4.  Acidosis--improved.  Given normalization of K+ not requiring HCO3 infusion    discussed with med team, attending  Wilton Laguna MD  contact me on TEAMS
-Hematuria lessened. Hgb down a bit but overall stable. Should improve as bleeding slows down.   -Cr better. Would hold ramipril on DC. Outpatient PMD f/u.   -Patient informed of path findings and need for outpatient f/u with urology next week. Appt scheduled for Wednesday 2pm. -Also, referred to Dr. Montoya or Dr. Mccall for bladder cancer. -Will go home with Cordero.   -Urology recs outpt f/u. -I left secure message for Dr. Canales.  -Stable for DC. -36 minutes spent on the DC process.
-Na Bicarb drip completed per renal. -Bicitra tomorrow. -Will put on LR at 100cc/hr to match UO. -F/u BMP. -Discussed with renal team.   -Hematuria persists. -F/u urology recs. -Trend CBC.
-Cr improving. C/w IVF's. -F/u renal recs.   -Hematuria persists but Hgb stable. F/u urology recs. Tentative plan to DC with Cordero with close outpatient follow up. -F/u with urology if medical oncology needs to be on board.
72M PMH HTN, hypothyroidism, pulmonary nodule s/p VATS LORRIE (8/2021), s/p R nephrectomy 5/22 (RCC) now w/ CKD, s/p TURBT 2/14/23 showing high grade invasive urothelial carcinoma who presents to the ED at the recommendation of his PMD due to abnormal lab testing, found to be in renal failure w/ hyperkalemia. Imaging revealing L hydroureteronephrosis w/ filling defect in the ureter, likely in the setting of malignancy s/p stent placement by urology overnight. Continue to monitor CMP BID. Outpatient urology and onc Follow up for urothelial carcinoma; will defer staging scans to outpatient/when kidney function improves. Rest of care as above

## 2023-05-25 ENCOUNTER — NON-APPOINTMENT (OUTPATIENT)
Age: 73
End: 2023-05-25

## 2023-05-25 RX ORDER — SODIUM FLUORIDE 1.1 G/100G
1.1 GEL, DENTIFRICE ORAL
Qty: 51 | Refills: 0 | Status: COMPLETED | COMMUNITY
Start: 2020-11-23 | End: 2023-05-25

## 2023-05-25 RX ORDER — ASPIRIN 81 MG
81 TABLET, DELAYED RELEASE (ENTERIC COATED) ORAL
Refills: 0 | Status: COMPLETED | COMMUNITY
End: 2023-05-25

## 2023-05-25 RX ORDER — CHOLECALCIFEROL (VITAMIN D3) 1250 MCG
1.25 MG CAPSULE ORAL
Qty: 12 | Refills: 3 | Status: COMPLETED | COMMUNITY
Start: 2019-02-10 | End: 2023-05-25

## 2023-05-25 RX ORDER — AMOXICILLIN AND CLAVULANATE POTASSIUM 875; 125 MG/1; MG/1
875-125 TABLET, COATED ORAL
Qty: 14 | Refills: 1 | Status: COMPLETED | COMMUNITY
Start: 2023-04-26 | End: 2023-05-25

## 2023-05-30 ENCOUNTER — APPOINTMENT (OUTPATIENT)
Dept: FAMILY MEDICINE | Facility: CLINIC | Age: 73
End: 2023-05-30
Payer: MEDICARE

## 2023-05-30 VITALS
SYSTOLIC BLOOD PRESSURE: 137 MMHG | WEIGHT: 227 LBS | DIASTOLIC BLOOD PRESSURE: 84 MMHG | HEIGHT: 75 IN | OXYGEN SATURATION: 99 % | BODY MASS INDEX: 28.23 KG/M2 | TEMPERATURE: 98.1 F | HEART RATE: 90 BPM | RESPIRATION RATE: 16 BRPM

## 2023-05-30 DIAGNOSIS — Z86.2 PERSONAL HISTORY OF DISEASES OF THE BLOOD AND BLOOD-FORMING ORGANS AND CERTAIN DISORDERS INVOLVING THE IMMUNE MECHANISM: ICD-10-CM

## 2023-05-30 DIAGNOSIS — E55.9 VITAMIN D DEFICIENCY, UNSPECIFIED: ICD-10-CM

## 2023-05-30 PROCEDURE — 36415 COLL VENOUS BLD VENIPUNCTURE: CPT

## 2023-05-30 PROCEDURE — 99496 TRANSJ CARE MGMT HIGH F2F 7D: CPT | Mod: 25

## 2023-05-30 RX ORDER — RAMIPRIL 10 MG/1
10 CAPSULE ORAL
Qty: 180 | Refills: 1 | Status: COMPLETED | COMMUNITY
End: 2023-05-30

## 2023-05-30 RX ORDER — METRONIDAZOLE 7.5 MG/G
0.75 GEL TOPICAL
Qty: 1 | Refills: 1 | Status: COMPLETED | COMMUNITY
Start: 2023-03-22 | End: 2023-05-30

## 2023-05-30 RX ORDER — OXYCODONE HYDROCHLORIDE 5 MG/1
5 CAPSULE ORAL 3 TIMES DAILY
Qty: 30 | Refills: 0 | Status: COMPLETED | COMMUNITY
Start: 2021-09-09 | End: 2023-05-30

## 2023-05-30 NOTE — HISTORY OF PRESENT ILLNESS
[Post-hospitalization from ___ Hospital] : Post-hospitalization from [unfilled] Hospital [Admitted on: ___] : The patient was admitted on [unfilled] [Discharged on ___] : discharged on [unfilled] [Discharge Summary] : discharge summary [Patient Contacted By: ____] : and contacted by [unfilled] [Pertinent Labs] : pertinent labs [Radiology Findings] : radiology findings [Discharge Med List] : discharge medication list [Med Reconciliation] : medication reconciliation has been completed [[email or web address]] : 72M PMH HTN, hypothyroidism, pulmonary nodule s/p VATS LORRIE (8/2021), s/p R\par nephrectomy 5/22 (RCC) now w/ CKD, s/p TURBT 2/14/23 showing high grade\par invasive urothelial carcinoma who presents to the ED after PMD referral,  SCr ~6 w/ hyperkalemia,Anemia. He denies recent difficulty urinating, decreased orincreased urination, dysuria. Denies fevers, chills, SOB, cough, chest pain,\par palpitations, abdominal pain, N/V/D, constipation, lower extremity swelling.\par  In the ED, he was afebrile, HR 80s-90s, BP 150s-180s systolic, RR 15-20, O2 sat\par 98-99% on RA. Labs notable for WBC 10.53, hgb 11.1, platelets 298. K 5.9,\par bicarb 14, AG 19, BUN/Cr 81/6.85. VBG 7.29/37/36/18. EKG w/ NSR 82 w/ 1st\par degree AV block & left axis deviation. CTAP w/ moderate to severe L\par hydroureteronephrosis to the level of a filling defect within the L mid ureter,\par neoplasm until proven otherwise.  He received 5u insulin & dextrose, albuterol\par 10mg via nebulizer, 2g calcium gluconate, 10g lokelma. He was started on a\par bicarb infusion. Repeat labs w/ K 5.7, bicarb 15, AG 18, BUN/Cr 82/7.29. He was\par assessed by urology & nephrology. He was admitted to medicine for further\par management. Pt underwent urgent ureteral stent placement overnight on 5/21. Admission labs\par consistent w/ acute renal failure (Cr > 7) complicated by hyperkalemia and\par metabolic acidosis. Cr noted to subsequently improve post-stent and\par hyperkalemia was monitored for improvement. Nephrology followed for ARF which\par was initially managed with sodium bicarbonate gtt and PRN management of\par hyperkalemia w/ calc gluconate and insulin/D50. On further assessment\par nephrology recommended followup in 3-4 weeks with labs drawn before visit.\par Urology recommended followup ASAP with Dr Marte - appt made for May 31st.\par Urine sample cytopathology showing HIGH GRADE UROTHELIAL CARCINOMA consistent\par with prior samples, per urology no need for oncology evaluation inpatient, can\par follow up outpatient.\par  Patient's home ramipril 10mg was held on admission d/t GAMAL. Ramipril will be\par held on discharge since BP stable (120s/70s) and GAMAL still present (Cr 2.8,\par baseline 1.5). Pt should follow up with PCP or nephrologist to restart blood\par pressure agents as needed. Pt was discharged with a michel catheter and has been taught how to care\par for it. At this point he is stable and medically optimized for discharge with\par close urology and nephrology followup.\par  Pt is feeling better, stronger, better appetite. His current meds finasteride 5 mg oral tablet: 1 tab(s) orally once a day\par levothyroxine 25 mcg (0.025 mg) oral tablet: 1 tab(s) orally once a day.Pt deneis fever, chills. He had  f/u tomorrow.  Nephrol appt in few weeks, \par ,

## 2023-05-30 NOTE — COUNSELING
[Fall prevention counseling provided] : Fall prevention counseling provided [Adequate lighting] : Adequate lighting [No throw rugs] : No throw rugs [Good understanding] : Patient has a good understanding of lifestyle changes and steps needed to achieve self management goal

## 2023-05-30 NOTE — HEALTH RISK ASSESSMENT
[Yes] : Yes [Monthly or less (1 pt)] : Monthly or less (1 point) [1 or 2 (0 pts)] : 1 or 2 (0 points) [Never (0 pts)] : Never (0 points) [No] : In the past 12 months have you used drugs other than those required for medical reasons? No [No falls in past year] : Patient reported no falls in the past year [0] : 2) Feeling down, depressed, or hopeless: Not at all (0) [PHQ-2 Negative - No further assessment needed] : PHQ-2 Negative - No further assessment needed [I have developed a follow-up plan documented below in the note.] : I have developed a follow-up plan documented below in the note. [Audit-CScore] : 1 [de-identified] : walking  [de-identified] : regular  [[id number]] : 0

## 2023-05-30 NOTE — PHYSICAL EXAM
[93931 - High Complexity requires an extensive number of possible diagnoses and/or the management options, extensive complexity of the medical data (tests, etc.) to be reviewed, and a high risk of significant complications, morbidity, and/or mortality as w] : High Complexity  [No Acute Distress] : no acute distress [No Varicosities] : no varicosities [No Edema] : there was no peripheral edema [No Rash] : no rash [Normal] : normal gait, coordination grossly intact, no focal deficits and deep tendon reflexes were 2+ and symmetric [de-identified] : + Cordero  [de-identified] : pale

## 2023-05-31 ENCOUNTER — OUTPATIENT (OUTPATIENT)
Dept: OUTPATIENT SERVICES | Facility: HOSPITAL | Age: 73
LOS: 1 days | End: 2023-05-31
Payer: MEDICARE

## 2023-05-31 ENCOUNTER — APPOINTMENT (OUTPATIENT)
Dept: UROLOGY | Facility: CLINIC | Age: 73
End: 2023-05-31
Payer: MEDICARE

## 2023-05-31 VITALS
TEMPERATURE: 98.6 F | SYSTOLIC BLOOD PRESSURE: 144 MMHG | HEART RATE: 88 BPM | RESPIRATION RATE: 18 BRPM | DIASTOLIC BLOOD PRESSURE: 89 MMHG

## 2023-05-31 DIAGNOSIS — R35.0 FREQUENCY OF MICTURITION: ICD-10-CM

## 2023-05-31 DIAGNOSIS — Z98.890 OTHER SPECIFIED POSTPROCEDURAL STATES: Chronic | ICD-10-CM

## 2023-05-31 DIAGNOSIS — Z90.5 ACQUIRED ABSENCE OF KIDNEY: Chronic | ICD-10-CM

## 2023-05-31 DIAGNOSIS — Z90.2 ACQUIRED ABSENCE OF LUNG [PART OF]: Chronic | ICD-10-CM

## 2023-05-31 PROCEDURE — 99214 OFFICE O/P EST MOD 30 MIN: CPT | Mod: 57

## 2023-05-31 PROCEDURE — 52000 CYSTOURETHROSCOPY: CPT

## 2023-05-31 PROCEDURE — 88112 CYTOPATH CELL ENHANCE TECH: CPT | Mod: 26

## 2023-06-01 DIAGNOSIS — N13.30 UNSPECIFIED HYDRONEPHROSIS: ICD-10-CM

## 2023-06-01 LAB
ANION GAP SERPL CALC-SCNC: 17 MMOL/L
APPEARANCE: ABNORMAL
BACTERIA: ABNORMAL /HPF
BILIRUBIN URINE: NEGATIVE
BLOOD URINE: ABNORMAL
BUN SERPL-MCNC: 32 MG/DL
CALCIUM SERPL-MCNC: 9.3 MG/DL
CAST: 0 /LPF
CHLORIDE SERPL-SCNC: 101 MMOL/L
CO2 SERPL-SCNC: 19 MMOL/L
COLOR: ABNORMAL
CREAT SERPL-MCNC: 2.19 MG/DL
EGFR: 31 ML/MIN/1.73M2
EPITHELIAL CELLS: 0 /HPF
GLUCOSE QUALITATIVE U: NEGATIVE MG/DL
GLUCOSE SERPL-MCNC: 111 MG/DL
KETONES URINE: NEGATIVE MG/DL
LEUKOCYTE ESTERASE URINE: ABNORMAL
MICROSCOPIC-UA: NORMAL
NITRITE URINE: NEGATIVE
PH URINE: 6
POTASSIUM SERPL-SCNC: 4.9 MMOL/L
PROTEIN URINE: 30 MG/DL
RED BLOOD CELLS URINE: 2 /HPF
REVIEW: NORMAL
SODIUM SERPL-SCNC: 137 MMOL/L
SPECIFIC GRAVITY URINE: 1
URINE CYTOLOGY: NORMAL
UROBILINOGEN URINE: 0.2 MG/DL
WHITE BLOOD CELLS URINE: 106 /HPF

## 2023-06-04 LAB — BACTERIA UR CULT: ABNORMAL

## 2023-06-05 LAB
25(OH)D3 SERPL-MCNC: 97.6 NG/ML
ALBUMIN SERPL ELPH-MCNC: 4.5 G/DL
ALP BLD-CCNC: 87 U/L
ALT SERPL-CCNC: 13 U/L
ANION GAP SERPL CALC-SCNC: 18 MMOL/L
AST SERPL-CCNC: 19 U/L
BILIRUB SERPL-MCNC: 0.4 MG/DL
BUN SERPL-MCNC: 37 MG/DL
CALCIUM SERPL-MCNC: 9.8 MG/DL
CHLORIDE SERPL-SCNC: 104 MMOL/L
CO2 SERPL-SCNC: 17 MMOL/L
CREAT SERPL-MCNC: 2.42 MG/DL
EGFR: 27 ML/MIN/1.73M2
FERRITIN SERPL-MCNC: 445 NG/ML
GLUCOSE SERPL-MCNC: 111 MG/DL
IRON SATN MFR SERPL: 22 %
IRON SERPL-MCNC: 56 UG/DL
MAGNESIUM SERPL-MCNC: 1.7 MG/DL
PHOSPHATE SERPL-MCNC: 3.4 MG/DL
POTASSIUM SERPL-SCNC: 4.6 MMOL/L
PROT SERPL-MCNC: 7.7 G/DL
SODIUM SERPL-SCNC: 139 MMOL/L
TIBC SERPL-MCNC: 258 UG/DL
TRANSFERRIN SERPL-MCNC: 214 MG/DL
UIBC SERPL-MCNC: 202 UG/DL

## 2023-06-13 ENCOUNTER — APPOINTMENT (OUTPATIENT)
Dept: FAMILY MEDICINE | Facility: CLINIC | Age: 73
End: 2023-06-13
Payer: MEDICARE

## 2023-06-13 VITALS
TEMPERATURE: 97.2 F | SYSTOLIC BLOOD PRESSURE: 132 MMHG | BODY MASS INDEX: 27.48 KG/M2 | DIASTOLIC BLOOD PRESSURE: 80 MMHG | OXYGEN SATURATION: 99 % | WEIGHT: 221 LBS | RESPIRATION RATE: 16 BRPM | HEIGHT: 75 IN | HEART RATE: 87 BPM

## 2023-06-13 DIAGNOSIS — F41.9 ANXIETY DISORDER, UNSPECIFIED: ICD-10-CM

## 2023-06-13 DIAGNOSIS — D64.9 ANEMIA, UNSPECIFIED: ICD-10-CM

## 2023-06-13 DIAGNOSIS — N39.0 URINARY TRACT INFECTION, SITE NOT SPECIFIED: ICD-10-CM

## 2023-06-13 PROCEDURE — 99215 OFFICE O/P EST HI 40 MIN: CPT | Mod: 25

## 2023-06-13 PROCEDURE — 36415 COLL VENOUS BLD VENIPUNCTURE: CPT

## 2023-06-15 LAB
25(OH)D3 SERPL-MCNC: 89.3 NG/ML
ALBUMIN SERPL ELPH-MCNC: 3.9 G/DL
ALP BLD-CCNC: 81 U/L
ALT SERPL-CCNC: 13 U/L
ANION GAP SERPL CALC-SCNC: 19 MMOL/L
APTT BLD: 28.7 SEC
AST SERPL-CCNC: 19 U/L
BILIRUB SERPL-MCNC: 0.3 MG/DL
BUN SERPL-MCNC: 42 MG/DL
CALCIUM SERPL-MCNC: 9.4 MG/DL
CHLORIDE SERPL-SCNC: 103 MMOL/L
CO2 SERPL-SCNC: 17 MMOL/L
CREAT SERPL-MCNC: 2.15 MG/DL
EGFR: 32 ML/MIN/1.73M2
FERRITIN SERPL-MCNC: 623 NG/ML
FOLATE SERPL-MCNC: 12.7 NG/ML
GLUCOSE SERPL-MCNC: 99 MG/DL
INR PPP: 1.2 RATIO
IRON SATN MFR SERPL: 17 %
IRON SERPL-MCNC: 40 UG/DL
POTASSIUM SERPL-SCNC: 5 MMOL/L
PROT SERPL-MCNC: 7.8 G/DL
PT BLD: 13.9 SEC
RBC # BLD: 3.27 M/UL
RETICS # AUTO: 1.3 %
RETICS AGGREG/RBC NFR: 41.2 K/UL
SODIUM SERPL-SCNC: 138 MMOL/L
TIBC SERPL-MCNC: 236 UG/DL
TRANSFERRIN SERPL-MCNC: 165 MG/DL
UIBC SERPL-MCNC: 195 UG/DL
VIT B12 SERPL-MCNC: 793 PG/ML

## 2023-06-16 ENCOUNTER — OUTPATIENT (OUTPATIENT)
Dept: OUTPATIENT SERVICES | Facility: HOSPITAL | Age: 73
LOS: 1 days | End: 2023-06-16

## 2023-06-16 VITALS
OXYGEN SATURATION: 98 % | SYSTOLIC BLOOD PRESSURE: 115 MMHG | TEMPERATURE: 98 F | HEART RATE: 83 BPM | DIASTOLIC BLOOD PRESSURE: 75 MMHG | RESPIRATION RATE: 18 BRPM | HEIGHT: 73 IN | WEIGHT: 220.9 LBS

## 2023-06-16 DIAGNOSIS — Z98.890 OTHER SPECIFIED POSTPROCEDURAL STATES: Chronic | ICD-10-CM

## 2023-06-16 DIAGNOSIS — C67.9 MALIGNANT NEOPLASM OF BLADDER, UNSPECIFIED: ICD-10-CM

## 2023-06-16 DIAGNOSIS — Z90.2 ACQUIRED ABSENCE OF LUNG [PART OF]: Chronic | ICD-10-CM

## 2023-06-16 DIAGNOSIS — M54.50 LOW BACK PAIN, UNSPECIFIED: ICD-10-CM

## 2023-06-16 DIAGNOSIS — Z91.89 OTHER SPECIFIED PERSONAL RISK FACTORS, NOT ELSEWHERE CLASSIFIED: ICD-10-CM

## 2023-06-16 DIAGNOSIS — Z90.5 ACQUIRED ABSENCE OF KIDNEY: Chronic | ICD-10-CM

## 2023-06-16 DIAGNOSIS — E03.9 HYPOTHYROIDISM, UNSPECIFIED: ICD-10-CM

## 2023-06-16 RX ORDER — SODIUM CHLORIDE 9 MG/ML
1000 INJECTION INTRAMUSCULAR; INTRAVENOUS; SUBCUTANEOUS
Refills: 0 | Status: DISCONTINUED | OUTPATIENT
Start: 2023-06-23 | End: 2023-07-07

## 2023-06-16 NOTE — H&P PST ADULT - HISTORY OF PRESENT ILLNESS
72 year old male with pmhx of HTN, Lung cancer s/p LORRIE lobectomy (2021), Renal cancer s/p right nephrectomy (2022), AAA, Hypothyroidism, Recent hospitalization at [facility] for Hydronephrosis s/p left ureteral stent (May 2023) for pre-op evaluation for diagnosis of Malignant neoplasm of bladder unspecified. Pt is scheduled for Ureteroscopy.  72 year old male with pmhx of HTN, Lung cancer s/p LORRIE lobectomy (2021), Renal cancer s/p right nephrectomy (2022), AAA, Hypothyroidism, Recent hospitalization at [facility] for Hydronephrosis s/p left ureteral stent (May 2023) for pre-op evaluation for diagnosis of Malignant neoplasm of bladder unspecified. Pt is scheduled for Ureteroscopy. 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

## 2023-06-16 NOTE — H&P PST ADULT - GENITOURINARY COMMENTS
pre-op dx: Malignant neoplasm of bladder unspecified. pre-op dx; Malignant neoplasm of bladder unspecified.

## 2023-06-16 NOTE — H&P PST ADULT - NSICDXPASTSURGICALHX_GEN_ALL_CORE_FT
PAST SURGICAL HISTORY:  H/O arthroscopy of knee     H/O carpal tunnel repair     History of biopsy of bladder     S/p nephrectomy right;  05/2022    S/P partial lobectomy of lung left upper;  08/04/2021

## 2023-06-16 NOTE — H&P PST ADULT - PROBLEM SELECTOR PLAN 1
Patient tentatively scheduled for Ureteroscopy on 6/23/23.  Pre-op instructions provided. Pt given verbal and written instructions with teach back on pepcid. Pt verbalized understanding.    72 year old male who is an intermediate patient scheduled for low risk procedure.  Copy of labs CBC, CMP, Ucx in chart. Currently on Augmentin for UTI until June 22.  Copy of Echocardiogram and stress test requested.  Medical evaluation in chart (incomplete pending labs). Requested updated evaluation. Patient tentatively scheduled for Ureteroscopy on 6/23/23.  Pre-op instructions provided. Pt given verbal and written instructions with teach back on pepcid. Pt verbalized understanding.    72 year old male who is an intermediate patient scheduled for low risk procedure.  Copy of labs CBC, CMP, Ucx in chart. Currently on Augmentin for UTI until June 22.  Copy of Echocardiogram and stress test requested.  Medical evaluation in chart (incomplete pending labs). Requested updated evaluation (GAMAL, UTI, recent hospitalization)

## 2023-06-16 NOTE — H&P PST ADULT - NSICDXPASTMEDICALHX_GEN_ALL_CORE_FT
PAST MEDICAL HISTORY:  Ascending aortic aneurysm     BPH without obstruction/lower urinary tract symptoms     COVID-19 12/2020 loss of smell no hosp    Hearing loss     History of iron deficiency     History of low back pain     HTN (hypertension)     Hypothyroid     Lumbar vertebral fracture     Lung cancer     Malignant neoplasm of bladder, unspecified     Malignant neoplasm of unspecified kidney, except renal pelvis     Obesity     Pulmonary nodule removed 8/4/2021 early stage no chemo VAT    Thoracic aortic aneurysm      PAST MEDICAL HISTORY:  GAMAL (acute kidney injury)     Ascending aortic aneurysm     BPH without obstruction/lower urinary tract symptoms     COVID-19 12/2020 loss of smell no hosp    Hearing loss     History of iron deficiency     History of low back pain     HTN (hypertension)     Hydronephrosis     Hypothyroid     Lumbar vertebral fracture     Lung cancer     Malignant neoplasm of bladder, unspecified     Malignant neoplasm of unspecified kidney, except renal pelvis     Obesity     Pulmonary nodule removed 8/4/2021 early stage no chemo VAT    Thoracic aortic aneurysm

## 2023-06-16 NOTE — H&P PST ADULT - PROBLEM SELECTOR PROBLEM 3
Tejinder is discharging from the hospital today and is in need of a TCM appointment. Please advise which provider would have availability to see Tejinder and if an In-clinic or virtual visit would be appropriate. Thank you.     If calling before 12:00 today we would call Shannon back at the hospital at [phone] or if after 12:00 we would call the patient to schedule.    Chronic low back pain

## 2023-06-16 NOTE — H&P PST ADULT - FUNCTIONAL STATUS
METS 4- Able to climb up 2 flights of stairs, walk up hill and do house chores without symptoms/4-10 METS

## 2023-06-19 NOTE — HISTORY OF PRESENT ILLNESS
[No Pertinent Pulmonary History] : no history of asthma, COPD, sleep apnea, or smoking [No Adverse Anesthesia Reaction] : no adverse anesthesia reaction in self or family member [Chronic Kidney Disease] : chronic kidney disease [(Patient denies any chest pain, claudication, dyspnea on exertion, orthopnea, palpitations or syncope)] : Patient denies any chest pain, claudication, dyspnea on exertion, orthopnea, palpitations or syncope [Excellent (>10 METs)] : Excellent (>10 METs) [Anti-Platelet Agents: _____] : Anti-Platelet Agents: [unfilled] [Chronic Anticoagulation] : no chronic anticoagulation [Diabetes] : no diabetes [FreeTextEntry1] : Cystoscopy and polypectomy [FreeTextEntry2] : 06/29/23 [FreeTextEntry3] : Dr. Ally Crystal [FreeTextEntry4] : Encounter conducted in Polish.\par Patient deneis CP,SOB,abd pain, no fever, no chills.S/p right  nephrectomy 05/22, recently Dgn with left hydronephrosis.Pt f/u with  ,recent UA show infection, pt not on meds - I will treat that.  [FreeTextEntry5] : Aortic aneurysm

## 2023-06-19 NOTE — ADDENDUM
[FreeTextEntry1] : Updated 06/19/2023\par PSA eval noted. patient is medically optimized for the proposed procedure.

## 2023-06-19 NOTE — RESULTS/DATA
[ECG Reviewed] : reviewed [Normal] : The 12 - lead ECG is normal [No Acute Ischemia] : No acute ischemia [Ventricular Rate___] : ventricular rate is [unfilled] beats per minute [No Interval Change] : no interval change [FreeTextEntry4] : EKG was done 05/20/23 Hospital

## 2023-06-20 ENCOUNTER — RX RENEWAL (OUTPATIENT)
Age: 73
End: 2023-06-20

## 2023-06-22 ENCOUNTER — TRANSCRIPTION ENCOUNTER (OUTPATIENT)
Age: 73
End: 2023-06-22

## 2023-06-22 ENCOUNTER — APPOINTMENT (OUTPATIENT)
Dept: FAMILY MEDICINE | Facility: CLINIC | Age: 73
End: 2023-06-22

## 2023-06-23 ENCOUNTER — RESULT REVIEW (OUTPATIENT)
Age: 73
End: 2023-06-23

## 2023-06-23 ENCOUNTER — TRANSCRIPTION ENCOUNTER (OUTPATIENT)
Age: 73
End: 2023-06-23

## 2023-06-23 ENCOUNTER — OUTPATIENT (OUTPATIENT)
Dept: OUTPATIENT SERVICES | Facility: HOSPITAL | Age: 73
LOS: 1 days | Discharge: HOME | End: 2023-06-23
Payer: MEDICARE

## 2023-06-23 ENCOUNTER — APPOINTMENT (OUTPATIENT)
Dept: UROLOGY | Facility: HOSPITAL | Age: 73
End: 2023-06-23

## 2023-06-23 VITALS
DIASTOLIC BLOOD PRESSURE: 75 MMHG | WEIGHT: 220.9 LBS | OXYGEN SATURATION: 95 % | SYSTOLIC BLOOD PRESSURE: 129 MMHG | HEIGHT: 73 IN | HEART RATE: 80 BPM | RESPIRATION RATE: 18 BRPM | TEMPERATURE: 98 F

## 2023-06-23 VITALS
OXYGEN SATURATION: 100 % | RESPIRATION RATE: 16 BRPM | DIASTOLIC BLOOD PRESSURE: 81 MMHG | SYSTOLIC BLOOD PRESSURE: 136 MMHG | HEART RATE: 77 BPM

## 2023-06-23 DIAGNOSIS — C67.9 MALIGNANT NEOPLASM OF BLADDER, UNSPECIFIED: ICD-10-CM

## 2023-06-23 DIAGNOSIS — Z98.890 OTHER SPECIFIED POSTPROCEDURAL STATES: Chronic | ICD-10-CM

## 2023-06-23 DIAGNOSIS — Z90.2 ACQUIRED ABSENCE OF LUNG [PART OF]: Chronic | ICD-10-CM

## 2023-06-23 DIAGNOSIS — Z90.5 ACQUIRED ABSENCE OF KIDNEY: Chronic | ICD-10-CM

## 2023-06-23 PROCEDURE — 52351 CYSTOURETERO & OR PYELOSCOPE: CPT | Mod: 50,RT

## 2023-06-23 PROCEDURE — 88112 CYTOPATH CELL ENHANCE TECH: CPT | Mod: 26

## 2023-06-23 PROCEDURE — 52240 CYSTOSCOPY AND TREATMENT: CPT

## 2023-06-23 PROCEDURE — 88307 TISSUE EXAM BY PATHOLOGIST: CPT | Mod: 26

## 2023-06-23 DEVICE — GUIDEWIRE SENSOR DUAL-FLEX NITINOL STRAIGHT .038" X 150CM: Type: IMPLANTABLE DEVICE | Site: LEFT | Status: FUNCTIONAL

## 2023-06-23 DEVICE — URETERAL CATH OPEN END 5FR 70CM: Type: IMPLANTABLE DEVICE | Site: LEFT | Status: FUNCTIONAL

## 2023-06-23 DEVICE — URETERAL STENT PERCUFLEX PLUS 7FR 26CM: Type: IMPLANTABLE DEVICE | Site: LEFT | Status: FUNCTIONAL

## 2023-06-23 RX ORDER — ONDANSETRON 8 MG/1
4 TABLET, FILM COATED ORAL ONCE
Refills: 0 | Status: DISCONTINUED | OUTPATIENT
Start: 2023-06-23 | End: 2023-07-07

## 2023-06-23 RX ORDER — HYDROMORPHONE HYDROCHLORIDE 2 MG/ML
0.5 INJECTION INTRAMUSCULAR; INTRAVENOUS; SUBCUTANEOUS
Refills: 0 | Status: DISCONTINUED | OUTPATIENT
Start: 2023-06-23 | End: 2023-06-23

## 2023-06-23 NOTE — BRIEF OPERATIVE NOTE - SPECIMENS
bladder urine cytology, bladder urine culture, left ureteral urine cytology, left ureteral urine culture, bladder tumor, bladder tumor deep

## 2023-06-23 NOTE — ASU DISCHARGE PLAN (ADULT/PEDIATRIC) - NURSING INSTRUCTIONS
discharge home with michel with removal of michel by patient on 6/24/23. call MD office for any situation that you deem important

## 2023-06-23 NOTE — ASU DISCHARGE PLAN (ADULT/PEDIATRIC) - NS MD DC FALL RISK RISK
For information on Fall & Injury Prevention, visit: [email or web address] OR  [email or web address] OR  https://www.cdc.gov/steadi/patient.html

## 2023-06-23 NOTE — ASU DISCHARGE PLAN (ADULT/PEDIATRIC) - ASU DC SPECIAL INSTRUCTIONSFT
CATHETER: Some patients are sent home with a michel catheter, while others go home urinating on their own. If you still have a catheter, the nurses will review instructions and care before you go home.    Tomorrow, (6/24/23), please take out your michel catheter. The way to remove the michel is by taking a pair of scissors and cutting the balloon port (right under the colored cap). Do this while seated over the toilet or while standing in the shower. About 1 tablespoon of water will drain out. Wait approximately 1 minute and then pull the catheter out. After the catheter is out, you may urinate as you normally do, since your bladder is empty after michel removal it may take you a few hours to urinate. IF YOU FEEL THAT YOU CANNOT URINATE IN 8 HOURS AFTER REMOVAL OR ARE UNCOMFORTABLE, CALL THE OFFICE/EMERGENCY LINE FOR FURTHER INSTRUCTIONS. FOR ANY EMERGENCIES, YOU CAN COME TO THE NEAREST EMERGENCY ROOM FOR ASSESSMENT.      STENT: You have a ureteral stent. This stent is temporary and MUST be removed or exchanged by your urologist. Follow up with your urologist regarding your stent plan at your post-operative visit.  GENERAL: It is common to have blood in your urine after your procedure. It may be pink or even red; inform your doctor if you have a significant amount of clot in the urine or if you are unable to void at all or if your catheter stops draining. The urine may clear and then become bloody again especially as you are more physically active. It is not uncommon to have some burning when you urinate, this will gradually improve. With a catheter in place, it is not uncommon to have occasional leakage or urine or blood around the catheter. Please call your urologist if this is excessive and/or the urine is not draining through the catheter into the bag.  BATHING: You may shower after your catheter is removed tomorrow.  DIET: You may resume your regular diet and regular medication regimen.  PAIN: You may take Tylenol (acetaminophen) 650-975mg and/or Motrin (ibuprofen) 400-600mg, both available over the counter, for pain every 6 hours as needed. Do not exceed 4000mg of Tylenol (acetaminophen) daily. You may alternate these medications such that you take one or the other every 3 hours for around the clock pain coverage.  ANTIBIOTICS: You will be given a prescription for an antibiotic, please take this medication as instructed and be sure to complete the entire course.  STOOL SOFTENERS: Do not allow yourself to become constipated as straining may cause bleeding. Take stool softeners or a laxative (ex. Miralax, Colace, Senokot, ExLax, etc), available over the counter, if needed.  ACTIVITY: No heavy lifting or strenuous exercise until you are evaluated at your post-operative appointment. Otherwise, you may return to your usual level of physical activity.  FOLLOW-UP: If you did not already schedule your post-operative appointment, please call your urologist to schedule a follow-up appointment.  CALL YOUR UROLOGIST IF: You have any bleeding that does not stop, inability to void >8 hours, fever over 100.4 F, chills, persistent nausea/vomiting, changes in your incision concerning for infection, or if your pain is not controlled on your discharge pain medications.

## 2023-06-23 NOTE — ASU DISCHARGE PLAN (ADULT/PEDIATRIC) - CARE PROVIDER_API CALL
Bonilla Canales  Urology  [address] [place] [address], [address] [address]  Phone: [phone]  Fax: [phone]  Follow Up Time:

## 2023-06-23 NOTE — BRIEF OPERATIVE NOTE - NSICDXBRIEFPROCEDURE_GEN_ALL_CORE_FT
PROCEDURES:  Cystoscopy, with bladder biopsy, TURBT, and retrograde pyelogram 23-Jun-2023 13:40:12  Angie Maria  Cystoscopic insertion of ureteral stent 23-Jun-2023 13:40:27  Angie Maria

## 2023-06-23 NOTE — ASU PREOP CHECKLIST - NS PREOP CHK INSULIN PUMP THERAPY
Go for blood tests as directed. Your doctor will do lab tests at regular visits to monitor the effects of this medicine. Please follow up with your doctor and keep your health care provider appointments. na

## 2023-06-25 LAB
CULTURE RESULTS: NO GROWTH — SIGNIFICANT CHANGE UP
SPECIMEN SOURCE: SIGNIFICANT CHANGE UP

## 2023-06-26 LAB
NON-GYNECOLOGICAL CYTOLOGY STUDY: SIGNIFICANT CHANGE UP
NON-GYNECOLOGICAL CYTOLOGY STUDY: SIGNIFICANT CHANGE UP

## 2023-06-27 ENCOUNTER — RESULT REVIEW (OUTPATIENT)
Age: 73
End: 2023-06-27

## 2023-07-05 LAB — SURGICAL PATHOLOGY STUDY: SIGNIFICANT CHANGE UP

## 2023-07-07 PROBLEM — N17.9 ACUTE KIDNEY FAILURE, UNSPECIFIED: Chronic | Status: ACTIVE | Noted: 2023-06-16

## 2023-07-07 PROBLEM — C67.9 MALIGNANT NEOPLASM OF BLADDER, UNSPECIFIED: Chronic | Status: ACTIVE | Noted: 2023-06-16

## 2023-07-08 NOTE — PROGRESS NOTE ADULT - PROVIDER SPECIALTY LIST ADULT
CT Surgery
CT Surgery
Pain Medicine
Thoracic Surgery
Pain Medicine
Thoracic Surgery
Thoracic Surgery
Critical Care
Critical Care
None

## 2023-07-11 ENCOUNTER — APPOINTMENT (OUTPATIENT)
Dept: FAMILY MEDICINE | Facility: CLINIC | Age: 73
End: 2023-07-11
Payer: MEDICARE

## 2023-07-11 VITALS
WEIGHT: 235 LBS | SYSTOLIC BLOOD PRESSURE: 132 MMHG | TEMPERATURE: 97.2 F | HEART RATE: 92 BPM | DIASTOLIC BLOOD PRESSURE: 80 MMHG | OXYGEN SATURATION: 97 % | RESPIRATION RATE: 16 BRPM | BODY MASS INDEX: 29.37 KG/M2

## 2023-07-11 PROBLEM — C34.90 MALIGNANT NEOPLASM OF UNSPECIFIED PART OF UNSPECIFIED BRONCHUS OR LUNG: Chronic | Status: ACTIVE | Noted: 2023-06-16

## 2023-07-11 PROBLEM — S32.009A UNSPECIFIED FRACTURE OF UNSPECIFIED LUMBAR VERTEBRA, INITIAL ENCOUNTER FOR CLOSED FRACTURE: Chronic | Status: ACTIVE | Noted: 2023-06-16

## 2023-07-11 PROBLEM — N13.30 UNSPECIFIED HYDRONEPHROSIS: Chronic | Status: ACTIVE | Noted: 2023-06-16

## 2023-07-11 PROBLEM — Z86.39 PERSONAL HISTORY OF OTHER ENDOCRINE, NUTRITIONAL AND METABOLIC DISEASE: Chronic | Status: ACTIVE | Noted: 2023-06-16

## 2023-07-11 PROCEDURE — 99215 OFFICE O/P EST HI 40 MIN: CPT | Mod: 25

## 2023-07-11 PROCEDURE — 36415 COLL VENOUS BLD VENIPUNCTURE: CPT

## 2023-07-11 RX ORDER — AMOXICILLIN AND CLAVULANATE POTASSIUM 875; 125 MG/1; MG/1
875-125 TABLET, COATED ORAL
Qty: 20 | Refills: 0 | Status: COMPLETED | COMMUNITY
Start: 2023-06-13 | End: 2023-07-11

## 2023-07-11 NOTE — HISTORY OF PRESENT ILLNESS
[FreeTextEntry1] : cc: f/u after Sx,Anemia,renal failure [de-identified] : Pt is doing well,deneis pain,no fever,  no chills, Path report is benign.Pt deneis CP,SOB,abd pain, He takes all his meds.

## 2023-07-11 NOTE — HEALTH RISK ASSESSMENT
[Yes] : Yes [1 or 2 (0 pts)] : 1 or 2 (0 points) [Never (0 pts)] : Never (0 points) [No] : In the past 12 months have you used drugs other than those required for medical reasons? No [No falls in past year] : Patient reported no falls in the past year [0] : 2) Feeling down, depressed, or hopeless: Not at all (0) [PHQ-2 Negative - No further assessment needed] : PHQ-2 Negative - No further assessment needed [With Patient/Caregiver] : , with patient/caregiver [Aggressive treatment] : aggressive treatment [Never] : Never [Audit-CScore] : 1 [de-identified] : walking  [de-identified] : regular  [[id number]] : 0 [AdvancecareDate] : 07/23/23

## 2023-07-12 LAB
25(OH)D3 SERPL-MCNC: 71.7 NG/ML
ALBUMIN SERPL ELPH-MCNC: 4.1 G/DL
ALP BLD-CCNC: 89 U/L
ALT SERPL-CCNC: 10 U/L
ANION GAP SERPL CALC-SCNC: 14 MMOL/L
AST SERPL-CCNC: 20 U/L
BILIRUB SERPL-MCNC: 0.3 MG/DL
BUN SERPL-MCNC: 28 MG/DL
CALCIUM SERPL-MCNC: 9.1 MG/DL
CHLORIDE SERPL-SCNC: 107 MMOL/L
CO2 SERPL-SCNC: 19 MMOL/L
CREAT SERPL-MCNC: 1.67 MG/DL
EGFR: 43 ML/MIN/1.73M2
GLUCOSE SERPL-MCNC: 93 MG/DL
HCT VFR BLD CALC: 32.5 %
HGB BLD-MCNC: 10.2 G/DL
MCHC RBC-ENTMCNC: 30.4 PG
MCHC RBC-ENTMCNC: 31.4 GM/DL
MCV RBC AUTO: 97 FL
PLATELET # BLD AUTO: 222 K/UL
POTASSIUM SERPL-SCNC: 4.8 MMOL/L
PROT SERPL-MCNC: 7.1 G/DL
RBC # BLD: 3.35 M/UL
RBC # FLD: 14.3 %
SODIUM SERPL-SCNC: 140 MMOL/L
T3FREE SERPL-MCNC: 2.36 PG/ML
TSH SERPL-ACNC: 0.52 UIU/ML
WBC # FLD AUTO: 9.3 K/UL

## 2023-07-14 ENCOUNTER — APPOINTMENT (OUTPATIENT)
Dept: NEPHROLOGY | Facility: CLINIC | Age: 73
End: 2023-07-14

## 2023-07-18 ENCOUNTER — APPOINTMENT (OUTPATIENT)
Dept: CT IMAGING | Facility: IMAGING CENTER | Age: 73
End: 2023-07-18
Payer: MEDICARE

## 2023-07-18 ENCOUNTER — OUTPATIENT (OUTPATIENT)
Dept: OUTPATIENT SERVICES | Facility: HOSPITAL | Age: 73
LOS: 1 days | End: 2023-07-18
Payer: MEDICARE

## 2023-07-18 DIAGNOSIS — Z98.890 OTHER SPECIFIED POSTPROCEDURAL STATES: Chronic | ICD-10-CM

## 2023-07-18 DIAGNOSIS — Z90.5 ACQUIRED ABSENCE OF KIDNEY: Chronic | ICD-10-CM

## 2023-07-18 DIAGNOSIS — C80.1 MALIGNANT (PRIMARY) NEOPLASM, UNSPECIFIED: ICD-10-CM

## 2023-07-18 DIAGNOSIS — Z90.2 ACQUIRED ABSENCE OF LUNG [PART OF]: Chronic | ICD-10-CM

## 2023-07-18 PROCEDURE — 74178 CT ABD&PLV WO CNTR FLWD CNTR: CPT | Mod: 26

## 2023-07-18 PROCEDURE — 71260 CT THORAX DX C+: CPT | Mod: 26

## 2023-07-18 PROCEDURE — 71260 CT THORAX DX C+: CPT

## 2023-07-18 PROCEDURE — 74178 CT ABD&PLV WO CNTR FLWD CNTR: CPT

## 2023-07-25 ENCOUNTER — APPOINTMENT (OUTPATIENT)
Dept: THORACIC SURGERY | Facility: CLINIC | Age: 73
End: 2023-07-25
Payer: MEDICARE

## 2023-07-25 VITALS
WEIGHT: 220 LBS | HEIGHT: 75 IN | BODY MASS INDEX: 27.35 KG/M2 | OXYGEN SATURATION: 98 % | SYSTOLIC BLOOD PRESSURE: 139 MMHG | HEART RATE: 75 BPM | DIASTOLIC BLOOD PRESSURE: 73 MMHG

## 2023-07-25 PROCEDURE — 99213 OFFICE O/P EST LOW 20 MIN: CPT

## 2023-07-25 NOTE — ASSESSMENT
[FreeTextEntry1] : Mr. RACHEL PATEL, 72 year old male, never smoker, w/ hx of HTN, BPH, hypothyroidism, Vertebral fracture, Thoracic aortic aneurysm, who was found to have enlarging LORRIE ground glass nodule during CT evaluation of ascending aortic aneurysm. Referred to office by Dr. Lilly Yu.\par \par S/p Flexible bronch, uniportal left VATS, pneumonolysis, wedge resection left upper lobe x1,LULobectomy, MLND on 8/4/2021. Path demonstrating: Invasive mucinous adenocarcinoma; G3; 1cm; Single focus; All LN (0/16) and margins negative for tumor; pT1a pN0 (Stage IA1)\par \par Patient presents today with follow up scan. \par \par I have reviewed the patient's medical records and diagnostic images at time of this office consultation and have made the following recommendation:\par 1. CT Chest reviewed, stable chest findings. I recommend he returns to clinic in 6 with CXR.\par 2. Continue follow up with uro, cards, and PCP.\par \par Recommendations reviewed with patient during this office visit, and all questions answered; Patient instructed on the importance of follow up and verbalizes understanding.\par \par \par I, ALY Valencia, personally performed the evaluation and management (E/M) services for this established patient. That E/M includes conducting the examination, assessing all new/exacerbated conditions, and establishing a new plan of care. Today, my ACP, Derick Melgoza NP, was here to observe my evaluation and management services for this new problem/exacerbated condition to be followed going forward.\par \par  \par

## 2023-07-25 NOTE — HISTORY OF PRESENT ILLNESS
[FreeTextEntry1] : Mr. RACHEL PATEL, 72 year old male, never smoker, w/ hx of HTN, BPH, hypothyroidism, Vertebral fracture, Thoracic aortic aneurysm, who was found to have enlarging LORRIE ground glass nodule during CT evaluation of ascending aortic aneurysm. Referred to office by Dr. Lilly Yu.\par \par CT Angio Chest/Abd/Pelvis on 6/23/21:\par - 8 mm left upper lobe groundglass nodule, previously 4 mm in 2018. \par - Stable 4.6 cm ascending thoracic aortic aneurysm without dissection. \par - Increased attenuation and mild expansion of a right upper pole renal calyx. \par \par PFTs on 7/9/21: FVC 87%, FEV1 87%, DLCO 142%.\par \par PET/CT on 7/14/21:\par - Non-FDG-avid 8 mm left upper nodule is unchanged as compared to CT dated 6/23/2021, and is increased in size as compared to CT dated 12/26/2018, where it measures 0.5 cm (image 94).\par - Increased attenuation and mild expansion of a right upper pole renal calyx, unchanged as compared to CT dated 6/23/2021, not identified on CT dated 2/12/2021, is not well evaluated on PET due to physiologic excretion of radiotracer activity. CT urogram is recommended for further evaluation.\par \par CT Chest on 10/19/21:\par - Status post left upper lobectomy with expected postsurgical changes. Trace left pleural effusion.\par - No new or enlarging nodule.\par \par CXR on 2/2/21: Status post LEFT upper lobe lobectomy.\par No radiographic evidence of active chest disease.\par \par S/p Flexible bronch, uniportal left VATS, pneumonolysis, wedge resection left upper lobe x1,LULobectomy, MLND on 8/4/2021. Path demonstrating: Invasive mucinous adenocarcinoma; G3; 1cm; Single focus; All LN (0/16) and margins negative for tumor; pT1a pN0 (Stage IA1)\par \par CT Chest on 5/3/22:\par - Stable distortion of the left hemithorax from left upper lobectomy. No endobronchial lesion. \par - Stable 2 mm subpleural nodule left lower lobe (2-39). No pleural effusion.\par \par CT Chest on 12/19/22:\par - New right lower lobe subpleural 3 mm nodule (2-124).\par - Stable 2 mm subpleural left lower lobe nodule (2-45). \par - Right lower lobe calcified granuloma. No pleural effusion.\par \par CT Chest on 3/20/23:\par - s/p LULobectomy\par - Unchanged 2 mm subpleural nodule in the LLL (2-42)\par -  Resolved previously new nodule in the RLL \par - New 4 mm solid nodule in the RLL posterior basal segment (2-126)\par - RLL calcified granuloma\par - Unchanged dilated approximate 4.7 cm midascending aorta. Normal variant anatomy with persistent left-sided SVC.\par - Unchanged too small to characterize hypodensities in the liver. Right nephrectomy. Partially included left renal cyst.\par - Multilevel spondylosis.\par \par OF NOTE: 5/22/23: s/p open Right nephro-ureterectomy \par 6/23/23: S/p cystoscopy, transurethral resection of bladder, left ureteroscopy, exchange of left double J stent with Dr. Canales. Path of bladder tumor reveals Urothelial atypia. Path of left ureteral urine, negative for malignancy. \par \par CT Chest on 7/18/23: \par - s/p  partial left lung resection with postoperative changes\par - Aneurysmal dilatation of the ascending aorta measuring up to 5.0 cm which has not significantly changed\par - Subcentimeter low-attenuation lesions in the liver which are too small to characterize. These do not appear significantly changed.\par - Status post right-sided nephrectomy with postsurgical changes. Left-sided renal cysts. Interval placement of a left ureteral stent with continued moderate left-sided hydroureteronephrosis. Question of prominent soft tissue in the left mid ureter on series 4 image 124. This was the site of transition on the prior study and underlying lesion must be considered.\par - Colonic diverticuli predominantly in the sigmoid without focal inflammation.\par - Postsurgical changes midanterior abdominal wall. Small umbilical hernia containing fat. Small left inguinal hernia containing fat.\par \par Patient is here today for a follow up. Overall, he reports to be feeling well. Denies any chest pain, shortness of breath, cough, or hemoptysis.

## 2023-07-28 ENCOUNTER — APPOINTMENT (OUTPATIENT)
Dept: UROLOGY | Facility: CLINIC | Age: 73
End: 2023-07-28
Payer: MEDICARE

## 2023-07-28 VITALS
TEMPERATURE: 98.1 F | SYSTOLIC BLOOD PRESSURE: 129 MMHG | RESPIRATION RATE: 17 BRPM | HEART RATE: 78 BPM | DIASTOLIC BLOOD PRESSURE: 82 MMHG

## 2023-07-28 PROCEDURE — 99215 OFFICE O/P EST HI 40 MIN: CPT | Mod: 57

## 2023-07-28 NOTE — HISTORY OF PRESENT ILLNESS
[FreeTextEntry1] : : Aug  2 1950 \par Referring Provider: none \par \par HPI: Mr. RACHEL PATEL is a 72 year yo M with a PMHx notable for \par \par Anticoagulation: None\par All: NKDA\par Social: never smoker, nondrinker, , no radiation exposure\par PMHx: previously HTN, no HLD\par FHx: no cancer\par PSHx: R NxU, prior left upper lobectomy\par \par Imaging: CT urogram with mid L ureter filling defect\par  [Urinary Incontinence] : no urinary incontinence [Urinary Retention] : no urinary retention [Urinary Urgency] : no urinary urgency [Urinary Frequency] : no urinary frequency

## 2023-07-28 NOTE — PHYSICAL EXAM
[General Appearance - Well Nourished] : well nourished [General Appearance - Well Developed] : well developed [Bowel Sounds] : normal bowel sounds [Abdomen Soft] : soft [Skin Color & Pigmentation] : normal skin color and pigmentation [Skin Turgor] : supple [Heart Rate And Rhythm] : Heart rate and rhythm were normal [] : no respiratory distress [Respiration, Rhythm And Depth] : normal respiratory rhythm and effort [Oriented To Time, Place, And Person] : oriented to person, place, and time [Not Anxious] : not anxious [Normal Station and Gait] : the gait and station were normal for the patient's age [No Focal Deficits] : no focal deficits

## 2023-08-01 LAB — BACTERIA UR CULT: NORMAL

## 2023-08-15 ENCOUNTER — APPOINTMENT (OUTPATIENT)
Dept: CARDIOLOGY | Facility: CLINIC | Age: 73
End: 2023-08-15
Payer: MEDICARE

## 2023-08-15 VITALS
HEIGHT: 75 IN | WEIGHT: 240 LBS | TEMPERATURE: 97.6 F | OXYGEN SATURATION: 98 % | RESPIRATION RATE: 19 BRPM | BODY MASS INDEX: 29.84 KG/M2 | HEART RATE: 76 BPM

## 2023-08-15 VITALS — DIASTOLIC BLOOD PRESSURE: 86 MMHG | SYSTOLIC BLOOD PRESSURE: 148 MMHG | HEART RATE: 76 BPM

## 2023-08-15 DIAGNOSIS — Z86.79 PERSONAL HISTORY OF OTHER DISEASES OF THE CIRCULATORY SYSTEM: ICD-10-CM

## 2023-08-15 DIAGNOSIS — R01.1 CARDIAC MURMUR, UNSPECIFIED: ICD-10-CM

## 2023-08-15 PROCEDURE — 93306 TTE W/DOPPLER COMPLETE: CPT

## 2023-08-15 PROCEDURE — 99214 OFFICE O/P EST MOD 30 MIN: CPT | Mod: 25

## 2023-08-15 PROCEDURE — 93000 ELECTROCARDIOGRAM COMPLETE: CPT

## 2023-08-15 NOTE — REASON FOR VISIT
[Other: ____] : [unfilled] [FreeTextEntry1] : This 71-year-old man with a history of hypertension and no known history of heart disease who presents for cardiac clearance for kidney surgery. The patient due to risk factors did undergo noninvasive evaluation in the fall of 2020 with an echocardiogram that revealed normal left ventricular systolic function and  exercise stress test that revealed no evidence of ischemia. He denies chest discomfort shortness of breath palpitations dizziness or syncope.

## 2023-08-15 NOTE — PHYSICAL EXAM
Vitals:    19 1457   BP: 120/66     History reviewed. No pertinent past medical history.  Current Outpatient Medications   Medication   • LUTERA 0.1-20 MG-MCG per tablet   • Biotin 1 MG Cap   • Cyanocobalamin (VITAMIN B 12 PO)   • Omega-3 Fatty Acids (FISH OIL) 1000 MG CAPSULE DELAYED RELEASE   • cholecalciferol (VITAMIN D3) 1000 UNITS tablet   • Docusate Calcium (STOOL SOFTENER PO)   • Multiple Vitamin (MULTI-VITAMIN DAILY PO)   • Probiotic Product (PROBIOTIC DAILY PO)   • CRANBERRY EXTRACT PO     No current facility-administered medications for this visit.        Chief Complaint   Patient presents with   • Gyn Exam       History of Present Illness:    The patient presents to the office today for an annual GYN exam.  She is  0, para 0.  She is currently using oral contraceptive pill as her method of birth control and is not planning any pregnancies in the next year so at this time desires to continue.  She reports monthly menstrual periods, 3 days with light  flow, changing a pad or a tampon 4/day.  She denies any complaints with her periods.  The first day of her last normal menstrual period was 2019.  Her last annual here with a Pap was , which was normal.  She denies family history of breast, gynecologic, colon cancer or osteoporosis in a first degree relative.  Patient denies health complaints at this time.    Review of Systems:  HEENT:  Patient denies headaches.  Denies problems with nose, mouth or throat.  Respiratory:  Denies shortness of breath or asthma.  Cardiovascular:  Denies chest pain or palpitations  Gastrointestinal:  Denies abdominal pain, nausea, vomiting.  Denies constipation, diarrhea or hemorrhoids.  Genitourinary:  Denies urinary frequency, incontinence, urgency or pain.  Denies dyspareunia.  Vaginal:  Denies change in vaginal discharge, denies change in partner or STD concern.  Denies excessive dryness or abnormal bleeding.  Musculoskeletal:  Denies problems.  No  weakness or joint pain.  Neurologic:  No complaints.  Skin:  Denies rashes, dryness or other problems.  Psychological:  Denies concerns.    Physical Exam  Vital Signs: Stable per review.  Neck: No palpable nodes, no masses. Thyroid is midline and without masses, not enlarged.  Skin:  Warm, dry and clear.  Heart: Normal S1, S2; without murmurs  Breasts: Normal appearance without dimpling, puckering or retraction.  There is no nipple discharge with expression.  No palpable dominant masses bilaterally.  Abdomen: Soft, nontender, no masses or hepatosplenomegaly.  External Genitalia: Normal appearance.  Urethral meatus appears normal.  Internal Speculum:  Vaginal mucosa appears consistent with hormonal status.  Cervix is clean. Pap smear not collected. Cervix nonfriable.  Bimanual: Uterine position is retroverted.  Uterus is normal in size, mobile, nontender, without masses.  Adnexa are nontender and negative for masses.  Rectal: deferred      Impression:    (Z01.419) Well woman exam with routine gynecological exam  (primary encounter diagnosis)  Plan: Annual gyn exam, breast self-exam reviewed, next Pap due  2021    (Z30.41) Oral contraceptive pill surveillance  Plan: Reviewed risks, benefits, alternatives to current contraceptive method. RX refilled for one year.          [General Appearance - Well Developed] : well developed [Normal Appearance] : normal appearance [Well Groomed] : well groomed [General Appearance - Well Nourished] : well nourished [No Deformities] : no deformities [General Appearance - In No Acute Distress] : no acute distress [Normal Oral Mucosa] : normal oral mucosa [No Oral Pallor] : no oral pallor [No Oral Cyanosis] : no oral cyanosis [Normal Jugular Venous A Waves Present] : normal jugular venous A waves present [Normal Jugular Venous V Waves Present] : normal jugular venous V waves present [No Jugular Venous Ball A Waves] : no jugular venous ball A waves [Normal Rate] : normal [Normal S1] : normal S1 [Normal S2] : normal S2 [No Murmur] : no murmurs heard [2+] : left 2+ [No Abnormalities] : the abdominal aorta was not enlarged and no bruit was heard [No Pitting Edema] : no pitting edema present [Respiration, Rhythm And Depth] : normal respiratory rhythm and effort [Exaggerated Use Of Accessory Muscles For Inspiration] : no accessory muscle use [Auscultation Breath Sounds / Voice Sounds] : lungs were clear to auscultation bilaterally [Abdomen Soft] : soft [Abdomen Tenderness] : non-tender [Abdomen Mass (___ Cm)] : no abdominal mass palpated [Abnormal Walk] : normal gait [Gait - Sufficient For Exercise Testing] : the gait was sufficient for exercise testing [Nail Clubbing] : no clubbing of the fingernails [Cyanosis, Localized] : no localized cyanosis [Petechial Hemorrhages (___cm)] : no petechial hemorrhages [Skin Color & Pigmentation] : normal skin color and pigmentation [] : no rash [No Venous Stasis] : no venous stasis [Skin Lesions] : no skin lesions [No Skin Ulcers] : no skin ulcer [No Xanthoma] : no  xanthoma was observed [Oriented To Time, Place, And Person] : oriented to person, place, and time [Affect] : the affect was normal [Mood] : the mood was normal [No Anxiety] : not feeling anxious [S3] : no S3 [S4] : no S4 [Right Carotid Bruit] : no bruit heard over the right carotid [Left Carotid Bruit] : no bruit heard over the left carotid [Right Femoral Bruit] : no bruit heard over the right femoral artery [Left Femoral Bruit] : no bruit heard over the left femoral artery

## 2023-08-15 NOTE — PHYSICAL EXAM
[5th Left ICS - MCL] : palpated at the 5th LICS in the midclavicular line [Normal] : normal [Normal Rate] : normal [Rhythm Regular] : regular [II] : a grade 2

## 2023-08-15 NOTE — REASON FOR VISIT
[Other: ____] : [unfilled] [FreeTextEntry1] : This is a 73-year-old man who presents for cardiac clearance for a genitourinary procedure.  The patient has no known history of myocardial infarction or congestive heart failure.  He does have a stable thoracic aortic aneurysm and has been assessed by cardiothoracic surgery for this.  He underwent exercise stress testing in 2020 which was negative for ischemia.  He underwent echocardiography today which reveals normal left ventricular systolic function and no evidence of significant valvular stenosis or insufficiency.  He denies chest discomfort shortness of breath palpitations dizziness or syncope

## 2023-08-15 NOTE — ASSESSMENT
[FreeTextEntry1] : In summary, the patient is a 71-year-old man without any history of prior cardiac events.\par \par Based on the above and based on his prior noninvasive testing there is no cardiac contraindication to the proposed procedure.\par \par There is no contraindication to discontinuing aspirin as well

## 2023-08-15 NOTE — ASSESSMENT
[FreeTextEntry1] : In summary, the patient is a 73-year-old man asymptomatic from a cardiac perspective.  He has normal LV function by echocardiography.  There is no absolute cardiac contraindication to the proposed procedure

## 2023-08-18 ENCOUNTER — OUTPATIENT (OUTPATIENT)
Dept: OUTPATIENT SERVICES | Facility: HOSPITAL | Age: 73
LOS: 1 days | End: 2023-08-18
Payer: MEDICARE

## 2023-08-18 VITALS
HEIGHT: 75 IN | TEMPERATURE: 99 F | HEART RATE: 78 BPM | OXYGEN SATURATION: 98 % | RESPIRATION RATE: 18 BRPM | WEIGHT: 238.98 LBS | DIASTOLIC BLOOD PRESSURE: 84 MMHG | SYSTOLIC BLOOD PRESSURE: 132 MMHG

## 2023-08-18 DIAGNOSIS — Z98.890 OTHER SPECIFIED POSTPROCEDURAL STATES: Chronic | ICD-10-CM

## 2023-08-18 DIAGNOSIS — C65.9 MALIGNANT NEOPLASM OF UNSPECIFIED RENAL PELVIS: ICD-10-CM

## 2023-08-18 DIAGNOSIS — C64.9 MALIGNANT NEOPLASM OF UNSPECIFIED KIDNEY, EXCEPT RENAL PELVIS: ICD-10-CM

## 2023-08-18 DIAGNOSIS — Z01.818 ENCOUNTER FOR OTHER PREPROCEDURAL EXAMINATION: ICD-10-CM

## 2023-08-18 DIAGNOSIS — E03.9 HYPOTHYROIDISM, UNSPECIFIED: ICD-10-CM

## 2023-08-18 DIAGNOSIS — Z90.2 ACQUIRED ABSENCE OF LUNG [PART OF]: Chronic | ICD-10-CM

## 2023-08-18 DIAGNOSIS — Z29.9 ENCOUNTER FOR PROPHYLACTIC MEASURES, UNSPECIFIED: ICD-10-CM

## 2023-08-18 DIAGNOSIS — Z90.5 ACQUIRED ABSENCE OF KIDNEY: Chronic | ICD-10-CM

## 2023-08-18 LAB
ANION GAP SERPL CALC-SCNC: 16 MMOL/L — SIGNIFICANT CHANGE UP (ref 5–17)
BLD GP AB SCN SERPL QL: NEGATIVE — SIGNIFICANT CHANGE UP
BUN SERPL-MCNC: 21 MG/DL — SIGNIFICANT CHANGE UP (ref 7–23)
CALCIUM SERPL-MCNC: 9.1 MG/DL — SIGNIFICANT CHANGE UP (ref 8.4–10.5)
CHLORIDE SERPL-SCNC: 104 MMOL/L — SIGNIFICANT CHANGE UP (ref 96–108)
CO2 SERPL-SCNC: 23 MMOL/L — SIGNIFICANT CHANGE UP (ref 22–31)
CREAT SERPL-MCNC: 1.67 MG/DL — HIGH (ref 0.5–1.3)
EGFR: 42 ML/MIN/1.73M2 — LOW
GLUCOSE SERPL-MCNC: 99 MG/DL — SIGNIFICANT CHANGE UP (ref 70–99)
HCT VFR BLD CALC: 38.3 % — LOW (ref 39–50)
HGB BLD-MCNC: 12.4 G/DL — LOW (ref 13–17)
MCHC RBC-ENTMCNC: 31.9 PG — SIGNIFICANT CHANGE UP (ref 27–34)
MCHC RBC-ENTMCNC: 32.4 GM/DL — SIGNIFICANT CHANGE UP (ref 32–36)
MCV RBC AUTO: 98.5 FL — SIGNIFICANT CHANGE UP (ref 80–100)
NRBC # BLD: 0 /100 WBCS — SIGNIFICANT CHANGE UP (ref 0–0)
PLATELET # BLD AUTO: 213 K/UL — SIGNIFICANT CHANGE UP (ref 150–400)
POTASSIUM SERPL-MCNC: 4.6 MMOL/L — SIGNIFICANT CHANGE UP (ref 3.5–5.3)
POTASSIUM SERPL-SCNC: 4.6 MMOL/L — SIGNIFICANT CHANGE UP (ref 3.5–5.3)
RBC # BLD: 3.89 M/UL — LOW (ref 4.2–5.8)
RBC # FLD: 14.1 % — SIGNIFICANT CHANGE UP (ref 10.3–14.5)
RH IG SCN BLD-IMP: POSITIVE — SIGNIFICANT CHANGE UP
SODIUM SERPL-SCNC: 143 MMOL/L — SIGNIFICANT CHANGE UP (ref 135–145)
WBC # BLD: 7 K/UL — SIGNIFICANT CHANGE UP (ref 3.8–10.5)
WBC # FLD AUTO: 7 K/UL — SIGNIFICANT CHANGE UP (ref 3.8–10.5)

## 2023-08-18 PROCEDURE — 86900 BLOOD TYPING SEROLOGIC ABO: CPT

## 2023-08-18 PROCEDURE — 86850 RBC ANTIBODY SCREEN: CPT

## 2023-08-18 PROCEDURE — 86901 BLOOD TYPING SEROLOGIC RH(D): CPT

## 2023-08-18 PROCEDURE — 87086 URINE CULTURE/COLONY COUNT: CPT

## 2023-08-18 PROCEDURE — G0463: CPT

## 2023-08-18 PROCEDURE — 85027 COMPLETE CBC AUTOMATED: CPT

## 2023-08-18 PROCEDURE — 80048 BASIC METABOLIC PNL TOTAL CA: CPT

## 2023-08-18 RX ORDER — OXYCODONE HYDROCHLORIDE 5 MG/1
1 TABLET ORAL
Refills: 0 | DISCHARGE

## 2023-08-18 RX ORDER — SODIUM CHLORIDE 9 MG/ML
3 INJECTION INTRAMUSCULAR; INTRAVENOUS; SUBCUTANEOUS EVERY 8 HOURS
Refills: 0 | Status: DISCONTINUED | OUTPATIENT
Start: 2023-09-01 | End: 2023-09-04

## 2023-08-18 RX ORDER — LIDOCAINE HCL 20 MG/ML
0.2 VIAL (ML) INJECTION ONCE
Refills: 0 | Status: DISCONTINUED | OUTPATIENT
Start: 2023-09-01 | End: 2023-09-04

## 2023-08-18 RX ORDER — CHLORHEXIDINE GLUCONATE 213 G/1000ML
1 SOLUTION TOPICAL ONCE
Refills: 0 | Status: DISCONTINUED | OUTPATIENT
Start: 2023-09-01 | End: 2023-09-04

## 2023-08-18 NOTE — H&P PST ADULT - ASSESSMENT
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[employer], cooks, cleans, vacuum, able to go up one flight of stairs or walk 1-2 blocks with out difficulty  Loose or removable teeth: denies   CAPRINI SCORE [CLOT]    AGE RELATED RISK FACTORS                                                       MOBILITY RELATED FACTORS  [ ] Age 41-60 years                                            (1 Point)                  [ ] Bed rest                                                        (1 Point)  [ ] Age: 61-74 years                                           (2 Points)                 [ ] Plaster cast                                                   (2 Points)  [ ] Age= 75 years                                              (3 Points)                 [ ] Bed bound for more than 72 hours                 (2 Points)    DISEASE RELATED RISK FACTORS                                               GENDER SPECIFIC FACTORS  [ ] Edema in the lower extremities                       (1 Point)                  [ ] Pregnancy                                                     (1 Point)  [ ] Varicose veins                                               (1 Point)                  [ ] Post-partum < 6 weeks                                   (1 Point)             [ ] BMI > 25 Kg/m2                                            (1 Point)                  [ ] Hormonal therapy  or oral contraception          (1 Point)                 [ ] Sepsis (in the previous month)                        (1 Point)                  [ ] History of pregnancy complications                 (1 point)  [ ] Pneumonia or serious lung disease                                               [ ] Unexplained or recurrent                     (1 Point)           (in the previous month)                               (1 Point)  [ ] Abnormal pulmonary function test                     (1 Point)                 SURGERY RELATED RISK FACTORS  [ ] Acute myocardial infarction                              (1 Point)                 [ ]  Section                                             (1 Point)  [ ] Congestive heart failure (in the previous month)  (1 Point)               [ ] Minor surgery                                                  (1 Point)   [ ] Inflammatory bowel disease                             (1 Point)                 [ ] Arthroscopic surgery                                        (2 Points)  [ ] Central venous access                                      (2 Points)                [ ] General surgery lasting more than 45 minutes   (2 Points)       [ ] Stroke (in the previous month)                          (5 Points)               [ ] Elective arthroplasty                                         (5 Points)                                                                                                                                               HEMATOLOGY RELATED FACTORS                                                 TRAUMA RELATED RISK FACTORS  [ ] Prior episodes of VTE                                     (3 Points)                [ ] Fracture of the hip, pelvis, or leg                       (5 Points)  [ ] Positive family history for VTE                         (3 Points)                 [ ] Acute spinal cord injury (in the previous month)  (5 Points)  [ ] Prothrombin 26355 A                                     (3 Points)                 [ ] Paralysis  (less than 1 month)                             (5 Points)  [ ] Factor V Leiden                                             (3 Points)                  [ ] Multiple Trauma within 1 month                        (5 Points)  [ ] Lupus anticoagulants                                     (3 Points)                                                           [ ] Anticardiolipin antibodies                               (3 Points)                                                       [ ] High homocysteine in the blood                      (3 Points)                                             [ ] Other congenital or acquired thrombophilia      (3 Points)                                                [ ] Heparin induced thrombocytopenia                  (3 Points)                                          Total Score [         ]    Caprini Score 0 - 2:  Low Risk, No VTE Prophylaxis required for most patients, encourage ambulation  Caprini Score 3 - 6:  At Risk, pharmacologic VTE prophylaxis is indicated for most patients (in the absence of a contraindication)  Caprini Score Greater than or = 7:  High Risk, pharmacologic VTE prophylaxis is indicated for most patients (in the absence of a contraindication) DASI Score:7.44  DASI Activity: Pt former athlete, still drives, 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[employer], cooks, cleans, vacuum, able to go up one flight of stairs or walk 1-2 blocks with out difficulty  Loose or removable teeth: denies   CAPRINI SCORE [CLOT]    AGE RELATED RISK FACTORS                                                       MOBILITY RELATED FACTORS  [ ] Age 41-60 years                                            (1 Point)                  [ ] Bed rest                                                        (1 Point)  [ ] Age: 61-74 years                                           (2 Points)                 [ ] Plaster cast                                                   (2 Points)  [ x] Age= 75 years                                              (3 Points)                 [ ] Bed bound for more than 72 hours                 (2 Points)    DISEASE RELATED RISK FACTORS                                               GENDER SPECIFIC FACTORS  [ ] Edema in the lower extremities                       (1 Point)                  [ ] Pregnancy                                                     (1 Point)  [ ] Varicose veins                                               (1 Point)                  [ ] Post-partum < 6 weeks                                   (1 Point)             [ ] BMI > 25 Kg/m2                                            (1 Point)                  [ ] Hormonal therapy  or oral contraception          (1 Point)                 [ ] Sepsis (in the previous month)                        (1 Point)                  [ ] History of pregnancy complications                 (1 point)  [ ] Pneumonia or serious lung disease                                               [ ] Unexplained or recurrent                     (1 Point)           (in the previous month)                               (1 Point)  [ ] Abnormal pulmonary function test                     (1 Point)                 SURGERY RELATED RISK FACTORS  [ ] Acute myocardial infarction                              (1 Point)                 [ ]  Section                                             (1 Point)  [ ] Congestive heart failure (in the previous month)  (1 Point)               [ ] Minor surgery                                                  (1 Point)   [ ] Inflammatory bowel disease                             (1 Point)                 [ ] Arthroscopic surgery                                        (2 Points)  [ ] Central venous access                                      (2 Points)                [ x] General surgery lasting more than 45 minutes   (2 Points)       [ ] Stroke (in the previous month)                          (5 Points)               [ ] Elective arthroplasty                                         (5 Points)                                                                                                                                               HEMATOLOGY RELATED FACTORS                                                 TRAUMA RELATED RISK FACTORS  [ ] Prior episodes of VTE                                     (3 Points)                [ ] Fracture of the hip, pelvis, or leg                       (5 Points)  [ ] Positive family history for VTE                         (3 Points)                 [ ] Acute spinal cord injury (in the previous month)  (5 Points)  [ ] Prothrombin 30946 A                                     (3 Points)                 [ ] Paralysis  (less than 1 month)                             (5 Points)  [ ] Factor V Leiden                                             (3 Points)                  [ ] Multiple Trauma within 1 month                        (5 Points)  [ ] Lupus anticoagulants                                     (3 Points)                                                           [ ] Anticardiolipin antibodies                               (3 Points)                                                       [ ] High homocysteine in the blood                      (3 Points)                                             [ ] Other congenital or acquired thrombophilia      (3 Points)                                                [ ] Heparin induced thrombocytopenia                  (3 Points)                                          Total Score [   5      ]    Caprini Score 0 - 2:  Low Risk, No VTE Prophylaxis required for most patients, encourage ambulation  Caprini Score 3 - 6:  At Risk, pharmacologic VTE prophylaxis is indicated for most patients (in the absence of a contraindication)  Caprini Score Greater than or = 7:  High Risk, pharmacologic VTE prophylaxis is indicated for most patients (in the absence of a contraindication)

## 2023-08-18 NOTE — H&P PST ADULT - PROBLEM SELECTOR PROBLEM 1
Malignant neoplasm of bladder, unspecified Malignant neoplasm of unspecified kidney, except renal pelvis

## 2023-08-18 NOTE — H&P PST ADULT - NSICDXPASTMEDICALHX_GEN_ALL_CORE_FT
PAST MEDICAL HISTORY:  GAMAL (acute kidney injury)     Ascending aortic aneurysm     BPH without obstruction/lower urinary tract symptoms     COVID-19 12/2020 loss of smell no hosp    Hearing loss     History of iron deficiency     History of low back pain     HTN (hypertension)     Hydronephrosis     Hypothyroid     Lumbar vertebral fracture     Lung cancer     Malignant neoplasm of bladder, unspecified     Malignant neoplasm of unspecified kidney, except renal pelvis     Obesity     Pulmonary nodule removed 8/4/2021 early stage no chemo VAT    Thoracic aortic aneurysm

## 2023-08-18 NOTE — H&P PST ADULT - RESPIRATORY
Adequate: hears normal conversation without difficulty normal/clear to auscultation bilaterally/no wheezes/no rales/no rhonchi/airway patent/breath sounds equal

## 2023-08-18 NOTE — H&P PST ADULT - HISTORY OF PRESENT ILLNESS
72 year old male with pmhx of HTN, Lung cancer s/p LORRIE lobectomy (2021), Renal cancer s/p right nephrectomy (2022), AAA, Hypothyroidism, Recent hospitalization at [facility] for Hydronephrosis s/p left ureteral stent (May 2023) for pre-op evaluation for diagnosis of Malignant neoplasm of bladder unspecified. Pt is scheduled for Ureteroscopy. 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  72  year old male presents to PST prior to scheduled Cystoscopy, Left Ureteroscopy, Robotic Assisted Laparoscopic Left Partial Ureterotomy, Ureteroureterostomy, Stent Placement with Dr. La on 9/1/23. PMhx HTN, Hypothyroidism, Lung cancer s/p LORRIE lobectomy (2021), Renal cancer s/p right nephrectomy (2022), Anemia, GAMAL, BPH, AAA, hearing loss (b/l hearing aids). PShx carpal tunnel, bladder biopsy. C/o     Recent hospitalization at [facility] for Hydronephrosis s/p left ureteral stent (May 2023) for pre-op evaluation for diagnosis of Malignant neoplasm of bladder unspecified. Pt is scheduled for Ureteroscopy. 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  73 year old male presents to PST prior to scheduled Cystoscopy, Left Ureteroscopy, Robotic Assisted Laparoscopic Left Partial Ureterotomy, Ureteroureterostomy, Stent Placement with Dr. La on 9/1/23. PMhx HTN, Hypothyroidism, Lung cancer s/p LORRIE lobectomy (2021), Renal cancer s/p right nephrectomy (2022), Anemia, GAMAL, BPH, AAA, hearing loss (b/l hearing aids). PShx carpal tunnel, bladder biopsy. C/o ureteral"polyp". Pt recently hospitalization at [facility] for Hydronephrosis s/p left ureteral stent (May 2023) for pre-op evaluation for diagnosis of Malignant neoplasm of bladder unspecified. 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 Denies any chest pain, palpitations, SOB, N/V, fever or chills.

## 2023-08-18 NOTE — H&P PST ADULT - PROBLEM SELECTOR PLAN 2
Patient instructed to take Levothyroxine with a sip of water on the morning of procedure. Pt instructed to take thyroid medication DOS.

## 2023-08-18 NOTE — H&P PST ADULT - PROBLEM SELECTOR PLAN 1
Patient tentatively scheduled for Ureteroscopy on 6/23/23.  Pre-op instructions provided. Pt given verbal and written instructions with teach back on pepcid. Pt verbalized understanding.    72 year old male who is an intermediate patient scheduled for low risk procedure.  Copy of labs CBC, CMP, Ucx in chart. Currently on Augmentin for UTI until June 22.  Copy of Echocardiogram and stress test requested.  Medical evaluation in chart (incomplete pending labs). Requested updated evaluation (GAMAL, UTI, recent hospitalization) Patient tentatively scheduled for Ureteroscopy on 6/23/23.  Pre-op instructions provided. Pt given verbal and written instructions with teach back on pepcid. Pt verbalized understanding. Pt scheduled for Cystoscopy, Left Ureteroscopy, Robotic Assisted Laparoscopic Left Partial Ureterotomy, Ureteroureterostomy, Stent Placement with Dr. La on 9/1/23.  Pre-op instructions given, all questions answered.  Surgical Soap given.  Labs: CBC, BMP, T&S

## 2023-08-19 LAB
CULTURE RESULTS: SIGNIFICANT CHANGE UP
SPECIMEN SOURCE: SIGNIFICANT CHANGE UP

## 2023-08-27 ENCOUNTER — RX RENEWAL (OUTPATIENT)
Age: 73
End: 2023-08-27

## 2023-08-29 ENCOUNTER — APPOINTMENT (OUTPATIENT)
Dept: FAMILY MEDICINE | Facility: CLINIC | Age: 73
End: 2023-08-29
Payer: MEDICARE

## 2023-08-29 VITALS
DIASTOLIC BLOOD PRESSURE: 88 MMHG | OXYGEN SATURATION: 97 % | TEMPERATURE: 97.2 F | RESPIRATION RATE: 20 BRPM | HEIGHT: 75 IN | BODY MASS INDEX: 29.97 KG/M2 | WEIGHT: 241 LBS | HEART RATE: 89 BPM | SYSTOLIC BLOOD PRESSURE: 146 MMHG

## 2023-08-29 DIAGNOSIS — G89.29 LOW BACK PAIN, UNSPECIFIED: ICD-10-CM

## 2023-08-29 DIAGNOSIS — N18.4 CHRONIC KIDNEY DISEASE, STAGE 4 (SEVERE): ICD-10-CM

## 2023-08-29 DIAGNOSIS — D49.59 NEOPLASM UNSPECIFIED BEHAVIOR OF OTHER GENITOURINARY ORGAN: ICD-10-CM

## 2023-08-29 DIAGNOSIS — N18.31 CHRONIC KIDNEY DISEASE, STAGE 3A: ICD-10-CM

## 2023-08-29 DIAGNOSIS — Z87.448 PERSONAL HISTORY OF OTHER DISEASES OF URINARY SYSTEM: ICD-10-CM

## 2023-08-29 DIAGNOSIS — M54.50 LOW BACK PAIN, UNSPECIFIED: ICD-10-CM

## 2023-08-29 PROCEDURE — 99215 OFFICE O/P EST HI 40 MIN: CPT

## 2023-08-29 NOTE — HISTORY OF PRESENT ILLNESS
[No Pertinent Pulmonary History] : no history of asthma, COPD, sleep apnea, or smoking [No Adverse Anesthesia Reaction] : no adverse anesthesia reaction in self or family member [Chronic Kidney Disease] : chronic kidney disease [(Patient denies any chest pain, claudication, dyspnea on exertion, orthopnea, palpitations or syncope)] : Patient denies any chest pain, claudication, dyspnea on exertion, orthopnea, palpitations or syncope [Excellent (>10 METs)] : Excellent (>10 METs) [Chronic Anticoagulation] : no chronic anticoagulation [Diabetes] : no diabetes [FreeTextEntry1] : Cystoscopy, Left Ureteroscopy, Robotic Assisted Laparoscopic Left Partial Ureterotomy, Ureteroureterostomy, Stent Placementpolypectomy [FreeTextEntry2] : 09/01/23 [FreeTextEntry3] :  [FreeTextEntry4] : Encounter conducted in Polish. Patient denies CP,SOB,abd pain, no fever, no chills. S/p right  nephrectomy 05/22, recently [facility] with neoplasm of the left ureter, HX with left hydronephrosis  -  Cystoscopy, Left Ureteroscopy, Robotic Assisted Laparoscopic Left Partial Urethrotomy, Ureteroureterostomy. [FreeTextEntry5] : Aortic aneurysm

## 2023-08-29 NOTE — RESULTS/DATA
[No Interval Change] : no interval change [] : results reviewed [FreeTextEntry4] : EKG was done during Cardiac clearance  [de-identified] : Cardiac clearance noted

## 2023-08-29 NOTE — ASSESSMENT
[High Risk Surgery - Intraperitoneal, Intrathoracic or Supringuinal Vascular Procedures] : High Risk Surgery - Intraperitoneal, Intrathoracic or Supringuinal Vascular Procedures - No (0) [Ischemic Heart Disease] : Ischemic Heart Disease - No (0) [Congestive Heart Failure] : Congestive Heart Failure - No (0) [Prior Cerebrovascular Accident or TIA] : Prior Cerebrovascular Accident or TIA - No (0) [Creatinine >= 2mg/dL (1 Point)] : Creatinine >= 2mg/dL - No (0) [Insulin-dependent Diabetic (1 Point)] : Insulin-dependent Diabetic - No (0) [As per surgery] : as per surgery [0] : 0 , RCRI Class: I, Risk of Post-Op Cardiac Complications: 3.9%, 95% CI for Risk Estimate: 2.8% - 5.4% [Patient Optimized for Surgery] : Patient optimized for surgery [FreeTextEntry4] : Patient is medically optimized for the proposed procedure.  [FreeTextEntry5] : NA [FreeTextEntry6] : NA [FreeTextEntry7] : hold all supplement 7 days prior, cont rest of the meds

## 2023-08-29 NOTE — REVIEW OF SYSTEMS
[Fatigue] : fatigue [Negative] : Heme/Lymph [Back Pain] : back pain [Fever] : no fever [Chills] : no chills [Joint Pain] : no joint pain [Muscle Pain] : no muscle pain [Muscle Weakness] : no muscle weakness [Joint Swelling] : no joint swelling [FreeTextEntry9] : chronic back pain

## 2023-08-31 ENCOUNTER — TRANSCRIPTION ENCOUNTER (OUTPATIENT)
Age: 73
End: 2023-08-31

## 2023-09-01 ENCOUNTER — RESULT REVIEW (OUTPATIENT)
Age: 73
End: 2023-09-01

## 2023-09-01 ENCOUNTER — APPOINTMENT (OUTPATIENT)
Dept: UROLOGY | Facility: HOSPITAL | Age: 73
End: 2023-09-01

## 2023-09-01 ENCOUNTER — TRANSCRIPTION ENCOUNTER (OUTPATIENT)
Age: 73
End: 2023-09-01

## 2023-09-01 ENCOUNTER — INPATIENT (INPATIENT)
Facility: HOSPITAL | Age: 73
LOS: 2 days | Discharge: HOME | DRG: 657 | End: 2023-09-04
Attending: UROLOGY | Admitting: UROLOGY
Payer: MEDICARE

## 2023-09-01 VITALS
HEART RATE: 64 BPM | TEMPERATURE: 98 F | HEIGHT: 75 IN | SYSTOLIC BLOOD PRESSURE: 161 MMHG | RESPIRATION RATE: 16 BRPM | OXYGEN SATURATION: 97 % | DIASTOLIC BLOOD PRESSURE: 90 MMHG | WEIGHT: 238.98 LBS

## 2023-09-01 DIAGNOSIS — Z98.890 OTHER SPECIFIED POSTPROCEDURAL STATES: Chronic | ICD-10-CM

## 2023-09-01 DIAGNOSIS — Z90.2 ACQUIRED ABSENCE OF LUNG [PART OF]: Chronic | ICD-10-CM

## 2023-09-01 DIAGNOSIS — Z90.5 ACQUIRED ABSENCE OF KIDNEY: Chronic | ICD-10-CM

## 2023-09-01 DIAGNOSIS — C65.9 MALIGNANT NEOPLASM OF UNSPECIFIED RENAL PELVIS: ICD-10-CM

## 2023-09-01 LAB
ANION GAP SERPL CALC-SCNC: 15 MMOL/L — SIGNIFICANT CHANGE UP (ref 5–17)
BUN SERPL-MCNC: 27 MG/DL — HIGH (ref 7–23)
CALCIUM SERPL-MCNC: 8.8 MG/DL — SIGNIFICANT CHANGE UP (ref 8.4–10.5)
CHLORIDE SERPL-SCNC: 104 MMOL/L — SIGNIFICANT CHANGE UP (ref 96–108)
CO2 SERPL-SCNC: 20 MMOL/L — LOW (ref 22–31)
CREAT SERPL-MCNC: 2.23 MG/DL — HIGH (ref 0.5–1.3)
EGFR: 30 ML/MIN/1.73M2 — LOW
GLUCOSE SERPL-MCNC: 155 MG/DL — HIGH (ref 70–99)
HCT VFR BLD CALC: 42.5 % — SIGNIFICANT CHANGE UP (ref 39–50)
HGB BLD-MCNC: 13.9 G/DL — SIGNIFICANT CHANGE UP (ref 13–17)
MCHC RBC-ENTMCNC: 31.6 PG — SIGNIFICANT CHANGE UP (ref 27–34)
MCHC RBC-ENTMCNC: 32.7 GM/DL — SIGNIFICANT CHANGE UP (ref 32–36)
MCV RBC AUTO: 96.6 FL — SIGNIFICANT CHANGE UP (ref 80–100)
NRBC # BLD: 0 /100 WBCS — SIGNIFICANT CHANGE UP (ref 0–0)
PLATELET # BLD AUTO: 227 K/UL — SIGNIFICANT CHANGE UP (ref 150–400)
POTASSIUM SERPL-MCNC: 5.3 MMOL/L — SIGNIFICANT CHANGE UP (ref 3.5–5.3)
POTASSIUM SERPL-SCNC: 5.3 MMOL/L — SIGNIFICANT CHANGE UP (ref 3.5–5.3)
RBC # BLD: 4.4 M/UL — SIGNIFICANT CHANGE UP (ref 4.2–5.8)
RBC # FLD: 12.8 % — SIGNIFICANT CHANGE UP (ref 10.3–14.5)
SODIUM SERPL-SCNC: 139 MMOL/L — SIGNIFICANT CHANGE UP (ref 135–145)
WBC # BLD: 14.26 K/UL — HIGH (ref 3.8–10.5)
WBC # FLD AUTO: 14.26 K/UL — HIGH (ref 3.8–10.5)

## 2023-09-01 PROCEDURE — 74420 UROGRAPHY RTRGR +-KUB: CPT | Mod: 26

## 2023-09-01 PROCEDURE — 55705 BX PRST8 ANY APPROACH NONIMG: CPT

## 2023-09-01 PROCEDURE — G0416: CPT | Mod: 26

## 2023-09-01 PROCEDURE — 52235 CYSTOSCOPY AND TREATMENT: CPT

## 2023-09-01 PROCEDURE — 52355 CYSTOURETERO W/EXCISE TUMOR: CPT | Mod: LT

## 2023-09-01 PROCEDURE — 52332 CYSTOSCOPY AND TREATMENT: CPT | Mod: LT

## 2023-09-01 DEVICE — GUIDEWIRE SENSOR DUAL-FLEX NITINOL STRAIGHT .035" X 150CM: Type: IMPLANTABLE DEVICE | Site: LEFT | Status: FUNCTIONAL

## 2023-09-01 DEVICE — STENT URET 7FR 28CM: Type: IMPLANTABLE DEVICE | Site: LEFT | Status: FUNCTIONAL

## 2023-09-01 DEVICE — IMPLANTABLE DEVICE: Type: IMPLANTABLE DEVICE | Site: LEFT | Status: FUNCTIONAL

## 2023-09-01 DEVICE — URETERAL SHEATH NAVIGATOR HD 12/14FR X 28CM: Type: IMPLANTABLE DEVICE | Site: LEFT | Status: FUNCTIONAL

## 2023-09-01 DEVICE — LASER FIBER SOLTIVE 200 BALL TIP: Type: IMPLANTABLE DEVICE | Site: LEFT | Status: FUNCTIONAL

## 2023-09-01 DEVICE — URETERAL CATH OPEN END 5FR 70CM: Type: IMPLANTABLE DEVICE | Site: LEFT | Status: FUNCTIONAL

## 2023-09-01 DEVICE — STONE BASKET ZEROTIP NITINOL 4-WIRE 1.9FR 120CM X 12MM: Type: IMPLANTABLE DEVICE | Site: LEFT | Status: FUNCTIONAL

## 2023-09-01 RX ORDER — CEFAZOLIN SODIUM 1 G
2000 VIAL (EA) INJECTION ONCE
Refills: 0 | Status: COMPLETED | OUTPATIENT
Start: 2023-09-01 | End: 2023-09-01

## 2023-09-01 RX ORDER — SODIUM CHLORIDE 9 MG/ML
1000 INJECTION, SOLUTION INTRAVENOUS
Refills: 0 | Status: DISCONTINUED | OUTPATIENT
Start: 2023-09-01 | End: 2023-09-02

## 2023-09-01 RX ORDER — ACETAMINOPHEN 500 MG
1000 TABLET ORAL ONCE
Refills: 0 | Status: COMPLETED | OUTPATIENT
Start: 2023-09-01 | End: 2023-09-01

## 2023-09-01 RX ORDER — SODIUM CHLORIDE 9 MG/ML
3 INJECTION INTRAMUSCULAR; INTRAVENOUS; SUBCUTANEOUS EVERY 8 HOURS
Refills: 0 | Status: DISCONTINUED | OUTPATIENT
Start: 2023-09-01 | End: 2023-09-01

## 2023-09-01 RX ORDER — SENNA PLUS 8.6 MG/1
2 TABLET ORAL AT BEDTIME
Refills: 0 | Status: DISCONTINUED | OUTPATIENT
Start: 2023-09-01 | End: 2023-09-04

## 2023-09-01 RX ORDER — HYDRALAZINE HCL 50 MG
10 TABLET ORAL ONCE
Refills: 0 | Status: COMPLETED | OUTPATIENT
Start: 2023-09-01 | End: 2023-09-01

## 2023-09-01 RX ORDER — HEPARIN SODIUM 5000 [USP'U]/ML
5000 INJECTION INTRAVENOUS; SUBCUTANEOUS EVERY 8 HOURS
Refills: 0 | Status: DISCONTINUED | OUTPATIENT
Start: 2023-09-01 | End: 2023-09-04

## 2023-09-01 RX ORDER — SIMETHICONE 80 MG/1
80 TABLET, CHEWABLE ORAL ONCE
Refills: 0 | Status: COMPLETED | OUTPATIENT
Start: 2023-09-01 | End: 2023-09-01

## 2023-09-01 RX ORDER — HYDROMORPHONE HYDROCHLORIDE 2 MG/ML
0.5 INJECTION INTRAMUSCULAR; INTRAVENOUS; SUBCUTANEOUS
Refills: 0 | Status: DISCONTINUED | OUTPATIENT
Start: 2023-09-01 | End: 2023-09-01

## 2023-09-01 RX ORDER — LIDOCAINE HCL 20 MG/ML
0.2 VIAL (ML) INJECTION ONCE
Refills: 0 | Status: DISCONTINUED | OUTPATIENT
Start: 2023-09-01 | End: 2023-09-01

## 2023-09-01 RX ORDER — OXYCODONE HYDROCHLORIDE 5 MG/1
5 TABLET ORAL EVERY 4 HOURS
Refills: 0 | Status: DISCONTINUED | OUTPATIENT
Start: 2023-09-01 | End: 2023-09-04

## 2023-09-01 RX ORDER — POLYETHYLENE GLYCOL 3350 17 G/17G
17 POWDER, FOR SOLUTION ORAL ONCE
Refills: 0 | Status: COMPLETED | OUTPATIENT
Start: 2023-09-01 | End: 2023-09-01

## 2023-09-01 RX ORDER — ONDANSETRON 8 MG/1
4 TABLET, FILM COATED ORAL ONCE
Refills: 0 | Status: DISCONTINUED | OUTPATIENT
Start: 2023-09-01 | End: 2023-09-01

## 2023-09-01 RX ORDER — SODIUM CHLORIDE 9 MG/ML
500 INJECTION INTRAMUSCULAR; INTRAVENOUS; SUBCUTANEOUS ONCE
Refills: 0 | Status: COMPLETED | OUTPATIENT
Start: 2023-09-01 | End: 2023-09-01

## 2023-09-01 RX ORDER — HYDRALAZINE HCL 50 MG
5 TABLET ORAL ONCE
Refills: 0 | Status: COMPLETED | OUTPATIENT
Start: 2023-09-01 | End: 2023-09-01

## 2023-09-01 RX ORDER — ACETAMINOPHEN 500 MG
1000 TABLET ORAL ONCE
Refills: 0 | Status: COMPLETED | OUTPATIENT
Start: 2023-09-01 | End: 2023-09-02

## 2023-09-01 RX ORDER — OXYCODONE HYDROCHLORIDE 5 MG/1
5 TABLET ORAL EVERY 6 HOURS
Refills: 0 | Status: DISCONTINUED | OUTPATIENT
Start: 2023-09-01 | End: 2023-09-01

## 2023-09-01 RX ADMIN — Medication 10 MILLIGRAM(S): at 12:17

## 2023-09-01 RX ADMIN — Medication 1000 MILLIGRAM(S): at 16:07

## 2023-09-01 RX ADMIN — SIMETHICONE 80 MILLIGRAM(S): 80 TABLET, CHEWABLE ORAL at 21:31

## 2023-09-01 RX ADMIN — HYDROMORPHONE HYDROCHLORIDE 0.5 MILLIGRAM(S): 2 INJECTION INTRAMUSCULAR; INTRAVENOUS; SUBCUTANEOUS at 11:43

## 2023-09-01 RX ADMIN — Medication 1 TABLET(S): at 18:03

## 2023-09-01 RX ADMIN — Medication 400 MILLIGRAM(S): at 15:59

## 2023-09-01 RX ADMIN — HYDROMORPHONE HYDROCHLORIDE 0.5 MILLIGRAM(S): 2 INJECTION INTRAMUSCULAR; INTRAVENOUS; SUBCUTANEOUS at 12:15

## 2023-09-01 RX ADMIN — SODIUM CHLORIDE 125 MILLILITER(S): 9 INJECTION, SOLUTION INTRAVENOUS at 18:03

## 2023-09-01 RX ADMIN — HEPARIN SODIUM 5000 UNIT(S): 5000 INJECTION INTRAVENOUS; SUBCUTANEOUS at 21:31

## 2023-09-01 RX ADMIN — Medication 5 MILLIGRAM(S): at 13:28

## 2023-09-01 RX ADMIN — HYDROMORPHONE HYDROCHLORIDE 0.5 MILLIGRAM(S): 2 INJECTION INTRAMUSCULAR; INTRAVENOUS; SUBCUTANEOUS at 11:51

## 2023-09-01 RX ADMIN — POLYETHYLENE GLYCOL 3350 17 GRAM(S): 17 POWDER, FOR SOLUTION ORAL at 21:31

## 2023-09-01 RX ADMIN — HYDROMORPHONE HYDROCHLORIDE 0.5 MILLIGRAM(S): 2 INJECTION INTRAMUSCULAR; INTRAVENOUS; SUBCUTANEOUS at 14:55

## 2023-09-01 RX ADMIN — SENNA PLUS 2 TABLET(S): 8.6 TABLET ORAL at 21:31

## 2023-09-01 RX ADMIN — HYDROMORPHONE HYDROCHLORIDE 0.5 MILLIGRAM(S): 2 INJECTION INTRAMUSCULAR; INTRAVENOUS; SUBCUTANEOUS at 14:54

## 2023-09-01 RX ADMIN — OXYCODONE HYDROCHLORIDE 5 MILLIGRAM(S): 5 TABLET ORAL at 18:03

## 2023-09-01 RX ADMIN — SODIUM CHLORIDE 125 MILLILITER(S): 9 INJECTION, SOLUTION INTRAVENOUS at 15:42

## 2023-09-01 RX ADMIN — OXYCODONE HYDROCHLORIDE 5 MILLIGRAM(S): 5 TABLET ORAL at 19:02

## 2023-09-01 NOTE — ASU DISCHARGE PLAN (ADULT/PEDIATRIC) - CARE PROVIDER_API CALL
Mohamud Starkey  Urology  [address] [place] [id number]  Phone: [phone]  Fax: [phone]  Follow Up Time:

## 2023-09-01 NOTE — BRIEF OPERATIVE NOTE - OPERATION/FINDINGS
Left URS, laser ablation, biopsy, turbt, stent insetion Left URS, laser ablation, biopsy, turbt, stent insetion  Dictation: [id number]

## 2023-09-01 NOTE — PROVIDER CONTACT NOTE (OTHER) - ASSESSMENT
Pt denies chest pain or SOB. Pt denies n/t. Pt c/o gas pain & constipation. Pain more localized to Left flank area. Pt appears very distended. Tender on left side, not on right. Right feels more taut compared to Left. Pt denies chest pain or SOB. Pt denies n/t. Pt c/o gas pain & constipation. Pain more localized to Left flank area. Pt appears very distended. Tender on left side, not on right. Right feels more taut compared to Left. Pt BP remains elevated @ 178/102 @2035.

## 2023-09-01 NOTE — PROVIDER CONTACT NOTE (OTHER) - ACTION/TREATMENT ORDERED:
As per PA, Carlos, will come to bedside to evaluate pt. Bladder scan ordered. No further interventions ordered at this time. Will continue to monitor pt for the remainder of this shift.

## 2023-09-01 NOTE — PRE-ANESTHESIA EVALUATION ADULT - NSANTHPMHFT_GEN_ALL_CORE
73 year old male presents to PST prior to scheduled Cystoscopy, Left Ureteroscopy, Robotic Assisted Laparoscopic Left Partial Ureterotomy, Ureteroureterostomy, Stent Placement with Dr. La on 9/1/23. PMhx HTN, Hypothyroidism, Lung cancer s/p LORRIE lobectomy (2021), Renal cancer s/p right nephrectomy (2022), Anemia, GAMAL, BPH, AAA, hearing loss (b/l hearing aids). PShx carpal tunnel, bladder biopsy. C/o ureteral"polyp". Pt recently hospitalization at [facility] for Hydronephrosis s/p left ureteral stent (May 2023) for pre-op evaluation for diagnosis of Malignant neoplasm of bladder unspecified. 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 Denies any chest pain, palpitations, SOB, N/V, fever or chills.

## 2023-09-01 NOTE — PROGRESS NOTE ADULT - ASSESSMENT
A/P: 73y Male s/p Left Ureteroscopy and ablation, TURBT, hypertensive post operatively   - monitor BP  - Augmentin  - Keep michel  - DVT prophylaxis/OOB  - Incentive spirometry  - Strict I&O's  - Analgesia and antiemetics as needed  - Diet  - AM labs

## 2023-09-01 NOTE — PROVIDER CONTACT NOTE (OTHER) - ASSESSMENT
Pt denies chest pain or SOB. Pt denies n/t. Pt c/o gas pain & constipation. Pain more localized to Left flank area. Pt appears very distended.

## 2023-09-01 NOTE — PROGRESS NOTE ADULT - SUBJECTIVE AND OBJECTIVE BOX
Post op Check  Pt seen with Dr. Starkey.  Pt seen and examined. c/o lower abd fullness. Pain is controlled with meds. Denies SOB/CP/N/V.     Vital Signs Last 24 Hrs  T(C): 36.7 (01 Sep 2023 10:37), Max: 36.7 (01 Sep 2023 06:13)  T(F): 98.1 (01 Sep 2023 10:37), Max: 98.1 (01 Sep 2023 06:13)  HR: 81 (01 Sep 2023 14:45) (64 - 94)  BP: 189/87 (01 Sep 2023 14:45) (128/75 - 208/94)  BP(mean): 125 (01 Sep 2023 14:45) (96 - 141)  RR: 16 (01 Sep 2023 14:45) (16 - 16)  SpO2: 100% (01 Sep 2023 14:45) (94% - 100%)    Parameters below as of 01 Sep 2023 13:15  Patient On (Oxygen Delivery Method): room air        I&O's Summary    01 Sep 2023 07:01  -  01 Sep 2023 15:34  --------------------------------------------------------  IN: 0 mL / OUT: 200 mL / NET: -200 mL        Physical Exam  Gen: NAD, A&Ox3  Pulm: No respiratory distress, no subcostal retractions  Abd: Soft, NT, ND  : michel with reddish-ink urine, no clots

## 2023-09-01 NOTE — PROVIDER CONTACT NOTE (OTHER) - ACTION/TREATMENT ORDERED:
As per MONA Gonzalez, Will put in orders for Senna, Simethicone, & Miralax. No further interventions were ordered at this time. Pt will be continued to monitored for the rest of this shift.

## 2023-09-01 NOTE — ASU DISCHARGE PLAN (ADULT/PEDIATRIC) - NS MD DC FALL RISK RISK
For information on Fall & Injury Prevention, visit: [email or web address] OR  [email or web address] OR  https://www.cdc.gov/steadi/patient.html

## 2023-09-02 LAB
ANION GAP SERPL CALC-SCNC: 14 MMOL/L — SIGNIFICANT CHANGE UP (ref 5–17)
ANION GAP SERPL CALC-SCNC: 17 MMOL/L — SIGNIFICANT CHANGE UP (ref 5–17)
BUN SERPL-MCNC: 31 MG/DL — HIGH (ref 7–23)
BUN SERPL-MCNC: 32 MG/DL — HIGH (ref 7–23)
CALCIUM SERPL-MCNC: 8.3 MG/DL — LOW (ref 8.4–10.5)
CALCIUM SERPL-MCNC: 8.9 MG/DL — SIGNIFICANT CHANGE UP (ref 8.4–10.5)
CHLORIDE SERPL-SCNC: 101 MMOL/L — SIGNIFICANT CHANGE UP (ref 96–108)
CHLORIDE SERPL-SCNC: 98 MMOL/L — SIGNIFICANT CHANGE UP (ref 96–108)
CO2 SERPL-SCNC: 15 MMOL/L — LOW (ref 22–31)
CO2 SERPL-SCNC: 21 MMOL/L — LOW (ref 22–31)
CREAT SERPL-MCNC: 2.95 MG/DL — HIGH (ref 0.5–1.3)
CREAT SERPL-MCNC: 3.09 MG/DL — HIGH (ref 0.5–1.3)
EGFR: 20 ML/MIN/1.73M2 — LOW
EGFR: 21 ML/MIN/1.73M2 — LOW
GLUCOSE SERPL-MCNC: 125 MG/DL — HIGH (ref 70–99)
GLUCOSE SERPL-MCNC: 134 MG/DL — HIGH (ref 70–99)
HCT VFR BLD CALC: 40.8 % — SIGNIFICANT CHANGE UP (ref 39–50)
HCT VFR BLD CALC: 41.7 % — SIGNIFICANT CHANGE UP (ref 39–50)
HGB BLD-MCNC: 12.8 G/DL — LOW (ref 13–17)
HGB BLD-MCNC: 13.5 G/DL — SIGNIFICANT CHANGE UP (ref 13–17)
MCHC RBC-ENTMCNC: 30.7 GM/DL — LOW (ref 32–36)
MCHC RBC-ENTMCNC: 32 PG — SIGNIFICANT CHANGE UP (ref 27–34)
MCHC RBC-ENTMCNC: 32.2 PG — SIGNIFICANT CHANGE UP (ref 27–34)
MCHC RBC-ENTMCNC: 33.1 GM/DL — SIGNIFICANT CHANGE UP (ref 32–36)
MCV RBC AUTO: 105 FL — HIGH (ref 80–100)
MCV RBC AUTO: 96.7 FL — SIGNIFICANT CHANGE UP (ref 80–100)
NRBC # BLD: 0 /100 WBCS — SIGNIFICANT CHANGE UP (ref 0–0)
NRBC # BLD: 0 /100 WBCS — SIGNIFICANT CHANGE UP (ref 0–0)
PLATELET # BLD AUTO: 214 K/UL — SIGNIFICANT CHANGE UP (ref 150–400)
PLATELET # BLD AUTO: 231 K/UL — SIGNIFICANT CHANGE UP (ref 150–400)
POTASSIUM SERPL-MCNC: 4.9 MMOL/L — SIGNIFICANT CHANGE UP (ref 3.5–5.3)
POTASSIUM SERPL-MCNC: 4.9 MMOL/L — SIGNIFICANT CHANGE UP (ref 3.5–5.3)
POTASSIUM SERPL-SCNC: 4.9 MMOL/L — SIGNIFICANT CHANGE UP (ref 3.5–5.3)
POTASSIUM SERPL-SCNC: 4.9 MMOL/L — SIGNIFICANT CHANGE UP (ref 3.5–5.3)
RBC # BLD: 3.97 M/UL — LOW (ref 4.2–5.8)
RBC # BLD: 4.22 M/UL — SIGNIFICANT CHANGE UP (ref 4.2–5.8)
RBC # FLD: 12.7 % — SIGNIFICANT CHANGE UP (ref 10.3–14.5)
RBC # FLD: 12.9 % — SIGNIFICANT CHANGE UP (ref 10.3–14.5)
SODIUM SERPL-SCNC: 133 MMOL/L — LOW (ref 135–145)
SODIUM SERPL-SCNC: 133 MMOL/L — LOW (ref 135–145)
WBC # BLD: 17.63 K/UL — HIGH (ref 3.8–10.5)
WBC # BLD: 17.92 K/UL — HIGH (ref 3.8–10.5)
WBC # FLD AUTO: 17.63 K/UL — HIGH (ref 3.8–10.5)
WBC # FLD AUTO: 17.92 K/UL — HIGH (ref 3.8–10.5)

## 2023-09-02 PROCEDURE — 74176 CT ABD & PELVIS W/O CONTRAST: CPT | Mod: 26

## 2023-09-02 PROCEDURE — 74018 RADEX ABDOMEN 1 VIEW: CPT | Mod: 26

## 2023-09-02 RX ORDER — CEFTRIAXONE 500 MG/1
2000 INJECTION, POWDER, FOR SOLUTION INTRAMUSCULAR; INTRAVENOUS EVERY 24 HOURS
Refills: 0 | Status: DISCONTINUED | OUTPATIENT
Start: 2023-09-02 | End: 2023-09-04

## 2023-09-02 RX ORDER — TAMSULOSIN HYDROCHLORIDE 0.4 MG/1
0.4 CAPSULE ORAL AT BEDTIME
Refills: 0 | Status: DISCONTINUED | OUTPATIENT
Start: 2023-09-02 | End: 2023-09-04

## 2023-09-02 RX ORDER — LIDOCAINE 4 G/100G
1 CREAM TOPICAL DAILY
Refills: 0 | Status: DISCONTINUED | OUTPATIENT
Start: 2023-09-02 | End: 2023-09-04

## 2023-09-02 RX ORDER — SIMETHICONE 80 MG/1
80 TABLET, CHEWABLE ORAL ONCE
Refills: 0 | Status: COMPLETED | OUTPATIENT
Start: 2023-09-02 | End: 2023-09-02

## 2023-09-02 RX ORDER — SODIUM CHLORIDE 9 MG/ML
1000 INJECTION, SOLUTION INTRAVENOUS
Refills: 0 | Status: DISCONTINUED | OUTPATIENT
Start: 2023-09-02 | End: 2023-09-04

## 2023-09-02 RX ORDER — MORPHINE SULFATE 50 MG/1
2 CAPSULE, EXTENDED RELEASE ORAL ONCE
Refills: 0 | Status: DISCONTINUED | OUTPATIENT
Start: 2023-09-02 | End: 2023-09-02

## 2023-09-02 RX ORDER — FINASTERIDE 5 MG/1
5 TABLET, FILM COATED ORAL DAILY
Refills: 0 | Status: DISCONTINUED | OUTPATIENT
Start: 2023-09-02 | End: 2023-09-04

## 2023-09-02 RX ORDER — OXYCODONE HYDROCHLORIDE 5 MG/1
10 TABLET ORAL EVERY 4 HOURS
Refills: 0 | Status: DISCONTINUED | OUTPATIENT
Start: 2023-09-02 | End: 2023-09-03

## 2023-09-02 RX ORDER — PANTOPRAZOLE SODIUM 20 MG/1
40 TABLET, DELAYED RELEASE ORAL
Refills: 0 | Status: DISCONTINUED | OUTPATIENT
Start: 2023-09-02 | End: 2023-09-04

## 2023-09-02 RX ORDER — ACETAMINOPHEN 500 MG
650 TABLET ORAL ONCE
Refills: 0 | Status: COMPLETED | OUTPATIENT
Start: 2023-09-02 | End: 2023-09-02

## 2023-09-02 RX ORDER — POLYETHYLENE GLYCOL 3350 17 G/17G
17 POWDER, FOR SOLUTION ORAL DAILY
Refills: 0 | Status: DISCONTINUED | OUTPATIENT
Start: 2023-09-02 | End: 2023-09-04

## 2023-09-02 RX ADMIN — SODIUM CHLORIDE 3 MILLILITER(S): 9 INJECTION INTRAMUSCULAR; INTRAVENOUS; SUBCUTANEOUS at 22:20

## 2023-09-02 RX ADMIN — POLYETHYLENE GLYCOL 3350 17 GRAM(S): 17 POWDER, FOR SOLUTION ORAL at 10:18

## 2023-09-02 RX ADMIN — OXYCODONE HYDROCHLORIDE 10 MILLIGRAM(S): 5 TABLET ORAL at 05:29

## 2023-09-02 RX ADMIN — MORPHINE SULFATE 2 MILLIGRAM(S): 50 CAPSULE, EXTENDED RELEASE ORAL at 02:30

## 2023-09-02 RX ADMIN — OXYCODONE HYDROCHLORIDE 10 MILLIGRAM(S): 5 TABLET ORAL at 10:44

## 2023-09-02 RX ADMIN — OXYCODONE HYDROCHLORIDE 10 MILLIGRAM(S): 5 TABLET ORAL at 11:14

## 2023-09-02 RX ADMIN — LIDOCAINE 1 PATCH: 4 CREAM TOPICAL at 05:30

## 2023-09-02 RX ADMIN — TAMSULOSIN HYDROCHLORIDE 0.4 MILLIGRAM(S): 0.4 CAPSULE ORAL at 21:15

## 2023-09-02 RX ADMIN — OXYCODONE HYDROCHLORIDE 5 MILLIGRAM(S): 5 TABLET ORAL at 00:51

## 2023-09-02 RX ADMIN — Medication 30 MILLILITER(S): at 00:59

## 2023-09-02 RX ADMIN — OXYCODONE HYDROCHLORIDE 10 MILLIGRAM(S): 5 TABLET ORAL at 21:15

## 2023-09-02 RX ADMIN — CEFTRIAXONE 100 MILLIGRAM(S): 500 INJECTION, POWDER, FOR SOLUTION INTRAMUSCULAR; INTRAVENOUS at 15:53

## 2023-09-02 RX ADMIN — HEPARIN SODIUM 5000 UNIT(S): 5000 INJECTION INTRAVENOUS; SUBCUTANEOUS at 13:39

## 2023-09-02 RX ADMIN — Medication 1 TABLET(S): at 05:29

## 2023-09-02 RX ADMIN — Medication 650 MILLIGRAM(S): at 21:15

## 2023-09-02 RX ADMIN — FINASTERIDE 5 MILLIGRAM(S): 5 TABLET, FILM COATED ORAL at 13:38

## 2023-09-02 RX ADMIN — SODIUM CHLORIDE 125 MILLILITER(S): 9 INJECTION, SOLUTION INTRAVENOUS at 00:21

## 2023-09-02 RX ADMIN — OXYCODONE HYDROCHLORIDE 10 MILLIGRAM(S): 5 TABLET ORAL at 20:34

## 2023-09-02 RX ADMIN — OXYCODONE HYDROCHLORIDE 5 MILLIGRAM(S): 5 TABLET ORAL at 00:20

## 2023-09-02 RX ADMIN — Medication 650 MILLIGRAM(S): at 21:45

## 2023-09-02 RX ADMIN — OXYCODONE HYDROCHLORIDE 10 MILLIGRAM(S): 5 TABLET ORAL at 06:00

## 2023-09-02 RX ADMIN — PANTOPRAZOLE SODIUM 40 MILLIGRAM(S): 20 TABLET, DELAYED RELEASE ORAL at 10:17

## 2023-09-02 RX ADMIN — HEPARIN SODIUM 5000 UNIT(S): 5000 INJECTION INTRAVENOUS; SUBCUTANEOUS at 05:29

## 2023-09-02 RX ADMIN — SODIUM CHLORIDE 500 MILLILITER(S): 9 INJECTION INTRAMUSCULAR; INTRAVENOUS; SUBCUTANEOUS at 00:20

## 2023-09-02 RX ADMIN — Medication 1000 MILLIGRAM(S): at 00:51

## 2023-09-02 RX ADMIN — LIDOCAINE 1 PATCH: 4 CREAM TOPICAL at 16:51

## 2023-09-02 RX ADMIN — SODIUM CHLORIDE 3 MILLILITER(S): 9 INJECTION INTRAMUSCULAR; INTRAVENOUS; SUBCUTANEOUS at 13:13

## 2023-09-02 RX ADMIN — SIMETHICONE 80 MILLIGRAM(S): 80 TABLET, CHEWABLE ORAL at 21:15

## 2023-09-02 RX ADMIN — Medication 400 MILLIGRAM(S): at 00:21

## 2023-09-02 RX ADMIN — MORPHINE SULFATE 2 MILLIGRAM(S): 50 CAPSULE, EXTENDED RELEASE ORAL at 01:54

## 2023-09-02 RX ADMIN — SENNA PLUS 2 TABLET(S): 8.6 TABLET ORAL at 21:17

## 2023-09-02 RX ADMIN — LIDOCAINE 1 PATCH: 4 CREAM TOPICAL at 07:00

## 2023-09-02 RX ADMIN — HEPARIN SODIUM 5000 UNIT(S): 5000 INJECTION INTRAVENOUS; SUBCUTANEOUS at 21:16

## 2023-09-02 NOTE — PROVIDER CONTACT NOTE (OTHER) - SITUATION
Pt c/o severe abdominal pain & constipation.
Pt c/o worsening abdominal pain.
Pt c/o gas pain & constipation.

## 2023-09-02 NOTE — PROGRESS NOTE ADULT - SUBJECTIVE AND OBJECTIVE BOX
UROLOGY PA PROGRESS NOTE:     Subjective:  s/p L URS, tumor ablation, left stent, TURBT yesterday. stayed 2/2 post op pain. this am still complaining of generalized abdominal pain which is not improving. low urine output overnight, improved after 500cc bolus.   denies any flatus, last BM was before surgery. +belching. c/o nausea, no vomiting.     Objective:  Vital signs  T(F): , Max: 98.1 (09-01-23 @ 10:37)  HR: 80 (09-02-23 @ 06:06)  BP: 150/88 (09-02-23 @ 06:06)  SpO2: 96% (09-02-23 @ 06:06)  Wt(kg): --    Output     09-01 @ 07:01  -  09-02 @ 07:00  --------------------------------------------------------  IN: 3610 mL / OUT: 960 mL / NET: 2650 mL    michel 600cc    Physical Exam:  Gen: NAD  Abd: soft, diffuse TTP  : +michel draining tea colored urine     Labs:  09-02  17.63 / 41.7  /2.95   09-01  14.26 / 42.5  /2.23                           12.8   17.63 )-----------( 231      ( 02 Sep 2023 02:42 )             41.7     09-02    133<L>  |  101  |  31<H>  ----------------------------<  125<H>  4.9   |  15<L>  |  2.95<H>    Ca    8.3<L>      02 Sep 2023 02:42        Urinalysis Basic - ( 02 Sep 2023 02:42 )    Color: x / Appearance: x / SG: x / pH: x  Gluc: 125 mg/dL / Ketone: x  / Bili: x / Urobili: x   Blood: x / Protein: x / Nitrite: x   Leuk Esterase: x / RBC: x / WBC x   Sq Epi: x / Non Sq Epi: x / Bacteria: x        Urine Cx:

## 2023-09-02 NOTE — PROVIDER CONTACT NOTE (OTHER) - ASSESSMENT
Pt c/o worsening abdominal pain. States "I feel like I'm going to explode. Can't you see? This pain won't let me sleep." Pt still c/o L Flank pain. 500 cc bolus of NS given. LR continued @ 125mL/hr. Pt c/o Gil "not working," stated "This isn't working. This stent isn't working. Look how bloated I am."

## 2023-09-02 NOTE — PROVIDER CONTACT NOTE (OTHER) - RECOMMENDATIONS
Notify provider. Come to bedside to assess patient?
Pt encouraged to increase PO intake.
Notify provider. Possible Senna or Miralax order? Possible Simethicone order for the gas pain?

## 2023-09-02 NOTE — PROVIDER CONTACT NOTE (OTHER) - ACTION/TREATMENT ORDERED:
As per PA, Carlos, will increase pain medication. Urgent X-ray of L Kidney ordered. Flomax ordered. As per PA, Carlos, will increase pain medication. Urgent X-ray of L Kidney ordered. Flomax ordered. STAT labs ordered. No further interventions were ordered at this time.

## 2023-09-02 NOTE — PROVIDER CONTACT NOTE (OTHER) - REASON
Pt c/o gas pain & constipation.
Pt c/o worsening abdominal pain.
Pt c/o severe abdominal pain & constipation.

## 2023-09-02 NOTE — PROGRESS NOTE ADULT - ASSESSMENT
A/P: 73y Male s/p Left Ureteroscopy and ablation, TURBT, hypertensive post operatively , c/o pain since post op   - am labs   - Augmentin  - Keep michel  - CT abd/pelvis   - Strict I&O's  - Analgesia and antiemetics as needed  - stool softener, protonix

## 2023-09-03 ENCOUNTER — TRANSCRIPTION ENCOUNTER (OUTPATIENT)
Age: 73
End: 2023-09-03

## 2023-09-03 LAB
ANION GAP SERPL CALC-SCNC: 14 MMOL/L — SIGNIFICANT CHANGE UP (ref 5–17)
BUN SERPL-MCNC: 26 MG/DL — HIGH (ref 7–23)
CALCIUM SERPL-MCNC: 8.9 MG/DL — SIGNIFICANT CHANGE UP (ref 8.4–10.5)
CHLORIDE SERPL-SCNC: 102 MMOL/L — SIGNIFICANT CHANGE UP (ref 96–108)
CO2 SERPL-SCNC: 20 MMOL/L — LOW (ref 22–31)
CREAT SERPL-MCNC: 2.35 MG/DL — HIGH (ref 0.5–1.3)
EGFR: 28 ML/MIN/1.73M2 — LOW
GLUCOSE SERPL-MCNC: 109 MG/DL — HIGH (ref 70–99)
HCT VFR BLD CALC: 38.8 % — LOW (ref 39–50)
HGB BLD-MCNC: 12.4 G/DL — LOW (ref 13–17)
MCHC RBC-ENTMCNC: 31.7 PG — SIGNIFICANT CHANGE UP (ref 27–34)
MCHC RBC-ENTMCNC: 32 GM/DL — SIGNIFICANT CHANGE UP (ref 32–36)
MCV RBC AUTO: 99.2 FL — SIGNIFICANT CHANGE UP (ref 80–100)
NRBC # BLD: 0 /100 WBCS — SIGNIFICANT CHANGE UP (ref 0–0)
PLATELET # BLD AUTO: 152 K/UL — SIGNIFICANT CHANGE UP (ref 150–400)
POTASSIUM SERPL-MCNC: 4.3 MMOL/L — SIGNIFICANT CHANGE UP (ref 3.5–5.3)
POTASSIUM SERPL-SCNC: 4.3 MMOL/L — SIGNIFICANT CHANGE UP (ref 3.5–5.3)
RBC # BLD: 3.91 M/UL — LOW (ref 4.2–5.8)
RBC # FLD: 12.9 % — SIGNIFICANT CHANGE UP (ref 10.3–14.5)
SODIUM SERPL-SCNC: 136 MMOL/L — SIGNIFICANT CHANGE UP (ref 135–145)
WBC # BLD: 13.16 K/UL — HIGH (ref 3.8–10.5)
WBC # FLD AUTO: 13.16 K/UL — HIGH (ref 3.8–10.5)

## 2023-09-03 RX ORDER — TAMSULOSIN HYDROCHLORIDE 0.4 MG/1
1 CAPSULE ORAL
Qty: 30 | Refills: 0
Start: 2023-09-03 | End: 2023-10-02

## 2023-09-03 RX ORDER — OXYCODONE HYDROCHLORIDE 5 MG/1
1 TABLET ORAL
Qty: 12 | Refills: 0
Start: 2023-09-03 | End: 2023-09-05

## 2023-09-03 RX ADMIN — SODIUM CHLORIDE 3 MILLILITER(S): 9 INJECTION INTRAMUSCULAR; INTRAVENOUS; SUBCUTANEOUS at 13:05

## 2023-09-03 RX ADMIN — OXYCODONE HYDROCHLORIDE 5 MILLIGRAM(S): 5 TABLET ORAL at 18:34

## 2023-09-03 RX ADMIN — PANTOPRAZOLE SODIUM 40 MILLIGRAM(S): 20 TABLET, DELAYED RELEASE ORAL at 05:10

## 2023-09-03 RX ADMIN — HEPARIN SODIUM 5000 UNIT(S): 5000 INJECTION INTRAVENOUS; SUBCUTANEOUS at 22:23

## 2023-09-03 RX ADMIN — TAMSULOSIN HYDROCHLORIDE 0.4 MILLIGRAM(S): 0.4 CAPSULE ORAL at 22:23

## 2023-09-03 RX ADMIN — OXYCODONE HYDROCHLORIDE 10 MILLIGRAM(S): 5 TABLET ORAL at 03:00

## 2023-09-03 RX ADMIN — SODIUM CHLORIDE 75 MILLILITER(S): 9 INJECTION, SOLUTION INTRAVENOUS at 22:25

## 2023-09-03 RX ADMIN — SODIUM CHLORIDE 3 MILLILITER(S): 9 INJECTION INTRAMUSCULAR; INTRAVENOUS; SUBCUTANEOUS at 05:28

## 2023-09-03 RX ADMIN — SODIUM CHLORIDE 3 MILLILITER(S): 9 INJECTION INTRAMUSCULAR; INTRAVENOUS; SUBCUTANEOUS at 22:41

## 2023-09-03 RX ADMIN — OXYCODONE HYDROCHLORIDE 10 MILLIGRAM(S): 5 TABLET ORAL at 01:59

## 2023-09-03 RX ADMIN — POLYETHYLENE GLYCOL 3350 17 GRAM(S): 17 POWDER, FOR SOLUTION ORAL at 18:08

## 2023-09-03 RX ADMIN — HEPARIN SODIUM 5000 UNIT(S): 5000 INJECTION INTRAVENOUS; SUBCUTANEOUS at 14:19

## 2023-09-03 RX ADMIN — CEFTRIAXONE 100 MILLIGRAM(S): 500 INJECTION, POWDER, FOR SOLUTION INTRAMUSCULAR; INTRAVENOUS at 15:06

## 2023-09-03 RX ADMIN — HEPARIN SODIUM 5000 UNIT(S): 5000 INJECTION INTRAVENOUS; SUBCUTANEOUS at 05:09

## 2023-09-03 RX ADMIN — OXYCODONE HYDROCHLORIDE 5 MILLIGRAM(S): 5 TABLET ORAL at 18:07

## 2023-09-03 RX ADMIN — SENNA PLUS 2 TABLET(S): 8.6 TABLET ORAL at 22:24

## 2023-09-03 RX ADMIN — FINASTERIDE 5 MILLIGRAM(S): 5 TABLET, FILM COATED ORAL at 14:19

## 2023-09-03 RX ADMIN — Medication 10 MILLIGRAM(S): at 10:05

## 2023-09-03 NOTE — PROGRESS NOTE ADULT - SUBJECTIVE AND OBJECTIVE BOX
UROLOGY PA PROGRESS NOTE:     Subjective:  no acute events overnight. abdominal pain improved with pain meds. no flatus or BM since pre-surgery. low appetite. mild nausea no vomiting     Objective:  Vital signs  T(F): , Max: 99.6 (09-02-23 @ 17:10)  HR: 86 (09-03-23 @ 05:14)  BP: 138/79 (09-03-23 @ 05:14)  SpO2: 93% (09-03-23 @ 05:14)  Wt(kg): --    Output     09-02 @ 07:01  -  09-03 @ 07:00  --------------------------------------------------------  IN: 3200 mL / OUT: 3350 mL / NET: -150 mL    michel 2250     Physical Exam:  Gen: NAD  Abd: soft, NT/ND  : +michel draining clear     Labs:  09-03  13.16 / 38.8  /2.35   09-02  17.92 / 40.8  /3.09                           12.4   13.16 )-----------( 152      ( 03 Sep 2023 06:46 )             38.8     09-03    136  |  102  |  26<H>  ----------------------------<  109<H>  4.3   |  20<L>  |  2.35<H>    Ca    8.9      03 Sep 2023 06:46        Urinalysis Basic - ( 03 Sep 2023 06:46 )    Color: x / Appearance: x / SG: x / pH: x  Gluc: 109 mg/dL / Ketone: x  / Bili: x / Urobili: x   Blood: x / Protein: x / Nitrite: x   Leuk Esterase: x / RBC: x / WBC x   Sq Epi: x / Non Sq Epi: x / Bacteria: x        Urine Cx: p

## 2023-09-03 NOTE — DISCHARGE NOTE PROVIDER - CARE PROVIDER_API CALL
Mohamud Starkey  Urology  [address] [place] [id number]  Phone: [phone]  Fax: [phone]  Follow Up Time: 1 week

## 2023-09-03 NOTE — DISCHARGE NOTE PROVIDER - NSDCCPCAREPLAN_GEN_ALL_CORE_FT
PRINCIPAL DISCHARGE DIAGNOSIS  Diagnosis: Malignant neoplasm of unspecified kidney, except renal pelvis  Assessment and Plan of Treatment: Call the office if you have fever greater than 101, difficulty urinating, pain not relieved with pain medication, nausea/vomiting.   michel x1week.   complete entire course of antibiotic as prescribed.   You may have intermittent pink tinged urine and slight flank pain when you urinate.  This is normal and due to the stent in your ureter.   If your urine becomes bright red or with clots, please call the office. recommend colace/senna while taking narcotic pain medication to avoid constipation. Do not drive while taking narcotic pain medication

## 2023-09-03 NOTE — PROGRESS NOTE ADULT - ASSESSMENT
A/P: 73y Male s/p Left Ureteroscopy and ablation, TURBT, hypertensive post operatively , c/o pain since post op   - am labs   - CTX  - f/u urine cx  - Keep michel  - Strict I&O's  - Analgesia and antiemetics as needed  - dulcolax supp  - d/c planning

## 2023-09-03 NOTE — DISCHARGE NOTE PROVIDER - NSDCFUSCHEDAPPT_GEN_ALL_CORE_FT
Kaila Spence  [facility]  F[address]ILY[address]  Scheduled Appointment: 09/12/2023

## 2023-09-03 NOTE — DISCHARGE NOTE PROVIDER - HOSPITAL COURSE
73 year old male initially planned for Robotic Assisted Laparoscopic Left Partial Ureterotomy, but due to large tumor burden, ended up doing a L URS, tumor ablation, stent and TURBT. post op with pain so was admitted 9/1.   POD#1 CT showed Air and free fluid, likely postoperative, within the peritoneum and   retroperitoneum, Moderate left hydronephrosis not significantly changed , Left ureteral stent in place.  urine cx sent, started on CTX.   POD#2- pain better controlled. plan for home with michel x1 week. augmentin sent to pharmacy.      73 year old male initially planned for Robotic Assisted Laparoscopic Left Partial Ureterotomy, but due to large tumor burden, ended up doing a L URS, tumor ablation, stent and TURBT. post op with pain so was admitted 9/1.   POD#1 CT showed Air and free fluid, likely postoperative, within the peritoneum and   retroperitoneum, Moderate left hydronephrosis not significantly changed , Left ureteral stent in place.  urine cx sent, started on CTX. POD#2- pain better controlled. plan for home with michel x1 week. Augmentin sent to pharmacy.   Upon discharge labs and vitals are stable, tolerating diet, pain controlled, ambulating and having GI function. Pt will follow up with Dr. Starkey next week.

## 2023-09-03 NOTE — DISCHARGE NOTE PROVIDER - NSDCMRMEDTOKEN_GEN_ALL_CORE_FT
amoxicillin-clavulanate 875 mg-125 mg oral tablet: 1 tab(s) orally every 12 hours  finasteride 5 mg oral tablet: 1 tab(s) orally once a day  levothyroxine 25 mcg (0.025 mg) oral tablet: 1 tab(s) orally once a day  Multiple Vitamins oral capsule: 1 orally  oxyCODONE 5 mg oral tablet: 1 tab(s) orally every 4 hours as needed for Moderate Pain (4 - 6) MDD: 6tabs  tamsulosin 0.4 mg oral capsule: 1 cap(s) orally once a day (at bedtime)  vitamin E 100 intl units oral capsule: 1 orally

## 2023-09-03 NOTE — DISCHARGE NOTE PROVIDER - NSDCCPTREATMENT_GEN_ALL_CORE_FT
PRINCIPAL PROCEDURE  Procedure: Cystoureteroscopy with biopsy of bladder  Findings and Treatment:       SECONDARY PROCEDURE  Procedure: Ureterotomy  Findings and Treatment:

## 2023-09-04 ENCOUNTER — TRANSCRIPTION ENCOUNTER (OUTPATIENT)
Age: 73
End: 2023-09-04

## 2023-09-04 VITALS
DIASTOLIC BLOOD PRESSURE: 80 MMHG | TEMPERATURE: 98 F | SYSTOLIC BLOOD PRESSURE: 138 MMHG | RESPIRATION RATE: 18 BRPM | OXYGEN SATURATION: 95 % | HEART RATE: 73 BPM

## 2023-09-04 LAB
CULTURE RESULTS: NO GROWTH — SIGNIFICANT CHANGE UP
SPECIMEN SOURCE: SIGNIFICANT CHANGE UP

## 2023-09-04 PROCEDURE — 74176 CT ABD & PELVIS W/O CONTRAST: CPT

## 2023-09-04 PROCEDURE — 85027 COMPLETE CBC AUTOMATED: CPT

## 2023-09-04 PROCEDURE — 80048 BASIC METABOLIC PNL TOTAL CA: CPT

## 2023-09-04 PROCEDURE — C1758: CPT

## 2023-09-04 PROCEDURE — 86923 COMPATIBILITY TEST ELECTRIC: CPT

## 2023-09-04 PROCEDURE — 87086 URINE CULTURE/COLONY COUNT: CPT

## 2023-09-04 PROCEDURE — G0416: CPT

## 2023-09-04 PROCEDURE — 36415 COLL VENOUS BLD VENIPUNCTURE: CPT

## 2023-09-04 PROCEDURE — C1889: CPT

## 2023-09-04 PROCEDURE — 74018 RADEX ABDOMEN 1 VIEW: CPT

## 2023-09-04 PROCEDURE — S2900: CPT

## 2023-09-04 PROCEDURE — C9399: CPT

## 2023-09-04 PROCEDURE — C2625: CPT

## 2023-09-04 PROCEDURE — C1769: CPT

## 2023-09-04 PROCEDURE — 88305 TISSUE EXAM BY PATHOLOGIST: CPT

## 2023-09-04 PROCEDURE — 76000 FLUOROSCOPY <1 HR PHYS/QHP: CPT

## 2023-09-04 RX ADMIN — PANTOPRAZOLE SODIUM 40 MILLIGRAM(S): 20 TABLET, DELAYED RELEASE ORAL at 05:35

## 2023-09-04 RX ADMIN — SODIUM CHLORIDE 75 MILLILITER(S): 9 INJECTION, SOLUTION INTRAVENOUS at 05:37

## 2023-09-04 RX ADMIN — OXYCODONE HYDROCHLORIDE 5 MILLIGRAM(S): 5 TABLET ORAL at 12:31

## 2023-09-04 RX ADMIN — OXYCODONE HYDROCHLORIDE 5 MILLIGRAM(S): 5 TABLET ORAL at 02:07

## 2023-09-04 RX ADMIN — SODIUM CHLORIDE 3 MILLILITER(S): 9 INJECTION INTRAMUSCULAR; INTRAVENOUS; SUBCUTANEOUS at 05:46

## 2023-09-04 RX ADMIN — CEFTRIAXONE 100 MILLIGRAM(S): 500 INJECTION, POWDER, FOR SOLUTION INTRAMUSCULAR; INTRAVENOUS at 15:24

## 2023-09-04 RX ADMIN — HEPARIN SODIUM 5000 UNIT(S): 5000 INJECTION INTRAVENOUS; SUBCUTANEOUS at 15:24

## 2023-09-04 RX ADMIN — SODIUM CHLORIDE 3 MILLILITER(S): 9 INJECTION INTRAMUSCULAR; INTRAVENOUS; SUBCUTANEOUS at 14:21

## 2023-09-04 RX ADMIN — OXYCODONE HYDROCHLORIDE 5 MILLIGRAM(S): 5 TABLET ORAL at 01:37

## 2023-09-04 RX ADMIN — HEPARIN SODIUM 5000 UNIT(S): 5000 INJECTION INTRAVENOUS; SUBCUTANEOUS at 05:34

## 2023-09-04 RX ADMIN — FINASTERIDE 5 MILLIGRAM(S): 5 TABLET, FILM COATED ORAL at 12:31

## 2023-09-04 NOTE — DISCHARGE NOTE NURSING/CASE MANAGEMENT/SOCIAL WORK - PATIENT PORTAL LINK FT
You can access the FollowMyHealth Patient Portal offered by [facility] by registering at the following website: [email or web address]. By joining [facility]’s FollowMyHealth portal, you will also be able to view your health information using other applications (apps) compatible with our system.

## 2023-09-04 NOTE — PROGRESS NOTE ADULT - ASSESSMENT
A/P: 73y Male s/p Left Ureteroscopy and ablation, TURBT, hypertensive post operatively , c/o pain since post op     - am labs   - CTX  - f/u urine cx  - Keep michel  - Strict I&O's  - Analgesia and antiemetics as needed  - dulcolax supp  - Michel x 1 week   - d/c planning  A/P: 73y Male s/p Left Ureteroscopy and ablation, TURBT, hypertensive post operatively     - Keep michel  - Analgesia as needed  - Continue bowel regimen   - Michel x 1 week   - D/c planning for today

## 2023-09-04 NOTE — PROGRESS NOTE ADULT - SUBJECTIVE AND OBJECTIVE BOX
UROLOGY DAILY PROGRESS NOTE:     Subjective: Patient seen and examined at bedside. No overnight events.     Objective:  Vital signs  T(F): , Max: 98.9 (09-03-23 @ 09:17)  HR: 79 (09-04-23 @ 06:12)  BP: 136/78 (09-04-23 @ 06:12)  SpO2: 93% (09-04-23 @ 06:12)  Wt(kg): --    I&O's Summary    02 Sep 2023 07:01  -  03 Sep 2023 07:00  --------------------------------------------------------  IN: 3200 mL / OUT: 3350 mL / NET: -150 mL    03 Sep 2023 07:01  -  04 Sep 2023 06:57  --------------------------------------------------------  IN: 2320 mL / OUT: 3950 mL / NET: -1630 mL    Physical Exam:  Gen: NAD  Abd: soft, ND, diffuse TTP  : +michel draining tea colored urine     Labs:  09-03  13.16 / 38.8  /2.35   09-02  17.92 / 40.8  /3.09                           12.4   13.16 )-----------( 152      ( 03 Sep 2023 06:46 )             38.8     09-03    136  |  102  |  26<H>  ----------------------------<  109<H>  4.3   |  20<L>  |  2.35<H>    Ca    8.9      03 Sep 2023 06:46     UROLOGY DAILY PROGRESS NOTE:     Subjective: Patient seen and examined at bedside. No overnight events. Pain controlled.     Objective:  Vital signs  T(F): , Max: 98.9 (09-03-23 @ 09:17)  HR: 79 (09-04-23 @ 06:12)  BP: 136/78 (09-04-23 @ 06:12)  SpO2: 93% (09-04-23 @ 06:12)  Wt(kg): --    I&O's Summary    02 Sep 2023 07:01  -  03 Sep 2023 07:00  --------------------------------------------------------  IN: 3200 mL / OUT: 3350 mL / NET: -150 mL    03 Sep 2023 07:01  -  04 Sep 2023 06:57  --------------------------------------------------------  IN: 2320 mL / OUT: 3950 mL / NET: -1630 mL    Physical Exam:  Gen: NAD  Abd: soft, ND, diffuse TTP  : +michel draining yellow colored urine     Labs:  09-03  13.16 / 38.8  /2.35   09-02  17.92 / 40.8  /3.09                           12.4   13.16 )-----------( 152      ( 03 Sep 2023 06:46 )             38.8     09-03    136  |  102  |  26<H>  ----------------------------<  109<H>  4.3   |  20<L>  |  2.35<H>    Ca    8.9      03 Sep 2023 06:46

## 2023-09-04 NOTE — DISCHARGE NOTE NURSING/CASE MANAGEMENT/SOCIAL WORK - NSDCPEFALRISK_GEN_ALL_CORE
For information on Fall & Injury Prevention, visit: [email or web address] OR  [email or web address] OR  https://www.cdc.gov/steadi/patient.html

## 2023-09-06 ENCOUNTER — NON-APPOINTMENT (OUTPATIENT)
Age: 73
End: 2023-09-06

## 2023-09-07 LAB — SURGICAL PATHOLOGY STUDY: SIGNIFICANT CHANGE UP

## 2023-09-07 NOTE — DISCHARGE NOTE NURSING/CASE MANAGEMENT/SOCIAL WORK - NSTRANSFERBELONGINGSDISPO_GEN_A_NUR
"Chief Complaint  Diabetes (medtronic)    Subjective          Jovan Hardwick presents to [facility] ENDOCR[facility]OLOGY  Diabetes        In office visit       Primary provider Dr. Toure    70-year-old male presents for follow-up    Reason diabetes mellitus type 2    Timing constant    Quality great control    Severity is high    Complications neuropathy, CKD      Current diabetes regimen    Insulin through insulin pump    Has Medtronic 770    Current glucose monitoring    Checks 4 times daily      Medtronic Guardian sensor      See below     Review of Systems - General ROS: negative          Objective   Vital Signs:   /74   Pulse 83   Ht 185.4 cm (73\")   Wt 115 kg (252 lb 9.6 oz)   SpO2 95%   BMI 33.33 kg/m²     Physical Exam  Constitutional:       Appearance: Normal appearance.   Cardiovascular:      Rate and Rhythm: Regular rhythm.      Heart sounds: Normal heart sounds.   Pulmonary:      Breath sounds: Normal breath sounds.   Musculoskeletal:      Cervical back: Normal range of motion.   Neurological:      Mental Status: He is alert.      Result Review :   The following data was reviewed by: KAHLIL Ovalles on 05/03/2022:  Common labs          6/16/2023    11:27 7/24/2023    15:01 8/23/2023    15:19   Common Labs   Glucose 112      144       BUN 38      43       Creatinine 1.89      2.50       Sodium 141      141       Potassium 4.6      4.7       Chloride 106      106       Calcium 8.8      9.5       PSA  <0.10           Details          This result is from an external source.                       Assessment and Plan    Diagnoses and all orders for this visit:    1. Diabetic polyneuropathy associated with type 2 diabetes mellitus (Primary)    2. Mixed hyperlipidemia    3. Essential hypertension    4. Stage 3b chronic kidney disease           Glycemic Management     Diabetes mellitus type 2       Lab Results   Component Value Date    HGBA1C 5.6 05/25/2023 "         Medtronic 770 G     With sensor           Ambulatory Glucose Profile Report    Days Analyzed : 2 week period ending on 09/07/23      Continuous Glucose Monitory Device:  medtronic     0% very high target range  0% high target range  100% in target range  0% below target range  GMI 5.7 %  Average glucose 101 mg/dl    Interpretation : Diabetes Type 2 Controlled         he is in target 100% of the time       He does have lows after bolusing for meals -- change to carb ratio    Basal     MN - 1.00    Carb ratio    6.5 --- change to 10     Correction     30          Medtronic sensor  has allowed the patient to minimize hypoglycemia as alarms have predicted and avoided them     he also uses the arrows on the sensor as follows:     If arrow is horizontal , he uses the predicted bolus     If the arrow is slanted up or down-- he increase or decrease by 0.5 units     If the arrow is vertical up or down - he increases or decreases by 1 unit                        Previous regimen          Levemir 40 units and 45 units pm          Humalog  1 unit per every 5 grams of CHO               Lipid Management             hyperlipidemia           Taking simvastatin 80 mg at night     Taking TriCor 160 mg at night     Taking Zetia 10 mg daily     July 2021     LDL - 79           Blood Pressure Management:t      Hypertension      follows with nephrology       Taking metolazone 2.5 mg three times weekly      Taking  lasix 40 mg daily                     Microvascular Complication Monitoring      CKD stage III, nephrotic sx      Follows with nephrology        Neuropathy      Lyrica -- caused side effects      Gabapentin - caused side effects      On  norco for pain -- prescribed by primary         No cuts or sores on feet       last eye exam -- July . 2021 , no DR --scheduled for next month               Other Diabetes Related Aspects      Lab Results   Component Value Date    TSH 2.300 08/29/2018             Lupus      Taking  prednisone 5 mg other every day              preventive care     Smoker        Jovan Hardwick  reports that he has been smoking. He has never used smokeless tobacco.. I have educated him on the risk of diseases from using tobacco products such as cancer, COPD and heart disease.     I advised him to quit and he is not willing to quit.    I spent 3  minutes counseling the patient.                        Follow Up   No follow-ups on file.  Patient was given instructions and counseling regarding his condition or for health maintenance advice. Please see specific information pulled into the AVS if appropriate.         This document has been electronically signed by KAHLIL Ovalles on September 7, 2023 13:52 CDT.       given to family/with patient

## 2023-09-10 ENCOUNTER — RX RENEWAL (OUTPATIENT)
Age: 73
End: 2023-09-10

## 2023-09-12 ENCOUNTER — APPOINTMENT (OUTPATIENT)
Dept: FAMILY MEDICINE | Facility: CLINIC | Age: 73
End: 2023-09-12
Payer: MEDICARE

## 2023-09-12 ENCOUNTER — APPOINTMENT (OUTPATIENT)
Dept: UROLOGY | Facility: CLINIC | Age: 73
End: 2023-09-12
Payer: MEDICARE

## 2023-09-12 VITALS
WEIGHT: 236 LBS | RESPIRATION RATE: 20 BRPM | SYSTOLIC BLOOD PRESSURE: 140 MMHG | TEMPERATURE: 97.3 F | HEIGHT: 75 IN | OXYGEN SATURATION: 98 % | BODY MASS INDEX: 29.34 KG/M2 | HEART RATE: 93 BPM | DIASTOLIC BLOOD PRESSURE: 74 MMHG

## 2023-09-12 DIAGNOSIS — Z09 ENCOUNTER FOR FOLLOW-UP EXAMINATION AFTER COMPLETED TREATMENT FOR CONDITIONS OTHER THAN MALIGNANT NEOPLASM: ICD-10-CM

## 2023-09-12 PROCEDURE — 99214 OFFICE O/P EST MOD 30 MIN: CPT

## 2023-09-12 PROCEDURE — 36415 COLL VENOUS BLD VENIPUNCTURE: CPT

## 2023-09-12 PROCEDURE — 99496 TRANSJ CARE MGMT HIGH F2F 7D: CPT | Mod: 25

## 2023-09-13 LAB
ALBUMIN SERPL ELPH-MCNC: 4 G/DL
ALP BLD-CCNC: 70 U/L
ALT SERPL-CCNC: 8 U/L
ANION GAP SERPL CALC-SCNC: 13 MMOL/L
AST SERPL-CCNC: 20 U/L
BASOPHILS # BLD AUTO: 0.06 K/UL
BASOPHILS NFR BLD AUTO: 0.7 %
BILIRUB SERPL-MCNC: 0.2 MG/DL
BUN SERPL-MCNC: 33 MG/DL
CALCIUM SERPL-MCNC: 9 MG/DL
CHLORIDE SERPL-SCNC: 107 MMOL/L
CO2 SERPL-SCNC: 20 MMOL/L
CREAT SERPL-MCNC: 1.7 MG/DL
EGFR: 42 ML/MIN/1.73M2
EOSINOPHIL # BLD AUTO: 0.24 K/UL
EOSINOPHIL NFR BLD AUTO: 3 %
GLUCOSE SERPL-MCNC: 133 MG/DL
HCT VFR BLD CALC: 38.3 %
HGB BLD-MCNC: 12.4 G/DL
IMM GRANULOCYTES NFR BLD AUTO: 0.6 %
LYMPHOCYTES # BLD AUTO: 1.72 K/UL
LYMPHOCYTES NFR BLD AUTO: 21.2 %
MAN DIFF?: NORMAL
MCHC RBC-ENTMCNC: 32 PG
MCHC RBC-ENTMCNC: 32.4 GM/DL
MCV RBC AUTO: 99 FL
MONOCYTES # BLD AUTO: 0.73 K/UL
MONOCYTES NFR BLD AUTO: 9 %
NEUTROPHILS # BLD AUTO: 5.32 K/UL
NEUTROPHILS NFR BLD AUTO: 65.5 %
PLATELET # BLD AUTO: 259 K/UL
POTASSIUM SERPL-SCNC: 4.3 MMOL/L
PROT SERPL-MCNC: 7 G/DL
RBC # BLD: 3.87 M/UL
RBC # FLD: 12.2 %
SODIUM SERPL-SCNC: 140 MMOL/L
WBC # FLD AUTO: 8.12 K/UL

## 2023-09-27 ENCOUNTER — OUTPATIENT (OUTPATIENT)
Dept: OUTPATIENT SERVICES | Facility: HOSPITAL | Age: 73
LOS: 1 days | Discharge: HOME | End: 2023-09-27

## 2023-09-27 DIAGNOSIS — Z98.890 OTHER SPECIFIED POSTPROCEDURAL STATES: Chronic | ICD-10-CM

## 2023-09-27 DIAGNOSIS — C80.1 MALIGNANT (PRIMARY) NEOPLASM, UNSPECIFIED: ICD-10-CM

## 2023-09-27 DIAGNOSIS — Z90.5 ACQUIRED ABSENCE OF KIDNEY: Chronic | ICD-10-CM

## 2023-09-27 DIAGNOSIS — Z90.2 ACQUIRED ABSENCE OF LUNG [PART OF]: Chronic | ICD-10-CM

## 2023-10-02 ENCOUNTER — RESULT REVIEW (OUTPATIENT)
Age: 73
End: 2023-10-02

## 2023-10-02 ENCOUNTER — NON-APPOINTMENT (OUTPATIENT)
Age: 73
End: 2023-10-02

## 2023-10-02 ENCOUNTER — APPOINTMENT (OUTPATIENT)
Dept: HEMATOLOGY ONCOLOGY | Facility: CLINIC | Age: 73
End: 2023-10-02
Payer: MEDICARE

## 2023-10-02 VITALS
SYSTOLIC BLOOD PRESSURE: 146 MMHG | TEMPERATURE: 96.2 F | BODY MASS INDEX: 28.78 KG/M2 | HEART RATE: 80 BPM | RESPIRATION RATE: 16 BRPM | DIASTOLIC BLOOD PRESSURE: 89 MMHG | HEIGHT: 75 IN | WEIGHT: 231.46 LBS | OXYGEN SATURATION: 96 %

## 2023-10-02 LAB
BASOPHILS # BLD AUTO: 0.03 K/UL — SIGNIFICANT CHANGE UP (ref 0–0.2)
BASOPHILS NFR BLD AUTO: 0.4 % — SIGNIFICANT CHANGE UP (ref 0–2)
EOSINOPHIL # BLD AUTO: 0.06 K/UL — SIGNIFICANT CHANGE UP (ref 0–0.5)
EOSINOPHIL NFR BLD AUTO: 0.7 % — SIGNIFICANT CHANGE UP (ref 0–6)
HCT VFR BLD CALC: 39.5 % — SIGNIFICANT CHANGE UP (ref 39–50)
HGB BLD-MCNC: 13.2 G/DL — SIGNIFICANT CHANGE UP (ref 13–17)
IMM GRANULOCYTES NFR BLD AUTO: 0.5 % — SIGNIFICANT CHANGE UP (ref 0–0.9)
LYMPHOCYTES # BLD AUTO: 1.19 K/UL — SIGNIFICANT CHANGE UP (ref 1–3.3)
LYMPHOCYTES # BLD AUTO: 14.6 % — SIGNIFICANT CHANGE UP (ref 13–44)
MCHC RBC-ENTMCNC: 31.1 PG — SIGNIFICANT CHANGE UP (ref 27–34)
MCHC RBC-ENTMCNC: 33.4 G/DL — SIGNIFICANT CHANGE UP (ref 32–36)
MCV RBC AUTO: 92.9 FL — SIGNIFICANT CHANGE UP (ref 80–100)
MONOCYTES # BLD AUTO: 0.81 K/UL — SIGNIFICANT CHANGE UP (ref 0–0.9)
MONOCYTES NFR BLD AUTO: 9.9 % — SIGNIFICANT CHANGE UP (ref 2–14)
NEUTROPHILS # BLD AUTO: 6.02 K/UL — SIGNIFICANT CHANGE UP (ref 1.8–7.4)
NEUTROPHILS NFR BLD AUTO: 73.9 % — SIGNIFICANT CHANGE UP (ref 43–77)
NRBC # BLD: 0 /100 WBCS — SIGNIFICANT CHANGE UP (ref 0–0)
PLATELET # BLD AUTO: 206 K/UL — SIGNIFICANT CHANGE UP (ref 150–400)
RBC # BLD: 4.25 M/UL — SIGNIFICANT CHANGE UP (ref 4.2–5.8)
RBC # FLD: 11.7 % — SIGNIFICANT CHANGE UP (ref 10.3–14.5)
WBC # BLD: 8.15 K/UL — SIGNIFICANT CHANGE UP (ref 3.8–10.5)
WBC # FLD AUTO: 8.15 K/UL — SIGNIFICANT CHANGE UP (ref 3.8–10.5)

## 2023-10-02 PROCEDURE — 99205 OFFICE O/P NEW HI 60 MIN: CPT

## 2023-10-02 RX ORDER — CHOLECALCIFEROL (VITAMIN D3) 1250 MCG
1.25 MG CAPSULE ORAL
Qty: 12 | Refills: 3 | Status: DISCONTINUED | COMMUNITY
Start: 2023-09-10 | End: 2023-10-02

## 2023-10-03 LAB
ALBUMIN SERPL ELPH-MCNC: 4.5 G/DL
ALP BLD-CCNC: 98 U/L
ALT SERPL-CCNC: 6 U/L
ANION GAP SERPL CALC-SCNC: 12 MMOL/L
APTT BLD: 31.1 SEC
AST SERPL-CCNC: 14 U/L
BILIRUB SERPL-MCNC: 0.4 MG/DL
BUN SERPL-MCNC: 23 MG/DL
CALCIUM SERPL-MCNC: 9.4 MG/DL
CHLORIDE SERPL-SCNC: 105 MMOL/L
CO2 SERPL-SCNC: 23 MMOL/L
CREAT SERPL-MCNC: 1.56 MG/DL
EGFR: 46 ML/MIN/1.73M2
GLUCOSE SERPL-MCNC: 99 MG/DL
HBV CORE IGG+IGM SER QL: NONREACTIVE
HBV CORE IGM SER QL: NONREACTIVE
HBV E AB SER QL: NONREACTIVE
HBV E AG SER QL: NONREACTIVE
HBV SURFACE AB SER QL: NONREACTIVE
HBV SURFACE AG SER QL: NONREACTIVE
HCV AB SER QL: NONREACTIVE
HCV S/CO RATIO: 0.21 S/CO
INR PPP: 0.98 RATIO
POTASSIUM SERPL-SCNC: 4.3 MMOL/L
PROT SERPL-MCNC: 7.5 G/DL
PT BLD: 11.1 SEC
SODIUM SERPL-SCNC: 140 MMOL/L

## 2023-10-11 ENCOUNTER — NON-APPOINTMENT (OUTPATIENT)
Age: 73
End: 2023-10-11

## 2023-10-13 ENCOUNTER — APPOINTMENT (OUTPATIENT)
Dept: CT IMAGING | Facility: IMAGING CENTER | Age: 73
End: 2023-10-13
Payer: MEDICARE

## 2023-10-13 ENCOUNTER — OUTPATIENT (OUTPATIENT)
Dept: OUTPATIENT SERVICES | Facility: HOSPITAL | Age: 73
LOS: 1 days | End: 2023-10-13
Payer: MEDICARE

## 2023-10-13 DIAGNOSIS — Z98.890 OTHER SPECIFIED POSTPROCEDURAL STATES: Chronic | ICD-10-CM

## 2023-10-13 DIAGNOSIS — C66.2 MALIGNANT NEOPLASM OF LEFT URETER: ICD-10-CM

## 2023-10-13 DIAGNOSIS — Z90.2 ACQUIRED ABSENCE OF LUNG [PART OF]: Chronic | ICD-10-CM

## 2023-10-13 DIAGNOSIS — Z90.5 ACQUIRED ABSENCE OF KIDNEY: Chronic | ICD-10-CM

## 2023-10-13 PROCEDURE — 71250 CT THORAX DX C-: CPT

## 2023-10-13 PROCEDURE — 71250 CT THORAX DX C-: CPT | Mod: 26

## 2023-10-16 ENCOUNTER — APPOINTMENT (OUTPATIENT)
Dept: INTERVENTIONAL RADIOLOGY/VASCULAR | Facility: CLINIC | Age: 73
End: 2023-10-16

## 2023-10-16 VITALS
HEIGHT: 75 IN | OXYGEN SATURATION: 97 % | SYSTOLIC BLOOD PRESSURE: 122 MMHG | RESPIRATION RATE: 17 BRPM | BODY MASS INDEX: 28.72 KG/M2 | HEART RATE: 73 BPM | DIASTOLIC BLOOD PRESSURE: 76 MMHG | WEIGHT: 231 LBS

## 2023-10-16 PROCEDURE — XXXXX: CPT | Mod: 1L

## 2023-10-17 ENCOUNTER — OUTPATIENT (OUTPATIENT)
Dept: OUTPATIENT SERVICES | Facility: HOSPITAL | Age: 73
LOS: 1 days | End: 2023-10-17
Payer: MEDICARE

## 2023-10-17 ENCOUNTER — RESULT REVIEW (OUTPATIENT)
Age: 73
End: 2023-10-17

## 2023-10-17 DIAGNOSIS — C66.2 MALIGNANT NEOPLASM OF LEFT URETER: ICD-10-CM

## 2023-10-17 DIAGNOSIS — Z90.2 ACQUIRED ABSENCE OF LUNG [PART OF]: Chronic | ICD-10-CM

## 2023-10-17 DIAGNOSIS — Z98.890 OTHER SPECIFIED POSTPROCEDURAL STATES: Chronic | ICD-10-CM

## 2023-10-17 DIAGNOSIS — Z90.5 ACQUIRED ABSENCE OF KIDNEY: Chronic | ICD-10-CM

## 2023-10-17 PROCEDURE — 76937 US GUIDE VASCULAR ACCESS: CPT | Mod: 26

## 2023-10-17 PROCEDURE — 77001 FLUOROGUIDE FOR VEIN DEVICE: CPT | Mod: 26,GC

## 2023-10-17 PROCEDURE — 36561 INSERT TUNNELED CV CATH: CPT

## 2023-10-17 RX ORDER — SODIUM CHLORIDE 9 MG/ML
10 INJECTION INTRAMUSCULAR; INTRAVENOUS; SUBCUTANEOUS ONCE
Refills: 0 | Status: DISCONTINUED | OUTPATIENT
Start: 2023-10-17 | End: 2023-11-01

## 2023-10-17 NOTE — PRE PROCEDURE NOTE - PRE PROCEDURE EVALUATION
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 He is here for a chest wall port placement prior to initiation of chemotherapy.     Allergies: No Known Allergies    Medications (Abx/Cardiac/Anticoagulation/Blood Products)      Data:    T(C): --  HR: --  BP: --  RR: --  SpO2: --    Exam  General: No acute distress  Chest: Non labored breathing  Abdomen: Non-distended  Extremities: No swelling, warm    Imaging: Reviewed. 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 He is here for a chest wall port placement prior to initiation of chemotherapy.   -Risks/Benefits/alternatives explained with the patient and/or healthcare proxy and witnessed informed consent obtained.

## 2023-10-18 ENCOUNTER — RESULT REVIEW (OUTPATIENT)
Age: 73
End: 2023-10-18

## 2023-10-18 ENCOUNTER — APPOINTMENT (OUTPATIENT)
Dept: INFUSION THERAPY | Facility: HOSPITAL | Age: 73
End: 2023-10-18

## 2023-10-18 ENCOUNTER — APPOINTMENT (OUTPATIENT)
Dept: HEMATOLOGY ONCOLOGY | Facility: CLINIC | Age: 73
End: 2023-10-18

## 2023-10-18 ENCOUNTER — NON-APPOINTMENT (OUTPATIENT)
Age: 73
End: 2023-10-18

## 2023-10-18 LAB
ALBUMIN SERPL ELPH-MCNC: 3.9 G/DL — SIGNIFICANT CHANGE UP (ref 3.3–5)
ALBUMIN SERPL ELPH-MCNC: 3.9 G/DL — SIGNIFICANT CHANGE UP (ref 3.3–5)
ALP SERPL-CCNC: 89 U/L — SIGNIFICANT CHANGE UP (ref 40–120)
ALP SERPL-CCNC: 89 U/L — SIGNIFICANT CHANGE UP (ref 40–120)
ALT FLD-CCNC: <5 U/L — LOW (ref 10–45)
ALT FLD-CCNC: <5 U/L — LOW (ref 10–45)
ANION GAP SERPL CALC-SCNC: 10 MMOL/L — SIGNIFICANT CHANGE UP (ref 5–17)
ANION GAP SERPL CALC-SCNC: 10 MMOL/L — SIGNIFICANT CHANGE UP (ref 5–17)
AST SERPL-CCNC: 21 U/L — SIGNIFICANT CHANGE UP (ref 10–40)
AST SERPL-CCNC: 21 U/L — SIGNIFICANT CHANGE UP (ref 10–40)
BASOPHILS # BLD AUTO: 0.03 K/UL — SIGNIFICANT CHANGE UP (ref 0–0.2)
BASOPHILS # BLD AUTO: 0.03 K/UL — SIGNIFICANT CHANGE UP (ref 0–0.2)
BASOPHILS NFR BLD AUTO: 0.4 % — SIGNIFICANT CHANGE UP (ref 0–2)
BASOPHILS NFR BLD AUTO: 0.4 % — SIGNIFICANT CHANGE UP (ref 0–2)
BILIRUB SERPL-MCNC: 0.4 MG/DL — SIGNIFICANT CHANGE UP (ref 0.2–1.2)
BILIRUB SERPL-MCNC: 0.4 MG/DL — SIGNIFICANT CHANGE UP (ref 0.2–1.2)
BUN SERPL-MCNC: 30 MG/DL — HIGH (ref 7–23)
BUN SERPL-MCNC: 30 MG/DL — HIGH (ref 7–23)
CALCIUM SERPL-MCNC: 8.7 MG/DL — SIGNIFICANT CHANGE UP (ref 8.4–10.5)
CALCIUM SERPL-MCNC: 8.7 MG/DL — SIGNIFICANT CHANGE UP (ref 8.4–10.5)
CHLORIDE SERPL-SCNC: 107 MMOL/L — SIGNIFICANT CHANGE UP (ref 96–108)
CHLORIDE SERPL-SCNC: 107 MMOL/L — SIGNIFICANT CHANGE UP (ref 96–108)
CO2 SERPL-SCNC: 24 MMOL/L — SIGNIFICANT CHANGE UP (ref 22–31)
CO2 SERPL-SCNC: 24 MMOL/L — SIGNIFICANT CHANGE UP (ref 22–31)
CREAT SERPL-MCNC: 1.55 MG/DL — HIGH (ref 0.5–1.3)
CREAT SERPL-MCNC: 1.55 MG/DL — HIGH (ref 0.5–1.3)
EGFR: 46 ML/MIN/1.73M2 — LOW
EGFR: 46 ML/MIN/1.73M2 — LOW
EOSINOPHIL # BLD AUTO: 0.09 K/UL — SIGNIFICANT CHANGE UP (ref 0–0.5)
EOSINOPHIL # BLD AUTO: 0.09 K/UL — SIGNIFICANT CHANGE UP (ref 0–0.5)
EOSINOPHIL NFR BLD AUTO: 1.3 % — SIGNIFICANT CHANGE UP (ref 0–6)
EOSINOPHIL NFR BLD AUTO: 1.3 % — SIGNIFICANT CHANGE UP (ref 0–6)
GLUCOSE SERPL-MCNC: 108 MG/DL — HIGH (ref 70–99)
GLUCOSE SERPL-MCNC: 108 MG/DL — HIGH (ref 70–99)
HCT VFR BLD CALC: 35.8 % — LOW (ref 39–50)
HCT VFR BLD CALC: 35.8 % — LOW (ref 39–50)
HGB BLD-MCNC: 12 G/DL — LOW (ref 13–17)
HGB BLD-MCNC: 12 G/DL — LOW (ref 13–17)
IMM GRANULOCYTES NFR BLD AUTO: 0.4 % — SIGNIFICANT CHANGE UP (ref 0–0.9)
IMM GRANULOCYTES NFR BLD AUTO: 0.4 % — SIGNIFICANT CHANGE UP (ref 0–0.9)
LYMPHOCYTES # BLD AUTO: 1.5 K/UL — SIGNIFICANT CHANGE UP (ref 1–3.3)
LYMPHOCYTES # BLD AUTO: 1.5 K/UL — SIGNIFICANT CHANGE UP (ref 1–3.3)
LYMPHOCYTES # BLD AUTO: 21 % — SIGNIFICANT CHANGE UP (ref 13–44)
LYMPHOCYTES # BLD AUTO: 21 % — SIGNIFICANT CHANGE UP (ref 13–44)
MCHC RBC-ENTMCNC: 30.8 PG — SIGNIFICANT CHANGE UP (ref 27–34)
MCHC RBC-ENTMCNC: 30.8 PG — SIGNIFICANT CHANGE UP (ref 27–34)
MCHC RBC-ENTMCNC: 33.5 G/DL — SIGNIFICANT CHANGE UP (ref 32–36)
MCHC RBC-ENTMCNC: 33.5 G/DL — SIGNIFICANT CHANGE UP (ref 32–36)
MCV RBC AUTO: 92 FL — SIGNIFICANT CHANGE UP (ref 80–100)
MCV RBC AUTO: 92 FL — SIGNIFICANT CHANGE UP (ref 80–100)
MONOCYTES # BLD AUTO: 0.81 K/UL — SIGNIFICANT CHANGE UP (ref 0–0.9)
MONOCYTES # BLD AUTO: 0.81 K/UL — SIGNIFICANT CHANGE UP (ref 0–0.9)
MONOCYTES NFR BLD AUTO: 11.4 % — SIGNIFICANT CHANGE UP (ref 2–14)
MONOCYTES NFR BLD AUTO: 11.4 % — SIGNIFICANT CHANGE UP (ref 2–14)
NEUTROPHILS # BLD AUTO: 4.67 K/UL — SIGNIFICANT CHANGE UP (ref 1.8–7.4)
NEUTROPHILS # BLD AUTO: 4.67 K/UL — SIGNIFICANT CHANGE UP (ref 1.8–7.4)
NEUTROPHILS NFR BLD AUTO: 65.5 % — SIGNIFICANT CHANGE UP (ref 43–77)
NEUTROPHILS NFR BLD AUTO: 65.5 % — SIGNIFICANT CHANGE UP (ref 43–77)
NRBC # BLD: 0 /100 WBCS — SIGNIFICANT CHANGE UP (ref 0–0)
NRBC # BLD: 0 /100 WBCS — SIGNIFICANT CHANGE UP (ref 0–0)
PLATELET # BLD AUTO: 179 K/UL — SIGNIFICANT CHANGE UP (ref 150–400)
PLATELET # BLD AUTO: 179 K/UL — SIGNIFICANT CHANGE UP (ref 150–400)
POTASSIUM SERPL-MCNC: 4 MMOL/L — SIGNIFICANT CHANGE UP (ref 3.5–5.3)
POTASSIUM SERPL-MCNC: 4 MMOL/L — SIGNIFICANT CHANGE UP (ref 3.5–5.3)
POTASSIUM SERPL-SCNC: 4 MMOL/L — SIGNIFICANT CHANGE UP (ref 3.5–5.3)
POTASSIUM SERPL-SCNC: 4 MMOL/L — SIGNIFICANT CHANGE UP (ref 3.5–5.3)
PROT SERPL-MCNC: 7 G/DL — SIGNIFICANT CHANGE UP (ref 6–8.3)
PROT SERPL-MCNC: 7 G/DL — SIGNIFICANT CHANGE UP (ref 6–8.3)
RBC # BLD: 3.89 M/UL — LOW (ref 4.2–5.8)
RBC # BLD: 3.89 M/UL — LOW (ref 4.2–5.8)
RBC # FLD: 12.2 % — SIGNIFICANT CHANGE UP (ref 10.3–14.5)
RBC # FLD: 12.2 % — SIGNIFICANT CHANGE UP (ref 10.3–14.5)
SODIUM SERPL-SCNC: 141 MMOL/L — SIGNIFICANT CHANGE UP (ref 135–145)
SODIUM SERPL-SCNC: 141 MMOL/L — SIGNIFICANT CHANGE UP (ref 135–145)
WBC # BLD: 7.13 K/UL — SIGNIFICANT CHANGE UP (ref 3.8–10.5)
WBC # BLD: 7.13 K/UL — SIGNIFICANT CHANGE UP (ref 3.8–10.5)
WBC # FLD AUTO: 7.13 K/UL — SIGNIFICANT CHANGE UP (ref 3.8–10.5)
WBC # FLD AUTO: 7.13 K/UL — SIGNIFICANT CHANGE UP (ref 3.8–10.5)

## 2023-10-19 DIAGNOSIS — C68.9 MALIGNANT NEOPLASM OF URINARY ORGAN, UNSPECIFIED: ICD-10-CM

## 2023-10-19 DIAGNOSIS — Z51.11 ENCOUNTER FOR ANTINEOPLASTIC CHEMOTHERAPY: ICD-10-CM

## 2023-10-19 DIAGNOSIS — R11.2 NAUSEA WITH VOMITING, UNSPECIFIED: ICD-10-CM

## 2023-10-19 LAB
T4 FREE SERPL-MCNC: 1.4 NG/DL — SIGNIFICANT CHANGE UP (ref 0.9–1.8)
T4 FREE SERPL-MCNC: 1.4 NG/DL — SIGNIFICANT CHANGE UP (ref 0.9–1.8)
TSH SERPL-MCNC: 0.71 UIU/ML — SIGNIFICANT CHANGE UP (ref 0.27–4.2)
TSH SERPL-MCNC: 0.71 UIU/ML — SIGNIFICANT CHANGE UP (ref 0.27–4.2)

## 2023-10-25 ENCOUNTER — RESULT REVIEW (OUTPATIENT)
Age: 73
End: 2023-10-25

## 2023-10-25 ENCOUNTER — APPOINTMENT (OUTPATIENT)
Dept: INFUSION THERAPY | Facility: HOSPITAL | Age: 73
End: 2023-10-25

## 2023-10-25 ENCOUNTER — APPOINTMENT (OUTPATIENT)
Dept: HEMATOLOGY ONCOLOGY | Facility: CLINIC | Age: 73
End: 2023-10-25

## 2023-10-25 LAB
ANION GAP SERPL CALC-SCNC: 12 MMOL/L — SIGNIFICANT CHANGE UP (ref 5–17)
ANION GAP SERPL CALC-SCNC: 12 MMOL/L — SIGNIFICANT CHANGE UP (ref 5–17)
BASOPHILS # BLD AUTO: 0.05 K/UL — SIGNIFICANT CHANGE UP (ref 0–0.2)
BASOPHILS # BLD AUTO: 0.05 K/UL — SIGNIFICANT CHANGE UP (ref 0–0.2)
BASOPHILS NFR BLD AUTO: 0.6 % — SIGNIFICANT CHANGE UP (ref 0–2)
BASOPHILS NFR BLD AUTO: 0.6 % — SIGNIFICANT CHANGE UP (ref 0–2)
BUN SERPL-MCNC: 22 MG/DL — SIGNIFICANT CHANGE UP (ref 7–23)
BUN SERPL-MCNC: 22 MG/DL — SIGNIFICANT CHANGE UP (ref 7–23)
CALCIUM SERPL-MCNC: 9.3 MG/DL — SIGNIFICANT CHANGE UP (ref 8.4–10.5)
CALCIUM SERPL-MCNC: 9.3 MG/DL — SIGNIFICANT CHANGE UP (ref 8.4–10.5)
CHLORIDE SERPL-SCNC: 102 MMOL/L — SIGNIFICANT CHANGE UP (ref 96–108)
CHLORIDE SERPL-SCNC: 102 MMOL/L — SIGNIFICANT CHANGE UP (ref 96–108)
CO2 SERPL-SCNC: 23 MMOL/L — SIGNIFICANT CHANGE UP (ref 22–31)
CO2 SERPL-SCNC: 23 MMOL/L — SIGNIFICANT CHANGE UP (ref 22–31)
CREAT SERPL-MCNC: 1.52 MG/DL — HIGH (ref 0.5–1.3)
CREAT SERPL-MCNC: 1.52 MG/DL — HIGH (ref 0.5–1.3)
EGFR: 47 ML/MIN/1.73M2 — LOW
EGFR: 47 ML/MIN/1.73M2 — LOW
EOSINOPHIL # BLD AUTO: 0.22 K/UL — SIGNIFICANT CHANGE UP (ref 0–0.5)
EOSINOPHIL # BLD AUTO: 0.22 K/UL — SIGNIFICANT CHANGE UP (ref 0–0.5)
EOSINOPHIL NFR BLD AUTO: 2.6 % — SIGNIFICANT CHANGE UP (ref 0–6)
EOSINOPHIL NFR BLD AUTO: 2.6 % — SIGNIFICANT CHANGE UP (ref 0–6)
GLUCOSE SERPL-MCNC: 126 MG/DL — HIGH (ref 70–99)
GLUCOSE SERPL-MCNC: 126 MG/DL — HIGH (ref 70–99)
HCT VFR BLD CALC: 36.9 % — LOW (ref 39–50)
HCT VFR BLD CALC: 36.9 % — LOW (ref 39–50)
HGB BLD-MCNC: 12.9 G/DL — LOW (ref 13–17)
HGB BLD-MCNC: 12.9 G/DL — LOW (ref 13–17)
IMM GRANULOCYTES NFR BLD AUTO: 0.6 % — SIGNIFICANT CHANGE UP (ref 0–0.9)
IMM GRANULOCYTES NFR BLD AUTO: 0.6 % — SIGNIFICANT CHANGE UP (ref 0–0.9)
LYMPHOCYTES # BLD AUTO: 1.41 K/UL — SIGNIFICANT CHANGE UP (ref 1–3.3)
LYMPHOCYTES # BLD AUTO: 1.41 K/UL — SIGNIFICANT CHANGE UP (ref 1–3.3)
LYMPHOCYTES # BLD AUTO: 16.8 % — SIGNIFICANT CHANGE UP (ref 13–44)
LYMPHOCYTES # BLD AUTO: 16.8 % — SIGNIFICANT CHANGE UP (ref 13–44)
MCHC RBC-ENTMCNC: 31.3 PG — SIGNIFICANT CHANGE UP (ref 27–34)
MCHC RBC-ENTMCNC: 31.3 PG — SIGNIFICANT CHANGE UP (ref 27–34)
MCHC RBC-ENTMCNC: 35 G/DL — SIGNIFICANT CHANGE UP (ref 32–36)
MCHC RBC-ENTMCNC: 35 G/DL — SIGNIFICANT CHANGE UP (ref 32–36)
MCV RBC AUTO: 89.6 FL — SIGNIFICANT CHANGE UP (ref 80–100)
MCV RBC AUTO: 89.6 FL — SIGNIFICANT CHANGE UP (ref 80–100)
MONOCYTES # BLD AUTO: 1.17 K/UL — HIGH (ref 0–0.9)
MONOCYTES # BLD AUTO: 1.17 K/UL — HIGH (ref 0–0.9)
MONOCYTES NFR BLD AUTO: 14 % — SIGNIFICANT CHANGE UP (ref 2–14)
MONOCYTES NFR BLD AUTO: 14 % — SIGNIFICANT CHANGE UP (ref 2–14)
NEUTROPHILS # BLD AUTO: 5.48 K/UL — SIGNIFICANT CHANGE UP (ref 1.8–7.4)
NEUTROPHILS # BLD AUTO: 5.48 K/UL — SIGNIFICANT CHANGE UP (ref 1.8–7.4)
NEUTROPHILS NFR BLD AUTO: 65.4 % — SIGNIFICANT CHANGE UP (ref 43–77)
NEUTROPHILS NFR BLD AUTO: 65.4 % — SIGNIFICANT CHANGE UP (ref 43–77)
NRBC # BLD: 0 /100 WBCS — SIGNIFICANT CHANGE UP (ref 0–0)
NRBC # BLD: 0 /100 WBCS — SIGNIFICANT CHANGE UP (ref 0–0)
PLATELET # BLD AUTO: 157 K/UL — SIGNIFICANT CHANGE UP (ref 150–400)
PLATELET # BLD AUTO: 157 K/UL — SIGNIFICANT CHANGE UP (ref 150–400)
POTASSIUM SERPL-MCNC: 3.7 MMOL/L — SIGNIFICANT CHANGE UP (ref 3.5–5.3)
POTASSIUM SERPL-MCNC: 3.7 MMOL/L — SIGNIFICANT CHANGE UP (ref 3.5–5.3)
POTASSIUM SERPL-SCNC: 3.7 MMOL/L — SIGNIFICANT CHANGE UP (ref 3.5–5.3)
POTASSIUM SERPL-SCNC: 3.7 MMOL/L — SIGNIFICANT CHANGE UP (ref 3.5–5.3)
RBC # BLD: 4.12 M/UL — LOW (ref 4.2–5.8)
RBC # BLD: 4.12 M/UL — LOW (ref 4.2–5.8)
RBC # FLD: 12 % — SIGNIFICANT CHANGE UP (ref 10.3–14.5)
RBC # FLD: 12 % — SIGNIFICANT CHANGE UP (ref 10.3–14.5)
SODIUM SERPL-SCNC: 138 MMOL/L — SIGNIFICANT CHANGE UP (ref 135–145)
SODIUM SERPL-SCNC: 138 MMOL/L — SIGNIFICANT CHANGE UP (ref 135–145)
WBC # BLD: 8.38 K/UL — SIGNIFICANT CHANGE UP (ref 3.8–10.5)
WBC # BLD: 8.38 K/UL — SIGNIFICANT CHANGE UP (ref 3.8–10.5)
WBC # FLD AUTO: 8.38 K/UL — SIGNIFICANT CHANGE UP (ref 3.8–10.5)
WBC # FLD AUTO: 8.38 K/UL — SIGNIFICANT CHANGE UP (ref 3.8–10.5)

## 2023-10-26 LAB
ALBUMIN SERPL ELPH-MCNC: 4.1 G/DL — SIGNIFICANT CHANGE UP (ref 3.3–5)
ALBUMIN SERPL ELPH-MCNC: 4.1 G/DL — SIGNIFICANT CHANGE UP (ref 3.3–5)
ALP SERPL-CCNC: 103 U/L — SIGNIFICANT CHANGE UP (ref 40–120)
ALP SERPL-CCNC: 103 U/L — SIGNIFICANT CHANGE UP (ref 40–120)
ALT FLD-CCNC: 21 U/L — SIGNIFICANT CHANGE UP (ref 10–45)
ALT FLD-CCNC: 21 U/L — SIGNIFICANT CHANGE UP (ref 10–45)
ANION GAP SERPL CALC-SCNC: 14 MMOL/L — SIGNIFICANT CHANGE UP (ref 5–17)
ANION GAP SERPL CALC-SCNC: 14 MMOL/L — SIGNIFICANT CHANGE UP (ref 5–17)
AST SERPL-CCNC: 33 U/L — SIGNIFICANT CHANGE UP (ref 10–40)
AST SERPL-CCNC: 33 U/L — SIGNIFICANT CHANGE UP (ref 10–40)
BILIRUB SERPL-MCNC: 0.5 MG/DL — SIGNIFICANT CHANGE UP (ref 0.2–1.2)
BILIRUB SERPL-MCNC: 0.5 MG/DL — SIGNIFICANT CHANGE UP (ref 0.2–1.2)
BUN SERPL-MCNC: 23 MG/DL — SIGNIFICANT CHANGE UP (ref 7–23)
BUN SERPL-MCNC: 23 MG/DL — SIGNIFICANT CHANGE UP (ref 7–23)
CALCIUM SERPL-MCNC: 9 MG/DL — SIGNIFICANT CHANGE UP (ref 8.4–10.5)
CALCIUM SERPL-MCNC: 9 MG/DL — SIGNIFICANT CHANGE UP (ref 8.4–10.5)
CHLORIDE SERPL-SCNC: 103 MMOL/L — SIGNIFICANT CHANGE UP (ref 96–108)
CHLORIDE SERPL-SCNC: 103 MMOL/L — SIGNIFICANT CHANGE UP (ref 96–108)
CO2 SERPL-SCNC: 21 MMOL/L — LOW (ref 22–31)
CO2 SERPL-SCNC: 21 MMOL/L — LOW (ref 22–31)
CREAT SERPL-MCNC: 1.57 MG/DL — HIGH (ref 0.5–1.3)
CREAT SERPL-MCNC: 1.57 MG/DL — HIGH (ref 0.5–1.3)
EGFR: 46 ML/MIN/1.73M2 — LOW
EGFR: 46 ML/MIN/1.73M2 — LOW
GLUCOSE SERPL-MCNC: 104 MG/DL — HIGH (ref 70–99)
GLUCOSE SERPL-MCNC: 104 MG/DL — HIGH (ref 70–99)
POTASSIUM SERPL-MCNC: 4 MMOL/L — SIGNIFICANT CHANGE UP (ref 3.5–5.3)
POTASSIUM SERPL-MCNC: 4 MMOL/L — SIGNIFICANT CHANGE UP (ref 3.5–5.3)
POTASSIUM SERPL-SCNC: 4 MMOL/L — SIGNIFICANT CHANGE UP (ref 3.5–5.3)
POTASSIUM SERPL-SCNC: 4 MMOL/L — SIGNIFICANT CHANGE UP (ref 3.5–5.3)
PROT SERPL-MCNC: 7.2 G/DL — SIGNIFICANT CHANGE UP (ref 6–8.3)
PROT SERPL-MCNC: 7.2 G/DL — SIGNIFICANT CHANGE UP (ref 6–8.3)
SODIUM SERPL-SCNC: 139 MMOL/L — SIGNIFICANT CHANGE UP (ref 135–145)
SODIUM SERPL-SCNC: 139 MMOL/L — SIGNIFICANT CHANGE UP (ref 135–145)

## 2023-11-01 ENCOUNTER — APPOINTMENT (OUTPATIENT)
Dept: HEMATOLOGY ONCOLOGY | Facility: CLINIC | Age: 73
End: 2023-11-01
Payer: MEDICARE

## 2023-11-01 ENCOUNTER — APPOINTMENT (OUTPATIENT)
Dept: FAMILY MEDICINE | Facility: CLINIC | Age: 73
End: 2023-11-01
Payer: MEDICARE

## 2023-11-01 VITALS
DIASTOLIC BLOOD PRESSURE: 83 MMHG | HEART RATE: 100 BPM | BODY MASS INDEX: 29.51 KG/M2 | SYSTOLIC BLOOD PRESSURE: 137 MMHG | TEMPERATURE: 97.5 F | OXYGEN SATURATION: 97 % | WEIGHT: 236.1 LBS | RESPIRATION RATE: 16 BRPM

## 2023-11-01 VITALS
BODY MASS INDEX: 29.72 KG/M2 | DIASTOLIC BLOOD PRESSURE: 82 MMHG | RESPIRATION RATE: 16 BRPM | SYSTOLIC BLOOD PRESSURE: 140 MMHG | WEIGHT: 239 LBS | OXYGEN SATURATION: 98 % | TEMPERATURE: 97.6 F | HEIGHT: 75 IN | HEART RATE: 103 BPM

## 2023-11-01 DIAGNOSIS — R79.89 OTHER SPECIFIED ABNORMAL FINDINGS OF BLOOD CHEMISTRY: ICD-10-CM

## 2023-11-01 DIAGNOSIS — Z45.2 ENCOUNTER FOR ADJUSTMENT AND MANAGEMENT OF VASCULAR ACCESS DEVICE: ICD-10-CM

## 2023-11-01 PROCEDURE — 99213 OFFICE O/P EST LOW 20 MIN: CPT

## 2023-11-01 PROCEDURE — 99215 OFFICE O/P EST HI 40 MIN: CPT

## 2023-11-01 RX ORDER — BETAMETHASONE DIPROPIONATE 0.5 MG/G
0.05 CREAM TOPICAL
Qty: 1 | Refills: 4 | Status: ACTIVE | COMMUNITY
Start: 2023-11-01 | End: 1900-01-01

## 2023-11-01 RX ORDER — COLCHICINE 0.6 MG/1
0.6 TABLET ORAL
Qty: 14 | Refills: 0 | Status: ACTIVE | COMMUNITY
Start: 2023-07-17

## 2023-11-08 ENCOUNTER — RESULT REVIEW (OUTPATIENT)
Age: 73
End: 2023-11-08

## 2023-11-08 ENCOUNTER — APPOINTMENT (OUTPATIENT)
Dept: INFUSION THERAPY | Facility: HOSPITAL | Age: 73
End: 2023-11-08

## 2023-11-08 ENCOUNTER — NON-APPOINTMENT (OUTPATIENT)
Age: 73
End: 2023-11-08

## 2023-11-08 ENCOUNTER — APPOINTMENT (OUTPATIENT)
Dept: HEMATOLOGY ONCOLOGY | Facility: CLINIC | Age: 73
End: 2023-11-08

## 2023-11-08 ENCOUNTER — APPOINTMENT (OUTPATIENT)
Dept: RADIOLOGY | Facility: IMAGING CENTER | Age: 73
End: 2023-11-08
Payer: MEDICARE

## 2023-11-08 ENCOUNTER — OUTPATIENT (OUTPATIENT)
Dept: OUTPATIENT SERVICES | Facility: HOSPITAL | Age: 73
LOS: 1 days | End: 2023-11-08
Payer: MEDICARE

## 2023-11-08 DIAGNOSIS — Z98.890 OTHER SPECIFIED POSTPROCEDURAL STATES: Chronic | ICD-10-CM

## 2023-11-08 DIAGNOSIS — C67.9 MALIGNANT NEOPLASM OF BLADDER, UNSPECIFIED: ICD-10-CM

## 2023-11-08 DIAGNOSIS — K59.00 CONSTIPATION, UNSPECIFIED: ICD-10-CM

## 2023-11-08 DIAGNOSIS — Z90.2 ACQUIRED ABSENCE OF LUNG [PART OF]: Chronic | ICD-10-CM

## 2023-11-08 LAB
ALBUMIN SERPL ELPH-MCNC: 3.8 G/DL — SIGNIFICANT CHANGE UP (ref 3.3–5)
ALBUMIN SERPL ELPH-MCNC: 3.8 G/DL — SIGNIFICANT CHANGE UP (ref 3.3–5)
ALP SERPL-CCNC: 78 U/L — SIGNIFICANT CHANGE UP (ref 40–120)
ALP SERPL-CCNC: 78 U/L — SIGNIFICANT CHANGE UP (ref 40–120)
ALT FLD-CCNC: 40 U/L — SIGNIFICANT CHANGE UP (ref 10–45)
ALT FLD-CCNC: 40 U/L — SIGNIFICANT CHANGE UP (ref 10–45)
ANION GAP SERPL CALC-SCNC: 13 MMOL/L — SIGNIFICANT CHANGE UP (ref 5–17)
ANION GAP SERPL CALC-SCNC: 13 MMOL/L — SIGNIFICANT CHANGE UP (ref 5–17)
AST SERPL-CCNC: 69 U/L — HIGH (ref 10–40)
AST SERPL-CCNC: 69 U/L — HIGH (ref 10–40)
BASOPHILS # BLD AUTO: 0.08 K/UL — SIGNIFICANT CHANGE UP (ref 0–0.2)
BASOPHILS # BLD AUTO: 0.08 K/UL — SIGNIFICANT CHANGE UP (ref 0–0.2)
BASOPHILS NFR BLD AUTO: 1 % — SIGNIFICANT CHANGE UP (ref 0–2)
BASOPHILS NFR BLD AUTO: 1 % — SIGNIFICANT CHANGE UP (ref 0–2)
BILIRUB SERPL-MCNC: 0.4 MG/DL — SIGNIFICANT CHANGE UP (ref 0.2–1.2)
BILIRUB SERPL-MCNC: 0.4 MG/DL — SIGNIFICANT CHANGE UP (ref 0.2–1.2)
BUN SERPL-MCNC: 12 MG/DL — SIGNIFICANT CHANGE UP (ref 7–23)
BUN SERPL-MCNC: 12 MG/DL — SIGNIFICANT CHANGE UP (ref 7–23)
CALCIUM SERPL-MCNC: 8.5 MG/DL — SIGNIFICANT CHANGE UP (ref 8.4–10.5)
CALCIUM SERPL-MCNC: 8.5 MG/DL — SIGNIFICANT CHANGE UP (ref 8.4–10.5)
CHLORIDE SERPL-SCNC: 99 MMOL/L — SIGNIFICANT CHANGE UP (ref 96–108)
CHLORIDE SERPL-SCNC: 99 MMOL/L — SIGNIFICANT CHANGE UP (ref 96–108)
CO2 SERPL-SCNC: 24 MMOL/L — SIGNIFICANT CHANGE UP (ref 22–31)
CO2 SERPL-SCNC: 24 MMOL/L — SIGNIFICANT CHANGE UP (ref 22–31)
CREAT SERPL-MCNC: 1.45 MG/DL — HIGH (ref 0.5–1.3)
CREAT SERPL-MCNC: 1.45 MG/DL — HIGH (ref 0.5–1.3)
EGFR: 50 ML/MIN/1.73M2 — LOW
EGFR: 50 ML/MIN/1.73M2 — LOW
EOSINOPHIL # BLD AUTO: 0 K/UL — SIGNIFICANT CHANGE UP (ref 0–0.5)
EOSINOPHIL # BLD AUTO: 0 K/UL — SIGNIFICANT CHANGE UP (ref 0–0.5)
EOSINOPHIL NFR BLD AUTO: 0 % — SIGNIFICANT CHANGE UP (ref 0–6)
EOSINOPHIL NFR BLD AUTO: 0 % — SIGNIFICANT CHANGE UP (ref 0–6)
GLUCOSE SERPL-MCNC: 129 MG/DL — HIGH (ref 70–99)
GLUCOSE SERPL-MCNC: 129 MG/DL — HIGH (ref 70–99)
HCT VFR BLD CALC: 34.5 % — LOW (ref 39–50)
HCT VFR BLD CALC: 34.5 % — LOW (ref 39–50)
HGB BLD-MCNC: 12 G/DL — LOW (ref 13–17)
HGB BLD-MCNC: 12 G/DL — LOW (ref 13–17)
LYMPHOCYTES # BLD AUTO: 1.31 K/UL — SIGNIFICANT CHANGE UP (ref 1–3.3)
LYMPHOCYTES # BLD AUTO: 1.31 K/UL — SIGNIFICANT CHANGE UP (ref 1–3.3)
LYMPHOCYTES # BLD AUTO: 17 % — SIGNIFICANT CHANGE UP (ref 13–44)
LYMPHOCYTES # BLD AUTO: 17 % — SIGNIFICANT CHANGE UP (ref 13–44)
MCHC RBC-ENTMCNC: 31.1 PG — SIGNIFICANT CHANGE UP (ref 27–34)
MCHC RBC-ENTMCNC: 31.1 PG — SIGNIFICANT CHANGE UP (ref 27–34)
MCHC RBC-ENTMCNC: 34.8 G/DL — SIGNIFICANT CHANGE UP (ref 32–36)
MCHC RBC-ENTMCNC: 34.8 G/DL — SIGNIFICANT CHANGE UP (ref 32–36)
MCV RBC AUTO: 89.4 FL — SIGNIFICANT CHANGE UP (ref 80–100)
MCV RBC AUTO: 89.4 FL — SIGNIFICANT CHANGE UP (ref 80–100)
MONOCYTES # BLD AUTO: 2 K/UL — HIGH (ref 0–0.9)
MONOCYTES # BLD AUTO: 2 K/UL — HIGH (ref 0–0.9)
MONOCYTES NFR BLD AUTO: 26 % — HIGH (ref 2–14)
MONOCYTES NFR BLD AUTO: 26 % — HIGH (ref 2–14)
NEUTROPHILS # BLD AUTO: 4.31 K/UL — SIGNIFICANT CHANGE UP (ref 1.8–7.4)
NEUTROPHILS # BLD AUTO: 4.31 K/UL — SIGNIFICANT CHANGE UP (ref 1.8–7.4)
NEUTROPHILS NFR BLD AUTO: 56 % — SIGNIFICANT CHANGE UP (ref 43–77)
NEUTROPHILS NFR BLD AUTO: 56 % — SIGNIFICANT CHANGE UP (ref 43–77)
NRBC # BLD: 0 /100 — SIGNIFICANT CHANGE UP (ref 0–0)
NRBC # BLD: 0 /100 — SIGNIFICANT CHANGE UP (ref 0–0)
NRBC # BLD: SIGNIFICANT CHANGE UP /100 WBCS (ref 0–0)
NRBC # BLD: SIGNIFICANT CHANGE UP /100 WBCS (ref 0–0)
PLAT MORPH BLD: NORMAL — SIGNIFICANT CHANGE UP
PLAT MORPH BLD: NORMAL — SIGNIFICANT CHANGE UP
PLATELET # BLD AUTO: 254 K/UL — SIGNIFICANT CHANGE UP (ref 150–400)
PLATELET # BLD AUTO: 254 K/UL — SIGNIFICANT CHANGE UP (ref 150–400)
POTASSIUM SERPL-MCNC: 3.9 MMOL/L — SIGNIFICANT CHANGE UP (ref 3.5–5.3)
POTASSIUM SERPL-MCNC: 3.9 MMOL/L — SIGNIFICANT CHANGE UP (ref 3.5–5.3)
POTASSIUM SERPL-SCNC: 3.9 MMOL/L — SIGNIFICANT CHANGE UP (ref 3.5–5.3)
POTASSIUM SERPL-SCNC: 3.9 MMOL/L — SIGNIFICANT CHANGE UP (ref 3.5–5.3)
PROT SERPL-MCNC: 7 G/DL — SIGNIFICANT CHANGE UP (ref 6–8.3)
PROT SERPL-MCNC: 7 G/DL — SIGNIFICANT CHANGE UP (ref 6–8.3)
RBC # BLD: 3.86 M/UL — LOW (ref 4.2–5.8)
RBC # BLD: 3.86 M/UL — LOW (ref 4.2–5.8)
RBC # FLD: 13.5 % — SIGNIFICANT CHANGE UP (ref 10.3–14.5)
RBC # FLD: 13.5 % — SIGNIFICANT CHANGE UP (ref 10.3–14.5)
RBC BLD AUTO: SIGNIFICANT CHANGE UP
RBC BLD AUTO: SIGNIFICANT CHANGE UP
SODIUM SERPL-SCNC: 136 MMOL/L — SIGNIFICANT CHANGE UP (ref 135–145)
SODIUM SERPL-SCNC: 136 MMOL/L — SIGNIFICANT CHANGE UP (ref 135–145)
T4 FREE SERPL-MCNC: 1.6 NG/DL — SIGNIFICANT CHANGE UP (ref 0.9–1.8)
T4 FREE SERPL-MCNC: 1.6 NG/DL — SIGNIFICANT CHANGE UP (ref 0.9–1.8)
TSH SERPL-MCNC: 1.55 UIU/ML — SIGNIFICANT CHANGE UP (ref 0.27–4.2)
TSH SERPL-MCNC: 1.55 UIU/ML — SIGNIFICANT CHANGE UP (ref 0.27–4.2)
WBC # BLD: 7.69 K/UL — SIGNIFICANT CHANGE UP (ref 3.8–10.5)
WBC # BLD: 7.69 K/UL — SIGNIFICANT CHANGE UP (ref 3.8–10.5)
WBC # FLD AUTO: 7.69 K/UL — SIGNIFICANT CHANGE UP (ref 3.8–10.5)
WBC # FLD AUTO: 7.69 K/UL — SIGNIFICANT CHANGE UP (ref 3.8–10.5)

## 2023-11-08 PROCEDURE — 74018 RADEX ABDOMEN 1 VIEW: CPT

## 2023-11-08 PROCEDURE — 74018 RADEX ABDOMEN 1 VIEW: CPT | Mod: 26

## 2023-11-08 RX ORDER — SENNOSIDES 8.6 MG TABLETS 8.6 MG/1
8.6 TABLET ORAL DAILY
Qty: 60 | Refills: 3 | Status: ACTIVE | COMMUNITY
Start: 2023-11-08 | End: 1900-01-01

## 2023-11-08 RX ORDER — POLYETHYLENE GLYCOL 3350 17 G/17G
17 POWDER, FOR SOLUTION ORAL DAILY
Qty: 1 | Refills: 1 | Status: ACTIVE | COMMUNITY
Start: 2023-11-08 | End: 1900-01-01

## 2023-11-10 RX ORDER — LACTULOSE 10 G/15ML
10 SOLUTION ORAL 3 TIMES DAILY
Qty: 1 | Refills: 0 | Status: ACTIVE | COMMUNITY
Start: 2023-11-10 | End: 1900-01-01

## 2023-11-14 ENCOUNTER — OUTPATIENT (OUTPATIENT)
Dept: OUTPATIENT SERVICES | Facility: HOSPITAL | Age: 73
LOS: 1 days | Discharge: HOME | End: 2023-11-14

## 2023-11-14 DIAGNOSIS — Z90.2 ACQUIRED ABSENCE OF LUNG [PART OF]: Chronic | ICD-10-CM

## 2023-11-14 DIAGNOSIS — Z98.890 OTHER SPECIFIED POSTPROCEDURAL STATES: Chronic | ICD-10-CM

## 2023-11-14 DIAGNOSIS — C80.1 MALIGNANT (PRIMARY) NEOPLASM, UNSPECIFIED: ICD-10-CM

## 2023-11-14 DIAGNOSIS — Z90.5 ACQUIRED ABSENCE OF KIDNEY: Chronic | ICD-10-CM

## 2023-11-15 ENCOUNTER — RESULT REVIEW (OUTPATIENT)
Age: 73
End: 2023-11-15

## 2023-11-15 ENCOUNTER — NON-APPOINTMENT (OUTPATIENT)
Age: 73
End: 2023-11-15

## 2023-11-15 ENCOUNTER — APPOINTMENT (OUTPATIENT)
Dept: INFUSION THERAPY | Facility: HOSPITAL | Age: 73
End: 2023-11-15

## 2023-11-15 ENCOUNTER — APPOINTMENT (OUTPATIENT)
Dept: HEMATOLOGY ONCOLOGY | Facility: CLINIC | Age: 73
End: 2023-11-15

## 2023-11-15 LAB
ALBUMIN SERPL ELPH-MCNC: 4.1 G/DL — SIGNIFICANT CHANGE UP (ref 3.3–5)
ALBUMIN SERPL ELPH-MCNC: 4.1 G/DL — SIGNIFICANT CHANGE UP (ref 3.3–5)
ALP SERPL-CCNC: 74 U/L — SIGNIFICANT CHANGE UP (ref 40–120)
ALP SERPL-CCNC: 74 U/L — SIGNIFICANT CHANGE UP (ref 40–120)
ALT FLD-CCNC: 34 U/L — SIGNIFICANT CHANGE UP (ref 10–45)
ALT FLD-CCNC: 34 U/L — SIGNIFICANT CHANGE UP (ref 10–45)
ANION GAP SERPL CALC-SCNC: 12 MMOL/L — SIGNIFICANT CHANGE UP (ref 5–17)
ANION GAP SERPL CALC-SCNC: 12 MMOL/L — SIGNIFICANT CHANGE UP (ref 5–17)
AST SERPL-CCNC: 59 U/L — HIGH (ref 10–40)
AST SERPL-CCNC: 59 U/L — HIGH (ref 10–40)
BASOPHILS # BLD AUTO: 0.12 K/UL — SIGNIFICANT CHANGE UP (ref 0–0.2)
BASOPHILS # BLD AUTO: 0.12 K/UL — SIGNIFICANT CHANGE UP (ref 0–0.2)
BASOPHILS NFR BLD AUTO: 1.3 % — SIGNIFICANT CHANGE UP (ref 0–2)
BASOPHILS NFR BLD AUTO: 1.3 % — SIGNIFICANT CHANGE UP (ref 0–2)
BILIRUB SERPL-MCNC: 0.7 MG/DL — SIGNIFICANT CHANGE UP (ref 0.2–1.2)
BILIRUB SERPL-MCNC: 0.7 MG/DL — SIGNIFICANT CHANGE UP (ref 0.2–1.2)
BUN SERPL-MCNC: 18 MG/DL — SIGNIFICANT CHANGE UP (ref 7–23)
BUN SERPL-MCNC: 18 MG/DL — SIGNIFICANT CHANGE UP (ref 7–23)
CALCIUM SERPL-MCNC: 9.1 MG/DL — SIGNIFICANT CHANGE UP (ref 8.4–10.5)
CALCIUM SERPL-MCNC: 9.1 MG/DL — SIGNIFICANT CHANGE UP (ref 8.4–10.5)
CHLORIDE SERPL-SCNC: 99 MMOL/L — SIGNIFICANT CHANGE UP (ref 96–108)
CHLORIDE SERPL-SCNC: 99 MMOL/L — SIGNIFICANT CHANGE UP (ref 96–108)
CO2 SERPL-SCNC: 23 MMOL/L — SIGNIFICANT CHANGE UP (ref 22–31)
CO2 SERPL-SCNC: 23 MMOL/L — SIGNIFICANT CHANGE UP (ref 22–31)
CREAT SERPL-MCNC: 1.55 MG/DL — HIGH (ref 0.5–1.3)
CREAT SERPL-MCNC: 1.55 MG/DL — HIGH (ref 0.5–1.3)
EGFR: 46 ML/MIN/1.73M2 — LOW
EGFR: 46 ML/MIN/1.73M2 — LOW
EOSINOPHIL # BLD AUTO: 0.09 K/UL — SIGNIFICANT CHANGE UP (ref 0–0.5)
EOSINOPHIL # BLD AUTO: 0.09 K/UL — SIGNIFICANT CHANGE UP (ref 0–0.5)
EOSINOPHIL NFR BLD AUTO: 1 % — SIGNIFICANT CHANGE UP (ref 0–6)
EOSINOPHIL NFR BLD AUTO: 1 % — SIGNIFICANT CHANGE UP (ref 0–6)
GLUCOSE SERPL-MCNC: 136 MG/DL — HIGH (ref 70–99)
GLUCOSE SERPL-MCNC: 136 MG/DL — HIGH (ref 70–99)
HCT VFR BLD CALC: 37.1 % — LOW (ref 39–50)
HCT VFR BLD CALC: 37.1 % — LOW (ref 39–50)
HGB BLD-MCNC: 12.8 G/DL — LOW (ref 13–17)
HGB BLD-MCNC: 12.8 G/DL — LOW (ref 13–17)
IMM GRANULOCYTES NFR BLD AUTO: 1 % — HIGH (ref 0–0.9)
IMM GRANULOCYTES NFR BLD AUTO: 1 % — HIGH (ref 0–0.9)
LYMPHOCYTES # BLD AUTO: 1.36 K/UL — SIGNIFICANT CHANGE UP (ref 1–3.3)
LYMPHOCYTES # BLD AUTO: 1.36 K/UL — SIGNIFICANT CHANGE UP (ref 1–3.3)
LYMPHOCYTES # BLD AUTO: 14.9 % — SIGNIFICANT CHANGE UP (ref 13–44)
LYMPHOCYTES # BLD AUTO: 14.9 % — SIGNIFICANT CHANGE UP (ref 13–44)
MCHC RBC-ENTMCNC: 30.9 PG — SIGNIFICANT CHANGE UP (ref 27–34)
MCHC RBC-ENTMCNC: 30.9 PG — SIGNIFICANT CHANGE UP (ref 27–34)
MCHC RBC-ENTMCNC: 34.5 G/DL — SIGNIFICANT CHANGE UP (ref 32–36)
MCHC RBC-ENTMCNC: 34.5 G/DL — SIGNIFICANT CHANGE UP (ref 32–36)
MCV RBC AUTO: 89.6 FL — SIGNIFICANT CHANGE UP (ref 80–100)
MCV RBC AUTO: 89.6 FL — SIGNIFICANT CHANGE UP (ref 80–100)
MONOCYTES # BLD AUTO: 1.55 K/UL — HIGH (ref 0–0.9)
MONOCYTES # BLD AUTO: 1.55 K/UL — HIGH (ref 0–0.9)
MONOCYTES NFR BLD AUTO: 17 % — HIGH (ref 2–14)
MONOCYTES NFR BLD AUTO: 17 % — HIGH (ref 2–14)
NEUTROPHILS # BLD AUTO: 5.92 K/UL — SIGNIFICANT CHANGE UP (ref 1.8–7.4)
NEUTROPHILS # BLD AUTO: 5.92 K/UL — SIGNIFICANT CHANGE UP (ref 1.8–7.4)
NEUTROPHILS NFR BLD AUTO: 64.8 % — SIGNIFICANT CHANGE UP (ref 43–77)
NEUTROPHILS NFR BLD AUTO: 64.8 % — SIGNIFICANT CHANGE UP (ref 43–77)
NRBC # BLD: 0 /100 WBCS — SIGNIFICANT CHANGE UP (ref 0–0)
NRBC # BLD: 0 /100 WBCS — SIGNIFICANT CHANGE UP (ref 0–0)
PLATELET # BLD AUTO: 254 K/UL — SIGNIFICANT CHANGE UP (ref 150–400)
PLATELET # BLD AUTO: 254 K/UL — SIGNIFICANT CHANGE UP (ref 150–400)
POTASSIUM SERPL-MCNC: 3.7 MMOL/L — SIGNIFICANT CHANGE UP (ref 3.5–5.3)
POTASSIUM SERPL-MCNC: 3.7 MMOL/L — SIGNIFICANT CHANGE UP (ref 3.5–5.3)
POTASSIUM SERPL-SCNC: 3.7 MMOL/L — SIGNIFICANT CHANGE UP (ref 3.5–5.3)
POTASSIUM SERPL-SCNC: 3.7 MMOL/L — SIGNIFICANT CHANGE UP (ref 3.5–5.3)
PROT SERPL-MCNC: 7.5 G/DL — SIGNIFICANT CHANGE UP (ref 6–8.3)
PROT SERPL-MCNC: 7.5 G/DL — SIGNIFICANT CHANGE UP (ref 6–8.3)
RBC # BLD: 4.14 M/UL — LOW (ref 4.2–5.8)
RBC # BLD: 4.14 M/UL — LOW (ref 4.2–5.8)
RBC # FLD: 13.7 % — SIGNIFICANT CHANGE UP (ref 10.3–14.5)
RBC # FLD: 13.7 % — SIGNIFICANT CHANGE UP (ref 10.3–14.5)
SODIUM SERPL-SCNC: 134 MMOL/L — LOW (ref 135–145)
SODIUM SERPL-SCNC: 134 MMOL/L — LOW (ref 135–145)
WBC # BLD: 9.13 K/UL — SIGNIFICANT CHANGE UP (ref 3.8–10.5)
WBC # BLD: 9.13 K/UL — SIGNIFICANT CHANGE UP (ref 3.8–10.5)
WBC # FLD AUTO: 9.13 K/UL — SIGNIFICANT CHANGE UP (ref 3.8–10.5)
WBC # FLD AUTO: 9.13 K/UL — SIGNIFICANT CHANGE UP (ref 3.8–10.5)

## 2023-11-16 DIAGNOSIS — R11.2 NAUSEA WITH VOMITING, UNSPECIFIED: ICD-10-CM

## 2023-11-16 DIAGNOSIS — Z51.11 ENCOUNTER FOR ANTINEOPLASTIC CHEMOTHERAPY: ICD-10-CM

## 2023-11-16 DIAGNOSIS — C68.9 MALIGNANT NEOPLASM OF URINARY ORGAN, UNSPECIFIED: ICD-10-CM

## 2023-11-22 ENCOUNTER — RESULT REVIEW (OUTPATIENT)
Age: 73
End: 2023-11-22

## 2023-11-22 ENCOUNTER — OUTPATIENT (OUTPATIENT)
Dept: OUTPATIENT SERVICES | Facility: HOSPITAL | Age: 73
LOS: 1 days | End: 2023-11-22
Payer: MEDICARE

## 2023-11-22 ENCOUNTER — APPOINTMENT (OUTPATIENT)
Dept: HEMATOLOGY ONCOLOGY | Facility: CLINIC | Age: 73
End: 2023-11-22
Payer: MEDICARE

## 2023-11-22 VITALS
RESPIRATION RATE: 16 BRPM | DIASTOLIC BLOOD PRESSURE: 70 MMHG | SYSTOLIC BLOOD PRESSURE: 108 MMHG | TEMPERATURE: 97.4 F | HEART RATE: 90 BPM | OXYGEN SATURATION: 98 % | BODY MASS INDEX: 27.62 KG/M2 | WEIGHT: 221 LBS

## 2023-11-22 DIAGNOSIS — Z98.890 OTHER SPECIFIED POSTPROCEDURAL STATES: Chronic | ICD-10-CM

## 2023-11-22 DIAGNOSIS — C67.9 MALIGNANT NEOPLASM OF BLADDER, UNSPECIFIED: ICD-10-CM

## 2023-11-22 DIAGNOSIS — Z90.5 ACQUIRED ABSENCE OF KIDNEY: Chronic | ICD-10-CM

## 2023-11-22 DIAGNOSIS — R19.7 DIARRHEA, UNSPECIFIED: ICD-10-CM

## 2023-11-22 DIAGNOSIS — Z90.2 ACQUIRED ABSENCE OF LUNG [PART OF]: Chronic | ICD-10-CM

## 2023-11-22 LAB
BASOPHILS # BLD AUTO: 0.1 K/UL — SIGNIFICANT CHANGE UP (ref 0–0.2)
BASOPHILS # BLD AUTO: 0.1 K/UL — SIGNIFICANT CHANGE UP (ref 0–0.2)
BASOPHILS NFR BLD AUTO: 1.2 % — SIGNIFICANT CHANGE UP (ref 0–2)
BASOPHILS NFR BLD AUTO: 1.2 % — SIGNIFICANT CHANGE UP (ref 0–2)
EOSINOPHIL # BLD AUTO: 0.19 K/UL — SIGNIFICANT CHANGE UP (ref 0–0.5)
EOSINOPHIL # BLD AUTO: 0.19 K/UL — SIGNIFICANT CHANGE UP (ref 0–0.5)
EOSINOPHIL NFR BLD AUTO: 2.2 % — SIGNIFICANT CHANGE UP (ref 0–6)
EOSINOPHIL NFR BLD AUTO: 2.2 % — SIGNIFICANT CHANGE UP (ref 0–6)
HCT VFR BLD CALC: 35.1 % — LOW (ref 39–50)
HCT VFR BLD CALC: 35.1 % — LOW (ref 39–50)
HGB BLD-MCNC: 12.4 G/DL — LOW (ref 13–17)
HGB BLD-MCNC: 12.4 G/DL — LOW (ref 13–17)
IMM GRANULOCYTES NFR BLD AUTO: 1.4 % — HIGH (ref 0–0.9)
IMM GRANULOCYTES NFR BLD AUTO: 1.4 % — HIGH (ref 0–0.9)
LYMPHOCYTES # BLD AUTO: 0.95 K/UL — LOW (ref 1–3.3)
LYMPHOCYTES # BLD AUTO: 0.95 K/UL — LOW (ref 1–3.3)
LYMPHOCYTES # BLD AUTO: 11 % — LOW (ref 13–44)
LYMPHOCYTES # BLD AUTO: 11 % — LOW (ref 13–44)
MCHC RBC-ENTMCNC: 30.8 PG — SIGNIFICANT CHANGE UP (ref 27–34)
MCHC RBC-ENTMCNC: 30.8 PG — SIGNIFICANT CHANGE UP (ref 27–34)
MCHC RBC-ENTMCNC: 35.3 G/DL — SIGNIFICANT CHANGE UP (ref 32–36)
MCHC RBC-ENTMCNC: 35.3 G/DL — SIGNIFICANT CHANGE UP (ref 32–36)
MCV RBC AUTO: 87.1 FL — SIGNIFICANT CHANGE UP (ref 80–100)
MCV RBC AUTO: 87.1 FL — SIGNIFICANT CHANGE UP (ref 80–100)
MONOCYTES # BLD AUTO: 1.74 K/UL — HIGH (ref 0–0.9)
MONOCYTES # BLD AUTO: 1.74 K/UL — HIGH (ref 0–0.9)
MONOCYTES NFR BLD AUTO: 20.1 % — HIGH (ref 2–14)
MONOCYTES NFR BLD AUTO: 20.1 % — HIGH (ref 2–14)
NEUTROPHILS # BLD AUTO: 5.54 K/UL — SIGNIFICANT CHANGE UP (ref 1.8–7.4)
NEUTROPHILS # BLD AUTO: 5.54 K/UL — SIGNIFICANT CHANGE UP (ref 1.8–7.4)
NEUTROPHILS NFR BLD AUTO: 64.1 % — SIGNIFICANT CHANGE UP (ref 43–77)
NEUTROPHILS NFR BLD AUTO: 64.1 % — SIGNIFICANT CHANGE UP (ref 43–77)
NRBC # BLD: 0 /100 WBCS — SIGNIFICANT CHANGE UP (ref 0–0)
NRBC # BLD: 0 /100 WBCS — SIGNIFICANT CHANGE UP (ref 0–0)
PLATELET # BLD AUTO: 223 K/UL — SIGNIFICANT CHANGE UP (ref 150–400)
PLATELET # BLD AUTO: 223 K/UL — SIGNIFICANT CHANGE UP (ref 150–400)
RBC # BLD: 4.03 M/UL — LOW (ref 4.2–5.8)
RBC # BLD: 4.03 M/UL — LOW (ref 4.2–5.8)
RBC # FLD: 14 % — SIGNIFICANT CHANGE UP (ref 10.3–14.5)
RBC # FLD: 14 % — SIGNIFICANT CHANGE UP (ref 10.3–14.5)
WBC # BLD: 8.64 K/UL — SIGNIFICANT CHANGE UP (ref 3.8–10.5)
WBC # BLD: 8.64 K/UL — SIGNIFICANT CHANGE UP (ref 3.8–10.5)
WBC # FLD AUTO: 8.64 K/UL — SIGNIFICANT CHANGE UP (ref 3.8–10.5)
WBC # FLD AUTO: 8.64 K/UL — SIGNIFICANT CHANGE UP (ref 3.8–10.5)

## 2023-11-22 PROCEDURE — 99214 OFFICE O/P EST MOD 30 MIN: CPT

## 2023-11-22 PROCEDURE — 86900 BLOOD TYPING SEROLOGIC ABO: CPT

## 2023-11-22 PROCEDURE — 86901 BLOOD TYPING SEROLOGIC RH(D): CPT

## 2023-11-22 PROCEDURE — 86850 RBC ANTIBODY SCREEN: CPT

## 2023-11-22 RX ORDER — IBUPROFEN 400 MG/1
400 TABLET, FILM COATED ORAL
Qty: 30 | Refills: 0 | Status: DISCONTINUED | COMMUNITY
Start: 2023-07-17 | End: 2023-11-22

## 2023-11-24 LAB
ALBUMIN SERPL ELPH-MCNC: 4.2 G/DL
ALP BLD-CCNC: 69 U/L
ALT SERPL-CCNC: 33 U/L
ANION GAP SERPL CALC-SCNC: 13 MMOL/L
AST SERPL-CCNC: 56 U/L
BILIRUB SERPL-MCNC: 0.8 MG/DL
BUN SERPL-MCNC: 10 MG/DL
CALCIUM SERPL-MCNC: 9.9 MG/DL
CHLORIDE SERPL-SCNC: 96 MMOL/L
CO2 SERPL-SCNC: 22 MMOL/L
CREAT SERPL-MCNC: 1.71 MG/DL
EGFR: 41 ML/MIN/1.73M2
GLUCOSE SERPL-MCNC: 145 MG/DL
POTASSIUM SERPL-SCNC: 3.9 MMOL/L
PROT SERPL-MCNC: 7.3 G/DL
SODIUM SERPL-SCNC: 130 MMOL/L

## 2023-11-25 ENCOUNTER — APPOINTMENT (OUTPATIENT)
Dept: INFUSION THERAPY | Facility: HOSPITAL | Age: 73
End: 2023-11-25

## 2023-11-26 NOTE — DATA REVIEWED
26-Nov-2023 23:17 [FreeTextEntry1] : last blood work d/w the pt at length\par \par \par EKG - NSR at 80 bpm,  + Q waves  Self

## 2023-11-27 ENCOUNTER — NON-APPOINTMENT (OUTPATIENT)
Age: 73
End: 2023-11-27

## 2023-11-27 DIAGNOSIS — E86.0 DEHYDRATION: ICD-10-CM

## 2023-11-28 ENCOUNTER — NON-APPOINTMENT (OUTPATIENT)
Age: 73
End: 2023-11-28

## 2023-11-29 ENCOUNTER — RESULT REVIEW (OUTPATIENT)
Age: 73
End: 2023-11-29

## 2023-11-29 ENCOUNTER — APPOINTMENT (OUTPATIENT)
Dept: HEMATOLOGY ONCOLOGY | Facility: CLINIC | Age: 73
End: 2023-11-29

## 2023-11-29 ENCOUNTER — APPOINTMENT (OUTPATIENT)
Dept: INFUSION THERAPY | Facility: HOSPITAL | Age: 73
End: 2023-11-29

## 2023-11-29 LAB
ALBUMIN SERPL ELPH-MCNC: 3.6 G/DL — SIGNIFICANT CHANGE UP (ref 3.3–5)
ALBUMIN SERPL ELPH-MCNC: 3.6 G/DL — SIGNIFICANT CHANGE UP (ref 3.3–5)
ALP SERPL-CCNC: 62 U/L — SIGNIFICANT CHANGE UP (ref 40–120)
ALP SERPL-CCNC: 62 U/L — SIGNIFICANT CHANGE UP (ref 40–120)
ALT FLD-CCNC: 21 U/L — SIGNIFICANT CHANGE UP (ref 10–45)
ALT FLD-CCNC: 21 U/L — SIGNIFICANT CHANGE UP (ref 10–45)
ANION GAP SERPL CALC-SCNC: 13 MMOL/L — SIGNIFICANT CHANGE UP (ref 5–17)
ANION GAP SERPL CALC-SCNC: 13 MMOL/L — SIGNIFICANT CHANGE UP (ref 5–17)
AST SERPL-CCNC: 51 U/L — HIGH (ref 10–40)
AST SERPL-CCNC: 51 U/L — HIGH (ref 10–40)
BASOPHILS # BLD AUTO: 0.06 K/UL — SIGNIFICANT CHANGE UP (ref 0–0.2)
BASOPHILS # BLD AUTO: 0.06 K/UL — SIGNIFICANT CHANGE UP (ref 0–0.2)
BASOPHILS NFR BLD AUTO: 1 % — SIGNIFICANT CHANGE UP (ref 0–2)
BASOPHILS NFR BLD AUTO: 1 % — SIGNIFICANT CHANGE UP (ref 0–2)
BILIRUB SERPL-MCNC: 0.6 MG/DL — SIGNIFICANT CHANGE UP (ref 0.2–1.2)
BILIRUB SERPL-MCNC: 0.6 MG/DL — SIGNIFICANT CHANGE UP (ref 0.2–1.2)
BUN SERPL-MCNC: 12 MG/DL — SIGNIFICANT CHANGE UP (ref 7–23)
BUN SERPL-MCNC: 12 MG/DL — SIGNIFICANT CHANGE UP (ref 7–23)
CALCIUM SERPL-MCNC: 8.3 MG/DL — LOW (ref 8.4–10.5)
CALCIUM SERPL-MCNC: 8.3 MG/DL — LOW (ref 8.4–10.5)
CHLORIDE SERPL-SCNC: 90 MMOL/L — LOW (ref 96–108)
CHLORIDE SERPL-SCNC: 90 MMOL/L — LOW (ref 96–108)
CO2 SERPL-SCNC: 21 MMOL/L — LOW (ref 22–31)
CO2 SERPL-SCNC: 21 MMOL/L — LOW (ref 22–31)
CREAT SERPL-MCNC: 1.43 MG/DL — HIGH (ref 0.5–1.3)
CREAT SERPL-MCNC: 1.43 MG/DL — HIGH (ref 0.5–1.3)
EGFR: 51 ML/MIN/1.73M2 — LOW
EGFR: 51 ML/MIN/1.73M2 — LOW
EOSINOPHIL # BLD AUTO: 0 K/UL — SIGNIFICANT CHANGE UP (ref 0–0.5)
EOSINOPHIL # BLD AUTO: 0 K/UL — SIGNIFICANT CHANGE UP (ref 0–0.5)
EOSINOPHIL NFR BLD AUTO: 0 % — SIGNIFICANT CHANGE UP (ref 0–6)
EOSINOPHIL NFR BLD AUTO: 0 % — SIGNIFICANT CHANGE UP (ref 0–6)
GLUCOSE SERPL-MCNC: 119 MG/DL — HIGH (ref 70–99)
GLUCOSE SERPL-MCNC: 119 MG/DL — HIGH (ref 70–99)
HCT VFR BLD CALC: 32.7 % — LOW (ref 39–50)
HCT VFR BLD CALC: 32.7 % — LOW (ref 39–50)
HGB BLD-MCNC: 11.6 G/DL — LOW (ref 13–17)
HGB BLD-MCNC: 11.6 G/DL — LOW (ref 13–17)
LG PLATELETS BLD QL AUTO: SLIGHT — SIGNIFICANT CHANGE UP
LG PLATELETS BLD QL AUTO: SLIGHT — SIGNIFICANT CHANGE UP
LYMPHOCYTES # BLD AUTO: 1.63 K/UL — SIGNIFICANT CHANGE UP (ref 1–3.3)
LYMPHOCYTES # BLD AUTO: 1.63 K/UL — SIGNIFICANT CHANGE UP (ref 1–3.3)
LYMPHOCYTES # BLD AUTO: 27 % — SIGNIFICANT CHANGE UP (ref 13–44)
LYMPHOCYTES # BLD AUTO: 27 % — SIGNIFICANT CHANGE UP (ref 13–44)
MCHC RBC-ENTMCNC: 30.3 PG — SIGNIFICANT CHANGE UP (ref 27–34)
MCHC RBC-ENTMCNC: 30.3 PG — SIGNIFICANT CHANGE UP (ref 27–34)
MCHC RBC-ENTMCNC: 35.5 G/DL — SIGNIFICANT CHANGE UP (ref 32–36)
MCHC RBC-ENTMCNC: 35.5 G/DL — SIGNIFICANT CHANGE UP (ref 32–36)
MCV RBC AUTO: 85.4 FL — SIGNIFICANT CHANGE UP (ref 80–100)
MCV RBC AUTO: 85.4 FL — SIGNIFICANT CHANGE UP (ref 80–100)
METAMYELOCYTES # FLD: 7 % — HIGH (ref 0–0)
METAMYELOCYTES # FLD: 7 % — HIGH (ref 0–0)
MONOCYTES # BLD AUTO: 1.63 K/UL — HIGH (ref 0–0.9)
MONOCYTES # BLD AUTO: 1.63 K/UL — HIGH (ref 0–0.9)
MONOCYTES NFR BLD AUTO: 27 % — HIGH (ref 2–14)
MONOCYTES NFR BLD AUTO: 27 % — HIGH (ref 2–14)
MYELOCYTES NFR BLD: 7 % — HIGH (ref 0–0)
MYELOCYTES NFR BLD: 7 % — HIGH (ref 0–0)
NEUTROPHILS # BLD AUTO: 1.88 K/UL — SIGNIFICANT CHANGE UP (ref 1.8–7.4)
NEUTROPHILS # BLD AUTO: 1.88 K/UL — SIGNIFICANT CHANGE UP (ref 1.8–7.4)
NEUTROPHILS NFR BLD AUTO: 31 % — LOW (ref 43–77)
NEUTROPHILS NFR BLD AUTO: 31 % — LOW (ref 43–77)
NRBC # BLD: 0 /100 — SIGNIFICANT CHANGE UP (ref 0–0)
NRBC # BLD: 0 /100 — SIGNIFICANT CHANGE UP (ref 0–0)
NRBC # BLD: SIGNIFICANT CHANGE UP /100 WBCS (ref 0–0)
NRBC # BLD: SIGNIFICANT CHANGE UP /100 WBCS (ref 0–0)
PLAT MORPH BLD: ABNORMAL
PLAT MORPH BLD: ABNORMAL
PLATELET # BLD AUTO: 275 K/UL — SIGNIFICANT CHANGE UP (ref 150–400)
PLATELET # BLD AUTO: 275 K/UL — SIGNIFICANT CHANGE UP (ref 150–400)
POTASSIUM SERPL-MCNC: 3.4 MMOL/L — LOW (ref 3.5–5.3)
POTASSIUM SERPL-MCNC: 3.4 MMOL/L — LOW (ref 3.5–5.3)
POTASSIUM SERPL-SCNC: 3.4 MMOL/L — LOW (ref 3.5–5.3)
POTASSIUM SERPL-SCNC: 3.4 MMOL/L — LOW (ref 3.5–5.3)
PROT SERPL-MCNC: 6.8 G/DL — SIGNIFICANT CHANGE UP (ref 6–8.3)
PROT SERPL-MCNC: 6.8 G/DL — SIGNIFICANT CHANGE UP (ref 6–8.3)
RBC # BLD: 3.83 M/UL — LOW (ref 4.2–5.8)
RBC # BLD: 3.83 M/UL — LOW (ref 4.2–5.8)
RBC # FLD: 14.7 % — HIGH (ref 10.3–14.5)
RBC # FLD: 14.7 % — HIGH (ref 10.3–14.5)
RBC BLD AUTO: SIGNIFICANT CHANGE UP
RBC BLD AUTO: SIGNIFICANT CHANGE UP
SODIUM SERPL-SCNC: 124 MMOL/L — LOW (ref 135–145)
SODIUM SERPL-SCNC: 124 MMOL/L — LOW (ref 135–145)
T4 FREE SERPL-MCNC: 1.9 NG/DL — HIGH (ref 0.9–1.8)
T4 FREE SERPL-MCNC: 1.9 NG/DL — HIGH (ref 0.9–1.8)
TSH SERPL-MCNC: 2.23 UIU/ML — SIGNIFICANT CHANGE UP (ref 0.27–4.2)
TSH SERPL-MCNC: 2.23 UIU/ML — SIGNIFICANT CHANGE UP (ref 0.27–4.2)
WBC # BLD: 6.05 K/UL — SIGNIFICANT CHANGE UP (ref 3.8–10.5)
WBC # BLD: 6.05 K/UL — SIGNIFICANT CHANGE UP (ref 3.8–10.5)
WBC # FLD AUTO: 6.05 K/UL — SIGNIFICANT CHANGE UP (ref 3.8–10.5)
WBC # FLD AUTO: 6.05 K/UL — SIGNIFICANT CHANGE UP (ref 3.8–10.5)

## 2023-11-29 NOTE — ASU DISCHARGE PLAN (ADULT/PEDIATRIC) - ASU DC SPECIAL INSTRUCTIONSFT
· Stent: You have an internal stent (a hollow tube that runs from the kidney to your bladder) after your procedure, which helps urine drain from the kidney to your bladder. Some patients experience urinary frequency, burning, or even back pain (especially with urination). These sensations will gradually get better. Increasing your fluid intake can also improve these symptoms. While the stent is in place, your urine may continue to be bloody. This stent is temporary and must be removed or exchanged by your urologist as an outpatient within 3 months unless otherwise specified.    · Catheter: The nurses will review instructions and care before you go home.     · General: It is common to have blood in the urine after your procedure. It may be pink or even red; inform your doctor if you have a significant amount of clot in the urine or if you are unable to void at all. The urine may clear and then become bloody again especially as you are more physically active.    · Bathing: You may shower or bathe.    · Diet: You may resume your regular diet and regular medication regimen.    · Pain: You may take Tylenol (acetaminophen) 650-975mg    · Antibiotics: You have been given a prescription for an antibiotic, please take this medication as instructed and be sure to complete entire course.    · Stool softeners: Do not allow yourself to become constipated as this may increase your bother from the stent and/or straining may cause bleeding. Take stool softeners (ex. Colace) or a laxative (ex. Senekot, ExLax), available over the counter, if needed.    · Activity: No heavy lifting or strenuous exercise until you are evaluated at your post-operative appointment. Otherwise, you may return to your usual level of activity.    · Follow-up: If you did not already schedule your post-operative appointment, please call your urologist to schedule a follow-up appointment.    · Call your urologist if: You have any bleeding that does not stop, inability to void >8 hours, fever over 100.4 F, chills, persistent nausea/vomiting, or if your pain is not controlled on your discharge pain medications.
yes

## 2023-12-01 ENCOUNTER — NON-APPOINTMENT (OUTPATIENT)
Age: 73
End: 2023-12-01

## 2023-12-01 ENCOUNTER — RESULT REVIEW (OUTPATIENT)
Age: 73
End: 2023-12-01

## 2023-12-01 ENCOUNTER — APPOINTMENT (OUTPATIENT)
Dept: INFUSION THERAPY | Facility: HOSPITAL | Age: 73
End: 2023-12-01

## 2023-12-01 LAB
OSMOLALITY UR: 209 MOSM/KG — SIGNIFICANT CHANGE UP (ref 50–1200)
OSMOLALITY UR: 209 MOSM/KG — SIGNIFICANT CHANGE UP (ref 50–1200)

## 2023-12-02 LAB
ALBUMIN SERPL ELPH-MCNC: 3.4 G/DL — SIGNIFICANT CHANGE UP (ref 3.3–5)
ALBUMIN SERPL ELPH-MCNC: 3.4 G/DL — SIGNIFICANT CHANGE UP (ref 3.3–5)
ALP SERPL-CCNC: 55 U/L — SIGNIFICANT CHANGE UP (ref 40–120)
ALP SERPL-CCNC: 55 U/L — SIGNIFICANT CHANGE UP (ref 40–120)
ALT FLD-CCNC: 25 U/L — SIGNIFICANT CHANGE UP (ref 10–45)
ALT FLD-CCNC: 25 U/L — SIGNIFICANT CHANGE UP (ref 10–45)
ANION GAP SERPL CALC-SCNC: 17 MMOL/L — SIGNIFICANT CHANGE UP (ref 5–17)
ANION GAP SERPL CALC-SCNC: 17 MMOL/L — SIGNIFICANT CHANGE UP (ref 5–17)
AST SERPL-CCNC: 48 U/L — HIGH (ref 10–40)
AST SERPL-CCNC: 48 U/L — HIGH (ref 10–40)
BILIRUB SERPL-MCNC: 0.4 MG/DL — SIGNIFICANT CHANGE UP (ref 0.2–1.2)
BILIRUB SERPL-MCNC: 0.4 MG/DL — SIGNIFICANT CHANGE UP (ref 0.2–1.2)
BUN SERPL-MCNC: 12 MG/DL — SIGNIFICANT CHANGE UP (ref 7–23)
BUN SERPL-MCNC: 12 MG/DL — SIGNIFICANT CHANGE UP (ref 7–23)
CALCIUM SERPL-MCNC: 8 MG/DL — LOW (ref 8.4–10.5)
CALCIUM SERPL-MCNC: 8 MG/DL — LOW (ref 8.4–10.5)
CHLORIDE SERPL-SCNC: 97 MMOL/L — SIGNIFICANT CHANGE UP (ref 96–108)
CHLORIDE SERPL-SCNC: 97 MMOL/L — SIGNIFICANT CHANGE UP (ref 96–108)
CO2 SERPL-SCNC: 16 MMOL/L — LOW (ref 22–31)
CO2 SERPL-SCNC: 16 MMOL/L — LOW (ref 22–31)
CREAT SERPL-MCNC: 1.51 MG/DL — HIGH (ref 0.5–1.3)
CREAT SERPL-MCNC: 1.51 MG/DL — HIGH (ref 0.5–1.3)
EGFR: 48 ML/MIN/1.73M2 — LOW
EGFR: 48 ML/MIN/1.73M2 — LOW
GLUCOSE SERPL-MCNC: 83 MG/DL — SIGNIFICANT CHANGE UP (ref 70–99)
GLUCOSE SERPL-MCNC: 83 MG/DL — SIGNIFICANT CHANGE UP (ref 70–99)
MAGNESIUM SERPL-MCNC: 1.3 MG/DL — LOW (ref 1.6–2.6)
MAGNESIUM SERPL-MCNC: 1.3 MG/DL — LOW (ref 1.6–2.6)
OSMOLALITY SERPL: 268 MOSMOL/KG — LOW (ref 280–301)
OSMOLALITY SERPL: 268 MOSMOL/KG — LOW (ref 280–301)
PHOSPHATE SERPL-MCNC: 2.8 MG/DL — SIGNIFICANT CHANGE UP (ref 2.5–4.5)
PHOSPHATE SERPL-MCNC: 2.8 MG/DL — SIGNIFICANT CHANGE UP (ref 2.5–4.5)
POTASSIUM SERPL-MCNC: 3.9 MMOL/L — SIGNIFICANT CHANGE UP (ref 3.5–5.3)
POTASSIUM SERPL-MCNC: 3.9 MMOL/L — SIGNIFICANT CHANGE UP (ref 3.5–5.3)
POTASSIUM SERPL-SCNC: 3.9 MMOL/L — SIGNIFICANT CHANGE UP (ref 3.5–5.3)
POTASSIUM SERPL-SCNC: 3.9 MMOL/L — SIGNIFICANT CHANGE UP (ref 3.5–5.3)
PROT SERPL-MCNC: 6.7 G/DL — SIGNIFICANT CHANGE UP (ref 6–8.3)
PROT SERPL-MCNC: 6.7 G/DL — SIGNIFICANT CHANGE UP (ref 6–8.3)
SODIUM SERPL-SCNC: 130 MMOL/L — LOW (ref 135–145)
SODIUM SERPL-SCNC: 130 MMOL/L — LOW (ref 135–145)
SODIUM UR-SCNC: 29 MMOL/L — SIGNIFICANT CHANGE UP
SODIUM UR-SCNC: 29 MMOL/L — SIGNIFICANT CHANGE UP

## 2023-12-04 DIAGNOSIS — E83.42 HYPOMAGNESEMIA: ICD-10-CM

## 2023-12-06 ENCOUNTER — RESULT REVIEW (OUTPATIENT)
Age: 73
End: 2023-12-06

## 2023-12-06 ENCOUNTER — APPOINTMENT (OUTPATIENT)
Dept: INFUSION THERAPY | Facility: HOSPITAL | Age: 73
End: 2023-12-06

## 2023-12-06 ENCOUNTER — NON-APPOINTMENT (OUTPATIENT)
Age: 73
End: 2023-12-06

## 2023-12-06 ENCOUNTER — APPOINTMENT (OUTPATIENT)
Dept: HEMATOLOGY ONCOLOGY | Facility: CLINIC | Age: 73
End: 2023-12-06

## 2023-12-07 LAB
ANION GAP SERPL CALC-SCNC: 18 MMOL/L — HIGH (ref 5–17)
ANION GAP SERPL CALC-SCNC: 18 MMOL/L — HIGH (ref 5–17)
BUN SERPL-MCNC: 29 MG/DL — HIGH (ref 7–23)
BUN SERPL-MCNC: 29 MG/DL — HIGH (ref 7–23)
CALCIUM SERPL-MCNC: 9.2 MG/DL — SIGNIFICANT CHANGE UP (ref 8.4–10.5)
CALCIUM SERPL-MCNC: 9.2 MG/DL — SIGNIFICANT CHANGE UP (ref 8.4–10.5)
CHLORIDE SERPL-SCNC: 101 MMOL/L — SIGNIFICANT CHANGE UP (ref 96–108)
CHLORIDE SERPL-SCNC: 101 MMOL/L — SIGNIFICANT CHANGE UP (ref 96–108)
CO2 SERPL-SCNC: 17 MMOL/L — LOW (ref 22–31)
CO2 SERPL-SCNC: 17 MMOL/L — LOW (ref 22–31)
CREAT SERPL-MCNC: 1.75 MG/DL — HIGH (ref 0.5–1.3)
CREAT SERPL-MCNC: 1.75 MG/DL — HIGH (ref 0.5–1.3)
EGFR: 40 ML/MIN/1.73M2 — LOW
EGFR: 40 ML/MIN/1.73M2 — LOW
GLUCOSE SERPL-MCNC: 59 MG/DL — LOW (ref 70–99)
GLUCOSE SERPL-MCNC: 59 MG/DL — LOW (ref 70–99)
MAGNESIUM SERPL-MCNC: 2.7 MG/DL — HIGH (ref 1.6–2.6)
MAGNESIUM SERPL-MCNC: 2.7 MG/DL — HIGH (ref 1.6–2.6)
POTASSIUM SERPL-MCNC: 4.2 MMOL/L — SIGNIFICANT CHANGE UP (ref 3.5–5.3)
POTASSIUM SERPL-MCNC: 4.2 MMOL/L — SIGNIFICANT CHANGE UP (ref 3.5–5.3)
POTASSIUM SERPL-SCNC: 4.2 MMOL/L — SIGNIFICANT CHANGE UP (ref 3.5–5.3)
POTASSIUM SERPL-SCNC: 4.2 MMOL/L — SIGNIFICANT CHANGE UP (ref 3.5–5.3)
SODIUM SERPL-SCNC: 136 MMOL/L — SIGNIFICANT CHANGE UP (ref 135–145)
SODIUM SERPL-SCNC: 136 MMOL/L — SIGNIFICANT CHANGE UP (ref 135–145)

## 2023-12-13 ENCOUNTER — RESULT REVIEW (OUTPATIENT)
Age: 73
End: 2023-12-13

## 2023-12-13 ENCOUNTER — APPOINTMENT (OUTPATIENT)
Dept: HEMATOLOGY ONCOLOGY | Facility: CLINIC | Age: 73
End: 2023-12-13
Payer: MEDICARE

## 2023-12-13 VITALS
DIASTOLIC BLOOD PRESSURE: 71 MMHG | SYSTOLIC BLOOD PRESSURE: 110 MMHG | WEIGHT: 216.05 LBS | HEART RATE: 90 BPM | TEMPERATURE: 97.5 F | RESPIRATION RATE: 16 BRPM | OXYGEN SATURATION: 98 % | BODY MASS INDEX: 27 KG/M2

## 2023-12-13 LAB
BASOPHILS # BLD AUTO: 0.09 K/UL — SIGNIFICANT CHANGE UP (ref 0–0.2)
BASOPHILS # BLD AUTO: 0.09 K/UL — SIGNIFICANT CHANGE UP (ref 0–0.2)
BASOPHILS NFR BLD AUTO: 1.2 % — SIGNIFICANT CHANGE UP (ref 0–2)
BASOPHILS NFR BLD AUTO: 1.2 % — SIGNIFICANT CHANGE UP (ref 0–2)
EOSINOPHIL # BLD AUTO: 0.1 K/UL — SIGNIFICANT CHANGE UP (ref 0–0.5)
EOSINOPHIL # BLD AUTO: 0.1 K/UL — SIGNIFICANT CHANGE UP (ref 0–0.5)
EOSINOPHIL NFR BLD AUTO: 1.3 % — SIGNIFICANT CHANGE UP (ref 0–6)
EOSINOPHIL NFR BLD AUTO: 1.3 % — SIGNIFICANT CHANGE UP (ref 0–6)
HCT VFR BLD CALC: 34.8 % — LOW (ref 39–50)
HCT VFR BLD CALC: 34.8 % — LOW (ref 39–50)
HGB BLD-MCNC: 11.7 G/DL — LOW (ref 13–17)
HGB BLD-MCNC: 11.7 G/DL — LOW (ref 13–17)
IMM GRANULOCYTES NFR BLD AUTO: 0.5 % — SIGNIFICANT CHANGE UP (ref 0–0.9)
IMM GRANULOCYTES NFR BLD AUTO: 0.5 % — SIGNIFICANT CHANGE UP (ref 0–0.9)
LYMPHOCYTES # BLD AUTO: 1.48 K/UL — SIGNIFICANT CHANGE UP (ref 1–3.3)
LYMPHOCYTES # BLD AUTO: 1.48 K/UL — SIGNIFICANT CHANGE UP (ref 1–3.3)
LYMPHOCYTES # BLD AUTO: 19.2 % — SIGNIFICANT CHANGE UP (ref 13–44)
LYMPHOCYTES # BLD AUTO: 19.2 % — SIGNIFICANT CHANGE UP (ref 13–44)
MCHC RBC-ENTMCNC: 31.1 PG — SIGNIFICANT CHANGE UP (ref 27–34)
MCHC RBC-ENTMCNC: 31.1 PG — SIGNIFICANT CHANGE UP (ref 27–34)
MCHC RBC-ENTMCNC: 33.6 G/DL — SIGNIFICANT CHANGE UP (ref 32–36)
MCHC RBC-ENTMCNC: 33.6 G/DL — SIGNIFICANT CHANGE UP (ref 32–36)
MCV RBC AUTO: 92.8 FL — SIGNIFICANT CHANGE UP (ref 80–100)
MCV RBC AUTO: 92.8 FL — SIGNIFICANT CHANGE UP (ref 80–100)
MONOCYTES # BLD AUTO: 0.84 K/UL — SIGNIFICANT CHANGE UP (ref 0–0.9)
MONOCYTES # BLD AUTO: 0.84 K/UL — SIGNIFICANT CHANGE UP (ref 0–0.9)
MONOCYTES NFR BLD AUTO: 10.9 % — SIGNIFICANT CHANGE UP (ref 2–14)
MONOCYTES NFR BLD AUTO: 10.9 % — SIGNIFICANT CHANGE UP (ref 2–14)
NEUTROPHILS # BLD AUTO: 5.14 K/UL — SIGNIFICANT CHANGE UP (ref 1.8–7.4)
NEUTROPHILS # BLD AUTO: 5.14 K/UL — SIGNIFICANT CHANGE UP (ref 1.8–7.4)
NEUTROPHILS NFR BLD AUTO: 66.9 % — SIGNIFICANT CHANGE UP (ref 43–77)
NEUTROPHILS NFR BLD AUTO: 66.9 % — SIGNIFICANT CHANGE UP (ref 43–77)
NRBC # BLD: 0 /100 WBCS — SIGNIFICANT CHANGE UP (ref 0–0)
NRBC # BLD: 0 /100 WBCS — SIGNIFICANT CHANGE UP (ref 0–0)
PLATELET # BLD AUTO: 192 K/UL — SIGNIFICANT CHANGE UP (ref 150–400)
PLATELET # BLD AUTO: 192 K/UL — SIGNIFICANT CHANGE UP (ref 150–400)
RBC # BLD: 3.76 M/UL — LOW (ref 4.2–5.8)
RBC # BLD: 3.76 M/UL — LOW (ref 4.2–5.8)
RBC # FLD: 16 % — HIGH (ref 10.3–14.5)
RBC # FLD: 16 % — HIGH (ref 10.3–14.5)
WBC # BLD: 7.69 K/UL — SIGNIFICANT CHANGE UP (ref 3.8–10.5)
WBC # BLD: 7.69 K/UL — SIGNIFICANT CHANGE UP (ref 3.8–10.5)
WBC # FLD AUTO: 7.69 K/UL — SIGNIFICANT CHANGE UP (ref 3.8–10.5)
WBC # FLD AUTO: 7.69 K/UL — SIGNIFICANT CHANGE UP (ref 3.8–10.5)

## 2023-12-13 PROCEDURE — 99214 OFFICE O/P EST MOD 30 MIN: CPT

## 2023-12-13 RX ORDER — LORAZEPAM 0.5 MG/1
0.5 TABLET ORAL
Qty: 30 | Refills: 0 | Status: DISCONTINUED | COMMUNITY
Start: 2023-11-22 | End: 2023-12-13

## 2023-12-14 LAB
ANION GAP SERPL CALC-SCNC: 12 MMOL/L
BUN SERPL-MCNC: 32 MG/DL
CALCIUM SERPL-MCNC: 9.4 MG/DL
CHLORIDE SERPL-SCNC: 101 MMOL/L
CO2 SERPL-SCNC: 24 MMOL/L
CREAT SERPL-MCNC: 1.74 MG/DL
EGFR: 40 ML/MIN/1.73M2
GLUCOSE SERPL-MCNC: 94 MG/DL
MAGNESIUM SERPL-MCNC: 2 MG/DL
PHOSPHATE SERPL-MCNC: 4.2 MG/DL
POTASSIUM SERPL-SCNC: 4.1 MMOL/L
SODIUM SERPL-SCNC: 136 MMOL/L

## 2023-12-15 NOTE — REVIEW OF SYSTEMS
[Fever] : no fever [Chest Pain] : no chest pain [Lower Ext Edema] : no lower extremity edema [Cough] : no cough [Abdominal Pain] : no abdominal pain [Vomiting] : no vomiting [Constipation] : no constipation [Dysuria] : no dysuria [Skin Rash] : no skin rash [Dizziness] : no dizziness [FreeTextEntry9] : lower back `

## 2023-12-15 NOTE — HISTORY OF PRESENT ILLNESS
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  5/20/23 CT abdomen and pelvis w/o contrast showed mod to severe left hydroureteronephrosis to the left of a filling defect within the left mid ureter, neoplasm until proven otherwise.  7/18/23 CT chest and urogram showed Interval placement of left-sided ureteral stent with continued moderate hydroureteronephrosis. Point of transition in the left mid ureter. There is prominent soft tissue and a lack of contrast on the delayed films. This raises concern for an underlying neoplasm.  9/2/23 CT abdomen and pelvis without con showed left hydronephrosis without significant changes, left ureteral stent placement.   He was scheduled for segmental ureterectomy but ureteroscopy on 9/1 noted a 2.5 cm segment in the mid left ureter (now solitary kidney) with new disease and a 1.5 cm in the  distal ureter  lesion, and patholoy showed TaHG in the mid ureter and TaHG in the distal ureter as well as TaHG in the kidney with T1HG in the bladder.     He reports no hematuria, burning, frequency and abdominal pain.  He has chronic lower back pain for many years on oxycodon as needed.    11/1/23 Has newly developed skin itching and mild rash that has not been improving with OTC topicals. He also has intermittent headaches relieved by Tylenol. He reports increased fatigue and decreased sleep. He denied nausea, vomiting, diarrhea, constipation. [de-identified] : 11/22/23: C2D15 pembrolizumab and padcev. Patient has decreased appetite, has not been eating much at all in the past few weeks and has lost 10lbs. He is feeling very weak, very fatigued. Patient is so tired that he had to be wheeled to exam room in wheelchair. He was able to walk from the hallway into the exam room. He needs assistance getting up from a sitting position. He will occ get shortness of breath on exertion and this has worsened in the past few weeks. Denies chest pain but says once in a while he has palpitations when trying to sleep. He is not sure if palpitations happen with exertion because he has not been able to exert himself very much of late. Patient denies fever, he is cold all of the time. Had chills the other day, took temperature and it was normal. He is exhausted because he has not been able to sleep. He only sleeps for 15 minutes at a time at night and it is a very light sleep. He has never suffered from insomnia before. He is not taking naps during the day. He was already having difficulty sleeping during cylce 1 but it has worsened tremendously with this cycle. He has been using hydroxyzine and benadryl for itching, neither of which have made him drowsy. He has been using melatonin which does not help. He tried his wife's ambien, which also did not help. Patient has ongoing itching but no rashes, says he is "itching, itching, always itching". Says that the prescribed medications have not worked, including betamethasone cream and hydroxyzine. Has been using Eucerin and Sarna creams, sitting in Epsom salt baths. Patient was constipated, however now is having loose stools twice a day for the last three days, has stopped taking bowel regimen. Pt denies numbness and tingling in hands and feet. Patient reports chronic low back pain that pre dates his cancer diagnosis, the pain is not cancer related, has been dealing with this with his PCP and ?NSGY, initially surgery was recommended but now is not. Was going to PT but is no longer because it was not helping. He takes oxycodone for this pain, he says sometimes he will go days without taking it and then he will need three tabs (30mg) at one time, which is not how this medication is prescribed.    12/13/23 He continues to have rash which appears to be improving. However he and his wife are concerned that his skin is darkening (even in non sun exposed areas). He also states that recentl he is "always cold." He denies diarrhea or constipation. He continues to have some insomnia

## 2023-12-15 NOTE — ASSESSMENT
[FreeTextEntry1] : 73 year old male s/p right nephroureterectomy June 2022 with pT3 in renal pelvis (10% squamous differentiation) and pTa in the ureter and has since had several bouts of T1HG in the bladder July 2022, Feb 2023. 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 At initial appointment, Dr. Mccall discussed his cancer status and further management. Based on AUA and EAU guideline subjects with upper tract tumors of the renal pelvis and ureter(s) must meet a high risk assessment defined as: tumor 1cm and/or hydronephrosis and/or high grade pathology and/or multifocal disease, where a radical NU approach to treat localized disease is warranted, followed by adjuvant immunotherapy. However, he adamantly declined any surgery given his solitary kidney and the result of dialysis. He is cisplatin ineligible. The potential regimen, pembrolizumab and enfortumab vedotin could induce 73% response including 15% CR rate. His T1HG bladder cancer may be treated with both combination. He will be monitored by Dr. Starkey for surveillance cystoscopy and ureteroscopy. Potential AEs of immunotherapy and enfortumab vedotin were discussed and patient signed consent. He started treatment with pembrolizumab and padcev on 10/18/2023  Renal pelvis UCC, high grade  - was on pembrolizumab D1 and enfortumab vedotin (padcev) D1, D8 on 21 day cycle, today is C2D15,  C3D1 is 11/29; enfortumab vedotin held for C3. Will resume enfortumab vedotin for C4 with pembro - re-iterated potential AEs of keytruda, including but not limited to: fatigue, skin rash, joint pain, hypothyroidism, pneumonitis, hepatitis, nephritis, colitis, myocarditis, pericarditis, hypophysitis. - re-iterated potential AEs of padcev, including but not limited to fatigue, skin rash, worsening diabetes, eye problem and neuropathy and GI symptoms - continue to monitor blood work on treatment - imaging last: CT Chest 10/13, CT AP 9/2, will obtain repeat CT scan now that he has completed Cycle 3  - continue to follow with Dr. Starkey for surveillance cystoscopy and ureteroscopy  Pruritis  - pt initially had rash with first cycle of padcev/pembro, this has since resolved, however itching persisted but has now resolved  - reports betamethasone cream and PO benadryl have not been effective  - continue Eucerin and Sarna lotion - continue hydroxyzine q8h PRN, advised pt not to use this medication with benadryl, oxycodone or ativan  - patient has previously been referred to Dermatology and has yet to schedule appt / be seen     Insomnia  - patient has tried melatonin, benadryl and ambien without relief, he denies feeling anxious or having things on his mind keeping him up...  - Rx ativan 0.5mg, may take 1-2 tablets at bedtime as needed, discussed that this medication should not be taken with oxycodone or any other sedating medications, do not drive or operate machinery while taking this medication   Chronic low back pain  - this pain is chronic and is not cancer related pain, he has been prescribed oxy 10mg q8h PRN by another provider, however he says that he sometimes takes 30mg at a time... he was advised not to do this and to follow with his PCP / NSGY to discuss further management - 1 weeks worth of oxycodone was refilled at this time  - discussed risk/benefits of opioids, not to operate vehicle or machinery while using these medications, do not take with ativan or other sedating medications   Decreased appetite  - encouraged frequent high protein, calorically dense small meals throughout the day  Instructed to contact our office with any new/worsening symptoms. Patient educated regarding plan of care, all questions/concerns addressed to the best of my abilities and patient's apparent satisfaction.   Patient seen/examined and discussed with Dr.Zhu Jeromy Santos MD PGY6 Hematology/Oncology Fellow I have participated in history collection, physical exam and making assessment and plan through the whole visit. I also reviewed and edited the note.

## 2023-12-20 ENCOUNTER — RESULT REVIEW (OUTPATIENT)
Age: 73
End: 2023-12-20

## 2023-12-20 ENCOUNTER — APPOINTMENT (OUTPATIENT)
Dept: INFUSION THERAPY | Facility: HOSPITAL | Age: 73
End: 2023-12-20

## 2023-12-20 LAB
ANION GAP SERPL CALC-SCNC: 12 MMOL/L — SIGNIFICANT CHANGE UP (ref 5–17)
ANION GAP SERPL CALC-SCNC: 12 MMOL/L — SIGNIFICANT CHANGE UP (ref 5–17)
BASOPHILS # BLD AUTO: 0.06 K/UL — SIGNIFICANT CHANGE UP (ref 0–0.2)
BASOPHILS # BLD AUTO: 0.06 K/UL — SIGNIFICANT CHANGE UP (ref 0–0.2)
BASOPHILS NFR BLD AUTO: 0.9 % — SIGNIFICANT CHANGE UP (ref 0–2)
BASOPHILS NFR BLD AUTO: 0.9 % — SIGNIFICANT CHANGE UP (ref 0–2)
BUN SERPL-MCNC: 26 MG/DL — HIGH (ref 7–23)
BUN SERPL-MCNC: 26 MG/DL — HIGH (ref 7–23)
CALCIUM SERPL-MCNC: 8.8 MG/DL — SIGNIFICANT CHANGE UP (ref 8.4–10.5)
CALCIUM SERPL-MCNC: 8.8 MG/DL — SIGNIFICANT CHANGE UP (ref 8.4–10.5)
CHLORIDE SERPL-SCNC: 104 MMOL/L — SIGNIFICANT CHANGE UP (ref 96–108)
CHLORIDE SERPL-SCNC: 104 MMOL/L — SIGNIFICANT CHANGE UP (ref 96–108)
CO2 SERPL-SCNC: 23 MMOL/L — SIGNIFICANT CHANGE UP (ref 22–31)
CO2 SERPL-SCNC: 23 MMOL/L — SIGNIFICANT CHANGE UP (ref 22–31)
CREAT SERPL-MCNC: 1.59 MG/DL — HIGH (ref 0.5–1.3)
CREAT SERPL-MCNC: 1.59 MG/DL — HIGH (ref 0.5–1.3)
EGFR: 45 ML/MIN/1.73M2 — LOW
EGFR: 45 ML/MIN/1.73M2 — LOW
EOSINOPHIL # BLD AUTO: 0.1 K/UL — SIGNIFICANT CHANGE UP (ref 0–0.5)
EOSINOPHIL # BLD AUTO: 0.1 K/UL — SIGNIFICANT CHANGE UP (ref 0–0.5)
EOSINOPHIL NFR BLD AUTO: 1.5 % — SIGNIFICANT CHANGE UP (ref 0–6)
EOSINOPHIL NFR BLD AUTO: 1.5 % — SIGNIFICANT CHANGE UP (ref 0–6)
GLUCOSE SERPL-MCNC: 121 MG/DL — HIGH (ref 70–99)
GLUCOSE SERPL-MCNC: 121 MG/DL — HIGH (ref 70–99)
HCT VFR BLD CALC: 33.6 % — LOW (ref 39–50)
HCT VFR BLD CALC: 33.6 % — LOW (ref 39–50)
HGB BLD-MCNC: 11.1 G/DL — LOW (ref 13–17)
HGB BLD-MCNC: 11.1 G/DL — LOW (ref 13–17)
IMM GRANULOCYTES NFR BLD AUTO: 0.8 % — SIGNIFICANT CHANGE UP (ref 0–0.9)
IMM GRANULOCYTES NFR BLD AUTO: 0.8 % — SIGNIFICANT CHANGE UP (ref 0–0.9)
LYMPHOCYTES # BLD AUTO: 1.05 K/UL — SIGNIFICANT CHANGE UP (ref 1–3.3)
LYMPHOCYTES # BLD AUTO: 1.05 K/UL — SIGNIFICANT CHANGE UP (ref 1–3.3)
LYMPHOCYTES # BLD AUTO: 15.8 % — SIGNIFICANT CHANGE UP (ref 13–44)
LYMPHOCYTES # BLD AUTO: 15.8 % — SIGNIFICANT CHANGE UP (ref 13–44)
MCHC RBC-ENTMCNC: 30.7 PG — SIGNIFICANT CHANGE UP (ref 27–34)
MCHC RBC-ENTMCNC: 30.7 PG — SIGNIFICANT CHANGE UP (ref 27–34)
MCHC RBC-ENTMCNC: 33 G/DL — SIGNIFICANT CHANGE UP (ref 32–36)
MCHC RBC-ENTMCNC: 33 G/DL — SIGNIFICANT CHANGE UP (ref 32–36)
MCV RBC AUTO: 93.1 FL — SIGNIFICANT CHANGE UP (ref 80–100)
MCV RBC AUTO: 93.1 FL — SIGNIFICANT CHANGE UP (ref 80–100)
MONOCYTES # BLD AUTO: 0.73 K/UL — SIGNIFICANT CHANGE UP (ref 0–0.9)
MONOCYTES # BLD AUTO: 0.73 K/UL — SIGNIFICANT CHANGE UP (ref 0–0.9)
MONOCYTES NFR BLD AUTO: 11 % — SIGNIFICANT CHANGE UP (ref 2–14)
MONOCYTES NFR BLD AUTO: 11 % — SIGNIFICANT CHANGE UP (ref 2–14)
NEUTROPHILS # BLD AUTO: 4.66 K/UL — SIGNIFICANT CHANGE UP (ref 1.8–7.4)
NEUTROPHILS # BLD AUTO: 4.66 K/UL — SIGNIFICANT CHANGE UP (ref 1.8–7.4)
NEUTROPHILS NFR BLD AUTO: 70 % — SIGNIFICANT CHANGE UP (ref 43–77)
NEUTROPHILS NFR BLD AUTO: 70 % — SIGNIFICANT CHANGE UP (ref 43–77)
NRBC # BLD: 0 /100 WBCS — SIGNIFICANT CHANGE UP (ref 0–0)
NRBC # BLD: 0 /100 WBCS — SIGNIFICANT CHANGE UP (ref 0–0)
PLATELET # BLD AUTO: 267 K/UL — SIGNIFICANT CHANGE UP (ref 150–400)
PLATELET # BLD AUTO: 267 K/UL — SIGNIFICANT CHANGE UP (ref 150–400)
POTASSIUM SERPL-MCNC: 3.9 MMOL/L — SIGNIFICANT CHANGE UP (ref 3.5–5.3)
POTASSIUM SERPL-MCNC: 3.9 MMOL/L — SIGNIFICANT CHANGE UP (ref 3.5–5.3)
POTASSIUM SERPL-SCNC: 3.9 MMOL/L — SIGNIFICANT CHANGE UP (ref 3.5–5.3)
POTASSIUM SERPL-SCNC: 3.9 MMOL/L — SIGNIFICANT CHANGE UP (ref 3.5–5.3)
RBC # BLD: 3.61 M/UL — LOW (ref 4.2–5.8)
RBC # BLD: 3.61 M/UL — LOW (ref 4.2–5.8)
RBC # FLD: 16.3 % — HIGH (ref 10.3–14.5)
RBC # FLD: 16.3 % — HIGH (ref 10.3–14.5)
SODIUM SERPL-SCNC: 139 MMOL/L — SIGNIFICANT CHANGE UP (ref 135–145)
SODIUM SERPL-SCNC: 139 MMOL/L — SIGNIFICANT CHANGE UP (ref 135–145)
WBC # BLD: 6.65 K/UL — SIGNIFICANT CHANGE UP (ref 3.8–10.5)
WBC # BLD: 6.65 K/UL — SIGNIFICANT CHANGE UP (ref 3.8–10.5)
WBC # FLD AUTO: 6.65 K/UL — SIGNIFICANT CHANGE UP (ref 3.8–10.5)
WBC # FLD AUTO: 6.65 K/UL — SIGNIFICANT CHANGE UP (ref 3.8–10.5)

## 2023-12-22 ENCOUNTER — RESULT REVIEW (OUTPATIENT)
Age: 73
End: 2023-12-22

## 2023-12-27 ENCOUNTER — RESULT REVIEW (OUTPATIENT)
Age: 73
End: 2023-12-27

## 2023-12-27 ENCOUNTER — OUTPATIENT (OUTPATIENT)
Dept: OUTPATIENT SERVICES | Facility: HOSPITAL | Age: 73
LOS: 1 days | End: 2023-12-27
Payer: MEDICARE

## 2023-12-27 ENCOUNTER — APPOINTMENT (OUTPATIENT)
Dept: CT IMAGING | Facility: IMAGING CENTER | Age: 73
End: 2023-12-27
Payer: MEDICARE

## 2023-12-27 ENCOUNTER — APPOINTMENT (OUTPATIENT)
Dept: INFUSION THERAPY | Facility: HOSPITAL | Age: 73
End: 2023-12-27

## 2023-12-27 DIAGNOSIS — Z90.5 ACQUIRED ABSENCE OF KIDNEY: Chronic | ICD-10-CM

## 2023-12-27 DIAGNOSIS — Z90.2 ACQUIRED ABSENCE OF LUNG [PART OF]: Chronic | ICD-10-CM

## 2023-12-27 DIAGNOSIS — Z98.890 OTHER SPECIFIED POSTPROCEDURAL STATES: Chronic | ICD-10-CM

## 2023-12-27 DIAGNOSIS — C65.9 MALIGNANT NEOPLASM OF UNSPECIFIED RENAL PELVIS: ICD-10-CM

## 2023-12-27 DIAGNOSIS — C67.9 MALIGNANT NEOPLASM OF BLADDER, UNSPECIFIED: ICD-10-CM

## 2023-12-27 LAB
ALBUMIN SERPL ELPH-MCNC: 4 G/DL — SIGNIFICANT CHANGE UP (ref 3.3–5)
ALBUMIN SERPL ELPH-MCNC: 4 G/DL — SIGNIFICANT CHANGE UP (ref 3.3–5)
ALP SERPL-CCNC: 69 U/L — SIGNIFICANT CHANGE UP (ref 40–120)
ALP SERPL-CCNC: 69 U/L — SIGNIFICANT CHANGE UP (ref 40–120)
ALT FLD-CCNC: 5 U/L — LOW (ref 10–45)
ALT FLD-CCNC: 5 U/L — LOW (ref 10–45)
ANION GAP SERPL CALC-SCNC: 11 MMOL/L — SIGNIFICANT CHANGE UP (ref 5–17)
AST SERPL-CCNC: 31 U/L — SIGNIFICANT CHANGE UP (ref 10–40)
AST SERPL-CCNC: 31 U/L — SIGNIFICANT CHANGE UP (ref 10–40)
BASOPHILS # BLD AUTO: 0.05 K/UL — SIGNIFICANT CHANGE UP (ref 0–0.2)
BASOPHILS # BLD AUTO: 0.05 K/UL — SIGNIFICANT CHANGE UP (ref 0–0.2)
BASOPHILS NFR BLD AUTO: 0.6 % — SIGNIFICANT CHANGE UP (ref 0–2)
BASOPHILS NFR BLD AUTO: 0.6 % — SIGNIFICANT CHANGE UP (ref 0–2)
BILIRUB SERPL-MCNC: 0.5 MG/DL — SIGNIFICANT CHANGE UP (ref 0.2–1.2)
BILIRUB SERPL-MCNC: 0.5 MG/DL — SIGNIFICANT CHANGE UP (ref 0.2–1.2)
BUN SERPL-MCNC: 24 MG/DL — HIGH (ref 7–23)
CALCIUM SERPL-MCNC: 9.4 MG/DL — SIGNIFICANT CHANGE UP (ref 8.4–10.5)
CALCIUM SERPL-MCNC: 9.4 MG/DL — SIGNIFICANT CHANGE UP (ref 8.4–10.5)
CALCIUM SERPL-MCNC: 9.5 MG/DL — SIGNIFICANT CHANGE UP (ref 8.4–10.5)
CALCIUM SERPL-MCNC: 9.5 MG/DL — SIGNIFICANT CHANGE UP (ref 8.4–10.5)
CHLORIDE SERPL-SCNC: 104 MMOL/L — SIGNIFICANT CHANGE UP (ref 96–108)
CHLORIDE SERPL-SCNC: 104 MMOL/L — SIGNIFICANT CHANGE UP (ref 96–108)
CHLORIDE SERPL-SCNC: 105 MMOL/L — SIGNIFICANT CHANGE UP (ref 96–108)
CHLORIDE SERPL-SCNC: 105 MMOL/L — SIGNIFICANT CHANGE UP (ref 96–108)
CO2 SERPL-SCNC: 23 MMOL/L — SIGNIFICANT CHANGE UP (ref 22–31)
CO2 SERPL-SCNC: 23 MMOL/L — SIGNIFICANT CHANGE UP (ref 22–31)
CO2 SERPL-SCNC: 24 MMOL/L — SIGNIFICANT CHANGE UP (ref 22–31)
CO2 SERPL-SCNC: 24 MMOL/L — SIGNIFICANT CHANGE UP (ref 22–31)
CREAT SERPL-MCNC: 1.58 MG/DL — HIGH (ref 0.5–1.3)
CREAT SERPL-MCNC: 1.58 MG/DL — HIGH (ref 0.5–1.3)
CREAT SERPL-MCNC: 1.59 MG/DL — HIGH (ref 0.5–1.3)
CREAT SERPL-MCNC: 1.59 MG/DL — HIGH (ref 0.5–1.3)
EGFR: 45 ML/MIN/1.73M2 — LOW
EOSINOPHIL # BLD AUTO: 0.17 K/UL — SIGNIFICANT CHANGE UP (ref 0–0.5)
EOSINOPHIL # BLD AUTO: 0.17 K/UL — SIGNIFICANT CHANGE UP (ref 0–0.5)
EOSINOPHIL NFR BLD AUTO: 2.2 % — SIGNIFICANT CHANGE UP (ref 0–6)
EOSINOPHIL NFR BLD AUTO: 2.2 % — SIGNIFICANT CHANGE UP (ref 0–6)
GLUCOSE SERPL-MCNC: 104 MG/DL — HIGH (ref 70–99)
GLUCOSE SERPL-MCNC: 104 MG/DL — HIGH (ref 70–99)
GLUCOSE SERPL-MCNC: 106 MG/DL — HIGH (ref 70–99)
GLUCOSE SERPL-MCNC: 106 MG/DL — HIGH (ref 70–99)
HCT VFR BLD CALC: 34.4 % — LOW (ref 39–50)
HCT VFR BLD CALC: 34.4 % — LOW (ref 39–50)
HGB BLD-MCNC: 11.3 G/DL — LOW (ref 13–17)
HGB BLD-MCNC: 11.3 G/DL — LOW (ref 13–17)
IMM GRANULOCYTES NFR BLD AUTO: 0.5 % — SIGNIFICANT CHANGE UP (ref 0–0.9)
IMM GRANULOCYTES NFR BLD AUTO: 0.5 % — SIGNIFICANT CHANGE UP (ref 0–0.9)
LYMPHOCYTES # BLD AUTO: 1.53 K/UL — SIGNIFICANT CHANGE UP (ref 1–3.3)
LYMPHOCYTES # BLD AUTO: 1.53 K/UL — SIGNIFICANT CHANGE UP (ref 1–3.3)
LYMPHOCYTES # BLD AUTO: 19.4 % — SIGNIFICANT CHANGE UP (ref 13–44)
LYMPHOCYTES # BLD AUTO: 19.4 % — SIGNIFICANT CHANGE UP (ref 13–44)
MCHC RBC-ENTMCNC: 30.6 PG — SIGNIFICANT CHANGE UP (ref 27–34)
MCHC RBC-ENTMCNC: 30.6 PG — SIGNIFICANT CHANGE UP (ref 27–34)
MCHC RBC-ENTMCNC: 32.8 G/DL — SIGNIFICANT CHANGE UP (ref 32–36)
MCHC RBC-ENTMCNC: 32.8 G/DL — SIGNIFICANT CHANGE UP (ref 32–36)
MCV RBC AUTO: 93.2 FL — SIGNIFICANT CHANGE UP (ref 80–100)
MCV RBC AUTO: 93.2 FL — SIGNIFICANT CHANGE UP (ref 80–100)
MONOCYTES # BLD AUTO: 0.92 K/UL — HIGH (ref 0–0.9)
MONOCYTES # BLD AUTO: 0.92 K/UL — HIGH (ref 0–0.9)
MONOCYTES NFR BLD AUTO: 11.7 % — SIGNIFICANT CHANGE UP (ref 2–14)
MONOCYTES NFR BLD AUTO: 11.7 % — SIGNIFICANT CHANGE UP (ref 2–14)
NEUTROPHILS # BLD AUTO: 5.16 K/UL — SIGNIFICANT CHANGE UP (ref 1.8–7.4)
NEUTROPHILS # BLD AUTO: 5.16 K/UL — SIGNIFICANT CHANGE UP (ref 1.8–7.4)
NEUTROPHILS NFR BLD AUTO: 65.6 % — SIGNIFICANT CHANGE UP (ref 43–77)
NEUTROPHILS NFR BLD AUTO: 65.6 % — SIGNIFICANT CHANGE UP (ref 43–77)
NRBC # BLD: 0 /100 WBCS — SIGNIFICANT CHANGE UP (ref 0–0)
NRBC # BLD: 0 /100 WBCS — SIGNIFICANT CHANGE UP (ref 0–0)
PLATELET # BLD AUTO: 218 K/UL — SIGNIFICANT CHANGE UP (ref 150–400)
PLATELET # BLD AUTO: 218 K/UL — SIGNIFICANT CHANGE UP (ref 150–400)
POTASSIUM SERPL-MCNC: 4.5 MMOL/L — SIGNIFICANT CHANGE UP (ref 3.5–5.3)
POTASSIUM SERPL-SCNC: 4.5 MMOL/L — SIGNIFICANT CHANGE UP (ref 3.5–5.3)
PROT SERPL-MCNC: 7.3 G/DL — SIGNIFICANT CHANGE UP (ref 6–8.3)
PROT SERPL-MCNC: 7.3 G/DL — SIGNIFICANT CHANGE UP (ref 6–8.3)
RBC # BLD: 3.69 M/UL — LOW (ref 4.2–5.8)
RBC # BLD: 3.69 M/UL — LOW (ref 4.2–5.8)
RBC # FLD: 16 % — HIGH (ref 10.3–14.5)
RBC # FLD: 16 % — HIGH (ref 10.3–14.5)
SODIUM SERPL-SCNC: 138 MMOL/L — SIGNIFICANT CHANGE UP (ref 135–145)
SODIUM SERPL-SCNC: 138 MMOL/L — SIGNIFICANT CHANGE UP (ref 135–145)
SODIUM SERPL-SCNC: 139 MMOL/L — SIGNIFICANT CHANGE UP (ref 135–145)
SODIUM SERPL-SCNC: 139 MMOL/L — SIGNIFICANT CHANGE UP (ref 135–145)
WBC # BLD: 7.87 K/UL — SIGNIFICANT CHANGE UP (ref 3.8–10.5)
WBC # BLD: 7.87 K/UL — SIGNIFICANT CHANGE UP (ref 3.8–10.5)
WBC # FLD AUTO: 7.87 K/UL — SIGNIFICANT CHANGE UP (ref 3.8–10.5)
WBC # FLD AUTO: 7.87 K/UL — SIGNIFICANT CHANGE UP (ref 3.8–10.5)

## 2023-12-27 PROCEDURE — 71250 CT THORAX DX C-: CPT | Mod: 26

## 2023-12-27 PROCEDURE — 74176 CT ABD & PELVIS W/O CONTRAST: CPT | Mod: 26

## 2023-12-27 PROCEDURE — 71250 CT THORAX DX C-: CPT

## 2023-12-27 PROCEDURE — 74176 CT ABD & PELVIS W/O CONTRAST: CPT

## 2024-01-02 ENCOUNTER — LABORATORY RESULT (OUTPATIENT)
Age: 74
End: 2024-01-02

## 2024-01-02 ENCOUNTER — APPOINTMENT (OUTPATIENT)
Dept: FAMILY MEDICINE | Facility: CLINIC | Age: 74
End: 2024-01-02
Payer: MEDICARE

## 2024-01-02 VITALS
HEART RATE: 92 BPM | BODY MASS INDEX: 29.26 KG/M2 | TEMPERATURE: 96.5 F | DIASTOLIC BLOOD PRESSURE: 70 MMHG | WEIGHT: 216 LBS | RESPIRATION RATE: 17 BRPM | SYSTOLIC BLOOD PRESSURE: 122 MMHG | OXYGEN SATURATION: 98 % | HEIGHT: 72 IN

## 2024-01-02 DIAGNOSIS — I71.21 ANEURYSM OF THE ASCENDING AORTA, WITHOUT RUPTURE: ICD-10-CM

## 2024-01-02 DIAGNOSIS — M54.30 DORSALGIA, UNSPECIFIED: ICD-10-CM

## 2024-01-02 DIAGNOSIS — E53.8 DEFICIENCY OF OTHER SPECIFIED B GROUP VITAMINS: ICD-10-CM

## 2024-01-02 DIAGNOSIS — C64.9 MALIGNANT NEOPLASM OF UNSPECIFIED KIDNEY, EXCEPT RENAL PELVIS: ICD-10-CM

## 2024-01-02 DIAGNOSIS — M54.9 DORSALGIA, UNSPECIFIED: ICD-10-CM

## 2024-01-02 DIAGNOSIS — S32.059A UNSPECIFIED FRACTURE OF FIFTH LUMBAR VERTEBRA, INITIAL ENCOUNTER FOR CLOSED FRACTURE: ICD-10-CM

## 2024-01-02 DIAGNOSIS — Z00.00 ENCOUNTER FOR GENERAL ADULT MEDICAL EXAMINATION W/OUT ABNORMAL FINDINGS: ICD-10-CM

## 2024-01-02 PROCEDURE — G0439: CPT

## 2024-01-02 PROCEDURE — 36415 COLL VENOUS BLD VENIPUNCTURE: CPT

## 2024-01-02 PROCEDURE — 99214 OFFICE O/P EST MOD 30 MIN: CPT | Mod: 25

## 2024-01-02 NOTE — HISTORY OF PRESENT ILLNESS
[FreeTextEntry1] : cc: annual ,back pain  [de-identified] : Encounter conducted in Polish. Patient came  for physical. He denies CP,SOB,Abd pain, no N,V,C,D.Pt c/o chronic back pain, now worse ,8/10, Percocet helps.  pt with HX of vertebral Fx and now with Hx of  lung, renal Ca and bladder - undergoing chemo.

## 2024-01-02 NOTE — REVIEW OF SYSTEMS
[Back Pain] : back pain [Anxiety] : anxiety [Joint Pain] : joint pain [Joint Stiffness] : joint stiffness [Muscle Weakness] : no muscle weakness [Suicidal] : not suicidal [Insomnia] : no insomnia [Depression] : no depression [Negative] : Integumentary

## 2024-01-02 NOTE — PHYSICAL EXAM
[No Varicosities] : no varicosities [No Edema] : there was no peripheral edema [Declined Rectal Exam] : declined rectal exam [No CVA Tenderness] : no CVA  tenderness [No Spinal Tenderness] : no spinal tenderness [Normal Gait] : normal gait [Alert and Oriented x3] : oriented to person, place, and time [Normal] : affect was normal and insight and judgment were intact [No Joint Swelling] : no joint swelling [de-identified] : declined - done by [facility]

## 2024-01-02 NOTE — HEALTH RISK ASSESSMENT
[Good] : ~his/her~  mood as  good [No] : In the past 12 months have you used drugs other than those required for medical reasons? No [No falls in past year] : Patient reported no falls in the past year [0] : 2) Feeling down, depressed, or hopeless: Not at all (0) [PHQ-2 Negative - No further assessment needed] : PHQ-2 Negative - No further assessment needed [Patient reported colonoscopy was normal] : Patient reported colonoscopy was normal [None] : None [With Family] : lives with family [Retired] : retired [Graduate School] : graduate school [] :  [# Of Children ___] : has [unfilled] children [Feels Safe at Home] : Feels safe at home [Independent] : managing finances [Smoke Detector] : smoke detector [Carbon Monoxide Detector] : carbon monoxide detector [Seat Belt] :  uses seat belt [Sunscreen] : uses sunscreen [With Patient/Caregiver] : , with patient/caregiver [Aggressive treatment] : aggressive treatment [Former] : Former [FreeTextEntry1] : back pain , bladder Cancer  [de-identified] : , Cardiol, Oncol  [Audit-CScore] : 0 [de-identified] : walking  [de-identified] : regular  [FPU2Uwsrv] : 0 [Change in mental status noted] : No change in mental status noted [Language] : denies difficulty with language [Reports changes in hearing] : Reports no changes in hearing [MammogramDate] : NA [PapSmearDate] : NA [BoneDensityDate] : NA [ColonoscopyDate] : 02/19 [HIVDate] : 10/18 [AdvancecareDate] : 01/24

## 2024-01-03 ENCOUNTER — RESULT REVIEW (OUTPATIENT)
Age: 74
End: 2024-01-03

## 2024-01-03 ENCOUNTER — APPOINTMENT (OUTPATIENT)
Dept: HEMATOLOGY ONCOLOGY | Facility: CLINIC | Age: 74
End: 2024-01-03
Payer: MEDICARE

## 2024-01-03 VITALS
DIASTOLIC BLOOD PRESSURE: 83 MMHG | RESPIRATION RATE: 16 BRPM | HEART RATE: 84 BPM | OXYGEN SATURATION: 97 % | TEMPERATURE: 97.5 F | BODY MASS INDEX: 29.31 KG/M2 | SYSTOLIC BLOOD PRESSURE: 139 MMHG | WEIGHT: 216.14 LBS

## 2024-01-03 LAB
BASOPHILS # BLD AUTO: 0.04 K/UL — SIGNIFICANT CHANGE UP (ref 0–0.2)
BASOPHILS # BLD AUTO: 0.04 K/UL — SIGNIFICANT CHANGE UP (ref 0–0.2)
BASOPHILS NFR BLD AUTO: 0.5 % — SIGNIFICANT CHANGE UP (ref 0–2)
BASOPHILS NFR BLD AUTO: 0.5 % — SIGNIFICANT CHANGE UP (ref 0–2)
EOSINOPHIL # BLD AUTO: 0.18 K/UL — SIGNIFICANT CHANGE UP (ref 0–0.5)
EOSINOPHIL # BLD AUTO: 0.18 K/UL — SIGNIFICANT CHANGE UP (ref 0–0.5)
EOSINOPHIL NFR BLD AUTO: 2.5 % — SIGNIFICANT CHANGE UP (ref 0–6)
EOSINOPHIL NFR BLD AUTO: 2.5 % — SIGNIFICANT CHANGE UP (ref 0–6)
HCT VFR BLD CALC: 34 % — LOW (ref 39–50)
HCT VFR BLD CALC: 34 % — LOW (ref 39–50)
HGB BLD-MCNC: 11.6 G/DL — LOW (ref 13–17)
HGB BLD-MCNC: 11.6 G/DL — LOW (ref 13–17)
IMM GRANULOCYTES NFR BLD AUTO: 0.3 % — SIGNIFICANT CHANGE UP (ref 0–0.9)
IMM GRANULOCYTES NFR BLD AUTO: 0.3 % — SIGNIFICANT CHANGE UP (ref 0–0.9)
LYMPHOCYTES # BLD AUTO: 1.31 K/UL — SIGNIFICANT CHANGE UP (ref 1–3.3)
LYMPHOCYTES # BLD AUTO: 1.31 K/UL — SIGNIFICANT CHANGE UP (ref 1–3.3)
LYMPHOCYTES # BLD AUTO: 18 % — SIGNIFICANT CHANGE UP (ref 13–44)
LYMPHOCYTES # BLD AUTO: 18 % — SIGNIFICANT CHANGE UP (ref 13–44)
MCHC RBC-ENTMCNC: 31.2 PG — SIGNIFICANT CHANGE UP (ref 27–34)
MCHC RBC-ENTMCNC: 31.2 PG — SIGNIFICANT CHANGE UP (ref 27–34)
MCHC RBC-ENTMCNC: 34.1 G/DL — SIGNIFICANT CHANGE UP (ref 32–36)
MCHC RBC-ENTMCNC: 34.1 G/DL — SIGNIFICANT CHANGE UP (ref 32–36)
MCV RBC AUTO: 91.4 FL — SIGNIFICANT CHANGE UP (ref 80–100)
MCV RBC AUTO: 91.4 FL — SIGNIFICANT CHANGE UP (ref 80–100)
MONOCYTES # BLD AUTO: 1.02 K/UL — HIGH (ref 0–0.9)
MONOCYTES # BLD AUTO: 1.02 K/UL — HIGH (ref 0–0.9)
MONOCYTES NFR BLD AUTO: 14 % — SIGNIFICANT CHANGE UP (ref 2–14)
MONOCYTES NFR BLD AUTO: 14 % — SIGNIFICANT CHANGE UP (ref 2–14)
NEUTROPHILS # BLD AUTO: 4.71 K/UL — SIGNIFICANT CHANGE UP (ref 1.8–7.4)
NEUTROPHILS # BLD AUTO: 4.71 K/UL — SIGNIFICANT CHANGE UP (ref 1.8–7.4)
NEUTROPHILS NFR BLD AUTO: 64.7 % — SIGNIFICANT CHANGE UP (ref 43–77)
NEUTROPHILS NFR BLD AUTO: 64.7 % — SIGNIFICANT CHANGE UP (ref 43–77)
NRBC # BLD: 0 /100 WBCS — SIGNIFICANT CHANGE UP (ref 0–0)
NRBC # BLD: 0 /100 WBCS — SIGNIFICANT CHANGE UP (ref 0–0)
PLATELET # BLD AUTO: 161 K/UL — SIGNIFICANT CHANGE UP (ref 150–400)
PLATELET # BLD AUTO: 161 K/UL — SIGNIFICANT CHANGE UP (ref 150–400)
RBC # BLD: 3.72 M/UL — LOW (ref 4.2–5.8)
RBC # BLD: 3.72 M/UL — LOW (ref 4.2–5.8)
RBC # FLD: 15.7 % — HIGH (ref 10.3–14.5)
RBC # FLD: 15.7 % — HIGH (ref 10.3–14.5)
WBC # BLD: 7.28 K/UL — SIGNIFICANT CHANGE UP (ref 3.8–10.5)
WBC # BLD: 7.28 K/UL — SIGNIFICANT CHANGE UP (ref 3.8–10.5)
WBC # FLD AUTO: 7.28 K/UL — SIGNIFICANT CHANGE UP (ref 3.8–10.5)
WBC # FLD AUTO: 7.28 K/UL — SIGNIFICANT CHANGE UP (ref 3.8–10.5)

## 2024-01-03 PROCEDURE — 99214 OFFICE O/P EST MOD 30 MIN: CPT

## 2024-01-04 NOTE — REVIEW OF SYSTEMS
[Fatigue] : fatigue [Chills] : chills [Palpitations] : palpitations [SOB on Exertion] : shortness of breath during exertion [Diarrhea: Grade 1 - Increase of <4 stools per day over baseline; mild increase in ostomy output compared to baseline] : Diarrhea: Grade 1 - Increase of <4 stools per day over baseline; mild increase in ostomy output compared to baseline [Joint Pain] : joint pain [Muscle Pain] : muscle pain [Difficulty Walking] : difficulty walking [Fever] : no fever [Recent Change In Weight] : ~T no recent weight change [Chest Pain] : no chest pain [Lower Ext Edema] : no lower extremity edema [Cough] : no cough [Abdominal Pain] : no abdominal pain [Constipation] : no constipation [Vomiting] : no vomiting [Dysuria] : no dysuria [Dizziness] : no dizziness [Skin Rash] : no skin rash [FreeTextEntry9] : lower back `

## 2024-01-04 NOTE — HISTORY OF PRESENT ILLNESS
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  5/20/23 CT abdomen and pelvis w/o contrast showed mod to severe left hydroureteronephrosis to the left of a filling defect within the left mid ureter, neoplasm until proven otherwise.  7/18/23 CT chest and urogram showed Interval placement of left-sided ureteral stent with continued moderate hydroureteronephrosis. Point of transition in the left mid ureter. There is prominent soft tissue and a lack of contrast on the delayed films. This raises concern for an underlying neoplasm.  9/2/23 CT abdomen and pelvis without con showed left hydronephrosis without significant changes, left ureteral stent placement.   He was scheduled for segmental ureterectomy but ureteroscopy on 9/1 noted a 2.5 cm segment in the mid left ureter (now solitary kidney) with new disease and a 1.5 cm in the  distal ureter  lesion, and patholoy showed TaHG in the mid ureter and TaHG in the distal ureter as well as TaHG in the kidney with T1HG in the bladder.     He reports no hematuria, burning, frequency and abdominal pain.  He has chronic lower back pain for many years on oxycodon as needed.    11/1/23 Has newly developed skin itching and mild rash that has not been improving with OTC topicals. He also has intermittent headaches relieved by Tylenol. He reports increased fatigue and decreased sleep. He denied nausea, vomiting, diarrhea, constipation. [de-identified] : 11/22/23: C2D15 pembrolizumab and padcev. Patient has decreased appetite, has not been eating much at all in the past few weeks and has lost 10lbs. He is feeling very weak, very fatigued. Patient is so tired that he had to be wheeled to exam room in wheelchair. He was able to walk from the hallway into the exam room. He needs assistance getting up from a sitting position. He will occ get shortness of breath on exertion and this has worsened in the past few weeks. Denies chest pain but says once in a while he has palpitations when trying to sleep. He is not sure if palpitations happen with exertion because he has not been able to exert himself very much of late. Patient denies fever, he is cold all of the time. Had chills the other day, took temperature and it was normal. He is exhausted because he has not been able to sleep. He only sleeps for 15 minutes at a time at night and it is a very light sleep. He has never suffered from insomnia before. He is not taking naps during the day. He was already having difficulty sleeping during cylce 1 but it has worsened tremendously with this cycle. He has been using hydroxyzine and benadryl for itching, neither of which have made him drowsy. He has been using melatonin which does not help. He tried his wife's ambien, which also did not help. Patient has ongoing itching but no rashes, says he is "itching, itching, always itching". Says that the prescribed medications have not worked, including betamethasone cream and hydroxyzine. Has been using Eucerin and Sarna creams, sitting in Epsom salt baths. Patient was constipated, however now is having loose stools twice a day for the last three days, has stopped taking bowel regimen. Pt denies numbness and tingling in hands and feet. Patient reports chronic low back pain that pre dates his cancer diagnosis, the pain is not cancer related, has been dealing with this with his PCP and ?NSGY, initially surgery was recommended but now is not. Was going to PT but is no longer because it was not helping. He takes oxycodone for this pain, he says sometimes he will go days without taking it and then he will need three tabs (30mg) at one time, which is not how this medication is prescribed.    12/13/23 He continues to have rash which appears to be improving. However he and his wife are concerned that his skin is darkening (even in non sun exposed areas). He also states that recentl he is "always cold." He denies diarrhea or constipation. He continues to have some insomnia  12/14/23 CT CAP showed New 3 mm nodule left lung lower lobe. No adenopathy in the thorax, abdomen or pelvis. No left hydronephrosis and left mid ureteral lesion.   1/3/24 reports good appetite, better sleep, skin rash with improved itchiness. He is able to do exercises.

## 2024-01-04 NOTE — ASSESSMENT
[FreeTextEntry1] : 73 year old male s/p right nephroureterectomy June 2022 with pT3 in renal pelvis (10% squamous differentiation) and pTa in the ureter and has since had several bouts of T1HG in the bladder July 2022, Feb 2023. 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 At initial appointment, Dr. Mccall discussed his cancer status and further management. Based on AUA and EAU guideline subjects with upper tract tumors of the renal pelvis and ureter(s) must meet a high risk assessment defined as: tumor 1cm and/or hydronephrosis and/or high grade pathology and/or multifocal disease, where a radical NU approach to treat localized disease is warranted, followed by adjuvant immunotherapy. However, he adamantly declined any surgery given his solitary kidney and the result of dialysis. He is cisplatin ineligible. The potential regimen, pembrolizumab and enfortumab vedotin could induce 73% response including 15% CR rate. His T1HG bladder cancer may be treated with both combination. He will be monitored by Dr. Strakey for surveillance cystoscopy and ureteroscopy. Potential AEs of immunotherapy and enfortumab vedotin were discussed and patient signed consent. He started treatment with pembrolizumab and padcev on 10/18/2023. 12/14/23 CT CAP showed New 3 mm nodule left lung lower lobe. No adenopathy in the thorax, abdomen or pelvis. No left hydronephrosis and left mid ureteral lesion.   Renal pelvis UCC, high grade  - was on pembrolizumab D1 and enfortumab vedotin (padcev) D1, D8 on 21 day cycle, C3D1 is 11/29; enfortumab vedotin held for C3. continue enfortumab vedotin for C4 with pembro - re-iterated potential AEs of keytruda, including but not limited to: fatigue, skin rash, joint pain, hypothyroidism, pneumonitis, hepatitis, nephritis, colitis, myocarditis, pericarditis, hypophysitis. - re-iterated potential AEs of padcev, including but not limited to fatigue, skin rash, worsening diabetes, eye problem and neuropathy and GI symptoms - continue to monitor blood work on treatment - imaging last: CT Chest 10/13, CT AP 9/2, will obtain repeat CT scan now that he has completed Cycle 3  - continue to follow with Dr. Starkey for surveillance cystoscopy and ureteroscopy  Pruritis  - pt initially had rash with first cycle of padcev/pembro, this has since resolved, however itching persisted but has now resolved  - reports betamethasone cream and PO benadryl have not been effective  - continue Eucerin and Sarna lotion - continue hydroxyzine q8h PRN, advised pt not to use this medication with benadryl, oxycodone or ativan  - patient has previously been referred to Dermatology and has yet to schedule appt / be seen     Insomnia  - patient has tried melatonin, benadryl and ambien without relief, he denies feeling anxious or having things on his mind keeping him up...  - Rx ativan 0.5mg, may take 1-2 tablets at bedtime as needed, discussed that this medication should not be taken with oxycodone or any other sedating medications, do not drive or operate machinery while taking this medication   Chronic low back pain  - this pain is chronic and is not cancer related pain, he has been prescribed oxy 10mg q8h PRN by another provider, however he says that he sometimes takes 30mg at a time... he was advised not to do this and to follow with his PCP / NSGY to discuss further management - 1 weeks worth of oxycodone was refilled at this time  - discussed risk/benefits of opioids, not to operate vehicle or machinery while using these medications, do not take with ativan or other sedating medications   Decreased appetite  - encouraged frequent high protein, calorically dense small meals throughout the day  Instructed to contact our office with any new/worsening symptoms. Patient educated regarding plan of care, all questions/concerns addressed to the best of my abilities and patient's apparent satisfaction.   Patient seen/examined and discussed with Dr.Zhu Jeromy Santos MD PGY6 Hematology/Oncology Fellow I have participated in history collection, physical exam and making assessment and plan through the whole visit. I also reviewed and edited the note.

## 2024-01-04 NOTE — PHYSICAL EXAM
[Fully active, able to carry on all pre-disease performance without restriction] : Status 0 - Fully active, able to carry on all pre-disease performance without restriction [Normal] : affect appropriate [de-identified] : anicteric  [de-identified] : supple, FROM [de-identified] : no edema

## 2024-01-08 LAB
25(OH)D3 SERPL-MCNC: 100 NG/ML
ALBUMIN SERPL ELPH-MCNC: 4.2 G/DL
ALP BLD-CCNC: 79 U/L
ALT SERPL-CCNC: 21 U/L
ANION GAP SERPL CALC-SCNC: 13 MMOL/L
APPEARANCE: ABNORMAL
AST SERPL-CCNC: 31 U/L
BILIRUB SERPL-MCNC: 0.5 MG/DL
BILIRUBIN URINE: ABNORMAL
BLOOD URINE: ABNORMAL
BUN SERPL-MCNC: 23 MG/DL
CALCIUM SERPL-MCNC: 9.4 MG/DL
CHLORIDE SERPL-SCNC: 104 MMOL/L
CHOLEST SERPL-MCNC: 193 MG/DL
CO2 SERPL-SCNC: 21 MMOL/L
COLOR: NORMAL
CREAT SERPL-MCNC: 1.64 MG/DL
CREAT SPEC-SCNC: 382 MG/DL
EGFR: 43 ML/MIN/1.73M2
FOLATE SERPL-MCNC: 10.4 NG/ML
GLUCOSE QUALITATIVE U: NEGATIVE MG/DL
GLUCOSE SERPL-MCNC: 114 MG/DL
HDLC SERPL-MCNC: 38 MG/DL
KETONES URINE: ABNORMAL MG/DL
LDLC SERPL CALC-MCNC: 118 MG/DL
LEUKOCYTE ESTERASE URINE: ABNORMAL
MICROALBUMIN 24H UR DL<=1MG/L-MCNC: 29.8 MG/DL
MICROALBUMIN/CREAT 24H UR-RTO: 78 MG/G
NITRITE URINE: NEGATIVE
NONHDLC SERPL-MCNC: 155 MG/DL
PH URINE: 6
POTASSIUM SERPL-SCNC: 4.1 MMOL/L
PROT SERPL-MCNC: 7.2 G/DL
PROTEIN URINE: 100 MG/DL
SODIUM SERPL-SCNC: 137 MMOL/L
SPECIFIC GRAVITY URINE: 1.03
TRIGL SERPL-MCNC: 208 MG/DL
UROBILINOGEN URINE: 1 MG/DL
VIT B12 SERPL-MCNC: 884 PG/ML

## 2024-01-10 ENCOUNTER — RESULT REVIEW (OUTPATIENT)
Age: 74
End: 2024-01-10

## 2024-01-10 ENCOUNTER — APPOINTMENT (OUTPATIENT)
Dept: INFUSION THERAPY | Facility: HOSPITAL | Age: 74
End: 2024-01-10

## 2024-01-10 LAB
ALBUMIN SERPL ELPH-MCNC: 3.6 G/DL — SIGNIFICANT CHANGE UP (ref 3.3–5)
ALBUMIN SERPL ELPH-MCNC: 3.6 G/DL — SIGNIFICANT CHANGE UP (ref 3.3–5)
ALP SERPL-CCNC: 63 U/L — SIGNIFICANT CHANGE UP (ref 40–120)
ALP SERPL-CCNC: 63 U/L — SIGNIFICANT CHANGE UP (ref 40–120)
ALT FLD-CCNC: <5 U/L — LOW (ref 10–45)
ALT FLD-CCNC: <5 U/L — LOW (ref 10–45)
ANION GAP SERPL CALC-SCNC: 10 MMOL/L — SIGNIFICANT CHANGE UP (ref 5–17)
ANION GAP SERPL CALC-SCNC: 10 MMOL/L — SIGNIFICANT CHANGE UP (ref 5–17)
AST SERPL-CCNC: 28 U/L — SIGNIFICANT CHANGE UP (ref 10–40)
AST SERPL-CCNC: 28 U/L — SIGNIFICANT CHANGE UP (ref 10–40)
BASOPHILS # BLD AUTO: 0.04 K/UL — SIGNIFICANT CHANGE UP (ref 0–0.2)
BASOPHILS # BLD AUTO: 0.04 K/UL — SIGNIFICANT CHANGE UP (ref 0–0.2)
BASOPHILS NFR BLD AUTO: 0.6 % — SIGNIFICANT CHANGE UP (ref 0–2)
BASOPHILS NFR BLD AUTO: 0.6 % — SIGNIFICANT CHANGE UP (ref 0–2)
BILIRUB SERPL-MCNC: 0.4 MG/DL — SIGNIFICANT CHANGE UP (ref 0.2–1.2)
BILIRUB SERPL-MCNC: 0.4 MG/DL — SIGNIFICANT CHANGE UP (ref 0.2–1.2)
BUN SERPL-MCNC: 22 MG/DL — SIGNIFICANT CHANGE UP (ref 7–23)
BUN SERPL-MCNC: 22 MG/DL — SIGNIFICANT CHANGE UP (ref 7–23)
CALCIUM SERPL-MCNC: 8.6 MG/DL — SIGNIFICANT CHANGE UP (ref 8.4–10.5)
CALCIUM SERPL-MCNC: 8.6 MG/DL — SIGNIFICANT CHANGE UP (ref 8.4–10.5)
CHLORIDE SERPL-SCNC: 108 MMOL/L — SIGNIFICANT CHANGE UP (ref 96–108)
CHLORIDE SERPL-SCNC: 108 MMOL/L — SIGNIFICANT CHANGE UP (ref 96–108)
CO2 SERPL-SCNC: 23 MMOL/L — SIGNIFICANT CHANGE UP (ref 22–31)
CO2 SERPL-SCNC: 23 MMOL/L — SIGNIFICANT CHANGE UP (ref 22–31)
CREAT SERPL-MCNC: 1.39 MG/DL — HIGH (ref 0.5–1.3)
CREAT SERPL-MCNC: 1.39 MG/DL — HIGH (ref 0.5–1.3)
EGFR: 53 ML/MIN/1.73M2 — LOW
EGFR: 53 ML/MIN/1.73M2 — LOW
EOSINOPHIL # BLD AUTO: 0.1 K/UL — SIGNIFICANT CHANGE UP (ref 0–0.5)
EOSINOPHIL # BLD AUTO: 0.1 K/UL — SIGNIFICANT CHANGE UP (ref 0–0.5)
EOSINOPHIL NFR BLD AUTO: 1.5 % — SIGNIFICANT CHANGE UP (ref 0–6)
EOSINOPHIL NFR BLD AUTO: 1.5 % — SIGNIFICANT CHANGE UP (ref 0–6)
GLUCOSE SERPL-MCNC: 115 MG/DL — HIGH (ref 70–99)
GLUCOSE SERPL-MCNC: 115 MG/DL — HIGH (ref 70–99)
HCT VFR BLD CALC: 34.7 % — LOW (ref 39–50)
HCT VFR BLD CALC: 34.7 % — LOW (ref 39–50)
HGB BLD-MCNC: 11.7 G/DL — LOW (ref 13–17)
HGB BLD-MCNC: 11.7 G/DL — LOW (ref 13–17)
IMM GRANULOCYTES NFR BLD AUTO: 0.5 % — SIGNIFICANT CHANGE UP (ref 0–0.9)
IMM GRANULOCYTES NFR BLD AUTO: 0.5 % — SIGNIFICANT CHANGE UP (ref 0–0.9)
LYMPHOCYTES # BLD AUTO: 1.26 K/UL — SIGNIFICANT CHANGE UP (ref 1–3.3)
LYMPHOCYTES # BLD AUTO: 1.26 K/UL — SIGNIFICANT CHANGE UP (ref 1–3.3)
LYMPHOCYTES # BLD AUTO: 19.4 % — SIGNIFICANT CHANGE UP (ref 13–44)
LYMPHOCYTES # BLD AUTO: 19.4 % — SIGNIFICANT CHANGE UP (ref 13–44)
MCHC RBC-ENTMCNC: 31.5 PG — SIGNIFICANT CHANGE UP (ref 27–34)
MCHC RBC-ENTMCNC: 31.5 PG — SIGNIFICANT CHANGE UP (ref 27–34)
MCHC RBC-ENTMCNC: 33.7 G/DL — SIGNIFICANT CHANGE UP (ref 32–36)
MCHC RBC-ENTMCNC: 33.7 G/DL — SIGNIFICANT CHANGE UP (ref 32–36)
MCV RBC AUTO: 93.5 FL — SIGNIFICANT CHANGE UP (ref 80–100)
MCV RBC AUTO: 93.5 FL — SIGNIFICANT CHANGE UP (ref 80–100)
MONOCYTES # BLD AUTO: 0.82 K/UL — SIGNIFICANT CHANGE UP (ref 0–0.9)
MONOCYTES # BLD AUTO: 0.82 K/UL — SIGNIFICANT CHANGE UP (ref 0–0.9)
MONOCYTES NFR BLD AUTO: 12.6 % — SIGNIFICANT CHANGE UP (ref 2–14)
MONOCYTES NFR BLD AUTO: 12.6 % — SIGNIFICANT CHANGE UP (ref 2–14)
NEUTROPHILS # BLD AUTO: 4.26 K/UL — SIGNIFICANT CHANGE UP (ref 1.8–7.4)
NEUTROPHILS # BLD AUTO: 4.26 K/UL — SIGNIFICANT CHANGE UP (ref 1.8–7.4)
NEUTROPHILS NFR BLD AUTO: 65.4 % — SIGNIFICANT CHANGE UP (ref 43–77)
NEUTROPHILS NFR BLD AUTO: 65.4 % — SIGNIFICANT CHANGE UP (ref 43–77)
NRBC # BLD: 0 /100 WBCS — SIGNIFICANT CHANGE UP (ref 0–0)
NRBC # BLD: 0 /100 WBCS — SIGNIFICANT CHANGE UP (ref 0–0)
PLATELET # BLD AUTO: 196 K/UL — SIGNIFICANT CHANGE UP (ref 150–400)
PLATELET # BLD AUTO: 196 K/UL — SIGNIFICANT CHANGE UP (ref 150–400)
POTASSIUM SERPL-MCNC: 3.6 MMOL/L — SIGNIFICANT CHANGE UP (ref 3.5–5.3)
POTASSIUM SERPL-MCNC: 3.6 MMOL/L — SIGNIFICANT CHANGE UP (ref 3.5–5.3)
POTASSIUM SERPL-SCNC: 3.6 MMOL/L — SIGNIFICANT CHANGE UP (ref 3.5–5.3)
POTASSIUM SERPL-SCNC: 3.6 MMOL/L — SIGNIFICANT CHANGE UP (ref 3.5–5.3)
PROT SERPL-MCNC: 6.5 G/DL — SIGNIFICANT CHANGE UP (ref 6–8.3)
PROT SERPL-MCNC: 6.5 G/DL — SIGNIFICANT CHANGE UP (ref 6–8.3)
RBC # BLD: 3.71 M/UL — LOW (ref 4.2–5.8)
RBC # BLD: 3.71 M/UL — LOW (ref 4.2–5.8)
RBC # FLD: 15.9 % — HIGH (ref 10.3–14.5)
RBC # FLD: 15.9 % — HIGH (ref 10.3–14.5)
SODIUM SERPL-SCNC: 140 MMOL/L — SIGNIFICANT CHANGE UP (ref 135–145)
SODIUM SERPL-SCNC: 140 MMOL/L — SIGNIFICANT CHANGE UP (ref 135–145)
T4 FREE SERPL-MCNC: 1.4 NG/DL — SIGNIFICANT CHANGE UP (ref 0.9–1.8)
T4 FREE SERPL-MCNC: 1.4 NG/DL — SIGNIFICANT CHANGE UP (ref 0.9–1.8)
TSH SERPL-MCNC: 1.92 UIU/ML — SIGNIFICANT CHANGE UP (ref 0.27–4.2)
TSH SERPL-MCNC: 1.92 UIU/ML — SIGNIFICANT CHANGE UP (ref 0.27–4.2)
WBC # BLD: 6.51 K/UL — SIGNIFICANT CHANGE UP (ref 3.8–10.5)
WBC # BLD: 6.51 K/UL — SIGNIFICANT CHANGE UP (ref 3.8–10.5)
WBC # FLD AUTO: 6.51 K/UL — SIGNIFICANT CHANGE UP (ref 3.8–10.5)
WBC # FLD AUTO: 6.51 K/UL — SIGNIFICANT CHANGE UP (ref 3.8–10.5)

## 2024-01-12 ENCOUNTER — OUTPATIENT (OUTPATIENT)
Dept: OUTPATIENT SERVICES | Facility: HOSPITAL | Age: 74
LOS: 1 days | Discharge: HOME | End: 2024-01-12

## 2024-01-12 DIAGNOSIS — C80.1 MALIGNANT (PRIMARY) NEOPLASM, UNSPECIFIED: ICD-10-CM

## 2024-01-12 DIAGNOSIS — Z90.5 ACQUIRED ABSENCE OF KIDNEY: Chronic | ICD-10-CM

## 2024-01-12 DIAGNOSIS — Z98.890 OTHER SPECIFIED POSTPROCEDURAL STATES: Chronic | ICD-10-CM

## 2024-01-12 DIAGNOSIS — Z90.2 ACQUIRED ABSENCE OF LUNG [PART OF]: Chronic | ICD-10-CM

## 2024-01-17 ENCOUNTER — RESULT REVIEW (OUTPATIENT)
Age: 74
End: 2024-01-17

## 2024-01-17 ENCOUNTER — APPOINTMENT (OUTPATIENT)
Dept: INFUSION THERAPY | Facility: HOSPITAL | Age: 74
End: 2024-01-17

## 2024-01-17 LAB
ALBUMIN SERPL ELPH-MCNC: 4 G/DL — SIGNIFICANT CHANGE UP (ref 3.3–5)
ALP SERPL-CCNC: 70 U/L — SIGNIFICANT CHANGE UP (ref 40–120)
ALT FLD-CCNC: <5 U/L — LOW (ref 10–45)
ANION GAP SERPL CALC-SCNC: 9 MMOL/L — SIGNIFICANT CHANGE UP (ref 5–17)
AST SERPL-CCNC: 35 U/L — SIGNIFICANT CHANGE UP (ref 10–40)
BASOPHILS # BLD AUTO: 0.07 K/UL — SIGNIFICANT CHANGE UP (ref 0–0.2)
BASOPHILS NFR BLD AUTO: 1.1 % — SIGNIFICANT CHANGE UP (ref 0–2)
BILIRUB SERPL-MCNC: 0.5 MG/DL — SIGNIFICANT CHANGE UP (ref 0.2–1.2)
BUN SERPL-MCNC: 24 MG/DL — HIGH (ref 7–23)
CALCIUM SERPL-MCNC: 9.6 MG/DL — SIGNIFICANT CHANGE UP (ref 8.4–10.5)
CHLORIDE SERPL-SCNC: 104 MMOL/L — SIGNIFICANT CHANGE UP (ref 96–108)
CO2 SERPL-SCNC: 24 MMOL/L — SIGNIFICANT CHANGE UP (ref 22–31)
CREAT SERPL-MCNC: 1.41 MG/DL — HIGH (ref 0.5–1.3)
EGFR: 52 ML/MIN/1.73M2 — LOW
EOSINOPHIL # BLD AUTO: 0.04 K/UL — SIGNIFICANT CHANGE UP (ref 0–0.5)
EOSINOPHIL NFR BLD AUTO: 0.6 % — SIGNIFICANT CHANGE UP (ref 0–6)
GLUCOSE SERPL-MCNC: 132 MG/DL — HIGH (ref 70–99)
HCT VFR BLD CALC: 35.4 % — LOW (ref 39–50)
HGB BLD-MCNC: 12 G/DL — LOW (ref 13–17)
IMM GRANULOCYTES NFR BLD AUTO: 0.3 % — SIGNIFICANT CHANGE UP (ref 0–0.9)
LYMPHOCYTES # BLD AUTO: 1.59 K/UL — SIGNIFICANT CHANGE UP (ref 1–3.3)
LYMPHOCYTES # BLD AUTO: 24.5 % — SIGNIFICANT CHANGE UP (ref 13–44)
MCHC RBC-ENTMCNC: 31.7 PG — SIGNIFICANT CHANGE UP (ref 27–34)
MCHC RBC-ENTMCNC: 33.9 G/DL — SIGNIFICANT CHANGE UP (ref 32–36)
MCV RBC AUTO: 93.7 FL — SIGNIFICANT CHANGE UP (ref 80–100)
MONOCYTES # BLD AUTO: 0.8 K/UL — SIGNIFICANT CHANGE UP (ref 0–0.9)
MONOCYTES NFR BLD AUTO: 12.3 % — SIGNIFICANT CHANGE UP (ref 2–14)
NEUTROPHILS # BLD AUTO: 3.98 K/UL — SIGNIFICANT CHANGE UP (ref 1.8–7.4)
NEUTROPHILS NFR BLD AUTO: 61.2 % — SIGNIFICANT CHANGE UP (ref 43–77)
NRBC # BLD: 0 /100 WBCS — SIGNIFICANT CHANGE UP (ref 0–0)
PLATELET # BLD AUTO: 218 K/UL — SIGNIFICANT CHANGE UP (ref 150–400)
POTASSIUM SERPL-MCNC: 4.1 MMOL/L — SIGNIFICANT CHANGE UP (ref 3.5–5.3)
POTASSIUM SERPL-SCNC: 4.1 MMOL/L — SIGNIFICANT CHANGE UP (ref 3.5–5.3)
PROT SERPL-MCNC: 7.4 G/DL — SIGNIFICANT CHANGE UP (ref 6–8.3)
RBC # BLD: 3.78 M/UL — LOW (ref 4.2–5.8)
RBC # FLD: 15.2 % — HIGH (ref 10.3–14.5)
SODIUM SERPL-SCNC: 137 MMOL/L — SIGNIFICANT CHANGE UP (ref 135–145)
WBC # BLD: 6.5 K/UL — SIGNIFICANT CHANGE UP (ref 3.8–10.5)
WBC # FLD AUTO: 6.5 K/UL — SIGNIFICANT CHANGE UP (ref 3.8–10.5)

## 2024-01-18 DIAGNOSIS — C68.9 MALIGNANT NEOPLASM OF URINARY ORGAN, UNSPECIFIED: ICD-10-CM

## 2024-01-18 DIAGNOSIS — Z51.11 ENCOUNTER FOR ANTINEOPLASTIC CHEMOTHERAPY: ICD-10-CM

## 2024-01-18 DIAGNOSIS — R11.2 NAUSEA WITH VOMITING, UNSPECIFIED: ICD-10-CM

## 2024-01-23 NOTE — PHYSICAL EXAM
[Fully active, able to carry on all pre-disease performance without restriction] : Status 0 - Fully active, able to carry on all pre-disease performance without restriction [Normal] : affect appropriate [de-identified] : anicteric  [de-identified] : supple, FROM [de-identified] : no edema

## 2024-01-23 NOTE — HISTORY OF PRESENT ILLNESS
[de-identified] :  a 74 yo M with history of hypertension, hypothyroidism and right nephroureterectomy June 2022 with pT3 in renal pelvis (10% squamous differentiation) and pTa in the ureter and has since had several bouts of T1HG in the bladder July 2022, Feb 2023.  5/20/23 CT abdomen and pelvis w/o contrast showed mod to severe left hydroureteronephrosis to the left of a filling defect within the left mid ureter, neoplasm until proven otherwise.  7/18/23 CT chest and urogram showed Interval placement of left-sided ureteral stent with continued moderate hydroureteronephrosis. Point of transition in the left mid ureter. There is prominent soft tissue and a lack of contrast on the delayed films. This raises concern for an underlying neoplasm.  9/2/23 CT abdomen and pelvis without con showed left hydronephrosis without significant changes, left ureteral stent placement.   He was scheduled for segmental ureterectomy but ureteroscopy on 9/1 noted a 2.5 cm segment in the mid left ureter (now solitary kidney) with new disease and a 1.5 cm in the  distal ureter  lesion, and patholoy showed TaHG in the mid ureter and TaHG in the distal ureter as well as TaHG in the kidney with T1HG in the bladder.     He reports no hematuria, burning, frequency and abdominal pain.  He has chronic lower back pain for many years on oxycodon as needed.    11/1/23 Has newly developed skin itching and mild rash that has not been improving with OTC topicals. He also has intermittent headaches relieved by Tylenol. He reports increased fatigue and decreased sleep. He denied nausea, vomiting, diarrhea, constipation.  11/22/23: C2D15 pembrolizumab and padcev. Patient has decreased appetite, has not been eating much at all in the past few weeks and has lost 10lbs. He is feeling very weak, very fatigued. Patient is so tired that he had to be wheeled to exam room in wheelchair. He was able to walk from the hallway into the exam room. He needs assistance getting up from a sitting position. He will occ get shortness of breath on exertion and this has worsened in the past few weeks. Denies chest pain but says once in a while he has palpitations when trying to sleep. He is not sure if palpitations happen with exertion because he has not been able to exert himself very much of late. Patient denies fever, he is cold all of the time. Had chills the other day, took temperature and it was normal. He is exhausted because he has not been able to sleep. He only sleeps for 15 minutes at a time at night and it is a very light sleep. He has never suffered from insomnia before. He is not taking naps during the day. He was already having difficulty sleeping during cylce 1 but it has worsened tremendously with this cycle. He has been using hydroxyzine and benadryl for itching, neither of which have made him drowsy. He has been using melatonin which does not help. He tried his wife's ambien, which also did not help. Patient has ongoing itching but no rashes, says he is "itching, itching, always itching". Says that the prescribed medications have not worked, including betamethasone cream and hydroxyzine. Has been using Eucerin and Sarna creams, sitting in Epsom salt baths. Patient was constipated, however now is having loose stools twice a day for the last three days, has stopped taking bowel regimen. Pt denies numbness and tingling in hands and feet. Patient reports chronic low back pain that pre dates his cancer diagnosis, the pain is not cancer related, has been dealing with this with his PCP and ?NSGY, initially surgery was recommended but now is not. Was going to PT but is no longer because it was not helping. He takes oxycodone for this pain, he says sometimes he will go days without taking it and then he will need three tabs (30mg) at one time, which is not how this medication is prescribed.    12/13/23 He continues to have rash which appears to be improving. However he and his wife are concerned that his skin is darkening (even in non sun exposed areas). He also states that recentl he is "always cold." He denies diarrhea or constipation. He continues to have some insomnia  12/14/23 CT CAP showed New 3 mm nodule left lung lower lobe. No adenopathy in the thorax, abdomen or pelvis. No left hydronephrosis and left mid ureteral lesion.   1/3/24 reports good appetite, better sleep, skin rash with improved itchiness. He is able to do exercises.   [de-identified] : 1/24/24:

## 2024-01-23 NOTE — ASSESSMENT
[FreeTextEntry1] : 73 year old male s/p right nephroureterectomy June 2022 with pT3 in renal pelvis (10% squamous differentiation) and pTa in the ureter and has since had several bouts of T1HG in the bladder July 2022, Feb 2023. 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 At initial appointment, Dr. Mccall discussed his cancer status and further management. Based on AUA and EAU guideline subjects with upper tract tumors of the renal pelvis and ureter(s) must meet a high risk assessment defined as: tumor 1cm and/or hydronephrosis and/or high grade pathology and/or multifocal disease, where a radical NU approach to treat localized disease is warranted, followed by adjuvant immunotherapy. However, he adamantly declined any surgery given his solitary kidney and the result of dialysis. He is cisplatin ineligible. The potential regimen, pembrolizumab and enfortumab vedotin could induce 73% response including 15% CR rate. His T1HG bladder cancer may be treated with both combination. He will be monitored by Dr. Starkey for surveillance cystoscopy and ureteroscopy. Potential AEs of immunotherapy and enfortumab vedotin were discussed and patient signed consent. He started treatment with pembrolizumab and padcev on 10/18/2023. 12/14/23 CT CAP showed New 3 mm nodule left lung lower lobe. No adenopathy in the thorax, abdomen or pelvis. No left hydronephrosis and left mid ureteral lesion.   Renal pelvis UCC, high grade  - on pembrolizumab D1 and enfortumab vedotin (padcev) D1, D8 on 21 day cycle, enfortumab vedotin held for C3 given extreme fatigue; continued on pembro and enfortumab vedotin, next cycle due 1/31  - re-iterated potential AEs of keytruda, including but not limited to: fatigue, skin rash, joint pain, hypothyroidism, pneumonitis, hepatitis, nephritis, colitis, myocarditis, pericarditis, hypophysitis. - re-iterated potential AEs of padcev, including but not limited to fatigue, skin rash, worsening diabetes, eye problem and neuropathy and GI symptoms - continue to monitor blood work on treatment - 12/14/23 CT CAP showed New 3 mm nodule left lung lower lobe. No adenopathy in the thorax, abdomen or pelvis. No left hydronephrosis and left mid ureteral lesion.  - continue to follow with Dr. Starkey for surveillance cystoscopy and ureteroscopy  Pruritus, Rash - ______   - _____ - reports betamethasone cream and PO benadryl have not been effective _____ - continue Eucerin and Sarna lotion ____ - continue hydroxyzine q8h PRN, advised pt not to use this medication with benadryl, oxycodone or ativan ___ - patient has previously been referred to Dermatology and has yet to schedule appt / be seen _____  Insomnia  - patient has tried melatonin, benadryl and ambien without relief, he denies feeling anxious or having things on his mind keeping him up...  - Rx ativan 0.5mg, may take 1-2 tablets at bedtime as needed, discussed that this medication should not be taken with oxycodone or any other sedating medications, do not drive or operate machinery while taking this medication   Chronic low back pain _____ - this pain is chronic and is not cancer related pain, he has been prescribed oxy 10mg q8h PRN by another provider, however he says that he sometimes takes 30mg at a time... he was advised not to do this and to follow with his PCP / NSGY to discuss further management - discussed risk/benefits of opioids, not to operate vehicle or machinery while using these medications, do not take with ativan or other sedating medications    Decreased appetite ______ - encouraged frequent high protein, calorically dense small meals throughout the day  Instructed to contact our office with any new/worsening symptoms. Patient educated regarding plan of care, all questions/concerns addressed to the best of my abilities and patient's apparent satisfaction.

## 2024-01-23 NOTE — REVIEW OF SYSTEMS
[Fever] : no fever [Chills] : chills [Fatigue] : fatigue [Recent Change In Weight] : ~T no recent weight change [Chest Pain] : no chest pain [Palpitations] : palpitations [Lower Ext Edema] : no lower extremity edema [Cough] : no cough [SOB on Exertion] : shortness of breath during exertion [Abdominal Pain] : no abdominal pain [Vomiting] : no vomiting [Constipation] : no constipation [Diarrhea: Grade 1 - Increase of <4 stools per day over baseline; mild increase in ostomy output compared to baseline] : Diarrhea: Grade 1 - Increase of <4 stools per day over baseline; mild increase in ostomy output compared to baseline [Dysuria] : no dysuria [Joint Pain] : joint pain [Muscle Pain] : muscle pain [Skin Rash] : no skin rash [Dizziness] : no dizziness [Difficulty Walking] : difficulty walking [FreeTextEntry9] : lower back `

## 2024-01-24 ENCOUNTER — APPOINTMENT (OUTPATIENT)
Dept: HEMATOLOGY ONCOLOGY | Facility: CLINIC | Age: 74
End: 2024-01-24
Payer: MEDICARE

## 2024-01-24 ENCOUNTER — RESULT REVIEW (OUTPATIENT)
Age: 74
End: 2024-01-24

## 2024-01-24 VITALS
HEART RATE: 78 BPM | DIASTOLIC BLOOD PRESSURE: 85 MMHG | SYSTOLIC BLOOD PRESSURE: 123 MMHG | OXYGEN SATURATION: 97 % | TEMPERATURE: 97.7 F | WEIGHT: 218.24 LBS | RESPIRATION RATE: 16 BRPM | BODY MASS INDEX: 29.6 KG/M2

## 2024-01-24 DIAGNOSIS — R53.83 OTHER FATIGUE: ICD-10-CM

## 2024-01-24 DIAGNOSIS — L30.9 DERMATITIS, UNSPECIFIED: ICD-10-CM

## 2024-01-24 LAB
BASOPHILS # BLD AUTO: 0.04 K/UL — SIGNIFICANT CHANGE UP (ref 0–0.2)
BASOPHILS NFR BLD AUTO: 0.6 % — SIGNIFICANT CHANGE UP (ref 0–2)
EOSINOPHIL # BLD AUTO: 0.15 K/UL — SIGNIFICANT CHANGE UP (ref 0–0.5)
EOSINOPHIL NFR BLD AUTO: 2.3 % — SIGNIFICANT CHANGE UP (ref 0–6)
HCT VFR BLD CALC: 35 % — LOW (ref 39–50)
HGB BLD-MCNC: 12 G/DL — LOW (ref 13–17)
IMM GRANULOCYTES NFR BLD AUTO: 0.5 % — SIGNIFICANT CHANGE UP (ref 0–0.9)
LYMPHOCYTES # BLD AUTO: 1.62 K/UL — SIGNIFICANT CHANGE UP (ref 1–3.3)
LYMPHOCYTES # BLD AUTO: 24.6 % — SIGNIFICANT CHANGE UP (ref 13–44)
MCHC RBC-ENTMCNC: 31.3 PG — SIGNIFICANT CHANGE UP (ref 27–34)
MCHC RBC-ENTMCNC: 34.3 G/DL — SIGNIFICANT CHANGE UP (ref 32–36)
MCV RBC AUTO: 91.4 FL — SIGNIFICANT CHANGE UP (ref 80–100)
MONOCYTES # BLD AUTO: 0.95 K/UL — HIGH (ref 0–0.9)
MONOCYTES NFR BLD AUTO: 14.4 % — HIGH (ref 2–14)
NEUTROPHILS # BLD AUTO: 3.8 K/UL — SIGNIFICANT CHANGE UP (ref 1.8–7.4)
NEUTROPHILS NFR BLD AUTO: 57.6 % — SIGNIFICANT CHANGE UP (ref 43–77)
NRBC # BLD: 0 /100 WBCS — SIGNIFICANT CHANGE UP (ref 0–0)
PLATELET # BLD AUTO: 150 K/UL — SIGNIFICANT CHANGE UP (ref 150–400)
RBC # BLD: 3.83 M/UL — LOW (ref 4.2–5.8)
RBC # FLD: 14.7 % — HIGH (ref 10.3–14.5)
WBC # BLD: 6.59 K/UL — SIGNIFICANT CHANGE UP (ref 3.8–10.5)
WBC # FLD AUTO: 6.59 K/UL — SIGNIFICANT CHANGE UP (ref 3.8–10.5)

## 2024-01-24 PROCEDURE — 99214 OFFICE O/P EST MOD 30 MIN: CPT

## 2024-01-24 RX ORDER — MAGNESIUM OXIDE 241.3 MG/1000MG
400 TABLET ORAL
Qty: 30 | Refills: 3 | Status: ACTIVE | COMMUNITY
Start: 2023-12-04 | End: 1900-01-01

## 2024-01-24 RX ORDER — DIAZEPAM 5 MG/1
5 TABLET ORAL
Qty: 30 | Refills: 0 | Status: ACTIVE | COMMUNITY
Start: 2023-12-13 | End: 1900-01-01

## 2024-01-25 PROBLEM — R53.83 FATIGUE: Status: ACTIVE | Noted: 2020-11-03

## 2024-01-25 PROBLEM — L30.9 DERMATITIS: Status: ACTIVE | Noted: 2023-11-01

## 2024-01-31 ENCOUNTER — RESULT REVIEW (OUTPATIENT)
Age: 74
End: 2024-01-31

## 2024-01-31 ENCOUNTER — APPOINTMENT (OUTPATIENT)
Dept: INFUSION THERAPY | Facility: HOSPITAL | Age: 74
End: 2024-01-31

## 2024-01-31 LAB
ALBUMIN SERPL ELPH-MCNC: 3.9 G/DL — SIGNIFICANT CHANGE UP (ref 3.3–5)
ALP SERPL-CCNC: 68 U/L — SIGNIFICANT CHANGE UP (ref 40–120)
ALT FLD-CCNC: 6 U/L — LOW (ref 10–45)
ANION GAP SERPL CALC-SCNC: 8 MMOL/L — SIGNIFICANT CHANGE UP (ref 5–17)
AST SERPL-CCNC: 28 U/L — SIGNIFICANT CHANGE UP (ref 10–40)
BASOPHILS # BLD AUTO: 0.06 K/UL — SIGNIFICANT CHANGE UP (ref 0–0.2)
BASOPHILS NFR BLD AUTO: 1 % — SIGNIFICANT CHANGE UP (ref 0–2)
BILIRUB SERPL-MCNC: 0.4 MG/DL — SIGNIFICANT CHANGE UP (ref 0.2–1.2)
BUN SERPL-MCNC: 24 MG/DL — HIGH (ref 7–23)
CALCIUM SERPL-MCNC: 9.2 MG/DL — SIGNIFICANT CHANGE UP (ref 8.4–10.5)
CHLORIDE SERPL-SCNC: 104 MMOL/L — SIGNIFICANT CHANGE UP (ref 96–108)
CO2 SERPL-SCNC: 25 MMOL/L — SIGNIFICANT CHANGE UP (ref 22–31)
CREAT SERPL-MCNC: 1.42 MG/DL — HIGH (ref 0.5–1.3)
EGFR: 52 ML/MIN/1.73M2 — LOW
EOSINOPHIL # BLD AUTO: 0.11 K/UL — SIGNIFICANT CHANGE UP (ref 0–0.5)
EOSINOPHIL NFR BLD AUTO: 1.8 % — SIGNIFICANT CHANGE UP (ref 0–6)
GLUCOSE SERPL-MCNC: 130 MG/DL — HIGH (ref 70–99)
HCT VFR BLD CALC: 35.2 % — LOW (ref 39–50)
HGB BLD-MCNC: 12 G/DL — LOW (ref 13–17)
IMM GRANULOCYTES NFR BLD AUTO: 0.7 % — SIGNIFICANT CHANGE UP (ref 0–0.9)
LYMPHOCYTES # BLD AUTO: 1.48 K/UL — SIGNIFICANT CHANGE UP (ref 1–3.3)
LYMPHOCYTES # BLD AUTO: 24.1 % — SIGNIFICANT CHANGE UP (ref 13–44)
MAGNESIUM SERPL-MCNC: 1.8 MG/DL — SIGNIFICANT CHANGE UP (ref 1.6–2.6)
MCHC RBC-ENTMCNC: 31.7 PG — SIGNIFICANT CHANGE UP (ref 27–34)
MCHC RBC-ENTMCNC: 34.1 G/DL — SIGNIFICANT CHANGE UP (ref 32–36)
MCV RBC AUTO: 92.9 FL — SIGNIFICANT CHANGE UP (ref 80–100)
MONOCYTES # BLD AUTO: 0.84 K/UL — SIGNIFICANT CHANGE UP (ref 0–0.9)
MONOCYTES NFR BLD AUTO: 13.7 % — SIGNIFICANT CHANGE UP (ref 2–14)
NEUTROPHILS # BLD AUTO: 3.61 K/UL — SIGNIFICANT CHANGE UP (ref 1.8–7.4)
NEUTROPHILS NFR BLD AUTO: 58.7 % — SIGNIFICANT CHANGE UP (ref 43–77)
NRBC # BLD: 0 /100 WBCS — SIGNIFICANT CHANGE UP (ref 0–0)
PLATELET # BLD AUTO: SIGNIFICANT CHANGE UP K/UL (ref 150–400)
POTASSIUM SERPL-MCNC: 4.2 MMOL/L — SIGNIFICANT CHANGE UP (ref 3.5–5.3)
POTASSIUM SERPL-SCNC: 4.2 MMOL/L — SIGNIFICANT CHANGE UP (ref 3.5–5.3)
PROT SERPL-MCNC: 7.2 G/DL — SIGNIFICANT CHANGE UP (ref 6–8.3)
RBC # BLD: 3.79 M/UL — LOW (ref 4.2–5.8)
RBC # FLD: 14.8 % — HIGH (ref 10.3–14.5)
SODIUM SERPL-SCNC: 137 MMOL/L — SIGNIFICANT CHANGE UP (ref 135–145)
WBC # BLD: 6.14 K/UL — SIGNIFICANT CHANGE UP (ref 3.8–10.5)
WBC # FLD AUTO: 6.14 K/UL — SIGNIFICANT CHANGE UP (ref 3.8–10.5)

## 2024-02-01 LAB
CLOSURE TME COLL+EPINEP BLD: SIGNIFICANT CHANGE UP (ref 150–400)
T4 FREE SERPL-MCNC: 1.4 NG/DL — SIGNIFICANT CHANGE UP (ref 0.9–1.8)
TSH SERPL-MCNC: 0.57 UIU/ML — SIGNIFICANT CHANGE UP (ref 0.27–4.2)

## 2024-02-05 ENCOUNTER — APPOINTMENT (OUTPATIENT)
Dept: UROLOGY | Facility: CLINIC | Age: 74
End: 2024-02-05
Payer: MEDICARE

## 2024-02-05 PROCEDURE — 99214 OFFICE O/P EST MOD 30 MIN: CPT

## 2024-02-05 NOTE — PHYSICAL EXAM
[General Appearance - Well Developed] : well developed [General Appearance - Well Nourished] : well nourished [Heart Rate And Rhythm] : heart rate and rhythm were normal [] : no respiratory distress [Respiration, Rhythm And Depth] : normal respiratory rhythm and effort [Bowel Sounds] : normal bowel sounds [Abdomen Soft] : soft [Normal Station and Gait] : the gait and station were normal for the patient's age [Skin Color & Pigmentation] : normal skin color and pigmentation [Skin Turgor] : supple [No Focal Deficits] : no focal deficits [Oriented To Time, Place, And Person] : oriented to person, place, and time [Not Anxious] : not anxious

## 2024-02-05 NOTE — HISTORY OF PRESENT ILLNESS
[FreeTextEntry1] : : Aug 2 1950 Referring Provider: none  HPI: Mr. 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 He has not received BCG for the bladder. He is here today aftter he was scheduled for RAL segmental ureterectomy and stent placement for mid ureteral tumor resection but was found to have multifocal disease. He is here today to discuss pathology which demonstrated 2-3 mm pTa in the kidney, TaHG in mid and distal ureter and T1HG in the bladder. TOV today was successful. He is otherwise doing well. We will plan for likely some sort of BCG for bladder, however given solitary kidney may require NxU +/- chemotherapy.  Anticoagulation: None All: NKDA Social: never smoker, nondrinker, , no radiation exposure PMHx: previously HTN, no HLD FHx: no cancer PSHx: R NxU, prior left upper lobectomy  Imaging: CT urogram with mid L ureter filling defect  Disease Management  2024: 74 yo M with 23 CT CAP showed New 3 mm nodule left lung lower lobe. No adenopathy in the thorax, abdomen or pelvis. No left hydronephrosis and left mid ureteral lesion.  Patient has chronic pain in his back that he takes oxycodone for. He has occasional "sticking" pain in his L chest that he associates with his port. This was hurting him today, took oxy and it had improved. He does not have any cardiac chest pain, shortness of breath or palpitations. Renal pelvis UCC, high grade:  on pembrolizumab D1 and enfortumab vedotin (padcev) D1, D8 on 21 day cycle, enfortumab vedotin held for C3 given extreme fatigue; continued on pembro and enfortumab vedotin, next cycle due . PSA in April 1.46 ng/mL.  [Urinary Incontinence] : no urinary incontinence [Urinary Retention] : no urinary retention [Urinary Urgency] : no urinary urgency [Urinary Frequency] : no urinary frequency

## 2024-02-05 NOTE — ASSESSMENT
[FreeTextEntry1] : 74 yo M with urothelial carcinoma of UTUC s/p chemotherapy  #Urothelial CA - Plan for L URS/ laser ablation and stent - Continue tamsulosin and finasteride - Explained that should this progress, may have to consider NxU vs new chemo regimen

## 2024-02-05 NOTE — HISTORY OF PRESENT ILLNESS
[FreeTextEntry1] : : Aug 2 1950 Referring Provider: none  HPI: Mr. 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 He has not received BCG for the bladder. He is here today aftter he was scheduled for RAL segmental ureterectomy and stent placement for mid ureteral tumor resection but was found to have multifocal disease. He is here today to discuss pathology which demonstrated 2-3 mm pTa in the kidney, TaHG in mid and distal ureter and T1HG in the bladder. TOV today was successful. He is otherwise doing well. We will plan for likely some sort of BCG for bladder, however given solitary kidney may require NxU +/- chemotherapy.  Anticoagulation: None All: NKDA Social: never smoker, nondrinker, , no radiation exposure PMHx: previously HTN, no HLD FHx: no cancer PSHx: R NxU, prior left upper lobectomy  Imaging: CT urogram with mid L ureter filling defect  Disease Management  2024: 72 yo M with 23 CT CAP showed New 3 mm nodule left lung lower lobe. No adenopathy in the thorax, abdomen or pelvis. No left hydronephrosis and left mid ureteral lesion.  Patient has chronic pain in his back that he takes oxycodone for. He has occasional "sticking" pain in his L chest that he associates with his port. This was hurting him today, took oxy and it had improved. He does not have any cardiac chest pain, shortness of breath or palpitations. Renal pelvis UCC, high grade:  on pembrolizumab D1 and enfortumab vedotin (padcev) D1, D8 on 21 day cycle, enfortumab vedotin held for C3 given extreme fatigue; continued on pembro and enfortumab vedotin, next cycle due . PSA in April 1.46 ng/mL.  [Urinary Incontinence] : no urinary incontinence [Urinary Retention] : no urinary retention [Urinary Urgency] : no urinary urgency [Urinary Frequency] : no urinary frequency

## 2024-02-06 ENCOUNTER — NON-APPOINTMENT (OUTPATIENT)
Age: 74
End: 2024-02-06

## 2024-02-06 DIAGNOSIS — F11.90 OPIOID USE, UNSPECIFIED, UNCOMPLICATED: ICD-10-CM

## 2024-02-06 RX ORDER — NALOXONE HYDROCHLORIDE 4 MG/.1ML
4 SPRAY NASAL
Qty: 1 | Refills: 2 | Status: ACTIVE | COMMUNITY
Start: 2024-02-06 | End: 1900-01-01

## 2024-02-07 ENCOUNTER — RESULT REVIEW (OUTPATIENT)
Age: 74
End: 2024-02-07

## 2024-02-07 ENCOUNTER — APPOINTMENT (OUTPATIENT)
Dept: INFUSION THERAPY | Facility: HOSPITAL | Age: 74
End: 2024-02-07

## 2024-02-07 LAB
ANION GAP SERPL CALC-SCNC: 10 MMOL/L — SIGNIFICANT CHANGE UP (ref 5–17)
BASOPHILS # BLD AUTO: 0.06 K/UL — SIGNIFICANT CHANGE UP (ref 0–0.2)
BASOPHILS NFR BLD AUTO: 0.9 % — SIGNIFICANT CHANGE UP (ref 0–2)
BUN SERPL-MCNC: 29 MG/DL — HIGH (ref 7–23)
CALCIUM SERPL-MCNC: 9.4 MG/DL — SIGNIFICANT CHANGE UP (ref 8.4–10.5)
CHLORIDE SERPL-SCNC: 103 MMOL/L — SIGNIFICANT CHANGE UP (ref 96–108)
CO2 SERPL-SCNC: 24 MMOL/L — SIGNIFICANT CHANGE UP (ref 22–31)
CREAT SERPL-MCNC: 1.44 MG/DL — HIGH (ref 0.5–1.3)
EGFR: 51 ML/MIN/1.73M2 — LOW
EOSINOPHIL # BLD AUTO: 0.08 K/UL — SIGNIFICANT CHANGE UP (ref 0–0.5)
EOSINOPHIL NFR BLD AUTO: 1.3 % — SIGNIFICANT CHANGE UP (ref 0–6)
GLUCOSE SERPL-MCNC: 152 MG/DL — HIGH (ref 70–99)
HCT VFR BLD CALC: 36.9 % — LOW (ref 39–50)
HGB BLD-MCNC: 12.5 G/DL — LOW (ref 13–17)
IMM GRANULOCYTES NFR BLD AUTO: 0.3 % — SIGNIFICANT CHANGE UP (ref 0–0.9)
LYMPHOCYTES # BLD AUTO: 1.51 K/UL — SIGNIFICANT CHANGE UP (ref 1–3.3)
LYMPHOCYTES # BLD AUTO: 23.9 % — SIGNIFICANT CHANGE UP (ref 13–44)
MCHC RBC-ENTMCNC: 31.3 PG — SIGNIFICANT CHANGE UP (ref 27–34)
MCHC RBC-ENTMCNC: 33.9 G/DL — SIGNIFICANT CHANGE UP (ref 32–36)
MCV RBC AUTO: 92.5 FL — SIGNIFICANT CHANGE UP (ref 80–100)
MONOCYTES # BLD AUTO: 0.77 K/UL — SIGNIFICANT CHANGE UP (ref 0–0.9)
MONOCYTES NFR BLD AUTO: 12.2 % — SIGNIFICANT CHANGE UP (ref 2–14)
NEUTROPHILS # BLD AUTO: 3.89 K/UL — SIGNIFICANT CHANGE UP (ref 1.8–7.4)
NEUTROPHILS NFR BLD AUTO: 61.4 % — SIGNIFICANT CHANGE UP (ref 43–77)
NRBC # BLD: 0 /100 WBCS — SIGNIFICANT CHANGE UP (ref 0–0)
PLATELET # BLD AUTO: 213 K/UL — SIGNIFICANT CHANGE UP (ref 150–400)
POTASSIUM SERPL-MCNC: 4.1 MMOL/L — SIGNIFICANT CHANGE UP (ref 3.5–5.3)
POTASSIUM SERPL-SCNC: 4.1 MMOL/L — SIGNIFICANT CHANGE UP (ref 3.5–5.3)
RBC # BLD: 3.99 M/UL — LOW (ref 4.2–5.8)
RBC # FLD: 14.2 % — SIGNIFICANT CHANGE UP (ref 10.3–14.5)
SODIUM SERPL-SCNC: 137 MMOL/L — SIGNIFICANT CHANGE UP (ref 135–145)
WBC # BLD: 6.33 K/UL — SIGNIFICANT CHANGE UP (ref 3.8–10.5)
WBC # FLD AUTO: 6.33 K/UL — SIGNIFICANT CHANGE UP (ref 3.8–10.5)

## 2024-02-10 LAB — BACTERIA UR CULT: NORMAL

## 2024-02-12 ENCOUNTER — APPOINTMENT (OUTPATIENT)
Dept: HEMATOLOGY ONCOLOGY | Facility: CLINIC | Age: 74
End: 2024-02-12
Payer: MEDICARE

## 2024-02-12 ENCOUNTER — RESULT REVIEW (OUTPATIENT)
Age: 74
End: 2024-02-12

## 2024-02-12 ENCOUNTER — NON-APPOINTMENT (OUTPATIENT)
Age: 74
End: 2024-02-12

## 2024-02-12 ENCOUNTER — APPOINTMENT (OUTPATIENT)
Dept: FAMILY MEDICINE | Facility: CLINIC | Age: 74
End: 2024-02-12
Payer: MEDICARE

## 2024-02-12 VITALS
HEART RATE: 84 BPM | SYSTOLIC BLOOD PRESSURE: 131 MMHG | BODY MASS INDEX: 30.05 KG/M2 | OXYGEN SATURATION: 97 % | TEMPERATURE: 97 F | WEIGHT: 221.56 LBS | RESPIRATION RATE: 17 BRPM | DIASTOLIC BLOOD PRESSURE: 78 MMHG

## 2024-02-12 VITALS
DIASTOLIC BLOOD PRESSURE: 76 MMHG | OXYGEN SATURATION: 98 % | WEIGHT: 220 LBS | RESPIRATION RATE: 17 BRPM | SYSTOLIC BLOOD PRESSURE: 128 MMHG | HEIGHT: 72 IN | HEART RATE: 81 BPM | BODY MASS INDEX: 29.8 KG/M2 | TEMPERATURE: 96.7 F

## 2024-02-12 DIAGNOSIS — G89.29 DORSALGIA, UNSPECIFIED: ICD-10-CM

## 2024-02-12 DIAGNOSIS — M54.9 DORSALGIA, UNSPECIFIED: ICD-10-CM

## 2024-02-12 LAB
BASOPHILS # BLD AUTO: 0.07 K/UL — SIGNIFICANT CHANGE UP (ref 0–0.2)
BASOPHILS NFR BLD AUTO: 0.8 % — SIGNIFICANT CHANGE UP (ref 0–2)
EOSINOPHIL # BLD AUTO: 0.14 K/UL — SIGNIFICANT CHANGE UP (ref 0–0.5)
EOSINOPHIL NFR BLD AUTO: 1.7 % — SIGNIFICANT CHANGE UP (ref 0–6)
HCT VFR BLD CALC: 35.5 % — LOW (ref 39–50)
HGB BLD-MCNC: 12.3 G/DL — LOW (ref 13–17)
IMM GRANULOCYTES NFR BLD AUTO: 0.6 % — SIGNIFICANT CHANGE UP (ref 0–0.9)
LYMPHOCYTES # BLD AUTO: 2.06 K/UL — SIGNIFICANT CHANGE UP (ref 1–3.3)
LYMPHOCYTES # BLD AUTO: 24.3 % — SIGNIFICANT CHANGE UP (ref 13–44)
MCHC RBC-ENTMCNC: 31.6 PG — SIGNIFICANT CHANGE UP (ref 27–34)
MCHC RBC-ENTMCNC: 34.6 G/DL — SIGNIFICANT CHANGE UP (ref 32–36)
MCV RBC AUTO: 91.3 FL — SIGNIFICANT CHANGE UP (ref 80–100)
MONOCYTES # BLD AUTO: 1.13 K/UL — HIGH (ref 0–0.9)
MONOCYTES NFR BLD AUTO: 13.3 % — SIGNIFICANT CHANGE UP (ref 2–14)
NEUTROPHILS # BLD AUTO: 5.02 K/UL — SIGNIFICANT CHANGE UP (ref 1.8–7.4)
NEUTROPHILS NFR BLD AUTO: 59.3 % — SIGNIFICANT CHANGE UP (ref 43–77)
NRBC # BLD: 0 /100 WBCS — SIGNIFICANT CHANGE UP (ref 0–0)
PLATELET # BLD AUTO: 146 K/UL — LOW (ref 150–400)
RBC # BLD: 3.89 M/UL — LOW (ref 4.2–5.8)
RBC # FLD: 14.1 % — SIGNIFICANT CHANGE UP (ref 10.3–14.5)
WBC # BLD: 8.47 K/UL — SIGNIFICANT CHANGE UP (ref 3.8–10.5)
WBC # FLD AUTO: 8.47 K/UL — SIGNIFICANT CHANGE UP (ref 3.8–10.5)

## 2024-02-12 PROCEDURE — 99215 OFFICE O/P EST HI 40 MIN: CPT

## 2024-02-12 PROCEDURE — 99213 OFFICE O/P EST LOW 20 MIN: CPT

## 2024-02-12 PROCEDURE — G2211 COMPLEX E/M VISIT ADD ON: CPT

## 2024-02-12 PROCEDURE — 93000 ELECTROCARDIOGRAM COMPLETE: CPT

## 2024-02-12 RX ORDER — HYDROXYZINE HYDROCHLORIDE 25 MG/1
25 TABLET ORAL EVERY 8 HOURS
Qty: 45 | Refills: 0 | Status: COMPLETED | COMMUNITY
Start: 2023-11-15 | End: 2024-02-12

## 2024-02-12 RX ORDER — HYDROXYZINE HYDROCHLORIDE 25 MG/1
25 TABLET ORAL 3 TIMES DAILY
Qty: 90 | Refills: 1 | Status: COMPLETED | COMMUNITY
Start: 2023-11-01 | End: 2024-02-12

## 2024-02-12 NOTE — COUNSELING
[Weight management counseling provided] : Weight management [Healthy eating counseling provided] : healthy eating [Activity counseling provided] : activity [Weigh Self Once Weekly] : Weigh self once weekly [Low Fat Diet] : Low fat diet [Decrease Portions] : Decrease food portions

## 2024-02-12 NOTE — RESULTS/DATA
[NS ST-T Wave Changes] : there are nonspecific ST-T wave changes [ECG Reviewed] : reviewed [No Acute Ischemia] : No acute ischemia [Ventricular Rate___] : ventricular rate is [unfilled] beats per minute [Left Anterior Hemiblock] : left anterior hemiblock [Interval Change] : there was an interval change in the ECG [FreeTextEntry4] : Cardiol eval

## 2024-02-12 NOTE — ASSESSMENT
[High Risk Surgery - Intraperitoneal, Intrathoracic or Supringuinal Vascular Procedures] : High Risk Surgery - Intraperitoneal, Intrathoracic or Supringuinal Vascular Procedures - No (0) [Ischemic Heart Disease] : Ischemic Heart Disease - No (0) [Congestive Heart Failure] : Congestive Heart Failure - No (0) [Prior Cerebrovascular Accident or TIA] : Prior Cerebrovascular Accident or TIA - No (0) [Creatinine >= 2mg/dL (1 Point)] : Creatinine >= 2mg/dL - No (0) [Insulin-dependent Diabetic (1 Point)] : Insulin-dependent Diabetic - No (0) [0] : 0 , RCRI Class: I, Risk of Post-Op Cardiac Complications: 3.9%, 95% CI for Risk Estimate: 2.8% - 5.4% [Patient Requires Further Testing] : Patient requires further testing [Cardiology consultation] : Cardiology consultation [Modify medications prior to procedure] : Modify medications prior to procedure [FreeTextEntry4] : Cardiacl clearance pending ( please f/u  )  otherwise  pt is medically optimized  [FreeTextEntry5] : NA [FreeTextEntry6] : NA [FreeTextEntry7] : hold all vitamin as and supplements 7 days prior the surgery

## 2024-02-12 NOTE — REVIEW OF SYSTEMS
[Chills] : chills [Fatigue] : fatigue [SOB on Exertion] : shortness of breath during exertion [Diarrhea: Grade 0] : Diarrhea: Grade 0 [Joint Pain] : joint pain [Muscle Pain] : muscle pain [Skin Rash] : skin rash [Difficulty Walking] : difficulty walking [Fever] : no fever [Chest Pain] : no chest pain [Lower Ext Edema] : no lower extremity edema [Cough] : no cough [Abdominal Pain] : no abdominal pain [Vomiting] : no vomiting [Constipation] : no constipation [Dysuria] : no dysuria [Dizziness] : no dizziness [FreeTextEntry9] : lower back

## 2024-02-12 NOTE — PHYSICAL EXAM
[Fully active, able to carry on all pre-disease performance without restriction] : Status 0 - Fully active, able to carry on all pre-disease performance without restriction [Normal] : grossly intact [de-identified] : anicteric  [de-identified] : supple, FROM [de-identified] : no edema; L sided chest port - no erythema or ttp

## 2024-02-12 NOTE — HISTORY OF PRESENT ILLNESS
[No Pertinent Cardiac History] : no history of aortic stenosis, atrial fibrillation, coronary artery disease, recent myocardial infarction, or implantable device/pacemaker [No Pertinent Pulmonary History] : no history of asthma, COPD, sleep apnea, or smoking [No Adverse Anesthesia Reaction] : no adverse anesthesia reaction in self or family member [(Patient denies any chest pain, claudication, dyspnea on exertion, orthopnea, palpitations or syncope)] : Patient denies any chest pain, claudication, dyspnea on exertion, orthopnea, palpitations or syncope [Chronic Anticoagulation] : no chronic anticoagulation [Chronic Kidney Disease] : chronic kidney disease [Diabetes] : no diabetes [Good (7-10 METs)] : Good (7-10 METs) [FreeTextEntry1] : cystoscopy left ureteroscopy laser ablation biopsy of lesion and stent replaceme [FreeTextEntry2] : 02/27/24 [FreeTextEntry3] : Dr. Mohamud Starkey [FreeTextEntry4] : Patient denies CP,SOB,abd pain, N,V, Pt c/o chronic back pain , in the past referred multiple  for MRI spine, Ortho spine and pain management eval , have not done it. He is taking Oxycodone for pain management , not able to take NSAIDs due to CRI. Pt is aware that I will not be able to prescribe to him opioids chronically - he needs to see above. Pt  aware  and agree to schedule appt.

## 2024-02-12 NOTE — ASSESSMENT
[Future Reassessment of Pain Scale] : Future reassessment of pain scale    [Medication(s)] : Medication(s) [FreeTextEntry1] : 73 year old male s/p right nephroureterectomy June 2022 with pT3 in renal pelvis (10% squamous differentiation) and pTa in the ureter and has since had several bouts of T1HG in the bladder July 2022, Feb 2023. 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 At initial appointment, Dr. Mccall discussed his cancer status and further management. Based on AUA and EAU guideline subjects with upper tract tumors of the renal pelvis and ureter(s) must meet a high risk assessment defined as: tumor 1cm and/or hydronephrosis and/or high grade pathology and/or multifocal disease, where a radical NU approach to treat localized disease is warranted, followed by adjuvant immunotherapy. However, he adamantly declined any surgery given his solitary kidney and the result of dialysis. He is cisplatin ineligible. The potential regimen, pembrolizumab and enfortumab vedotin could induce 73% response including 15% CR rate. His T1HG bladder cancer may be treated with both combination. He will be monitored by Dr. Starkey for surveillance cystoscopy and ureteroscopy. Potential AEs of immunotherapy and enfortumab vedotin were discussed and patient signed consent. He started treatment with pembrolizumab and padcev on 10/18/2023. 12/14/23 CT CAP showed New 3 mm nodule left lung lower lobe. No adenopathy in the thorax, abdomen or pelvis. No left hydronephrosis and left mid ureteral lesion.   Renal pelvis UCC, high grade  - on pembrolizumab D1 and enfortumab vedotin (padcev) D1, D8 on 21 day cycle, enfortumab vedotin held for C3 given extreme fatigue; continued on pembro and enfortumab vedotin, next cycle due 1/31  - re-iterated potential AEs of keytruda, including but not limited to: fatigue, skin rash, joint pain, hypothyroidism, pneumonitis, hepatitis, nephritis, colitis, myocarditis, pericarditis, hypophysitis. - re-iterated potential AEs of padcev, including but not limited to fatigue, skin rash, worsening diabetes, eye problem and neuropathy and GI symptoms - continue to monitor blood work on treatment - 12/14/23 CT CAP showed New 3 mm nodule left lung lower lobe. No adenopathy in the thorax, abdomen or pelvis. No left hydronephrosis and left mid ureteral lesion.  - continue to follow with Dr. Starkey for surveillance cystoscopy and ureteroscopy  Pruritus, Rash - improved from prior    - continue benadryl, betamethasone cream, sarna lotion as needed   Insomnia  - Rx diazepam 5mg nightly as needed; discussed that this medication should not be taken with oxycodone or any other sedating medications, do not drive or operate machinery while taking this medication   Chronic low back pain  - this pain is chronic and is not cancer related pain, he has been prescribed oxy 10mg q8h PRN by another provider, however he says that he sometimes takes 30mg at a time... he was advised not to do this and to follow with his PCP / NSGY to discuss further management - discussed risk/benefits of opioids, not to operate vehicle or machinery while using these medications, do not take with diazepam or other sedating medications   - may continue MgOx for muscle cramps  - resume tamsulosin, prescriebed today   Instructed to contact our office with any new/worsening symptoms. Patient educated regarding plan of care, all questions/concerns addressed to the best of my abilities and patient's apparent satisfaction.

## 2024-02-12 NOTE — HISTORY OF PRESENT ILLNESS
[de-identified] :  a 74 yo M with history of hypertension, hypothyroidism and right nephroureterectomy June 2022 with pT3 in renal pelvis (10% squamous differentiation) and pTa in the ureter and has since had several bouts of T1HG in the bladder July 2022, Feb 2023.  5/20/23 CT abdomen and pelvis w/o contrast showed mod to severe left hydroureteronephrosis to the left of a filling defect within the left mid ureter, neoplasm until proven otherwise.  7/18/23 CT chest and urogram showed Interval placement of left-sided ureteral stent with continued moderate hydroureteronephrosis. Point of transition in the left mid ureter. There is prominent soft tissue and a lack of contrast on the delayed films. This raises concern for an underlying neoplasm.  9/2/23 CT abdomen and pelvis without con showed left hydronephrosis without significant changes, left ureteral stent placement.   He was scheduled for segmental ureterectomy but ureteroscopy on 9/1 noted a 2.5 cm segment in the mid left ureter (now solitary kidney) with new disease and a 1.5 cm in the  distal ureter  lesion, and patholoy showed TaHG in the mid ureter and TaHG in the distal ureter as well as TaHG in the kidney with T1HG in the bladder.     He reports no hematuria, burning, frequency and abdominal pain.  He has chronic lower back pain for many years on oxycodon as needed.    11/1/23 Has newly developed skin itching and mild rash that has not been improving with OTC topicals. He also has intermittent headaches relieved by Tylenol. He reports increased fatigue and decreased sleep. He denied nausea, vomiting, diarrhea, constipation.  11/22/23: C2D15 pembrolizumab and padcev. Patient has decreased appetite, has not been eating much at all in the past few weeks and has lost 10lbs. He is feeling very weak, very fatigued. Patient is so tired that he had to be wheeled to exam room in wheelchair. He was able to walk from the hallway into the exam room. He needs assistance getting up from a sitting position. He will occ get shortness of breath on exertion and this has worsened in the past few weeks. Denies chest pain but says once in a while he has palpitations when trying to sleep. He is not sure if palpitations happen with exertion because he has not been able to exert himself very much of late. Patient denies fever, he is cold all of the time. Had chills the other day, took temperature and it was normal. He is exhausted because he has not been able to sleep. He only sleeps for 15 minutes at a time at night and it is a very light sleep. He has never suffered from insomnia before. He is not taking naps during the day. He was already having difficulty sleeping during cylce 1 but it has worsened tremendously with this cycle. He has been using hydroxyzine and benadryl for itching, neither of which have made him drowsy. He has been using melatonin which does not help. He tried his wife's ambien, which also did not help. Patient has ongoing itching but no rashes, says he is "itching, itching, always itching". Says that the prescribed medications have not worked, including betamethasone cream and hydroxyzine. Has been using Eucerin and Sarna creams, sitting in Epsom salt baths. Patient was constipated, however now is having loose stools twice a day for the last three days, has stopped taking bowel regimen. Pt denies numbness and tingling in hands and feet. Patient reports chronic low back pain that pre dates his cancer diagnosis, the pain is not cancer related, has been dealing with this with his PCP and ?NSGY, initially surgery was recommended but now is not. Was going to PT but is no longer because it was not helping. He takes oxycodone for this pain, he says sometimes he will go days without taking it and then he will need three tabs (30mg) at one time, which is not how this medication is prescribed.    12/13/23 He continues to have rash which appears to be improving. However he and his wife are concerned that his skin is darkening (even in non sun exposed areas). He also states that recentl he is "always cold." He denies diarrhea or constipation. He continues to have some insomnia  12/14/23 CT CAP showed New 3 mm nodule left lung lower lobe. No adenopathy in the thorax, abdomen or pelvis. No left hydronephrosis and left mid ureteral lesion.   1/3/24 reports good appetite, better sleep, skin rash with improved itchiness. He is able to do exercises.   [de-identified] : 1/24/24: Energy is better than it was, but is still fatigued. Mood is better than it was. Appetite is better than before. Patient notes that he is able to walk for half an hour. Itching has improved, it is most pronounced on his buttock at nighttime, not taking hydroxyzine, sometimes will use benadryl, is using topicals as well. Reports hyperpigmentation of skin since starting treatment. He reports increased frequency of urination within the last few weeks, he also reports in the last few weeks he has not been taking tamsulosin. Says he urinates about every 2-3 hours during night and day. No hematuria or dysuria. Patient has chronic pain in his back that he takes oxycodone for. He has occasional "sticking" pain in his L chest that he associates with his port. This was hurting him today, took oxy and it had improved. He does not have any cardiac chest pain, shortness of breath or palpitations. He denies constipation and diarrhea, says he eats home made yogurt that helps to keep his bowel movements regular. He is using valium everyday for insomnia. He tries to go to sleep without it, but cannot fall asleep without it.    2/12/24 Reports mild fatigue and improving itchiness. Both hand fingers develop mild numbness. Nocturia 2. Denies any gross hematuria.

## 2024-02-12 NOTE — REVIEW OF SYSTEMS
[Joint Pain] : joint pain [Joint Stiffness] : joint stiffness [Muscle Weakness] : no muscle weakness [Back Pain] : back pain [Suicidal] : not suicidal [Insomnia] : no insomnia [Anxiety] : anxiety [Depression] : no depression [Negative] : Neurological

## 2024-02-12 NOTE — PHYSICAL EXAM
[No Varicosities] : no varicosities [No Edema] : there was no peripheral edema [No Joint Swelling] : no joint swelling [Normal] : affect was normal and insight and judgment were intact

## 2024-02-20 ENCOUNTER — APPOINTMENT (OUTPATIENT)
Dept: THORACIC SURGERY | Facility: CLINIC | Age: 74
End: 2024-02-20
Payer: MEDICARE

## 2024-02-20 ENCOUNTER — OUTPATIENT (OUTPATIENT)
Dept: OUTPATIENT SERVICES | Facility: HOSPITAL | Age: 74
LOS: 1 days | End: 2024-02-20
Payer: MEDICARE

## 2024-02-20 ENCOUNTER — APPOINTMENT (OUTPATIENT)
Dept: RADIOLOGY | Facility: HOSPITAL | Age: 74
End: 2024-02-20

## 2024-02-20 VITALS
HEART RATE: 84 BPM | RESPIRATION RATE: 17 BRPM | BODY MASS INDEX: 29.8 KG/M2 | HEIGHT: 72 IN | SYSTOLIC BLOOD PRESSURE: 127 MMHG | WEIGHT: 220 LBS | DIASTOLIC BLOOD PRESSURE: 77 MMHG | OXYGEN SATURATION: 98 %

## 2024-02-20 DIAGNOSIS — Z90.2 ACQUIRED ABSENCE OF LUNG [PART OF]: Chronic | ICD-10-CM

## 2024-02-20 DIAGNOSIS — Z90.5 ACQUIRED ABSENCE OF KIDNEY: Chronic | ICD-10-CM

## 2024-02-20 DIAGNOSIS — Z98.890 OTHER SPECIFIED POSTPROCEDURAL STATES: Chronic | ICD-10-CM

## 2024-02-20 DIAGNOSIS — R91.1 SOLITARY PULMONARY NODULE: ICD-10-CM

## 2024-02-20 PROCEDURE — 71046 X-RAY EXAM CHEST 2 VIEWS: CPT | Mod: 26

## 2024-02-20 PROCEDURE — 99213 OFFICE O/P EST LOW 20 MIN: CPT

## 2024-02-20 NOTE — DATA REVIEWED
[FreeTextEntry1] : Independently reviewed the following: - CT Chest/Abdo/Pelvis on 12/27/23 - CXR today

## 2024-02-20 NOTE — ASSESSMENT
[FreeTextEntry1] : Mr. RACHEL PATEL, 73 year old male, never smoker, w/ hx of HTN, BPH, hypothyroidism, Vertebral fracture, Thoracic aortic aneurysm, who was found to have enlarging LORRIE ground glass nodule during CT evaluation of ascending aortic aneurysm. Referred to office by Dr. Lilly uY.  S/p Flexible bronch, uniportal left VATS, pneumonolysis, wedge resection left upper lobe x1,LULobectomy, MLND on 8/4/2021. Path demonstrating: Invasive mucinous adenocarcinoma; G3; 1cm; Single focus; All LN (0/16) and margins negative for tumor; pT1a pN0 (Stage IA1)  Of Note: s/p TURBT 2/14/23 showing high grade invasive urothelial carcinoma  5/22/23: s/p open Right nephro-ureterectomy  6/23/23: S/p cystoscopy, transurethral resection of bladder, left ureteroscopy, exchange of left double J stent with Dr. Canales. Path of bladder tumor reveals Urothelial atypia. Path of left ureteral urine, negative for malignancy.   Follows w/ Dr. Akin Mccall, currently receiving systemic therapy.   Presents today with follow up imaging.   I have independently reviewed the medical records and imaging at the time of this office consultation, and discussed the following interpretations with the patient: - Imaging reviewed with patient, new small left lung nodule. I discussed he returns to clinic in 3 months with CT Chest without contrast or re-evaluation. He is agreeable.  - Continue follow up with heme/onc.   Recommendations reviewed with patient during this office visit, and all questions answered; Patient instructed on the importance of follow up and verbalizes understanding.   I, ALY Valencia, personally performed the evaluation and management (E/M) services for this established patient. That E/M includes conducting the examination, assessing all new/exacerbated conditions, and establishing a new plan of care. Today, my ACP, Derick Melgoza NP, was here to observe my evaluation and management services for this new problem/exacerbated condition to be followed going forward.

## 2024-02-20 NOTE — HISTORY OF PRESENT ILLNESS
[FreeTextEntry1] : Mr. RACHEL PATEL, 73 year old male, never smoker, w/ hx of HTN, BPH, hypothyroidism, Vertebral fracture, Thoracic aortic aneurysm, who was found to have enlarging LORRIE ground glass nodule during CT evaluation of ascending aortic aneurysm. Referred to office by Dr. Lilly Yu.  S/p Flexible bronch, uniportal left VATS, pneumonolysis, wedge resection left upper lobe x1,LULobectomy, MLND on 8/4/2021. Path demonstrating: Invasive mucinous adenocarcinoma; G3; 1cm; Single focus; All LN (0/16) and margins negative for tumor; pT1a pN0 (Stage IA1)  Of Note: s/p TURBT 2/14/23 showing high grade invasive urothelial carcinoma  CT Chest on 3/20/23: - s/p LULobectomy - Unchanged 2 mm subpleural nodule in the LLL (2-42) -  Resolved previously new nodule in the RLL  - New 4 mm solid nodule in the RLL posterior basal segment (2-126) - RLL calcified granuloma - Unchanged dilated approximate 4.7 cm midascending aorta. Normal variant anatomy with persistent left-sided SVC. - Unchanged too small to characterize hypodensities in the liver. Right nephrectomy. Partially included left renal cyst. - Multilevel spondylosis.  OF NOTE: 5/22/23: s/p open Right nephro-ureterectomy  6/23/23: S/p cystoscopy, transurethral resection of bladder, left ureteroscopy, exchange of left double J stent with Dr. Canales. Path of bladder tumor reveals Urothelial atypia. Path of left ureteral urine, negative for malignancy.   Follows w/ Dr. Akin Mccall, currently receiving systemic therapy.   CT Chest on 7/18/23:  - s/p  partial left lung resection with postoperative changes - Aneurysmal dilatation of the ascending aorta measuring up to 5.0 cm which has not significantly changed - Subcentimeter low-attenuation lesions in the liver which are too small to characterize. These do not appear significantly changed. - Status post right-sided nephrectomy with postsurgical changes. Left-sided renal cysts. Interval placement of a left ureteral stent with continued moderate left-sided hydroureteronephrosis. Question of prominent soft tissue in the left mid ureter on series 4 image 124. This was the site of transition on the prior study and underlying lesion must be considered. - Colonic diverticuli predominantly in the sigmoid without focal inflammation. - Postsurgical changes midanterior abdominal wall. Small umbilical hernia containing fat. Small left inguinal hernia containing fat.  CT Chest/Abdo/Pelvis on 12/27/23:  - s/p Left Upper Lobectomy  - New 3 mm left lung nodule (series 2 image 65)  - Stable subpleural nodule measuring 3 mm in the left lung (series 2 image 27) - Left-sided Mediport in left-sided SVC with tip near the cavoatrial junction. - Mid ascending thoracic aorta is dilated to 4.7 cm. - There is a left ureteral stent again noted. There is mild fullness left renal collecting system. Probable left renal cysts again noted. Indeterminate lesion lower pole left kidney measuring 1.4 cm (series 2 image 109) is unchanged. Right nephrectomy.  CXR today-- reviewed.   Patient is here today for a follow up. Overall, he reports to be feeling well. Denies any chest pain, shortness of breath, cough, or hemoptysis.

## 2024-02-21 ENCOUNTER — NON-APPOINTMENT (OUTPATIENT)
Age: 74
End: 2024-02-21

## 2024-02-21 ENCOUNTER — RESULT REVIEW (OUTPATIENT)
Age: 74
End: 2024-02-21

## 2024-02-21 ENCOUNTER — OUTPATIENT (OUTPATIENT)
Dept: OUTPATIENT SERVICES | Facility: HOSPITAL | Age: 74
LOS: 1 days | End: 2024-02-21

## 2024-02-21 ENCOUNTER — APPOINTMENT (OUTPATIENT)
Dept: INFUSION THERAPY | Facility: HOSPITAL | Age: 74
End: 2024-02-21

## 2024-02-21 VITALS
OXYGEN SATURATION: 96 % | WEIGHT: 218.92 LBS | TEMPERATURE: 98 F | SYSTOLIC BLOOD PRESSURE: 143 MMHG | RESPIRATION RATE: 16 BRPM | DIASTOLIC BLOOD PRESSURE: 80 MMHG | HEART RATE: 71 BPM | HEIGHT: 72 IN

## 2024-02-21 DIAGNOSIS — C68.9 MALIGNANT NEOPLASM OF URINARY ORGAN, UNSPECIFIED: ICD-10-CM

## 2024-02-21 DIAGNOSIS — Z98.890 OTHER SPECIFIED POSTPROCEDURAL STATES: Chronic | ICD-10-CM

## 2024-02-21 DIAGNOSIS — D49.59 NEOPLASM OF UNSPECIFIED BEHAVIOR OF OTHER GENITOURINARY ORGAN: ICD-10-CM

## 2024-02-21 DIAGNOSIS — Z90.2 ACQUIRED ABSENCE OF LUNG [PART OF]: Chronic | ICD-10-CM

## 2024-02-21 DIAGNOSIS — Z90.5 ACQUIRED ABSENCE OF KIDNEY: Chronic | ICD-10-CM

## 2024-02-21 LAB
ALBUMIN SERPL ELPH-MCNC: 3.9 G/DL — SIGNIFICANT CHANGE UP (ref 3.3–5)
ALP SERPL-CCNC: 72 U/L — SIGNIFICANT CHANGE UP (ref 40–120)
ALT FLD-CCNC: <5 U/L — LOW (ref 10–45)
ANION GAP SERPL CALC-SCNC: 11 MMOL/L — SIGNIFICANT CHANGE UP (ref 5–17)
AST SERPL-CCNC: 28 U/L — SIGNIFICANT CHANGE UP (ref 10–40)
BASOPHILS # BLD AUTO: 0.06 K/UL — SIGNIFICANT CHANGE UP (ref 0–0.2)
BASOPHILS NFR BLD AUTO: 1 % — SIGNIFICANT CHANGE UP (ref 0–2)
BILIRUB SERPL-MCNC: 0.4 MG/DL — SIGNIFICANT CHANGE UP (ref 0.2–1.2)
BUN SERPL-MCNC: 29 MG/DL — HIGH (ref 7–23)
CALCIUM SERPL-MCNC: 9.2 MG/DL — SIGNIFICANT CHANGE UP (ref 8.4–10.5)
CHLORIDE SERPL-SCNC: 105 MMOL/L — SIGNIFICANT CHANGE UP (ref 96–108)
CO2 SERPL-SCNC: 23 MMOL/L — SIGNIFICANT CHANGE UP (ref 22–31)
CREAT SERPL-MCNC: 1.38 MG/DL — HIGH (ref 0.5–1.3)
EGFR: 53 ML/MIN/1.73M2 — LOW
EOSINOPHIL # BLD AUTO: 0.18 K/UL — SIGNIFICANT CHANGE UP (ref 0–0.5)
EOSINOPHIL NFR BLD AUTO: 3 % — SIGNIFICANT CHANGE UP (ref 0–6)
GLUCOSE SERPL-MCNC: 124 MG/DL — HIGH (ref 70–99)
HCT VFR BLD CALC: 36 % — LOW (ref 39–50)
HGB BLD-MCNC: 12.3 G/DL — LOW (ref 13–17)
IMM GRANULOCYTES NFR BLD AUTO: 0.5 % — SIGNIFICANT CHANGE UP (ref 0–0.9)
LYMPHOCYTES # BLD AUTO: 1.48 K/UL — SIGNIFICANT CHANGE UP (ref 1–3.3)
LYMPHOCYTES # BLD AUTO: 24.4 % — SIGNIFICANT CHANGE UP (ref 13–44)
MCHC RBC-ENTMCNC: 32.4 PG — SIGNIFICANT CHANGE UP (ref 27–34)
MCHC RBC-ENTMCNC: 34.2 G/DL — SIGNIFICANT CHANGE UP (ref 32–36)
MCV RBC AUTO: 94.7 FL — SIGNIFICANT CHANGE UP (ref 80–100)
MONOCYTES # BLD AUTO: 0.69 K/UL — SIGNIFICANT CHANGE UP (ref 0–0.9)
MONOCYTES NFR BLD AUTO: 11.4 % — SIGNIFICANT CHANGE UP (ref 2–14)
NEUTROPHILS # BLD AUTO: 3.62 K/UL — SIGNIFICANT CHANGE UP (ref 1.8–7.4)
NEUTROPHILS NFR BLD AUTO: 59.7 % — SIGNIFICANT CHANGE UP (ref 43–77)
NRBC # BLD: 0 /100 WBCS — SIGNIFICANT CHANGE UP (ref 0–0)
PLATELET # BLD AUTO: 177 K/UL — SIGNIFICANT CHANGE UP (ref 150–400)
POTASSIUM SERPL-MCNC: 4.1 MMOL/L — SIGNIFICANT CHANGE UP (ref 3.5–5.3)
POTASSIUM SERPL-SCNC: 4.1 MMOL/L — SIGNIFICANT CHANGE UP (ref 3.5–5.3)
PROT SERPL-MCNC: 7.1 G/DL — SIGNIFICANT CHANGE UP (ref 6–8.3)
RBC # BLD: 3.8 M/UL — LOW (ref 4.2–5.8)
RBC # FLD: 14 % — SIGNIFICANT CHANGE UP (ref 10.3–14.5)
SODIUM SERPL-SCNC: 139 MMOL/L — SIGNIFICANT CHANGE UP (ref 135–145)
T4 FREE SERPL-MCNC: 1.5 NG/DL — SIGNIFICANT CHANGE UP (ref 0.9–1.8)
TSH SERPL-MCNC: 0.79 UIU/ML — SIGNIFICANT CHANGE UP (ref 0.27–4.2)
WBC # BLD: 6.06 K/UL — SIGNIFICANT CHANGE UP (ref 3.8–10.5)
WBC # FLD AUTO: 6.06 K/UL — SIGNIFICANT CHANGE UP (ref 3.8–10.5)

## 2024-02-21 RX ORDER — VITAMIN E 100 UNIT
1 CAPSULE ORAL
Refills: 0 | DISCHARGE

## 2024-02-21 RX ORDER — FINASTERIDE 5 MG/1
1 TABLET, FILM COATED ORAL
Qty: 0 | Refills: 0 | DISCHARGE

## 2024-02-21 RX ORDER — SODIUM CHLORIDE 9 MG/ML
1000 INJECTION, SOLUTION INTRAVENOUS
Refills: 0 | Status: DISCONTINUED | OUTPATIENT
Start: 2024-02-27 | End: 2024-03-12

## 2024-02-21 NOTE — H&P PST ADULT - ENMT
Fred Mcgee is requesting a refill of   amphetamine-dextroamphetamine (Adderall XR) 20 MG 24 hr capsule  for a 30 day supply and would like sent to local pharmacy, [facility].     Ok to leave a detailed message on voicemail Yes     details…

## 2024-02-21 NOTE — H&P PST ADULT - PROBLEM SELECTOR PLAN 1
Pt instructed to take the following meds with sip of water: synthroid   Labs EKG in chart   cardiac eval scheduled on 02/22/24, copy requested   Stop NSAIDS, aspirin, aleve, motrin, multivitamins, vitamin E, fish oil, herbal supplements     written and verbal instructions with teach back on chlorhexidine shampoo provided,  pt verbalized understanding Pt scheduled for Cystoscopy, Left Ureteroscopy, laser ablation, biopsy of lesion and stent replacement on 02/27/24  CBC, CMP, UC, EKG, CT chest in chart, med eval in chart  Pt instructed to continue meds as prescribed and take synthroid on am of the procedure with a sip of water   Stop NSAIDS, aspirin, aleve, motrin, multivitamins, vitamin E, fish oil, herbal supplements     written and verbal instructions with teach back on chlorhexidine shampoo provided,  pt verbalized understanding Pt scheduled for Cystoscopy, Left Ureteroscopy, laser ablation, biopsy of lesion and stent replacement on 02/27/24  CBC, CMP, UC, EKG, echo, med eval in chart, h/o AAA, copy of CT chest from 12/2023"mid ascending thoracic aorta is dilaterd to 4.7cm" copy in chart  Pt instructed to continue meds as prescribed and take synthroid on am of the procedure with a sip of water   Stop NSAIDS, aspirin, aleve, motrin, multivitamins, vitamin E, fish oil, herbal supplements     written and verbal instructions with teach back on chlorhexidine shampoo provided,  pt verbalized understanding

## 2024-02-21 NOTE — H&P PST ADULT - NSICDXPASTMEDICALHX_GEN_ALL_CORE_FT
PAST MEDICAL HISTORY:  GAMAL (acute kidney injury)     Ascending aortic aneurysm     BPH without obstruction/lower urinary tract symptoms     COVID-19 12/2020 loss of smell no hosp    Hearing loss     History of iron deficiency     History of low back pain     HTN (hypertension)     Hydronephrosis     Hypothyroid     Lumbar vertebral fracture     Lung cancer     Malignant neoplasm of bladder, unspecified     Malignant neoplasm of unspecified kidney, except renal pelvis     Malignant neoplasm of urinary organ     Obesity     Pulmonary nodule removed 8/4/2021 early stage no chemo VAT    Thoracic aortic aneurysm

## 2024-02-21 NOTE — H&P PST ADULT - NEGATIVE ENMT SYMPTOMS
no hearing difficulty/no sinus symptoms/no nasal congestion/no throat pain/no dysphagia no sinus symptoms/no nasal congestion/no throat pain/no dysphagia

## 2024-02-21 NOTE — H&P PST ADULT - COMMENTS
activity: walking, ADL, strength training, light house chores, able to climb 1 flight of stairs  METS: 4.64 (DASI)

## 2024-02-21 NOTE — H&P PST ADULT - HISTORY OF PRESENT ILLNESS
73 year old male presents to PST prior to scheduled Cystoscopy, Left Ureteroscopy, laser ablation, biopsy of lesion and stent replacement, h/o HTN, Hypothyroidism, Lung cancer s/p LORRIE lobectomy (2021), Renal cancer s/p right nephrectomy (2022), Anemia, GAMAL, BPH, AAA, h/oureteral polyp, s/p stent placement in 09/2023, hearing loss. h/o Malignant neoplasm of bladder CT scan May 2023 which showed moderate to severe left hydroureteronephrosis and left ureter neoplasm. Denies any chest pain, palpitations, SOB, N/V, fever or chills, dysuria, hematuria.  73 year old male presents to PST prior to scheduled Cystoscopy, Left Ureteroscopy, laser ablation, biopsy of lesion and stent replacement, h/o HTN, Hypothyroidism, Lung cancer s/p LORRIE lobectomy (2021), Renal cancer s/p right nephrectomy (2022), chronic back pain, anemia, BPH, AAA, h/o Malignant neoplasm of bladder CT scan May 2023 which showed moderate to severe left hydroureteronephrosis and left ureter neoplasm, s/p stent placement in 09/2023. Denies fever or chills, dysuria, hematuria.

## 2024-02-21 NOTE — H&P PST ADULT - CARDIOVASCULAR COMMENTS
HTN, HLD, AAA, last CT scan 2023 "no change" left CW mediport HTN, HLD, AAA, last CT scan 2023 "no change" as per pt

## 2024-02-21 NOTE — H&P PST ADULT - NSICDXPASTSURGICALHX_GEN_ALL_CORE_FT
PAST SURGICAL HISTORY:  H/O arthroscopy of knee     H/O carpal tunnel repair     H/O cystoscopy     History of biopsy of bladder     S/p nephrectomy right;  05/2022    S/P partial lobectomy of lung left upper;  08/04/2021

## 2024-02-22 ENCOUNTER — APPOINTMENT (OUTPATIENT)
Dept: CARDIOLOGY | Facility: CLINIC | Age: 74
End: 2024-02-22
Payer: MEDICARE

## 2024-02-22 VITALS — OXYGEN SATURATION: 96 % | HEART RATE: 78 BPM | BODY MASS INDEX: 29.12 KG/M2 | HEIGHT: 72 IN | WEIGHT: 215 LBS

## 2024-02-22 VITALS — DIASTOLIC BLOOD PRESSURE: 70 MMHG | HEART RATE: 72 BPM | SYSTOLIC BLOOD PRESSURE: 102 MMHG

## 2024-02-22 DIAGNOSIS — R94.31 ABNORMAL ELECTROCARDIOGRAM [ECG] [EKG]: ICD-10-CM

## 2024-02-22 DIAGNOSIS — Z01.818 ENCOUNTER FOR OTHER PREPROCEDURAL EXAMINATION: ICD-10-CM

## 2024-02-22 PROCEDURE — 99214 OFFICE O/P EST MOD 30 MIN: CPT | Mod: 25

## 2024-02-22 PROCEDURE — 93000 ELECTROCARDIOGRAM COMPLETE: CPT

## 2024-02-22 NOTE — REASON FOR VISIT
[FreeTextEntry1] : Patient presents for cardiac clearance for left uteroscopy, laser ablation, biopsy of lesion and stent replacement.  Since he was last seen here he has had no  coronary .  He has no history of any prior cardiac events..  Results of echocardiography in August of this year reveals normal left ventricular systolic function and no evidence of significant valvular stenosis or insufficiency.  Exercise stress testing in 2020 was negative for ischemia

## 2024-02-22 NOTE — ASSESSMENT
[FreeTextEntry1] : In summary, the patient is a 73-year-old man with no known history of coronary events.  There is no cardiac contraindication to the proposed procedure

## 2024-02-26 ENCOUNTER — TRANSCRIPTION ENCOUNTER (OUTPATIENT)
Age: 74
End: 2024-02-26

## 2024-02-27 ENCOUNTER — TRANSCRIPTION ENCOUNTER (OUTPATIENT)
Age: 74
End: 2024-02-27

## 2024-02-27 ENCOUNTER — APPOINTMENT (OUTPATIENT)
Dept: UROLOGY | Facility: HOSPITAL | Age: 74
End: 2024-02-27

## 2024-02-27 ENCOUNTER — OUTPATIENT (OUTPATIENT)
Dept: OUTPATIENT SERVICES | Facility: HOSPITAL | Age: 74
LOS: 1 days | Discharge: HOME | End: 2024-02-27
Payer: MEDICARE

## 2024-02-27 VITALS
TEMPERATURE: 98 F | HEART RATE: 67 BPM | RESPIRATION RATE: 16 BRPM | OXYGEN SATURATION: 98 % | DIASTOLIC BLOOD PRESSURE: 91 MMHG | SYSTOLIC BLOOD PRESSURE: 144 MMHG

## 2024-02-27 VITALS
RESPIRATION RATE: 16 BRPM | SYSTOLIC BLOOD PRESSURE: 126 MMHG | DIASTOLIC BLOOD PRESSURE: 72 MMHG | HEIGHT: 72 IN | HEART RATE: 75 BPM | WEIGHT: 218.92 LBS | OXYGEN SATURATION: 96 % | TEMPERATURE: 98 F

## 2024-02-27 DIAGNOSIS — Z90.5 ACQUIRED ABSENCE OF KIDNEY: Chronic | ICD-10-CM

## 2024-02-27 DIAGNOSIS — Z90.2 ACQUIRED ABSENCE OF LUNG [PART OF]: Chronic | ICD-10-CM

## 2024-02-27 DIAGNOSIS — Z98.890 OTHER SPECIFIED POSTPROCEDURAL STATES: Chronic | ICD-10-CM

## 2024-02-27 DIAGNOSIS — C68.9 MALIGNANT NEOPLASM OF URINARY ORGAN, UNSPECIFIED: ICD-10-CM

## 2024-02-27 DIAGNOSIS — N34.2 OTHER URETHRITIS: ICD-10-CM

## 2024-02-27 PROCEDURE — 74420 UROGRAPHY RTRGR +-KUB: CPT | Mod: 26

## 2024-02-27 PROCEDURE — 52351 CYSTOURETERO & OR PYELOSCOPE: CPT | Mod: LT

## 2024-02-27 DEVICE — GUIDEWIRE SENSOR DUAL-FLEX NITINOL STRAIGHT .038" X 150CM: Type: IMPLANTABLE DEVICE | Site: LEFT | Status: FUNCTIONAL

## 2024-02-27 DEVICE — URETERAL CATH OPEN END 5FR 70CM: Type: IMPLANTABLE DEVICE | Site: LEFT | Status: FUNCTIONAL

## 2024-02-27 DEVICE — STONE BASKET ZEROTIP NITINOL 4-WIRE 1.9FR 120CM X 12MM: Type: IMPLANTABLE DEVICE | Site: LEFT | Status: FUNCTIONAL

## 2024-02-27 RX ADMIN — SODIUM CHLORIDE 30 MILLILITER(S): 9 INJECTION, SOLUTION INTRAVENOUS at 14:02

## 2024-02-27 NOTE — ASU DISCHARGE PLAN (ADULT/PEDIATRIC) - CARE PROVIDER_API CALL
Mohamud Starkey  Urology  [address] [place] [address] - [place] [address]  Phone: [phone]  Fax: [phone]  Follow Up Time:

## 2024-02-27 NOTE — ASU DISCHARGE PLAN (ADULT/PEDIATRIC) - NURSING INSTRUCTIONS
DO NOT take any Tylenol (Acetaminophen) or narcotics containing Tylenol until after  10:10PM . You received Tylenol during your operation and it can cause damage to your liver if too much is taken within a 24 hour time period.

## 2024-02-27 NOTE — ASU PATIENT PROFILE, ADULT - PATIENT'S PREFERRED PRONOUN
DOCUMENT CREATED: 2021  1451h  NAME: Barby Guadalupe (Girl)  CLINIC NUMBER: [id number]  ADMITTED: 2021  HOSPITAL NUMBER: [id number]  BIRTH WEIGHT: 0.500 kg (1.5 percentile)  GESTATIONAL AGE AT BIRTH: 26 3 days  DATE OF SERVICE: 2021     AGE: 143 days. POSTMENSTRUAL AGE: 46 weeks 6 days. CURRENT WEIGHT: 2.520 kg   (Down 30gm) (5 lb 9 oz) (0.0 percentile). CURRENT HC: 32.5 cm (0.0 percentile).   WEIGHT GAIN: 8 gm/kg/day in the past week. HEAD GROWTH: 0.6 cm/week since birth.        VITAL SIGNS & PHYSICAL EXAM  WEIGHT: 2.520kg (0.0 percentile)  LENGTH: 43.8cm (0.0 percentile)  HC: 32.5cm   (0.0 percentile)  BED: Radiant warmer. TEMP: 98.1-98.1. HR: 138-142. RR: 40-75. BP: 90-95/40-68   (62-76)  STOOL: X5.  HEENT: Anterior fontanel soft and flat. Plagiocephaly. ETT and OG tube secured   to neobar, secured to cheeks without irritation.  RESPIRATORY: Breath sounds clear and equal bilaterally with no spontaneous   breaths over ventilator rate. Left cest tube secured in place with scant   serosanguineous drainage in tubing.  CARDIAC: Normal rhythm with set rate. No murmur. Peripherial pulses 2+ and   equal, capillary refill <3 seconds.  ABDOMEN: Abdomen soft and round with active bowel sounds.  : Normal term female features.  NEUROLOGIC: Sedated with minimal response to exam.  EXTREMITIES: Moves all extremities with passive ROM. Left hand PIV with intact   and secure dressing, infusing without difficulty. Left foot PIV saline locked.  SKIN: Pale pink, warm, and dry.     LABORATORY STUDIES  2021: blood - peripheral culture: no growth to date  2021: tracheal culture: Gram negative rods (rare WBC, no organisms)     NEW FLUID INTAKE  Based on 2.520kg. All IV constituents in mEq/kg unless otherwise specified.  TPN-PIV: C (D10W) standard solution  INTAKE OVER PAST 24 HOURS: 141ml/kg/d. OUTPUT OVER PAST 24 HOURS: 3.5ml/kg/hr.   COMMENTS: Received 107cal/kg/day. Lost 30gm. Made NPO over night for  pre-op   status. Capillary glucose 89. Voiding adequately with stool x5. PLANS: NPO with   TPN C at 133ml/kg/day. Electrolytes in AM.     CURRENT MEDICATIONS  Multivitamins with iron 0.5 ml Orally daily started on 2021 (completed 50   days)  Sildenafil 2.5mg Orally every 8 hours started on 2021 (completed 7 days)  Furosemide 2.5mg (1mg/kg) Orally every 6 hours from 2021 to 2021 (5   days total)  Dexamethasone 0.24mg (0.1mg/kg) Orally every 12 hours  from 2021 to   2021 (5 days total)  Midazolam 0.5mg/kg/dose Orally every 3 hours PRN from 2021 to 2021   (5 days total)  Tobramycin aerosol 300 mg every 12 hours for 10 doses started on 2021   (completed 1 days)  Dexamethasone 0.24mg (0.1mg/kg) IV every 12 hours started on 2021  Morphine 0.26mg (0.1mg/kg) IV every 3 horus started on 2021  Midazolam 0.25mg (0.1mg/kg) IV every 3 horus PRN started on 2021  Fentanyl 7mcg in OR on 2021  Cefazolin 62mg IV x1 in OR on 2021  Epinephrine 1mcg IV x1 in OR on 2021     RESPIRATORY SUPPORT  SUPPORT: Ventilator since 2021  FiO2: 0.25-0.38  RATE: 40  PIP: 24 cmH2O  PEEP: 7 cmH2O  PRSUPP: 13 cmH2O  IT:   0.4 sec  MODE: SIMV  O2 SATS: 79-98  CBG 2021  04:52h: pH:7.43  pCO2:52  pO2:30  Bicarb:34.3  BE:10.0     CURRENT PROBLEMS & DIAGNOSES  PREMATURITY - LESS THAN 28 WEEKS  ONSET: 2021  STATUS: Active  COMMENTS: 143 days old, corrected to 46 and 6/7 weeks gestational age. Euthermic   under RW.  PLANS: Provide developmental care as tolerated.  CONGENITAL HEART BLOCK  ONSET: 2021  STATUS: Active  PROCEDURES: Pacemaker placement on 2021 (Internally placed VVI generator).  COMMENTS: Congenital heart block secondary to maternal history of Sjogren's   syndrome. S/P VVI pacemaker placement on 8/23 with set rate of 140 bpm (last   increased by cardiology on 9/27).  PLANS: Follow with pediatric cardiology. Follow heart rate closely,  goal >135   BPM. Continue full disclosure telemetry.  PERICARDIAL EFFUSION  ONSET: 2021  STATUS: Active  PROCEDURES: Echocardiogram on 2021 (pericardial effusion present);   Echocardiogram on 2021 (pericardial effusion qualitatively increased in   size); Abdominal ultrasound on 2021 (no ascites); Echocardiogram on   2021 (large circumferential pericardial effusion; stretched bi-directional   PFO, no PDA); Echocardiogram on 2021 (large pericardial effusion, appears   to have increased in size. Mildly decreased LV and RV systolic function. Concern   for RA collapse during inspiration. RV mildly thickened.); Echocardiogram on   2021 (large pericardial effusion, relatively unchanged, no cardiac   tamponade, LV and RV systolic function mildly decreased); Echocardiogram on   2021 (large circumferential pericardial effusion with an echobright, mobile   mass lateral to the right ventricle. These are unchanged compared to the   previous study on 10/4/21. Intermittent reversal of flow through the hepatic   veins. PFO with bidirectional shunting. Paradoxical motion of the   interventricular septum noted.); Echocardiogram on 2021 (large pericardial   effusion, slightly increased from prior echocardiogram; no evidence of   tamponade.); Echocardiogram on 2021 (Large pericardial effusion., The   effusion appears to be slightly increased in size when compared to echo   10/11/21. There appears to be no right atrial, collapse with inspiration but   there is increased respiratory variability noted on the tricuspid valve (68%)   and mitral valve (75%), inflow velocities. There is an echogenic mass adjacent   to the right ventricle that is thought to be omentum., There is intermittent   flow reversal in the hepatic veins., Patent foramen ovale. Atrial bi-directional   shunt., Trivial tricuspid valve insufficiency., Dilated right ventricle, mild.,   Normal left ventricle structure and  size., Normal right and left ventricular   systolic function.); Pericardial window on 2021 (per Dr. oGff).  COMMENTS: Infant with recurrent pericardial effusion since 9/21 following   placement of pacemaker. Steroid treatment restarted 9/27 (dexamethasone chosen   so that chronic lung disease can also be addressed). Remains on diuresis with   furosemide. Most recent ECHO (10/13) with large pericardial effusion, slightly   increased in size. Pericardial window performed by Dr. Goff in OR today, large   amount of clear/yellow fluid drained and 15Fr. Lewis drain placed in pleural   space (not in pericardial space). Scant amount of serosanguineous drainage noted   in tubing upon return to NICU. Small portion of pericardium sent to pathology,   however did not appear inflamed and no concern for pericarditis. Received Ancef   x1 and epinephrine x1 in OR.  PLANS: Continue to follow with Peds Cardiology and CV surgery. Per Dr. Goff,   maintain drain at -20. Monitor output - unable to determine how much drainage is   to be expected at this point in time, will likely need drain for a minimum of   one week. Okay for nursing to strip tubing at frequent intervals to prevent clot   formation and tube occlusion. Follow results of pathology specemin. Discontinue   furosemide. Continue dexamethasone.  CHRONIC LUNG DISEASE  ONSET: 2021  STATUS: Active  PROCEDURES: Endotracheal intubation from 2021 to 2021 (3.0 ETT );   Endotracheal intubation on 2021 (3.0 ETT cuffed tube (uncuffed) in OR).  COMMENTS: Intubated on 10/13 for worsening respiratory status. Remains on SIMV   support with FiO2 requirements between 25-38% in the past 24 hours. CBG stable   this AM. Post-op CXR today with expansion to T10-11 bilaterally and mildly   improved aeration compared to previous, continues with bilateral opacifications   and large, poorly defied cardiac borders. Remains on dexamethasone therapy, dose   last increased  10/13. Post-op CBG stable today and PEEP decreased to 6.  PLANS: Continue current support. Continue to follow CBGs every 12 hours. Monitor   work of breathing and FiO2 requirements. Continue dexamethasone therapy,   transition from oral to IV form while NPO.  PULMONARY HYPERTENSION  ONSET: 2021  STATUS: Active  PROCEDURES: Echocardiogram on 2021 (no PDA, mildly dilated left pulmonary   artery, normal systolic function, moderately increased RVSP, trivial pericardial   effusion); Echocardiogram on 2021 (stretched PFO with bidirectional    L-Rshunt. No PDA. Mildly dilated PA. RV is mildly hypertrophied. No pericardial   effusion. Difficult to assess RV pressure as inadequate TR to measure and septal   motion is dyskinetic due to pacing).  COMMENTS: History of pulmonary hypertension requiring treatment with Wade and   milrinone. Remains on sildenafil, dose last weight adjusted on 10/11 secondary   to ECHO with a mildly dilated right ventricle with normal ventricular systolic   function. Echocardiogram 10/13 unchanged from previous. Sildenafil currently on   hold while NPO.  PLANS: Continue to hold sildenafil while NPO, resume once enteral feeds   restarted. Repeat ECHO ordered for 10/20. Follow with peds cardiology.  RETINOPATHY OF PREMATURITY STAGE 2  ONSET: 2021  STATUS: Active  PROCEDURES: Ophthalmologic exam on 2021 (Progression. Now grade 3 zone 2   with plus OU. Should do well with treatment); Avastin treatment on 2021   (per Dr. Bazan); Ophthalmologic exam on 2021 (Zone II Stage 0(OU).    Tortuosity OU. , Impression: worse today; now with buds into vit. ); Cryo/laser   on 2021 (cryo/laser ablation OU per . ).  COMMENTS: S/P cryo/laser therapy on 9/14. Repeat ROP exam on 9/23 with   appropriate response and no signs of active disease. Was due for ROP exam this   past  week (week of 10/11), was deferred due to clinical decompensation.  PLANS: Repeat ROP exam ordered  "for this week.  SEPSIS EVALUATION  ONSET: 2021  STATUS: Active  COMMENTS: Sepsis evaluation initiated on 10/13 after respiratory decompensation,   antibiotics not initiated. Blood culture remains no growth to date. Tracheal   culture is growing pansensitive Enterobacter cloacae along with normal zhane -   started on tobramycin nebs yesterday.  PLANS: Follow blood culture results until final. Continue tobramycin x5 days   (through 10/21). Follow clinically.  PAIN AND AGITATION  ONSET: 2021  STATUS: Active  COMMENTS: Received midazolam PRN x2 in the past 24 hours. Received fentanyl in   OR today. Returned from OR well sedated without evidence of pain.  PLANS: Begin morphine every 3 hours, consider transitioning to PRN status   tomorrow pending pain control in the next 24 hours. Transition from oral   midazolam to IV midazolam, continue PRN status. Follow responses.     TRACKING  CUS: Last study on 2021: Normal.   SCREENING: Last study on 2021: Presumptive positive amino acids,   transfused; hemoglobinopathy, galactosemia, biotinidase. Otherwise normal..  ROP SCREENING: Last study on 2021: Grade 0 Zone 2 with plus disease   bilaterally. "Good response; persistent plus, but no active disease" Follow up   in 3 weeks.  THYROID SCREENING: Last study on 2021: FT4 -0.74 (mildly decreased) and TSH   - 5.332 (WNL).  FURTHER SCREENING: Car seat screen indicated, hearing screen indicated and ROP   exam week of 10/17 (delay due to clinical status).  SOCIAL COMMENTS: 10/18: Parents updated post-op by Dr. Denver MD. Parents   updated at bedside by NNP during bedside rounds.  IMMUNIZATIONS & PROPHYLAXES: Hepatitis B on 2021, Pentacel (DTaP, IPV, Hib)   on 2021 and Pneumococcal (Prevnar) on 2021.     ATTENDING ADDENDUM  Seen on rounds with NNP and bedside nurse. Now 143 days old or 46 6/7 weeks   corrected age. Lost weight and stooling. Remains critically ill requiring   mechanical " ventilation for respiratory support. Just returned from the operating   room after she underwent the creation of a pericardial window in order to treat   a significant pericardial effusion. Now with left sided thoracostomy tube.   Etiology of effusion not clear but may have been ascites from the abdomen. Blood   gas with metabolically compensated respiratory acidosis. Current medications   are furosemide, dexamethasone, sildenafil, vitamins, tobramycin aerosol and   midazolam. Will discontinue furosemide and place sildenafil on hold. Tolerating   continuous feedings but is now npo. Continues to be followed by pediatric   cardiology and CV surgery. All nutrition will be parenteral today and pain   managed with morphine. Echocardiogram planned in 48 hours.     NOTE CREATORS  DAILY ATTENDING: Ammon Ladd MD  PREPARED BY: OLVIN Mckeon, KAY-BC                 Electronically Signed by OLVIN Mckeon NNP-BC on 2021 1451.           Electronically Signed by Ammon Ladd MD on 2021 1532.               Him/He

## 2024-02-27 NOTE — ASU PATIENT PROFILE, ADULT - NS TRANSFER EYEGLASSES PAIRS
If you are a smoker, it is important for your health to stop smoking. Please be aware that second hand smoke is also harmful. denies

## 2024-02-27 NOTE — ASU DISCHARGE PLAN (ADULT/PEDIATRIC) - ASU DC SPECIAL INSTRUCTIONSFT
It is common to have blood in the urine after your procedure.  It may be pink or even red; inform your doctor if you have a significant amount of clots in the urine or if you are unable to void at all.  -It is not uncommon to have some burning when you urinate, this will improve over the next few days.  -Provided that you are not restricted with fluids by your physician, you should drink 6-8 (8 oz.) glasses of fluid per day.  -You may resume your regular diet and regular medication regimen.    -You may shower or bathe.    -You may take over the counter pain medications such as Motrin and Tylenol as needed for pain.  Do not exceed 4 grams of Tylenol daily.  Each medication may be taken every 6 hours.  You may alternate these medications such that you take either one every 3 hours.  If you have severe pain that does not improve with the pain medication or you have persistent vomiting, call your doctor.  -You may have a prescription for an antibiotic when you go home.  Take this medication as instructed; if you miss one dose, resume taking it on your previous schedule until you have completed the entire course of treatment.  -As you have just underwent general anesthesia, you should refrain from driving, heavy lifting, smoking, alcohol consumption, or important decision making for the next 24 hours.  You may climb stairs and you may resume sexual activity.  -Call your physician if you have a fever over 101F.  -Make a follow up appointment for with your urologist when you arrive home (or the next business day).  -Call your urologist during normal business hours with any other routine questions.

## 2024-02-27 NOTE — ASU PATIENT PROFILE, ADULT - FALL HARM RISK - UNIVERSAL INTERVENTIONS
Bed in lowest position, wheels locked, appropriate side rails in place/Call bell, personal items and telephone in reach/Instruct patient to call for assistance before getting out of bed or chair/Non-slip footwear when patient is out of bed/[place] to call system/Physically safe environment - no spills, clutter or unnecessary equipment/Purposeful Proactive Rounding/Room/bathroom lighting operational, light cord in reach
Attending and PA/NP shared services statement (NON-critical care):

## 2024-02-28 ENCOUNTER — RESULT REVIEW (OUTPATIENT)
Age: 74
End: 2024-02-28

## 2024-02-28 ENCOUNTER — APPOINTMENT (OUTPATIENT)
Dept: INFUSION THERAPY | Facility: HOSPITAL | Age: 74
End: 2024-02-28

## 2024-02-28 LAB
ALBUMIN SERPL ELPH-MCNC: 3.9 G/DL — SIGNIFICANT CHANGE UP (ref 3.3–5)
ALP SERPL-CCNC: 75 U/L — SIGNIFICANT CHANGE UP (ref 40–120)
ALT FLD-CCNC: <5 U/L — LOW (ref 10–45)
ANION GAP SERPL CALC-SCNC: 12 MMOL/L — SIGNIFICANT CHANGE UP (ref 5–17)
ANION GAP SERPL CALC-SCNC: 13 MMOL/L — SIGNIFICANT CHANGE UP (ref 5–17)
AST SERPL-CCNC: 26 U/L — SIGNIFICANT CHANGE UP (ref 10–40)
BASOPHILS # BLD AUTO: 0.06 K/UL — SIGNIFICANT CHANGE UP (ref 0–0.2)
BASOPHILS NFR BLD AUTO: 0.7 % — SIGNIFICANT CHANGE UP (ref 0–2)
BILIRUB SERPL-MCNC: 0.5 MG/DL — SIGNIFICANT CHANGE UP (ref 0.2–1.2)
BUN SERPL-MCNC: 30 MG/DL — HIGH (ref 7–23)
BUN SERPL-MCNC: 30 MG/DL — HIGH (ref 7–23)
CALCIUM SERPL-MCNC: 9 MG/DL — SIGNIFICANT CHANGE UP (ref 8.4–10.5)
CALCIUM SERPL-MCNC: 9.2 MG/DL — SIGNIFICANT CHANGE UP (ref 8.4–10.5)
CHLORIDE SERPL-SCNC: 103 MMOL/L — SIGNIFICANT CHANGE UP (ref 96–108)
CHLORIDE SERPL-SCNC: 103 MMOL/L — SIGNIFICANT CHANGE UP (ref 96–108)
CO2 SERPL-SCNC: 23 MMOL/L — SIGNIFICANT CHANGE UP (ref 22–31)
CO2 SERPL-SCNC: 23 MMOL/L — SIGNIFICANT CHANGE UP (ref 22–31)
CREAT SERPL-MCNC: 1.46 MG/DL — HIGH (ref 0.5–1.3)
CREAT SERPL-MCNC: 1.53 MG/DL — HIGH (ref 0.5–1.3)
EGFR: 47 ML/MIN/1.73M2 — LOW
EGFR: 50 ML/MIN/1.73M2 — LOW
EOSINOPHIL # BLD AUTO: 0.05 K/UL — SIGNIFICANT CHANGE UP (ref 0–0.5)
EOSINOPHIL NFR BLD AUTO: 0.6 % — SIGNIFICANT CHANGE UP (ref 0–6)
GLUCOSE SERPL-MCNC: 192 MG/DL — HIGH (ref 70–99)
GLUCOSE SERPL-MCNC: 195 MG/DL — HIGH (ref 70–99)
HCT VFR BLD CALC: 36.6 % — LOW (ref 39–50)
HGB BLD-MCNC: 12.5 G/DL — LOW (ref 13–17)
IMM GRANULOCYTES NFR BLD AUTO: 0.7 % — SIGNIFICANT CHANGE UP (ref 0–0.9)
LYMPHOCYTES # BLD AUTO: 1.96 K/UL — SIGNIFICANT CHANGE UP (ref 1–3.3)
LYMPHOCYTES # BLD AUTO: 21.8 % — SIGNIFICANT CHANGE UP (ref 13–44)
MCHC RBC-ENTMCNC: 31.9 PG — SIGNIFICANT CHANGE UP (ref 27–34)
MCHC RBC-ENTMCNC: 34.2 G/DL — SIGNIFICANT CHANGE UP (ref 32–36)
MCV RBC AUTO: 93.4 FL — SIGNIFICANT CHANGE UP (ref 80–100)
MONOCYTES # BLD AUTO: 1.08 K/UL — HIGH (ref 0–0.9)
MONOCYTES NFR BLD AUTO: 12 % — SIGNIFICANT CHANGE UP (ref 2–14)
NEUTROPHILS # BLD AUTO: 5.8 K/UL — SIGNIFICANT CHANGE UP (ref 1.8–7.4)
NEUTROPHILS NFR BLD AUTO: 64.2 % — SIGNIFICANT CHANGE UP (ref 43–77)
NRBC # BLD: 0 /100 WBCS — SIGNIFICANT CHANGE UP (ref 0–0)
PLATELET # BLD AUTO: 150 K/UL — SIGNIFICANT CHANGE UP (ref 150–400)
POTASSIUM SERPL-MCNC: 3.8 MMOL/L — SIGNIFICANT CHANGE UP (ref 3.5–5.3)
POTASSIUM SERPL-MCNC: 4 MMOL/L — SIGNIFICANT CHANGE UP (ref 3.5–5.3)
POTASSIUM SERPL-SCNC: 3.8 MMOL/L — SIGNIFICANT CHANGE UP (ref 3.5–5.3)
POTASSIUM SERPL-SCNC: 4 MMOL/L — SIGNIFICANT CHANGE UP (ref 3.5–5.3)
PROT SERPL-MCNC: 7.2 G/DL — SIGNIFICANT CHANGE UP (ref 6–8.3)
RBC # BLD: 3.92 M/UL — LOW (ref 4.2–5.8)
RBC # FLD: 13.9 % — SIGNIFICANT CHANGE UP (ref 10.3–14.5)
SODIUM SERPL-SCNC: 137 MMOL/L — SIGNIFICANT CHANGE UP (ref 135–145)
SODIUM SERPL-SCNC: 139 MMOL/L — SIGNIFICANT CHANGE UP (ref 135–145)
WBC # BLD: 9.01 K/UL — SIGNIFICANT CHANGE UP (ref 3.8–10.5)
WBC # FLD AUTO: 9.01 K/UL — SIGNIFICANT CHANGE UP (ref 3.8–10.5)

## 2024-03-01 PROBLEM — C68.9 MALIGNANT NEOPLASM OF URINARY ORGAN, UNSPECIFIED: Chronic | Status: ACTIVE | Noted: 2024-02-21

## 2024-03-04 NOTE — ASU DISCHARGE PLAN (ADULT/PEDIATRIC) - OK TO LEAVE MESSAGE ON VOICEMAIL
Detail Level: Detailed Show Aperture Variable?: Yes Post-Care Instructions: I reviewed with the patient in detail post-care instructions. Patient is to wear sunprotection, and avoid picking at any of the treated lesions. Pt may apply Vaseline to crusted or scabbing areas. Render Post-Care Instructions In Note?: no Medical Necessity Information: It is in your best interest to select a reason for this procedure from the list below. All of these items fulfill various CMS LCD requirements except the new and changing color options. Consent: The patient's consent was obtained including but not limited to risks of crusting, scabbing, blistering, scarring, darker or lighter pigmentary change, recurrence, incomplete removal and infection. Medical Necessity Clause: This procedure was medically necessary because the lesions that were treated were: Spray Paint Text: The liquid nitrogen was applied to the skin utilizing a spray paint frosting technique. Duration Of Freeze Thaw-Cycle (Seconds): 0 Yes

## 2024-03-06 ENCOUNTER — OUTPATIENT (OUTPATIENT)
Dept: OUTPATIENT SERVICES | Facility: HOSPITAL | Age: 74
LOS: 1 days | Discharge: HOME | End: 2024-03-06

## 2024-03-06 ENCOUNTER — RESULT REVIEW (OUTPATIENT)
Age: 74
End: 2024-03-06

## 2024-03-06 ENCOUNTER — APPOINTMENT (OUTPATIENT)
Dept: HEMATOLOGY ONCOLOGY | Facility: CLINIC | Age: 74
End: 2024-03-06
Payer: MEDICARE

## 2024-03-06 VITALS
BODY MASS INDEX: 29 KG/M2 | RESPIRATION RATE: 16 BRPM | TEMPERATURE: 97.3 F | WEIGHT: 213.85 LBS | SYSTOLIC BLOOD PRESSURE: 130 MMHG | OXYGEN SATURATION: 98 % | HEART RATE: 77 BPM | DIASTOLIC BLOOD PRESSURE: 79 MMHG

## 2024-03-06 DIAGNOSIS — Z90.2 ACQUIRED ABSENCE OF LUNG [PART OF]: Chronic | ICD-10-CM

## 2024-03-06 DIAGNOSIS — Z98.890 OTHER SPECIFIED POSTPROCEDURAL STATES: Chronic | ICD-10-CM

## 2024-03-06 DIAGNOSIS — C80.1 MALIGNANT (PRIMARY) NEOPLASM, UNSPECIFIED: ICD-10-CM

## 2024-03-06 DIAGNOSIS — G47.00 INSOMNIA, UNSPECIFIED: ICD-10-CM

## 2024-03-06 DIAGNOSIS — Z90.5 ACQUIRED ABSENCE OF KIDNEY: Chronic | ICD-10-CM

## 2024-03-06 LAB
BASOPHILS # BLD AUTO: 0.06 K/UL — SIGNIFICANT CHANGE UP (ref 0–0.2)
BASOPHILS NFR BLD AUTO: 1 % — SIGNIFICANT CHANGE UP (ref 0–2)
EOSINOPHIL # BLD AUTO: 0.17 K/UL — SIGNIFICANT CHANGE UP (ref 0–0.5)
EOSINOPHIL NFR BLD AUTO: 2.7 % — SIGNIFICANT CHANGE UP (ref 0–6)
HCT VFR BLD CALC: 36.8 % — LOW (ref 39–50)
HGB BLD-MCNC: 12.9 G/DL — LOW (ref 13–17)
IMM GRANULOCYTES NFR BLD AUTO: 0.6 % — SIGNIFICANT CHANGE UP (ref 0–0.9)
LYMPHOCYTES # BLD AUTO: 1.33 K/UL — SIGNIFICANT CHANGE UP (ref 1–3.3)
LYMPHOCYTES # BLD AUTO: 21.3 % — SIGNIFICANT CHANGE UP (ref 13–44)
MCHC RBC-ENTMCNC: 31.3 PG — SIGNIFICANT CHANGE UP (ref 27–34)
MCHC RBC-ENTMCNC: 35.1 G/DL — SIGNIFICANT CHANGE UP (ref 32–36)
MCV RBC AUTO: 89.3 FL — SIGNIFICANT CHANGE UP (ref 80–100)
MONOCYTES # BLD AUTO: 0.77 K/UL — SIGNIFICANT CHANGE UP (ref 0–0.9)
MONOCYTES NFR BLD AUTO: 12.3 % — SIGNIFICANT CHANGE UP (ref 2–14)
NEUTROPHILS # BLD AUTO: 3.87 K/UL — SIGNIFICANT CHANGE UP (ref 1.8–7.4)
NEUTROPHILS NFR BLD AUTO: 62.1 % — SIGNIFICANT CHANGE UP (ref 43–77)
NRBC # BLD: 0 /100 WBCS — SIGNIFICANT CHANGE UP (ref 0–0)
PLATELET # BLD AUTO: 151 K/UL — SIGNIFICANT CHANGE UP (ref 150–400)
RBC # BLD: 4.12 M/UL — LOW (ref 4.2–5.8)
RBC # FLD: 13.8 % — SIGNIFICANT CHANGE UP (ref 10.3–14.5)
WBC # BLD: 6.24 K/UL — SIGNIFICANT CHANGE UP (ref 3.8–10.5)
WBC # FLD AUTO: 6.24 K/UL — SIGNIFICANT CHANGE UP (ref 3.8–10.5)

## 2024-03-06 PROCEDURE — G2211 COMPLEX E/M VISIT ADD ON: CPT

## 2024-03-06 PROCEDURE — 99214 OFFICE O/P EST MOD 30 MIN: CPT

## 2024-03-06 RX ORDER — CALCIUM CITRATE/VITAMIN D3 200MG-6.25
500-10 TABLET ORAL
Refills: 0 | Status: DISCONTINUED | COMMUNITY
Start: 2023-12-01 | End: 2024-03-06

## 2024-03-06 RX ORDER — FINASTERIDE 5 MG/1
5 TABLET, FILM COATED ORAL DAILY
Qty: 90 | Refills: 3 | Status: ACTIVE | COMMUNITY
Start: 2021-02-16 | End: 1900-01-01

## 2024-03-07 PROBLEM — G47.00 INSOMNIA: Status: ACTIVE | Noted: 2023-11-22

## 2024-03-07 NOTE — HISTORY OF PRESENT ILLNESS
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  5/20/23 CT abdomen and pelvis w/o contrast showed mod to severe left hydroureteronephrosis to the left of a filling defect within the left mid ureter, neoplasm until proven otherwise.  7/18/23 CT chest and urogram showed Interval placement of left-sided ureteral stent with continued moderate hydroureteronephrosis. Point of transition in the left mid ureter. There is prominent soft tissue and a lack of contrast on the delayed films. This raises concern for an underlying neoplasm.  9/2/23 CT abdomen and pelvis without con showed left hydronephrosis without significant changes, left ureteral stent placement.   He was scheduled for segmental ureterectomy but ureteroscopy on 9/1 noted a 2.5 cm segment in the mid left ureter (now solitary kidney) with new disease and a 1.5 cm in the  distal ureter  lesion, and patholoy showed TaHG in the mid ureter and TaHG in the distal ureter as well as TaHG in the kidney with T1HG in the bladder.     He reports no hematuria, burning, frequency and abdominal pain.  He has chronic lower back pain for many years on oxycodon as needed.    11/1/23 Has newly developed skin itching and mild rash that has not been improving with OTC topicals. He also has intermittent headaches relieved by Tylenol. He reports increased fatigue and decreased sleep. He denied nausea, vomiting, diarrhea, constipation.  11/22/23: C2D15 pembrolizumab and padcev. Patient has decreased appetite, has not been eating much at all in the past few weeks and has lost 10lbs. He is feeling very weak, very fatigued. Patient is so tired that he had to be wheeled to exam room in wheelchair. He was able to walk from the hallway into the exam room. He needs assistance getting up from a sitting position. He will occ get shortness of breath on exertion and this has worsened in the past few weeks. Denies chest pain but says once in a while he has palpitations when trying to sleep. He is not sure if palpitations happen with exertion because he has not been able to exert himself very much of late. Patient denies fever, he is cold all of the time. Had chills the other day, took temperature and it was normal. He is exhausted because he has not been able to sleep. He only sleeps for 15 minutes at a time at night and it is a very light sleep. He has never suffered from insomnia before. He is not taking naps during the day. He was already having difficulty sleeping during cylce 1 but it has worsened tremendously with this cycle. He has been using hydroxyzine and benadryl for itching, neither of which have made him drowsy. He has been using melatonin which does not help. He tried his wife's ambien, which also did not help. Patient has ongoing itching but no rashes, says he is "itching, itching, always itching". Says that the prescribed medications have not worked, including betamethasone cream and hydroxyzine. Has been using Eucerin and Sarna creams, sitting in Epsom salt baths. Patient was constipated, however now is having loose stools twice a day for the last three days, has stopped taking bowel regimen. Pt denies numbness and tingling in hands and feet. Patient reports chronic low back pain that pre dates his cancer diagnosis, the pain is not cancer related, has been dealing with this with his PCP and ?NSGY, initially surgery was recommended but now is not. Was going to PT but is no longer because it was not helping. He takes oxycodone for this pain, he says sometimes he will go days without taking it and then he will need three tabs (30mg) at one time, which is not how this medication is prescribed.    12/13/23 He continues to have rash which appears to be improving. However he and his wife are concerned that his skin is darkening (even in non sun exposed areas). He also states that recentl he is "always cold." He denies diarrhea or constipation. He continues to have some insomnia  12/14/23 CT CAP showed New 3 mm nodule left lung lower lobe. No adenopathy in the thorax, abdomen or pelvis. No left hydronephrosis and left mid ureteral lesion.   1/3/24 reports good appetite, better sleep, skin rash with improved itchiness. He is able to do exercises.   1/24/24: Energy is better than it was, but is still fatigued. Mood is better than it was. Appetite is better than before. Patient notes that he is able to walk for half an hour. Itching has improved, it is most pronounced on his buttock at nighttime, not taking hydroxyzine, sometimes will use benadryl, is using topicals as well. Reports hyperpigmentation of skin since starting treatment. He reports increased frequency of urination within the last few weeks, he also reports in the last few weeks he has not been taking tamsulosin. Says he urinates about every 2-3 hours during night and day. No hematuria or dysuria. Patient has chronic pain in his back that he takes oxycodone for. He has occasional "sticking" pain in his L chest that he associates with his port. This was hurting him today, took oxy and it had improved. He does not have any cardiac chest pain, shortness of breath or palpitations. He denies constipation and diarrhea, says he eats home made yogurt that helps to keep his bowel movements regular. He is using valium everyday for insomnia. He tries to go to sleep without it, but cannot fall asleep without it.    2/12/24 Reports mild fatigue and improving itchiness. Both hand fingers develop mild numbness. Nocturia 2. Denies any gross hematuria.  [de-identified] : 3/6/24: Patient continues to have difficulty sleeping, for example went to bed at 9:30 last night, did not fall asleep until 6AM, slept for 4 hours. He has not been using medication to sleep. He feels tired often. Appetite is fine. Says that he has numbness in fingers and toes for the last month. Says sometimes his legs feel weak and that he feels off balance, has not fallen, is not dizzy. He does exercise, mainly push ups. He has pain in his low back, not cancer related, takes oxycodone, has MR next month. Itching does not bother him much any more. Reports constipation. Last saw eye doctor 6 months ago, says he does have difficulty seeing, unsure if this has worsened since being on treatment, no flashes or floaters. Says he sometimes feels a stabbing pain in his L chest that lasts for about 10 minutes at a time, this does not happen every day.

## 2024-03-07 NOTE — ASSESSMENT
[Future Reassessment of Pain Scale] : Future reassessment of pain scale    [FreeTextEntry1] : 73 year old male s/p right nephroureterectomy June 2022 with pT3 in renal pelvis (10% squamous differentiation) and pTa in the ureter and has since had several bouts of T1HG in the bladder July 2022, Feb 2023. 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 At initial appointment, Dr. Mccall discussed his cancer status and further management. Based on AUA and EAU guideline subjects with upper tract tumors of the renal pelvis and ureter(s) must meet a high risk assessment defined as: tumor 1cm and/or hydronephrosis and/or high grade pathology and/or multifocal disease, where a radical NU approach to treat localized disease is warranted, followed by adjuvant immunotherapy. However, he adamantly declined any surgery given his solitary kidney and the result of dialysis. He is cisplatin ineligible. The potential regimen, pembrolizumab and enfortumab vedotin could induce 73% response including 15% CR rate. His T1HG bladder cancer may be treated with both combination. He will be monitored by Dr. Starkey for surveillance cystoscopy and ureteroscopy. Potential AEs of immunotherapy and enfortumab vedotin were discussed and patient signed consent. He started treatment with pembrolizumab and padcev on 10/18/2023. 12/14/23 CT CAP showed New 3 mm nodule left lung lower lobe. No adenopathy in the thorax, abdomen or pelvis. No left hydronephrosis and left mid ureteral lesion.   Renal pelvis UCC, high grade  - on pembrolizumab D1 and enfortumab vedotin (padcev) D1, D8 on 21 day cycle, next cycle planned for 3/13  - reviewed potential AEs of keytruda, including but not limited to: fatigue, skin rash, joint pain, hypothyroidism, pneumonitis, hepatitis, nephritis, colitis, myocarditis, pericarditis, hypophysitis. - reviewed potential AEs of padcev, including but not limited to fatigue, skin rash, worsening diabetes, eye problem and neuropathy and GI symptoms - continue to monitor blood work on treatment; glucose has been uptrending, was 193 on 2/28  - 12/27/23 CT CAP showed New 3 mm nodule left lung lower lobe. No adenopathy in the thorax, abdomen or pelvis. No left hydronephrosis and left mid ureteral lesion.  -- will repeat at end of March  - continue to follow with Dr. Starkey for surveillance cystoscopy and ureteroscopy/ureteral stent exchange, last on 2/27   Pruritus - improved   Rash - improved  - continue benadryl, betamethasone cream, sarna lotion as needed   Insomnia  - Rx diazepam 5mg nightly as needed; discussed that this medication should not be taken with oxycodone or any other sedating medications, do not drive or operate machinery while taking this medication   Chronic low back pain  - this pain is chronic and is not cancer related pain, he has been prescribed oxy 10mg q8h PRN by another provider, however he says that he sometimes takes 30mg at a time... he was advised not to do this and to follow with his PCP / NSGY to discuss further management - discussed risk/benefits of opioids, not to operate vehicle or machinery while using these medications, do not take with diazepam or other sedating medications   - may continue MgOx for muscle cramps  Instructed to contact our office with any new/worsening symptoms. Patient educated regarding plan of care, all questions/concerns addressed to the best of my abilities and patient's apparent satisfaction.

## 2024-03-07 NOTE — REVIEW OF SYSTEMS
[Chills] : chills [Fatigue] : fatigue [SOB on Exertion] : shortness of breath during exertion [Diarrhea: Grade 0] : Diarrhea: Grade 0 [Joint Pain] : joint pain [Muscle Pain] : muscle pain [Difficulty Walking] : difficulty walking [Constipation] : constipation [Insomnia] : insomnia [Fever] : no fever [Chest Pain] : no chest pain [Lower Ext Edema] : no lower extremity edema [Abdominal Pain] : no abdominal pain [Cough] : no cough [Dysuria] : no dysuria [Vomiting] : no vomiting [Skin Rash] : no skin rash [Dizziness] : no dizziness [FreeTextEntry9] : lower back

## 2024-03-07 NOTE — REASON FOR VISIT
[Follow-Up Visit] : a follow-up [Spouse] : spouse [Family Member] : family member [FreeTextEntry2] : ureteral ucc and bladder ucc

## 2024-03-07 NOTE — PHYSICAL EXAM
[Fully active, able to carry on all pre-disease performance without restriction] : Status 0 - Fully active, able to carry on all pre-disease performance without restriction [Normal] : affect appropriate [de-identified] : anicteric  [de-identified] : supple, FROM [de-identified] : no edema

## 2024-03-12 NOTE — H&P PST ADULT - MURMUR TIMING
This 27 systolic

## 2024-03-13 ENCOUNTER — RESULT REVIEW (OUTPATIENT)
Age: 74
End: 2024-03-13

## 2024-03-13 ENCOUNTER — EMERGENCY (EMERGENCY)
Facility: HOSPITAL | Age: 74
LOS: 1 days | Discharge: HOME | End: 2024-03-13
Attending: STUDENT IN AN ORGANIZED HEALTH CARE EDUCATION/TRAINING PROGRAM | Admitting: EMERGENCY MEDICINE
Payer: MEDICARE

## 2024-03-13 ENCOUNTER — APPOINTMENT (OUTPATIENT)
Dept: INFUSION THERAPY | Facility: HOSPITAL | Age: 74
End: 2024-03-13

## 2024-03-13 ENCOUNTER — NON-APPOINTMENT (OUTPATIENT)
Age: 74
End: 2024-03-13

## 2024-03-13 VITALS
SYSTOLIC BLOOD PRESSURE: 141 MMHG | OXYGEN SATURATION: 98 % | RESPIRATION RATE: 18 BRPM | HEIGHT: 72 IN | TEMPERATURE: 98 F | DIASTOLIC BLOOD PRESSURE: 79 MMHG | HEART RATE: 84 BPM

## 2024-03-13 DIAGNOSIS — Z98.890 OTHER SPECIFIED POSTPROCEDURAL STATES: Chronic | ICD-10-CM

## 2024-03-13 DIAGNOSIS — Z90.5 ACQUIRED ABSENCE OF KIDNEY: Chronic | ICD-10-CM

## 2024-03-13 DIAGNOSIS — Z90.2 ACQUIRED ABSENCE OF LUNG [PART OF]: Chronic | ICD-10-CM

## 2024-03-13 LAB
ADD ON TEST-SPECIMEN IN LAB: SIGNIFICANT CHANGE UP
ALBUMIN SERPL ELPH-MCNC: 3.8 G/DL — SIGNIFICANT CHANGE UP (ref 3.3–5)
ALBUMIN SERPL ELPH-MCNC: 4 G/DL — SIGNIFICANT CHANGE UP (ref 3.3–5)
ALP SERPL-CCNC: 100 U/L — SIGNIFICANT CHANGE UP (ref 40–120)
ALP SERPL-CCNC: 104 U/L — SIGNIFICANT CHANGE UP (ref 40–120)
ALT FLD-CCNC: 11 U/L — SIGNIFICANT CHANGE UP (ref 4–41)
ALT FLD-CCNC: <5 U/L — LOW (ref 10–45)
ANION GAP SERPL CALC-SCNC: 13 MMOL/L — SIGNIFICANT CHANGE UP (ref 5–17)
ANION GAP SERPL CALC-SCNC: 14 MMOL/L — SIGNIFICANT CHANGE UP (ref 7–14)
APPEARANCE UR: CLEAR — SIGNIFICANT CHANGE UP
AST SERPL-CCNC: 15 U/L — SIGNIFICANT CHANGE UP (ref 4–40)
AST SERPL-CCNC: 19 U/L — SIGNIFICANT CHANGE UP (ref 10–40)
B-OH-BUTYR SERPL-SCNC: 0.7 MMOL/L — HIGH (ref 0–0.4)
BASE EXCESS BLDV CALC-SCNC: -3.1 MMOL/L — LOW (ref -2–3)
BASOPHILS # BLD AUTO: 0.06 K/UL — SIGNIFICANT CHANGE UP (ref 0–0.2)
BASOPHILS # BLD AUTO: 0.08 K/UL — SIGNIFICANT CHANGE UP (ref 0–0.2)
BASOPHILS NFR BLD AUTO: 0.7 % — SIGNIFICANT CHANGE UP (ref 0–2)
BASOPHILS NFR BLD AUTO: 0.9 % — SIGNIFICANT CHANGE UP (ref 0–2)
BILIRUB SERPL-MCNC: 0.5 MG/DL — SIGNIFICANT CHANGE UP (ref 0.2–1.2)
BILIRUB SERPL-MCNC: 0.7 MG/DL — SIGNIFICANT CHANGE UP (ref 0.2–1.2)
BILIRUB UR-MCNC: NEGATIVE — SIGNIFICANT CHANGE UP
BLOOD GAS VENOUS COMPREHENSIVE RESULT: SIGNIFICANT CHANGE UP
BUN SERPL-MCNC: 28 MG/DL — HIGH (ref 7–23)
BUN SERPL-MCNC: 29 MG/DL — HIGH (ref 7–23)
CALCIUM SERPL-MCNC: 9.1 MG/DL — SIGNIFICANT CHANGE UP (ref 8.4–10.5)
CALCIUM SERPL-MCNC: 9.5 MG/DL — SIGNIFICANT CHANGE UP (ref 8.4–10.5)
CHLORIDE BLDV-SCNC: 101 MMOL/L — SIGNIFICANT CHANGE UP (ref 96–108)
CHLORIDE SERPL-SCNC: 97 MMOL/L — LOW (ref 98–107)
CHLORIDE SERPL-SCNC: 97 MMOL/L — SIGNIFICANT CHANGE UP (ref 96–108)
CO2 BLDV-SCNC: 24.6 MMOL/L — SIGNIFICANT CHANGE UP (ref 22–26)
CO2 SERPL-SCNC: 21 MMOL/L — LOW (ref 22–31)
CO2 SERPL-SCNC: 22 MMOL/L — SIGNIFICANT CHANGE UP (ref 22–31)
COLOR SPEC: YELLOW — SIGNIFICANT CHANGE UP
CREAT SERPL-MCNC: 1.5 MG/DL — HIGH (ref 0.5–1.3)
CREAT SERPL-MCNC: 1.52 MG/DL — HIGH (ref 0.5–1.3)
DIFF PNL FLD: NEGATIVE — SIGNIFICANT CHANGE UP
EGFR: 47 ML/MIN/1.73M2 — LOW
EGFR: 48 ML/MIN/1.73M2 — LOW
EOSINOPHIL # BLD AUTO: 0.11 K/UL — SIGNIFICANT CHANGE UP (ref 0–0.5)
EOSINOPHIL # BLD AUTO: 0.11 K/UL — SIGNIFICANT CHANGE UP (ref 0–0.5)
EOSINOPHIL NFR BLD AUTO: 1.2 % — SIGNIFICANT CHANGE UP (ref 0–6)
EOSINOPHIL NFR BLD AUTO: 1.3 % — SIGNIFICANT CHANGE UP (ref 0–6)
GAS PNL BLDV: 133 MMOL/L — LOW (ref 136–145)
GAS PNL BLDV: SIGNIFICANT CHANGE UP
GLUCOSE BLDV-MCNC: 411 MG/DL — HIGH (ref 70–99)
GLUCOSE SERPL-MCNC: 499 MG/DL — CRITICAL HIGH (ref 70–99)
GLUCOSE SERPL-MCNC: 571 MG/DL — CRITICAL HIGH (ref 70–99)
GLUCOSE UR QL: >=1000 MG/DL
HCO3 BLDV-SCNC: 23 MMOL/L — SIGNIFICANT CHANGE UP (ref 22–29)
HCT VFR BLD CALC: 35.8 % — LOW (ref 39–50)
HCT VFR BLD CALC: 36.3 % — LOW (ref 39–50)
HCT VFR BLDA CALC: 41 % — SIGNIFICANT CHANGE UP (ref 39–51)
HGB BLD CALC-MCNC: 13.8 G/DL — SIGNIFICANT CHANGE UP (ref 12.6–17.4)
HGB BLD-MCNC: 12.7 G/DL — LOW (ref 13–17)
HGB BLD-MCNC: 12.7 G/DL — LOW (ref 13–17)
IANC: 6.21 K/UL — SIGNIFICANT CHANGE UP (ref 1.8–7.4)
IMM GRANULOCYTES NFR BLD AUTO: 0.6 % — SIGNIFICANT CHANGE UP (ref 0–0.9)
IMM GRANULOCYTES NFR BLD AUTO: 0.7 % — SIGNIFICANT CHANGE UP (ref 0–0.9)
KETONES UR-MCNC: ABNORMAL MG/DL
LACTATE BLDV-MCNC: 1.5 MMOL/L — SIGNIFICANT CHANGE UP (ref 0.5–2)
LEUKOCYTE ESTERASE UR-ACNC: NEGATIVE — SIGNIFICANT CHANGE UP
LYMPHOCYTES # BLD AUTO: 1.12 K/UL — SIGNIFICANT CHANGE UP (ref 1–3.3)
LYMPHOCYTES # BLD AUTO: 1.58 K/UL — SIGNIFICANT CHANGE UP (ref 1–3.3)
LYMPHOCYTES # BLD AUTO: 13.6 % — SIGNIFICANT CHANGE UP (ref 13–44)
LYMPHOCYTES # BLD AUTO: 17.6 % — SIGNIFICANT CHANGE UP (ref 13–44)
MAGNESIUM SERPL-MCNC: 1.8 MG/DL — SIGNIFICANT CHANGE UP (ref 1.6–2.6)
MCHC RBC-ENTMCNC: 30.5 PG — SIGNIFICANT CHANGE UP (ref 27–34)
MCHC RBC-ENTMCNC: 31.8 PG — SIGNIFICANT CHANGE UP (ref 27–34)
MCHC RBC-ENTMCNC: 35 GM/DL — SIGNIFICANT CHANGE UP (ref 32–36)
MCHC RBC-ENTMCNC: 35.5 G/DL — SIGNIFICANT CHANGE UP (ref 32–36)
MCV RBC AUTO: 87.3 FL — SIGNIFICANT CHANGE UP (ref 80–100)
MCV RBC AUTO: 89.5 FL — SIGNIFICANT CHANGE UP (ref 80–100)
MONOCYTES # BLD AUTO: 0.84 K/UL — SIGNIFICANT CHANGE UP (ref 0–0.9)
MONOCYTES # BLD AUTO: 0.94 K/UL — HIGH (ref 0–0.9)
MONOCYTES NFR BLD AUTO: 10.2 % — SIGNIFICANT CHANGE UP (ref 2–14)
MONOCYTES NFR BLD AUTO: 10.5 % — SIGNIFICANT CHANGE UP (ref 2–14)
NEUTROPHILS # BLD AUTO: 6.03 K/UL — SIGNIFICANT CHANGE UP (ref 1.8–7.4)
NEUTROPHILS # BLD AUTO: 6.21 K/UL — SIGNIFICANT CHANGE UP (ref 1.8–7.4)
NEUTROPHILS NFR BLD AUTO: 69.1 % — SIGNIFICANT CHANGE UP (ref 43–77)
NEUTROPHILS NFR BLD AUTO: 73.6 % — SIGNIFICANT CHANGE UP (ref 43–77)
NITRITE UR-MCNC: NEGATIVE — SIGNIFICANT CHANGE UP
NRBC # BLD: 0 /100 WBCS — SIGNIFICANT CHANGE UP (ref 0–0)
NRBC # BLD: 0 /100 WBCS — SIGNIFICANT CHANGE UP (ref 0–0)
NRBC # FLD: 0 K/UL — SIGNIFICANT CHANGE UP (ref 0–0)
PCO2 BLDV: 45 MMHG — SIGNIFICANT CHANGE UP (ref 42–55)
PH BLDV: 7.32 — SIGNIFICANT CHANGE UP (ref 7.32–7.43)
PH UR: 6 — SIGNIFICANT CHANGE UP (ref 5–8)
PHOSPHATE SERPL-MCNC: 3.5 MG/DL — SIGNIFICANT CHANGE UP (ref 2.5–4.5)
PLATELET # BLD AUTO: 151 K/UL — SIGNIFICANT CHANGE UP (ref 150–400)
PLATELET # BLD AUTO: 167 K/UL — SIGNIFICANT CHANGE UP (ref 150–400)
PO2 BLDV: 31 MMHG — SIGNIFICANT CHANGE UP (ref 25–45)
POTASSIUM BLDV-SCNC: 4.3 MMOL/L — SIGNIFICANT CHANGE UP (ref 3.5–5.1)
POTASSIUM SERPL-MCNC: 4.5 MMOL/L — SIGNIFICANT CHANGE UP (ref 3.5–5.3)
POTASSIUM SERPL-MCNC: 4.6 MMOL/L — SIGNIFICANT CHANGE UP (ref 3.5–5.3)
POTASSIUM SERPL-SCNC: 4.5 MMOL/L — SIGNIFICANT CHANGE UP (ref 3.5–5.3)
POTASSIUM SERPL-SCNC: 4.6 MMOL/L — SIGNIFICANT CHANGE UP (ref 3.5–5.3)
PROT SERPL-MCNC: 7.2 G/DL — SIGNIFICANT CHANGE UP (ref 6–8.3)
PROT SERPL-MCNC: 7.7 G/DL — SIGNIFICANT CHANGE UP (ref 6–8.3)
PROT UR-MCNC: NEGATIVE MG/DL — SIGNIFICANT CHANGE UP
RBC # BLD: 4 M/UL — LOW (ref 4.2–5.8)
RBC # BLD: 4.16 M/UL — LOW (ref 4.2–5.8)
RBC # FLD: 13.5 % — SIGNIFICANT CHANGE UP (ref 10.3–14.5)
RBC # FLD: 13.7 % — SIGNIFICANT CHANGE UP (ref 10.3–14.5)
SAO2 % BLDV: 49 % — LOW (ref 67–88)
SODIUM SERPL-SCNC: 131 MMOL/L — LOW (ref 135–145)
SODIUM SERPL-SCNC: 133 MMOL/L — LOW (ref 135–145)
SP GR SPEC: 1.03 — SIGNIFICANT CHANGE UP (ref 1–1.03)
T4 FREE SERPL-MCNC: 1.6 NG/DL — SIGNIFICANT CHANGE UP (ref 0.9–1.8)
TSH SERPL-MCNC: 0.92 UIU/ML — SIGNIFICANT CHANGE UP (ref 0.27–4.2)
UROBILINOGEN FLD QL: 0.2 MG/DL — SIGNIFICANT CHANGE UP (ref 0.2–1)
WBC # BLD: 8.21 K/UL — SIGNIFICANT CHANGE UP (ref 3.8–10.5)
WBC # BLD: 8.98 K/UL — SIGNIFICANT CHANGE UP (ref 3.8–10.5)
WBC # FLD AUTO: 8.21 K/UL — SIGNIFICANT CHANGE UP (ref 3.8–10.5)
WBC # FLD AUTO: 8.98 K/UL — SIGNIFICANT CHANGE UP (ref 3.8–10.5)

## 2024-03-13 PROCEDURE — 99223 1ST HOSP IP/OBS HIGH 75: CPT

## 2024-03-13 RX ORDER — SODIUM CHLORIDE 9 MG/ML
1000 INJECTION, SOLUTION INTRAVENOUS
Refills: 0 | Status: DISCONTINUED | OUTPATIENT
Start: 2024-03-13 | End: 2024-03-17

## 2024-03-13 RX ORDER — INSULIN LISPRO 100/ML
VIAL (ML) SUBCUTANEOUS
Refills: 0 | Status: DISCONTINUED | OUTPATIENT
Start: 2024-03-13 | End: 2024-03-17

## 2024-03-13 RX ORDER — DIAZEPAM 5 MG
1 TABLET ORAL
Refills: 0 | DISCHARGE

## 2024-03-13 RX ORDER — DEXTROSE 50 % IN WATER 50 %
15 SYRINGE (ML) INTRAVENOUS ONCE
Refills: 0 | Status: DISCONTINUED | OUTPATIENT
Start: 2024-03-13 | End: 2024-03-17

## 2024-03-13 RX ORDER — SODIUM CHLORIDE 9 MG/ML
1000 INJECTION INTRAMUSCULAR; INTRAVENOUS; SUBCUTANEOUS ONCE
Refills: 0 | Status: COMPLETED | OUTPATIENT
Start: 2024-03-13 | End: 2024-03-13

## 2024-03-13 RX ORDER — DEXTROSE 50 % IN WATER 50 %
12.5 SYRINGE (ML) INTRAVENOUS ONCE
Refills: 0 | Status: DISCONTINUED | OUTPATIENT
Start: 2024-03-13 | End: 2024-03-17

## 2024-03-13 RX ORDER — INSULIN LISPRO 100/ML
5 VIAL (ML) SUBCUTANEOUS ONCE
Refills: 0 | Status: COMPLETED | OUTPATIENT
Start: 2024-03-13 | End: 2024-03-13

## 2024-03-13 RX ORDER — TAMSULOSIN HYDROCHLORIDE 0.4 MG/1
1 CAPSULE ORAL
Refills: 0 | DISCHARGE

## 2024-03-13 RX ORDER — DEXTROSE 50 % IN WATER 50 %
25 SYRINGE (ML) INTRAVENOUS ONCE
Refills: 0 | Status: DISCONTINUED | OUTPATIENT
Start: 2024-03-13 | End: 2024-03-17

## 2024-03-13 RX ORDER — INSULIN LISPRO 100/ML
VIAL (ML) SUBCUTANEOUS AT BEDTIME
Refills: 0 | Status: DISCONTINUED | OUTPATIENT
Start: 2024-03-13 | End: 2024-03-17

## 2024-03-13 RX ORDER — GLUCAGON INJECTION, SOLUTION 0.5 MG/.1ML
1 INJECTION, SOLUTION SUBCUTANEOUS ONCE
Refills: 0 | Status: DISCONTINUED | OUTPATIENT
Start: 2024-03-13 | End: 2024-03-17

## 2024-03-13 RX ORDER — LEVOTHYROXINE SODIUM 125 MCG
1 TABLET ORAL
Qty: 0 | Refills: 0 | DISCHARGE

## 2024-03-13 RX ORDER — OXYCODONE HYDROCHLORIDE 5 MG/1
1 TABLET ORAL
Refills: 0 | DISCHARGE

## 2024-03-13 RX ADMIN — Medication 3: at 23:52

## 2024-03-13 RX ADMIN — SODIUM CHLORIDE 1000 MILLILITER(S): 9 INJECTION INTRAMUSCULAR; INTRAVENOUS; SUBCUTANEOUS at 18:31

## 2024-03-13 RX ADMIN — Medication 5 UNIT(S): at 21:19

## 2024-03-13 RX ADMIN — SODIUM CHLORIDE 1000 MILLILITER(S): 9 INJECTION INTRAMUSCULAR; INTRAVENOUS; SUBCUTANEOUS at 17:01

## 2024-03-13 NOTE — ED ADULT NURSE NOTE - NS ED NURSE RECORD ANOTHER HT AND WT
How Did Your Itching Occur?: gradual in onset  (over a period of years) How Severe Is Your Itching?: moderate No

## 2024-03-13 NOTE — ED PROVIDER NOTE - PROGRESS NOTE DETAILS
Torin Oro DO: Patient reassessed, NAD, non-toxic appearing. results dw pt/family, questions answered. agrees w/ cdu.

## 2024-03-13 NOTE — ED PROVIDER NOTE - PHYSICAL EXAMINATION
Anuja Qureshi MD  GENERAL: Patient awake alert NAD.  HEENT: NC/AT, Moist mucous membranes, EOMI.  LUNGS: CTAB, no wheezes or crackles.   CARDIAC: RRR, no m/r/g.    ABDOMEN: Soft, NT, ND, No rebound, guarding. No CVA tenderness.   EXT: No edema. No calf tenderness.   MSK: No pain with movement, no deformities.  NEURO: A&Ox3. Moving all extremities.  SKIN: Warm and dry. No rash.  PSYCH: Normal affect.

## 2024-03-13 NOTE — ED CDU PROVIDER INITIAL DAY NOTE - CLINICAL SUMMARY MEDICAL DECISION MAKING FREE TEXT BOX
73-year-old male with history of bladder cancer on chemotherapy, Aortic aneurysm, BPH who presents to the ED with hyperglycemia on outpatient labs. He states he feels generally weak but denies any somatic complaints. HD stable. Labs reviewed, hyperglycemia noted without clinical evidence of DKA, hemoglobin A1c 7.4. Imp: New-Onset DM. Plan for glycemic control and endocrinology consult.

## 2024-03-13 NOTE — ED ADULT NURSE NOTE - PRO INTERPRETER NEED 2
English Pain Refusal Text: I offered to prescribe pain medication but the patient refused to take this medication.

## 2024-03-13 NOTE — ED PROVIDER NOTE - NSICDXPASTSURGICALHX_GEN_ALL_CORE_FT
PAST SURGICAL HISTORY:  H/O arthroscopy of knee     H/O carpal tunnel repair     H/O cystoscopy     History of biopsy of bladder     S/p nephrectomy right;  05/2022    S/P partial lobectomy of lung left upper;  08/04/2021    
What Type Of Note Output Would You Prefer (Optional)?: Bullet Format
How Severe Is Your Rash?: moderate
Is This A New Presentation, Or A Follow-Up?: Rash
Additional History: Started using new soup 1 month ago

## 2024-03-13 NOTE — ED ADULT NURSE NOTE - NSFALLUNIVINTERV_ED_ALL_ED
Bed/Stretcher in lowest position, wheels locked, appropriate side rails in place/Call bell, personal items and telephone in reach/Instruct patient to call for assistance before getting out of bed/chair/stretcher/Non-slip footwear applied when patient is off stretcher/[place] to call system/Physically safe environment - no spills, clutter or unnecessary equipment/Purposeful proactive rounding/Room/bathroom lighting operational, light cord in reach

## 2024-03-13 NOTE — ED CDU PROVIDER INITIAL DAY NOTE - NS ED ATTENDING STATEMENT MOD
I have seen and examined this patient and fully participated in the care of this patient as the teaching attending.  The service was shared with the CATALINA.  I reviewed and verified the documentation.

## 2024-03-13 NOTE — ED PROVIDER NOTE - ATTENDING CONTRIBUTION TO CARE
Torin Oro DO:  patient seen and evaluated with the resident.  I was present for key portions of the History & Physical, and I agree with the Impression & Plan. 74 yo m pmh HTN, Hypothyroidism, Lung cancer s/p LORRIE lobectomy (2021), Renal cancer s/p right nephrectomy (2022), chronic back pain, anemia, BPH, pw hyperglycemia seen on outpatient labs. reports mild fatigue, lightheadedness. denies n/v, cp, sob, cough, congestion, hx of similar sx. pt arrives hds, well appearing. will check labs, do not suspect dka/hhs. labs, reassess. ivf.

## 2024-03-13 NOTE — ED PROVIDER NOTE - OBJECTIVE STATEMENT
Patient is a 73-year-old male with history of bladder cancer on chemotherapy who presents to the ED with hyperglycemia.  While he was at his chemotherapy today he had a fingerstick in the 600s and was sent to the ED.  Patient denies any recent symptoms such as fevers, chills, chest pain, shortness of breath, abdominal pain, nausea, vomiting, dysuria, blood in his stool, diarrhea, problems urinating.  Has no history of diabetes and his fingersticks prior to today have been fine

## 2024-03-13 NOTE — ED CDU PROVIDER INITIAL DAY NOTE - OBJECTIVE STATEMENT
Initial ED provider note: "73-year-old male with history of bladder cancer on chemotherapy who presents to the ED with hyperglycemia.  While he was at his chemotherapy today he had a fingerstick in the 600s and was sent to the ED.  Patient denies any recent symptoms such as fevers, chills, chest pain, shortness of breath, abdominal pain, nausea, vomiting, dysuria, blood in his stool, diarrhea, problems urinating.  Has no history of diabetes and his fingersticks prior to today have been fine."    CDU note: Agree with above. 73-year-old male with history of bladder cancer on chemotherapy, Aortic aneurysm, BPH who presents to the ED with hyperglycemia on outpatient labs. He states he feels generally weak but denies any somatic complaints. HD stable. Labs reviewed, hyperglycemia noted without clinical evidence of DKA, hemoglobin A1c 7.4. Imp: New-Onset DM. Plan for glycemic control and endocrinology consult.

## 2024-03-13 NOTE — ED PROVIDER NOTE - CLINICAL SUMMARY MEDICAL DECISION MAKING FREE TEXT BOX
Kiera - Patient is a 73-year-old male with history of bladder cancer on chemotherapy who presents to the ED with hyperglycemia. ddx includes but is not limited to chemotherapy induced diabetes vs idiopathic DM vs lab error. Will check labs, give fluids, reassess

## 2024-03-13 NOTE — ED CDU PROVIDER INITIAL DAY NOTE - ATTENDING CONTRIBUTION TO CARE
Torin Oro DO:  patient seen and evaluated with the PA. I was present for key portions of the History & Physical, and I agree with the Impression & Plan. 72 yo m pmh HTN, Hypothyroidism, Lung cancer s/p LORRIE lobectomy (2021), Renal cancer s/p right nephrectomy (2022), chronic back pain, anemia, BPH, pw hyperglycemia seen on outpatient labs. reports mild fatigue, lightheadedness. denies n/v, cp, sob, cough, congestion, hx of similar sx. pt arrives hds, well appearing. cdu for glucose control.

## 2024-03-13 NOTE — ED ADULT NURSE NOTE - OBJECTIVE STATEMENT
Break coverage RN: 72yo male received in room 2. pt A&Ox4, hx of bladder cancer last chemo 2 weeks ago, sent in after his appointment today for elevated blood sugar, finger stick was 600. Denies increased thirst, urination, abdominal pain, n/v or headache.  Denies cp, sob, dizziness, and palpitation. Respiration even and non-labored. in NAD. 20G IV placed to LAC by facilitator RN, fluid infusing as per ordered.

## 2024-03-13 NOTE — ED ADULT TRIAGE NOTE - CHIEF COMPLAINT QUOTE
Sent by oncologist for hyperglycemia (600s) seen today in blood work. States he was getting immunotherapy treatment. h/o bladder ca. Denies increased thirst or urination. No complaints at this time. No hx of DM.  in triage.

## 2024-03-14 VITALS
DIASTOLIC BLOOD PRESSURE: 86 MMHG | SYSTOLIC BLOOD PRESSURE: 135 MMHG | TEMPERATURE: 98 F | OXYGEN SATURATION: 99 % | RESPIRATION RATE: 16 BRPM | HEART RATE: 69 BPM

## 2024-03-14 DIAGNOSIS — C68.9 MALIGNANT NEOPLASM OF URINARY ORGAN, UNSPECIFIED: ICD-10-CM

## 2024-03-14 DIAGNOSIS — E03.9 HYPOTHYROIDISM, UNSPECIFIED: ICD-10-CM

## 2024-03-14 DIAGNOSIS — E11.65 TYPE 2 DIABETES MELLITUS WITH HYPERGLYCEMIA: ICD-10-CM

## 2024-03-14 LAB
ALBUMIN SERPL ELPH-MCNC: 3.9 G/DL — SIGNIFICANT CHANGE UP (ref 3.3–5)
ALP SERPL-CCNC: 100 U/L — SIGNIFICANT CHANGE UP (ref 40–120)
ALT FLD-CCNC: 10 U/L — SIGNIFICANT CHANGE UP (ref 4–41)
ANION GAP SERPL CALC-SCNC: 14 MMOL/L — SIGNIFICANT CHANGE UP (ref 7–14)
AST SERPL-CCNC: 20 U/L — SIGNIFICANT CHANGE UP (ref 4–40)
BASE EXCESS BLDV CALC-SCNC: -1.7 MMOL/L — SIGNIFICANT CHANGE UP (ref -2–3)
BASOPHILS # BLD AUTO: 0.07 K/UL — SIGNIFICANT CHANGE UP (ref 0–0.2)
BASOPHILS NFR BLD AUTO: 0.8 % — SIGNIFICANT CHANGE UP (ref 0–2)
BILIRUB SERPL-MCNC: 0.5 MG/DL — SIGNIFICANT CHANGE UP (ref 0.2–1.2)
BLOOD GAS VENOUS COMPREHENSIVE RESULT: SIGNIFICANT CHANGE UP
BUN SERPL-MCNC: 25 MG/DL — HIGH (ref 7–23)
CALCIUM SERPL-MCNC: 9.2 MG/DL — SIGNIFICANT CHANGE UP (ref 8.4–10.5)
CHLORIDE BLDV-SCNC: 104 MMOL/L — SIGNIFICANT CHANGE UP (ref 96–108)
CHLORIDE SERPL-SCNC: 100 MMOL/L — SIGNIFICANT CHANGE UP (ref 98–107)
CO2 BLDV-SCNC: 25.7 MMOL/L — SIGNIFICANT CHANGE UP (ref 22–26)
CO2 SERPL-SCNC: 20 MMOL/L — LOW (ref 22–31)
CREAT SERPL-MCNC: 1.38 MG/DL — HIGH (ref 0.5–1.3)
EGFR: 53 ML/MIN/1.73M2 — LOW
EOSINOPHIL # BLD AUTO: 0.17 K/UL — SIGNIFICANT CHANGE UP (ref 0–0.5)
EOSINOPHIL NFR BLD AUTO: 2 % — SIGNIFICANT CHANGE UP (ref 0–6)
GAS PNL BLDV: 134 MMOL/L — LOW (ref 136–145)
GLUCOSE BLDV-MCNC: 306 MG/DL — HIGH (ref 70–99)
GLUCOSE SERPL-MCNC: 312 MG/DL — HIGH (ref 70–99)
HCO3 BLDV-SCNC: 24 MMOL/L — SIGNIFICANT CHANGE UP (ref 22–29)
HCT VFR BLD CALC: 37.1 % — LOW (ref 39–50)
HCT VFR BLDA CALC: 41 % — SIGNIFICANT CHANGE UP (ref 39–51)
HGB BLD CALC-MCNC: 13.7 G/DL — SIGNIFICANT CHANGE UP (ref 12.6–17.4)
HGB BLD-MCNC: 12.7 G/DL — LOW (ref 13–17)
IANC: 5.12 K/UL — SIGNIFICANT CHANGE UP (ref 1.8–7.4)
IMM GRANULOCYTES NFR BLD AUTO: 0.5 % — SIGNIFICANT CHANGE UP (ref 0–0.9)
LACTATE BLDV-MCNC: 1.4 MMOL/L — SIGNIFICANT CHANGE UP (ref 0.5–2)
LYMPHOCYTES # BLD AUTO: 2.02 K/UL — SIGNIFICANT CHANGE UP (ref 1–3.3)
LYMPHOCYTES # BLD AUTO: 24 % — SIGNIFICANT CHANGE UP (ref 13–44)
MCHC RBC-ENTMCNC: 30.8 PG — SIGNIFICANT CHANGE UP (ref 27–34)
MCHC RBC-ENTMCNC: 34.2 GM/DL — SIGNIFICANT CHANGE UP (ref 32–36)
MCV RBC AUTO: 89.8 FL — SIGNIFICANT CHANGE UP (ref 80–100)
MONOCYTES # BLD AUTO: 1.01 K/UL — HIGH (ref 0–0.9)
MONOCYTES NFR BLD AUTO: 12 % — SIGNIFICANT CHANGE UP (ref 2–14)
NEUTROPHILS # BLD AUTO: 5.12 K/UL — SIGNIFICANT CHANGE UP (ref 1.8–7.4)
NEUTROPHILS NFR BLD AUTO: 60.7 % — SIGNIFICANT CHANGE UP (ref 43–77)
NRBC # BLD: 0 /100 WBCS — SIGNIFICANT CHANGE UP (ref 0–0)
NRBC # FLD: 0 K/UL — SIGNIFICANT CHANGE UP (ref 0–0)
PCO2 BLDV: 45 MMHG — SIGNIFICANT CHANGE UP (ref 42–55)
PH BLDV: 7.34 — SIGNIFICANT CHANGE UP (ref 7.32–7.43)
PLATELET # BLD AUTO: 164 K/UL — SIGNIFICANT CHANGE UP (ref 150–400)
PO2 BLDV: 28 MMHG — SIGNIFICANT CHANGE UP (ref 25–45)
POTASSIUM BLDV-SCNC: 4.5 MMOL/L — SIGNIFICANT CHANGE UP (ref 3.5–5.1)
POTASSIUM SERPL-MCNC: 4.3 MMOL/L — SIGNIFICANT CHANGE UP (ref 3.5–5.3)
POTASSIUM SERPL-SCNC: 4.3 MMOL/L — SIGNIFICANT CHANGE UP (ref 3.5–5.3)
PROT SERPL-MCNC: 7.7 G/DL — SIGNIFICANT CHANGE UP (ref 6–8.3)
RBC # BLD: 4.13 M/UL — LOW (ref 4.2–5.8)
RBC # FLD: 13.7 % — SIGNIFICANT CHANGE UP (ref 10.3–14.5)
SAO2 % BLDV: 46.5 % — LOW (ref 67–88)
SODIUM SERPL-SCNC: 134 MMOL/L — LOW (ref 135–145)
WBC # BLD: 8.43 K/UL — SIGNIFICANT CHANGE UP (ref 3.8–10.5)
WBC # FLD AUTO: 8.43 K/UL — SIGNIFICANT CHANGE UP (ref 3.8–10.5)

## 2024-03-14 PROCEDURE — 99239 HOSP IP/OBS DSCHRG MGMT >30: CPT

## 2024-03-14 PROCEDURE — 99204 OFFICE O/P NEW MOD 45 MIN: CPT

## 2024-03-14 RX ORDER — INSULIN GLARGINE 100 [IU]/ML
15 INJECTION, SOLUTION SUBCUTANEOUS
Qty: 2 | Refills: 0
Start: 2024-03-14 | End: 2024-04-12

## 2024-03-14 RX ORDER — ISOPROPYL ALCOHOL, BENZOCAINE .7; .06 ML/ML; ML/ML
0 SWAB TOPICAL
Qty: 100 | Refills: 1
Start: 2024-03-14

## 2024-03-14 RX ORDER — INSULIN LISPRO 100/ML
5 VIAL (ML) SUBCUTANEOUS
Qty: 1 | Refills: 1
Start: 2024-03-14 | End: 2024-05-12

## 2024-03-14 RX ADMIN — Medication 5: at 12:12

## 2024-03-14 RX ADMIN — Medication 4: at 07:55

## 2024-03-14 NOTE — ED ADULT NURSE REASSESSMENT NOTE - NS ED NURSE REASSESS COMMENT FT1
Break coverage RN: Pt received in stretcher in room 1A. Pt resting comfortably. pt offered no complains at present. respiration even and non-labored. in NAD. Virtual CDU patient.
Report given to CDU NAPOLEON Cameron, pt stable for transport to CDU 5, Safety precautions implemented as per protocol, plan of care ongoing.
Pt appears to be resting comfortably, NAD, respirations are even and unlabored, denies any complaints at this moment, VS noted, pt admitted to CDU, awaiting for bed assignment. Safety precautions implemented as per protocol, awaiting further MD orders, plan of care ongoing.
Received report from Day RN Lilly Franz: Pt A&Ox4, ambulatory, appears to be resting comfortably, NAD, respirations are even and unlabored, denies any complaints at this moment, FS performed, VS noted, pt admitted to CDU, awaiting for bed assignment, Safety precautions implemented as per protocol, awaiting further MD orders, plan of care ongoing.

## 2024-03-14 NOTE — ED CDU PROVIDER DISPOSITION NOTE - PATIENT PORTAL LINK FT
You can access the FollowMyHealth Patient Portal offered by [facility] by registering at the following website: [email or web address]. By joining [facility]’s FollowMyHealth portal, you will also be able to view your health information using other applications (apps) compatible with our system.

## 2024-03-14 NOTE — CONSULT NOTE ADULT - ASSESSMENT
Patient is a 73-year-old male with hypertension, hypothyroidism on levothyroxine 25 mcg daily, and bladder cancer on Keytruda and Padcev presenting with worsening hyperglycemia. Hyperlgycemia first noted recently by oncologist but BG not severely uncontrolled. Prior A1c measurements normal. Endocrine consulted for severe hyperglycemia. A1c 7.4%.    On pembrolizumab D1 and enfortumab vedotin (padcev) D1, D8 on 21 day cycle, next cycle was planned for 3/13 but patient found to have pre-tx BG in the 500s so sent to the ED for Endocrine consult. Takes 25mcg levothyroxine for hypothyroidism. No abd pain, N/V, weakness. Vitals and electrolytes not concerning for AI.    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 Not on DM meds. Never saw an Endocrinologist. Has CKD (had a unilateral nephrectomy). Needs retina DM screening. No macrovascular complications. Not engaging in SMBG. Lost 25 pounds in 2 months. No convincing evidence of DKA.  Discharge plan:  -Start basal insulin 15 units qhs. Start admelog 5 units TIDac. Can try "Lantus Solostar Pen 15 units before bedtime, dispense 5mL." If not covered, can try alternating with one of following: tresiba/basaglar/toujeo. "Humalog Kwikpen up to 5 units before meals, dispense 5mL." Can try alternating with one of following if not covered: novolog/apidra/admelog/fiasp.  -Patient will need education on how to self-administer insulin, how to check FSBG, as well as hypoglycemia management. Patient to call doctor with persistent high or low BG at home.   -Will need to have glucometer (ACCU_CHECK Zamzam Connect, Ascensia Contour Next EZ or One, Freestyle Cope LITE or OneTouch Verio IQ), test strips and lancets (make sure compatible with glucometer, dispense #100 (or #200) and use as directed) sent to pharmacy prior to discharge.  -Will also need 4mm pen needles  -Recommend routine outpatient ophthalmology, endocrinology and oncology f/u  Patient to f/u outpatient with Endocrinology faculty practice [facility]: End[facility]ology at [place].   [address] [place] [address], [address]  [place] [address]  Phone: [phone]  Fax: [phone]    Hypothyroidism  Takes 25mcg levothyroxine for hypothyroidism. Continue this and f/u outpatient.    Discussed with primary team.    Lance Galindo DO  Endocrinology    [address] [place], [place] [id number] [phone]    [address], [address], [place] [id number] [phone]

## 2024-03-14 NOTE — ED CDU PROVIDER DISPOSITION NOTE - NSFOLLOWUPINSTRUCTIONS_ED_ALL_ED_FT
Follow up with your PMD within 48-72 hrs or our clinic [phone].   Follow up with Endocrinology within the week.  Call [phone] for appt.  Show copies of your reports given to you.   Start Lantus 15units inject subcutaneously at bedtime.   ADMELOG 5 units inject before meals.    Monitor you finger sticks 4x/day- before meals and at bedtime and record in a log book.    Bring that log book with you when you follow up with Endocrinology.    Blood sugar less than 60 or greater than 300 you should return to the ED.    Weakness, chest pain, nausea, vomiting, fever, chills, abdominal pain or ANY NEW CONCERNING SYMPTOMS return to the Emergency Department.     Future considerations as discussed:  Outpatient endocrinologist can repeat lactate level to confirm normalization and if so initiate metformin as outpatient.  GLP1RA once weekly injection can be added as outpatient.

## 2024-03-14 NOTE — CONSULT NOTE ADULT - SUBJECTIVE AND OBJECTIVE BOX
HPI: Patient is a 73-year-old male with hypertension, hypothyroidism on levothyroxine 25 mcg daily, and bladder cancer on Keytruda and Padcev presenting with worsening hyperglycemia. Hyperlgycemia first noted recently by oncologist but BG not severely uncontrolled. Prior A1c measurements normal. Endocrine consulted for severe hyperglycemia. A1c 7.4%.    Patient never before given the diagnosis of DM or pre-diabetes. Not on DM meds. Never saw an Endocrinologist. Has CKD (had a unilateral nephrectomy). Needs retina DM screening. No macrovascular complications. Not engaging in SMBG. Lost 25 pounds in 2 months.     On pembrolizumab D1 and enfortumab vedotin (padcev) D1, D8 on 21 day cycle, next cycle was planned for 3/13 but patient found to have pre-tx BG in the 500s so sent to the ED for Endocrine consult.    Takes 25mcg levothyroxine for hypothyroidism.    No abd pain, N/V, weakness. Vitals and electrolytes not concerning for AI.    PAST MEDICAL & SURGICAL HISTORY:  HTN (hypertension)      Ascending aortic aneurysm      Thoracic aortic aneurysm      Obesity      Pulmonary nodule  removed 8/4/2021 early stage no chemo VAT      Hypothyroid      COVID-19  12/2020 loss of smell no hosp      History of low back pain      BPH without obstruction/lower urinary tract symptoms      Hearing loss      Malignant neoplasm of unspecified kidney, except renal pelvis      Lung cancer      History of iron deficiency      Lumbar vertebral fracture      Malignant neoplasm of bladder, unspecified      Hydronephrosis      GAMAL (acute kidney injury)      Malignant neoplasm of urinary organ      S/p nephrectomy  right;  05/2022      S/P partial lobectomy of lung  left upper;  08/04/2021      History of biopsy of bladder      H/O arthroscopy of knee      H/O carpal tunnel repair      H/O cystoscopy          FAMILY HISTORY:      Social History:    Outpatient Medications: Home Medications:  diazePAM 5 mg oral tablet: 1 tab(s) orally once a day PRN (13 Mar 2024 17:04)  levothyroxine 25 mcg (0.025 mg) oral tablet: 1 tab(s) orally once a day (13 Mar 2024 17:04)  oxyCODONE 10 mg oral tablet: 1 tab(s) orally 3 times a day PRN (13 Mar 2024 17:04)  tamsulosin 0.4 mg oral capsule: 1 cap(s) orally once a day (13 Mar 2024 17:04)      MEDICATIONS  (STANDING):  dextrose 5%. 1000 milliLiter(s) (50 mL/Hr) IV Continuous <Continuous>  dextrose 5%. 1000 milliLiter(s) (100 mL/Hr) IV Continuous <Continuous>  dextrose 50% Injectable 12.5 Gram(s) IV Push once  dextrose 50% Injectable 25 Gram(s) IV Push once  dextrose 50% Injectable 25 Gram(s) IV Push once  glucagon  Injectable 1 milliGRAM(s) IntraMuscular once  insulin lispro (ADMELOG) corrective regimen sliding scale   SubCutaneous three times a day before meals  insulin lispro (ADMELOG) corrective regimen sliding scale   SubCutaneous at bedtime    MEDICATIONS  (PRN):  dextrose Oral Gel 15 Gram(s) Oral once PRN Blood Glucose LESS THAN 70 milliGRAM(s)/deciliter      Allergies    No Known Allergies    Intolerances      PHYSICAL EXAM:  VITALS: T(C): 36.5 (03-14-24 @ 14:03)  T(F): 97.7 (03-14-24 @ 14:03), Max: 98.5 (03-13-24 @ 15:51)  HR: 69 (03-14-24 @ 14:03) (53 - 78)  BP: 135/86 (03-14-24 @ 14:03) (98/61 - 150/93)  RR:  (16 - 18)  SpO2:  (96% - 100%)  Wt(kg): --  General: Well-developed male, No acute distress, Speaking full sentences.   Eye:  Extraocular movements are intact, No proptosis or lid lag.   HENT:  Normocephalic.   Neck:  Supple, Non-tender. Thyroid ~30g. No nodules.  Respiratory:  Respirations are non-labored, Symmetric chest wall expansion, Breath sounds are equal.   Cardiovascular:  Normal rate, Regular rhythm, No edema.  Gastrointestinal:  Soft, Non-tender, Non-distended.   Musculoskeletal:  Normal range of motion, No gross joint swelling.   Integumentary:  Warm, dry.  Mental Status Exam:  Orientedx4, Speech clear and coherent.   Neurologic:  Alert, Orientedx4, Normal motor function, No focal deficits, Cranial Nerves II-XII are grossly intact bilaterally.   Psychiatric:  Cooperative, Appropriate mood & affect.    POCT Blood Glucose.: 397 mg/dL (03-14-24 @ 11:42)  POCT Blood Glucose.: 312 mg/dL (03-14-24 @ 07:34)  POCT Blood Glucose.: 384 mg/dL (03-13-24 @ 23:48)  POCT Blood Glucose.: 409 mg/dL (03-13-24 @ 21:12)  POCT Blood Glucose.: 410 mg/dL (03-13-24 @ 18:17)  POCT Blood Glucose.: 520 mg/dL (03-13-24 @ 14:36)                            12.7   8.43  )-----------( 164      ( 14 Mar 2024 06:28 )             37.1       03-14    134<L>  |  100  |  25<H>  ----------------------------<  312<H>  4.3   |  20<L>  |  1.38<H>    eGFR: 53<L>    Ca    9.2      03-14  Mg     1.80     03-13  Phos  3.5     03-13    TPro  7.7  /  Alb  3.9  /  TBili  0.5  /  DBili  x   /  AST  20  /  ALT  10  /  AlkPhos  100  03-14      Thyroid Function Tests:  03-13 @ 12:17 TSH 0.92 FreeT4 1.6 T3 -- Anti TPO -- Anti Thyroglobulin Ab -- TSI --  02-21 @ 12:33 TSH 0.79 FreeT4 1.5 T3 -- Anti TPO -- Anti Thyroglobulin Ab -- TSI --              Radiology:

## 2024-03-14 NOTE — ED CDU PROVIDER DISPOSITION NOTE - CLINICAL COURSE
73-year-old male with history of bladder cancer on chemotherapy, Aortic aneurysm, BPH who presents to the ED with hyperglycemia on outpatient labs. He states he feels generally weak but denies any somatic complaints. HD stable. Labs reviewed, hyperglycemia noted without clinical evidence of DKA, hemoglobin A1c 7.4. Imp: New-Onset DM.  Seen by Endocrine, suspecting medication induced Type I Diabetes "cancer medication is attacking the pancreas." Rec starting lantus 15 units at bedtime, ademlog 5 units with meals and he will arrange endo clinic follow up.  Diabetes education provided, sent script for diabetes supplies including glucose testing monitor.

## 2024-03-19 ENCOUNTER — NON-APPOINTMENT (OUTPATIENT)
Age: 74
End: 2024-03-19

## 2024-03-20 ENCOUNTER — APPOINTMENT (OUTPATIENT)
Dept: INFUSION THERAPY | Facility: HOSPITAL | Age: 74
End: 2024-03-20

## 2024-03-27 ENCOUNTER — RESULT REVIEW (OUTPATIENT)
Age: 74
End: 2024-03-27

## 2024-03-27 ENCOUNTER — APPOINTMENT (OUTPATIENT)
Dept: HEMATOLOGY ONCOLOGY | Facility: CLINIC | Age: 74
End: 2024-03-27
Payer: MEDICARE

## 2024-03-27 VITALS
HEART RATE: 73 BPM | RESPIRATION RATE: 16 BRPM | TEMPERATURE: 96.6 F | OXYGEN SATURATION: 98 % | WEIGHT: 210.54 LBS | DIASTOLIC BLOOD PRESSURE: 82 MMHG | SYSTOLIC BLOOD PRESSURE: 129 MMHG | BODY MASS INDEX: 28.55 KG/M2

## 2024-03-27 DIAGNOSIS — M54.9 DORSALGIA, UNSPECIFIED: ICD-10-CM

## 2024-03-27 DIAGNOSIS — G89.29 DORSALGIA, UNSPECIFIED: ICD-10-CM

## 2024-03-27 DIAGNOSIS — L29.9 PRURITUS, UNSPECIFIED: ICD-10-CM

## 2024-03-27 LAB
BASOPHILS # BLD AUTO: 0.07 K/UL — SIGNIFICANT CHANGE UP (ref 0–0.2)
BASOPHILS NFR BLD AUTO: 0.9 % — SIGNIFICANT CHANGE UP (ref 0–2)
EOSINOPHIL # BLD AUTO: 0.15 K/UL — SIGNIFICANT CHANGE UP (ref 0–0.5)
EOSINOPHIL NFR BLD AUTO: 2 % — SIGNIFICANT CHANGE UP (ref 0–6)
HCT VFR BLD CALC: 36.8 % — LOW (ref 39–50)
HGB BLD-MCNC: 12.7 G/DL — LOW (ref 13–17)
IMM GRANULOCYTES NFR BLD AUTO: 0.3 % — SIGNIFICANT CHANGE UP (ref 0–0.9)
LYMPHOCYTES # BLD AUTO: 1.56 K/UL — SIGNIFICANT CHANGE UP (ref 1–3.3)
LYMPHOCYTES # BLD AUTO: 20.8 % — SIGNIFICANT CHANGE UP (ref 13–44)
MCHC RBC-ENTMCNC: 31.4 PG — SIGNIFICANT CHANGE UP (ref 27–34)
MCHC RBC-ENTMCNC: 34.5 G/DL — SIGNIFICANT CHANGE UP (ref 32–36)
MCV RBC AUTO: 90.9 FL — SIGNIFICANT CHANGE UP (ref 80–100)
MONOCYTES # BLD AUTO: 0.69 K/UL — SIGNIFICANT CHANGE UP (ref 0–0.9)
MONOCYTES NFR BLD AUTO: 9.2 % — SIGNIFICANT CHANGE UP (ref 2–14)
NEUTROPHILS # BLD AUTO: 5.01 K/UL — SIGNIFICANT CHANGE UP (ref 1.8–7.4)
NEUTROPHILS NFR BLD AUTO: 66.8 % — SIGNIFICANT CHANGE UP (ref 43–77)
NRBC # BLD: 0 /100 WBCS — SIGNIFICANT CHANGE UP (ref 0–0)
PLATELET # BLD AUTO: 193 K/UL — SIGNIFICANT CHANGE UP (ref 150–400)
RBC # BLD: 4.05 M/UL — LOW (ref 4.2–5.8)
RBC # FLD: 13.7 % — SIGNIFICANT CHANGE UP (ref 10.3–14.5)
WBC # BLD: 7.5 K/UL — SIGNIFICANT CHANGE UP (ref 3.8–10.5)
WBC # FLD AUTO: 7.5 K/UL — SIGNIFICANT CHANGE UP (ref 3.8–10.5)

## 2024-03-27 PROCEDURE — 99214 OFFICE O/P EST MOD 30 MIN: CPT

## 2024-03-27 PROCEDURE — G2211 COMPLEX E/M VISIT ADD ON: CPT

## 2024-03-27 RX ORDER — INSULIN LISPRO 100 U/ML
100 INJECTION, SOLUTION INTRAVENOUS; SUBCUTANEOUS 3 TIMES DAILY
Refills: 0 | Status: ACTIVE | COMMUNITY
Start: 2024-03-27

## 2024-03-27 RX ORDER — INSULIN GLARGINE 100 [IU]/ML
100 INJECTION, SOLUTION SUBCUTANEOUS AT BEDTIME
Refills: 0 | Status: ACTIVE | COMMUNITY
Start: 2024-03-27

## 2024-03-27 NOTE — ASSESSMENT
[FreeTextEntry1] : 73 year old male s/p right nephroureterectomy June 2022 with pT3 in renal pelvis (10% squamous differentiation) and pTa in the ureter and has since had several bouts of T1HG in the bladder July 2022, Feb 2023. 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 At initial appointment, Dr. Mccall discussed his cancer status and further management. Based on AUA and EAU guideline subjects with upper tract tumors of the renal pelvis and ureter(s) must meet a high risk assessment defined as: tumor 1cm and/or hydronephrosis and/or high grade pathology and/or multifocal disease, where a radical NU approach to treat localized disease is warranted, followed by adjuvant immunotherapy. However, he adamantly declined any surgery given his solitary kidney and the result of dialysis. He is cisplatin ineligible. The potential regimen, pembrolizumab and enfortumab vedotin could induce 73% response including 15% CR rate. His T1HG bladder cancer may be treated with both combination. He will be monitored by Dr. Starkey for surveillance cystoscopy and ureteroscopy. Potential AEs of immunotherapy and enfortumab vedotin were discussed and patient signed consent. He started treatment with pembrolizumab and padcev on 10/18/2023. 12/14/23 CT CAP showed New 3 mm nodule left lung lower lobe. No adenopathy in the thorax, abdomen or pelvis. No left hydronephrosis and left mid ureteral lesion.   Renal pelvis UCC, high grade  - on pembrolizumab D1 and enfortumab vedotin (padcev) D1, D8 on 21 day cycle, treatment held on 3/13 and 3/20 due to hyperglycemia, plan to resume on 4/3 after discussing with Endocrinology - reviewed potential AEs of keytruda, including but not limited to: fatigue, skin rash, joint pain, hypothyroidism, pneumonitis, hepatitis, nephritis, colitis, myocarditis, pericarditis, hypophysitis. - reviewed potential AEs of padcev, including but not limited to fatigue, skin rash, worsening diabetes, eye problem and neuropathy and GI symptoms - 12/27/23 CT CAP showed New 3 mm nodule left lung lower lobe. No adenopathy in the thorax, abdomen or pelvis. No left hydronephrosis and left mid ureteral lesion. - scans due end of March / April  - continue to follow with Dr. Starkey for surveillance cystoscopy and ureteroscopy/ureteral stent exchange, last on 2/27   Hyperglycemia Diabetes  - 3/13 and 3/20 pembro/padcev was held due to hyperglycemia/new onset Diabetes. On 3/13 pre treatment glucose = 571.Patient was sent to ED and was discharged on 15 units lantus qhs and ademlog 5 units TID. - c/f padcev and/or immunotherapy induced Diabtes  - 3/13 HgbA1C = 7.4, f/u C peptide - Bood sugars at home have been fasting , and high 200s in the evening  - referred to Endocrinology, Dr. Ramos, has appt 4/1, will discuss patient with Dr. Ramos before proceeding with next dose of padcev/pembro on 4/3   Pruritus - improved  - continue benadryl, betamethasone cream, sarna lotion as needed  - pt has been referred to Dermatology, they are holding off on scheduling appt at present  - perianal skin tear, now healing - advised to keep area clean and pat dry, continue Neomycin cream   Insomnia  - Rx diazepam 5mg nightly as needed; discussed that this medication should not be taken with oxycodone or any other sedating medications, do not drive or operate machinery while taking this medication   Chronic low back pain  - this pain is chronic and is not cancer related pain, he has been prescribed oxy 10mg q8h PRN by another provider, however he says that he sometimes takes 30mg at a time... he was advised not to do this and to follow with his PCP / NSGY to discuss further management - discussed risk/benefits of opioids, not to operate vehicle or machinery while using these medications, do not take with diazepam or other sedating medications    Instructed to contact our office with any new/worsening symptoms. Patient educated regarding plan of care, all questions/concerns addressed to the best of my abilities and patient's apparent satisfaction.

## 2024-03-27 NOTE — HISTORY OF PRESENT ILLNESS
[de-identified] : Last cycle of pembro/padcev was held due to hyperglycemia/new onset Diabetes. On 3/13 pre treatment glucose = 571.Patient was sent to ED and was discharged on 15 units lantus qhs and admelog 5 units TID.  3/27/24: Patient comes with his glucometer, his fasting glucoses have been 90 - 140s, evening he has been in the high 200s. He has an appt with Endocrine on Monday. He has been using the lantus at night and the admelog during the day. Has been trying to eat more healthy. Overall he has been feeling well. Pt's wife shoes me a picture of skin tear near pt's anus, says it started because of itching, it was open at one point but they have been using neomycin cream and it is healing nicely. Says this area opened up due to itching. However, say itching is not what it once was.    [de-identified] :  a 74 yo M with history of hypertension, hypothyroidism and right nephroureterectomy June 2022 with pT3 in renal pelvis (10% squamous differentiation) and pTa in the ureter and has since had several bouts of T1HG in the bladder July 2022, Feb 2023.  5/20/23 CT abdomen and pelvis w/o contrast showed mod to severe left hydroureteronephrosis to the left of a filling defect within the left mid ureter, neoplasm until proven otherwise.  7/18/23 CT chest and urogram showed Interval placement of left-sided ureteral stent with continued moderate hydroureteronephrosis. Point of transition in the left mid ureter. There is prominent soft tissue and a lack of contrast on the delayed films. This raises concern for an underlying neoplasm.  9/2/23 CT abdomen and pelvis without con showed left hydronephrosis without significant changes, left ureteral stent placement.   He was scheduled for segmental ureterectomy but ureteroscopy on 9/1 noted a 2.5 cm segment in the mid left ureter (now solitary kidney) with new disease and a 1.5 cm in the  distal ureter  lesion, and patholoy showed TaHG in the mid ureter and TaHG in the distal ureter as well as TaHG in the kidney with T1HG in the bladder.     He reports no hematuria, burning, frequency and abdominal pain.  He has chronic lower back pain for many years on oxycodon as needed.    11/1/23 Has newly developed skin itching and mild rash that has not been improving with OTC topicals. He also has intermittent headaches relieved by Tylenol. He reports increased fatigue and decreased sleep. He denied nausea, vomiting, diarrhea, constipation.  11/22/23: C2D15 pembrolizumab and padcev. Patient has decreased appetite, has not been eating much at all in the past few weeks and has lost 10lbs. He is feeling very weak, very fatigued. Patient is so tired that he had to be wheeled to exam room in wheelchair. He was able to walk from the hallway into the exam room. He needs assistance getting up from a sitting position. He will occ get shortness of breath on exertion and this has worsened in the past few weeks. Denies chest pain but says once in a while he has palpitations when trying to sleep. He is not sure if palpitations happen with exertion because he has not been able to exert himself very much of late. Patient denies fever, he is cold all of the time. Had chills the other day, took temperature and it was normal. He is exhausted because he has not been able to sleep. He only sleeps for 15 minutes at a time at night and it is a very light sleep. He has never suffered from insomnia before. He is not taking naps during the day. He was already having difficulty sleeping during cylce 1 but it has worsened tremendously with this cycle. He has been using hydroxyzine and benadryl for itching, neither of which have made him drowsy. He has been using melatonin which does not help. He tried his wife's ambien, which also did not help. Patient has ongoing itching but no rashes, says he is "itching, itching, always itching". Says that the prescribed medications have not worked, including betamethasone cream and hydroxyzine. Has been using Eucerin and Sarna creams, sitting in Epsom salt baths. Patient was constipated, however now is having loose stools twice a day for the last three days, has stopped taking bowel regimen. Pt denies numbness and tingling in hands and feet. Patient reports chronic low back pain that pre dates his cancer diagnosis, the pain is not cancer related, has been dealing with this with his PCP and ?NSGY, initially surgery was recommended but now is not. Was going to PT but is no longer because it was not helping. He takes oxycodone for this pain, he says sometimes he will go days without taking it and then he will need three tabs (30mg) at one time, which is not how this medication is prescribed.    12/13/23 He continues to have rash which appears to be improving. However he and his wife are concerned that his skin is darkening (even in non sun exposed areas). He also states that recentl he is "always cold." He denies diarrhea or constipation. He continues to have some insomnia  12/14/23 CT CAP showed New 3 mm nodule left lung lower lobe. No adenopathy in the thorax, abdomen or pelvis. No left hydronephrosis and left mid ureteral lesion.   1/3/24 reports good appetite, better sleep, skin rash with improved itchiness. He is able to do exercises.   1/24/24: Energy is better than it was, but is still fatigued. Mood is better than it was. Appetite is better than before. Patient notes that he is able to walk for half an hour. Itching has improved, it is most pronounced on his buttock at nighttime, not taking hydroxyzine, sometimes will use benadryl, is using topicals as well. Reports hyperpigmentation of skin since starting treatment. He reports increased frequency of urination within the last few weeks, he also reports in the last few weeks he has not been taking tamsulosin. Says he urinates about every 2-3 hours during night and day. No hematuria or dysuria. Patient has chronic pain in his back that he takes oxycodone for. He has occasional "sticking" pain in his L chest that he associates with his port. This was hurting him today, took oxy and it had improved. He does not have any cardiac chest pain, shortness of breath or palpitations. He denies constipation and diarrhea, says he eats home made yogurt that helps to keep his bowel movements regular. He is using valium everyday for insomnia. He tries to go to sleep without it, but cannot fall asleep without it.    2/12/24 Reports mild fatigue and improving itchiness. Both hand fingers develop mild numbness. Nocturia 2. Denies any gross hematuria.   3/6/24: Patient continues to have difficulty sleeping, for example went to bed at 9:30 last night, did not fall asleep until 6AM, slept for 4 hours. He has not been using medication to sleep. He feels tired often. Appetite is fine. Says that he has numbness in fingers and toes for the last month. Says sometimes his legs feel weak and that he feels off balance, has not fallen, is not dizzy. He does exercise, mainly push ups. He has pain in his low back, not cancer related, takes oxycodone, has MR next month. Itching does not bother him much any more. Reports constipation. Last saw eye doctor 6 months ago, says he does have difficulty seeing, unsure if this has worsened since being on treatment, no flashes or floaters. Says he sometimes feels a stabbing pain in his L chest that lasts for about 10 minutes at a time, this does not happen every day.

## 2024-03-27 NOTE — REVIEW OF SYSTEMS
[Chills] : chills [Fatigue] : fatigue [SOB on Exertion] : shortness of breath during exertion [Diarrhea: Grade 0] : Diarrhea: Grade 0 [Constipation] : constipation [Muscle Pain] : muscle pain [Joint Pain] : joint pain [Insomnia] : insomnia [Fever] : no fever [Lower Ext Edema] : no lower extremity edema [Chest Pain] : no chest pain [Cough] : no cough [Abdominal Pain] : no abdominal pain [Vomiting] : no vomiting [Dysuria] : no dysuria [Skin Rash] : no skin rash [Dizziness] : no dizziness [Skin Wound] : skin wound [Difficulty Walking] : no difficulty walking [FreeTextEntry9] : lower back

## 2024-03-27 NOTE — PHYSICAL EXAM
[Fully active, able to carry on all pre-disease performance without restriction] : Status 0 - Fully active, able to carry on all pre-disease performance without restriction [Normal] : affect appropriate [de-identified] : supple, FROM [de-identified] : anicteric  [de-identified] : no edema [de-identified] : no rashes, pt's wife shows me pictures of prior skin tear in perianal area now with healthy granulation tissue

## 2024-03-28 LAB
ALBUMIN SERPL ELPH-MCNC: 4 G/DL
ALP BLD-CCNC: 86 U/L
ALT SERPL-CCNC: 9 U/L
ANION GAP SERPL CALC-SCNC: 11 MMOL/L
AST SERPL-CCNC: 20 U/L
BILIRUB SERPL-MCNC: 0.3 MG/DL
BUN SERPL-MCNC: 25 MG/DL
C PEPTIDE SERPL-MCNC: 0.9 NG/ML
CALCIUM SERPL-MCNC: 8.9 MG/DL
CHLORIDE SERPL-SCNC: 102 MMOL/L
CO2 SERPL-SCNC: 22 MMOL/L
CREAT SERPL-MCNC: 1.28 MG/DL
EGFR: 58 ML/MIN/1.73M2
GLUCOSE SERPL-MCNC: 260 MG/DL
POTASSIUM SERPL-SCNC: 4.7 MMOL/L
PROT SERPL-MCNC: 7.1 G/DL
SODIUM SERPL-SCNC: 136 MMOL/L

## 2024-04-01 ENCOUNTER — NON-APPOINTMENT (OUTPATIENT)
Age: 74
End: 2024-04-01

## 2024-04-01 ENCOUNTER — APPOINTMENT (OUTPATIENT)
Dept: ENDOCRINOLOGY | Facility: CLINIC | Age: 74
End: 2024-04-01
Payer: MEDICARE

## 2024-04-01 VITALS
HEIGHT: 72 IN | OXYGEN SATURATION: 96 % | DIASTOLIC BLOOD PRESSURE: 73 MMHG | WEIGHT: 213 LBS | TEMPERATURE: 97.8 F | SYSTOLIC BLOOD PRESSURE: 127 MMHG | BODY MASS INDEX: 28.85 KG/M2 | HEART RATE: 63 BPM

## 2024-04-01 DIAGNOSIS — E03.9 HYPOTHYROIDISM, UNSPECIFIED: ICD-10-CM

## 2024-04-01 PROCEDURE — G2211 COMPLEX E/M VISIT ADD ON: CPT

## 2024-04-01 PROCEDURE — 83036 HEMOGLOBIN GLYCOSYLATED A1C: CPT | Mod: QW

## 2024-04-01 PROCEDURE — 99205 OFFICE O/P NEW HI 60 MIN: CPT

## 2024-04-02 ENCOUNTER — APPOINTMENT (OUTPATIENT)
Dept: FAMILY MEDICINE | Facility: CLINIC | Age: 74
End: 2024-04-02
Payer: MEDICARE

## 2024-04-02 VITALS
DIASTOLIC BLOOD PRESSURE: 64 MMHG | HEART RATE: 51 BPM | WEIGHT: 213 LBS | SYSTOLIC BLOOD PRESSURE: 122 MMHG | RESPIRATION RATE: 16 BRPM | BODY MASS INDEX: 28.85 KG/M2 | HEIGHT: 72 IN | TEMPERATURE: 96.9 F | OXYGEN SATURATION: 99 %

## 2024-04-02 DIAGNOSIS — N18.32 CHRONIC KIDNEY DISEASE, STAGE 3B: ICD-10-CM

## 2024-04-02 DIAGNOSIS — I77.819 AORTIC ECTASIA, UNSPECIFIED SITE: ICD-10-CM

## 2024-04-02 DIAGNOSIS — C65.9 MALIGNANT NEOPLASM OF UNSPECIFIED RENAL PELVIS: ICD-10-CM

## 2024-04-02 LAB — HBA1C MFR BLD HPLC: 9.6

## 2024-04-02 PROCEDURE — 99215 OFFICE O/P EST HI 40 MIN: CPT

## 2024-04-02 PROCEDURE — G2211 COMPLEX E/M VISIT ADD ON: CPT

## 2024-04-02 RX ORDER — AMOXICILLIN AND CLAVULANATE POTASSIUM 875; 125 MG/1; MG/1
875-125 TABLET, COATED ORAL
Qty: 6 | Refills: 0 | Status: COMPLETED | COMMUNITY
Start: 2024-02-27 | End: 2024-04-02

## 2024-04-02 NOTE — ASSESSMENT
[FreeTextEntry1] : 73 year old male with PMH of HTN, hypothyroidism, bladder cancer (on keytruda + padcev), recent dc for new onset diabetes and hyperglycemia (no DKA).  Lengthy discussion regarding the diagnosis of immunotherapy-releated diabetes. 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 We discussed the importance of annual foot and optho exams. Explained that ideally blood sugars in the morning prior to breakfast should be between 80 and 130. Blood sugars should be checked 2 hours after eating and should be <180. If blood sugar is <70, patient should treat the blood sugar FIRST and then contact provider. Advised patient to let us know if BG persistently <70 or >200   Patient educated on importance of ALWAYS taking basal insulin (not to skip) as pt insulin deficient and at high risk of DKA   Will recheck C peptide in 1 month at next visit   Explained to patient and wife that this form of diabetes is very similar to T1DM. Pt is dependent on insulin. From what we know about this condition, it is often permament and beta cells are unlikely to recover   #History of immunotherapy   -Patient made aware the endocrine adverse effects secondary to keytruda can occur up to 12 months post last dose-Main organs that can be involved from an endo perspective are pituitary gland, thyroid and pancreas -Patient aware to continue regular glucose and TFT checks as part of chemo labs -Pt aware of red flags for adrenal insufficiency (only check cortisol if clinical suspicion)  -Lengthy discussion with patient regarding the safety of continue keytruda now he has diabetes. If pt benefiting from keytruda from a cancer standpoint, would be safe to continue and no contraindications.  -Recent TFTs WNL TSH 0.92, FT4 1.6   #Hypothyroidism TFTS wnl Continue levothyroxine 25mcg daily  Will touch base with patient in 2 weeks via phone and follow up in 4 weeks

## 2024-04-02 NOTE — HISTORY OF PRESENT ILLNESS
[FreeTextEntry1] : 73 year old male with PMH of HTN, hypothyroidism, bladder cancer (on keytruda + padcev), recent dc for new onset diabetes and hyperglycemia (no DKA).  #New onset diabetes C PEPTIDE 0.9 GLUCOSE 260 -Dx March 2024, no prior history of pre/diabetes and lost 20 pounds in 2 months  -HbA1c 7.4% March 2024 -> 9.6% April 2024  -BG rising over the past month 195 early March -> >500 late March admitted to [facility] ED  -Medications: Lantus 15 units pre bed , lispro 5 units TID pre meal (compliant with medications) -BG fasting and pre bed  Fasting 102, 120 , 92, 101, 189, 193 Pre bed (at 2 hrs post dinner) 312, 286 178, 128, 350 -Denies hypos -eGFR 53, ACR 78 (mildly elevated in March 2024) -No family history   Diet (highest CHO meal would be breakfast)  Breakfast oatmeal +milk, bread with ham or cheese 3pm Lunch  soup Dinner salad, protein Snacks potato chips rarely, fruits  Minimal exercise   #Bladder cancer  -Diagnosed biopsy in September 2023 -Treatment  R) nephrectomy 5/22 -> nephroureterectomy  Keytruda + enfortumab commenced October 2023  1x week both  1x padcev 1x nothing   -> stopped after admission 2/52 ago   Hypothyroidism  -Levo 25mcg daily

## 2024-04-03 ENCOUNTER — RESULT REVIEW (OUTPATIENT)
Age: 74
End: 2024-04-03

## 2024-04-03 ENCOUNTER — APPOINTMENT (OUTPATIENT)
Dept: INFUSION THERAPY | Facility: HOSPITAL | Age: 74
End: 2024-04-03

## 2024-04-03 LAB
ALBUMIN SERPL ELPH-MCNC: 3.8 G/DL — SIGNIFICANT CHANGE UP (ref 3.3–5)
ALP SERPL-CCNC: 77 U/L — SIGNIFICANT CHANGE UP (ref 40–120)
ALT FLD-CCNC: <5 U/L — LOW (ref 10–45)
ANION GAP SERPL CALC-SCNC: 8 MMOL/L — SIGNIFICANT CHANGE UP (ref 5–17)
AST SERPL-CCNC: 21 U/L — SIGNIFICANT CHANGE UP (ref 10–40)
BASOPHILS # BLD AUTO: 0.05 K/UL — SIGNIFICANT CHANGE UP (ref 0–0.2)
BASOPHILS NFR BLD AUTO: 0.8 % — SIGNIFICANT CHANGE UP (ref 0–2)
BILIRUB SERPL-MCNC: 0.3 MG/DL — SIGNIFICANT CHANGE UP (ref 0.2–1.2)
BUN SERPL-MCNC: 24 MG/DL — HIGH (ref 7–23)
CALCIUM SERPL-MCNC: 8.8 MG/DL — SIGNIFICANT CHANGE UP (ref 8.4–10.5)
CHLORIDE SERPL-SCNC: 101 MMOL/L — SIGNIFICANT CHANGE UP (ref 96–108)
CO2 SERPL-SCNC: 24 MMOL/L — SIGNIFICANT CHANGE UP (ref 22–31)
CREAT SERPL-MCNC: 1.21 MG/DL — SIGNIFICANT CHANGE UP (ref 0.5–1.3)
EGFR: 62 ML/MIN/1.73M2 — SIGNIFICANT CHANGE UP
EOSINOPHIL # BLD AUTO: 0.17 K/UL — SIGNIFICANT CHANGE UP (ref 0–0.5)
EOSINOPHIL NFR BLD AUTO: 2.6 % — SIGNIFICANT CHANGE UP (ref 0–6)
GLUCOSE SERPL-MCNC: 269 MG/DL — HIGH (ref 70–99)
HCT VFR BLD CALC: 33.1 % — LOW (ref 39–50)
HGB BLD-MCNC: 11.1 G/DL — LOW (ref 13–17)
IMM GRANULOCYTES NFR BLD AUTO: 0.5 % — SIGNIFICANT CHANGE UP (ref 0–0.9)
LYMPHOCYTES # BLD AUTO: 1.23 K/UL — SIGNIFICANT CHANGE UP (ref 1–3.3)
LYMPHOCYTES # BLD AUTO: 18.8 % — SIGNIFICANT CHANGE UP (ref 13–44)
MCHC RBC-ENTMCNC: 31 PG — SIGNIFICANT CHANGE UP (ref 27–34)
MCHC RBC-ENTMCNC: 33.5 G/DL — SIGNIFICANT CHANGE UP (ref 32–36)
MCV RBC AUTO: 92.5 FL — SIGNIFICANT CHANGE UP (ref 80–100)
MONOCYTES # BLD AUTO: 0.54 K/UL — SIGNIFICANT CHANGE UP (ref 0–0.9)
MONOCYTES NFR BLD AUTO: 8.3 % — SIGNIFICANT CHANGE UP (ref 2–14)
NEUTROPHILS # BLD AUTO: 4.51 K/UL — SIGNIFICANT CHANGE UP (ref 1.8–7.4)
NEUTROPHILS NFR BLD AUTO: 69 % — SIGNIFICANT CHANGE UP (ref 43–77)
NRBC # BLD: 0 /100 WBCS — SIGNIFICANT CHANGE UP (ref 0–0)
PLATELET # BLD AUTO: 137 K/UL — LOW (ref 150–400)
POTASSIUM SERPL-MCNC: 4.5 MMOL/L — SIGNIFICANT CHANGE UP (ref 3.5–5.3)
POTASSIUM SERPL-SCNC: 4.5 MMOL/L — SIGNIFICANT CHANGE UP (ref 3.5–5.3)
PROT SERPL-MCNC: 6.7 G/DL — SIGNIFICANT CHANGE UP (ref 6–8.3)
RBC # BLD: 3.58 M/UL — LOW (ref 4.2–5.8)
RBC # FLD: 13.6 % — SIGNIFICANT CHANGE UP (ref 10.3–14.5)
SODIUM SERPL-SCNC: 133 MMOL/L — LOW (ref 135–145)
T4 FREE SERPL-MCNC: 1.4 NG/DL — SIGNIFICANT CHANGE UP (ref 0.9–1.8)
TSH SERPL-MCNC: 1.2 UIU/ML — SIGNIFICANT CHANGE UP (ref 0.27–4.2)
WBC # BLD: 6.53 K/UL — SIGNIFICANT CHANGE UP (ref 3.8–10.5)
WBC # FLD AUTO: 6.53 K/UL — SIGNIFICANT CHANGE UP (ref 3.8–10.5)

## 2024-04-03 RX ORDER — INSULIN LISPRO 100 [IU]/ML
100 INJECTION, SOLUTION INTRAVENOUS; SUBCUTANEOUS
Qty: 2 | Refills: 1 | Status: ACTIVE | COMMUNITY
Start: 2024-04-03 | End: 1900-01-01

## 2024-04-03 RX ORDER — BLOOD SUGAR DIAGNOSTIC
STRIP MISCELLANEOUS
Qty: 360 | Refills: 0 | Status: ACTIVE | COMMUNITY
Start: 2024-04-03 | End: 1900-01-01

## 2024-04-03 RX ORDER — LANCETS
EACH MISCELLANEOUS
Qty: 360 | Refills: 1 | Status: ACTIVE | COMMUNITY
Start: 2024-04-03 | End: 1900-01-01

## 2024-04-03 RX ORDER — PEN NEEDLE, DIABETIC 29 G X1/2"
31G X 5 MM NEEDLE, DISPOSABLE MISCELLANEOUS
Qty: 360 | Refills: 2 | Status: ACTIVE | COMMUNITY
Start: 2024-04-03 | End: 1900-01-01

## 2024-04-03 RX ORDER — BLOOD-GLUCOSE METER
W/DEVICE EACH MISCELLANEOUS
Qty: 1 | Refills: 1 | Status: ACTIVE | COMMUNITY
Start: 2024-04-03 | End: 1900-01-01

## 2024-04-04 DIAGNOSIS — Z51.11 ENCOUNTER FOR ANTINEOPLASTIC CHEMOTHERAPY: ICD-10-CM

## 2024-04-05 RX ORDER — BLOOD-GLUCOSE,RECEIVER,CONT
EACH MISCELLANEOUS
Qty: 1 | Refills: 0 | Status: ACTIVE | COMMUNITY
Start: 2024-04-03

## 2024-04-07 PROBLEM — N18.32 CHRONIC RENAL IMPAIRMENT, STAGE 3B: Status: ACTIVE | Noted: 2023-08-29

## 2024-04-07 PROBLEM — C65.9 CARCINOMA OF RENAL PELVIS: Status: ACTIVE | Noted: 2022-03-24

## 2024-04-07 PROBLEM — I77.819 AORTIC DILATATION: Status: ACTIVE | Noted: 2023-08-15

## 2024-04-07 NOTE — HEALTH RISK ASSESSMENT
[Yes] : Yes [1 or 2 (0 pts)] : 1 or 2 (0 points) [Never (0 pts)] : Never (0 points) [No falls in past year] : Patient reported no falls in the past year [No] : In the past 12 months have you used drugs other than those required for medical reasons? No [0] : 2) Feeling down, depressed, or hopeless: Not at all (0) [PHQ-2 Negative - No further assessment needed] : PHQ-2 Negative - No further assessment needed [With Patient/Caregiver] : , with patient/caregiver [Aggressive treatment] : aggressive treatment [Never] : Never [Audit-CScore] : 1 [de-identified] : walking  [[id number]] : 0 [de-identified] : regular  [AdvancecareDate] : 04/24

## 2024-04-07 NOTE — HISTORY OF PRESENT ILLNESS
[FreeTextEntry1] : cc: f/u after ED eval- new DM  , renal  Ca [de-identified] : Encounter conducted in Polish. Pt presented after Ed eval - new onset of DM due to Chemotherapy. He is doing well, dneis pain, no fever,  no chills , CP,SOB,abd pain, He takes all his meds. Pt have not completed MRI spine yet, was recommended to do it ASAP and f/u with Pain management

## 2024-04-07 NOTE — REVIEW OF SYSTEMS
[Negative] : Integumentary [Joint Pain] : no joint pain [Joint Stiffness] : joint stiffness [Muscle Pain] : muscle pain [Muscle Weakness] : muscle weakness [Back Pain] : back pain

## 2024-04-08 ENCOUNTER — NON-APPOINTMENT (OUTPATIENT)
Age: 74
End: 2024-04-08

## 2024-04-10 ENCOUNTER — APPOINTMENT (OUTPATIENT)
Dept: INFUSION THERAPY | Facility: HOSPITAL | Age: 74
End: 2024-04-10

## 2024-04-16 ENCOUNTER — APPOINTMENT (OUTPATIENT)
Dept: ENDOCRINOLOGY | Facility: CLINIC | Age: 74
End: 2024-04-16
Payer: MEDICARE

## 2024-04-16 ENCOUNTER — OUTPATIENT (OUTPATIENT)
Dept: OUTPATIENT SERVICES | Facility: HOSPITAL | Age: 74
LOS: 1 days | End: 2024-04-16
Payer: MEDICARE

## 2024-04-16 ENCOUNTER — APPOINTMENT (OUTPATIENT)
Dept: UROLOGY | Facility: CLINIC | Age: 74
End: 2024-04-16
Payer: MEDICARE

## 2024-04-16 DIAGNOSIS — Z90.5 ACQUIRED ABSENCE OF KIDNEY: Chronic | ICD-10-CM

## 2024-04-16 DIAGNOSIS — R35.0 FREQUENCY OF MICTURITION: ICD-10-CM

## 2024-04-16 DIAGNOSIS — C67.9 MALIGNANT NEOPLASM OF BLADDER, UNSPECIFIED: ICD-10-CM

## 2024-04-16 DIAGNOSIS — Z98.890 OTHER SPECIFIED POSTPROCEDURAL STATES: Chronic | ICD-10-CM

## 2024-04-16 DIAGNOSIS — N13.30 UNSPECIFIED HYDRONEPHROSIS: ICD-10-CM

## 2024-04-16 PROCEDURE — G0108 DIAB MANAGE TRN  PER INDIV: CPT

## 2024-04-16 PROCEDURE — 76775 US EXAM ABDO BACK WALL LIM: CPT

## 2024-04-16 PROCEDURE — 76775 US EXAM ABDO BACK WALL LIM: CPT | Mod: 26

## 2024-04-16 PROCEDURE — 99213 OFFICE O/P EST LOW 20 MIN: CPT

## 2024-04-19 ENCOUNTER — APPOINTMENT (OUTPATIENT)
Dept: ENDOCRINOLOGY | Facility: CLINIC | Age: 74
End: 2024-04-19

## 2024-04-19 PROCEDURE — 95251 CONT GLUC MNTR ANALYSIS I&R: CPT

## 2024-04-20 ENCOUNTER — RESULT REVIEW (OUTPATIENT)
Age: 74
End: 2024-04-20

## 2024-04-21 PROBLEM — N13.30 HYDRONEPHROSIS: Status: ACTIVE | Noted: 2022-03-24

## 2024-04-21 PROBLEM — C67.9 BLADDER CANCER: Status: ACTIVE | Noted: 2023-01-10

## 2024-04-21 NOTE — HISTORY OF PRESENT ILLNESS
[FreeTextEntry1] : : Aug 2 1950 Referring Provider: none  HPI: Mr. 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 He has not received BCG for the bladder. He is here today aftter he was scheduled for RAL segmental ureterectomy and stent placement for mid ureteral tumor resection but was found to have multifocal disease. He is here today to discuss pathology which demonstrated 2-3 mm pTa in the kidney, TaHG in mid and distal ureter and T1HG in the bladder. TOV today was successful. He is otherwise doing well. We will plan for likely some sort of BCG for bladder, however given solitary kidney may require NxU +/- chemotherapy.  Anticoagulation: None All: NKDA Social: never smoker, nondrinker, , no radiation exposure PMHx: previously HTN, no HLD FHx: no cancer PSHx: R NxU, prior left upper lobectomy  Imaging: CT urogram with mid L ureter filling defect  Disease Management  2024: 72 yo M with 23 CT CAP showed New 3 mm nodule left lung lower lobe. No adenopathy in the thorax, abdomen or pelvis. No left hydronephrosis and left mid ureteral lesion.  Patient has chronic pain in his back that he takes oxycodone for. He has occasional "sticking" pain in his L chest that he associates with his port. This was hurting him today, took oxy and it had improved. He does not have any cardiac chest pain, shortness of breath or palpitations. Renal pelvis UCC, high grade:  on pembrolizumab D1 and enfortumab vedotin (padcev) D1, D8 on 21 day cycle, enfortumab vedotin held for C3 given extreme fatigue; continued on pembro and enfortumab vedotin, next cycle due . PSA in April 1.46 ng/mL.    Here today for follow up. ULS today without hydronephrosis. Had previously undergone URS approximately 2 months ago and no obvious disease was noted in the ureter, kidney or bladder. Stent was removed. ULS today without hydronephrosis, suggesting no obvious obstruction from previously seen large mid uereteral lesion. He will need MR urogram to monitor for recurrence of lesion in the ureter in May 2024, we will plan for repeat URS and bladder cystoscopy in the OR in 2024. He otherwise feels well currently. He has been on Padcev and pembro with hyperglycemia, doing better with improved glucose control.  [Urinary Incontinence] : no urinary incontinence [Urinary Retention] : no urinary retention [Urinary Urgency] : no urinary urgency [Urinary Frequency] : no urinary frequency

## 2024-04-21 NOTE — ASSESSMENT
[FreeTextEntry1] : 74 yo M with L hydronephrosis and UTUC s/p prior resection  #UTUC - Plan for MR urogram in May - Repeat cystoscopy/ureteroscopy in August 2024 - ULS today without hydronephrosis - Seeing Zeltsman for pulmonary nodule, repeat CT in May 2024

## 2024-04-22 DIAGNOSIS — C68.9 MALIGNANT NEOPLASM OF URINARY ORGAN, UNSPECIFIED: ICD-10-CM

## 2024-04-22 DIAGNOSIS — N13.30 UNSPECIFIED HYDRONEPHROSIS: ICD-10-CM

## 2024-04-22 DIAGNOSIS — C67.9 MALIGNANT NEOPLASM OF BLADDER, UNSPECIFIED: ICD-10-CM

## 2024-04-23 ENCOUNTER — APPOINTMENT (OUTPATIENT)
Dept: ENDOCRINOLOGY | Facility: CLINIC | Age: 74
End: 2024-04-23
Payer: MEDICARE

## 2024-04-23 PROCEDURE — G0108 DIAB MANAGE TRN  PER INDIV: CPT

## 2024-04-24 ENCOUNTER — APPOINTMENT (OUTPATIENT)
Dept: INFUSION THERAPY | Facility: HOSPITAL | Age: 74
End: 2024-04-24

## 2024-04-24 ENCOUNTER — RESULT REVIEW (OUTPATIENT)
Age: 74
End: 2024-04-24

## 2024-04-24 ENCOUNTER — NON-APPOINTMENT (OUTPATIENT)
Age: 74
End: 2024-04-24

## 2024-04-24 ENCOUNTER — APPOINTMENT (OUTPATIENT)
Dept: HEMATOLOGY ONCOLOGY | Facility: CLINIC | Age: 74
End: 2024-04-24
Payer: MEDICARE

## 2024-04-24 VITALS
TEMPERATURE: 97.9 F | OXYGEN SATURATION: 98 % | WEIGHT: 209.44 LBS | RESPIRATION RATE: 16 BRPM | BODY MASS INDEX: 28.41 KG/M2 | DIASTOLIC BLOOD PRESSURE: 74 MMHG | SYSTOLIC BLOOD PRESSURE: 110 MMHG | HEART RATE: 65 BPM

## 2024-04-24 DIAGNOSIS — C68.9 MALIGNANT NEOPLASM OF URINARY ORGAN, UNSPECIFIED: ICD-10-CM

## 2024-04-24 LAB
ALBUMIN SERPL ELPH-MCNC: 4 G/DL — SIGNIFICANT CHANGE UP (ref 3.3–5)
ALP SERPL-CCNC: 82 U/L — SIGNIFICANT CHANGE UP (ref 40–120)
ALT FLD-CCNC: <5 U/L — LOW (ref 10–45)
ANION GAP SERPL CALC-SCNC: 9 MMOL/L — SIGNIFICANT CHANGE UP (ref 5–17)
AST SERPL-CCNC: 24 U/L — SIGNIFICANT CHANGE UP (ref 10–40)
BASOPHILS # BLD AUTO: 0.04 K/UL — SIGNIFICANT CHANGE UP (ref 0–0.2)
BASOPHILS NFR BLD AUTO: 0.6 % — SIGNIFICANT CHANGE UP (ref 0–2)
BILIRUB SERPL-MCNC: 0.7 MG/DL — SIGNIFICANT CHANGE UP (ref 0.2–1.2)
BUN SERPL-MCNC: 23 MG/DL — SIGNIFICANT CHANGE UP (ref 7–23)
CALCIUM SERPL-MCNC: 9.2 MG/DL — SIGNIFICANT CHANGE UP (ref 8.4–10.5)
CHLORIDE SERPL-SCNC: 103 MMOL/L — SIGNIFICANT CHANGE UP (ref 96–108)
CO2 SERPL-SCNC: 24 MMOL/L — SIGNIFICANT CHANGE UP (ref 22–31)
CREAT SERPL-MCNC: 1.24 MG/DL — SIGNIFICANT CHANGE UP (ref 0.5–1.3)
EGFR: 61 ML/MIN/1.73M2 — SIGNIFICANT CHANGE UP
EOSINOPHIL # BLD AUTO: 0.12 K/UL — SIGNIFICANT CHANGE UP (ref 0–0.5)
EOSINOPHIL NFR BLD AUTO: 1.9 % — SIGNIFICANT CHANGE UP (ref 0–6)
GLUCOSE SERPL-MCNC: 99 MG/DL — SIGNIFICANT CHANGE UP (ref 70–99)
HCT VFR BLD CALC: 38.2 % — LOW (ref 39–50)
HGB BLD-MCNC: 12.6 G/DL — LOW (ref 13–17)
IMM GRANULOCYTES NFR BLD AUTO: 0.3 % — SIGNIFICANT CHANGE UP (ref 0–0.9)
LYMPHOCYTES # BLD AUTO: 1.18 K/UL — SIGNIFICANT CHANGE UP (ref 1–3.3)
LYMPHOCYTES # BLD AUTO: 19 % — SIGNIFICANT CHANGE UP (ref 13–44)
MCHC RBC-ENTMCNC: 31.3 PG — SIGNIFICANT CHANGE UP (ref 27–34)
MCHC RBC-ENTMCNC: 33 G/DL — SIGNIFICANT CHANGE UP (ref 32–36)
MCV RBC AUTO: 95 FL — SIGNIFICANT CHANGE UP (ref 80–100)
MONOCYTES # BLD AUTO: 0.53 K/UL — SIGNIFICANT CHANGE UP (ref 0–0.9)
MONOCYTES NFR BLD AUTO: 8.5 % — SIGNIFICANT CHANGE UP (ref 2–14)
NEUTROPHILS # BLD AUTO: 4.33 K/UL — SIGNIFICANT CHANGE UP (ref 1.8–7.4)
NEUTROPHILS NFR BLD AUTO: 69.7 % — SIGNIFICANT CHANGE UP (ref 43–77)
NRBC # BLD: 0 /100 WBCS — SIGNIFICANT CHANGE UP (ref 0–0)
PLATELET # BLD AUTO: 191 K/UL — SIGNIFICANT CHANGE UP (ref 150–400)
POTASSIUM SERPL-MCNC: 4.6 MMOL/L — SIGNIFICANT CHANGE UP (ref 3.5–5.3)
POTASSIUM SERPL-SCNC: 4.6 MMOL/L — SIGNIFICANT CHANGE UP (ref 3.5–5.3)
PROT SERPL-MCNC: 7.2 G/DL — SIGNIFICANT CHANGE UP (ref 6–8.3)
RBC # BLD: 4.02 M/UL — LOW (ref 4.2–5.8)
RBC # FLD: 13.8 % — SIGNIFICANT CHANGE UP (ref 10.3–14.5)
SODIUM SERPL-SCNC: 136 MMOL/L — SIGNIFICANT CHANGE UP (ref 135–145)
WBC # BLD: 6.22 K/UL — SIGNIFICANT CHANGE UP (ref 3.8–10.5)
WBC # FLD AUTO: 6.22 K/UL — SIGNIFICANT CHANGE UP (ref 3.8–10.5)

## 2024-04-24 PROCEDURE — 99214 OFFICE O/P EST MOD 30 MIN: CPT

## 2024-04-24 PROCEDURE — G2211 COMPLEX E/M VISIT ADD ON: CPT

## 2024-04-25 DIAGNOSIS — R11.2 NAUSEA WITH VOMITING, UNSPECIFIED: ICD-10-CM

## 2024-04-25 PROBLEM — C68.9: Status: ACTIVE | Noted: 2022-02-28

## 2024-04-25 LAB
HBV SURFACE AB SER-ACNC: SIGNIFICANT CHANGE UP
HBV SURFACE AG SER-ACNC: SIGNIFICANT CHANGE UP
HCV AB S/CO SERPL IA: 0.21 S/CO — SIGNIFICANT CHANGE UP (ref 0–0.99)
HCV AB SERPL-IMP: SIGNIFICANT CHANGE UP
HIV 1+2 AB+HIV1 P24 AG SERPL QL IA: SIGNIFICANT CHANGE UP
T4 FREE SERPL-MCNC: 1.6 NG/DL — SIGNIFICANT CHANGE UP (ref 0.9–1.8)
TSH SERPL-MCNC: 0.88 UIU/ML — SIGNIFICANT CHANGE UP (ref 0.27–4.2)

## 2024-04-25 NOTE — HISTORY OF PRESENT ILLNESS
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  5/20/23 CT abdomen and pelvis w/o contrast showed mod to severe left hydroureteronephrosis to the left of a filling defect within the left mid ureter, neoplasm until proven otherwise.  7/18/23 CT chest and urogram showed Interval placement of left-sided ureteral stent with continued moderate hydroureteronephrosis. Point of transition in the left mid ureter. There is prominent soft tissue and a lack of contrast on the delayed films. This raises concern for an underlying neoplasm.  9/2/23 CT abdomen and pelvis without con showed left hydronephrosis without significant changes, left ureteral stent placement.   He was scheduled for segmental ureterectomy but ureteroscopy on 9/1 noted a 2.5 cm segment in the mid left ureter (now solitary kidney) with new disease and a 1.5 cm in the  distal ureter  lesion, and patholoy showed TaHG in the mid ureter and TaHG in the distal ureter as well as TaHG in the kidney with T1HG in the bladder.     He reports no hematuria, burning, frequency and abdominal pain.  He has chronic lower back pain for many years on oxycodon as needed.    11/1/23 Has newly developed skin itching and mild rash that has not been improving with OTC topicals. He also has intermittent headaches relieved by Tylenol. He reports increased fatigue and decreased sleep. He denied nausea, vomiting, diarrhea, constipation.  11/22/23: C2D15 pembrolizumab and padcev. Patient has decreased appetite, has not been eating much at all in the past few weeks and has lost 10lbs. He is feeling very weak, very fatigued. Patient is so tired that he had to be wheeled to exam room in wheelchair. He was able to walk from the hallway into the exam room. He needs assistance getting up from a sitting position. He will occ get shortness of breath on exertion and this has worsened in the past few weeks. Denies chest pain but says once in a while he has palpitations when trying to sleep. He is not sure if palpitations happen with exertion because he has not been able to exert himself very much of late. Patient denies fever, he is cold all of the time. Had chills the other day, took temperature and it was normal. He is exhausted because he has not been able to sleep. He only sleeps for 15 minutes at a time at night and it is a very light sleep. He has never suffered from insomnia before. He is not taking naps during the day. He was already having difficulty sleeping during cylce 1 but it has worsened tremendously with this cycle. He has been using hydroxyzine and benadryl for itching, neither of which have made him drowsy. He has been using melatonin which does not help. He tried his wife's ambien, which also did not help. Patient has ongoing itching but no rashes, says he is "itching, itching, always itching". Says that the prescribed medications have not worked, including betamethasone cream and hydroxyzine. Has been using Eucerin and Sarna creams, sitting in Epsom salt baths. Patient was constipated, however now is having loose stools twice a day for the last three days, has stopped taking bowel regimen. Pt denies numbness and tingling in hands and feet. Patient reports chronic low back pain that pre dates his cancer diagnosis, the pain is not cancer related, has been dealing with this with his PCP and ?NSGY, initially surgery was recommended but now is not. Was going to PT but is no longer because it was not helping. He takes oxycodone for this pain, he says sometimes he will go days without taking it and then he will need three tabs (30mg) at one time, which is not how this medication is prescribed.    12/13/23 He continues to have rash which appears to be improving. However he and his wife are concerned that his skin is darkening (even in non sun exposed areas). He also states that recentl he is "always cold." He denies diarrhea or constipation. He continues to have some insomnia  12/14/23 CT CAP showed New 3 mm nodule left lung lower lobe. No adenopathy in the thorax, abdomen or pelvis. No left hydronephrosis and left mid ureteral lesion.   1/3/24 reports good appetite, better sleep, skin rash with improved itchiness. He is able to do exercises.   1/24/24: Energy is better than it was, but is still fatigued. Mood is better than it was. Appetite is better than before. Patient notes that he is able to walk for half an hour. Itching has improved, it is most pronounced on his buttock at nighttime, not taking hydroxyzine, sometimes will use benadryl, is using topicals as well. Reports hyperpigmentation of skin since starting treatment. He reports increased frequency of urination within the last few weeks, he also reports in the last few weeks he has not been taking tamsulosin. Says he urinates about every 2-3 hours during night and day. No hematuria or dysuria. Patient has chronic pain in his back that he takes oxycodone for. He has occasional "sticking" pain in his L chest that he associates with his port. This was hurting him today, took oxy and it had improved. He does not have any cardiac chest pain, shortness of breath or palpitations. He denies constipation and diarrhea, says he eats home made yogurt that helps to keep his bowel movements regular. He is using valium everyday for insomnia. He tries to go to sleep without it, but cannot fall asleep without it.    2/12/24 Reports mild fatigue and improving itchiness. Both hand fingers develop mild numbness. Nocturia 2. Denies any gross hematuria.   3/6/24: Patient continues to have difficulty sleeping, for example went to bed at 9:30 last night, did not fall asleep until 6AM, slept for 4 hours. He has not been using medication to sleep. He feels tired often. Appetite is fine. Says that he has numbness in fingers and toes for the last month. Says sometimes his legs feel weak and that he feels off balance, has not fallen, is not dizzy. He does exercise, mainly push ups. He has pain in his low back, not cancer related, takes oxycodone, has MR next month. Itching does not bother him much any more. Reports constipation. Last saw eye doctor 6 months ago, says he does have difficulty seeing, unsure if this has worsened since being on treatment, no flashes or floaters. Says he sometimes feels a stabbing pain in his L chest that lasts for about 10 minutes at a time, this does not happen every day.   3/27/24: Last cycle of pembro/padcev was held due to hyperglycemia/new onset Diabetes. On 3/13 pre treatment glucose = 571.Patient was sent to ED and was discharged on 15 units lantus qhs and admelog 5 units TID.  Patient comes with his glucometer, his fasting glucoses have been 90 - 140s, evening he has been in the high 200s. He has an appt with Endocrine on Monday. He has been using the lantus at night and the admelog during the day. Has been trying to eat more healthy. Overall he has been feeling well. Pt's wife shoes me a picture of skin tear near pt's anus, says it started because of itching, it was open at one point but they have been using neomycin cream and it is healing nicely. Says this area opened up due to itching. However, say itching is not what it once was.   [de-identified] : 4/24/24: Patient says he learned about carb counting yesterday with Endo. For the last few days his glucose has been mostly in the 200 and 300s, was better controlled the week prior, right now glucose is 157, average glucose has been between 203 - 324. Pt having improved/normal BM he thinks because he is eating more healthy. He denies itching at present. He has fingertip numbness and tingling for the past few months as well as occ numbness in his toes. He saw podiatry yesterday. Energy is okay, goes for daily walks.

## 2024-04-25 NOTE — REVIEW OF SYSTEMS
[Chills] : chills [Fatigue] : fatigue [SOB on Exertion] : shortness of breath during exertion [Diarrhea: Grade 0] : Diarrhea: Grade 0 [Joint Pain] : joint pain [Muscle Pain] : muscle pain [Skin Wound] : skin wound [Insomnia] : insomnia [Fever] : no fever [Chest Pain] : no chest pain [Lower Ext Edema] : no lower extremity edema [Cough] : no cough [Abdominal Pain] : no abdominal pain [Vomiting] : no vomiting [Constipation] : no constipation [Dysuria] : no dysuria [Skin Rash] : no skin rash [Dizziness] : no dizziness [Difficulty Walking] : no difficulty walking [FreeTextEntry9] : lower back

## 2024-04-25 NOTE — ASSESSMENT
[FreeTextEntry1] : 73 year old male s/p right nephroureterectomy June 2022 with pT3 in renal pelvis (10% squamous differentiation) and pTa in the ureter and has since had several bouts of T1HG in the bladder July 2022, Feb 2023. 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 At initial appointment, Dr. Mccall discussed his cancer status and further management. Based on AUA and EAU guideline subjects with upper tract tumors of the renal pelvis and ureter(s) must meet a high risk assessment defined as: tumor 1cm and/or hydronephrosis and/or high grade pathology and/or multifocal disease, where a radical NU approach to treat localized disease is warranted, followed by adjuvant immunotherapy. However, he adamantly declined any surgery given his solitary kidney and the result of dialysis. He is cisplatin ineligible. The potential regimen, pembrolizumab and enfortumab vedotin could induce 73% response including 15% CR rate. His T1HG bladder cancer may be treated with both combination. He will be monitored by Dr. Starkey for surveillance cystoscopy and ureteroscopy. Potential AEs of immunotherapy and enfortumab vedotin were discussed and patient signed consent. He started treatment with pembrolizumab and padcev on 10/18/2023. 12/14/23 CT CAP showed New 3 mm nodule left lung lower lobe. No adenopathy in the thorax, abdomen or pelvis. No left hydronephrosis and left mid ureteral lesion.   Renal pelvis UCC, high grade  - on pembrolizumab D1 and enfortumab vedotin (padcev) D1, D8 on 21 day cycle, treatment held on 3/13 and 3/20 due to hyperglycemia, patient received pembro on 4/3, held padcev on 4/3 and 4/10, here today 4/24 to resume pembrolizumab and padcev  - reviewed potential AEs of keytruda, including but not limited to: fatigue, skin rash, joint pain, hypothyroidism, pneumonitis, hepatitis, nephritis, colitis, myocarditis, pericarditis, hypophysitis. - reviewed potential AEs of padcev, including but not limited to fatigue, skin rash, worsening diabetes, eye problem and neuropathy and GI symptoms - 12/27/23 CT CAP showed New 3 mm nodule left lung lower lobe. No adenopathy in the thorax, abdomen or pelvis. No left hydronephrosis and left mid ureteral lesion. - scans due end of March / April - MR AP is ordered by Dr. Starkey and is scheduled for 5/15, CT chest is ordered by Dr. Verde and is scheduled for 5/21 - continue to follow with Dr. Starkey for surveillance cystoscopy and ureteroscopy/ureteral stent exchange, per Dr. Starkey note - R stent was removed, ULS sowed no obstruction or hydronephrosis, will monitor on MR AP, plan for ureteroscopy and cystoscopy in August 2o24   Hyperglycemia Diabetes  - 3/13 and 3/20 pembro/padcev was held due to hyperglycemia/new onset Diabetes. On 3/13 pre treatment glucose = 571.Patient was sent to ED and was discharged on 15 units lantus qhs and ademlog 5 units TID. - c/f padcev and/or immunotherapy induced Diabtes  - 3/13 HgbA1C = 7.4, f/u C peptide - Average blood glucose has been 203  - 324, Glucose today on CMP = 99  - referred to Endocrinology, continue to follow with Dr. Ramos  Pruritus - improved  - continue benadryl, betamethasone cream, sarna lotion as needed  - pt has been referred to Dermatology, they are holding off on scheduling appt at present  - perianal skin tear, now healing - advised to keep area clean and pat dry, continue Neomycin cream   Insomnia  - Rx diazepam 5mg nightly as needed; discussed that this medication should not be taken with oxycodone or any other sedating medications, do not drive or operate machinery while taking this medication   Chronic low back pain  - this pain is chronic and is not cancer related pain, he has been prescribed oxy 10mg q8h PRN by another provider, however he says that he sometimes takes 30mg at a time... he was advised not to do this and to follow with his PCP / NSGY to discuss further management - discussed risk/benefits of opioids, not to operate vehicle or machinery while using these medications, do not take with diazepam or other sedating medications   - pt reports he is getting MR next month   Neuropathy  - endorses mild numbness/tingling in fingers and toes  - continue to monitor   Instructed to contact our office with any new/worsening symptoms. Patient educated regarding plan of care, all questions/concerns addressed to the best of my abilities and patient's apparent satisfaction.

## 2024-04-25 NOTE — PHYSICAL EXAM
[Fully active, able to carry on all pre-disease performance without restriction] : Status 0 - Fully active, able to carry on all pre-disease performance without restriction [de-identified] : no rashes, pt's wife shows me pictures of prior skin tear in perianal area now with healthy granulation tissue [Normal] : normal appearance, no rash, nodules, vesicles, ulcers, erythema [de-identified] : anicteric  [de-identified] : supple, FROM [de-identified] : no edema

## 2024-05-01 ENCOUNTER — RESULT REVIEW (OUTPATIENT)
Age: 74
End: 2024-05-01

## 2024-05-01 ENCOUNTER — APPOINTMENT (OUTPATIENT)
Dept: INFUSION THERAPY | Facility: HOSPITAL | Age: 74
End: 2024-05-01

## 2024-05-01 LAB
ALBUMIN SERPL ELPH-MCNC: 4.1 G/DL — SIGNIFICANT CHANGE UP (ref 3.3–5)
ALP SERPL-CCNC: 84 U/L — SIGNIFICANT CHANGE UP (ref 40–120)
ALT FLD-CCNC: <5 U/L — LOW (ref 10–45)
ANION GAP SERPL CALC-SCNC: 10 MMOL/L — SIGNIFICANT CHANGE UP (ref 5–17)
AST SERPL-CCNC: 24 U/L — SIGNIFICANT CHANGE UP (ref 10–40)
BASOPHILS # BLD AUTO: 0.04 K/UL — SIGNIFICANT CHANGE UP (ref 0–0.2)
BASOPHILS NFR BLD AUTO: 0.7 % — SIGNIFICANT CHANGE UP (ref 0–2)
BILIRUB SERPL-MCNC: 0.8 MG/DL — SIGNIFICANT CHANGE UP (ref 0.2–1.2)
BUN SERPL-MCNC: 22 MG/DL — SIGNIFICANT CHANGE UP (ref 7–23)
CALCIUM SERPL-MCNC: 8.9 MG/DL — SIGNIFICANT CHANGE UP (ref 8.4–10.5)
CHLORIDE SERPL-SCNC: 98 MMOL/L — SIGNIFICANT CHANGE UP (ref 96–108)
CO2 SERPL-SCNC: 23 MMOL/L — SIGNIFICANT CHANGE UP (ref 22–31)
CREAT SERPL-MCNC: 1.22 MG/DL — SIGNIFICANT CHANGE UP (ref 0.5–1.3)
EGFR: 62 ML/MIN/1.73M2 — SIGNIFICANT CHANGE UP
EOSINOPHIL # BLD AUTO: 0.15 K/UL — SIGNIFICANT CHANGE UP (ref 0–0.5)
EOSINOPHIL NFR BLD AUTO: 2.5 % — SIGNIFICANT CHANGE UP (ref 0–6)
GLUCOSE SERPL-MCNC: 401 MG/DL — HIGH (ref 70–99)
HCT VFR BLD CALC: 36.6 % — LOW (ref 39–50)
HGB BLD-MCNC: 12 G/DL — LOW (ref 13–17)
IMM GRANULOCYTES NFR BLD AUTO: 0.2 % — SIGNIFICANT CHANGE UP (ref 0–0.9)
LYMPHOCYTES # BLD AUTO: 0.83 K/UL — LOW (ref 1–3.3)
LYMPHOCYTES # BLD AUTO: 13.7 % — SIGNIFICANT CHANGE UP (ref 13–44)
MCHC RBC-ENTMCNC: 31 PG — SIGNIFICANT CHANGE UP (ref 27–34)
MCHC RBC-ENTMCNC: 32.8 G/DL — SIGNIFICANT CHANGE UP (ref 32–36)
MCV RBC AUTO: 94.6 FL — SIGNIFICANT CHANGE UP (ref 80–100)
MONOCYTES # BLD AUTO: 0.52 K/UL — SIGNIFICANT CHANGE UP (ref 0–0.9)
MONOCYTES NFR BLD AUTO: 8.6 % — SIGNIFICANT CHANGE UP (ref 2–14)
NEUTROPHILS # BLD AUTO: 4.52 K/UL — SIGNIFICANT CHANGE UP (ref 1.8–7.4)
NEUTROPHILS NFR BLD AUTO: 74.3 % — SIGNIFICANT CHANGE UP (ref 43–77)
NRBC # BLD: 0 /100 WBCS — SIGNIFICANT CHANGE UP (ref 0–0)
PLATELET # BLD AUTO: 139 K/UL — LOW (ref 150–400)
POTASSIUM SERPL-MCNC: 4.5 MMOL/L — SIGNIFICANT CHANGE UP (ref 3.5–5.3)
POTASSIUM SERPL-SCNC: 4.5 MMOL/L — SIGNIFICANT CHANGE UP (ref 3.5–5.3)
PROT SERPL-MCNC: 6.8 G/DL — SIGNIFICANT CHANGE UP (ref 6–8.3)
RBC # BLD: 3.87 M/UL — LOW (ref 4.2–5.8)
RBC # FLD: 13.5 % — SIGNIFICANT CHANGE UP (ref 10.3–14.5)
SODIUM SERPL-SCNC: 130 MMOL/L — LOW (ref 135–145)
WBC # BLD: 6.07 K/UL — SIGNIFICANT CHANGE UP (ref 3.8–10.5)
WBC # FLD AUTO: 6.07 K/UL — SIGNIFICANT CHANGE UP (ref 3.8–10.5)

## 2024-05-02 DIAGNOSIS — E86.0 DEHYDRATION: ICD-10-CM

## 2024-05-03 ENCOUNTER — OUTPATIENT (OUTPATIENT)
Dept: OUTPATIENT SERVICES | Facility: HOSPITAL | Age: 74
LOS: 1 days | Discharge: HOME | End: 2024-05-03

## 2024-05-03 DIAGNOSIS — Z98.890 OTHER SPECIFIED POSTPROCEDURAL STATES: Chronic | ICD-10-CM

## 2024-05-03 DIAGNOSIS — C80.1 MALIGNANT (PRIMARY) NEOPLASM, UNSPECIFIED: ICD-10-CM

## 2024-05-03 DIAGNOSIS — Z90.2 ACQUIRED ABSENCE OF LUNG [PART OF]: Chronic | ICD-10-CM

## 2024-05-07 ENCOUNTER — APPOINTMENT (OUTPATIENT)
Dept: ENDOCRINOLOGY | Facility: CLINIC | Age: 74
End: 2024-05-07
Payer: MEDICARE

## 2024-05-07 VITALS
OXYGEN SATURATION: 97 % | SYSTOLIC BLOOD PRESSURE: 113 MMHG | WEIGHT: 208 LBS | HEIGHT: 72 IN | HEART RATE: 71 BPM | TEMPERATURE: 97.5 F | BODY MASS INDEX: 28.17 KG/M2 | DIASTOLIC BLOOD PRESSURE: 73 MMHG

## 2024-05-07 PROCEDURE — 95251 CONT GLUC MNTR ANALYSIS I&R: CPT

## 2024-05-07 PROCEDURE — 99215 OFFICE O/P EST HI 40 MIN: CPT

## 2024-05-07 NOTE — ASSESSMENT
[FreeTextEntry1] : 73 year old male with PMH of HTN, hypothyroidism, bladder cancer (on keytruda + padcev), recent dc for new onset diabetes and hyperglycemia (no DKA).  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) C peptide 0.9  HbA1c 9.6% (above target aim 8% given age and comorbidities) C/W lantus 15 units pre bed, carb ratio 1:15g, provided lispro doses incase patient cannot continue carb counting 5 units breakfast, 6 units lunch and 4 units dinner + low dose correction scale 200-250 1 unit  251-300 2 unit  301-350 3 unit  351-400 4 unit >400 5 unit   Given extremely variable BGs despite similar food intake/exercise and daily insulin, suspect possible absorption or administration issue -> try injection into thighs rather than abdomen to see if improves variability   Continue CGM   Patient counseled extensively about the complications of diabetes including but not limited to nephropathy, neuropathy, and retinopathy. We discussed the importance of annual foot and optho exams. Explained that ideally blood sugars in the morning prior to breakfast should be between 80 and 130. Blood sugars should be checked 2 hours after eating and should be <180. If blood sugar is <70, patient should treat the blood sugar FIRST and then contact provider. Advised patient to let us know if BG persistently <70 or >200  Patient educated on importance of ALWAYS taking basal insulin (not to skip) as pt insulin deficient and at high risk of DKA  Will recheck C peptide once glucose better controlled   Explained to patient and wife that this form of diabetes is very similar to T1DM. Pt is dependent on insulin. From what we know about this condition, it is often permament and beta cells are unlikely to recover  #History of immunotherapy  -Patient made aware the endocrine adverse effects secondary to keytruda can occur up to 12 months post last dose-Main organs that can be involved from an endo perspective are pituitary gland, thyroid and pancreas -Patient aware to continue regular glucose and TFT checks as part of chemo labs -Pt aware of red flags for adrenal insufficiency (only check cortisol if clinical suspicion) -Lengthy discussion with patient regarding the safety of continue keytruda now he has diabetes. If pt benefiting from keytruda from a cancer standpoint, would be safe to continue and no contraindications. -Recent TFTs WNL   #Hypothyroidism TFTS wnl Continue levothyroxine 25mcg daily  Review in 4 weeks (will touch base later this week to see if any improvements)

## 2024-05-07 NOTE — HISTORY OF PRESENT ILLNESS
[place] [address] [facility], [address]  [place]  [address] [phone]      OPERATIVE REPORT         PATIENT NAME:  Gordon Pratt                            YOB: 1962        PREOP DIAGNOSIS:  Left hip advanced end-stage osteoarthritis    POSTOP DIAGNOSIS:  Left hip advanced end-stage osteoarthritis    PROCEDURE:  Left total hip replacement.    SURGEON:   Kevon Carrillo MD    OPERATIVE TEAM:  Circulator: Otilia Steel RN; Anamika Good RN  Scrub Person: Marlin Motta, DANIEL; Marlin Arambula; Germania Crocker    ANESTHETIST:  CRNA: Luis Alcantar CRNA    ANESTHESIA:   General    FLUIDS:   2000 ml crystalloid    ESTIMATED BLOOD LOSS: 300 ml      URINE OUTPUT:  150 ml    SPECIMENS:   Femoral head sent to Pathology.    DRAINS:   Godinez to gravity.    FINDINGS: Severe degenerative arthritis, osteophytes, erosions, cysts, deformity of femoral head, deformity and migration of acetabulum, calcified labrum and bone on bone wear.    HARDWARE:                       Biomet Taperloc Complete press fit total hip replacement  with a #14 stem, 133 degree neck angle, +3 mm neck, 36 mm  ceramic head, 52 mm acetabular cup with two superior  quadrant screws (30 mm, 25 mm) and 10 degree posterior  high wall cross-linked polyethylene liner.      COMPLICATIONS:  None.    DISPOSITION:  Stable to recovery.    INDICATIONS/NARRATIVE:   The patient presents for planned total hip replacement.  The patient has persistent daily pain, limited ambulation and activity intolerance, hip stiffness, deformity, and limitations related to advanced degenerative joint disease of the hip.  Treatment alternatives have been discussed, and the patient wishes to proceed with elective total hip replacement.  Risks and benefits of the proposed procedure have been discussed, and informed consent obtained.  Risks were discussed including but not limited to, anesthesia, infection, nerve/vessel/tendon injury, fracture,  prosthetic wear, loosening or dislocation, DVT, pulmonary embolus, blood loss and the possible need for transfusion.  Arrangements have been made for tranexamic acid IV protocol.  Goals of the procedure include the potential for pain relief, improved hip motion, limb alignment and improved tolerance with ambulation and activities.      Antibiotic prophylaxis was given.  Surgeon site marking and a time out were performed.  Anesthesia was effective and well tolerated.  The hip and leg were prepped and draped in the usual sterile fashion.  The patient was placed in the lateral decubitus position for the procedure, with care taken to pad all areas of the body including a bean bag mold, axillary roll, and fibular head and malleolar padding.      After sterile prep and drape, a curved incision was made centered on the greater trochanter of the hip.  Dissection proceeded in line with the skin incision and through the tensor fascia oksana.  A Charnley self-retaining retractor was placed.  The hip was gently internally rotated and a blunt Cobra was placed under the gluteus medius.  The piriformis tendon and short external rotators were released from the fossa and tagged for subsequent repair.  A T capsulotomy was performed and the capsule was tagged for repair.  Synovial joint fluid expressed and suctioned.  There were marked degenerative changes, cartilage worn down to bone and a deformed femoral head and acetabulum.  Using the neck-cutting template, with the desired slope and position, a neck cut was made with a saw.  The femoral head was extracted from the hip, and sent to pathology as a specimen.      After debridement of the osteophytes, reaming of the acetabulum was performed that provided a concentric acetabular socket for the implant.  A trial cup provided an excellent press fit with no toggling.  Care was taken to ream and place the cup in 15-20 degrees of anteversion and 45-50 degrees of inclination.  The trial  [FreeTextEntry1] : 73 year old male with PMH of HTN, hypothyroidism, bladder cancer (on keytruda + padcev), recent dc for new onset diabetes and hyperglycemia (no DKA).  #New onset diabetes C PEPTIDE 0.9 GLUCOSE 260 -Dx March 2024, no prior history of pre/diabetes and lost 20 pounds in 2 months -HbA1c 7.4% March 2024 -> 9.6% April 2024 -BG rising over the past month 195 early March -> >500 late March admitted to [facility] ED -Medications: Lantus 15 units pre bed, lispro 1:15g (recent carb counting with Gloria Winters). Pt reports not really using this as sometime difficult to measure and weigh out carbs.   CGM download Very high 42% High 20% Target 38% No low/very low Daily trends - no pattern ,some days good other days high all day (not chemo days) Pt reports compliance and good insulin adminstration technique. No lipohypertrophy on exam   -Denies hypos -eGFR 53, ACR 78 (mildly elevated in March 2024) -No family history  Diet (highest CHO meal would be breakfast) Breakfast oatmeal +milk, bread with ham or cheese 3pm Lunch soup Dinner salad, protein Snacks potato chips rarely, fruits Minimal exercise  #Bladder cancer -Diagnosed biopsy in September 2023 -Treatment R) nephrectomy 5/22 -> nephroureterectomy Keytruda + enfortumab commenced October 2023 1x week both Wed/Thur/Fri 1x padcev 1x nothing -> stopped after admission 2/52 ago -> now recommenced 1st cycle on the 24th April. Paclev held last week due hyperglycemia   Hypothyroidism -Levo 25mcg daily -TFTs in April normal, TSH 0.88, FT4 1.6  Denies any symptoms of adrenal crisis   component was removed, and the acetabulum was irrigated copiously.  Then, the actual acetabular shell was impacted in place and obtained an excellent press fit, with good position.  The fixation was reinforced with superior quadrant screws, with standard drilling depth gauge measurement, and placement of screws.  Then, a trial liner was placed onto the cup.    Next, steps were taken to prepare the femur.  A box-cutting osteotome was used to lateralize the entry to the canal and along the greater trochanter.  Sequential broaching with the broach was performed, up to the best-fit sizes, which provided an excellent press fit, no toggling.  Care was taken to broach 15-20 degrees of femoral anteversion.  Next, trial heads and necks were placed to find the best range of motion and stability.  There was smooth, free flexion, full extension of the hip and smooth full external and internal rotation with stability.  The trial components were removed.  The wound and bone surfaces were pulse irrigated with copious antibiotic solution, and then suctioned.  Next, the actual stem was placed, which had an excellent press fit that was in good position.  The trial cup liner was removed and the actual insert was placed.  The actual head was then Luther tapered onto the femoral stem.  A final reduction was performed.  Clinically the leg lengths were equal and there was full range of motion and stability of the hip.  The wound was irrigated copiously.      Routine closure was performed and consisted of interrupted #1 Vicryl to repair the capsule, #5 non-absorbable Ethibond to repair the piriformis tendon, #1 Vicryl to repair the tensor fascia oksana, 2-0 Vicryl for the deep and superficial subcutaneous layers, and staples for the skin.  A sterile Aquqcel dressing was applied.  An abduction pillow was placed and the patient was moved supine on the hospital bed.  Anesthesia was effective and well tolerated.  There were no complications of  surgery.  The patient was transferred in stable condition to the recovery room and x-rays of the pelvis and hip were requested.

## 2024-05-09 RX ORDER — INSULIN GLARGINE 100 [IU]/ML
100 INJECTION, SOLUTION SUBCUTANEOUS
Qty: 1 | Refills: 1 | Status: ACTIVE | COMMUNITY
Start: 2024-05-09 | End: 1900-01-01

## 2024-05-09 RX ORDER — BLOOD SUGAR DIAGNOSTIC
STRIP MISCELLANEOUS
Qty: 270 | Refills: 3 | Status: ACTIVE | COMMUNITY
Start: 2024-05-09 | End: 1900-01-01

## 2024-05-09 RX ORDER — URINE ACETONE TEST STRIPS
STRIP MISCELLANEOUS
Qty: 1 | Refills: 1 | Status: ACTIVE | COMMUNITY
Start: 2024-05-09 | End: 1900-01-01

## 2024-05-09 RX ORDER — INSULIN ASPART 100 [IU]/ML
100 INJECTION, SOLUTION INTRAVENOUS; SUBCUTANEOUS
Qty: 2 | Refills: 2 | Status: ACTIVE | COMMUNITY
Start: 2024-05-09 | End: 1900-01-01

## 2024-05-13 RX ORDER — INSULIN DEGLUDEC INJECTION 100 U/ML
100 INJECTION, SOLUTION SUBCUTANEOUS
Qty: 1 | Refills: 2 | Status: ACTIVE | COMMUNITY
Start: 2024-05-13 | End: 1900-01-01

## 2024-05-13 RX ORDER — INSULIN GLARGINE 100 [IU]/ML
100 INJECTION, SOLUTION SUBCUTANEOUS
Qty: 1 | Refills: 5 | Status: DISCONTINUED | COMMUNITY
Start: 2024-04-03 | End: 2024-05-13

## 2024-05-15 ENCOUNTER — RESULT REVIEW (OUTPATIENT)
Age: 74
End: 2024-05-15

## 2024-05-15 ENCOUNTER — APPOINTMENT (OUTPATIENT)
Dept: MRI IMAGING | Facility: IMAGING CENTER | Age: 74
End: 2024-05-15
Payer: MEDICARE

## 2024-05-15 ENCOUNTER — OUTPATIENT (OUTPATIENT)
Dept: OUTPATIENT SERVICES | Facility: HOSPITAL | Age: 74
LOS: 1 days | End: 2024-05-15
Payer: MEDICARE

## 2024-05-15 ENCOUNTER — APPOINTMENT (OUTPATIENT)
Dept: INFUSION THERAPY | Facility: HOSPITAL | Age: 74
End: 2024-05-15

## 2024-05-15 ENCOUNTER — APPOINTMENT (OUTPATIENT)
Dept: HEMATOLOGY ONCOLOGY | Facility: CLINIC | Age: 74
End: 2024-05-15
Payer: MEDICARE

## 2024-05-15 VITALS
DIASTOLIC BLOOD PRESSURE: 79 MMHG | BODY MASS INDEX: 28.28 KG/M2 | HEART RATE: 63 BPM | SYSTOLIC BLOOD PRESSURE: 121 MMHG | WEIGHT: 208.54 LBS | RESPIRATION RATE: 16 BRPM | OXYGEN SATURATION: 97 % | TEMPERATURE: 98.2 F

## 2024-05-15 DIAGNOSIS — Z51.11 ENCOUNTER FOR ANTINEOPLASTIC CHEMOTHERAPY: ICD-10-CM

## 2024-05-15 DIAGNOSIS — C65.9 MALIGNANT NEOPLASM OF UNSPECIFIED RENAL PELVIS: ICD-10-CM

## 2024-05-15 DIAGNOSIS — Z90.5 ACQUIRED ABSENCE OF KIDNEY: Chronic | ICD-10-CM

## 2024-05-15 DIAGNOSIS — R11.2 NAUSEA WITH VOMITING, UNSPECIFIED: ICD-10-CM

## 2024-05-15 DIAGNOSIS — Z98.890 OTHER SPECIFIED POSTPROCEDURAL STATES: Chronic | ICD-10-CM

## 2024-05-15 DIAGNOSIS — C66.2 MALIGNANT NEOPLASM OF LEFT URETER: ICD-10-CM

## 2024-05-15 LAB
ALBUMIN SERPL ELPH-MCNC: 4.2 G/DL — SIGNIFICANT CHANGE UP (ref 3.3–5)
ALP SERPL-CCNC: 78 U/L — SIGNIFICANT CHANGE UP (ref 40–120)
ALT FLD-CCNC: <5 U/L — LOW (ref 10–45)
ANION GAP SERPL CALC-SCNC: 9 MMOL/L — SIGNIFICANT CHANGE UP (ref 5–17)
AST SERPL-CCNC: 20 U/L — SIGNIFICANT CHANGE UP (ref 10–40)
BASOPHILS # BLD AUTO: 0.06 K/UL — SIGNIFICANT CHANGE UP (ref 0–0.2)
BASOPHILS NFR BLD AUTO: 1 % — SIGNIFICANT CHANGE UP (ref 0–2)
BILIRUB SERPL-MCNC: 0.8 MG/DL — SIGNIFICANT CHANGE UP (ref 0.2–1.2)
BUN SERPL-MCNC: 22 MG/DL — SIGNIFICANT CHANGE UP (ref 7–23)
CALCIUM SERPL-MCNC: 9.4 MG/DL — SIGNIFICANT CHANGE UP (ref 8.4–10.5)
CHLORIDE SERPL-SCNC: 101 MMOL/L — SIGNIFICANT CHANGE UP (ref 96–108)
CO2 SERPL-SCNC: 26 MMOL/L — SIGNIFICANT CHANGE UP (ref 22–31)
CREAT SERPL-MCNC: 1.18 MG/DL — SIGNIFICANT CHANGE UP (ref 0.5–1.3)
EGFR: 64 ML/MIN/1.73M2 — SIGNIFICANT CHANGE UP
EOSINOPHIL # BLD AUTO: 0.1 K/UL — SIGNIFICANT CHANGE UP (ref 0–0.5)
EOSINOPHIL NFR BLD AUTO: 1.7 % — SIGNIFICANT CHANGE UP (ref 0–6)
GLUCOSE SERPL-MCNC: 196 MG/DL — HIGH (ref 70–99)
HCT VFR BLD CALC: 35.5 % — LOW (ref 39–50)
HGB BLD-MCNC: 12 G/DL — LOW (ref 13–17)
IMM GRANULOCYTES NFR BLD AUTO: 0.3 % — SIGNIFICANT CHANGE UP (ref 0–0.9)
LYMPHOCYTES # BLD AUTO: 1.21 K/UL — SIGNIFICANT CHANGE UP (ref 1–3.3)
LYMPHOCYTES # BLD AUTO: 21 % — SIGNIFICANT CHANGE UP (ref 13–44)
MCHC RBC-ENTMCNC: 31.4 PG — SIGNIFICANT CHANGE UP (ref 27–34)
MCHC RBC-ENTMCNC: 33.8 G/DL — SIGNIFICANT CHANGE UP (ref 32–36)
MCV RBC AUTO: 92.9 FL — SIGNIFICANT CHANGE UP (ref 80–100)
MONOCYTES # BLD AUTO: 0.55 K/UL — SIGNIFICANT CHANGE UP (ref 0–0.9)
MONOCYTES NFR BLD AUTO: 9.6 % — SIGNIFICANT CHANGE UP (ref 2–14)
NEUTROPHILS # BLD AUTO: 3.81 K/UL — SIGNIFICANT CHANGE UP (ref 1.8–7.4)
NEUTROPHILS NFR BLD AUTO: 66.4 % — SIGNIFICANT CHANGE UP (ref 43–77)
NRBC # BLD: 0 /100 WBCS — SIGNIFICANT CHANGE UP (ref 0–0)
PLATELET # BLD AUTO: 183 K/UL — SIGNIFICANT CHANGE UP (ref 150–400)
POTASSIUM SERPL-MCNC: 4.3 MMOL/L — SIGNIFICANT CHANGE UP (ref 3.5–5.3)
POTASSIUM SERPL-SCNC: 4.3 MMOL/L — SIGNIFICANT CHANGE UP (ref 3.5–5.3)
PROT SERPL-MCNC: 7 G/DL — SIGNIFICANT CHANGE UP (ref 6–8.3)
RBC # BLD: 3.82 M/UL — LOW (ref 4.2–5.8)
RBC # FLD: 13.9 % — SIGNIFICANT CHANGE UP (ref 10.3–14.5)
SODIUM SERPL-SCNC: 136 MMOL/L — SIGNIFICANT CHANGE UP (ref 135–145)
T4 FREE SERPL-MCNC: 1.9 NG/DL — HIGH (ref 0.9–1.8)
TSH SERPL-MCNC: 1.17 UIU/ML — SIGNIFICANT CHANGE UP (ref 0.27–4.2)
WBC # BLD: 5.75 K/UL — SIGNIFICANT CHANGE UP (ref 3.8–10.5)
WBC # FLD AUTO: 5.75 K/UL — SIGNIFICANT CHANGE UP (ref 3.8–10.5)

## 2024-05-15 PROCEDURE — 72197 MRI PELVIS W/O & W/DYE: CPT | Mod: 26

## 2024-05-15 PROCEDURE — 72148 MRI LUMBAR SPINE W/O DYE: CPT | Mod: 26

## 2024-05-15 PROCEDURE — 72148 MRI LUMBAR SPINE W/O DYE: CPT

## 2024-05-15 PROCEDURE — 74183 MRI ABD W/O CNTR FLWD CNTR: CPT | Mod: 26

## 2024-05-15 PROCEDURE — 99214 OFFICE O/P EST MOD 30 MIN: CPT

## 2024-05-15 PROCEDURE — 72197 MRI PELVIS W/O & W/DYE: CPT

## 2024-05-15 PROCEDURE — 74183 MRI ABD W/O CNTR FLWD CNTR: CPT

## 2024-05-15 NOTE — PHYSICAL EXAM
[Fully active, able to carry on all pre-disease performance without restriction] : Status 0 - Fully active, able to carry on all pre-disease performance without restriction [Normal] : affect appropriate [de-identified] : anicteric  [de-identified] : supple, FROM [de-identified] : no edema

## 2024-05-15 NOTE — ASSESSMENT
[FreeTextEntry1] : 73 year old male s/p right nephroureterectomy June 2022 with pT3 in renal pelvis (10% squamous differentiation) and pTa in the ureter and has since had several bouts of T1HG in the bladder July 2022, Feb 2023. 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 At initial appointment, Dr. Mccall discussed his cancer status and further management. Based on AUA and EAU guideline subjects with upper tract tumors of the renal pelvis and ureter(s) must meet a high risk assessment defined as: tumor 1cm and/or hydronephrosis and/or high grade pathology and/or multifocal disease, where a radical NU approach to treat localized disease is warranted, followed by adjuvant immunotherapy. However, he adamantly declined any surgery given his solitary kidney and the result of dialysis. He is cisplatin ineligible. The potential regimen, pembrolizumab and enfortumab vedotin could induce 73% response including 15% CR rate. His T1HG bladder cancer may be treated with both combination. He will be monitored by Dr. Starkey for surveillance cystoscopy and ureteroscopy. Potential AEs of immunotherapy and enfortumab vedotin were discussed and patient signed consent. He started treatment with pembrolizumab and padcev on 10/18/2023. 12/14/23 CT CAP showed New 3 mm nodule left lung lower lobe. No adenopathy in the thorax, abdomen or pelvis. No left hydronephrosis and left mid ureteral lesion.   Renal pelvis UCC, high grade  - on pembrolizumab D1 and enfortumab vedotin (padcev) D1, D8 on 21 day cycle, treatment held on 3/13 and 3/20 due to hyperglycemia, patient received pembro on 4/3, held padcev on 4/3 and 4/10, received 4/24 to resume pembrolizumab and padcev, may 1 did not receive. Today May 15, start a new cycle - reviewed potential AEs of keytruda, including but not limited to: fatigue, skin rash, joint pain, hypothyroidism, pneumonitis, hepatitis, nephritis, colitis, myocarditis, pericarditis, hypophysitis. - reviewed potential AEs of padcev, including but not limited to fatigue, skin rash, worsening diabetes, eye problem and neuropathy and GI symptoms - 12/27/23 CT CAP showed New 3 mm nodule left lung lower lobe. No adenopathy in the thorax, abdomen or pelvis. No left hydronephrosis and left mid ureteral lesion. - scans due end of March / April - MR AP is ordered by Dr. Starkey and is scheduled for 5/15, CT chest is ordered by Dr. Verde and is scheduled for 5/21 - continue to follow with Dr. Starkey for surveillance cystoscopy and ureteroscopy/ureteral stent exchange, per Dr. Starkey note - R stent was removed, ULS sowed no obstruction or hydronephrosis, will monitor on MR AP, plan for ureteroscopy and cystoscopy in August 2o24   Hyperglycemia Diabetes  - 3/13 and 3/20 pembro/padcev was held due to hyperglycemia/new onset Diabetes. On 3/13 pre treatment glucose = 571.Patient was sent to ED and was discharged on 15 units lantus qhs and ademlog 5 units TID. - c/f padcev and/or immunotherapy induced Diabtes  - 3/13 HgbA1C = 7.4, f/u C peptide - Average blood glucose has been 203  - 324, Glucose today on CMP = 99  - referred to Endocrinology, continue to follow with Dr. Ramos  Pruritus - improved  - continue benadryl, betamethasone cream, sarna lotion as needed  - pt has been referred to Dermatology, they are holding off on scheduling appt at present  - perianal skin tear, now healing - advised to keep area clean and pat dry, continue Neomycin cream   Insomnia  - Rx diazepam 5mg nightly as needed; discussed that this medication should not be taken with oxycodone or any other sedating medications, do not drive or operate machinery while taking this medication   Chronic low back pain  - this pain is chronic and is not cancer related pain, he has been prescribed oxy 10mg q8h PRN by another provider, however he says that he sometimes takes 30mg at a time... he was advised not to do this and to follow with his PCP / NSGY to discuss further management - discussed risk/benefits of opioids, not to operate vehicle or machinery while using these medications, do not take with diazepam or other sedating medications   - pt reports he is getting MR next month   Neuropathy  - endorses mild numbness/tingling in fingers and toes  - continue to monitor   Instructed to contact our office with any new/worsening symptoms. Patient educated regarding plan of care, all questions/concerns addressed to the best of my abilities and patient's apparent satisfaction.

## 2024-05-15 NOTE — HISTORY OF PRESENT ILLNESS
[de-identified] :  a 74 yo M with history of hypertension, hypothyroidism and right nephroureterectomy June 2022 with pT3 in renal pelvis (10% squamous differentiation) and pTa in the ureter and has since had several bouts of T1HG in the bladder July 2022, Feb 2023.  5/20/23 CT abdomen and pelvis w/o contrast showed mod to severe left hydroureteronephrosis to the left of a filling defect within the left mid ureter, neoplasm until proven otherwise.  7/18/23 CT chest and urogram showed Interval placement of left-sided ureteral stent with continued moderate hydroureteronephrosis. Point of transition in the left mid ureter. There is prominent soft tissue and a lack of contrast on the delayed films. This raises concern for an underlying neoplasm.  9/2/23 CT abdomen and pelvis without con showed left hydronephrosis without significant changes, left ureteral stent placement.   He was scheduled for segmental ureterectomy but ureteroscopy on 9/1 noted a 2.5 cm segment in the mid left ureter (now solitary kidney) with new disease and a 1.5 cm in the  distal ureter  lesion, and patholoy showed TaHG in the mid ureter and TaHG in the distal ureter as well as TaHG in the kidney with T1HG in the bladder.     He reports no hematuria, burning, frequency and abdominal pain.  He has chronic lower back pain for many years on oxycodon as needed.    11/1/23 Has newly developed skin itching and mild rash that has not been improving with OTC topicals. He also has intermittent headaches relieved by Tylenol. He reports increased fatigue and decreased sleep. He denied nausea, vomiting, diarrhea, constipation.  11/22/23: C2D15 pembrolizumab and padcev. Patient has decreased appetite, has not been eating much at all in the past few weeks and has lost 10lbs. He is feeling very weak, very fatigued. Patient is so tired that he had to be wheeled to exam room in wheelchair. He was able to walk from the hallway into the exam room. He needs assistance getting up from a sitting position. He will occ get shortness of breath on exertion and this has worsened in the past few weeks. Denies chest pain but says once in a while he has palpitations when trying to sleep. He is not sure if palpitations happen with exertion because he has not been able to exert himself very much of late. Patient denies fever, he is cold all of the time. Had chills the other day, took temperature and it was normal. He is exhausted because he has not been able to sleep. He only sleeps for 15 minutes at a time at night and it is a very light sleep. He has never suffered from insomnia before. He is not taking naps during the day. He was already having difficulty sleeping during cylce 1 but it has worsened tremendously with this cycle. He has been using hydroxyzine and benadryl for itching, neither of which have made him drowsy. He has been using melatonin which does not help. He tried his wife's ambien, which also did not help. Patient has ongoing itching but no rashes, says he is "itching, itching, always itching". Says that the prescribed medications have not worked, including betamethasone cream and hydroxyzine. Has been using Eucerin and Sarna creams, sitting in Epsom salt baths. Patient was constipated, however now is having loose stools twice a day for the last three days, has stopped taking bowel regimen. Pt denies numbness and tingling in hands and feet. Patient reports chronic low back pain that pre dates his cancer diagnosis, the pain is not cancer related, has been dealing with this with his PCP and ?NSGY, initially surgery was recommended but now is not. Was going to PT but is no longer because it was not helping. He takes oxycodone for this pain, he says sometimes he will go days without taking it and then he will need three tabs (30mg) at one time, which is not how this medication is prescribed.    12/13/23 He continues to have rash which appears to be improving. However he and his wife are concerned that his skin is darkening (even in non sun exposed areas). He also states that recentl he is "always cold." He denies diarrhea or constipation. He continues to have some insomnia  12/14/23 CT CAP showed New 3 mm nodule left lung lower lobe. No adenopathy in the thorax, abdomen or pelvis. No left hydronephrosis and left mid ureteral lesion.   1/3/24 reports good appetite, better sleep, skin rash with improved itchiness. He is able to do exercises.   1/24/24: Energy is better than it was, but is still fatigued. Mood is better than it was. Appetite is better than before. Patient notes that he is able to walk for half an hour. Itching has improved, it is most pronounced on his buttock at nighttime, not taking hydroxyzine, sometimes will use benadryl, is using topicals as well. Reports hyperpigmentation of skin since starting treatment. He reports increased frequency of urination within the last few weeks, he also reports in the last few weeks he has not been taking tamsulosin. Says he urinates about every 2-3 hours during night and day. No hematuria or dysuria. Patient has chronic pain in his back that he takes oxycodone for. He has occasional "sticking" pain in his L chest that he associates with his port. This was hurting him today, took oxy and it had improved. He does not have any cardiac chest pain, shortness of breath or palpitations. He denies constipation and diarrhea, says he eats home made yogurt that helps to keep his bowel movements regular. He is using valium everyday for insomnia. He tries to go to sleep without it, but cannot fall asleep without it.    2/12/24 Reports mild fatigue and improving itchiness. Both hand fingers develop mild numbness. Nocturia 2. Denies any gross hematuria.   3/6/24: Patient continues to have difficulty sleeping, for example went to bed at 9:30 last night, did not fall asleep until 6AM, slept for 4 hours. He has not been using medication to sleep. He feels tired often. Appetite is fine. Says that he has numbness in fingers and toes for the last month. Says sometimes his legs feel weak and that he feels off balance, has not fallen, is not dizzy. He does exercise, mainly push ups. He has pain in his low back, not cancer related, takes oxycodone, has MR next month. Itching does not bother him much any more. Reports constipation. Last saw eye doctor 6 months ago, says he does have difficulty seeing, unsure if this has worsened since being on treatment, no flashes or floaters. Says he sometimes feels a stabbing pain in his L chest that lasts for about 10 minutes at a time, this does not happen every day.   3/27/24: Last cycle of pembro/padcev was held due to hyperglycemia/new onset Diabetes. On 3/13 pre treatment glucose = 571.Patient was sent to ED and was discharged on 15 units lantus qhs and admelog 5 units TID.  Patient comes with his glucometer, his fasting glucoses have been 90 - 140s, evening he has been in the high 200s. He has an appt with Endocrine on Monday. He has been using the lantus at night and the admelog during the day. Has been trying to eat more healthy. Overall he has been feeling well. Pt's wife shoes me a picture of skin tear near pt's anus, says it started because of itching, it was open at one point but they have been using neomycin cream and it is healing nicely. Says this area opened up due to itching. However, say itching is not what it once was.   [de-identified] : 4/24/24: Patient says he learned about carb counting yesterday with Endo. For the last few days his glucose has been mostly in the 200 and 300s, was better controlled the week prior, right now glucose is 157, average glucose has been between 203 - 324. Pt having improved/normal BM he thinks because he is eating more healthy. He denies itching at present. He has fingertip numbness and tingling for the past few months as well as occ numbness in his toes. He saw podiatry yesterday. Energy is okay, goes for daily walks.    5/15/24 in last week, his glucose average 262mg/dl, feels numb in the hands and feet. He can do buttoning and walk steadily.

## 2024-05-15 NOTE — REVIEW OF SYSTEMS
[Fatigue] : fatigue [SOB on Exertion] : shortness of breath during exertion [Diarrhea: Grade 0] : Diarrhea: Grade 0 [Joint Pain] : joint pain [Muscle Pain] : muscle pain [Insomnia] : insomnia [Fever] : no fever [Chest Pain] : no chest pain [Lower Ext Edema] : no lower extremity edema [Cough] : no cough [Abdominal Pain] : no abdominal pain [Vomiting] : no vomiting [Constipation] : no constipation [Dysuria] : no dysuria [Skin Rash] : no skin rash [Dizziness] : no dizziness [Difficulty Walking] : no difficulty walking [FreeTextEntry9] : lower back

## 2024-05-21 ENCOUNTER — APPOINTMENT (OUTPATIENT)
Dept: CT IMAGING | Facility: IMAGING CENTER | Age: 74
End: 2024-05-21
Payer: MEDICARE

## 2024-05-21 ENCOUNTER — OUTPATIENT (OUTPATIENT)
Dept: OUTPATIENT SERVICES | Facility: HOSPITAL | Age: 74
LOS: 1 days | End: 2024-05-21
Payer: MEDICARE

## 2024-05-21 DIAGNOSIS — Z98.890 OTHER SPECIFIED POSTPROCEDURAL STATES: Chronic | ICD-10-CM

## 2024-05-21 DIAGNOSIS — Z90.5 ACQUIRED ABSENCE OF KIDNEY: Chronic | ICD-10-CM

## 2024-05-21 DIAGNOSIS — Z00.8 ENCOUNTER FOR OTHER GENERAL EXAMINATION: ICD-10-CM

## 2024-05-21 DIAGNOSIS — C80.1 MALIGNANT (PRIMARY) NEOPLASM, UNSPECIFIED: ICD-10-CM

## 2024-05-21 PROCEDURE — 71250 CT THORAX DX C-: CPT

## 2024-05-21 PROCEDURE — 71250 CT THORAX DX C-: CPT | Mod: 26

## 2024-05-22 ENCOUNTER — RESULT REVIEW (OUTPATIENT)
Age: 74
End: 2024-05-22

## 2024-05-22 ENCOUNTER — APPOINTMENT (OUTPATIENT)
Dept: INFUSION THERAPY | Facility: HOSPITAL | Age: 74
End: 2024-05-22

## 2024-05-22 LAB
ALBUMIN SERPL ELPH-MCNC: 4 G/DL — SIGNIFICANT CHANGE UP (ref 3.3–5)
ALP SERPL-CCNC: 78 U/L — SIGNIFICANT CHANGE UP (ref 40–120)
ALT FLD-CCNC: <5 U/L — LOW (ref 10–45)
ANION GAP SERPL CALC-SCNC: 11 MMOL/L — SIGNIFICANT CHANGE UP (ref 5–17)
AST SERPL-CCNC: 31 U/L — SIGNIFICANT CHANGE UP (ref 10–40)
BASOPHILS # BLD AUTO: 0.06 K/UL — SIGNIFICANT CHANGE UP (ref 0–0.2)
BASOPHILS NFR BLD AUTO: 0.9 % — SIGNIFICANT CHANGE UP (ref 0–2)
BILIRUB SERPL-MCNC: 0.8 MG/DL — SIGNIFICANT CHANGE UP (ref 0.2–1.2)
BUN SERPL-MCNC: 21 MG/DL — SIGNIFICANT CHANGE UP (ref 7–23)
CALCIUM SERPL-MCNC: 9.4 MG/DL — SIGNIFICANT CHANGE UP (ref 8.4–10.5)
CHLORIDE SERPL-SCNC: 104 MMOL/L — SIGNIFICANT CHANGE UP (ref 96–108)
CO2 SERPL-SCNC: 25 MMOL/L — SIGNIFICANT CHANGE UP (ref 22–31)
CREAT SERPL-MCNC: 1.23 MG/DL — SIGNIFICANT CHANGE UP (ref 0.5–1.3)
EGFR: 61 ML/MIN/1.73M2 — SIGNIFICANT CHANGE UP
EOSINOPHIL # BLD AUTO: 0.21 K/UL — SIGNIFICANT CHANGE UP (ref 0–0.5)
EOSINOPHIL NFR BLD AUTO: 3.1 % — SIGNIFICANT CHANGE UP (ref 0–6)
GLUCOSE SERPL-MCNC: 112 MG/DL — HIGH (ref 70–99)
HCT VFR BLD CALC: 37 % — LOW (ref 39–50)
HGB BLD-MCNC: 12.7 G/DL — LOW (ref 13–17)
IMM GRANULOCYTES NFR BLD AUTO: 0.6 % — SIGNIFICANT CHANGE UP (ref 0–0.9)
LYMPHOCYTES # BLD AUTO: 1.18 K/UL — SIGNIFICANT CHANGE UP (ref 1–3.3)
LYMPHOCYTES # BLD AUTO: 17.2 % — SIGNIFICANT CHANGE UP (ref 13–44)
MCHC RBC-ENTMCNC: 32.3 PG — SIGNIFICANT CHANGE UP (ref 27–34)
MCHC RBC-ENTMCNC: 34.3 G/DL — SIGNIFICANT CHANGE UP (ref 32–36)
MCV RBC AUTO: 94.1 FL — SIGNIFICANT CHANGE UP (ref 80–100)
MONOCYTES # BLD AUTO: 0.65 K/UL — SIGNIFICANT CHANGE UP (ref 0–0.9)
MONOCYTES NFR BLD AUTO: 9.5 % — SIGNIFICANT CHANGE UP (ref 2–14)
NEUTROPHILS # BLD AUTO: 4.72 K/UL — SIGNIFICANT CHANGE UP (ref 1.8–7.4)
NEUTROPHILS NFR BLD AUTO: 68.7 % — SIGNIFICANT CHANGE UP (ref 43–77)
NRBC # BLD: 0 /100 WBCS — SIGNIFICANT CHANGE UP (ref 0–0)
PLATELET # BLD AUTO: 164 K/UL — SIGNIFICANT CHANGE UP (ref 150–400)
POTASSIUM SERPL-MCNC: 4.2 MMOL/L — SIGNIFICANT CHANGE UP (ref 3.5–5.3)
POTASSIUM SERPL-SCNC: 4.2 MMOL/L — SIGNIFICANT CHANGE UP (ref 3.5–5.3)
PROT SERPL-MCNC: 6.9 G/DL — SIGNIFICANT CHANGE UP (ref 6–8.3)
RBC # BLD: 3.93 M/UL — LOW (ref 4.2–5.8)
RBC # FLD: 13.9 % — SIGNIFICANT CHANGE UP (ref 10.3–14.5)
SODIUM SERPL-SCNC: 140 MMOL/L — SIGNIFICANT CHANGE UP (ref 135–145)
WBC # BLD: 6.86 K/UL — SIGNIFICANT CHANGE UP (ref 3.8–10.5)
WBC # FLD AUTO: 6.86 K/UL — SIGNIFICANT CHANGE UP (ref 3.8–10.5)

## 2024-05-22 RX ORDER — BLOOD-GLUCOSE SENSOR
EACH MISCELLANEOUS
Qty: 6 | Refills: 3 | Status: ACTIVE | COMMUNITY
Start: 2024-04-03 | End: 1900-01-01

## 2024-05-27 PROBLEM — C80.1 MUCINOUS ADENOCARCINOMA: Status: ACTIVE | Noted: 2021-08-16

## 2024-05-28 ENCOUNTER — RX RENEWAL (OUTPATIENT)
Age: 74
End: 2024-05-28

## 2024-05-28 ENCOUNTER — APPOINTMENT (OUTPATIENT)
Dept: THORACIC SURGERY | Facility: CLINIC | Age: 74
End: 2024-05-28
Payer: MEDICARE

## 2024-05-28 VITALS
DIASTOLIC BLOOD PRESSURE: 75 MMHG | BODY MASS INDEX: 27.7 KG/M2 | RESPIRATION RATE: 16 BRPM | OXYGEN SATURATION: 96 % | WEIGHT: 209 LBS | HEIGHT: 73 IN | HEART RATE: 67 BPM | SYSTOLIC BLOOD PRESSURE: 128 MMHG

## 2024-05-28 DIAGNOSIS — C80.1 MALIGNANT (PRIMARY) NEOPLASM, UNSPECIFIED: ICD-10-CM

## 2024-05-28 PROCEDURE — 99213 OFFICE O/P EST LOW 20 MIN: CPT

## 2024-05-28 RX ORDER — TAMSULOSIN HYDROCHLORIDE 0.4 MG/1
0.4 CAPSULE ORAL
Qty: 30 | Refills: 3 | Status: ACTIVE | COMMUNITY
Start: 2023-09-05 | End: 1900-01-01

## 2024-05-28 NOTE — ASSESSMENT
[FreeTextEntry1] : Mr. RACHEL PATEL, 73 year old male, never smoker, w/ hx of HTN, BPH, hypothyroidism, Vertebral fracture, Thoracic aortic aneurysm, who was found to have enlarging LORRIE ground glass nodule during CT evaluation of ascending aortic aneurysm. Referred to office by Dr. Lilly Yu.  S/p Flexible bronch, uniportal left VATS, pneumonolysis, wedge resection left upper lobe x1,LULobectomy, MLND on 8/4/2021. Path demonstrating: Invasive mucinous adenocarcinoma; G3; 1cm; Single focus; All LN (0/16) and margins negative for tumor; pT1a pN0 (Stage IA1)  Of Note: s/p TURBT 2/14/23 showing high grade invasive urothelial carcinoma  5/22/23: s/p open Right nephro-ureterectomy  6/23/23: S/p cystoscopy, transurethral resection of bladder, left ureteroscopy, exchange of left double J stent with Dr. Canales. Path of bladder tumor reveals Urothelial atypia. Path of left ureteral urine, negative for malignancy.   Follows w/ Dr. Akin Mccall, currently receiving systemic therapy.   Presents today with follow up imaging.   I have independently reviewed the medical records and imaging at the time of this office consultation, and discussed the following interpretations with the patient: - CT imaging reviewed. Stable findings. Discussed repeating a non contrast CT Chest in 6 months to re-evaluate stability. He is agreeable.  [facility]  - Follow up with Cardiology regarding Aortic findings.   Recommendations reviewed with patient during this office visit, and all questions answered; Patient instructed on the importance of follow up and verbalizes understanding.  I, ALY Valencia, personally performed the evaluation and management (E/M) services for this established patient. That E/M includes conducting the examination, assessing all new/exacerbated conditions, and establishing a new plan of care. Today, My ACP, Eugenia Moreland, was here to observe my evaluation and management services for this patient to be followed going forward.

## 2024-05-28 NOTE — PHYSICAL EXAM
[] : no respiratory distress [Respiration, Rhythm And Depth] : normal respiratory rhythm and effort [Exaggerated Use Of Accessory Muscles For Inspiration] : no accessory muscle use [Auscultation Breath Sounds / Voice Sounds] : lungs were clear to auscultation bilaterally [Heart Rate And Rhythm] : heart rate was normal and rhythm regular [Examination Of The Chest] : the chest was normal in appearance [Chest Visual Inspection Thoracic Asymmetry] : no chest asymmetry [Diminished Respiratory Excursion] : normal chest expansion [2+] : left 2+ [Cervical Lymph Nodes Enlarged Posterior Bilaterally] : posterior cervical [Cervical Lymph Nodes Enlarged Anterior Bilaterally] : anterior cervical [Supraclavicular Lymph Nodes Enlarged Bilaterally] : supraclavicular [No CVA Tenderness] : no ~M costovertebral angle tenderness [No Spinal Tenderness] : no spinal tenderness [Involuntary Movements] : no involuntary movements were seen [Cranial Nerves] : cranial nerves 2-12 were intact [Oriented To Time, Place, And Person] : oriented to person, place, and time

## 2024-05-28 NOTE — HISTORY OF PRESENT ILLNESS
[FreeTextEntry1] : Mr. RACHEL PATEL, 73 year old male, never smoker, w/ hx of HTN, BPH, hypothyroidism, Vertebral fracture, Thoracic aortic aneurysm, who was found to have enlarging LORRIE ground glass nodule during CT evaluation of ascending aortic aneurysm. Referred to office by Dr. Lilly Yu.  S/p Flexible bronch, uniportal left VATS, pneumonolysis, wedge resection left upper lobe x1,LULobectomy, MLND on 8/4/2021. Path demonstrating: Invasive mucinous adenocarcinoma; G3; 1cm; Single focus; All LN (0/16) and margins negative for tumor; pT1a pN0 (Stage IA1)  Of Note: s/p TURBT 2/14/23 showing high grade invasive urothelial carcinoma  CT Chest on 3/20/23: - s/p LULobectomy - Unchanged 2 mm subpleural nodule in the LLL (2-42) -  Resolved previously new nodule in the RLL  - New 4 mm solid nodule in the RLL posterior basal segment (2-126) - RLL calcified granuloma - Unchanged dilated approximate 4.7 cm midascending aorta. Normal variant anatomy with persistent left-sided SVC. - Unchanged too small to characterize hypodensities in the liver. Right nephrectomy. Partially included left renal cyst. - Multilevel spondylosis.  OF NOTE: 5/22/23: s/p open Right nephro-ureterectomy  6/23/23: S/p cystoscopy, transurethral resection of bladder, left ureteroscopy, exchange of left double J stent with Dr. Canales. Path of bladder tumor reveals Urothelial atypia. Path of left ureteral urine, negative for malignancy.   Follows w/ Dr. Akin Mccall, currently receiving systemic therapy.   CT Chest on 7/18/23:  - s/p  partial left lung resection with postoperative changes - Aneurysmal dilatation of the ascending aorta measuring up to 5.0 cm which has not significantly changed - Subcentimeter low-attenuation lesions in the liver which are too small to characterize. These do not appear significantly changed. - Status post right-sided nephrectomy with postsurgical changes. Left-sided renal cysts. Interval placement of a left ureteral stent with continued moderate left-sided hydroureteronephrosis. Question of prominent soft tissue in the left mid ureter on series 4 image 124. This was the site of transition on the prior study and underlying lesion must be considered. - Colonic diverticuli predominantly in the sigmoid without focal inflammation. - Postsurgical changes midanterior abdominal wall. Small umbilical hernia containing fat. Small left inguinal hernia containing fat.  CT Chest/Abdo/Pelvis on 12/27/23:  - s/p Left Upper Lobectomy  - New 3 mm left lung nodule (series 2 image 65)  - Stable subpleural nodule measuring 3 mm in the left lung (series 2 image 27) - Left-sided Mediport in left-sided SVC with tip near the cavoatrial junction. - Mid ascending thoracic aorta is dilated to 4.7 cm. - There is a left ureteral stent again noted. There is mild fullness left renal collecting system. Probable left renal cysts again noted. Indeterminate lesion lower pole left kidney measuring 1.4 cm (series 2 image 109) is unchanged. Right nephrectomy.  CXR on 2/20/24: No acute pulmonary disease  - s/p LULobectomy.  - Unchanged sub-4 mm nodule abutting the pleura in the LLL (2-43).  - Resolved second LLL nodule that was new on prior.  -Left central venous catheter within a left-sided SVC.  - Unchanged 4.7 cm ascending aorta. - Unchanged subcentimeter hypodensities in the liver, too small to characterize. Left renal cysts. Right nephrectomy.  Patient presents today for follow up. Overall, feels well. Today, patient denies worsening SOB, chest pain, cough, hemoptysis, fever, chills, night sweats, lightheadedness or dizziness.

## 2024-05-30 RX ORDER — LEVOTHYROXINE SODIUM 0.03 MG/1
25 TABLET ORAL
Qty: 90 | Refills: 0 | Status: ACTIVE | COMMUNITY
Start: 2018-11-20 | End: 1900-01-01

## 2024-06-04 ENCOUNTER — NON-APPOINTMENT (OUTPATIENT)
Age: 74
End: 2024-06-04

## 2024-06-05 ENCOUNTER — RESULT REVIEW (OUTPATIENT)
Age: 74
End: 2024-06-05

## 2024-06-05 ENCOUNTER — APPOINTMENT (OUTPATIENT)
Dept: HEMATOLOGY ONCOLOGY | Facility: CLINIC | Age: 74
End: 2024-06-05
Payer: MEDICARE

## 2024-06-05 ENCOUNTER — APPOINTMENT (OUTPATIENT)
Dept: INFUSION THERAPY | Facility: HOSPITAL | Age: 74
End: 2024-06-05

## 2024-06-05 ENCOUNTER — APPOINTMENT (OUTPATIENT)
Dept: CARDIOLOGY | Facility: CLINIC | Age: 74
End: 2024-06-05

## 2024-06-05 VITALS
HEART RATE: 67 BPM | OXYGEN SATURATION: 97 % | RESPIRATION RATE: 16 BRPM | TEMPERATURE: 98.7 F | DIASTOLIC BLOOD PRESSURE: 74 MMHG | BODY MASS INDEX: 27.34 KG/M2 | SYSTOLIC BLOOD PRESSURE: 122 MMHG | WEIGHT: 207.23 LBS

## 2024-06-05 LAB
ALBUMIN SERPL ELPH-MCNC: 4.2 G/DL — SIGNIFICANT CHANGE UP (ref 3.3–5)
ALP SERPL-CCNC: 86 U/L — SIGNIFICANT CHANGE UP (ref 40–120)
ALT FLD-CCNC: <5 U/L — LOW (ref 10–45)
ANION GAP SERPL CALC-SCNC: 11 MMOL/L — SIGNIFICANT CHANGE UP (ref 5–17)
AST SERPL-CCNC: 25 U/L — SIGNIFICANT CHANGE UP (ref 10–40)
BASOPHILS # BLD AUTO: 0.06 K/UL — SIGNIFICANT CHANGE UP (ref 0–0.2)
BASOPHILS NFR BLD AUTO: 1 % — SIGNIFICANT CHANGE UP (ref 0–2)
BILIRUB SERPL-MCNC: 0.5 MG/DL — SIGNIFICANT CHANGE UP (ref 0.2–1.2)
BUN SERPL-MCNC: 21 MG/DL — SIGNIFICANT CHANGE UP (ref 7–23)
CALCIUM SERPL-MCNC: 9.4 MG/DL — SIGNIFICANT CHANGE UP (ref 8.4–10.5)
CHLORIDE SERPL-SCNC: 101 MMOL/L — SIGNIFICANT CHANGE UP (ref 96–108)
CO2 SERPL-SCNC: 24 MMOL/L — SIGNIFICANT CHANGE UP (ref 22–31)
CREAT SERPL-MCNC: 1.18 MG/DL — SIGNIFICANT CHANGE UP (ref 0.5–1.3)
EGFR: 64 ML/MIN/1.73M2 — SIGNIFICANT CHANGE UP
EOSINOPHIL # BLD AUTO: 0.11 K/UL — SIGNIFICANT CHANGE UP (ref 0–0.5)
EOSINOPHIL NFR BLD AUTO: 1.8 % — SIGNIFICANT CHANGE UP (ref 0–6)
GLUCOSE SERPL-MCNC: 162 MG/DL — HIGH (ref 70–99)
HCT VFR BLD CALC: 36.4 % — LOW (ref 39–50)
HGB BLD-MCNC: 12.5 G/DL — LOW (ref 13–17)
IMM GRANULOCYTES NFR BLD AUTO: 0.5 % — SIGNIFICANT CHANGE UP (ref 0–0.9)
LYMPHOCYTES # BLD AUTO: 1.43 K/UL — SIGNIFICANT CHANGE UP (ref 1–3.3)
LYMPHOCYTES # BLD AUTO: 23.6 % — SIGNIFICANT CHANGE UP (ref 13–44)
MCHC RBC-ENTMCNC: 32.1 PG — SIGNIFICANT CHANGE UP (ref 27–34)
MCHC RBC-ENTMCNC: 34.3 G/DL — SIGNIFICANT CHANGE UP (ref 32–36)
MCV RBC AUTO: 93.3 FL — SIGNIFICANT CHANGE UP (ref 80–100)
MONOCYTES # BLD AUTO: 0.72 K/UL — SIGNIFICANT CHANGE UP (ref 0–0.9)
MONOCYTES NFR BLD AUTO: 11.9 % — SIGNIFICANT CHANGE UP (ref 2–14)
NEUTROPHILS # BLD AUTO: 3.71 K/UL — SIGNIFICANT CHANGE UP (ref 1.8–7.4)
NEUTROPHILS NFR BLD AUTO: 61.2 % — SIGNIFICANT CHANGE UP (ref 43–77)
NRBC # BLD: 0 /100 WBCS — SIGNIFICANT CHANGE UP (ref 0–0)
PLATELET # BLD AUTO: 192 K/UL — SIGNIFICANT CHANGE UP (ref 150–400)
POTASSIUM SERPL-MCNC: 4.3 MMOL/L — SIGNIFICANT CHANGE UP (ref 3.5–5.3)
POTASSIUM SERPL-SCNC: 4.3 MMOL/L — SIGNIFICANT CHANGE UP (ref 3.5–5.3)
PROT SERPL-MCNC: 7.3 G/DL — SIGNIFICANT CHANGE UP (ref 6–8.3)
RBC # BLD: 3.9 M/UL — LOW (ref 4.2–5.8)
RBC # FLD: 13.9 % — SIGNIFICANT CHANGE UP (ref 10.3–14.5)
SODIUM SERPL-SCNC: 137 MMOL/L — SIGNIFICANT CHANGE UP (ref 135–145)
T4 FREE SERPL-MCNC: 1.7 NG/DL — SIGNIFICANT CHANGE UP (ref 0.9–1.8)
TSH SERPL-MCNC: 1.3 UIU/ML — SIGNIFICANT CHANGE UP (ref 0.27–4.2)
WBC # BLD: 6.06 K/UL — SIGNIFICANT CHANGE UP (ref 3.8–10.5)
WBC # FLD AUTO: 6.06 K/UL — SIGNIFICANT CHANGE UP (ref 3.8–10.5)

## 2024-06-05 PROCEDURE — 99213 OFFICE O/P EST LOW 20 MIN: CPT

## 2024-06-05 PROCEDURE — G2211 COMPLEX E/M VISIT ADD ON: CPT

## 2024-06-05 NOTE — HISTORY OF PRESENT ILLNESS
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  5/20/23 CT abdomen and pelvis w/o contrast showed mod to severe left hydroureteronephrosis to the left of a filling defect within the left mid ureter, neoplasm until proven otherwise.  7/18/23 CT chest and urogram showed Interval placement of left-sided ureteral stent with continued moderate hydroureteronephrosis. Point of transition in the left mid ureter. There is prominent soft tissue and a lack of contrast on the delayed films. This raises concern for an underlying neoplasm.  9/2/23 CT abdomen and pelvis without con showed left hydronephrosis without significant changes, left ureteral stent placement.   He was scheduled for segmental ureterectomy but ureteroscopy on 9/1 noted a 2.5 cm segment in the mid left ureter (now solitary kidney) with new disease and a 1.5 cm in the  distal ureter  lesion, and patholoy showed TaHG in the mid ureter and TaHG in the distal ureter as well as TaHG in the kidney with T1HG in the bladder.     He reports no hematuria, burning, frequency and abdominal pain.  He has chronic lower back pain for many years on oxycodon as needed.    11/1/23 Has newly developed skin itching and mild rash that has not been improving with OTC topicals. He also has intermittent headaches relieved by Tylenol. He reports increased fatigue and decreased sleep. He denied nausea, vomiting, diarrhea, constipation.  11/22/23: C2D15 pembrolizumab and padcev. Patient has decreased appetite, has not been eating much at all in the past few weeks and has lost 10lbs. He is feeling very weak, very fatigued. Patient is so tired that he had to be wheeled to exam room in wheelchair. He was able to walk from the hallway into the exam room. He needs assistance getting up from a sitting position. He will occ get shortness of breath on exertion and this has worsened in the past few weeks. Denies chest pain but says once in a while he has palpitations when trying to sleep. He is not sure if palpitations happen with exertion because he has not been able to exert himself very much of late. Patient denies fever, he is cold all of the time. Had chills the other day, took temperature and it was normal. He is exhausted because he has not been able to sleep. He only sleeps for 15 minutes at a time at night and it is a very light sleep. He has never suffered from insomnia before. He is not taking naps during the day. He was already having difficulty sleeping during cylce 1 but it has worsened tremendously with this cycle. He has been using hydroxyzine and benadryl for itching, neither of which have made him drowsy. He has been using melatonin which does not help. He tried his wife's ambien, which also did not help. Patient has ongoing itching but no rashes, says he is "itching, itching, always itching". Says that the prescribed medications have not worked, including betamethasone cream and hydroxyzine. Has been using Eucerin and Sarna creams, sitting in Epsom salt baths. Patient was constipated, however now is having loose stools twice a day for the last three days, has stopped taking bowel regimen. Pt denies numbness and tingling in hands and feet. Patient reports chronic low back pain that pre dates his cancer diagnosis, the pain is not cancer related, has been dealing with this with his PCP and ?NSGY, initially surgery was recommended but now is not. Was going to PT but is no longer because it was not helping. He takes oxycodone for this pain, he says sometimes he will go days without taking it and then he will need three tabs (30mg) at one time, which is not how this medication is prescribed.    12/13/23 He continues to have rash which appears to be improving. However he and his wife are concerned that his skin is darkening (even in non sun exposed areas). He also states that recentl he is "always cold." He denies diarrhea or constipation. He continues to have some insomnia  12/14/23 CT CAP showed New 3 mm nodule left lung lower lobe. No adenopathy in the thorax, abdomen or pelvis. No left hydronephrosis and left mid ureteral lesion.   1/3/24 reports good appetite, better sleep, skin rash with improved itchiness. He is able to do exercises.   1/24/24: Energy is better than it was, but is still fatigued. Mood is better than it was. Appetite is better than before. Patient notes that he is able to walk for half an hour. Itching has improved, it is most pronounced on his buttock at nighttime, not taking hydroxyzine, sometimes will use benadryl, is using topicals as well. Reports hyperpigmentation of skin since starting treatment. He reports increased frequency of urination within the last few weeks, he also reports in the last few weeks he has not been taking tamsulosin. Says he urinates about every 2-3 hours during night and day. No hematuria or dysuria. Patient has chronic pain in his back that he takes oxycodone for. He has occasional "sticking" pain in his L chest that he associates with his port. This was hurting him today, took oxy and it had improved. He does not have any cardiac chest pain, shortness of breath or palpitations. He denies constipation and diarrhea, says he eats home made yogurt that helps to keep his bowel movements regular. He is using valium everyday for insomnia. He tries to go to sleep without it, but cannot fall asleep without it.    2/12/24 Reports mild fatigue and improving itchiness. Both hand fingers develop mild numbness. Nocturia 2. Denies any gross hematuria.   3/6/24: Patient continues to have difficulty sleeping, for example went to bed at 9:30 last night, did not fall asleep until 6AM, slept for 4 hours. He has not been using medication to sleep. He feels tired often. Appetite is fine. Says that he has numbness in fingers and toes for the last month. Says sometimes his legs feel weak and that he feels off balance, has not fallen, is not dizzy. He does exercise, mainly push ups. He has pain in his low back, not cancer related, takes oxycodone, has MR next month. Itching does not bother him much any more. Reports constipation. Last saw eye doctor 6 months ago, says he does have difficulty seeing, unsure if this has worsened since being on treatment, no flashes or floaters. Says he sometimes feels a stabbing pain in his L chest that lasts for about 10 minutes at a time, this does not happen every day.   3/27/24: Last cycle of pembro/padcev was held due to hyperglycemia/new onset Diabetes. On 3/13 pre treatment glucose = 571.Patient was sent to ED and was discharged on 15 units lantus qhs and admelog 5 units TID.  Patient comes with his glucometer, his fasting glucoses have been 90 - 140s, evening he has been in the high 200s. He has an appt with Endocrine on Monday. He has been using the lantus at night and the admelog during the day. Has been trying to eat more healthy. Overall he has been feeling well. Pt's wife shoes me a picture of skin tear near pt's anus, says it started because of itching, it was open at one point but they have been using neomycin cream and it is healing nicely. Says this area opened up due to itching. However, say itching is not what it once was.   [de-identified] : 4/24/24: Patient says he learned about carb counting yesterday with Endo. For the last few days his glucose has been mostly in the 200 and 300s, was better controlled the week prior, right now glucose is 157, average glucose has been between 203 - 324. Pt having improved/normal BM he thinks because he is eating more healthy. He denies itching at present. He has fingertip numbness and tingling for the past few months as well as occ numbness in his toes. He saw podiatry yesterday. Energy is okay, goes for daily walks.    5/15/24 in last week, his glucose average 262mg/dl, feels numb in the hands and feet. He can do buttoning and walk steadily.   5/15/24 MRI abdomen and pelvis showed no evidence of disease. 5/21/24 CT chest showed resolved left lower lobe nodule that was new on 12/27/2023. No new lung nodule.  6/5/24 reports moderate numbness in fingers and toes. His hands and feet function normally. He can hold a cup and do buttoning, walk steadily.

## 2024-06-05 NOTE — PHYSICAL EXAM
[Fully active, able to carry on all pre-disease performance without restriction] : Status 0 - Fully active, able to carry on all pre-disease performance without restriction [Normal] : affect appropriate [de-identified] : anicteric  [de-identified] : supple, FROM [de-identified] : no edema

## 2024-06-05 NOTE — ASSESSMENT
[FreeTextEntry1] : 73 year old male s/p right nephroureterectomy June 2022 with pT3 in renal pelvis (10% squamous differentiation) and pTa in the ureter and has since had several bouts of T1HG in the bladder July 2022, Feb 2023. 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 At initial appointment, Dr. Mccall discussed his cancer status and further management. Based on AUA and EAU guideline subjects with upper tract tumors of the renal pelvis and ureter(s) must meet a high risk assessment defined as: tumor 1cm and/or hydronephrosis and/or high grade pathology and/or multifocal disease, where a radical NU approach to treat localized disease is warranted, followed by adjuvant immunotherapy. However, he adamantly declined any surgery given his solitary kidney and the result of dialysis. He is cisplatin ineligible. The potential regimen, pembrolizumab and enfortumab vedotin could induce 73% response including 15% CR rate. His T1HG bladder cancer may be treated with both combination. He will be monitored by Dr. Starkey for surveillance cystoscopy and ureteroscopy. Potential AEs of immunotherapy and enfortumab vedotin were discussed and patient signed consent. He started treatment with pembrolizumab and padcev on 10/18/2023. 12/14/23 CT CAP showed New 3 mm nodule left lung lower lobe. No adenopathy in the thorax, abdomen or pelvis. No left hydronephrosis and left mid ureteral lesion. 5/15 MRI abdomen and pelvis and 5/21/24 CT chest showed LISETTE.   Renal pelvis UCC, high grade  - on pembrolizumab D1 and enfortumab vedotin (padcev) D1, D8 on 21 day cycle, treatment held on 3/13 and 3/20 due to hyperglycemia, patient received pembro on 4/3, held padcev on 4/3 and 4/10, received 4/24 to resume pembrolizumab and padcev, may 1 did not receive. Today May 15, start a new cycle - reviewed potential AEs of keytruda, including but not limited to: fatigue, skin rash, joint pain, hypothyroidism, pneumonitis, hepatitis, nephritis, colitis, myocarditis, pericarditis, hypophysitis. - reviewed potential AEs of padcev, including but not limited to fatigue, skin rash, worsening diabetes, eye problem and neuropathy and GI symptoms - 12/27/23 CT CAP showed New 3 mm nodule left lung lower lobe. No adenopathy in the thorax, abdomen or pelvis. No left hydronephrosis and left mid ureteral lesion. - scans due end of March / April - MR AP is ordered by Dr. Starkey and is scheduled for 5/15, CT chest is ordered by Dr. Verde and is scheduled for 5/21 - continue to follow with Dr. Starkey for surveillance cystoscopy and ureteroscopy/ureteral stent exchange, per Dr. Starkey note - R stent was removed, ULS sowed no obstruction or hydronephrosis, will monitor on MR AP, plan for ureteroscopy and cystoscopy in August 2o24   Hyperglycemia Diabetes  - 3/13 and 3/20 pembro/padcev was held due to hyperglycemia/new onset Diabetes. On 3/13 pre treatment glucose = 571.Patient was sent to ED and was discharged on 15 units lantus qhs and ademlog 5 units TID. - c/f padcev and/or immunotherapy induced Diabtes  - 3/13 HgbA1C = 7.4, f/u C peptide - Average blood glucose has been 203  - 324, Glucose today on CMP = 99  - referred to Endocrinology, continue to follow with Dr. Ramos  Pruritus - improved  - continue benadryl, betamethasone cream, sarna lotion as needed  - pt has been referred to Dermatology, they are holding off on scheduling appt at present  - perianal skin tear, now healing - advised to keep area clean and pat dry, continue Neomycin cream   Insomnia  - Rx diazepam 5mg nightly as needed; discussed that this medication should not be taken with oxycodone or any other sedating medications, do not drive or operate machinery while taking this medication   Chronic low back pain  - this pain is chronic and is not cancer related pain, he has been prescribed oxy 10mg q8h PRN by another provider, however he says that he sometimes takes 30mg at a time... he was advised not to do this and to follow with his PCP / NSGY to discuss further management - discussed risk/benefits of opioids, not to operate vehicle or machinery while using these medications, do not take with diazepam or other sedating medications   - pt reports he is getting MR next month   Neuropathy  - endorses mild numbness/tingling in fingers and toes  - continue to monitor   Instructed to contact our office with any new/worsening symptoms. Patient educated regarding plan of care, all questions/concerns addressed to the best of my abilities and patient's apparent satisfaction.

## 2024-06-05 NOTE — REVIEW OF SYSTEMS
[Fatigue] : fatigue [SOB on Exertion] : shortness of breath during exertion [Diarrhea: Grade 0] : Diarrhea: Grade 0 [Muscle Pain] : muscle pain [Insomnia] : insomnia [Joint Pain] : joint pain [Fever] : no fever [Chest Pain] : no chest pain [Lower Ext Edema] : no lower extremity edema [Cough] : no cough [Abdominal Pain] : no abdominal pain [Vomiting] : no vomiting [Constipation] : no constipation [Dysuria] : no dysuria [Skin Rash] : no skin rash [Dizziness] : no dizziness [Difficulty Walking] : no difficulty walking [FreeTextEntry9] : lower back

## 2024-06-06 DIAGNOSIS — C68.9 MALIGNANT NEOPLASM OF URINARY ORGAN, UNSPECIFIED: ICD-10-CM

## 2024-06-09 ENCOUNTER — NON-APPOINTMENT (OUTPATIENT)
Age: 74
End: 2024-06-09

## 2024-06-10 ENCOUNTER — APPOINTMENT (OUTPATIENT)
Dept: ORTHOPEDIC SURGERY | Facility: CLINIC | Age: 74
End: 2024-06-10
Payer: MEDICARE

## 2024-06-10 VITALS
SYSTOLIC BLOOD PRESSURE: 125 MMHG | DIASTOLIC BLOOD PRESSURE: 70 MMHG | BODY MASS INDEX: 27.43 KG/M2 | HEART RATE: 65 BPM | HEIGHT: 73 IN | WEIGHT: 207 LBS

## 2024-06-10 DIAGNOSIS — M47.816 SPONDYLOSIS W/OUT MYELOPATHY OR RADICULOPATHY, LUMBAR REGION: ICD-10-CM

## 2024-06-10 PROCEDURE — 99204 OFFICE O/P NEW MOD 45 MIN: CPT

## 2024-06-10 RX ORDER — CHLORHEXIDINE GLUCONATE 4 %
400 (240 MG) LIQUID (ML) TOPICAL
Qty: 30 | Refills: 0 | Status: ACTIVE | COMMUNITY
Start: 2024-01-24

## 2024-06-10 RX ORDER — PEN NEEDLE, DIABETIC 32GX 5/32"
32G X 4 MM NEEDLE, DISPOSABLE MISCELLANEOUS
Qty: 100 | Refills: 0 | Status: ACTIVE | COMMUNITY
Start: 2024-03-14

## 2024-06-10 RX ORDER — OXYCODONE 10 MG/1
10 TABLET ORAL 3 TIMES DAILY
Qty: 30 | Refills: 0 | Status: ACTIVE | COMMUNITY
Start: 2023-06-13 | End: 1900-01-01

## 2024-06-10 NOTE — ADDENDUM
[FreeTextEntry1] : I, Esdras Chauhan Jr, acted solely as a scribe for Dr. Luis Crouch on this date 06/10/2024 .  All medical record entries made by the Scribe were at my, Dr. Luis Crouch, direction and personally dictated by me on 06/10/2024 . I have reviewed the chart and agree that the record accurately reflects my personal performance of the history, physical exam, assessment and plan. I have also personally directed, reviewed, and agreed with the chart.

## 2024-06-10 NOTE — PHYSICAL EXAM
[1+] : left ankle jerk 1+ [LE] : Sensory: Intact in bilateral lower extremities [Normal RLE] : Right Lower Extremity: No scars, rashes, lesions, ulcers, skin intact [Normal LLE] : Left Lower Extremity: No scars, rashes, lesions, ulcers, skin intact [Normal] : Oriented to person, place, and time, insight and judgement were intact and the affect was normal [de-identified] : Able to Heel & Toe Walk without difficultly, No signs of a Drop foot. [de-identified] : MRI Evaluation of the Lumbar Spine performed on 05/15/2024 shows 1. No evidence of bony metastatic disease.  2. Dextroscoliosis with multilevel lumbar spondylosis, similar to prior MRI. Findings are worst at L4-5 and L5-S1 spondylosis, as follows:  L4-L5: Posterior osseous ridging and disc bulging with severe right greater than left bilateral facet arthropathy. Facet arthropathy predominantly contributes to severe right neural foraminal and subarticular stenosis. Mild left neural foraminal stenosis.  L5-S1: Severe right hypertrophic facet arthropathy with large anterolateral osteophytes on the right which causes severe extraforaminal impingement on the exiting right L5 nerve root. Severe right neural foraminal and moderate right subarticular stenosis. No significant left neural foraminal or central stenosis  3. Surgical absence of the right kidney. Left kidney with prominent parapelvic cysts and 3.8 cm midpole cyst, incompletely imaged/evaluated. There are 2 foci of T2 hyperintensity within the posterior right hepatic lobe measuring 7 mm on series 7 image 1, likely to represent cysts. For complete evaluation of these findings, see MRI abdomen performed on the same date (5/15/2024).  Personal Review shows Multilevel Degenerative Changes with mild scoliosis. Spinal stenosis and right lateral recess at L4-5.

## 2024-06-10 NOTE — DISCUSSION/SUMMARY
[Medication Risks Reviewed] : Medication risks reviewed [PRN] : PRN [de-identified] : I discussed the underlying pathophysiology of the patient's condition & MRI findings. I expressed to the patient that his symptoms are consistent with spinal stenosis from degenerative arthritis in his lower back. The patient and I discussed his options regarding treatment. The patient is not a surgical candidate at this time. I recommended that the patient consults pain management specialist for further pain medication and treatment of his symptoms. The patient was provided with a recommended pain management specialist's address & phone number. If the patient begins to experience any changes or severe exacerbation of his symptoms, he should reach out to me as soon as possible. Otherwise, he should return to me as needed.

## 2024-06-10 NOTE — HISTORY OF PRESENT ILLNESS
[Stable] : stable [6] : a minimum pain level of 6/10 [7] : a maximum pain level of 7/10 [Constant] : ~He/She~ states the symptoms seem to be constant [Bending] : worsened by bending [de-identified] : A 73-year-old male presents an initial visit for lower back pain. The patient was referred by her PCP. The patient has been experiencing lower back pain for several months without any prior accidents or injuries. He denies any radiating symptoms to his lower extremities. The patient denies any symptoms of numbness, tingling, or weakness. The patient has been prescribed Oxycodone by PCP and it has helped. The patient has also obtained an MRI of his Lumbar spine and was advised to consult with a spine specialist. The patient reports no other modifying factors at this time.

## 2024-06-11 ENCOUNTER — APPOINTMENT (OUTPATIENT)
Dept: ENDOCRINOLOGY | Facility: CLINIC | Age: 74
End: 2024-06-11
Payer: MEDICARE

## 2024-06-11 VITALS
BODY MASS INDEX: 27.43 KG/M2 | OXYGEN SATURATION: 97 % | RESPIRATION RATE: 18 BRPM | DIASTOLIC BLOOD PRESSURE: 75 MMHG | HEART RATE: 72 BPM | HEIGHT: 73 IN | WEIGHT: 207 LBS | SYSTOLIC BLOOD PRESSURE: 128 MMHG

## 2024-06-11 DIAGNOSIS — E11.9 TYPE 2 DIABETES MELLITUS W/OUT COMPLICATIONS: ICD-10-CM

## 2024-06-11 DIAGNOSIS — Z92.89 PERSONAL HISTORY OF OTHER MEDICAL TREATMENT: ICD-10-CM

## 2024-06-11 DIAGNOSIS — I10 ESSENTIAL (PRIMARY) HYPERTENSION: ICD-10-CM

## 2024-06-11 DIAGNOSIS — E10.9 TYPE 1 DIABETES MELLITUS W/OUT COMPLICATIONS: ICD-10-CM

## 2024-06-11 LAB — HBA1C MFR BLD HPLC: 8.6

## 2024-06-11 PROCEDURE — 95251 CONT GLUC MNTR ANALYSIS I&R: CPT

## 2024-06-11 PROCEDURE — 99215 OFFICE O/P EST HI 40 MIN: CPT

## 2024-06-11 PROCEDURE — 83036 HEMOGLOBIN GLYCOSYLATED A1C: CPT | Mod: QW

## 2024-06-12 ENCOUNTER — APPOINTMENT (OUTPATIENT)
Dept: INFUSION THERAPY | Facility: HOSPITAL | Age: 74
End: 2024-06-12

## 2024-06-12 ENCOUNTER — RESULT REVIEW (OUTPATIENT)
Age: 74
End: 2024-06-12

## 2024-06-12 LAB
ALBUMIN SERPL ELPH-MCNC: 4.1 G/DL — SIGNIFICANT CHANGE UP (ref 3.3–5)
ALP SERPL-CCNC: 81 U/L — SIGNIFICANT CHANGE UP (ref 40–120)
ALT FLD-CCNC: <5 U/L — LOW (ref 10–45)
ANION GAP SERPL CALC-SCNC: 11 MMOL/L — SIGNIFICANT CHANGE UP (ref 5–17)
AST SERPL-CCNC: 28 U/L — SIGNIFICANT CHANGE UP (ref 10–40)
BASOPHILS # BLD AUTO: 0.06 K/UL — SIGNIFICANT CHANGE UP (ref 0–0.2)
BASOPHILS NFR BLD AUTO: 0.9 % — SIGNIFICANT CHANGE UP (ref 0–2)
BILIRUB SERPL-MCNC: 0.6 MG/DL — SIGNIFICANT CHANGE UP (ref 0.2–1.2)
BUN SERPL-MCNC: 25 MG/DL — HIGH (ref 7–23)
CALCIUM SERPL-MCNC: 9.3 MG/DL — SIGNIFICANT CHANGE UP (ref 8.4–10.5)
CHLORIDE SERPL-SCNC: 103 MMOL/L — SIGNIFICANT CHANGE UP (ref 96–108)
CO2 SERPL-SCNC: 24 MMOL/L — SIGNIFICANT CHANGE UP (ref 22–31)
CREAT SERPL-MCNC: 1.19 MG/DL — SIGNIFICANT CHANGE UP (ref 0.5–1.3)
EGFR: 64 ML/MIN/1.73M2 — SIGNIFICANT CHANGE UP
EOSINOPHIL # BLD AUTO: 0.08 K/UL — SIGNIFICANT CHANGE UP (ref 0–0.5)
EOSINOPHIL NFR BLD AUTO: 1.2 % — SIGNIFICANT CHANGE UP (ref 0–6)
GLUCOSE SERPL-MCNC: 112 MG/DL — HIGH (ref 70–99)
HCT VFR BLD CALC: 38.2 % — LOW (ref 39–50)
HGB BLD-MCNC: 13.1 G/DL — SIGNIFICANT CHANGE UP (ref 13–17)
IMM GRANULOCYTES NFR BLD AUTO: 0.3 % — SIGNIFICANT CHANGE UP (ref 0–0.9)
LYMPHOCYTES # BLD AUTO: 1.44 K/UL — SIGNIFICANT CHANGE UP (ref 1–3.3)
LYMPHOCYTES # BLD AUTO: 22.1 % — SIGNIFICANT CHANGE UP (ref 13–44)
MCHC RBC-ENTMCNC: 32 PG — SIGNIFICANT CHANGE UP (ref 27–34)
MCHC RBC-ENTMCNC: 34.3 G/DL — SIGNIFICANT CHANGE UP (ref 32–36)
MCV RBC AUTO: 93.2 FL — SIGNIFICANT CHANGE UP (ref 80–100)
MONOCYTES # BLD AUTO: 0.71 K/UL — SIGNIFICANT CHANGE UP (ref 0–0.9)
MONOCYTES NFR BLD AUTO: 10.9 % — SIGNIFICANT CHANGE UP (ref 2–14)
NEUTROPHILS # BLD AUTO: 4.21 K/UL — SIGNIFICANT CHANGE UP (ref 1.8–7.4)
NEUTROPHILS NFR BLD AUTO: 64.6 % — SIGNIFICANT CHANGE UP (ref 43–77)
NRBC # BLD: 0 /100 WBCS — SIGNIFICANT CHANGE UP (ref 0–0)
PLATELET # BLD AUTO: 188 K/UL — SIGNIFICANT CHANGE UP (ref 150–400)
POTASSIUM SERPL-MCNC: 4.1 MMOL/L — SIGNIFICANT CHANGE UP (ref 3.5–5.3)
POTASSIUM SERPL-SCNC: 4.1 MMOL/L — SIGNIFICANT CHANGE UP (ref 3.5–5.3)
PROT SERPL-MCNC: 7.4 G/DL — SIGNIFICANT CHANGE UP (ref 6–8.3)
RBC # BLD: 4.1 M/UL — LOW (ref 4.2–5.8)
RBC # FLD: 13.8 % — SIGNIFICANT CHANGE UP (ref 10.3–14.5)
SODIUM SERPL-SCNC: 139 MMOL/L — SIGNIFICANT CHANGE UP (ref 135–145)
WBC # BLD: 6.52 K/UL — SIGNIFICANT CHANGE UP (ref 3.8–10.5)
WBC # FLD AUTO: 6.52 K/UL — SIGNIFICANT CHANGE UP (ref 3.8–10.5)

## 2024-06-13 PROBLEM — I10 HTN (HYPERTENSION): Status: ACTIVE | Noted: 2018-10-23

## 2024-06-13 PROBLEM — E10.9 TYPE 1 DIABETES: Status: ACTIVE | Noted: 2024-04-01

## 2024-06-13 PROBLEM — E11.9 DIABETES MELLITUS: Status: ACTIVE | Noted: 2024-03-27

## 2024-06-13 PROBLEM — Z92.89 HISTORY OF IMMUNOTHERAPY: Status: ACTIVE | Noted: 2024-04-02

## 2024-06-13 NOTE — ASSESSMENT
[FreeTextEntry1] : 73 year old male with PMH of HTN, hypothyroidism, bladder cancer (on keytruda + padcev), recent dc for new onset diabetes and hyperglycemia (no DKA).  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) C peptide 0.9  HbA1c 8.6 % (above target aim 8% given age and comorbidities) Increase lantus to 18 units pre bed, carb ratio 1:15g, provided lispro doses incase patient cannot continue carb counting 8units TID pre meals + low dose correction scale 200-250 1 unit 251-300 2 unit 301-350 3 unit 351-400 4 unit >400 5 unit  Given extremely variable BGs despite similar food intake/exercise and daily insulin, suspect possible absorption or administration issue, change site to thighs recently, but no improvement in glucose variability   Consider islet as pt has T1DM and struggling with carb counting - would likely benefit. Will check with pump company regarding pump response to steroids as pt receives steroids as part of chemo and not sure if pump can keep up with this. Pt and wife eager to try this as BGs remain poorly controlled despite good compliance. Will sc appointment with Gloria SOLIS to start process.   Continue CGM  Patient counseled extensively about the complications of diabetes including but not limited to nephropathy, neuropathy, and retinopathy. We discussed the importance of annual foot and optho exams. Explained that ideally blood sugars in the morning prior to breakfast should be between 80 and 130. Blood sugars should be checked 2 hours after eating and should be <180. If blood sugar is <70, patient should treat the blood sugar FIRST and then contact provider. Advised patient to let us know if BG persistently <70 or >200  Patient educated on importance of ALWAYS taking basal insulin (not to skip) as pt insulin deficient and at high risk of DKA  Will recheck C peptide once glucose better controlled  Explained to patient and wife that this form of diabetes is very similar to T1DM. Pt is dependent on insulin. From what we know about this condition, it is often permanent and beta cells are unlikely to recover  Pt traveling to [place] end of week - has all supplies needed including ketone strips. Educated about DKA.   #History of immunotherapy  -Patient made aware the endocrine adverse effects secondary to keytruda can occur up to 12 months post last dose-Main organs that can be involved from an endo perspective are pituitary gland, thyroid and pancreas -Patient aware to continue regular glucose and TFT checks as part of chemo labs -Pt aware of red flags for adrenal insufficiency (only check cortisol if clinical suspicion) -Lengthy discussion with patient regarding the safety of continue keytruda now he has diabetes. If pt benefiting from keytruda from a cancer standpoint, would be safe to continue and no contraindications. -Recent TFTs WNL  #Hypothyroidism TFTS wnl 5th June Continue levothyroxine 25mcg daily  Review in 4 weeks

## 2024-06-13 NOTE — HISTORY OF PRESENT ILLNESS
[FreeTextEntry1] : 73 year old male with PMH of HTN, hypothyroidism, bladder cancer (on keytruda + padcev), recent dc for new onset diabetes and hyperglycemia (no DKA).  #T1DM likely 2/2 keytruda +/- padcev  C PEPTIDE 0.9 GLUCOSE 260 -Dx March 2024, no prior history of pre/diabetes and lost 20 pounds in 2 months -HbA1c 7.4% March 2024 -> 9.6% April 2024 -> 8.6% June 2024 -BG rising over the past month 195 early March -> >500 late March admitted to [facility] -Medications: Lantus 15 units pre bed, lispro 1:15g (recent carb counting with Gloria Winters). Pt reports not really using this as sometime difficult to measure and weigh out carbs. Using approx 6 units TID pre meals with low dose correction scale.   CGM download Very high 61% High 24% Target 15% No low/very low 0% Daily trends - no pattern ,some days good other days high all day (not chemo days) Pt reports compliance and good insulin adminstration technique. No lipohypertrophy on exam  -Denies hypos -eGFR 53, ACR 78 (mildly elevated in March 2024) -No family history  Diet (highest CHO meal would be breakfast) Breakfast oatmeal +milk, bread with ham or cheese 3pm Lunch soup Dinner salad, protein Snacks potato chips rarely, fruits Minimal exercise  #Bladder cancer -Diagnosed biopsy in September 2023 -Treatment R) nephrectomy 5/22 -> nephroureterectomy Keytruda + enfortumab commenced October 2023 1x week both Wed/Thur/Fri 1x padcev 1x nothing -> stopped after admission 2/52 ago -> now recommenced 1st cycle on the 24th April. Paclev held last week due hyperglycemia  Hypothyroidism -Levo 25mcg daily -TFTs in April normal, TSH 0.88, FT4 1.6  Denies any symptoms of adrenal crisis

## 2024-06-14 ENCOUNTER — NON-APPOINTMENT (OUTPATIENT)
Age: 74
End: 2024-06-14

## 2024-06-28 ENCOUNTER — OUTPATIENT (OUTPATIENT)
Dept: OUTPATIENT SERVICES | Facility: HOSPITAL | Age: 74
LOS: 1 days | Discharge: HOME | End: 2024-06-28

## 2024-06-28 DIAGNOSIS — Z98.890 OTHER SPECIFIED POSTPROCEDURAL STATES: Chronic | ICD-10-CM

## 2024-06-28 DIAGNOSIS — C80.1 MALIGNANT (PRIMARY) NEOPLASM, UNSPECIFIED: ICD-10-CM

## 2024-06-28 DIAGNOSIS — Z90.5 ACQUIRED ABSENCE OF KIDNEY: Chronic | ICD-10-CM

## 2024-06-28 DIAGNOSIS — Z90.2 ACQUIRED ABSENCE OF LUNG [PART OF]: Chronic | ICD-10-CM

## 2024-07-03 ENCOUNTER — APPOINTMENT (OUTPATIENT)
Dept: HEMATOLOGY ONCOLOGY | Facility: CLINIC | Age: 74
End: 2024-07-03
Payer: MEDICARE

## 2024-07-03 ENCOUNTER — RESULT REVIEW (OUTPATIENT)
Age: 74
End: 2024-07-03

## 2024-07-03 ENCOUNTER — NON-APPOINTMENT (OUTPATIENT)
Age: 74
End: 2024-07-03

## 2024-07-03 ENCOUNTER — APPOINTMENT (OUTPATIENT)
Dept: INFUSION THERAPY | Facility: HOSPITAL | Age: 74
End: 2024-07-03

## 2024-07-03 VITALS
DIASTOLIC BLOOD PRESSURE: 87 MMHG | HEIGHT: 73 IN | BODY MASS INDEX: 27.2 KG/M2 | TEMPERATURE: 97.7 F | HEART RATE: 66 BPM | WEIGHT: 205.23 LBS | SYSTOLIC BLOOD PRESSURE: 133 MMHG | OXYGEN SATURATION: 99 % | RESPIRATION RATE: 16 BRPM

## 2024-07-03 DIAGNOSIS — E11.9 TYPE 2 DIABETES MELLITUS W/OUT COMPLICATIONS: ICD-10-CM

## 2024-07-03 DIAGNOSIS — C66.2 MALIGNANT NEOPLASM OF LEFT URETER: ICD-10-CM

## 2024-07-03 DIAGNOSIS — C65.9 MALIGNANT NEOPLASM OF UNSPECIFIED RENAL PELVIS: ICD-10-CM

## 2024-07-03 DIAGNOSIS — C67.9 MALIGNANT NEOPLASM OF BLADDER, UNSPECIFIED: ICD-10-CM

## 2024-07-03 LAB
ALBUMIN SERPL ELPH-MCNC: 3.9 G/DL — SIGNIFICANT CHANGE UP (ref 3.3–5)
ALP SERPL-CCNC: 75 U/L — SIGNIFICANT CHANGE UP (ref 40–120)
ALT FLD-CCNC: <5 U/L — LOW (ref 10–45)
ANION GAP SERPL CALC-SCNC: 8 MMOL/L — SIGNIFICANT CHANGE UP (ref 5–17)
AST SERPL-CCNC: 28 U/L — SIGNIFICANT CHANGE UP (ref 10–40)
BASOPHILS # BLD AUTO: 0.07 K/UL — SIGNIFICANT CHANGE UP (ref 0–0.2)
BASOPHILS NFR BLD AUTO: 1.1 % — SIGNIFICANT CHANGE UP (ref 0–2)
BILIRUB SERPL-MCNC: 0.5 MG/DL — SIGNIFICANT CHANGE UP (ref 0.2–1.2)
BUN SERPL-MCNC: 23 MG/DL — SIGNIFICANT CHANGE UP (ref 7–23)
CALCIUM SERPL-MCNC: 8.9 MG/DL — SIGNIFICANT CHANGE UP (ref 8.4–10.5)
CHLORIDE SERPL-SCNC: 104 MMOL/L — SIGNIFICANT CHANGE UP (ref 96–108)
CO2 SERPL-SCNC: 23 MMOL/L — SIGNIFICANT CHANGE UP (ref 22–31)
CREAT SERPL-MCNC: 1.18 MG/DL — SIGNIFICANT CHANGE UP (ref 0.5–1.3)
EGFR: 64 ML/MIN/1.73M2 — SIGNIFICANT CHANGE UP
EOSINOPHIL # BLD AUTO: 0.07 K/UL — SIGNIFICANT CHANGE UP (ref 0–0.5)
EOSINOPHIL NFR BLD AUTO: 1.1 % — SIGNIFICANT CHANGE UP (ref 0–6)
GLUCOSE SERPL-MCNC: 179 MG/DL — HIGH (ref 70–99)
HCT VFR BLD CALC: 36.8 % — LOW (ref 39–50)
HGB BLD-MCNC: 12.2 G/DL — LOW (ref 13–17)
IMM GRANULOCYTES NFR BLD AUTO: 0.6 % — SIGNIFICANT CHANGE UP (ref 0–0.9)
LYMPHOCYTES # BLD AUTO: 1.32 K/UL — SIGNIFICANT CHANGE UP (ref 1–3.3)
LYMPHOCYTES # BLD AUTO: 20.3 % — SIGNIFICANT CHANGE UP (ref 13–44)
MCHC RBC-ENTMCNC: 31.9 PG — SIGNIFICANT CHANGE UP (ref 27–34)
MCHC RBC-ENTMCNC: 33.2 G/DL — SIGNIFICANT CHANGE UP (ref 32–36)
MCV RBC AUTO: 96.3 FL — SIGNIFICANT CHANGE UP (ref 80–100)
MONOCYTES # BLD AUTO: 0.83 K/UL — SIGNIFICANT CHANGE UP (ref 0–0.9)
MONOCYTES NFR BLD AUTO: 12.8 % — SIGNIFICANT CHANGE UP (ref 2–14)
NEUTROPHILS # BLD AUTO: 4.16 K/UL — SIGNIFICANT CHANGE UP (ref 1.8–7.4)
NEUTROPHILS NFR BLD AUTO: 64.1 % — SIGNIFICANT CHANGE UP (ref 43–77)
NRBC # BLD: 0 /100 WBCS — SIGNIFICANT CHANGE UP (ref 0–0)
PLATELET # BLD AUTO: 239 K/UL — SIGNIFICANT CHANGE UP (ref 150–400)
POTASSIUM SERPL-MCNC: 4.3 MMOL/L — SIGNIFICANT CHANGE UP (ref 3.5–5.3)
POTASSIUM SERPL-SCNC: 4.3 MMOL/L — SIGNIFICANT CHANGE UP (ref 3.5–5.3)
PROT SERPL-MCNC: 7 G/DL — SIGNIFICANT CHANGE UP (ref 6–8.3)
RBC # BLD: 3.82 M/UL — LOW (ref 4.2–5.8)
RBC # FLD: 14.1 % — SIGNIFICANT CHANGE UP (ref 10.3–14.5)
SODIUM SERPL-SCNC: 135 MMOL/L — SIGNIFICANT CHANGE UP (ref 135–145)
T4 FREE SERPL-MCNC: 1.7 NG/DL — SIGNIFICANT CHANGE UP (ref 0.9–1.8)
TSH SERPL-MCNC: 0.68 UIU/ML — SIGNIFICANT CHANGE UP (ref 0.27–4.2)
WBC # BLD: 6.49 K/UL — SIGNIFICANT CHANGE UP (ref 3.8–10.5)
WBC # FLD AUTO: 6.49 K/UL — SIGNIFICANT CHANGE UP (ref 3.8–10.5)

## 2024-07-03 PROCEDURE — 99214 OFFICE O/P EST MOD 30 MIN: CPT

## 2024-07-03 PROCEDURE — G2211 COMPLEX E/M VISIT ADD ON: CPT

## 2024-07-05 ENCOUNTER — APPOINTMENT (OUTPATIENT)
Dept: UROLOGY | Facility: CLINIC | Age: 74
End: 2024-07-05

## 2024-07-05 DIAGNOSIS — R11.2 NAUSEA WITH VOMITING, UNSPECIFIED: ICD-10-CM

## 2024-07-05 DIAGNOSIS — C68.9 MALIGNANT NEOPLASM OF URINARY ORGAN, UNSPECIFIED: ICD-10-CM

## 2024-07-05 DIAGNOSIS — Z51.11 ENCOUNTER FOR ANTINEOPLASTIC CHEMOTHERAPY: ICD-10-CM

## 2024-07-10 ENCOUNTER — RESULT REVIEW (OUTPATIENT)
Age: 74
End: 2024-07-10

## 2024-07-10 ENCOUNTER — APPOINTMENT (OUTPATIENT)
Dept: INFUSION THERAPY | Facility: HOSPITAL | Age: 74
End: 2024-07-10

## 2024-07-10 LAB
ALBUMIN SERPL ELPH-MCNC: 3.9 G/DL — SIGNIFICANT CHANGE UP (ref 3.3–5)
ALP SERPL-CCNC: 79 U/L — SIGNIFICANT CHANGE UP (ref 40–120)
ALT FLD-CCNC: <5 U/L — LOW (ref 10–45)
ANION GAP SERPL CALC-SCNC: 10 MMOL/L — SIGNIFICANT CHANGE UP (ref 5–17)
AST SERPL-CCNC: 27 U/L — SIGNIFICANT CHANGE UP (ref 10–40)
BASOPHILS # BLD AUTO: 0.07 K/UL — SIGNIFICANT CHANGE UP (ref 0–0.2)
BASOPHILS NFR BLD AUTO: 1.2 % — SIGNIFICANT CHANGE UP (ref 0–2)
BILIRUB SERPL-MCNC: 0.7 MG/DL — SIGNIFICANT CHANGE UP (ref 0.2–1.2)
BUN SERPL-MCNC: 20 MG/DL — SIGNIFICANT CHANGE UP (ref 7–23)
CALCIUM SERPL-MCNC: 9.1 MG/DL — SIGNIFICANT CHANGE UP (ref 8.4–10.5)
CHLORIDE SERPL-SCNC: 98 MMOL/L — SIGNIFICANT CHANGE UP (ref 96–108)
CO2 SERPL-SCNC: 24 MMOL/L — SIGNIFICANT CHANGE UP (ref 22–31)
CREAT SERPL-MCNC: 1.15 MG/DL — SIGNIFICANT CHANGE UP (ref 0.5–1.3)
EGFR: 66 ML/MIN/1.73M2 — SIGNIFICANT CHANGE UP
EOSINOPHIL # BLD AUTO: 0.22 K/UL — SIGNIFICANT CHANGE UP (ref 0–0.5)
EOSINOPHIL NFR BLD AUTO: 3.6 % — SIGNIFICANT CHANGE UP (ref 0–6)
GLUCOSE SERPL-MCNC: 281 MG/DL — HIGH (ref 70–99)
HCT VFR BLD CALC: 37.9 % — LOW (ref 39–50)
HGB BLD-MCNC: 12.9 G/DL — LOW (ref 13–17)
IMM GRANULOCYTES NFR BLD AUTO: 0.3 % — SIGNIFICANT CHANGE UP (ref 0–0.9)
LYMPHOCYTES # BLD AUTO: 1.24 K/UL — SIGNIFICANT CHANGE UP (ref 1–3.3)
LYMPHOCYTES # BLD AUTO: 20.5 % — SIGNIFICANT CHANGE UP (ref 13–44)
MCHC RBC-ENTMCNC: 32 PG — SIGNIFICANT CHANGE UP (ref 27–34)
MCHC RBC-ENTMCNC: 34 G/DL — SIGNIFICANT CHANGE UP (ref 32–36)
MCV RBC AUTO: 94 FL — SIGNIFICANT CHANGE UP (ref 80–100)
MONOCYTES # BLD AUTO: 0.67 K/UL — SIGNIFICANT CHANGE UP (ref 0–0.9)
MONOCYTES NFR BLD AUTO: 11.1 % — SIGNIFICANT CHANGE UP (ref 2–14)
NEUTROPHILS # BLD AUTO: 3.84 K/UL — SIGNIFICANT CHANGE UP (ref 1.8–7.4)
NEUTROPHILS NFR BLD AUTO: 63.3 % — SIGNIFICANT CHANGE UP (ref 43–77)
NRBC # BLD: 0 /100 WBCS — SIGNIFICANT CHANGE UP (ref 0–0)
PLATELET # BLD AUTO: 170 K/UL — SIGNIFICANT CHANGE UP (ref 150–400)
POTASSIUM SERPL-MCNC: 4.6 MMOL/L — SIGNIFICANT CHANGE UP (ref 3.5–5.3)
POTASSIUM SERPL-SCNC: 4.6 MMOL/L — SIGNIFICANT CHANGE UP (ref 3.5–5.3)
PROT SERPL-MCNC: 7 G/DL — SIGNIFICANT CHANGE UP (ref 6–8.3)
RBC # BLD: 4.03 M/UL — LOW (ref 4.2–5.8)
RBC # FLD: 13.8 % — SIGNIFICANT CHANGE UP (ref 10.3–14.5)
SODIUM SERPL-SCNC: 132 MMOL/L — LOW (ref 135–145)
WBC # BLD: 6.06 K/UL — SIGNIFICANT CHANGE UP (ref 3.8–10.5)
WBC # FLD AUTO: 6.06 K/UL — SIGNIFICANT CHANGE UP (ref 3.8–10.5)

## 2024-07-16 ENCOUNTER — APPOINTMENT (OUTPATIENT)
Dept: ENDOCRINOLOGY | Facility: CLINIC | Age: 74
End: 2024-07-16
Payer: MEDICARE

## 2024-07-16 DIAGNOSIS — E10.9 TYPE 1 DIABETES MELLITUS W/OUT COMPLICATIONS: ICD-10-CM

## 2024-07-16 PROCEDURE — 95251 CONT GLUC MNTR ANALYSIS I&R: CPT

## 2024-07-16 PROCEDURE — G0108 DIAB MANAGE TRN  PER INDIV: CPT

## 2024-07-19 ENCOUNTER — APPOINTMENT (OUTPATIENT)
Dept: GERIATRICS | Facility: CLINIC | Age: 74
End: 2024-07-19
Payer: MEDICARE

## 2024-07-19 VITALS
RESPIRATION RATE: 16 BRPM | WEIGHT: 206 LBS | DIASTOLIC BLOOD PRESSURE: 80 MMHG | BODY MASS INDEX: 27.3 KG/M2 | HEIGHT: 73 IN | HEART RATE: 67 BPM | SYSTOLIC BLOOD PRESSURE: 128 MMHG | OXYGEN SATURATION: 98 % | TEMPERATURE: 97.6 F

## 2024-07-19 DIAGNOSIS — Z51.5 ENCOUNTER FOR PALLIATIVE CARE: ICD-10-CM

## 2024-07-19 DIAGNOSIS — G89.29 DORSALGIA, UNSPECIFIED: ICD-10-CM

## 2024-07-19 DIAGNOSIS — F41.9 ANXIETY DISORDER, UNSPECIFIED: ICD-10-CM

## 2024-07-19 DIAGNOSIS — M54.9 DORSALGIA, UNSPECIFIED: ICD-10-CM

## 2024-07-19 DIAGNOSIS — G62.9 POLYNEUROPATHY, UNSPECIFIED: ICD-10-CM

## 2024-07-19 PROCEDURE — G2212 PROLONG OUTPT/OFFICE VIS: CPT

## 2024-07-19 PROCEDURE — 99205 OFFICE O/P NEW HI 60 MIN: CPT

## 2024-07-19 PROCEDURE — G2211 COMPLEX E/M VISIT ADD ON: CPT

## 2024-07-19 RX ORDER — OXYCODONE 10 MG/1
10 TABLET ORAL
Qty: 36 | Refills: 0 | Status: ACTIVE | COMMUNITY
Start: 2024-07-19 | End: 1900-01-01

## 2024-07-24 ENCOUNTER — RESULT REVIEW (OUTPATIENT)
Age: 74
End: 2024-07-24

## 2024-07-24 ENCOUNTER — APPOINTMENT (OUTPATIENT)
Dept: INFUSION THERAPY | Facility: HOSPITAL | Age: 74
End: 2024-07-24

## 2024-07-24 ENCOUNTER — APPOINTMENT (OUTPATIENT)
Dept: HEMATOLOGY ONCOLOGY | Facility: CLINIC | Age: 74
End: 2024-07-24
Payer: MEDICARE

## 2024-07-24 VITALS
OXYGEN SATURATION: 96 % | DIASTOLIC BLOOD PRESSURE: 77 MMHG | WEIGHT: 203 LBS | RESPIRATION RATE: 16 BRPM | TEMPERATURE: 97.7 F | HEART RATE: 71 BPM | BODY MASS INDEX: 26.78 KG/M2 | SYSTOLIC BLOOD PRESSURE: 132 MMHG

## 2024-07-24 DIAGNOSIS — C68.9 MALIGNANT NEOPLASM OF URINARY ORGAN, UNSPECIFIED: ICD-10-CM

## 2024-07-24 LAB
ALBUMIN SERPL ELPH-MCNC: 4.1 G/DL — SIGNIFICANT CHANGE UP (ref 3.3–5)
ALP SERPL-CCNC: 84 U/L — SIGNIFICANT CHANGE UP (ref 40–120)
ALT FLD-CCNC: <5 U/L — LOW (ref 10–45)
ANION GAP SERPL CALC-SCNC: 12 MMOL/L — SIGNIFICANT CHANGE UP (ref 5–17)
AST SERPL-CCNC: 24 U/L — SIGNIFICANT CHANGE UP (ref 10–40)
BASOPHILS # BLD AUTO: 0.06 K/UL — SIGNIFICANT CHANGE UP (ref 0–0.2)
BASOPHILS NFR BLD AUTO: 0.8 % — SIGNIFICANT CHANGE UP (ref 0–2)
BILIRUB SERPL-MCNC: 0.5 MG/DL — SIGNIFICANT CHANGE UP (ref 0.2–1.2)
BUN SERPL-MCNC: 24 MG/DL — HIGH (ref 7–23)
CALCIUM SERPL-MCNC: 9.5 MG/DL — SIGNIFICANT CHANGE UP (ref 8.4–10.5)
CHLORIDE SERPL-SCNC: 104 MMOL/L — SIGNIFICANT CHANGE UP (ref 96–108)
CO2 SERPL-SCNC: 21 MMOL/L — LOW (ref 22–31)
CREAT SERPL-MCNC: 1.19 MG/DL — SIGNIFICANT CHANGE UP (ref 0.5–1.3)
EGFR: 64 ML/MIN/1.73M2 — SIGNIFICANT CHANGE UP
EOSINOPHIL # BLD AUTO: 0.23 K/UL — SIGNIFICANT CHANGE UP (ref 0–0.5)
EOSINOPHIL NFR BLD AUTO: 2.9 % — SIGNIFICANT CHANGE UP (ref 0–6)
GLUCOSE SERPL-MCNC: 104 MG/DL — HIGH (ref 70–99)
HCT VFR BLD CALC: 39.4 % — SIGNIFICANT CHANGE UP (ref 39–50)
HGB BLD-MCNC: 13.2 G/DL — SIGNIFICANT CHANGE UP (ref 13–17)
IMM GRANULOCYTES NFR BLD AUTO: 0.5 % — SIGNIFICANT CHANGE UP (ref 0–0.9)
LYMPHOCYTES # BLD AUTO: 1.38 K/UL — SIGNIFICANT CHANGE UP (ref 1–3.3)
LYMPHOCYTES # BLD AUTO: 17.5 % — SIGNIFICANT CHANGE UP (ref 13–44)
MCHC RBC-ENTMCNC: 32 PG — SIGNIFICANT CHANGE UP (ref 27–34)
MCHC RBC-ENTMCNC: 33.5 G/DL — SIGNIFICANT CHANGE UP (ref 32–36)
MCV RBC AUTO: 95.6 FL — SIGNIFICANT CHANGE UP (ref 80–100)
MONOCYTES # BLD AUTO: 0.77 K/UL — SIGNIFICANT CHANGE UP (ref 0–0.9)
MONOCYTES NFR BLD AUTO: 9.8 % — SIGNIFICANT CHANGE UP (ref 2–14)
NEUTROPHILS # BLD AUTO: 5.39 K/UL — SIGNIFICANT CHANGE UP (ref 1.8–7.4)
NEUTROPHILS NFR BLD AUTO: 68.5 % — SIGNIFICANT CHANGE UP (ref 43–77)
NRBC # BLD: 0 /100 WBCS — SIGNIFICANT CHANGE UP (ref 0–0)
PLATELET # BLD AUTO: 177 K/UL — SIGNIFICANT CHANGE UP (ref 150–400)
POTASSIUM SERPL-MCNC: 4.3 MMOL/L — SIGNIFICANT CHANGE UP (ref 3.5–5.3)
POTASSIUM SERPL-SCNC: 4.3 MMOL/L — SIGNIFICANT CHANGE UP (ref 3.5–5.3)
PROT SERPL-MCNC: 7.4 G/DL — SIGNIFICANT CHANGE UP (ref 6–8.3)
RBC # BLD: 4.12 M/UL — LOW (ref 4.2–5.8)
RBC # FLD: 13.9 % — SIGNIFICANT CHANGE UP (ref 10.3–14.5)
SODIUM SERPL-SCNC: 136 MMOL/L — SIGNIFICANT CHANGE UP (ref 135–145)
T4 FREE SERPL-MCNC: 1.5 NG/DL — SIGNIFICANT CHANGE UP (ref 0.9–1.8)
TSH SERPL-MCNC: 0.65 UIU/ML — SIGNIFICANT CHANGE UP (ref 0.27–4.2)
WBC # BLD: 7.87 K/UL — SIGNIFICANT CHANGE UP (ref 3.8–10.5)
WBC # FLD AUTO: 7.87 K/UL — SIGNIFICANT CHANGE UP (ref 3.8–10.5)

## 2024-07-24 PROCEDURE — 99214 OFFICE O/P EST MOD 30 MIN: CPT

## 2024-07-24 PROCEDURE — G2211 COMPLEX E/M VISIT ADD ON: CPT

## 2024-07-26 NOTE — ASSESSMENT
[FreeTextEntry1] : 73 year old male s/p right nephroureterectomy June 2022 with pT3 in renal pelvis (10% squamous differentiation) and pTa in the ureter and has since had several bouts of T1HG in the bladder July 2022, Feb 2023. 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 At initial appointment, Dr. Mccall discussed his cancer status and further management. Based on AUA and EAU guideline subjects with upper tract tumors of the renal pelvis and ureter(s) must meet a high risk assessment defined as: tumor 1cm and/or hydronephrosis and/or high grade pathology and/or multifocal disease, where a radical NU approach to treat localized disease is warranted, followed by adjuvant immunotherapy. However, he adamantly declined any surgery given his solitary kidney and the result of dialysis. He is cisplatin ineligible. The potential regimen, pembrolizumab and enfortumab vedotin could induce 73% response including 15% CR rate. His T1HG bladder cancer may be treated with both combination. He will be monitored by Dr. Starkey for surveillance cystoscopy and ureteroscopy. Potential AEs of immunotherapy and enfortumab vedotin were discussed and patient signed consent. He started treatment with pembrolizumab and padcev on 10/18/2023. 12/14/23 CT CAP showed New 3 mm nodule left lung lower lobe. No adenopathy in the thorax, abdomen or pelvis. No left hydronephrosis and left mid ureteral lesion. 5/15 MRI abdomen and pelvis and 5/21/24 CT chest showed LISETTE.   Renal pelvis UCC, high grade  - on pembrolizumab D1 and enfortumab vedotin (padcev) D1, D8 on 21 day cycle, treatment held on 3/13 and 3/20 due to hyperglycemia, patient received pembro on 4/3, held padcev on 4/3 and 4/10, received 4/24 to resume pembrolizumab and padcev, May 1 did not receive, then resumed again 5/15, today 7/24 start of new cycle and will receive pembrolizumab and padcev  - reviewed potential AEs of keytruda, including but not limited to: fatigue, skin rash, joint pain, hypothyroidism, pneumonitis, hepatitis, nephritis, colitis, myocarditis, pericarditis, hypophysitis.  - reviewed potential AEs of padcev, including but not limited to fatigue, skin rash, worsening diabetes, eye problem and neuropathy and GI symptoms  - 5/15/24 CT Chest: resolved left lower lobe nodule that was new on 12/27/2023. No new lung nodule. - 5/21/24 MR AP: no evidence of new or recurrent neoplasm  - continue to follow with Dr. Starkey for surveillance cystoscopy and ureteroscopy/ureteral stent exchange, per Dr. Starkey note - R stent was removed, ULS sowed no obstruction or hydronephrosis, will monitor on MR AP, plan for ureteroscopy and cystoscopy in August 2024 7/24/24 :Pt inquired about therapy treatment break. Dr. Mccall reiterate that the plan would be to stop Padcev in the event that ureteroscopy is normal but will be maintained on immunotherapy [Keytruda]   Hyperglycemia Diabetes  - c/f padcev and/or immunotherapy induced Diabtes  - 3/13 and 3/20 pembro/padcev was held due to hyperglycemia/new onset Diabetes. On 3/13/24 pre treatment glucose = 571. Patient was sent to ED and discharged with insulin regimen and Endo follow up  - continue to follow with Dr. Ramos  Neuropathy  - endorses consistent numbness/tingling in fingers and toes  - continue to monitor   Pruritus - improved  - continue benadryl, betamethasone cream, sarna lotion as needed  - pt has been referred to Dermatology, they are holding off on scheduling appt at present  - perianal skin tear, now healing - advised to keep area clean and pat dry, continue Neomycin cream   Insomnia - improved  - Rx diazepam 5mg nightly as needed; discussed that this medication should not be taken with oxycodone or any other sedating medications, do not drive or operate machinery while taking this medication   Chronic low back pain  - this pain is chronic and is not cancer related pain, he has been prescribed oxy 10mg q8h PRN by another provider, however he says that he sometimes takes 30mg at a time... he was advised not to do this and to follow with his PCP / NSGY to discuss further management - discussed risk/benefits of opioids, not to operate vehicle or machinery while using these medications, do not take with diazepam or other sedating medications   - 5/15 MR L spine: No evidence of bony metastatic disease. - pt seen by Ortho spine and referred to Pain Mgmt for lumbar stenosis/degenerative arthritis   RTC every 3 weeks

## 2024-07-26 NOTE — HISTORY OF PRESENT ILLNESS
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   5/20/23 CT abdomen and pelvis w/o contrast showed mod to severe left hydroureteronephrosis to the left of a filling defect within the left mid ureter, neoplasm until proven otherwise. 7/18/23 CT chest and urogram showed Interval placement of left-sided ureteral stent with continued moderate hydroureteronephrosis. Point of transition in the left mid ureter. There is prominent soft tissue and a lack of contrast on the delayed films. This raises concern for an underlying neoplasm.  9/2/23 CT abdomen and pelvis without con showed left hydronephrosis without significant changes, left ureteral stent placement.   He was scheduled for segmental ureterectomy but ureteroscopy on 9/1 noted a 2.5 cm segment in the mid left ureter (now solitary kidney) with new disease and a 1.5 cm in the  distal ureter  lesion, and patholoy showed TaHG in the mid ureter and TaHG in the distal ureter as well as TaHG in the kidney with T1HG in the bladder.    He reports no hematuria, burning, frequency and abdominal pain.  He has chronic lower back pain for many years on oxycodon as needed.    11/1/23 Has newly developed skin itching and mild rash that has not been improving with OTC topicals. He also has intermittent headaches relieved by Tylenol. He reports increased fatigue and decreased sleep. He denied nausea, vomiting, diarrhea, constipation.  11/22/23: C2D15 pembrolizumab and padcev. Patient has decreased appetite, has not been eating much at all in the past few weeks and has lost 10lbs. He is feeling very weak, very fatigued. Patient is so tired that he had to be wheeled to exam room in wheelchair. He was able to walk from the hallway into the exam room. He needs assistance getting up from a sitting position. He will occ get shortness of breath on exertion and this has worsened in the past few weeks. Denies chest pain but says once in a while he has palpitations when trying to sleep. He is not sure if palpitations happen with exertion because he has not been able to exert himself very much of late. Patient denies fever, he is cold all of the time. Had chills the other day, took temperature and it was normal. He is exhausted because he has not been able to sleep. He only sleeps for 15 minutes at a time at night and it is a very light sleep. He has never suffered from insomnia before. He is not taking naps during the day. He was already having difficulty sleeping during cylce 1 but it has worsened tremendously with this cycle. He has been using hydroxyzine and benadryl for itching, neither of which have made him drowsy. He has been using melatonin which does not help. He tried his wife's ambien, which also did not help. Patient has ongoing itching but no rashes, says he is "itching, itching, always itching". Says that the prescribed medications have not worked, including betamethasone cream and hydroxyzine. Has been using Eucerin and Sarna creams, sitting in Epsom salt baths. Patient was constipated, however now is having loose stools twice a day for the last three days, has stopped taking bowel regimen. Pt denies numbness and tingling in hands and feet. Patient reports chronic low back pain that pre dates his cancer diagnosis, the pain is not cancer related, has been dealing with this with his PCP and ?NSGY, initially surgery was recommended but now is not. Was going to PT but is no longer because it was not helping. He takes oxycodone for this pain, he says sometimes he will go days without taking it and then he will need three tabs (30mg) at one time, which is not how this medication is prescribed.    12/13/23 He continues to have rash which appears to be improving. However he and his wife are concerned that his skin is darkening (even in non sun exposed areas). He also states that recentl he is "always cold." He denies diarrhea or constipation. He continues to have some insomnia  12/14/23 CT CAP showed New 3 mm nodule left lung lower lobe. No adenopathy in the thorax, abdomen or pelvis. No left hydronephrosis and left mid ureteral lesion.   1/3/24 reports good appetite, better sleep, skin rash with improved itchiness. He is able to do exercises.   1/24/24: Energy is better than it was, but is still fatigued. Mood is better than it was. Appetite is better than before. Patient notes that he is able to walk for half an hour. Itching has improved, it is most pronounced on his buttock at nighttime, not taking hydroxyzine, sometimes will use benadryl, is using topicals as well. Reports hyperpigmentation of skin since starting treatment. He reports increased frequency of urination within the last few weeks, he also reports in the last few weeks he has not been taking tamsulosin. Says he urinates about every 2-3 hours during night and day. No hematuria or dysuria. Patient has chronic pain in his back that he takes oxycodone for. He has occasional "sticking" pain in his L chest that he associates with his port. This was hurting him today, took oxy and it had improved. He does not have any cardiac chest pain, shortness of breath or palpitations. He denies constipation and diarrhea, says he eats home made yogurt that helps to keep his bowel movements regular. He is using valium everyday for insomnia. He tries to go to sleep without it, but cannot fall asleep without it.    2/12/24 Reports mild fatigue and improving itchiness. Both hand fingers develop mild numbness. Nocturia 2. Denies any gross hematuria.   3/6/24: Patient continues to have difficulty sleeping, for example went to bed at 9:30 last night, did not fall asleep until 6AM, slept for 4 hours. He has not been using medication to sleep. He feels tired often. Appetite is fine. Says that he has numbness in fingers and toes for the last month. Says sometimes his legs feel weak and that he feels off balance, has not fallen, is not dizzy. He does exercise, mainly push ups. He has pain in his low back, not cancer related, takes oxycodone, has MR next month. Itching does not bother him much any more. Reports constipation. Last saw eye doctor 6 months ago, says he does have difficulty seeing, unsure if this has worsened since being on treatment, no flashes or floaters. Says he sometimes feels a stabbing pain in his L chest that lasts for about 10 minutes at a time, this does not happen every day.   3/27/24: Last cycle of pembro/padcev was held due to hyperglycemia/new onset Diabetes. On 3/13 pre treatment glucose = 571.Patient was sent to ED and was discharged on 15 units lantus qhs and admelog 5 units TID.  Patient comes with his glucometer, his fasting glucoses have been 90 - 140s, evening he has been in the high 200s. He has an appt with Endocrine on Monday. He has been using the lantus at night and the admelog during the day. Has been trying to eat more healthy. Overall he has been feeling well. Pt's wife shoes me a picture of skin tear near pt's anus, says it started because of itching, it was open at one point but they have been using neomycin cream and it is healing nicely. Says this area opened up due to itching. However, say itching is not what it once was.    4/24/24: Patient says he learned about carb counting yesterday with Endo. For the last few days his glucose has been mostly in the 200 and 300s, was better controlled the week prior, right now glucose is 157, average glucose has been between 203 - 324. Pt having improved/normal BM he thinks because he is eating more healthy. He denies itching at present. He has fingertip numbness and tingling for the past few months as well as occ numbness in his toes. He saw podiatry yesterday. Energy is okay, goes for daily walks.    5/15/24 in last week, his glucose average 262mg/dl, feels numb in the hands and feet. He can do buttoning and walk steadily.   5/15/24 MRI abdomen and pelvis showed no evidence of disease. 5/21/24 CT chest showed resolved left lower lobe nodule that was new on 12/27/2023. No new lung nodule.  6/5/24 reports moderate numbness in fingers and toes. His hands and feet function normally. He can hold a cup and do buttoning, walk steadily.   [de-identified] : 7/3/24: Patient had been in [place] for the past few weeks, returned on Monday, he feels much more relaxed and enjoyed his time away. Appetite is good, trying to watch what he eats for DM. No constipation or diarrhea. Occ itching, no rashes. Has numbness in fingertips and in toes consistently. Reports that his glucoses  bounces around, average is 200, can be lower than 100 and over 300 at times. Endocrine has recommended an insulin pump, will be further discussed at his next appt with them.  He has ongoing chronic low back pain, was seen by Ortho/Spine, recc pain mgmt physician. Feels he is walking unstable at times, unsure if this is from back pain, recommended PT at this visit, however he wishes to see pain mgmt prior to starting PT. Patient notes that he is sleeping better and only using valium sporadically.    7/24/24 Pt is doing well. Tolerating Keytruda/Padcev however, expresses disappointment in immune mediated DM and frustration at maintaining euglycemia. Neuropathy and numbness are relatively stable and not interrupting his quality of life. Intermittent gait imbalance but no recent falls. Pt is scheduled to see Dr. Starkey for ureteroscopy.

## 2024-07-26 NOTE — PHYSICAL EXAM
[Fully active, able to carry on all pre-disease performance without restriction] : Status 0 - Fully active, able to carry on all pre-disease performance without restriction [Normal] : affect appropriate [de-identified] : anicteric  [de-identified] : no edema

## 2024-07-26 NOTE — PHYSICAL EXAM
[Fully active, able to carry on all pre-disease performance without restriction] : Status 0 - Fully active, able to carry on all pre-disease performance without restriction [Normal] : affect appropriate [de-identified] : anicteric  [de-identified] : no edema

## 2024-07-26 NOTE — HISTORY OF PRESENT ILLNESS
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   5/20/23 CT abdomen and pelvis w/o contrast showed mod to severe left hydroureteronephrosis to the left of a filling defect within the left mid ureter, neoplasm until proven otherwise. 7/18/23 CT chest and urogram showed Interval placement of left-sided ureteral stent with continued moderate hydroureteronephrosis. Point of transition in the left mid ureter. There is prominent soft tissue and a lack of contrast on the delayed films. This raises concern for an underlying neoplasm.  9/2/23 CT abdomen and pelvis without con showed left hydronephrosis without significant changes, left ureteral stent placement.   He was scheduled for segmental ureterectomy but ureteroscopy on 9/1 noted a 2.5 cm segment in the mid left ureter (now solitary kidney) with new disease and a 1.5 cm in the  distal ureter  lesion, and patholoy showed TaHG in the mid ureter and TaHG in the distal ureter as well as TaHG in the kidney with T1HG in the bladder.    He reports no hematuria, burning, frequency and abdominal pain.  He has chronic lower back pain for many years on oxycodon as needed.    11/1/23 Has newly developed skin itching and mild rash that has not been improving with OTC topicals. He also has intermittent headaches relieved by Tylenol. He reports increased fatigue and decreased sleep. He denied nausea, vomiting, diarrhea, constipation.  11/22/23: C2D15 pembrolizumab and padcev. Patient has decreased appetite, has not been eating much at all in the past few weeks and has lost 10lbs. He is feeling very weak, very fatigued. Patient is so tired that he had to be wheeled to exam room in wheelchair. He was able to walk from the hallway into the exam room. He needs assistance getting up from a sitting position. He will occ get shortness of breath on exertion and this has worsened in the past few weeks. Denies chest pain but says once in a while he has palpitations when trying to sleep. He is not sure if palpitations happen with exertion because he has not been able to exert himself very much of late. Patient denies fever, he is cold all of the time. Had chills the other day, took temperature and it was normal. He is exhausted because he has not been able to sleep. He only sleeps for 15 minutes at a time at night and it is a very light sleep. He has never suffered from insomnia before. He is not taking naps during the day. He was already having difficulty sleeping during cylce 1 but it has worsened tremendously with this cycle. He has been using hydroxyzine and benadryl for itching, neither of which have made him drowsy. He has been using melatonin which does not help. He tried his wife's ambien, which also did not help. Patient has ongoing itching but no rashes, says he is "itching, itching, always itching". Says that the prescribed medications have not worked, including betamethasone cream and hydroxyzine. Has been using Eucerin and Sarna creams, sitting in Epsom salt baths. Patient was constipated, however now is having loose stools twice a day for the last three days, has stopped taking bowel regimen. Pt denies numbness and tingling in hands and feet. Patient reports chronic low back pain that pre dates his cancer diagnosis, the pain is not cancer related, has been dealing with this with his PCP and ?NSGY, initially surgery was recommended but now is not. Was going to PT but is no longer because it was not helping. He takes oxycodone for this pain, he says sometimes he will go days without taking it and then he will need three tabs (30mg) at one time, which is not how this medication is prescribed.    12/13/23 He continues to have rash which appears to be improving. However he and his wife are concerned that his skin is darkening (even in non sun exposed areas). He also states that recentl he is "always cold." He denies diarrhea or constipation. He continues to have some insomnia  12/14/23 CT CAP showed New 3 mm nodule left lung lower lobe. No adenopathy in the thorax, abdomen or pelvis. No left hydronephrosis and left mid ureteral lesion.   1/3/24 reports good appetite, better sleep, skin rash with improved itchiness. He is able to do exercises.   1/24/24: Energy is better than it was, but is still fatigued. Mood is better than it was. Appetite is better than before. Patient notes that he is able to walk for half an hour. Itching has improved, it is most pronounced on his buttock at nighttime, not taking hydroxyzine, sometimes will use benadryl, is using topicals as well. Reports hyperpigmentation of skin since starting treatment. He reports increased frequency of urination within the last few weeks, he also reports in the last few weeks he has not been taking tamsulosin. Says he urinates about every 2-3 hours during night and day. No hematuria or dysuria. Patient has chronic pain in his back that he takes oxycodone for. He has occasional "sticking" pain in his L chest that he associates with his port. This was hurting him today, took oxy and it had improved. He does not have any cardiac chest pain, shortness of breath or palpitations. He denies constipation and diarrhea, says he eats home made yogurt that helps to keep his bowel movements regular. He is using valium everyday for insomnia. He tries to go to sleep without it, but cannot fall asleep without it.    2/12/24 Reports mild fatigue and improving itchiness. Both hand fingers develop mild numbness. Nocturia 2. Denies any gross hematuria.   3/6/24: Patient continues to have difficulty sleeping, for example went to bed at 9:30 last night, did not fall asleep until 6AM, slept for 4 hours. He has not been using medication to sleep. He feels tired often. Appetite is fine. Says that he has numbness in fingers and toes for the last month. Says sometimes his legs feel weak and that he feels off balance, has not fallen, is not dizzy. He does exercise, mainly push ups. He has pain in his low back, not cancer related, takes oxycodone, has MR next month. Itching does not bother him much any more. Reports constipation. Last saw eye doctor 6 months ago, says he does have difficulty seeing, unsure if this has worsened since being on treatment, no flashes or floaters. Says he sometimes feels a stabbing pain in his L chest that lasts for about 10 minutes at a time, this does not happen every day.   3/27/24: Last cycle of pembro/padcev was held due to hyperglycemia/new onset Diabetes. On 3/13 pre treatment glucose = 571.Patient was sent to ED and was discharged on 15 units lantus qhs and admelog 5 units TID.  Patient comes with his glucometer, his fasting glucoses have been 90 - 140s, evening he has been in the high 200s. He has an appt with Endocrine on Monday. He has been using the lantus at night and the admelog during the day. Has been trying to eat more healthy. Overall he has been feeling well. Pt's wife shoes me a picture of skin tear near pt's anus, says it started because of itching, it was open at one point but they have been using neomycin cream and it is healing nicely. Says this area opened up due to itching. However, say itching is not what it once was.    4/24/24: Patient says he learned about carb counting yesterday with Endo. For the last few days his glucose has been mostly in the 200 and 300s, was better controlled the week prior, right now glucose is 157, average glucose has been between 203 - 324. Pt having improved/normal BM he thinks because he is eating more healthy. He denies itching at present. He has fingertip numbness and tingling for the past few months as well as occ numbness in his toes. He saw podiatry yesterday. Energy is okay, goes for daily walks.    5/15/24 in last week, his glucose average 262mg/dl, feels numb in the hands and feet. He can do buttoning and walk steadily.   5/15/24 MRI abdomen and pelvis showed no evidence of disease. 5/21/24 CT chest showed resolved left lower lobe nodule that was new on 12/27/2023. No new lung nodule.  6/5/24 reports moderate numbness in fingers and toes. His hands and feet function normally. He can hold a cup and do buttoning, walk steadily.   [de-identified] : 7/3/24: Patient had been in [place] for the past few weeks, returned on Monday, he feels much more relaxed and enjoyed his time away. Appetite is good, trying to watch what he eats for DM. No constipation or diarrhea. Occ itching, no rashes. Has numbness in fingertips and in toes consistently. Reports that his glucoses  bounces around, average is 200, can be lower than 100 and over 300 at times. Endocrine has recommended an insulin pump, will be further discussed at his next appt with them.  He has ongoing chronic low back pain, was seen by Ortho/Spine, recc pain mgmt physician. Feels he is walking unstable at times, unsure if this is from back pain, recommended PT at this visit, however he wishes to see pain mgmt prior to starting PT. Patient notes that he is sleeping better and only using valium sporadically.    7/24/24 Pt is doing well. Tolerating Keytruda/Padcev however, expresses disappointment in immune mediated DM and frustration at maintaining euglycemia. Neuropathy and numbness are relatively stable and not interrupting his quality of life. Intermittent gait imbalance but no recent falls. Pt is scheduled to see Dr. Starkey for ureteroscopy.

## 2024-07-26 NOTE — REVIEW OF SYSTEMS
[Diarrhea: Grade 0] : Diarrhea: Grade 0 [Joint Pain] : joint pain [Fever] : no fever [Fatigue] : no fatigue [Chest Pain] : no chest pain [Palpitations] : no palpitations [Lower Ext Edema] : no lower extremity edema [Shortness Of Breath] : no shortness of breath [Cough] : no cough [Abdominal Pain] : no abdominal pain [Vomiting] : no vomiting [Constipation] : no constipation [Dysuria] : no dysuria [Muscle Pain] : no muscle pain [Skin Rash] : no skin rash [FreeTextEntry9] : lower back pain

## 2024-07-26 NOTE — HISTORY OF PRESENT ILLNESS
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   5/20/23 CT abdomen and pelvis w/o contrast showed mod to severe left hydroureteronephrosis to the left of a filling defect within the left mid ureter, neoplasm until proven otherwise. 7/18/23 CT chest and urogram showed Interval placement of left-sided ureteral stent with continued moderate hydroureteronephrosis. Point of transition in the left mid ureter. There is prominent soft tissue and a lack of contrast on the delayed films. This raises concern for an underlying neoplasm.  9/2/23 CT abdomen and pelvis without con showed left hydronephrosis without significant changes, left ureteral stent placement.   He was scheduled for segmental ureterectomy but ureteroscopy on 9/1 noted a 2.5 cm segment in the mid left ureter (now solitary kidney) with new disease and a 1.5 cm in the  distal ureter  lesion, and patholoy showed TaHG in the mid ureter and TaHG in the distal ureter as well as TaHG in the kidney with T1HG in the bladder.    He reports no hematuria, burning, frequency and abdominal pain.  He has chronic lower back pain for many years on oxycodon as needed.    11/1/23 Has newly developed skin itching and mild rash that has not been improving with OTC topicals. He also has intermittent headaches relieved by Tylenol. He reports increased fatigue and decreased sleep. He denied nausea, vomiting, diarrhea, constipation.  11/22/23: C2D15 pembrolizumab and padcev. Patient has decreased appetite, has not been eating much at all in the past few weeks and has lost 10lbs. He is feeling very weak, very fatigued. Patient is so tired that he had to be wheeled to exam room in wheelchair. He was able to walk from the hallway into the exam room. He needs assistance getting up from a sitting position. He will occ get shortness of breath on exertion and this has worsened in the past few weeks. Denies chest pain but says once in a while he has palpitations when trying to sleep. He is not sure if palpitations happen with exertion because he has not been able to exert himself very much of late. Patient denies fever, he is cold all of the time. Had chills the other day, took temperature and it was normal. He is exhausted because he has not been able to sleep. He only sleeps for 15 minutes at a time at night and it is a very light sleep. He has never suffered from insomnia before. He is not taking naps during the day. He was already having difficulty sleeping during cylce 1 but it has worsened tremendously with this cycle. He has been using hydroxyzine and benadryl for itching, neither of which have made him drowsy. He has been using melatonin which does not help. He tried his wife's ambien, which also did not help. Patient has ongoing itching but no rashes, says he is "itching, itching, always itching". Says that the prescribed medications have not worked, including betamethasone cream and hydroxyzine. Has been using Eucerin and Sarna creams, sitting in Epsom salt baths. Patient was constipated, however now is having loose stools twice a day for the last three days, has stopped taking bowel regimen. Pt denies numbness and tingling in hands and feet. Patient reports chronic low back pain that pre dates his cancer diagnosis, the pain is not cancer related, has been dealing with this with his PCP and ?NSGY, initially surgery was recommended but now is not. Was going to PT but is no longer because it was not helping. He takes oxycodone for this pain, he says sometimes he will go days without taking it and then he will need three tabs (30mg) at one time, which is not how this medication is prescribed.    12/13/23 He continues to have rash which appears to be improving. However he and his wife are concerned that his skin is darkening (even in non sun exposed areas). He also states that recentl he is "always cold." He denies diarrhea or constipation. He continues to have some insomnia  12/14/23 CT CAP showed New 3 mm nodule left lung lower lobe. No adenopathy in the thorax, abdomen or pelvis. No left hydronephrosis and left mid ureteral lesion.   1/3/24 reports good appetite, better sleep, skin rash with improved itchiness. He is able to do exercises.   1/24/24: Energy is better than it was, but is still fatigued. Mood is better than it was. Appetite is better than before. Patient notes that he is able to walk for half an hour. Itching has improved, it is most pronounced on his buttock at nighttime, not taking hydroxyzine, sometimes will use benadryl, is using topicals as well. Reports hyperpigmentation of skin since starting treatment. He reports increased frequency of urination within the last few weeks, he also reports in the last few weeks he has not been taking tamsulosin. Says he urinates about every 2-3 hours during night and day. No hematuria or dysuria. Patient has chronic pain in his back that he takes oxycodone for. He has occasional "sticking" pain in his L chest that he associates with his port. This was hurting him today, took oxy and it had improved. He does not have any cardiac chest pain, shortness of breath or palpitations. He denies constipation and diarrhea, says he eats home made yogurt that helps to keep his bowel movements regular. He is using valium everyday for insomnia. He tries to go to sleep without it, but cannot fall asleep without it.    2/12/24 Reports mild fatigue and improving itchiness. Both hand fingers develop mild numbness. Nocturia 2. Denies any gross hematuria.   3/6/24: Patient continues to have difficulty sleeping, for example went to bed at 9:30 last night, did not fall asleep until 6AM, slept for 4 hours. He has not been using medication to sleep. He feels tired often. Appetite is fine. Says that he has numbness in fingers and toes for the last month. Says sometimes his legs feel weak and that he feels off balance, has not fallen, is not dizzy. He does exercise, mainly push ups. He has pain in his low back, not cancer related, takes oxycodone, has MR next month. Itching does not bother him much any more. Reports constipation. Last saw eye doctor 6 months ago, says he does have difficulty seeing, unsure if this has worsened since being on treatment, no flashes or floaters. Says he sometimes feels a stabbing pain in his L chest that lasts for about 10 minutes at a time, this does not happen every day.   3/27/24: Last cycle of pembro/padcev was held due to hyperglycemia/new onset Diabetes. On 3/13 pre treatment glucose = 571.Patient was sent to ED and was discharged on 15 units lantus qhs and admelog 5 units TID.  Patient comes with his glucometer, his fasting glucoses have been 90 - 140s, evening he has been in the high 200s. He has an appt with Endocrine on Monday. He has been using the lantus at night and the admelog during the day. Has been trying to eat more healthy. Overall he has been feeling well. Pt's wife shoes me a picture of skin tear near pt's anus, says it started because of itching, it was open at one point but they have been using neomycin cream and it is healing nicely. Says this area opened up due to itching. However, say itching is not what it once was.    4/24/24: Patient says he learned about carb counting yesterday with Endo. For the last few days his glucose has been mostly in the 200 and 300s, was better controlled the week prior, right now glucose is 157, average glucose has been between 203 - 324. Pt having improved/normal BM he thinks because he is eating more healthy. He denies itching at present. He has fingertip numbness and tingling for the past few months as well as occ numbness in his toes. He saw podiatry yesterday. Energy is okay, goes for daily walks.    5/15/24 in last week, his glucose average 262mg/dl, feels numb in the hands and feet. He can do buttoning and walk steadily.   5/15/24 MRI abdomen and pelvis showed no evidence of disease. 5/21/24 CT chest showed resolved left lower lobe nodule that was new on 12/27/2023. No new lung nodule.  6/5/24 reports moderate numbness in fingers and toes. His hands and feet function normally. He can hold a cup and do buttoning, walk steadily.   [de-identified] : 7/3/24: Patient had been in [place] for the past few weeks, returned on Monday, he feels much more relaxed and enjoyed his time away. Appetite is good, trying to watch what he eats for DM. No constipation or diarrhea. Occ itching, no rashes. Has numbness in fingertips and in toes consistently. Reports that his glucoses  bounces around, average is 200, can be lower than 100 and over 300 at times. Endocrine has recommended an insulin pump, will be further discussed at his next appt with them.  He has ongoing chronic low back pain, was seen by Ortho/Spine, recc pain mgmt physician. Feels he is walking unstable at times, unsure if this is from back pain, recommended PT at this visit, however he wishes to see pain mgmt prior to starting PT. Patient notes that he is sleeping better and only using valium sporadically.    7/24/24 Pt is doing well. Tolerating Keytruda/Padcev however, expresses disappointment in immune mediated DM and frustration at maintaining euglycemia. Neuropathy and numbness are relatively stable and not interrupting his quality of life. Intermittent gait imbalance but no recent falls. Pt is scheduled to see Dr. Starkey for ureteroscopy.

## 2024-07-26 NOTE — PHYSICAL EXAM
[Fully active, able to carry on all pre-disease performance without restriction] : Status 0 - Fully active, able to carry on all pre-disease performance without restriction [Normal] : affect appropriate [de-identified] : anicteric  [de-identified] : no edema

## 2024-07-26 NOTE — PHYSICAL EXAM
[Fully active, able to carry on all pre-disease performance without restriction] : Status 0 - Fully active, able to carry on all pre-disease performance without restriction [Normal] : affect appropriate [de-identified] : anicteric  [de-identified] : no edema

## 2024-07-26 NOTE — HISTORY OF PRESENT ILLNESS
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   5/20/23 CT abdomen and pelvis w/o contrast showed mod to severe left hydroureteronephrosis to the left of a filling defect within the left mid ureter, neoplasm until proven otherwise. 7/18/23 CT chest and urogram showed Interval placement of left-sided ureteral stent with continued moderate hydroureteronephrosis. Point of transition in the left mid ureter. There is prominent soft tissue and a lack of contrast on the delayed films. This raises concern for an underlying neoplasm.  9/2/23 CT abdomen and pelvis without con showed left hydronephrosis without significant changes, left ureteral stent placement.   He was scheduled for segmental ureterectomy but ureteroscopy on 9/1 noted a 2.5 cm segment in the mid left ureter (now solitary kidney) with new disease and a 1.5 cm in the  distal ureter  lesion, and patholoy showed TaHG in the mid ureter and TaHG in the distal ureter as well as TaHG in the kidney with T1HG in the bladder.    He reports no hematuria, burning, frequency and abdominal pain.  He has chronic lower back pain for many years on oxycodon as needed.    11/1/23 Has newly developed skin itching and mild rash that has not been improving with OTC topicals. He also has intermittent headaches relieved by Tylenol. He reports increased fatigue and decreased sleep. He denied nausea, vomiting, diarrhea, constipation.  11/22/23: C2D15 pembrolizumab and padcev. Patient has decreased appetite, has not been eating much at all in the past few weeks and has lost 10lbs. He is feeling very weak, very fatigued. Patient is so tired that he had to be wheeled to exam room in wheelchair. He was able to walk from the hallway into the exam room. He needs assistance getting up from a sitting position. He will occ get shortness of breath on exertion and this has worsened in the past few weeks. Denies chest pain but says once in a while he has palpitations when trying to sleep. He is not sure if palpitations happen with exertion because he has not been able to exert himself very much of late. Patient denies fever, he is cold all of the time. Had chills the other day, took temperature and it was normal. He is exhausted because he has not been able to sleep. He only sleeps for 15 minutes at a time at night and it is a very light sleep. He has never suffered from insomnia before. He is not taking naps during the day. He was already having difficulty sleeping during cylce 1 but it has worsened tremendously with this cycle. He has been using hydroxyzine and benadryl for itching, neither of which have made him drowsy. He has been using melatonin which does not help. He tried his wife's ambien, which also did not help. Patient has ongoing itching but no rashes, says he is "itching, itching, always itching". Says that the prescribed medications have not worked, including betamethasone cream and hydroxyzine. Has been using Eucerin and Sarna creams, sitting in Epsom salt baths. Patient was constipated, however now is having loose stools twice a day for the last three days, has stopped taking bowel regimen. Pt denies numbness and tingling in hands and feet. Patient reports chronic low back pain that pre dates his cancer diagnosis, the pain is not cancer related, has been dealing with this with his PCP and ?NSGY, initially surgery was recommended but now is not. Was going to PT but is no longer because it was not helping. He takes oxycodone for this pain, he says sometimes he will go days without taking it and then he will need three tabs (30mg) at one time, which is not how this medication is prescribed.    12/13/23 He continues to have rash which appears to be improving. However he and his wife are concerned that his skin is darkening (even in non sun exposed areas). He also states that recentl he is "always cold." He denies diarrhea or constipation. He continues to have some insomnia  12/14/23 CT CAP showed New 3 mm nodule left lung lower lobe. No adenopathy in the thorax, abdomen or pelvis. No left hydronephrosis and left mid ureteral lesion.   1/3/24 reports good appetite, better sleep, skin rash with improved itchiness. He is able to do exercises.   1/24/24: Energy is better than it was, but is still fatigued. Mood is better than it was. Appetite is better than before. Patient notes that he is able to walk for half an hour. Itching has improved, it is most pronounced on his buttock at nighttime, not taking hydroxyzine, sometimes will use benadryl, is using topicals as well. Reports hyperpigmentation of skin since starting treatment. He reports increased frequency of urination within the last few weeks, he also reports in the last few weeks he has not been taking tamsulosin. Says he urinates about every 2-3 hours during night and day. No hematuria or dysuria. Patient has chronic pain in his back that he takes oxycodone for. He has occasional "sticking" pain in his L chest that he associates with his port. This was hurting him today, took oxy and it had improved. He does not have any cardiac chest pain, shortness of breath or palpitations. He denies constipation and diarrhea, says he eats home made yogurt that helps to keep his bowel movements regular. He is using valium everyday for insomnia. He tries to go to sleep without it, but cannot fall asleep without it.    2/12/24 Reports mild fatigue and improving itchiness. Both hand fingers develop mild numbness. Nocturia 2. Denies any gross hematuria.   3/6/24: Patient continues to have difficulty sleeping, for example went to bed at 9:30 last night, did not fall asleep until 6AM, slept for 4 hours. He has not been using medication to sleep. He feels tired often. Appetite is fine. Says that he has numbness in fingers and toes for the last month. Says sometimes his legs feel weak and that he feels off balance, has not fallen, is not dizzy. He does exercise, mainly push ups. He has pain in his low back, not cancer related, takes oxycodone, has MR next month. Itching does not bother him much any more. Reports constipation. Last saw eye doctor 6 months ago, says he does have difficulty seeing, unsure if this has worsened since being on treatment, no flashes or floaters. Says he sometimes feels a stabbing pain in his L chest that lasts for about 10 minutes at a time, this does not happen every day.   3/27/24: Last cycle of pembro/padcev was held due to hyperglycemia/new onset Diabetes. On 3/13 pre treatment glucose = 571.Patient was sent to ED and was discharged on 15 units lantus qhs and admelog 5 units TID.  Patient comes with his glucometer, his fasting glucoses have been 90 - 140s, evening he has been in the high 200s. He has an appt with Endocrine on Monday. He has been using the lantus at night and the admelog during the day. Has been trying to eat more healthy. Overall he has been feeling well. Pt's wife shoes me a picture of skin tear near pt's anus, says it started because of itching, it was open at one point but they have been using neomycin cream and it is healing nicely. Says this area opened up due to itching. However, say itching is not what it once was.    4/24/24: Patient says he learned about carb counting yesterday with Endo. For the last few days his glucose has been mostly in the 200 and 300s, was better controlled the week prior, right now glucose is 157, average glucose has been between 203 - 324. Pt having improved/normal BM he thinks because he is eating more healthy. He denies itching at present. He has fingertip numbness and tingling for the past few months as well as occ numbness in his toes. He saw podiatry yesterday. Energy is okay, goes for daily walks.    5/15/24 in last week, his glucose average 262mg/dl, feels numb in the hands and feet. He can do buttoning and walk steadily.   5/15/24 MRI abdomen and pelvis showed no evidence of disease. 5/21/24 CT chest showed resolved left lower lobe nodule that was new on 12/27/2023. No new lung nodule.  6/5/24 reports moderate numbness in fingers and toes. His hands and feet function normally. He can hold a cup and do buttoning, walk steadily.   [de-identified] : 7/3/24: Patient had been in [place] for the past few weeks, returned on Monday, he feels much more relaxed and enjoyed his time away. Appetite is good, trying to watch what he eats for DM. No constipation or diarrhea. Occ itching, no rashes. Has numbness in fingertips and in toes consistently. Reports that his glucoses  bounces around, average is 200, can be lower than 100 and over 300 at times. Endocrine has recommended an insulin pump, will be further discussed at his next appt with them.  He has ongoing chronic low back pain, was seen by Ortho/Spine, recc pain mgmt physician. Feels he is walking unstable at times, unsure if this is from back pain, recommended PT at this visit, however he wishes to see pain mgmt prior to starting PT. Patient notes that he is sleeping better and only using valium sporadically.    7/24/24 Pt is doing well. Tolerating Keytruda/Padcev however, expresses disappointment in immune mediated DM and frustration at maintaining euglycemia. Neuropathy and numbness are relatively stable and not interrupting his quality of life. Intermittent gait imbalance but no recent falls. Pt is scheduled to see Dr. Starkey for ureteroscopy.

## 2024-07-29 ENCOUNTER — RX RENEWAL (OUTPATIENT)
Age: 74
End: 2024-07-29

## 2024-07-30 ENCOUNTER — APPOINTMENT (OUTPATIENT)
Dept: FAMILY MEDICINE | Facility: CLINIC | Age: 74
End: 2024-07-30
Payer: MEDICARE

## 2024-07-30 VITALS
OXYGEN SATURATION: 98 % | HEIGHT: 73 IN | SYSTOLIC BLOOD PRESSURE: 130 MMHG | TEMPERATURE: 97.7 F | WEIGHT: 205 LBS | HEART RATE: 96 BPM | DIASTOLIC BLOOD PRESSURE: 78 MMHG | RESPIRATION RATE: 16 BRPM | BODY MASS INDEX: 27.17 KG/M2

## 2024-07-30 DIAGNOSIS — Z01.818 ENCOUNTER FOR OTHER PREPROCEDURAL EXAMINATION: ICD-10-CM

## 2024-07-30 DIAGNOSIS — R79.89 OTHER SPECIFIED ABNORMAL FINDINGS OF BLOOD CHEMISTRY: ICD-10-CM

## 2024-07-30 DIAGNOSIS — C65.9 MALIGNANT NEOPLASM OF UNSPECIFIED RENAL PELVIS: ICD-10-CM

## 2024-07-30 DIAGNOSIS — N18.32 CHRONIC KIDNEY DISEASE, STAGE 3B: ICD-10-CM

## 2024-07-30 DIAGNOSIS — E03.9 HYPOTHYROIDISM, UNSPECIFIED: ICD-10-CM

## 2024-07-30 DIAGNOSIS — E78.5 HYPERLIPIDEMIA, UNSPECIFIED: ICD-10-CM

## 2024-07-30 PROCEDURE — G2211 COMPLEX E/M VISIT ADD ON: CPT

## 2024-07-30 PROCEDURE — 99215 OFFICE O/P EST HI 40 MIN: CPT

## 2024-07-30 NOTE — REVIEW OF SYSTEMS
[Back Pain] : back pain [Negative] : Heme/Lymph [Fever] : no fever [Chills] : no chills [Fatigue] : no fatigue [Joint Pain] : no joint pain [Muscle Pain] : no muscle pain [Muscle Weakness] : no muscle weakness [Joint Swelling] : no joint swelling [FreeTextEntry9] : chronic back pain

## 2024-07-30 NOTE — PHYSICAL EXAM
[No Varicosities] : no varicosities [No Edema] : there was no peripheral edema [No CVA Tenderness] : no CVA  tenderness [No Spinal Tenderness] : no spinal tenderness [No Joint Swelling] : no joint swelling [Grossly Normal Strength/Tone] : grossly normal strength/tone [Normal] : affect was normal and insight and judgment were intact [No Acute Distress] : no acute distress

## 2024-07-30 NOTE — HISTORY OF PRESENT ILLNESS
[No Pertinent Pulmonary History] : no history of asthma, COPD, sleep apnea, or smoking [No Adverse Anesthesia Reaction] : no adverse anesthesia reaction in self or family member [Chronic Kidney Disease] : chronic kidney disease [(Patient denies any chest pain, claudication, dyspnea on exertion, orthopnea, palpitations or syncope)] : Patient denies any chest pain, claudication, dyspnea on exertion, orthopnea, palpitations or syncope [Moderate (4-6 METs)] : Moderate (4-6 METs) [Aortic Stenosis] : no aortic stenosis [Atrial Fibrillation] : no atrial fibrillation [Coronary Artery Disease] : no coronary artery disease [Recent Myocardial Infarction] : no recent myocardial infarction [Implantable Device/Pacemaker] : no implantable device/pacemaker [Chronic Anticoagulation] : no chronic anticoagulation [Diabetes] : no diabetes [FreeTextEntry1] : Cystoscopy, left ureteroscopy  [FreeTextEntry2] : 08/09/24 [FreeTextEntry3] :  [FreeTextEntry4] : Encounter conducted in Polish. Patient denies CP,SOB,abd pain, no fever, no chills. S/p right  nephrectomy 05/22, renal Ca, Now under Oncol care - Bladder Ca. Pt f/u with pain management  - bladder Ca ,chronic, worsening back pain.  [FreeTextEntry5] : Aortic aneurysm

## 2024-07-30 NOTE — ASSESSMENT
[High Risk Surgery - Intraperitoneal, Intrathoracic or Supringuinal Vascular Procedures] : High Risk Surgery - Intraperitoneal, Intrathoracic or Supringuinal Vascular Procedures - No (0) [Ischemic Heart Disease] : Ischemic Heart Disease - No (0) [Congestive Heart Failure] : Congestive Heart Failure - No (0) [Prior Cerebrovascular Accident or TIA] : Prior Cerebrovascular Accident or TIA - No (0) [Creatinine >= 2mg/dL (1 Point)] : Creatinine >= 2mg/dL - No (0) [As per surgery] : as per surgery [Insulin-dependent Diabetic (1 point)] : Insulin-dependent Diabetic - Yes (1) [1] : 1 , RCRI Class: II, Risk of Post-Op Cardiac Complications: 6.0%, 95% CI for Risk Estimate: 4.9% - 7.4% [Patient Requires Further Testing] : Patient requires further testing [Other: _____] : [unfilled] [FreeTextEntry5] : NA [FreeTextEntry6] : NA [FreeTextEntry7] : hold all supplement 7 days prior, cont rest of the meds , if NPO prior the surgery - hold Novolog, and take only 50% dose of Tresiba

## 2024-07-31 ENCOUNTER — APPOINTMENT (OUTPATIENT)
Dept: INFUSION THERAPY | Facility: HOSPITAL | Age: 74
End: 2024-07-31

## 2024-07-31 ENCOUNTER — APPOINTMENT (OUTPATIENT)
Dept: GERIATRICS | Facility: CLINIC | Age: 74
End: 2024-07-31
Payer: MEDICARE

## 2024-07-31 ENCOUNTER — RESULT REVIEW (OUTPATIENT)
Age: 74
End: 2024-07-31

## 2024-07-31 ENCOUNTER — OUTPATIENT (OUTPATIENT)
Dept: OUTPATIENT SERVICES | Facility: HOSPITAL | Age: 74
LOS: 1 days | End: 2024-07-31

## 2024-07-31 VITALS
WEIGHT: 205.91 LBS | HEART RATE: 62 BPM | HEIGHT: 73 IN | TEMPERATURE: 98 F | RESPIRATION RATE: 16 BRPM | OXYGEN SATURATION: 99 % | SYSTOLIC BLOOD PRESSURE: 132 MMHG | DIASTOLIC BLOOD PRESSURE: 80 MMHG

## 2024-07-31 DIAGNOSIS — Z98.890 OTHER SPECIFIED POSTPROCEDURAL STATES: Chronic | ICD-10-CM

## 2024-07-31 DIAGNOSIS — Z86.79 PERSONAL HISTORY OF OTHER DISEASES OF THE CIRCULATORY SYSTEM: ICD-10-CM

## 2024-07-31 DIAGNOSIS — C65.9 MALIGNANT NEOPLASM OF UNSPECIFIED RENAL PELVIS: ICD-10-CM

## 2024-07-31 DIAGNOSIS — C67.9 MALIGNANT NEOPLASM OF BLADDER, UNSPECIFIED: ICD-10-CM

## 2024-07-31 DIAGNOSIS — E11.9 TYPE 2 DIABETES MELLITUS WITHOUT COMPLICATIONS: ICD-10-CM

## 2024-07-31 DIAGNOSIS — Z90.2 ACQUIRED ABSENCE OF LUNG [PART OF]: Chronic | ICD-10-CM

## 2024-07-31 DIAGNOSIS — Z90.5 ACQUIRED ABSENCE OF KIDNEY: Chronic | ICD-10-CM

## 2024-07-31 LAB
ALBUMIN SERPL ELPH-MCNC: 4.1 G/DL — SIGNIFICANT CHANGE UP (ref 3.3–5)
ALP SERPL-CCNC: 81 U/L — SIGNIFICANT CHANGE UP (ref 40–120)
ALT FLD-CCNC: 6 U/L — LOW (ref 10–45)
ANION GAP SERPL CALC-SCNC: 11 MMOL/L — SIGNIFICANT CHANGE UP (ref 5–17)
AST SERPL-CCNC: 29 U/L — SIGNIFICANT CHANGE UP (ref 10–40)
BASOPHILS # BLD AUTO: 0.07 K/UL — SIGNIFICANT CHANGE UP (ref 0–0.2)
BASOPHILS NFR BLD AUTO: 1.1 % — SIGNIFICANT CHANGE UP (ref 0–2)
BILIRUB SERPL-MCNC: 0.6 MG/DL — SIGNIFICANT CHANGE UP (ref 0.2–1.2)
BUN SERPL-MCNC: 23 MG/DL — SIGNIFICANT CHANGE UP (ref 7–23)
CALCIUM SERPL-MCNC: 9.6 MG/DL — SIGNIFICANT CHANGE UP (ref 8.4–10.5)
CHLORIDE SERPL-SCNC: 100 MMOL/L — SIGNIFICANT CHANGE UP (ref 96–108)
CO2 SERPL-SCNC: 24 MMOL/L — SIGNIFICANT CHANGE UP (ref 22–31)
CREAT SERPL-MCNC: 1.15 MG/DL — SIGNIFICANT CHANGE UP (ref 0.5–1.3)
EGFR: 66 ML/MIN/1.73M2 — SIGNIFICANT CHANGE UP
EOSINOPHIL # BLD AUTO: 0.21 K/UL — SIGNIFICANT CHANGE UP (ref 0–0.5)
EOSINOPHIL NFR BLD AUTO: 3.2 % — SIGNIFICANT CHANGE UP (ref 0–6)
GLUCOSE SERPL-MCNC: 84 MG/DL — SIGNIFICANT CHANGE UP (ref 70–99)
HCT VFR BLD CALC: 38.5 % — LOW (ref 39–50)
HGB BLD-MCNC: 13 G/DL — SIGNIFICANT CHANGE UP (ref 13–17)
IMM GRANULOCYTES NFR BLD AUTO: 0.3 % — SIGNIFICANT CHANGE UP (ref 0–0.9)
LYMPHOCYTES # BLD AUTO: 1.45 K/UL — SIGNIFICANT CHANGE UP (ref 1–3.3)
LYMPHOCYTES # BLD AUTO: 22.1 % — SIGNIFICANT CHANGE UP (ref 13–44)
MCHC RBC-ENTMCNC: 31.7 PG — SIGNIFICANT CHANGE UP (ref 27–34)
MCHC RBC-ENTMCNC: 33.8 G/DL — SIGNIFICANT CHANGE UP (ref 32–36)
MCV RBC AUTO: 93.9 FL — SIGNIFICANT CHANGE UP (ref 80–100)
MONOCYTES # BLD AUTO: 0.79 K/UL — SIGNIFICANT CHANGE UP (ref 0–0.9)
MONOCYTES NFR BLD AUTO: 12 % — SIGNIFICANT CHANGE UP (ref 2–14)
NEUTROPHILS # BLD AUTO: 4.02 K/UL — SIGNIFICANT CHANGE UP (ref 1.8–7.4)
NEUTROPHILS NFR BLD AUTO: 61.3 % — SIGNIFICANT CHANGE UP (ref 43–77)
NRBC # BLD: 0 /100 WBCS — SIGNIFICANT CHANGE UP (ref 0–0)
PLATELET # BLD AUTO: 178 K/UL — SIGNIFICANT CHANGE UP (ref 150–400)
POTASSIUM SERPL-MCNC: 4.1 MMOL/L — SIGNIFICANT CHANGE UP (ref 3.5–5.3)
POTASSIUM SERPL-SCNC: 4.1 MMOL/L — SIGNIFICANT CHANGE UP (ref 3.5–5.3)
PROT SERPL-MCNC: 7.4 G/DL — SIGNIFICANT CHANGE UP (ref 6–8.3)
RBC # BLD: 4.1 M/UL — LOW (ref 4.2–5.8)
RBC # FLD: 13.5 % — SIGNIFICANT CHANGE UP (ref 10.3–14.5)
SODIUM SERPL-SCNC: 135 MMOL/L — SIGNIFICANT CHANGE UP (ref 135–145)
WBC # BLD: 6.56 K/UL — SIGNIFICANT CHANGE UP (ref 3.8–10.5)
WBC # FLD AUTO: 6.56 K/UL — SIGNIFICANT CHANGE UP (ref 3.8–10.5)

## 2024-07-31 PROCEDURE — 99441: CPT

## 2024-07-31 RX ORDER — DEXTROSE 4 G
25 TABLET,CHEWABLE ORAL ONCE
Refills: 0 | Status: ACTIVE | OUTPATIENT
Start: 2024-08-08

## 2024-07-31 RX ORDER — DEXTROSE MONOHYDRATE, SODIUM CHLORIDE, SODIUM LACTATE, CALCIUM CHLORIDE, MAGNESIUM CHLORIDE 1.5; 538; 448; 18.4; 5.08 G/100ML; MG/100ML; MG/100ML; MG/100ML; MG/100ML
1000 SOLUTION INTRAPERITONEAL
Refills: 0 | Status: ACTIVE | OUTPATIENT
Start: 2024-08-08 | End: 2025-07-07

## 2024-07-31 RX ORDER — DEXTROSE 4 G
15 TABLET,CHEWABLE ORAL ONCE
Refills: 0 | Status: ACTIVE | OUTPATIENT
Start: 2024-08-08

## 2024-07-31 RX ORDER — DEXTROSE 4 G
12.5 TABLET,CHEWABLE ORAL ONCE
Refills: 0 | Status: ACTIVE | OUTPATIENT
Start: 2024-08-08

## 2024-07-31 RX ORDER — OXYCODONE 10 MG/1
10 TABLET ORAL
Qty: 112 | Refills: 0 | Status: ACTIVE | COMMUNITY
Start: 2024-07-31 | End: 1900-01-01

## 2024-07-31 RX ORDER — GLUCAGON INJECTION, SOLUTION 0.5 MG/.1ML
1 INJECTION, SOLUTION SUBCUTANEOUS ONCE
Refills: 0 | Status: ACTIVE | OUTPATIENT
Start: 2024-08-08

## 2024-07-31 NOTE — H&P PST ADULT - HISTORY OF PRESENT ILLNESS
73 year old male with hx of  h/o HTN, Hypothyroidism, Lung cancer s/p LORRIE lobectomy (2021), Renal cancer s/p right nephrectomy (2022), chronic back pain, anemia, BPH, AAA, h/o Malignant neoplasm of bladder CT scan May 2023 showed moderate to severe left hydroureteronephrosis and left ureter neoplasm, s/p stent placement in 09/2023 74 year old male with hx of  h/o HTN, Hypothyroidism, Lung cancer s/p LORRIE lobectomy (2021), Renal cancer s/p right nephrectomy (2022),  Vertebral fracture, Thoracic aortic aneurysm, AAA ( last CT 12/23 4.7cm) , bladder cancer had  CT scan May 2023 showed moderate to severe left hydroureteronephrosis and left ureter neoplasm, s/p stent placement in 09/2023 and removed few months after presents to PST today with pre op dx of malignant neoplasm of bladder, unspecified is scheduled for cystoscopy, left ureteroscopy, possible ablation of tumor, possible transurethral resection of bladder lesion.      74 year old male with hx of  h/o HTN, Hypothyroidism, Lung cancer s/p LORRIE lobectomy (2021), Renal cancer s/p right nephrectomy (2022),  Vertebral fracture, Thoracic aortic aneurysm, AAA ( last CT abd/pelvis 12/23 resulted 4.7cm, CT Chest resulted with  unchanged 4.7 cm ascending aorta) , bladder cancer had  CT scan May 2023 showed moderate to severe left hydroureteronephrosis and left ureter neoplasm, s/p stent placement in 09/2023 and removed few months after presents to PST today with pre op dx of malignant neoplasm of bladder, unspecified is scheduled for cystoscopy, left ureteroscopy, possible ablation of tumor, possible transurethral resection of bladder lesion.

## 2024-07-31 NOTE — H&P PST ADULT - CARDIOVASCULAR
normal/regular rate and rhythm/S1 S2 present/no gallops/no rub/no murmur details… normal/regular rate and rhythm/S1 S2 present/no rub/no JVD/no pedal edema/vascular

## 2024-07-31 NOTE — H&P PST ADULT - NEGATIVE PSYCHIATRIC SYMPTOMS
<-------- Click here to INCLUDE CoVID-19 Discharge Instructions
no suicidal ideation/no depression/no anxiety

## 2024-07-31 NOTE — H&P PST ADULT - PROBLEM SELECTOR PLAN 3
h/o AAA, copy of CT chest from 12/2023"mid ascending thoracic aorta is dilated to 4.7cm"and 05/24 CT chest showed Unchanged 4.7 cm ascending aorta.- copy in chart  Copy of echo report, med eval in chart,  followed by cardiologist h/o AAA, copy of CT chest from 12/2023"mid ascending thoracic aorta is dilated to 4.7cm"and 05/24 CT chest showed unchanged 4.7 cm ascending aorta.- copy in chart  copy of echo report, med eval in chart -pt is followed by cardiologist

## 2024-07-31 NOTE — H&P PST ADULT - LAST ECHOCARDIOGRAM
08/15/23 for cardiac murmur  Normal left ventricular cavity size. Left ventricular wall thickness is normal. Left ventricular systolic function is normal with an ejection fraction visually estimated at 60 to 65 %.,The aortic root at the sinuses of Valsalva diameter is dilated, measuring 4.20 cm (indexed 1.96 cm/m²). The ascending aorta diameter is dilated, measuring 4.00 cm (indexed 1.87 cm/m²). Normal pulmonary artery pressure.

## 2024-07-31 NOTE — H&P PST ADULT - PROBLEM SELECTOR PLAN 1
Patient tentatively scheduled for cystoscopy, left ureteroscopy, possible ablation of tumor, possible transurethral resection of bladder lesion on 08/08/2024  Pre-op instructions provided. Pt given verbal and written instructions with teach back on pepcid. Pt verbalized understanding with return demonstration.  urine culture results pending.  CBC, CMP done by pcp 07/31/24- copy of results in chart.

## 2024-07-31 NOTE — H&P PST ADULT - ASSESSMENT
73 y.o. male with preop diagnosis of malignant neoplasm of urinary organ    Pt scheduled for Cystoscopy, Left Ureteroscopy, laser ablation, biopsy of lesion and stent replacement on 02/27/24  CBC, CMP, UC, EKG, echo, med eval in chart, h/o AAA, copy of CT chest from 12/2023"mid ascending thoracic aorta is dilaterd to 4.7cm" copy in chart  Pt instructed to continue meds as prescribed and take synthroid on am of the procedure with a sip of water   Stop NSAIDS, aspirin, aleve, motrin, multivitamins, vitamin E, fish oil, herbal supplements     written and verbal instructions with teach back on chlorhexidine shampoo provided,  pt verbalized understanding Malignant neoplasm of bladder, unspecified

## 2024-07-31 NOTE — H&P PST ADULT - CARDIOVASCULAR COMMENTS
left CW mediport hx of HTN, HLD, AAA, last CT scan 12/2023 "no change" as per pt hx of HTN, HLD, AAA, last CT scan 12/2023 and 05/24 "no change" as per pt - follows cardiologist

## 2024-07-31 NOTE — H&P PST ADULT - PROBLEM SELECTOR PLAN 2
Patient instructed not to take Insulin Novolog  on the morning of procedure.   Patient instructed to take 14 units of Insulin Tresiba on 08/07/2024 PM.  Check fingerstick DOS

## 2024-07-31 NOTE — H&P PST ADULT - FUNCTIONAL STATUS
Mets Dasi score 6.05 Walks 1 to 2 blocks, carries groceries, climbs 1 flight of stairs, ADLs/4-10 METS

## 2024-08-01 LAB — 24R-OH-CALCIDIOL SERPL-MCNC: 54.2 NG/ML — SIGNIFICANT CHANGE UP (ref 30–80)

## 2024-08-02 LAB
CULTURE RESULTS: SIGNIFICANT CHANGE UP
SPECIMEN SOURCE: SIGNIFICANT CHANGE UP

## 2024-08-03 ENCOUNTER — TRANSCRIPTION ENCOUNTER (OUTPATIENT)
Age: 74
End: 2024-08-03

## 2024-08-08 ENCOUNTER — TRANSCRIPTION ENCOUNTER (OUTPATIENT)
Age: 74
End: 2024-08-08

## 2024-08-08 ENCOUNTER — APPOINTMENT (OUTPATIENT)
Dept: UROLOGY | Facility: HOSPITAL | Age: 74
End: 2024-08-08

## 2024-08-08 ENCOUNTER — OUTPATIENT (OUTPATIENT)
Dept: OUTPATIENT SERVICES | Facility: HOSPITAL | Age: 74
LOS: 1 days | Discharge: HOME | End: 2024-08-08
Payer: MEDICARE

## 2024-08-08 VITALS
HEART RATE: 70 BPM | RESPIRATION RATE: 16 BRPM | WEIGHT: 205.91 LBS | TEMPERATURE: 98 F | HEIGHT: 73 IN | OXYGEN SATURATION: 100 % | DIASTOLIC BLOOD PRESSURE: 72 MMHG | SYSTOLIC BLOOD PRESSURE: 119 MMHG

## 2024-08-08 VITALS
OXYGEN SATURATION: 98 % | HEART RATE: 52 BPM | RESPIRATION RATE: 16 BRPM | DIASTOLIC BLOOD PRESSURE: 74 MMHG | SYSTOLIC BLOOD PRESSURE: 153 MMHG | TEMPERATURE: 98 F

## 2024-08-08 DIAGNOSIS — Z90.2 ACQUIRED ABSENCE OF LUNG [PART OF]: Chronic | ICD-10-CM

## 2024-08-08 DIAGNOSIS — Z98.890 OTHER SPECIFIED POSTPROCEDURAL STATES: Chronic | ICD-10-CM

## 2024-08-08 DIAGNOSIS — C65.9 MALIGNANT NEOPLASM OF UNSPECIFIED RENAL PELVIS: ICD-10-CM

## 2024-08-08 DIAGNOSIS — Z90.5 ACQUIRED ABSENCE OF KIDNEY: Chronic | ICD-10-CM

## 2024-08-08 PROCEDURE — 52351 CYSTOURETERO & OR PYELOSCOPE: CPT | Mod: LT

## 2024-08-08 PROCEDURE — 74420 UROGRAPHY RTRGR +-KUB: CPT | Mod: 26

## 2024-08-08 DEVICE — GUIDEWIRE SENSOR DUAL-FLEX NITINOL STRAIGHT .038" X 150CM: Type: IMPLANTABLE DEVICE | Status: FUNCTIONAL

## 2024-08-08 DEVICE — URETERAL CATH OPEN END 5FR 70CM: Type: IMPLANTABLE DEVICE | Status: FUNCTIONAL

## 2024-08-08 RX ORDER — SULFAMETHOXAZOLE AND TRIMETHOPRIM 400; 80 MG/1; MG/1
1 TABLET ORAL
Qty: 6 | Refills: 0
Start: 2024-08-08 | End: 2024-08-10

## 2024-08-08 RX ORDER — DEXTROSE MONOHYDRATE, SODIUM CHLORIDE, SODIUM LACTATE, CALCIUM CHLORIDE, MAGNESIUM CHLORIDE 1.5; 538; 448; 18.4; 5.08 G/100ML; MG/100ML; MG/100ML; MG/100ML; MG/100ML
1000 SOLUTION INTRAPERITONEAL
Refills: 0 | Status: ACTIVE | OUTPATIENT
Start: 2024-08-08 | End: 2025-07-07

## 2024-08-08 RX ORDER — INSULIN ASPART 100 [IU]/ML
0 INJECTION, SOLUTION INTRAVENOUS; SUBCUTANEOUS
Refills: 0 | DISCHARGE

## 2024-08-08 RX ORDER — INSULIN DEGLUDEC 100 U/ML
17 INJECTION, SOLUTION SUBCUTANEOUS
Refills: 0 | DISCHARGE

## 2024-08-08 RX ORDER — ONDANSETRON HCL/PF 4 MG/2 ML
4 VIAL (ML) INJECTION ONCE
Refills: 0 | Status: ACTIVE | OUTPATIENT
Start: 2024-08-08 | End: 2025-07-07

## 2024-08-08 RX ORDER — OXYCODONE HYDROCHLORIDE 30 MG/1
5 TABLET ORAL ONCE
Refills: 0 | Status: DISCONTINUED | OUTPATIENT
Start: 2024-08-08 | End: 2024-08-08

## 2024-08-08 RX ORDER — FINASTERIDE 5 MG/1
1 TABLET, FILM COATED ORAL
Refills: 0 | DISCHARGE

## 2024-08-08 RX ORDER — FENTANYL CITRATE 1200 UG/1
25 LOZENGE ORAL; TRANSMUCOSAL
Refills: 0 | Status: DISCONTINUED | OUTPATIENT
Start: 2024-08-08 | End: 2024-08-08

## 2024-08-08 NOTE — ASU DISCHARGE PLAN (ADULT/PEDIATRIC) - ASU DC SPECIAL INSTRUCTIONSFT
GENERAL: It is common to have blood in your urine after your procedure. It may be pink or even red; inform your doctor if you have a significant amount of clot in the urine or if you are unable to void at all. The urine may clear and then become bloody again especially as you are more physically active.  BATHING: You may shower or bathe.  DIET: You may resume your regular diet and regular medication regimen.  ANTIBIOTICS: You may be given a prescription for an antibiotic, please take this medication as instructed and be sure to complete the entire course.  STOOL SOFTENERS: Do not allow yourself to become constipated as straining may cause bleeding. Take stool softeners or a laxative (ex. Miralax, Colace, Senokot, ExLax, etc), available over the counter, if needed.  ACTIVITY: No heavy lifting or strenuous exercise until you are evaluated at your post-operative appointment. Otherwise, you may return to your usual level of physical activity.  ANTICOAGULATION: If you are taking any blood thinning medications, please discuss with your urologist prior to restarting these medications unless otherwise specified.  FOLLOW-UP: If you did not already schedule your post-operative appointment, please call your urologist to schedule a follow-up appointment.  CALL YOUR UROLOGIST IF: You have any bleeding that does not stop, inability to void >8 hours, fever over 100.4 F, chills, persistent nausea/vomiting, changes in your incision concerning for infection, or if your pain is not controlled on your discharge pain medications.

## 2024-08-08 NOTE — ASU DISCHARGE PLAN (ADULT/PEDIATRIC) - CARE PROVIDER_API CALL
Mohamud Starkey  Urology  [address] [place] [id number]  Phone: [phone]  Fax: [phone]  Follow Up Time:

## 2024-08-08 NOTE — ASU DISCHARGE PLAN (ADULT/PEDIATRIC) - PLEASE INDICATE TEMPERATURE IN FAHRENHEIT OR CELSIUS
Notify Patient:  Most of your results are normal.Your hemoglobin A1c is slightly higher than normal.  I have increased your Metformin dose. Your cholesterol level is still high. I will increase your cholesterol medicine dose. 100.4

## 2024-08-08 NOTE — ASU DISCHARGE PLAN (ADULT/PEDIATRIC) - NS MD DC FALL RISK RISK
For information on Fall & Injury Prevention, visit: [email or web address] OR  [email or web address] OR  https://www.cdc.gov/steadi/patient.html

## 2024-08-08 NOTE — BRIEF OPERATIVE NOTE - OPERATION/FINDINGS
No sign of recurrent urothelial carcinoma No sign of recurrent urothelial carcinoma  Dictation number: [id number]

## 2024-08-08 NOTE — ASU DISCHARGE PLAN (ADULT/PEDIATRIC) - NURSING INSTRUCTIONS
It is common to have blood in the urine after your procedure. It may be pink or red.  If you have significant amount of clots in urine, difficulty urinating or unable to urinate inform your doctor. Drink plenty of liquids.    Do not perform strenous activities or resume sexual activity until you are cleared by your doctor. Make appointment with MD.

## 2024-08-09 PROBLEM — Z79.899 ON ANTINEOPLASTIC CHEMOTHERAPY: Status: ACTIVE | Noted: 2024-08-09

## 2024-08-12 LAB — GLUCOSE BLDC GLUCOMTR-MCNC: 176 MG/DL — HIGH (ref 70–99)

## 2024-08-14 ENCOUNTER — RESULT REVIEW (OUTPATIENT)
Age: 74
End: 2024-08-14

## 2024-08-14 ENCOUNTER — APPOINTMENT (OUTPATIENT)
Dept: HEMATOLOGY ONCOLOGY | Facility: CLINIC | Age: 74
End: 2024-08-14
Payer: MEDICARE

## 2024-08-14 ENCOUNTER — NON-APPOINTMENT (OUTPATIENT)
Age: 74
End: 2024-08-14

## 2024-08-14 ENCOUNTER — APPOINTMENT (OUTPATIENT)
Dept: INFUSION THERAPY | Facility: HOSPITAL | Age: 74
End: 2024-08-14

## 2024-08-14 VITALS
TEMPERATURE: 97.6 F | RESPIRATION RATE: 16 BRPM | HEART RATE: 68 BPM | WEIGHT: 205.69 LBS | OXYGEN SATURATION: 99 % | BODY MASS INDEX: 27.14 KG/M2 | SYSTOLIC BLOOD PRESSURE: 106 MMHG | DIASTOLIC BLOOD PRESSURE: 60 MMHG

## 2024-08-14 DIAGNOSIS — Z79.899 OTHER LONG TERM (CURRENT) DRUG THERAPY: ICD-10-CM

## 2024-08-14 DIAGNOSIS — E11.9 TYPE 2 DIABETES MELLITUS W/OUT COMPLICATIONS: ICD-10-CM

## 2024-08-14 DIAGNOSIS — G62.9 POLYNEUROPATHY, UNSPECIFIED: ICD-10-CM

## 2024-08-14 DIAGNOSIS — Z92.89 PERSONAL HISTORY OF OTHER MEDICAL TREATMENT: ICD-10-CM

## 2024-08-14 DIAGNOSIS — G47.00 INSOMNIA, UNSPECIFIED: ICD-10-CM

## 2024-08-14 DIAGNOSIS — C68.9 MALIGNANT NEOPLASM OF URINARY ORGAN, UNSPECIFIED: ICD-10-CM

## 2024-08-14 DIAGNOSIS — C66.2 MALIGNANT NEOPLASM OF LEFT URETER: ICD-10-CM

## 2024-08-14 DIAGNOSIS — C67.9 MALIGNANT NEOPLASM OF BLADDER, UNSPECIFIED: ICD-10-CM

## 2024-08-14 LAB
ALBUMIN SERPL ELPH-MCNC: 3.9 G/DL — SIGNIFICANT CHANGE UP (ref 3.3–5)
ALP SERPL-CCNC: 82 U/L — SIGNIFICANT CHANGE UP (ref 40–120)
ALT FLD-CCNC: 9 U/L — LOW (ref 10–45)
ANION GAP SERPL CALC-SCNC: 10 MMOL/L — SIGNIFICANT CHANGE UP (ref 5–17)
AST SERPL-CCNC: 31 U/L — SIGNIFICANT CHANGE UP (ref 10–40)
BASOPHILS # BLD AUTO: 0.06 K/UL — SIGNIFICANT CHANGE UP (ref 0–0.2)
BASOPHILS NFR BLD AUTO: 1 % — SIGNIFICANT CHANGE UP (ref 0–2)
BILIRUB SERPL-MCNC: 0.5 MG/DL — SIGNIFICANT CHANGE UP (ref 0.2–1.2)
BUN SERPL-MCNC: 19 MG/DL — SIGNIFICANT CHANGE UP (ref 7–23)
CALCIUM SERPL-MCNC: 9.4 MG/DL — SIGNIFICANT CHANGE UP (ref 8.4–10.5)
CHLORIDE SERPL-SCNC: 99 MMOL/L — SIGNIFICANT CHANGE UP (ref 96–108)
CO2 SERPL-SCNC: 25 MMOL/L — SIGNIFICANT CHANGE UP (ref 22–31)
CREAT SERPL-MCNC: 1.26 MG/DL — SIGNIFICANT CHANGE UP (ref 0.5–1.3)
EGFR: 59 ML/MIN/1.73M2 — LOW
EOSINOPHIL # BLD AUTO: 0.2 K/UL — SIGNIFICANT CHANGE UP (ref 0–0.5)
EOSINOPHIL NFR BLD AUTO: 3.2 % — SIGNIFICANT CHANGE UP (ref 0–6)
GLUCOSE SERPL-MCNC: 120 MG/DL — HIGH (ref 70–99)
HCT VFR BLD CALC: 36.8 % — LOW (ref 39–50)
HGB BLD-MCNC: 12.4 G/DL — LOW (ref 13–17)
IMM GRANULOCYTES NFR BLD AUTO: 0.6 % — SIGNIFICANT CHANGE UP (ref 0–0.9)
LYMPHOCYTES # BLD AUTO: 1.31 K/UL — SIGNIFICANT CHANGE UP (ref 1–3.3)
LYMPHOCYTES # BLD AUTO: 20.8 % — SIGNIFICANT CHANGE UP (ref 13–44)
MAGNESIUM SERPL-MCNC: 2 MG/DL — SIGNIFICANT CHANGE UP (ref 1.6–2.6)
MCHC RBC-ENTMCNC: 31.7 PG — SIGNIFICANT CHANGE UP (ref 27–34)
MCHC RBC-ENTMCNC: 33.7 G/DL — SIGNIFICANT CHANGE UP (ref 32–36)
MCV RBC AUTO: 94.1 FL — SIGNIFICANT CHANGE UP (ref 80–100)
MONOCYTES # BLD AUTO: 0.66 K/UL — SIGNIFICANT CHANGE UP (ref 0–0.9)
MONOCYTES NFR BLD AUTO: 10.5 % — SIGNIFICANT CHANGE UP (ref 2–14)
NEUTROPHILS # BLD AUTO: 4.02 K/UL — SIGNIFICANT CHANGE UP (ref 1.8–7.4)
NEUTROPHILS NFR BLD AUTO: 63.9 % — SIGNIFICANT CHANGE UP (ref 43–77)
NRBC # BLD: 0 /100 WBCS — SIGNIFICANT CHANGE UP (ref 0–0)
PLATELET # BLD AUTO: 170 K/UL — SIGNIFICANT CHANGE UP (ref 150–400)
POTASSIUM SERPL-MCNC: 4 MMOL/L — SIGNIFICANT CHANGE UP (ref 3.5–5.3)
POTASSIUM SERPL-SCNC: 4 MMOL/L — SIGNIFICANT CHANGE UP (ref 3.5–5.3)
PROT SERPL-MCNC: 7.4 G/DL — SIGNIFICANT CHANGE UP (ref 6–8.3)
RBC # BLD: 3.91 M/UL — LOW (ref 4.2–5.8)
RBC # FLD: 13.2 % — SIGNIFICANT CHANGE UP (ref 10.3–14.5)
SODIUM SERPL-SCNC: 134 MMOL/L — LOW (ref 135–145)
T4 FREE SERPL-MCNC: 1.6 NG/DL — SIGNIFICANT CHANGE UP (ref 0.9–1.8)
TSH SERPL-MCNC: 0.93 UIU/ML — SIGNIFICANT CHANGE UP (ref 0.27–4.2)
WBC # BLD: 6.29 K/UL — SIGNIFICANT CHANGE UP (ref 3.8–10.5)
WBC # FLD AUTO: 6.29 K/UL — SIGNIFICANT CHANGE UP (ref 3.8–10.5)

## 2024-08-14 PROCEDURE — G2211 COMPLEX E/M VISIT ADD ON: CPT

## 2024-08-14 PROCEDURE — 99214 OFFICE O/P EST MOD 30 MIN: CPT

## 2024-08-14 RX ORDER — DIAZEPAM 5 MG/1
5 TABLET ORAL
Qty: 14 | Refills: 0 | Status: ACTIVE | COMMUNITY
Start: 2024-08-14 | End: 1900-01-01

## 2024-08-14 NOTE — ASSESSMENT
[FreeTextEntry1] : 74 year old male s/p right nephroureterectomy June 2022 with pT3 in renal pelvis (10% squamous differentiation) and pTa in the ureter and has since had several bouts of T1HG in the bladder July 2022, Feb 2023. 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 At initial appointment, Dr. Mccall discussed his cancer status and further management. Based on AUA and EAU guideline subjects with upper tract tumors of the renal pelvis and ureter(s) must meet a high risk assessment defined as: tumor 1cm and/or hydronephrosis and/or high grade pathology and/or multifocal disease, where a radical NU approach to treat localized disease is warranted, followed by adjuvant immunotherapy. However, he adamantly declined any surgery given his solitary kidney and the result of dialysis. He is cisplatin ineligible. The potential regimen, pembrolizumab and enfortumab vedotin could induce 73% response including 15% CR rate. His T1HG bladder cancer may be treated with both combination. He will be monitored by Dr. Starkey for surveillance cystoscopy and ureteroscopy. Potential AEs of immunotherapy and enfortumab vedotin were discussed and patient signed consent. He started treatment with pembrolizumab and padcev on 10/18/2023. 12/14/23 CT CAP showed New 3 mm nodule left lung lower lobe. No adenopathy in the thorax, abdomen or pelvis. No left hydronephrosis and left mid ureteral lesion. 5/15 MRI abdomen and pelvis and 5/21/24 CT chest showed LISETTE.   Renal pelvis UCC, high grade  - on pembrolizumab D1 and enfortumab vedotin (padcev) D1, D8 on 21 day cycle, treatment held on 3/13 and 3/20 due to hyperglycemia, patient received pembro on 4/3, held padcev on 4/3 and 4/10, received 4/24 to resume pembrolizumab and padcev, May 1 did not receive, then resumed again 5/15, today 7/24 start of new cycle and will receive pembrolizumab and padcev - S/p cystoscopy and ureteroscopy on 8/8/24. Brief operative note states no sign of recurrent urothelial carcinoma - He is scheduled for EV/pembro cycle 14 day 1 today. Per Dr. Mccall's previous notes, we will proceed with pembrolizumab alone at this time given no evidence of recurrent disease on his 8/8/24 cysto/ureteroscopy  - reviewed potential AEs of keytruda, including but not limited to: fatigue, skin rash, joint pain, hypothyroidism, pneumonitis, hepatitis, nephritis, colitis, myocarditis, pericarditis, hypophysitis.  - 5/15/24 CT Chest: resolved left lower lobe nodule that was new on 12/27/2023. No new lung nodule. - 5/21/24 MR AP: no evidence of new or recurrent neoplasm  Diabetes  - Padcev vs pembrolizumab associated hyperglycemia - Continue to follow with Dr. Ramos  Neuropathy  - endorses consistent numbness/tingling in fingers and toes  - continue to monitor, hopefully this will improve over time after cessation of Padcev.   Pruritus - resolved - continue benadryl, betamethasone cream, sarna lotion as needed  - pt has been referred to Dermatology, they are holding off on scheduling appt at present  - perianal skin tear, now healing - advised to keep area clean and pat dry, continue Neomycin cream   Insomnia -  - Recurrent insomnia. Has been using PRN diazepam 5mg qhs with good effects. Refill provided 8/14/24. Again discussed that this medication should not be taken with oxycodone or any other sedating medications, do not drive or operate machinery while taking this medication   Chronic low back pain  - this pain is chronic and is not cancer related pain, he has been prescribed PRN oxycodone 10mg q8h by another provider. Continue follow with his PCP / NSGY to discuss further management - Discussed risk/benefits of opioids, not to operate vehicle or machinery while using these medications, do not take with diazepam or other sedating medications   - 5/15 MR L spine: No evidence of bony metastatic disease. - pt seen by Ortho spine and referred to Pain Mgmt for lumbar stenosis/degenerative arthritis   F/u 9/4/24 to see Dr. Mccall and for cycle 15 day 1 [Future Reassessment of Pain Scale] : Future reassessment of pain scale    [Medication(s)] : Medication(s)

## 2024-08-14 NOTE — HISTORY OF PRESENT ILLNESS
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   5/20/23 CT abdomen and pelvis w/o contrast showed mod to severe left hydroureteronephrosis to the left of a filling defect within the left mid ureter, neoplasm until proven otherwise. 7/18/23 CT chest and urogram showed Interval placement of left-sided ureteral stent with continued moderate hydroureteronephrosis. Point of transition in the left mid ureter. There is prominent soft tissue and a lack of contrast on the delayed films. This raises concern for an underlying neoplasm.  9/2/23 CT abdomen and pelvis without con showed left hydronephrosis without significant changes, left ureteral stent placement.   He was scheduled for segmental ureterectomy but ureteroscopy on 9/1 noted a 2.5 cm segment in the mid left ureter (now solitary kidney) with new disease and a 1.5 cm in the  distal ureter  lesion, and patholoy showed TaHG in the mid ureter and TaHG in the distal ureter as well as TaHG in the kidney with T1HG in the bladder.    He reports no hematuria, burning, frequency and abdominal pain.  He has chronic lower back pain for many years on oxycodon as needed.    11/1/23 Has newly developed skin itching and mild rash that has not been improving with OTC topicals. He also has intermittent headaches relieved by Tylenol. He reports increased fatigue and decreased sleep. He denied nausea, vomiting, diarrhea, constipation.  11/22/23: C2D15 pembrolizumab and padcev. Patient has decreased appetite, has not been eating much at all in the past few weeks and has lost 10lbs. He is feeling very weak, very fatigued. Patient is so tired that he had to be wheeled to exam room in wheelchair. He was able to walk from the hallway into the exam room. He needs assistance getting up from a sitting position. He will occ get shortness of breath on exertion and this has worsened in the past few weeks. Denies chest pain but says once in a while he has palpitations when trying to sleep. He is not sure if palpitations happen with exertion because he has not been able to exert himself very much of late. Patient denies fever, he is cold all of the time. Had chills the other day, took temperature and it was normal. He is exhausted because he has not been able to sleep. He only sleeps for 15 minutes at a time at night and it is a very light sleep. He has never suffered from insomnia before. He is not taking naps during the day. He was already having difficulty sleeping during cylce 1 but it has worsened tremendously with this cycle. He has been using hydroxyzine and benadryl for itching, neither of which have made him drowsy. He has been using melatonin which does not help. He tried his wife's ambien, which also did not help. Patient has ongoing itching but no rashes, says he is "itching, itching, always itching". Says that the prescribed medications have not worked, including betamethasone cream and hydroxyzine. Has been using Eucerin and Sarna creams, sitting in Epsom salt baths. Patient was constipated, however now is having loose stools twice a day for the last three days, has stopped taking bowel regimen. Pt denies numbness and tingling in hands and feet. Patient reports chronic low back pain that pre dates his cancer diagnosis, the pain is not cancer related, has been dealing with this with his PCP and ?NSGY, initially surgery was recommended but now is not. Was going to PT but is no longer because it was not helping. He takes oxycodone for this pain, he says sometimes he will go days without taking it and then he will need three tabs (30mg) at one time, which is not how this medication is prescribed.    12/13/23 He continues to have rash which appears to be improving. However he and his wife are concerned that his skin is darkening (even in non sun exposed areas). He also states that recentl he is "always cold." He denies diarrhea or constipation. He continues to have some insomnia  12/14/23 CT CAP showed New 3 mm nodule left lung lower lobe. No adenopathy in the thorax, abdomen or pelvis. No left hydronephrosis and left mid ureteral lesion.   1/3/24 reports good appetite, better sleep, skin rash with improved itchiness. He is able to do exercises.   1/24/24: Energy is better than it was, but is still fatigued. Mood is better than it was. Appetite is better than before. Patient notes that he is able to walk for half an hour. Itching has improved, it is most pronounced on his buttock at nighttime, not taking hydroxyzine, sometimes will use benadryl, is using topicals as well. Reports hyperpigmentation of skin since starting treatment. He reports increased frequency of urination within the last few weeks, he also reports in the last few weeks he has not been taking tamsulosin. Says he urinates about every 2-3 hours during night and day. No hematuria or dysuria. Patient has chronic pain in his back that he takes oxycodone for. He has occasional "sticking" pain in his L chest that he associates with his port. This was hurting him today, took oxy and it had improved. He does not have any cardiac chest pain, shortness of breath or palpitations. He denies constipation and diarrhea, says he eats home made yogurt that helps to keep his bowel movements regular. He is using valium everyday for insomnia. He tries to go to sleep without it, but cannot fall asleep without it.    2/12/24 Reports mild fatigue and improving itchiness. Both hand fingers develop mild numbness. Nocturia 2. Denies any gross hematuria.   3/6/24: Patient continues to have difficulty sleeping, for example went to bed at 9:30 last night, did not fall asleep until 6AM, slept for 4 hours. He has not been using medication to sleep. He feels tired often. Appetite is fine. Says that he has numbness in fingers and toes for the last month. Says sometimes his legs feel weak and that he feels off balance, has not fallen, is not dizzy. He does exercise, mainly push ups. He has pain in his low back, not cancer related, takes oxycodone, has MR next month. Itching does not bother him much any more. Reports constipation. Last saw eye doctor 6 months ago, says he does have difficulty seeing, unsure if this has worsened since being on treatment, no flashes or floaters. Says he sometimes feels a stabbing pain in his L chest that lasts for about 10 minutes at a time, this does not happen every day.   3/27/24: Last cycle of pembro/padcev was held due to hyperglycemia/new onset Diabetes. On 3/13 pre treatment glucose = 571.Patient was sent to ED and was discharged on 15 units lantus qhs and admelog 5 units TID.  Patient comes with his glucometer, his fasting glucoses have been 90 - 140s, evening he has been in the high 200s. He has an appt with Endocrine on Monday. He has been using the lantus at night and the admelog during the day. Has been trying to eat more healthy. Overall he has been feeling well. Pt's wife shoes me a picture of skin tear near pt's anus, says it started because of itching, it was open at one point but they have been using neomycin cream and it is healing nicely. Says this area opened up due to itching. However, say itching is not what it once was.    4/24/24: Patient says he learned about carb counting yesterday with Endo. For the last few days his glucose has been mostly in the 200 and 300s, was better controlled the week prior, right now glucose is 157, average glucose has been between 203 - 324. Pt having improved/normal BM he thinks because he is eating more healthy. He denies itching at present. He has fingertip numbness and tingling for the past few months as well as occ numbness in his toes. He saw podiatry yesterday. Energy is okay, goes for daily walks.    5/15/24 in last week, his glucose average 262mg/dl, feels numb in the hands and feet. He can do buttoning and walk steadily.   5/15/24 MRI abdomen and pelvis showed no evidence of disease. 5/21/24 CT chest showed resolved left lower lobe nodule that was new on 12/27/2023. No new lung nodule.  6/5/24 reports moderate numbness in fingers and toes. His hands and feet function normally. He can hold a cup and do buttoning, walk steadily.   [de-identified] : 7/3/24: Patient had been in [place] for the past few weeks, returned on Monday, he feels much more relaxed and enjoyed his time away. Appetite is good, trying to watch what he eats for DM. No constipation or diarrhea. Occ itching, no rashes. Has numbness in fingertips and in toes consistently. Reports that his glucoses  bounces around, average is 200, can be lower than 100 and over 300 at times. Endocrine has recommended an insulin pump, will be further discussed at his next appt with them.  He has ongoing chronic low back pain, was seen by Ortho/Spine, recc pain mgmt physician. Feels he is walking unstable at times, unsure if this is from back pain, recommended PT at this visit, however he wishes to see pain mgmt prior to starting PT. Patient notes that he is sleeping better and only using valium sporadically.    7/24/24 Pt is doing well. Tolerating Keytruda/Padcev however, expresses disappointment in immune mediated DM and frustration at maintaining euglycemia. Neuropathy and numbness are relatively stable and not interrupting his quality of life. Intermittent gait imbalance but no recent falls. Pt is scheduled to see Dr. Starkey for ureteroscopy.   8/14/24 - continues on EV/pembro since Oct 2023, cycle 14 day 1 today. Overall feeling "good", mild fatigue. Recurrent insomnia for which he restarted PRN diazepam 5mg qhs with good effect. Appetite is "normal", no dysgeusia. Glucose at time of this visit is 222, usually between 150-180 but his scanner shows it has been >250 approx 15% of the time. Had constipation following his cysto/ureteroscopy last week but now improving. Persistent numbness of bilateral fingertips and toes. Chronic low back for which he takes PRN oxycodone and follows with chronic pain management. Denies fever, chills, night sweats, headache, dizziness, eye pain/problems, mucositis/odynophagia, chest pain, palpitations, SOB, cough, nausea/vomiting, diarrhea, abdominal pain, dysuria, hematuria, incontinence, LE edema, rash/pruritus, bleeding.

## 2024-08-14 NOTE — PHYSICAL EXAM
[Fully active, able to carry on all pre-disease performance without restriction] : Status 0 - Fully active, able to carry on all pre-disease performance without restriction [Normal] : affect appropriate [de-identified] : anicteric  [de-identified] : no LE edema

## 2024-08-14 NOTE — PHYSICAL EXAM
[Fully active, able to carry on all pre-disease performance without restriction] : Status 0 - Fully active, able to carry on all pre-disease performance without restriction [Normal] : affect appropriate [de-identified] : anicteric  [de-identified] : no LE edema

## 2024-08-14 NOTE — PHYSICAL EXAM
[Fully active, able to carry on all pre-disease performance without restriction] : Status 0 - Fully active, able to carry on all pre-disease performance without restriction [Normal] : affect appropriate [de-identified] : anicteric  [de-identified] : no LE edema

## 2024-08-14 NOTE — HISTORY OF PRESENT ILLNESS
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   5/20/23 CT abdomen and pelvis w/o contrast showed mod to severe left hydroureteronephrosis to the left of a filling defect within the left mid ureter, neoplasm until proven otherwise. 7/18/23 CT chest and urogram showed Interval placement of left-sided ureteral stent with continued moderate hydroureteronephrosis. Point of transition in the left mid ureter. There is prominent soft tissue and a lack of contrast on the delayed films. This raises concern for an underlying neoplasm.  9/2/23 CT abdomen and pelvis without con showed left hydronephrosis without significant changes, left ureteral stent placement.   He was scheduled for segmental ureterectomy but ureteroscopy on 9/1 noted a 2.5 cm segment in the mid left ureter (now solitary kidney) with new disease and a 1.5 cm in the  distal ureter  lesion, and patholoy showed TaHG in the mid ureter and TaHG in the distal ureter as well as TaHG in the kidney with T1HG in the bladder.    He reports no hematuria, burning, frequency and abdominal pain.  He has chronic lower back pain for many years on oxycodon as needed.    11/1/23 Has newly developed skin itching and mild rash that has not been improving with OTC topicals. He also has intermittent headaches relieved by Tylenol. He reports increased fatigue and decreased sleep. He denied nausea, vomiting, diarrhea, constipation.  11/22/23: C2D15 pembrolizumab and padcev. Patient has decreased appetite, has not been eating much at all in the past few weeks and has lost 10lbs. He is feeling very weak, very fatigued. Patient is so tired that he had to be wheeled to exam room in wheelchair. He was able to walk from the hallway into the exam room. He needs assistance getting up from a sitting position. He will occ get shortness of breath on exertion and this has worsened in the past few weeks. Denies chest pain but says once in a while he has palpitations when trying to sleep. He is not sure if palpitations happen with exertion because he has not been able to exert himself very much of late. Patient denies fever, he is cold all of the time. Had chills the other day, took temperature and it was normal. He is exhausted because he has not been able to sleep. He only sleeps for 15 minutes at a time at night and it is a very light sleep. He has never suffered from insomnia before. He is not taking naps during the day. He was already having difficulty sleeping during cylce 1 but it has worsened tremendously with this cycle. He has been using hydroxyzine and benadryl for itching, neither of which have made him drowsy. He has been using melatonin which does not help. He tried his wife's ambien, which also did not help. Patient has ongoing itching but no rashes, says he is "itching, itching, always itching". Says that the prescribed medications have not worked, including betamethasone cream and hydroxyzine. Has been using Eucerin and Sarna creams, sitting in Epsom salt baths. Patient was constipated, however now is having loose stools twice a day for the last three days, has stopped taking bowel regimen. Pt denies numbness and tingling in hands and feet. Patient reports chronic low back pain that pre dates his cancer diagnosis, the pain is not cancer related, has been dealing with this with his PCP and ?NSGY, initially surgery was recommended but now is not. Was going to PT but is no longer because it was not helping. He takes oxycodone for this pain, he says sometimes he will go days without taking it and then he will need three tabs (30mg) at one time, which is not how this medication is prescribed.    12/13/23 He continues to have rash which appears to be improving. However he and his wife are concerned that his skin is darkening (even in non sun exposed areas). He also states that recentl he is "always cold." He denies diarrhea or constipation. He continues to have some insomnia  12/14/23 CT CAP showed New 3 mm nodule left lung lower lobe. No adenopathy in the thorax, abdomen or pelvis. No left hydronephrosis and left mid ureteral lesion.   1/3/24 reports good appetite, better sleep, skin rash with improved itchiness. He is able to do exercises.   1/24/24: Energy is better than it was, but is still fatigued. Mood is better than it was. Appetite is better than before. Patient notes that he is able to walk for half an hour. Itching has improved, it is most pronounced on his buttock at nighttime, not taking hydroxyzine, sometimes will use benadryl, is using topicals as well. Reports hyperpigmentation of skin since starting treatment. He reports increased frequency of urination within the last few weeks, he also reports in the last few weeks he has not been taking tamsulosin. Says he urinates about every 2-3 hours during night and day. No hematuria or dysuria. Patient has chronic pain in his back that he takes oxycodone for. He has occasional "sticking" pain in his L chest that he associates with his port. This was hurting him today, took oxy and it had improved. He does not have any cardiac chest pain, shortness of breath or palpitations. He denies constipation and diarrhea, says he eats home made yogurt that helps to keep his bowel movements regular. He is using valium everyday for insomnia. He tries to go to sleep without it, but cannot fall asleep without it.    2/12/24 Reports mild fatigue and improving itchiness. Both hand fingers develop mild numbness. Nocturia 2. Denies any gross hematuria.   3/6/24: Patient continues to have difficulty sleeping, for example went to bed at 9:30 last night, did not fall asleep until 6AM, slept for 4 hours. He has not been using medication to sleep. He feels tired often. Appetite is fine. Says that he has numbness in fingers and toes for the last month. Says sometimes his legs feel weak and that he feels off balance, has not fallen, is not dizzy. He does exercise, mainly push ups. He has pain in his low back, not cancer related, takes oxycodone, has MR next month. Itching does not bother him much any more. Reports constipation. Last saw eye doctor 6 months ago, says he does have difficulty seeing, unsure if this has worsened since being on treatment, no flashes or floaters. Says he sometimes feels a stabbing pain in his L chest that lasts for about 10 minutes at a time, this does not happen every day.   3/27/24: Last cycle of pembro/padcev was held due to hyperglycemia/new onset Diabetes. On 3/13 pre treatment glucose = 571.Patient was sent to ED and was discharged on 15 units lantus qhs and admelog 5 units TID.  Patient comes with his glucometer, his fasting glucoses have been 90 - 140s, evening he has been in the high 200s. He has an appt with Endocrine on Monday. He has been using the lantus at night and the admelog during the day. Has been trying to eat more healthy. Overall he has been feeling well. Pt's wife shoes me a picture of skin tear near pt's anus, says it started because of itching, it was open at one point but they have been using neomycin cream and it is healing nicely. Says this area opened up due to itching. However, say itching is not what it once was.    4/24/24: Patient says he learned about carb counting yesterday with Endo. For the last few days his glucose has been mostly in the 200 and 300s, was better controlled the week prior, right now glucose is 157, average glucose has been between 203 - 324. Pt having improved/normal BM he thinks because he is eating more healthy. He denies itching at present. He has fingertip numbness and tingling for the past few months as well as occ numbness in his toes. He saw podiatry yesterday. Energy is okay, goes for daily walks.    5/15/24 in last week, his glucose average 262mg/dl, feels numb in the hands and feet. He can do buttoning and walk steadily.   5/15/24 MRI abdomen and pelvis showed no evidence of disease. 5/21/24 CT chest showed resolved left lower lobe nodule that was new on 12/27/2023. No new lung nodule.  6/5/24 reports moderate numbness in fingers and toes. His hands and feet function normally. He can hold a cup and do buttoning, walk steadily.   [de-identified] : 7/3/24: Patient had been in [place] for the past few weeks, returned on Monday, he feels much more relaxed and enjoyed his time away. Appetite is good, trying to watch what he eats for DM. No constipation or diarrhea. Occ itching, no rashes. Has numbness in fingertips and in toes consistently. Reports that his glucoses  bounces around, average is 200, can be lower than 100 and over 300 at times. Endocrine has recommended an insulin pump, will be further discussed at his next appt with them.  He has ongoing chronic low back pain, was seen by Ortho/Spine, recc pain mgmt physician. Feels he is walking unstable at times, unsure if this is from back pain, recommended PT at this visit, however he wishes to see pain mgmt prior to starting PT. Patient notes that he is sleeping better and only using valium sporadically.    7/24/24 Pt is doing well. Tolerating Keytruda/Padcev however, expresses disappointment in immune mediated DM and frustration at maintaining euglycemia. Neuropathy and numbness are relatively stable and not interrupting his quality of life. Intermittent gait imbalance but no recent falls. Pt is scheduled to see Dr. Starkey for ureteroscopy.   8/14/24 - continues on EV/pembro since Oct 2023, cycle 14 day 1 today. Overall feeling "good", mild fatigue. Recurrent insomnia for which he restarted PRN diazepam 5mg qhs with good effect. Appetite is "normal", no dysgeusia. Glucose at time of this visit is 222, usually between 150-180 but his scanner shows it has been >250 approx 15% of the time. Had constipation following his cysto/ureteroscopy last week but now improving. Persistent numbness of bilateral fingertips and toes. Chronic low back for which he takes PRN oxycodone and follows with chronic pain management. Denies fever, chills, night sweats, headache, dizziness, eye pain/problems, mucositis/odynophagia, chest pain, palpitations, SOB, cough, nausea/vomiting, diarrhea, abdominal pain, dysuria, hematuria, incontinence, LE edema, rash/pruritus, bleeding.

## 2024-08-14 NOTE — HISTORY OF PRESENT ILLNESS
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   5/20/23 CT abdomen and pelvis w/o contrast showed mod to severe left hydroureteronephrosis to the left of a filling defect within the left mid ureter, neoplasm until proven otherwise. 7/18/23 CT chest and urogram showed Interval placement of left-sided ureteral stent with continued moderate hydroureteronephrosis. Point of transition in the left mid ureter. There is prominent soft tissue and a lack of contrast on the delayed films. This raises concern for an underlying neoplasm.  9/2/23 CT abdomen and pelvis without con showed left hydronephrosis without significant changes, left ureteral stent placement.   He was scheduled for segmental ureterectomy but ureteroscopy on 9/1 noted a 2.5 cm segment in the mid left ureter (now solitary kidney) with new disease and a 1.5 cm in the  distal ureter  lesion, and patholoy showed TaHG in the mid ureter and TaHG in the distal ureter as well as TaHG in the kidney with T1HG in the bladder.    He reports no hematuria, burning, frequency and abdominal pain.  He has chronic lower back pain for many years on oxycodon as needed.    11/1/23 Has newly developed skin itching and mild rash that has not been improving with OTC topicals. He also has intermittent headaches relieved by Tylenol. He reports increased fatigue and decreased sleep. He denied nausea, vomiting, diarrhea, constipation.  11/22/23: C2D15 pembrolizumab and padcev. Patient has decreased appetite, has not been eating much at all in the past few weeks and has lost 10lbs. He is feeling very weak, very fatigued. Patient is so tired that he had to be wheeled to exam room in wheelchair. He was able to walk from the hallway into the exam room. He needs assistance getting up from a sitting position. He will occ get shortness of breath on exertion and this has worsened in the past few weeks. Denies chest pain but says once in a while he has palpitations when trying to sleep. He is not sure if palpitations happen with exertion because he has not been able to exert himself very much of late. Patient denies fever, he is cold all of the time. Had chills the other day, took temperature and it was normal. He is exhausted because he has not been able to sleep. He only sleeps for 15 minutes at a time at night and it is a very light sleep. He has never suffered from insomnia before. He is not taking naps during the day. He was already having difficulty sleeping during cylce 1 but it has worsened tremendously with this cycle. He has been using hydroxyzine and benadryl for itching, neither of which have made him drowsy. He has been using melatonin which does not help. He tried his wife's ambien, which also did not help. Patient has ongoing itching but no rashes, says he is "itching, itching, always itching". Says that the prescribed medications have not worked, including betamethasone cream and hydroxyzine. Has been using Eucerin and Sarna creams, sitting in Epsom salt baths. Patient was constipated, however now is having loose stools twice a day for the last three days, has stopped taking bowel regimen. Pt denies numbness and tingling in hands and feet. Patient reports chronic low back pain that pre dates his cancer diagnosis, the pain is not cancer related, has been dealing with this with his PCP and ?NSGY, initially surgery was recommended but now is not. Was going to PT but is no longer because it was not helping. He takes oxycodone for this pain, he says sometimes he will go days without taking it and then he will need three tabs (30mg) at one time, which is not how this medication is prescribed.    12/13/23 He continues to have rash which appears to be improving. However he and his wife are concerned that his skin is darkening (even in non sun exposed areas). He also states that recentl he is "always cold." He denies diarrhea or constipation. He continues to have some insomnia  12/14/23 CT CAP showed New 3 mm nodule left lung lower lobe. No adenopathy in the thorax, abdomen or pelvis. No left hydronephrosis and left mid ureteral lesion.   1/3/24 reports good appetite, better sleep, skin rash with improved itchiness. He is able to do exercises.   1/24/24: Energy is better than it was, but is still fatigued. Mood is better than it was. Appetite is better than before. Patient notes that he is able to walk for half an hour. Itching has improved, it is most pronounced on his buttock at nighttime, not taking hydroxyzine, sometimes will use benadryl, is using topicals as well. Reports hyperpigmentation of skin since starting treatment. He reports increased frequency of urination within the last few weeks, he also reports in the last few weeks he has not been taking tamsulosin. Says he urinates about every 2-3 hours during night and day. No hematuria or dysuria. Patient has chronic pain in his back that he takes oxycodone for. He has occasional "sticking" pain in his L chest that he associates with his port. This was hurting him today, took oxy and it had improved. He does not have any cardiac chest pain, shortness of breath or palpitations. He denies constipation and diarrhea, says he eats home made yogurt that helps to keep his bowel movements regular. He is using valium everyday for insomnia. He tries to go to sleep without it, but cannot fall asleep without it.    2/12/24 Reports mild fatigue and improving itchiness. Both hand fingers develop mild numbness. Nocturia 2. Denies any gross hematuria.   3/6/24: Patient continues to have difficulty sleeping, for example went to bed at 9:30 last night, did not fall asleep until 6AM, slept for 4 hours. He has not been using medication to sleep. He feels tired often. Appetite is fine. Says that he has numbness in fingers and toes for the last month. Says sometimes his legs feel weak and that he feels off balance, has not fallen, is not dizzy. He does exercise, mainly push ups. He has pain in his low back, not cancer related, takes oxycodone, has MR next month. Itching does not bother him much any more. Reports constipation. Last saw eye doctor 6 months ago, says he does have difficulty seeing, unsure if this has worsened since being on treatment, no flashes or floaters. Says he sometimes feels a stabbing pain in his L chest that lasts for about 10 minutes at a time, this does not happen every day.   3/27/24: Last cycle of pembro/padcev was held due to hyperglycemia/new onset Diabetes. On 3/13 pre treatment glucose = 571.Patient was sent to ED and was discharged on 15 units lantus qhs and admelog 5 units TID.  Patient comes with his glucometer, his fasting glucoses have been 90 - 140s, evening he has been in the high 200s. He has an appt with Endocrine on Monday. He has been using the lantus at night and the admelog during the day. Has been trying to eat more healthy. Overall he has been feeling well. Pt's wife shoes me a picture of skin tear near pt's anus, says it started because of itching, it was open at one point but they have been using neomycin cream and it is healing nicely. Says this area opened up due to itching. However, say itching is not what it once was.    4/24/24: Patient says he learned about carb counting yesterday with Endo. For the last few days his glucose has been mostly in the 200 and 300s, was better controlled the week prior, right now glucose is 157, average glucose has been between 203 - 324. Pt having improved/normal BM he thinks because he is eating more healthy. He denies itching at present. He has fingertip numbness and tingling for the past few months as well as occ numbness in his toes. He saw podiatry yesterday. Energy is okay, goes for daily walks.    5/15/24 in last week, his glucose average 262mg/dl, feels numb in the hands and feet. He can do buttoning and walk steadily.   5/15/24 MRI abdomen and pelvis showed no evidence of disease. 5/21/24 CT chest showed resolved left lower lobe nodule that was new on 12/27/2023. No new lung nodule.  6/5/24 reports moderate numbness in fingers and toes. His hands and feet function normally. He can hold a cup and do buttoning, walk steadily.   [de-identified] : 7/3/24: Patient had been in [place] for the past few weeks, returned on Monday, he feels much more relaxed and enjoyed his time away. Appetite is good, trying to watch what he eats for DM. No constipation or diarrhea. Occ itching, no rashes. Has numbness in fingertips and in toes consistently. Reports that his glucoses  bounces around, average is 200, can be lower than 100 and over 300 at times. Endocrine has recommended an insulin pump, will be further discussed at his next appt with them.  He has ongoing chronic low back pain, was seen by Ortho/Spine, recc pain mgmt physician. Feels he is walking unstable at times, unsure if this is from back pain, recommended PT at this visit, however he wishes to see pain mgmt prior to starting PT. Patient notes that he is sleeping better and only using valium sporadically.    7/24/24 Pt is doing well. Tolerating Keytruda/Padcev however, expresses disappointment in immune mediated DM and frustration at maintaining euglycemia. Neuropathy and numbness are relatively stable and not interrupting his quality of life. Intermittent gait imbalance but no recent falls. Pt is scheduled to see Dr. Starkey for ureteroscopy.   8/14/24 - continues on EV/pembro since Oct 2023, cycle 14 day 1 today. Overall feeling "good", mild fatigue. Recurrent insomnia for which he restarted PRN diazepam 5mg qhs with good effect. Appetite is "normal", no dysgeusia. Glucose at time of this visit is 222, usually between 150-180 but his scanner shows it has been >250 approx 15% of the time. Had constipation following his cysto/ureteroscopy last week but now improving. Persistent numbness of bilateral fingertips and toes. Chronic low back for which he takes PRN oxycodone and follows with chronic pain management. Denies fever, chills, night sweats, headache, dizziness, eye pain/problems, mucositis/odynophagia, chest pain, palpitations, SOB, cough, nausea/vomiting, diarrhea, abdominal pain, dysuria, hematuria, incontinence, LE edema, rash/pruritus, bleeding.

## 2024-08-14 NOTE — REVIEW OF SYSTEMS
[Fatigue] : fatigue [Constipation] : constipation [Diarrhea: Grade 0] : Diarrhea: Grade 0 [Muscle Pain] : muscle pain [Fever] : no fever [Chills] : no chills [Night Sweats] : no night sweats [Eye Pain] : no eye pain [Vision Problems] : no vision problems [Dysphagia] : no dysphagia [Odynophagia] : no odynophagia [Mucosal Pain] : no mucosal pain [Chest Pain] : no chest pain [Palpitations] : no palpitations [Lower Ext Edema] : no lower extremity edema [Shortness Of Breath] : no shortness of breath [Cough] : no cough [Abdominal Pain] : no abdominal pain [Vomiting] : no vomiting [Dysuria] : no dysuria [Incontinence] : no incontinence [Joint Pain] : no joint pain [Joint Stiffness] : no joint stiffness [Muscle Weakness] : no muscle weakness [Skin Rash] : no skin rash [Dizziness] : no dizziness [Easy Bleeding] : no tendency for easy bleeding [Easy Bruising] : no tendency for easy bruising

## 2024-08-15 ENCOUNTER — APPOINTMENT (OUTPATIENT)
Dept: ENDOCRINOLOGY | Facility: CLINIC | Age: 74
End: 2024-08-15
Payer: MEDICARE

## 2024-08-15 VITALS
SYSTOLIC BLOOD PRESSURE: 143 MMHG | OXYGEN SATURATION: 97 % | WEIGHT: 204 LBS | HEIGHT: 73 IN | BODY MASS INDEX: 27.04 KG/M2 | DIASTOLIC BLOOD PRESSURE: 78 MMHG | HEART RATE: 71 BPM

## 2024-08-15 DIAGNOSIS — E03.9 HYPOTHYROIDISM, UNSPECIFIED: ICD-10-CM

## 2024-08-15 DIAGNOSIS — I10 ESSENTIAL (PRIMARY) HYPERTENSION: ICD-10-CM

## 2024-08-15 DIAGNOSIS — E10.9 TYPE 1 DIABETES MELLITUS W/OUT COMPLICATIONS: ICD-10-CM

## 2024-08-15 PROCEDURE — 99215 OFFICE O/P EST HI 40 MIN: CPT

## 2024-08-15 PROCEDURE — 95251 CONT GLUC MNTR ANALYSIS I&R: CPT

## 2024-08-15 NOTE — HISTORY OF PRESENT ILLNESS
[FreeTextEntry1] : 74 year old male with PMH of HTN, hypothyroidism, bladder cancer (on keytruda + padcev), recent dc for new onset diabetes and hyperglycemia (no DKA).  Interim history  Pt traveling return from [place]  Pt well - celebrating wedding 43rd wedding anniversary today  Stopping Padcev now - just continue Keytruda   7/24 Padcev + Keytruda  7/31 Padcev 8/7 No treatment  Trends from CGM - fasting usually ok, peak post lunch, drop post dinner, pt likely overcorrects for low BG post dinner and leads to overnight high Currently taking Lantus 18 units and NL 8/10/8 + low scale 1: 50 >200  #T1DM likely 2/2 keytruda +/- padcev C PEPTIDE 0.9 GLUCOSE 260 -Dx March 2024, no prior history of pre/diabetes and lost 20 pounds in 2 months -HbA1c 7.4% March 2024 -> 9.6% April 2024 -> 8.6% June 2024 -BG rising over the past month 195 early March -> >500 late March admitted to [facility] ED -Medications: Lantus 15 units pre bed, lispro 1:15g (recent carb counting with Gloria Winters). Pt reports not really using this as sometime difficult to measure and weigh out carbs. Using approx 6 units TID pre meals with low dose correction scale.  CGM download Very high 25% High 29% Target 46% No low/very low 0% Daily trends - high mainly post breakfast, dips post lunch and dinner -> spike overnight likely from overcorrection of hypo Pt reports compliance and good insulin adminstration technique. No lipohypertrophy on exam  -Denies hypos -eGFR 53, ACR 78 (mildly elevated in March 2024) -No family history  Diet (highest CHO meal would be breakfast) Breakfast oatmeal +milk, bread with ham or cheese 3pm Lunch soup Dinner salad, protein Snacks potato chips rarely, fruits Minimal exercise  #Bladder cancer -Diagnosed biopsy in September 2023 -Treatment R) nephrectomy 5/22 -> nephroureterectomy Keytruda + enfortumab commenced October 2023 1x week both Wed/Thur/Fri 1x padcev 1x nothing -> stopped after admission 2/52 ago -> now recommenced 1st cycle on the 24th April. Paclev held last week due hyperglycemia  Hypothyroidism -Levo 25mcg daily -TFTs in April normal, TSH 0.88, FT4 1.6  Denies any symptoms of adrenal crisis

## 2024-08-15 NOTE — ASSESSMENT
[FreeTextEntry1] : 73 year old male with PMH of HTN, hypothyroidism, bladder cancer (on keytruda + padcev), recent dc for new onset diabetes and hyperglycemia (no DKA).  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  Pt also tends to alternative size of meal for lunch and dinner (one large one small) Reduce pre lunch insulin to 5 units + low dose correction scale and keep pre dinner insulin at 8 units (if bigger meal at lunch then give 8units at lunch small dinner 5 units at dinner)  Increase lantus to 18 units pre bed (lispro as above 12/5/8) Low dose correction as below  200-250 1 unit 251-300 2 unit 301-350 3 unit 351-400 4 unit >400 5 unit  Consider islet as pt has T1DM and struggling with carb counting - would likely benefit. Will check with pump company regarding pump response to steroids as pt receives steroids as part of chemo and not sure if pump can keep up with this. Pt and wife eager to try this as BGs remain poorly controlled despite good compliance. Will revisit at next visit if BGs remain poorly controlled   Continue CGM  Patient counseled extensively about the complications of diabetes including but not limited to nephropathy, neuropathy, and retinopathy. We discussed the importance of annual foot and optho exams. Explained that ideally blood sugars in the morning prior to breakfast should be between 80 and 130. Blood sugars should be checked 2 hours after eating and should be <180. If blood sugar is <70, patient should treat the blood sugar FIRST and then contact provider. Advised patient to let us know if BG persistently <70 or >200  Patient educated on importance of ALWAYS taking basal insulin (not to skip) as pt insulin deficient and at high risk of DKA  Will recheck C peptide once glucose better controlled  Explained to patient and wife that this form of diabetes is very similar to T1DM. Pt is dependent on insulin. From what we know about this condition, it is often permanent and beta cells are unlikely to recover  #History of immunotherapy  -Patient made aware the endocrine adverse effects secondary to keytruda can occur up to 12 months post last dose-Main organs that can be involved from an endo perspective are pituitary gland, thyroid and pancreas -Patient aware to continue regular glucose and TFT checks as part of chemo labs -Pt aware of red flags for adrenal insufficiency (only check cortisol if clinical suspicion) -Lengthy discussion with patient regarding the safety of continue keytruda now he has diabetes. If pt benefiting from keytruda from a cancer standpoint, would be safe to continue and no contraindications. -Recent TFTs WNL  #Hypothyroidism TFTS wnl 5th June Continue levothyroxine 25mcg daily  Review in 8 weeks.

## 2024-08-21 ENCOUNTER — APPOINTMENT (OUTPATIENT)
Dept: INFUSION THERAPY | Facility: HOSPITAL | Age: 74
End: 2024-08-21

## 2024-08-21 ENCOUNTER — OUTPATIENT (OUTPATIENT)
Dept: OUTPATIENT SERVICES | Facility: HOSPITAL | Age: 74
LOS: 1 days | Discharge: HOME | End: 2024-08-21

## 2024-08-21 DIAGNOSIS — Z90.2 ACQUIRED ABSENCE OF LUNG [PART OF]: Chronic | ICD-10-CM

## 2024-08-21 DIAGNOSIS — Z98.890 OTHER SPECIFIED POSTPROCEDURAL STATES: Chronic | ICD-10-CM

## 2024-08-21 DIAGNOSIS — C80.1 MALIGNANT (PRIMARY) NEOPLASM, UNSPECIFIED: ICD-10-CM

## 2024-08-21 DIAGNOSIS — Z90.5 ACQUIRED ABSENCE OF KIDNEY: Chronic | ICD-10-CM

## 2024-08-30 ENCOUNTER — APPOINTMENT (OUTPATIENT)
Dept: GERIATRICS | Facility: CLINIC | Age: 74
End: 2024-08-30

## 2024-08-30 VITALS
HEART RATE: 91 BPM | BODY MASS INDEX: 27.57 KG/M2 | OXYGEN SATURATION: 97 % | DIASTOLIC BLOOD PRESSURE: 69 MMHG | TEMPERATURE: 97.9 F | RESPIRATION RATE: 15 BRPM | WEIGHT: 209 LBS | SYSTOLIC BLOOD PRESSURE: 138 MMHG

## 2024-08-30 DIAGNOSIS — F41.9 ANXIETY DISORDER, UNSPECIFIED: ICD-10-CM

## 2024-08-30 DIAGNOSIS — Z51.5 ENCOUNTER FOR PALLIATIVE CARE: ICD-10-CM

## 2024-08-30 DIAGNOSIS — G89.29 DORSALGIA, UNSPECIFIED: ICD-10-CM

## 2024-08-30 DIAGNOSIS — C67.9 MALIGNANT NEOPLASM OF BLADDER, UNSPECIFIED: ICD-10-CM

## 2024-08-30 DIAGNOSIS — M54.9 DORSALGIA, UNSPECIFIED: ICD-10-CM

## 2024-08-30 DIAGNOSIS — G62.9 POLYNEUROPATHY, UNSPECIFIED: ICD-10-CM

## 2024-08-30 PROCEDURE — G2211 COMPLEX E/M VISIT ADD ON: CPT

## 2024-08-30 PROCEDURE — 99215 OFFICE O/P EST HI 40 MIN: CPT

## 2024-09-04 ENCOUNTER — RESULT REVIEW (OUTPATIENT)
Age: 74
End: 2024-09-04

## 2024-09-04 ENCOUNTER — APPOINTMENT (OUTPATIENT)
Dept: HEMATOLOGY ONCOLOGY | Facility: CLINIC | Age: 74
End: 2024-09-04
Payer: MEDICARE

## 2024-09-04 ENCOUNTER — APPOINTMENT (OUTPATIENT)
Dept: INFUSION THERAPY | Facility: HOSPITAL | Age: 74
End: 2024-09-04

## 2024-09-04 VITALS
WEIGHT: 207.23 LBS | HEART RATE: 71 BPM | OXYGEN SATURATION: 96 % | DIASTOLIC BLOOD PRESSURE: 76 MMHG | RESPIRATION RATE: 16 BRPM | BODY MASS INDEX: 27.34 KG/M2 | TEMPERATURE: 97.6 F | SYSTOLIC BLOOD PRESSURE: 122 MMHG

## 2024-09-04 DIAGNOSIS — C65.9 MALIGNANT NEOPLASM OF UNSPECIFIED RENAL PELVIS: ICD-10-CM

## 2024-09-04 LAB
ALBUMIN SERPL ELPH-MCNC: 3.9 G/DL — SIGNIFICANT CHANGE UP (ref 3.3–5)
ALP SERPL-CCNC: 95 U/L — SIGNIFICANT CHANGE UP (ref 40–120)
ALT FLD-CCNC: 5 U/L — LOW (ref 10–45)
ANION GAP SERPL CALC-SCNC: 10 MMOL/L — SIGNIFICANT CHANGE UP (ref 5–17)
AST SERPL-CCNC: 19 U/L — SIGNIFICANT CHANGE UP (ref 10–40)
BASOPHILS # BLD AUTO: 0.04 K/UL — SIGNIFICANT CHANGE UP (ref 0–0.2)
BASOPHILS NFR BLD AUTO: 0.5 % — SIGNIFICANT CHANGE UP (ref 0–2)
BILIRUB SERPL-MCNC: 0.6 MG/DL — SIGNIFICANT CHANGE UP (ref 0.2–1.2)
BUN SERPL-MCNC: 33 MG/DL — HIGH (ref 7–23)
CALCIUM SERPL-MCNC: 9.2 MG/DL — SIGNIFICANT CHANGE UP (ref 8.4–10.5)
CHLORIDE SERPL-SCNC: 97 MMOL/L — SIGNIFICANT CHANGE UP (ref 96–108)
CO2 SERPL-SCNC: 25 MMOL/L — SIGNIFICANT CHANGE UP (ref 22–31)
CREAT SERPL-MCNC: 1.28 MG/DL — SIGNIFICANT CHANGE UP (ref 0.5–1.3)
EGFR: 58 ML/MIN/1.73M2 — LOW
EGFR: 58 ML/MIN/1.73M2 — LOW
EOSINOPHIL # BLD AUTO: 0.28 K/UL — SIGNIFICANT CHANGE UP (ref 0–0.5)
EOSINOPHIL NFR BLD AUTO: 3.6 % — SIGNIFICANT CHANGE UP (ref 0–6)
GLUCOSE SERPL-MCNC: 435 MG/DL — HIGH (ref 70–99)
HCT VFR BLD CALC: 36.4 % — LOW (ref 39–50)
HGB BLD-MCNC: 12.3 G/DL — LOW (ref 13–17)
IMM GRANULOCYTES NFR BLD AUTO: 0.4 % — SIGNIFICANT CHANGE UP (ref 0–0.9)
LYMPHOCYTES # BLD AUTO: 1.07 K/UL — SIGNIFICANT CHANGE UP (ref 1–3.3)
LYMPHOCYTES # BLD AUTO: 13.6 % — SIGNIFICANT CHANGE UP (ref 13–44)
MCHC RBC-ENTMCNC: 32.5 PG — SIGNIFICANT CHANGE UP (ref 27–34)
MCHC RBC-ENTMCNC: 33.8 G/DL — SIGNIFICANT CHANGE UP (ref 32–36)
MCV RBC AUTO: 96 FL — SIGNIFICANT CHANGE UP (ref 80–100)
MONOCYTES # BLD AUTO: 0.76 K/UL — SIGNIFICANT CHANGE UP (ref 0–0.9)
MONOCYTES NFR BLD AUTO: 9.7 % — SIGNIFICANT CHANGE UP (ref 2–14)
NEUTROPHILS # BLD AUTO: 5.68 K/UL — SIGNIFICANT CHANGE UP (ref 1.8–7.4)
NEUTROPHILS NFR BLD AUTO: 72.2 % — SIGNIFICANT CHANGE UP (ref 43–77)
NRBC # BLD: 0 /100 WBCS — SIGNIFICANT CHANGE UP (ref 0–0)
NRBC BLD-RTO: 0 /100 WBCS — SIGNIFICANT CHANGE UP (ref 0–0)
PLATELET # BLD AUTO: 134 K/UL — LOW (ref 150–400)
POTASSIUM SERPL-MCNC: 4.4 MMOL/L — SIGNIFICANT CHANGE UP (ref 3.5–5.3)
POTASSIUM SERPL-SCNC: 4.4 MMOL/L — SIGNIFICANT CHANGE UP (ref 3.5–5.3)
PROT SERPL-MCNC: 7.1 G/DL — SIGNIFICANT CHANGE UP (ref 6–8.3)
RBC # BLD: 3.79 M/UL — LOW (ref 4.2–5.8)
RBC # FLD: 13.6 % — SIGNIFICANT CHANGE UP (ref 10.3–14.5)
SODIUM SERPL-SCNC: 132 MMOL/L — LOW (ref 135–145)
WBC # BLD: 7.86 K/UL — SIGNIFICANT CHANGE UP (ref 3.8–10.5)
WBC # FLD AUTO: 7.86 K/UL — SIGNIFICANT CHANGE UP (ref 3.8–10.5)

## 2024-09-04 PROCEDURE — 99214 OFFICE O/P EST MOD 30 MIN: CPT

## 2024-09-04 PROCEDURE — G2211 COMPLEX E/M VISIT ADD ON: CPT

## 2024-09-04 NOTE — PHYSICAL EXAM
[Fully active, able to carry on all pre-disease performance without restriction] : Status 0 - Fully active, able to carry on all pre-disease performance without restriction [Normal] : affect appropriate [de-identified] : anicteric  [de-identified] : no LE edema

## 2024-09-04 NOTE — ASSESSMENT
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 At initial appointment, Dr. Mccall discussed his cancer status and further management. Based on AUA and EAU guideline subjects with upper tract tumors of the renal pelvis and ureter(s) must meet a high risk assessment defined as: tumor 1cm and/or hydronephrosis and/or high grade pathology and/or multifocal disease, where a radical NU approach to treat localized disease is warranted, followed by adjuvant immunotherapy. However, he adamantly declined any surgery given his solitary kidney and the result of dialysis. He is cisplatin ineligible. The potential regimen, pembrolizumab and enfortumab vedotin could induce 73% response including 15% CR rate. His T1HG bladder cancer may be treated with both combination. He will be monitored by Dr. Starkey for surveillance cystoscopy and ureteroscopy. Potential AEs of immunotherapy and enfortumab vedotin were discussed and patient signed consent. He started treatment with pembrolizumab and padcev on 10/18/2023. 12/14/23 CT CAP showed New 3 mm nodule left lung lower lobe. No adenopathy in the thorax, abdomen or pelvis. No left hydronephrosis and left mid ureteral lesion. 5/15 MRI abdomen and pelvis and 5/21/24 CT chest showed LISETTE.   Renal pelvis UCC, high grade  - on pembrolizumab D1 and enfortumab vedotin (padcev) D1, D8 on 21 day cycle, treatment held on 3/13 and 3/20 due to hyperglycemia, patient received pembro on 4/3, held padcev on 4/3 and 4/10, received 4/24 to resume pembrolizumab and padcev, May 1 did not receive, then resumed again 5/15, 7/24 start of new cycle and will receive pembrolizumab and padcev - S/p cystoscopy and ureteroscopy on 8/8/24. Brief operative note states no sign of recurrent urothelial carcinoma - He is scheduled for EV/pembro cycle 14 day 1 today. Per Dr. Mccall's previous notes, we will proceed with pembrolizumab alone at this time given no evidence of recurrent disease on his 8/8/24 cysto/ureteroscopy. Will continue keytruda until two years in Oct 2025 to consider stopping keytruda if he still has CR - reviewed potential AEs of keytruda, including but not limited to: fatigue, skin rash, joint pain, hypothyroidism, pneumonitis, hepatitis, nephritis, colitis, myocarditis, pericarditis, hypophysitis.  - 5/15/24 CT Chest: resolved left lower lobe nodule that was new on 12/27/2023. No new lung nodule. - 5/21/24 MR AP: no evidence of new or recurrent neoplasm -repeat images in September   Diabetes  - Padcev vs pembrolizumab associated hyperglycemia - Continue to follow with Dr. Ramos  Neuropathy  - endorses consistent numbness/tingling in fingers and toes  - continue to monitor, hopefully this will improve over time after cessation of Padcev.   Insomnia -  - Recurrent insomnia. Has been using PRN diazepam 5mg qhs with good effects. Refill provided 8/14/24. Again discussed that this medication should not be taken with oxycodone or any other sedating medications, do not drive or operate machinery while taking this medication   Chronic low back pain  - this pain is chronic and is not cancer related pain, he has been prescribed PRN oxycodone 10mg q8h by another provider. Continue follow with his PCP / NSGY to discuss further management - Discussed risk/benefits of opioids, not to operate vehicle or machinery while using these medications, do not take with diazepam or other sedating medications   - 5/15 MR L spine: No evidence of bony metastatic disease. - pt seen by Ortho spine and referred to Pain Mgmt for lumbar stenosis/degenerative arthritis   F/u 9/4/24 to see Dr. Mccall and for cycle 15 day 1

## 2024-09-04 NOTE — HISTORY OF PRESENT ILLNESS
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   5/20/23 CT abdomen and pelvis w/o contrast showed mod to severe left hydroureteronephrosis to the left of a filling defect within the left mid ureter, neoplasm until proven otherwise. 7/18/23 CT chest and urogram showed Interval placement of left-sided ureteral stent with continued moderate hydroureteronephrosis. Point of transition in the left mid ureter. There is prominent soft tissue and a lack of contrast on the delayed films. This raises concern for an underlying neoplasm.  9/2/23 CT abdomen and pelvis without con showed left hydronephrosis without significant changes, left ureteral stent placement.   He was scheduled for segmental ureterectomy but ureteroscopy on 9/1 noted a 2.5 cm segment in the mid left ureter (now solitary kidney) with new disease and a 1.5 cm in the  distal ureter  lesion, and patholoy showed TaHG in the mid ureter and TaHG in the distal ureter as well as TaHG in the kidney with T1HG in the bladder.    He reports no hematuria, burning, frequency and abdominal pain.  He has chronic lower back pain for many years on oxycodon as needed.    11/1/23 Has newly developed skin itching and mild rash that has not been improving with OTC topicals. He also has intermittent headaches relieved by Tylenol. He reports increased fatigue and decreased sleep. He denied nausea, vomiting, diarrhea, constipation.  11/22/23: C2D15 pembrolizumab and padcev. Patient has decreased appetite, has not been eating much at all in the past few weeks and has lost 10lbs. He is feeling very weak, very fatigued. Patient is so tired that he had to be wheeled to exam room in wheelchair. He was able to walk from the hallway into the exam room. He needs assistance getting up from a sitting position. He will occ get shortness of breath on exertion and this has worsened in the past few weeks. Denies chest pain but says once in a while he has palpitations when trying to sleep. He is not sure if palpitations happen with exertion because he has not been able to exert himself very much of late. Patient denies fever, he is cold all of the time. Had chills the other day, took temperature and it was normal. He is exhausted because he has not been able to sleep. He only sleeps for 15 minutes at a time at night and it is a very light sleep. He has never suffered from insomnia before. He is not taking naps during the day. He was already having difficulty sleeping during cylce 1 but it has worsened tremendously with this cycle. He has been using hydroxyzine and benadryl for itching, neither of which have made him drowsy. He has been using melatonin which does not help. He tried his wife's ambien, which also did not help. Patient has ongoing itching but no rashes, says he is "itching, itching, always itching". Says that the prescribed medications have not worked, including betamethasone cream and hydroxyzine. Has been using Eucerin and Sarna creams, sitting in Epsom salt baths. Patient was constipated, however now is having loose stools twice a day for the last three days, has stopped taking bowel regimen. Pt denies numbness and tingling in hands and feet. Patient reports chronic low back pain that pre dates his cancer diagnosis, the pain is not cancer related, has been dealing with this with his PCP and ?NSGY, initially surgery was recommended but now is not. Was going to PT but is no longer because it was not helping. He takes oxycodone for this pain, he says sometimes he will go days without taking it and then he will need three tabs (30mg) at one time, which is not how this medication is prescribed.    12/13/23 He continues to have rash which appears to be improving. However he and his wife are concerned that his skin is darkening (even in non sun exposed areas). He also states that recentl he is "always cold." He denies diarrhea or constipation. He continues to have some insomnia  12/14/23 CT CAP showed New 3 mm nodule left lung lower lobe. No adenopathy in the thorax, abdomen or pelvis. No left hydronephrosis and left mid ureteral lesion.   1/3/24 reports good appetite, better sleep, skin rash with improved itchiness. He is able to do exercises.   1/24/24: Energy is better than it was, but is still fatigued. Mood is better than it was. Appetite is better than before. Patient notes that he is able to walk for half an hour. Itching has improved, it is most pronounced on his buttock at nighttime, not taking hydroxyzine, sometimes will use benadryl, is using topicals as well. Reports hyperpigmentation of skin since starting treatment. He reports increased frequency of urination within the last few weeks, he also reports in the last few weeks he has not been taking tamsulosin. Says he urinates about every 2-3 hours during night and day. No hematuria or dysuria. Patient has chronic pain in his back that he takes oxycodone for. He has occasional "sticking" pain in his L chest that he associates with his port. This was hurting him today, took oxy and it had improved. He does not have any cardiac chest pain, shortness of breath or palpitations. He denies constipation and diarrhea, says he eats home made yogurt that helps to keep his bowel movements regular. He is using valium everyday for insomnia. He tries to go to sleep without it, but cannot fall asleep without it.    2/12/24 Reports mild fatigue and improving itchiness. Both hand fingers develop mild numbness. Nocturia 2. Denies any gross hematuria.   3/6/24: Patient continues to have difficulty sleeping, for example went to bed at 9:30 last night, did not fall asleep until 6AM, slept for 4 hours. He has not been using medication to sleep. He feels tired often. Appetite is fine. Says that he has numbness in fingers and toes for the last month. Says sometimes his legs feel weak and that he feels off balance, has not fallen, is not dizzy. He does exercise, mainly push ups. He has pain in his low back, not cancer related, takes oxycodone, has MR next month. Itching does not bother him much any more. Reports constipation. Last saw eye doctor 6 months ago, says he does have difficulty seeing, unsure if this has worsened since being on treatment, no flashes or floaters. Says he sometimes feels a stabbing pain in his L chest that lasts for about 10 minutes at a time, this does not happen every day.   3/27/24: Last cycle of pembro/padcev was held due to hyperglycemia/new onset Diabetes. On 3/13 pre treatment glucose = 571.Patient was sent to ED and was discharged on 15 units lantus qhs and admelog 5 units TID.  Patient comes with his glucometer, his fasting glucoses have been 90 - 140s, evening he has been in the high 200s. He has an appt with Endocrine on Monday. He has been using the lantus at night and the admelog during the day. Has been trying to eat more healthy. Overall he has been feeling well. Pt's wife shoes me a picture of skin tear near pt's anus, says it started because of itching, it was open at one point but they have been using neomycin cream and it is healing nicely. Says this area opened up due to itching. However, say itching is not what it once was.    4/24/24: Patient says he learned about carb counting yesterday with Endo. For the last few days his glucose has been mostly in the 200 and 300s, was better controlled the week prior, right now glucose is 157, average glucose has been between 203 - 324. Pt having improved/normal BM he thinks because he is eating more healthy. He denies itching at present. He has fingertip numbness and tingling for the past few months as well as occ numbness in his toes. He saw podiatry yesterday. Energy is okay, goes for daily walks.    5/15/24 in last week, his glucose average 262mg/dl, feels numb in the hands and feet. He can do buttoning and walk steadily.   5/15/24 MRI abdomen and pelvis showed no evidence of disease. 5/21/24 CT chest showed resolved left lower lobe nodule that was new on 12/27/2023. No new lung nodule.  6/5/24 reports moderate numbness in fingers and toes. His hands and feet function normally. He can hold a cup and do buttoning, walk steadily.   [de-identified] : 7/3/24: Patient had been in [place] for the past few weeks, returned on Monday, he feels much more relaxed and enjoyed his time away. Appetite is good, trying to watch what he eats for DM. No constipation or diarrhea. Occ itching, no rashes. Has numbness in fingertips and in toes consistently. Reports that his glucoses  bounces around, average is 200, can be lower than 100 and over 300 at times. Endocrine has recommended an insulin pump, will be further discussed at his next appt with them.  He has ongoing chronic low back pain, was seen by Ortho/Spine, recc pain mgmt physician. Feels he is walking unstable at times, unsure if this is from back pain, recommended PT at this visit, however he wishes to see pain mgmt prior to starting PT. Patient notes that he is sleeping better and only using valium sporadically.    7/24/24 Pt is doing well. Tolerating Keytruda/Padcev however, expresses disappointment in immune mediated DM and frustration at maintaining euglycemia. Neuropathy and numbness are relatively stable and not interrupting his quality of life. Intermittent gait imbalance but no recent falls. Pt is scheduled to see Dr. Starkey for ureteroscopy.   8/14/24 - continues on EV/pembro since Oct 2023, cycle 14 day 1 today. Overall feeling "good", mild fatigue. Recurrent insomnia for which he restarted PRN diazepam 5mg qhs with good effect. Appetite is "normal", no dysgeusia. Glucose at time of this visit is 222, usually between 150-180 but his scanner shows it has been >250 approx 15% of the time. Had constipation following his cysto/ureteroscopy last week but now improving. Persistent numbness of bilateral fingertips and toes. Chronic low back for which he takes PRN oxycodone and follows with chronic pain management. Denies fever, chills, night sweats, headache, dizziness, eye pain/problems, mucositis/odynophagia, chest pain, palpitations, SOB, cough, nausea/vomiting, diarrhea, abdominal pain, dysuria, hematuria, incontinence, LE edema, rash/pruritus, bleeding.   9/4/24 is only on keytruda maintenance. Feels overall fine, denies fatigue, skin rash and diarrhea.

## 2024-09-05 DIAGNOSIS — Z51.11 ENCOUNTER FOR ANTINEOPLASTIC CHEMOTHERAPY: ICD-10-CM

## 2024-09-05 DIAGNOSIS — C68.9 MALIGNANT NEOPLASM OF URINARY ORGAN, UNSPECIFIED: ICD-10-CM

## 2024-09-05 LAB
T4 FREE SERPL-MCNC: 1.5 NG/DL — SIGNIFICANT CHANGE UP (ref 0.9–1.8)
TSH SERPL-MCNC: 0.73 UIU/ML — SIGNIFICANT CHANGE UP (ref 0.27–4.2)

## 2024-09-07 NOTE — REVIEW OF SYSTEMS
[As Noted in HPI] : as noted in HPI [Negative] : Heme/Lymph [Constipation] : no constipation [Anxiety] : no anxiety [Depression] : no depression

## 2024-09-07 NOTE — PHYSICAL EXAM
[General Appearance - Alert] : alert [General Appearance - In No Acute Distress] : in no acute distress [General Appearance - Well Nourished] : well nourished [Sclera] : the sclera and conjunctiva were normal [Neck Appearance] : the appearance of the neck was normal [] : no respiratory distress [Heart Sounds] : normal S1 and S2 [Edema] : there was no peripheral edema [Bowel Sounds] : normal bowel sounds [Abnormal Walk] : normal gait [Involuntary Movements] : no involuntary movements were seen [Skin Color & Pigmentation] : normal skin color and pigmentation [No Focal Deficits] : no focal deficits [Impaired Insight] : insight and judgment were intact [Affect] : the affect was normal [Mood] : the mood was normal [FreeTextEntry1] : lumbar spine tenderness

## 2024-09-07 NOTE — HISTORY OF PRESENT ILLNESS
[FreeTextEntry1] : 75 y/o M with significant hx of bladder ca comes today for evaluation of medical marijuana.   PMH: HEENT: Neuro: Cardio: HTN Pulm: GI: : Renal: Right Nephroureterectomy 6/2022 d/t neoplasm now with left ureter and bladder affected, L hydronephrosis. Musculo: Chronic LBP, Spinal Stenosis and Disc Bulge- refused surgery Rheum: Endo: Hypothyroidism, Immunotherapy induced DM Vascular: Skin: Heme/Onc: Renal Pelvis Urothelial cell carcinoma  Psych: _______________________ Specialists: [facility] ortho spine  _______________________ Assessment: # Ureteral, Bladder cancer- on immunotherapy  # Chronic LBP- on oxycodone 20 mg BID ATC. BM wnl # Neuropathy # Anxiety- on diazepam- wants alternative/ weaning     _______________________ SoHx:  (wife tiara), drinks on social occasion (wine only), has one son whom lives in [place]

## 2024-09-07 NOTE — HISTORY OF PRESENT ILLNESS
[FreeTextEntry1] : 73 y/o M with significant hx of bladder ca comes today for evaluation of medical marijuana.   PMH: HEENT: Neuro: Cardio: HTN Pulm: GI: : Renal: Right Nephroureterectomy 6/2022 d/t neoplasm now with left ureter and bladder affected, L hydronephrosis. Musculo: Chronic LBP, Spinal Stenosis and Disc Bulge- refused surgery Rheum: Endo: Hypothyroidism, Immunotherapy induced DM Vascular: Skin: Heme/Onc: Renal Pelvis Urothelial cell carcinoma  Psych: _______________________ Specialists: [facility] ortho spine  _______________________ Assessment: # Ureteral, Bladder cancer- on immunotherapy  # Chronic LBP- on oxycodone 20 mg BID ATC. BM wnl # Neuropathy # Anxiety- on diazepam- wants alternative/ weaning     _______________________ SoHx:  (wife tiara), drinks on social occasion (wine only), has one son whom lives in [place]

## 2024-09-11 ENCOUNTER — APPOINTMENT (OUTPATIENT)
Dept: INFUSION THERAPY | Facility: HOSPITAL | Age: 74
End: 2024-09-11

## 2024-09-17 ENCOUNTER — NON-APPOINTMENT (OUTPATIENT)
Age: 74
End: 2024-09-17

## 2024-09-25 ENCOUNTER — RESULT REVIEW (OUTPATIENT)
Age: 74
End: 2024-09-25

## 2024-09-25 ENCOUNTER — APPOINTMENT (OUTPATIENT)
Dept: INFUSION THERAPY | Facility: HOSPITAL | Age: 74
End: 2024-09-25

## 2024-09-25 ENCOUNTER — APPOINTMENT (OUTPATIENT)
Dept: HEMATOLOGY ONCOLOGY | Facility: CLINIC | Age: 74
End: 2024-09-25
Payer: MEDICARE

## 2024-09-25 VITALS
HEIGHT: 73 IN | RESPIRATION RATE: 16 BRPM | WEIGHT: 209.64 LBS | HEART RATE: 66 BPM | OXYGEN SATURATION: 97 % | SYSTOLIC BLOOD PRESSURE: 129 MMHG | DIASTOLIC BLOOD PRESSURE: 82 MMHG | BODY MASS INDEX: 27.78 KG/M2

## 2024-09-25 DIAGNOSIS — E11.9 TYPE 2 DIABETES MELLITUS W/OUT COMPLICATIONS: ICD-10-CM

## 2024-09-25 DIAGNOSIS — G62.9 POLYNEUROPATHY, UNSPECIFIED: ICD-10-CM

## 2024-09-25 DIAGNOSIS — G47.00 INSOMNIA, UNSPECIFIED: ICD-10-CM

## 2024-09-25 DIAGNOSIS — C64.9 MALIGNANT NEOPLASM OF UNSPECIFIED KIDNEY, EXCEPT RENAL PELVIS: ICD-10-CM

## 2024-09-25 LAB
ALBUMIN SERPL ELPH-MCNC: 4 G/DL — SIGNIFICANT CHANGE UP (ref 3.3–5)
ALP SERPL-CCNC: 90 U/L — SIGNIFICANT CHANGE UP (ref 40–120)
ALT FLD-CCNC: 6 U/L — LOW (ref 10–45)
ANION GAP SERPL CALC-SCNC: 12 MMOL/L — SIGNIFICANT CHANGE UP (ref 5–17)
AST SERPL-CCNC: 26 U/L — SIGNIFICANT CHANGE UP (ref 10–40)
BASOPHILS # BLD AUTO: 0.07 K/UL — SIGNIFICANT CHANGE UP (ref 0–0.2)
BASOPHILS NFR BLD AUTO: 0.9 % — SIGNIFICANT CHANGE UP (ref 0–2)
BILIRUB SERPL-MCNC: 0.8 MG/DL — SIGNIFICANT CHANGE UP (ref 0.2–1.2)
BUN SERPL-MCNC: 24 MG/DL — HIGH (ref 7–23)
CALCIUM SERPL-MCNC: 9.4 MG/DL — SIGNIFICANT CHANGE UP (ref 8.4–10.5)
CHLORIDE SERPL-SCNC: 98 MMOL/L — SIGNIFICANT CHANGE UP (ref 96–108)
CO2 SERPL-SCNC: 23 MMOL/L — SIGNIFICANT CHANGE UP (ref 22–31)
CREAT SERPL-MCNC: 1.31 MG/DL — HIGH (ref 0.5–1.3)
EGFR: 56 ML/MIN/1.73M2 — LOW
EGFR: 56 ML/MIN/1.73M2 — LOW
EOSINOPHIL # BLD AUTO: 0.37 K/UL — SIGNIFICANT CHANGE UP (ref 0–0.5)
EOSINOPHIL NFR BLD AUTO: 4.9 % — SIGNIFICANT CHANGE UP (ref 0–6)
GLUCOSE SERPL-MCNC: 341 MG/DL — HIGH (ref 70–99)
HCT VFR BLD CALC: 39.3 % — SIGNIFICANT CHANGE UP (ref 39–50)
HGB BLD-MCNC: 13 G/DL — SIGNIFICANT CHANGE UP (ref 13–17)
IMM GRANULOCYTES NFR BLD AUTO: 0.3 % — SIGNIFICANT CHANGE UP (ref 0–0.9)
LYMPHOCYTES # BLD AUTO: 1.18 K/UL — SIGNIFICANT CHANGE UP (ref 1–3.3)
LYMPHOCYTES # BLD AUTO: 15.7 % — SIGNIFICANT CHANGE UP (ref 13–44)
MCHC RBC-ENTMCNC: 32.4 PG — SIGNIFICANT CHANGE UP (ref 27–34)
MCHC RBC-ENTMCNC: 33.1 G/DL — SIGNIFICANT CHANGE UP (ref 32–36)
MCV RBC AUTO: 98 FL — SIGNIFICANT CHANGE UP (ref 80–100)
MONOCYTES # BLD AUTO: 0.68 K/UL — SIGNIFICANT CHANGE UP (ref 0–0.9)
MONOCYTES NFR BLD AUTO: 9.1 % — SIGNIFICANT CHANGE UP (ref 2–14)
NEUTROPHILS # BLD AUTO: 5.18 K/UL — SIGNIFICANT CHANGE UP (ref 1.8–7.4)
NEUTROPHILS NFR BLD AUTO: 69.1 % — SIGNIFICANT CHANGE UP (ref 43–77)
NRBC # BLD: 0 /100 WBCS — SIGNIFICANT CHANGE UP (ref 0–0)
NRBC BLD-RTO: 0 /100 WBCS — SIGNIFICANT CHANGE UP (ref 0–0)
PLATELET # BLD AUTO: 143 K/UL — LOW (ref 150–400)
POTASSIUM SERPL-MCNC: 4.9 MMOL/L — SIGNIFICANT CHANGE UP (ref 3.5–5.3)
POTASSIUM SERPL-SCNC: 4.9 MMOL/L — SIGNIFICANT CHANGE UP (ref 3.5–5.3)
PROT SERPL-MCNC: 7.4 G/DL — SIGNIFICANT CHANGE UP (ref 6–8.3)
RBC # BLD: 4.01 M/UL — LOW (ref 4.2–5.8)
RBC # FLD: 13.2 % — SIGNIFICANT CHANGE UP (ref 10.3–14.5)
SODIUM SERPL-SCNC: 132 MMOL/L — LOW (ref 135–145)
T4 FREE SERPL-MCNC: 1.8 NG/DL — SIGNIFICANT CHANGE UP (ref 0.9–1.8)
TSH SERPL-MCNC: 1.11 UIU/ML — SIGNIFICANT CHANGE UP (ref 0.27–4.2)
WBC # BLD: 7.5 K/UL — SIGNIFICANT CHANGE UP (ref 3.8–10.5)
WBC # FLD AUTO: 7.5 K/UL — SIGNIFICANT CHANGE UP (ref 3.8–10.5)

## 2024-09-25 PROCEDURE — G2211 COMPLEX E/M VISIT ADD ON: CPT

## 2024-09-25 PROCEDURE — 99215 OFFICE O/P EST HI 40 MIN: CPT

## 2024-09-26 NOTE — HISTORY OF PRESENT ILLNESS
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   5/20/23 CT abdomen and pelvis w/o contrast showed mod to severe left hydroureteronephrosis to the left of a filling defect within the left mid ureter, neoplasm until proven otherwise. 7/18/23 CT chest and urogram showed Interval placement of left-sided ureteral stent with continued moderate hydroureteronephrosis. Point of transition in the left mid ureter. There is prominent soft tissue and a lack of contrast on the delayed films. This raises concern for an underlying neoplasm.  9/2/23 CT abdomen and pelvis without con showed left hydronephrosis without significant changes, left ureteral stent placement.   He was scheduled for segmental ureterectomy but ureteroscopy on 9/1 noted a 2.5 cm segment in the mid left ureter (now solitary kidney) with new disease and a 1.5 cm in the  distal ureter  lesion, and patholoy showed TaHG in the mid ureter and TaHG in the distal ureter as well as TaHG in the kidney with T1HG in the bladder.    He reports no hematuria, burning, frequency and abdominal pain.  He has chronic lower back pain for many years on oxycodon as needed.    11/1/23 Has newly developed skin itching and mild rash that has not been improving with OTC topicals. He also has intermittent headaches relieved by Tylenol. He reports increased fatigue and decreased sleep. He denied nausea, vomiting, diarrhea, constipation.  11/22/23: C2D15 pembrolizumab and padcev. Patient has decreased appetite, has not been eating much at all in the past few weeks and has lost 10lbs. He is feeling very weak, very fatigued. Patient is so tired that he had to be wheeled to exam room in wheelchair. He was able to walk from the hallway into the exam room. He needs assistance getting up from a sitting position. He will occ get shortness of breath on exertion and this has worsened in the past few weeks. Denies chest pain but says once in a while he has palpitations when trying to sleep. He is not sure if palpitations happen with exertion because he has not been able to exert himself very much of late. Patient denies fever, he is cold all of the time. Had chills the other day, took temperature and it was normal. He is exhausted because he has not been able to sleep. He only sleeps for 15 minutes at a time at night and it is a very light sleep. He has never suffered from insomnia before. He is not taking naps during the day. He was already having difficulty sleeping during cylce 1 but it has worsened tremendously with this cycle. He has been using hydroxyzine and benadryl for itching, neither of which have made him drowsy. He has been using melatonin which does not help. He tried his wife's ambien, which also did not help. Patient has ongoing itching but no rashes, says he is "itching, itching, always itching". Says that the prescribed medications have not worked, including betamethasone cream and hydroxyzine. Has been using Eucerin and Sarna creams, sitting in Epsom salt baths. Patient was constipated, however now is having loose stools twice a day for the last three days, has stopped taking bowel regimen. Pt denies numbness and tingling in hands and feet. Patient reports chronic low back pain that pre dates his cancer diagnosis, the pain is not cancer related, has been dealing with this with his PCP and ?NSGY, initially surgery was recommended but now is not. Was going to PT but is no longer because it was not helping. He takes oxycodone for this pain, he says sometimes he will go days without taking it and then he will need three tabs (30mg) at one time, which is not how this medication is prescribed.    12/13/23 He continues to have rash which appears to be improving. However he and his wife are concerned that his skin is darkening (even in non sun exposed areas). He also states that recentl he is "always cold." He denies diarrhea or constipation. He continues to have some insomnia  12/14/23 CT CAP showed New 3 mm nodule left lung lower lobe. No adenopathy in the thorax, abdomen or pelvis. No left hydronephrosis and left mid ureteral lesion.   1/3/24 reports good appetite, better sleep, skin rash with improved itchiness. He is able to do exercises.   1/24/24: Energy is better than it was, but is still fatigued. Mood is better than it was. Appetite is better than before. Patient notes that he is able to walk for half an hour. Itching has improved, it is most pronounced on his buttock at nighttime, not taking hydroxyzine, sometimes will use benadryl, is using topicals as well. Reports hyperpigmentation of skin since starting treatment. He reports increased frequency of urination within the last few weeks, he also reports in the last few weeks he has not been taking tamsulosin. Says he urinates about every 2-3 hours during night and day. No hematuria or dysuria. Patient has chronic pain in his back that he takes oxycodone for. He has occasional "sticking" pain in his L chest that he associates with his port. This was hurting him today, took oxy and it had improved. He does not have any cardiac chest pain, shortness of breath or palpitations. He denies constipation and diarrhea, says he eats home made yogurt that helps to keep his bowel movements regular. He is using valium everyday for insomnia. He tries to go to sleep without it, but cannot fall asleep without it.    2/12/24 Reports mild fatigue and improving itchiness. Both hand fingers develop mild numbness. Nocturia 2. Denies any gross hematuria.   3/6/24: Patient continues to have difficulty sleeping, for example went to bed at 9:30 last night, did not fall asleep until 6AM, slept for 4 hours. He has not been using medication to sleep. He feels tired often. Appetite is fine. Says that he has numbness in fingers and toes for the last month. Says sometimes his legs feel weak and that he feels off balance, has not fallen, is not dizzy. He does exercise, mainly push ups. He has pain in his low back, not cancer related, takes oxycodone, has MR next month. Itching does not bother him much any more. Reports constipation. Last saw eye doctor 6 months ago, says he does have difficulty seeing, unsure if this has worsened since being on treatment, no flashes or floaters. Says he sometimes feels a stabbing pain in his L chest that lasts for about 10 minutes at a time, this does not happen every day.   3/27/24: Last cycle of pembro/padcev was held due to hyperglycemia/new onset Diabetes. On 3/13 pre treatment glucose = 571.Patient was sent to ED and was discharged on 15 units lantus qhs and admelog 5 units TID.  Patient comes with his glucometer, his fasting glucoses have been 90 - 140s, evening he has been in the high 200s. He has an appt with Endocrine on Monday. He has been using the lantus at night and the admelog during the day. Has been trying to eat more healthy. Overall he has been feeling well. Pt's wife shoes me a picture of skin tear near pt's anus, says it started because of itching, it was open at one point but they have been using neomycin cream and it is healing nicely. Says this area opened up due to itching. However, say itching is not what it once was.    4/24/24: Patient says he learned about carb counting yesterday with Endo. For the last few days his glucose has been mostly in the 200 and 300s, was better controlled the week prior, right now glucose is 157, average glucose has been between 203 - 324. Pt having improved/normal BM he thinks because he is eating more healthy. He denies itching at present. He has fingertip numbness and tingling for the past few months as well as occ numbness in his toes. He saw podiatry yesterday. Energy is okay, goes for daily walks.    5/15/24 in last week, his glucose average 262mg/dl, feels numb in the hands and feet. He can do buttoning and walk steadily.   5/15/24 MRI abdomen and pelvis showed no evidence of disease. 5/21/24 CT chest showed resolved left lower lobe nodule that was new on 12/27/2023. No new lung nodule.  6/5/24 reports moderate numbness in fingers and toes. His hands and feet function normally. He can hold a cup and do buttoning, walk steadily.   [de-identified] : 7/3/24: Patient had been in [place] for the past few weeks, returned on Monday, he feels much more relaxed and enjoyed his time away. Appetite is good, trying to watch what he eats for DM. No constipation or diarrhea. Occ itching, no rashes. Has numbness in fingertips and in toes consistently. Reports that his glucoses  bounces around, average is 200, can be lower than 100 and over 300 at times. Endocrine has recommended an insulin pump, will be further discussed at his next appt with them.  He has ongoing chronic low back pain, was seen by Ortho/Spine, recc pain mgmt physician. Feels he is walking unstable at times, unsure if this is from back pain, recommended PT at this visit, however he wishes to see pain mgmt prior to starting PT. Patient notes that he is sleeping better and only using valium sporadically.    7/24/24 Pt is doing well. Tolerating Keytruda/Padcev however, expresses disappointment in immune mediated DM and frustration at maintaining euglycemia. Neuropathy and numbness are relatively stable and not interrupting his quality of life. Intermittent gait imbalance but no recent falls. Pt is scheduled to see Dr. Starkey for ureteroscopy.   8/14/24 - continues on EV/pembro since Oct 2023, cycle 14 day 1 today. Overall feeling "good", mild fatigue. Recurrent insomnia for which he restarted PRN diazepam 5mg qhs with good effect. Appetite is "normal", no dysgeusia. Glucose at time of this visit is 222, usually between 150-180 but his scanner shows it has been >250 approx 15% of the time. Had constipation following his cysto/ureteroscopy last week but now improving. Persistent numbness of bilateral fingertips and toes. Chronic low back for which he takes PRN oxycodone and follows with chronic pain management. Denies fever, chills, night sweats, headache, dizziness, eye pain/problems, mucositis/odynophagia, chest pain, palpitations, SOB, cough, nausea/vomiting, diarrhea, abdominal pain, dysuria, hematuria, incontinence, LE edema, rash/pruritus, bleeding.   9/4/24 is only on keytruda maintenance. Feels overall fine, denies fatigue, skin rash and diarrhea.   9/25/24 pr is doing well. Tolerating Keytruda well with no AEs. Appetite is good and maintaining weight. Blood glucose is erratic with periods of extreme low levels and high levels. pt advised to follow up with Dr. Richard SEVILLA. Neuropathy not any worst but not interfering with QOL.

## 2024-09-26 NOTE — HISTORY OF PRESENT ILLNESS
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   5/20/23 CT abdomen and pelvis w/o contrast showed mod to severe left hydroureteronephrosis to the left of a filling defect within the left mid ureter, neoplasm until proven otherwise. 7/18/23 CT chest and urogram showed Interval placement of left-sided ureteral stent with continued moderate hydroureteronephrosis. Point of transition in the left mid ureter. There is prominent soft tissue and a lack of contrast on the delayed films. This raises concern for an underlying neoplasm.  9/2/23 CT abdomen and pelvis without con showed left hydronephrosis without significant changes, left ureteral stent placement.   He was scheduled for segmental ureterectomy but ureteroscopy on 9/1 noted a 2.5 cm segment in the mid left ureter (now solitary kidney) with new disease and a 1.5 cm in the  distal ureter  lesion, and patholoy showed TaHG in the mid ureter and TaHG in the distal ureter as well as TaHG in the kidney with T1HG in the bladder.    He reports no hematuria, burning, frequency and abdominal pain.  He has chronic lower back pain for many years on oxycodon as needed.    11/1/23 Has newly developed skin itching and mild rash that has not been improving with OTC topicals. He also has intermittent headaches relieved by Tylenol. He reports increased fatigue and decreased sleep. He denied nausea, vomiting, diarrhea, constipation.  11/22/23: C2D15 pembrolizumab and padcev. Patient has decreased appetite, has not been eating much at all in the past few weeks and has lost 10lbs. He is feeling very weak, very fatigued. Patient is so tired that he had to be wheeled to exam room in wheelchair. He was able to walk from the hallway into the exam room. He needs assistance getting up from a sitting position. He will occ get shortness of breath on exertion and this has worsened in the past few weeks. Denies chest pain but says once in a while he has palpitations when trying to sleep. He is not sure if palpitations happen with exertion because he has not been able to exert himself very much of late. Patient denies fever, he is cold all of the time. Had chills the other day, took temperature and it was normal. He is exhausted because he has not been able to sleep. He only sleeps for 15 minutes at a time at night and it is a very light sleep. He has never suffered from insomnia before. He is not taking naps during the day. He was already having difficulty sleeping during cylce 1 but it has worsened tremendously with this cycle. He has been using hydroxyzine and benadryl for itching, neither of which have made him drowsy. He has been using melatonin which does not help. He tried his wife's ambien, which also did not help. Patient has ongoing itching but no rashes, says he is "itching, itching, always itching". Says that the prescribed medications have not worked, including betamethasone cream and hydroxyzine. Has been using Eucerin and Sarna creams, sitting in Epsom salt baths. Patient was constipated, however now is having loose stools twice a day for the last three days, has stopped taking bowel regimen. Pt denies numbness and tingling in hands and feet. Patient reports chronic low back pain that pre dates his cancer diagnosis, the pain is not cancer related, has been dealing with this with his PCP and ?NSGY, initially surgery was recommended but now is not. Was going to PT but is no longer because it was not helping. He takes oxycodone for this pain, he says sometimes he will go days without taking it and then he will need three tabs (30mg) at one time, which is not how this medication is prescribed.    12/13/23 He continues to have rash which appears to be improving. However he and his wife are concerned that his skin is darkening (even in non sun exposed areas). He also states that recentl he is "always cold." He denies diarrhea or constipation. He continues to have some insomnia  12/14/23 CT CAP showed New 3 mm nodule left lung lower lobe. No adenopathy in the thorax, abdomen or pelvis. No left hydronephrosis and left mid ureteral lesion.   1/3/24 reports good appetite, better sleep, skin rash with improved itchiness. He is able to do exercises.   1/24/24: Energy is better than it was, but is still fatigued. Mood is better than it was. Appetite is better than before. Patient notes that he is able to walk for half an hour. Itching has improved, it is most pronounced on his buttock at nighttime, not taking hydroxyzine, sometimes will use benadryl, is using topicals as well. Reports hyperpigmentation of skin since starting treatment. He reports increased frequency of urination within the last few weeks, he also reports in the last few weeks he has not been taking tamsulosin. Says he urinates about every 2-3 hours during night and day. No hematuria or dysuria. Patient has chronic pain in his back that he takes oxycodone for. He has occasional "sticking" pain in his L chest that he associates with his port. This was hurting him today, took oxy and it had improved. He does not have any cardiac chest pain, shortness of breath or palpitations. He denies constipation and diarrhea, says he eats home made yogurt that helps to keep his bowel movements regular. He is using valium everyday for insomnia. He tries to go to sleep without it, but cannot fall asleep without it.    2/12/24 Reports mild fatigue and improving itchiness. Both hand fingers develop mild numbness. Nocturia 2. Denies any gross hematuria.   3/6/24: Patient continues to have difficulty sleeping, for example went to bed at 9:30 last night, did not fall asleep until 6AM, slept for 4 hours. He has not been using medication to sleep. He feels tired often. Appetite is fine. Says that he has numbness in fingers and toes for the last month. Says sometimes his legs feel weak and that he feels off balance, has not fallen, is not dizzy. He does exercise, mainly push ups. He has pain in his low back, not cancer related, takes oxycodone, has MR next month. Itching does not bother him much any more. Reports constipation. Last saw eye doctor 6 months ago, says he does have difficulty seeing, unsure if this has worsened since being on treatment, no flashes or floaters. Says he sometimes feels a stabbing pain in his L chest that lasts for about 10 minutes at a time, this does not happen every day.   3/27/24: Last cycle of pembro/padcev was held due to hyperglycemia/new onset Diabetes. On 3/13 pre treatment glucose = 571.Patient was sent to ED and was discharged on 15 units lantus qhs and admelog 5 units TID.  Patient comes with his glucometer, his fasting glucoses have been 90 - 140s, evening he has been in the high 200s. He has an appt with Endocrine on Monday. He has been using the lantus at night and the admelog during the day. Has been trying to eat more healthy. Overall he has been feeling well. Pt's wife shoes me a picture of skin tear near pt's anus, says it started because of itching, it was open at one point but they have been using neomycin cream and it is healing nicely. Says this area opened up due to itching. However, say itching is not what it once was.    4/24/24: Patient says he learned about carb counting yesterday with Endo. For the last few days his glucose has been mostly in the 200 and 300s, was better controlled the week prior, right now glucose is 157, average glucose has been between 203 - 324. Pt having improved/normal BM he thinks because he is eating more healthy. He denies itching at present. He has fingertip numbness and tingling for the past few months as well as occ numbness in his toes. He saw podiatry yesterday. Energy is okay, goes for daily walks.    5/15/24 in last week, his glucose average 262mg/dl, feels numb in the hands and feet. He can do buttoning and walk steadily.   5/15/24 MRI abdomen and pelvis showed no evidence of disease. 5/21/24 CT chest showed resolved left lower lobe nodule that was new on 12/27/2023. No new lung nodule.  6/5/24 reports moderate numbness in fingers and toes. His hands and feet function normally. He can hold a cup and do buttoning, walk steadily.   [de-identified] : 7/3/24: Patient had been in [place] for the past few weeks, returned on Monday, he feels much more relaxed and enjoyed his time away. Appetite is good, trying to watch what he eats for DM. No constipation or diarrhea. Occ itching, no rashes. Has numbness in fingertips and in toes consistently. Reports that his glucoses  bounces around, average is 200, can be lower than 100 and over 300 at times. Endocrine has recommended an insulin pump, will be further discussed at his next appt with them.  He has ongoing chronic low back pain, was seen by Ortho/Spine, recc pain mgmt physician. Feels he is walking unstable at times, unsure if this is from back pain, recommended PT at this visit, however he wishes to see pain mgmt prior to starting PT. Patient notes that he is sleeping better and only using valium sporadically.    7/24/24 Pt is doing well. Tolerating Keytruda/Padcev however, expresses disappointment in immune mediated DM and frustration at maintaining euglycemia. Neuropathy and numbness are relatively stable and not interrupting his quality of life. Intermittent gait imbalance but no recent falls. Pt is scheduled to see Dr. Starkey for ureteroscopy.   8/14/24 - continues on EV/pembro since Oct 2023, cycle 14 day 1 today. Overall feeling "good", mild fatigue. Recurrent insomnia for which he restarted PRN diazepam 5mg qhs with good effect. Appetite is "normal", no dysgeusia. Glucose at time of this visit is 222, usually between 150-180 but his scanner shows it has been >250 approx 15% of the time. Had constipation following his cysto/ureteroscopy last week but now improving. Persistent numbness of bilateral fingertips and toes. Chronic low back for which he takes PRN oxycodone and follows with chronic pain management. Denies fever, chills, night sweats, headache, dizziness, eye pain/problems, mucositis/odynophagia, chest pain, palpitations, SOB, cough, nausea/vomiting, diarrhea, abdominal pain, dysuria, hematuria, incontinence, LE edema, rash/pruritus, bleeding.   9/4/24 is only on keytruda maintenance. Feels overall fine, denies fatigue, skin rash and diarrhea.   9/25/24 pr is doing well. Tolerating Keytruda well with no AEs. Appetite is good and maintaining weight. Blood glucose is erratic with periods of extreme low levels and high levels. pt advised to follow up with Dr. Richard SEVILLA. Neuropathy not any worst but not interfering with QOL.

## 2024-09-26 NOTE — HISTORY OF PRESENT ILLNESS
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   5/20/23 CT abdomen and pelvis w/o contrast showed mod to severe left hydroureteronephrosis to the left of a filling defect within the left mid ureter, neoplasm until proven otherwise. 7/18/23 CT chest and urogram showed Interval placement of left-sided ureteral stent with continued moderate hydroureteronephrosis. Point of transition in the left mid ureter. There is prominent soft tissue and a lack of contrast on the delayed films. This raises concern for an underlying neoplasm.  9/2/23 CT abdomen and pelvis without con showed left hydronephrosis without significant changes, left ureteral stent placement.   He was scheduled for segmental ureterectomy but ureteroscopy on 9/1 noted a 2.5 cm segment in the mid left ureter (now solitary kidney) with new disease and a 1.5 cm in the  distal ureter  lesion, and patholoy showed TaHG in the mid ureter and TaHG in the distal ureter as well as TaHG in the kidney with T1HG in the bladder.    He reports no hematuria, burning, frequency and abdominal pain.  He has chronic lower back pain for many years on oxycodon as needed.    11/1/23 Has newly developed skin itching and mild rash that has not been improving with OTC topicals. He also has intermittent headaches relieved by Tylenol. He reports increased fatigue and decreased sleep. He denied nausea, vomiting, diarrhea, constipation.  11/22/23: C2D15 pembrolizumab and padcev. Patient has decreased appetite, has not been eating much at all in the past few weeks and has lost 10lbs. He is feeling very weak, very fatigued. Patient is so tired that he had to be wheeled to exam room in wheelchair. He was able to walk from the hallway into the exam room. He needs assistance getting up from a sitting position. He will occ get shortness of breath on exertion and this has worsened in the past few weeks. Denies chest pain but says once in a while he has palpitations when trying to sleep. He is not sure if palpitations happen with exertion because he has not been able to exert himself very much of late. Patient denies fever, he is cold all of the time. Had chills the other day, took temperature and it was normal. He is exhausted because he has not been able to sleep. He only sleeps for 15 minutes at a time at night and it is a very light sleep. He has never suffered from insomnia before. He is not taking naps during the day. He was already having difficulty sleeping during cylce 1 but it has worsened tremendously with this cycle. He has been using hydroxyzine and benadryl for itching, neither of which have made him drowsy. He has been using melatonin which does not help. He tried his wife's ambien, which also did not help. Patient has ongoing itching but no rashes, says he is "itching, itching, always itching". Says that the prescribed medications have not worked, including betamethasone cream and hydroxyzine. Has been using Eucerin and Sarna creams, sitting in Epsom salt baths. Patient was constipated, however now is having loose stools twice a day for the last three days, has stopped taking bowel regimen. Pt denies numbness and tingling in hands and feet. Patient reports chronic low back pain that pre dates his cancer diagnosis, the pain is not cancer related, has been dealing with this with his PCP and ?NSGY, initially surgery was recommended but now is not. Was going to PT but is no longer because it was not helping. He takes oxycodone for this pain, he says sometimes he will go days without taking it and then he will need three tabs (30mg) at one time, which is not how this medication is prescribed.    12/13/23 He continues to have rash which appears to be improving. However he and his wife are concerned that his skin is darkening (even in non sun exposed areas). He also states that recentl he is "always cold." He denies diarrhea or constipation. He continues to have some insomnia  12/14/23 CT CAP showed New 3 mm nodule left lung lower lobe. No adenopathy in the thorax, abdomen or pelvis. No left hydronephrosis and left mid ureteral lesion.   1/3/24 reports good appetite, better sleep, skin rash with improved itchiness. He is able to do exercises.   1/24/24: Energy is better than it was, but is still fatigued. Mood is better than it was. Appetite is better than before. Patient notes that he is able to walk for half an hour. Itching has improved, it is most pronounced on his buttock at nighttime, not taking hydroxyzine, sometimes will use benadryl, is using topicals as well. Reports hyperpigmentation of skin since starting treatment. He reports increased frequency of urination within the last few weeks, he also reports in the last few weeks he has not been taking tamsulosin. Says he urinates about every 2-3 hours during night and day. No hematuria or dysuria. Patient has chronic pain in his back that he takes oxycodone for. He has occasional "sticking" pain in his L chest that he associates with his port. This was hurting him today, took oxy and it had improved. He does not have any cardiac chest pain, shortness of breath or palpitations. He denies constipation and diarrhea, says he eats home made yogurt that helps to keep his bowel movements regular. He is using valium everyday for insomnia. He tries to go to sleep without it, but cannot fall asleep without it.    2/12/24 Reports mild fatigue and improving itchiness. Both hand fingers develop mild numbness. Nocturia 2. Denies any gross hematuria.   3/6/24: Patient continues to have difficulty sleeping, for example went to bed at 9:30 last night, did not fall asleep until 6AM, slept for 4 hours. He has not been using medication to sleep. He feels tired often. Appetite is fine. Says that he has numbness in fingers and toes for the last month. Says sometimes his legs feel weak and that he feels off balance, has not fallen, is not dizzy. He does exercise, mainly push ups. He has pain in his low back, not cancer related, takes oxycodone, has MR next month. Itching does not bother him much any more. Reports constipation. Last saw eye doctor 6 months ago, says he does have difficulty seeing, unsure if this has worsened since being on treatment, no flashes or floaters. Says he sometimes feels a stabbing pain in his L chest that lasts for about 10 minutes at a time, this does not happen every day.   3/27/24: Last cycle of pembro/padcev was held due to hyperglycemia/new onset Diabetes. On 3/13 pre treatment glucose = 571.Patient was sent to ED and was discharged on 15 units lantus qhs and admelog 5 units TID.  Patient comes with his glucometer, his fasting glucoses have been 90 - 140s, evening he has been in the high 200s. He has an appt with Endocrine on Monday. He has been using the lantus at night and the admelog during the day. Has been trying to eat more healthy. Overall he has been feeling well. Pt's wife shoes me a picture of skin tear near pt's anus, says it started because of itching, it was open at one point but they have been using neomycin cream and it is healing nicely. Says this area opened up due to itching. However, say itching is not what it once was.    4/24/24: Patient says he learned about carb counting yesterday with Endo. For the last few days his glucose has been mostly in the 200 and 300s, was better controlled the week prior, right now glucose is 157, average glucose has been between 203 - 324. Pt having improved/normal BM he thinks because he is eating more healthy. He denies itching at present. He has fingertip numbness and tingling for the past few months as well as occ numbness in his toes. He saw podiatry yesterday. Energy is okay, goes for daily walks.    5/15/24 in last week, his glucose average 262mg/dl, feels numb in the hands and feet. He can do buttoning and walk steadily.   5/15/24 MRI abdomen and pelvis showed no evidence of disease. 5/21/24 CT chest showed resolved left lower lobe nodule that was new on 12/27/2023. No new lung nodule.  6/5/24 reports moderate numbness in fingers and toes. His hands and feet function normally. He can hold a cup and do buttoning, walk steadily.   [de-identified] : 7/3/24: Patient had been in [place] for the past few weeks, returned on Monday, he feels much more relaxed and enjoyed his time away. Appetite is good, trying to watch what he eats for DM. No constipation or diarrhea. Occ itching, no rashes. Has numbness in fingertips and in toes consistently. Reports that his glucoses  bounces around, average is 200, can be lower than 100 and over 300 at times. Endocrine has recommended an insulin pump, will be further discussed at his next appt with them.  He has ongoing chronic low back pain, was seen by Ortho/Spine, recc pain mgmt physician. Feels he is walking unstable at times, unsure if this is from back pain, recommended PT at this visit, however he wishes to see pain mgmt prior to starting PT. Patient notes that he is sleeping better and only using valium sporadically.    7/24/24 Pt is doing well. Tolerating Keytruda/Padcev however, expresses disappointment in immune mediated DM and frustration at maintaining euglycemia. Neuropathy and numbness are relatively stable and not interrupting his quality of life. Intermittent gait imbalance but no recent falls. Pt is scheduled to see Dr. Starkey for ureteroscopy.   8/14/24 - continues on EV/pembro since Oct 2023, cycle 14 day 1 today. Overall feeling "good", mild fatigue. Recurrent insomnia for which he restarted PRN diazepam 5mg qhs with good effect. Appetite is "normal", no dysgeusia. Glucose at time of this visit is 222, usually between 150-180 but his scanner shows it has been >250 approx 15% of the time. Had constipation following his cysto/ureteroscopy last week but now improving. Persistent numbness of bilateral fingertips and toes. Chronic low back for which he takes PRN oxycodone and follows with chronic pain management. Denies fever, chills, night sweats, headache, dizziness, eye pain/problems, mucositis/odynophagia, chest pain, palpitations, SOB, cough, nausea/vomiting, diarrhea, abdominal pain, dysuria, hematuria, incontinence, LE edema, rash/pruritus, bleeding.   9/4/24 is only on keytruda maintenance. Feels overall fine, denies fatigue, skin rash and diarrhea.   9/25/24 pr is doing well. Tolerating Keytruda well with no AEs. Appetite is good and maintaining weight. Blood glucose is erratic with periods of extreme low levels and high levels. pt advised to follow up with Dr. Richard SEVILLA. Neuropathy not any worst but not interfering with QOL.

## 2024-09-26 NOTE — PHYSICAL EXAM
[Fully active, able to carry on all pre-disease performance without restriction] : Status 0 - Fully active, able to carry on all pre-disease performance without restriction [Normal] : affect appropriate [de-identified] : anicteric  [de-identified] : no LE edema

## 2024-09-26 NOTE — ASSESSMENT
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 At initial appointment, Dr. Mccall discussed his cancer status and further management. Based on AUA and EAU guideline subjects with upper tract tumors of the renal pelvis and ureter(s) must meet a high risk assessment defined as: tumor 1cm and/or hydronephrosis and/or high grade pathology and/or multifocal disease, where a radical NU approach to treat localized disease is warranted, followed by adjuvant immunotherapy. However, he adamantly declined any surgery given his solitary kidney and the result of dialysis. He is cisplatin ineligible. The potential regimen, pembrolizumab and enfortumab vedotin could induce 73% response including 15% CR rate. His T1HG bladder cancer may be treated with both combination. He will be monitored by Dr. Starkey for surveillance cystoscopy and ureteroscopy. Potential AEs of immunotherapy and enfortumab vedotin were discussed and patient signed consent. He started treatment with pembrolizumab and padcev on 10/18/2023. 12/14/23 CT CAP showed New 3 mm nodule left lung lower lobe. No adenopathy in the thorax, abdomen or pelvis. No left hydronephrosis and left mid ureteral lesion. 5/15 MRI abdomen and pelvis and 5/21/24 CT chest showed LISETTE.   Plan Renal pelvis UCC, high grade  - Per Dr. Mccall's previous notes, we will proceed with Pembrolizumab alone at this time given no evidence of recurrent disease on his 8/8/24 cysto/ureteroscopy. Will continue Keytruda until two years in Oct 2025 to consider stopping Keytruda if he still has CR. - S/p cystoscopy and ureteroscopy on 8/8/24. Brief operative note states no sign of recurrent urothelial carcinoma. - May proceed to cycle#2 of Keytruda only 9/25/24  - 5/15/24 CT Chest: resolved left lower lobe nodule that was new on 12/27/2023. No new lung nodule. - 5/21/24 MR AP: no evidence of new or recurrent neoplasm  - Repeat CT Chest with no contrast and MR ABD/Pelvis images in September/Oct. pt to call to schedule   Diabetes  - Pembrolizumab associated hyperglycemia - Continue to follow with Dr. Ramos, next due 10/16/24. Pt advised to request an earlier appointment.   Neuropathy  - endorses consistent numbness/tingling in fingers and toes  - continue to monitor.  Insomnia due to uncontrolled Blood glucose levels-  - Recurrent insomnia. Has been using PRN diazepam 5mg qhs with good effects. However, not currently using given period of extreme hypoglycemia.  Pt understands not to take diazepam with oxycodone or any other sedating medications and not drive or operate machinery.   Chronic low back pain  - this pain is chronic and is not cancer related pain, he has been prescribed PRN oxycodone 10mg q8h by another provider. Continue follow with his PCP / NSGY to discuss further management   - 5/15 MR L spine: No evidence of bony metastatic disease. - pt seen by Ortho spine and referred to Pain Mgmt for lumbar stenosis/degenerative arthritis   RTC 10/16/24 to see Dr. Mccall

## 2024-09-26 NOTE — PHYSICAL EXAM
[Fully active, able to carry on all pre-disease performance without restriction] : Status 0 - Fully active, able to carry on all pre-disease performance without restriction [Normal] : affect appropriate [de-identified] : anicteric  [de-identified] : no LE edema

## 2024-09-26 NOTE — PHYSICAL EXAM
[Fully active, able to carry on all pre-disease performance without restriction] : Status 0 - Fully active, able to carry on all pre-disease performance without restriction [Normal] : affect appropriate [de-identified] : anicteric  [de-identified] : no LE edema

## 2024-10-02 ENCOUNTER — APPOINTMENT (OUTPATIENT)
Dept: INFUSION THERAPY | Facility: HOSPITAL | Age: 74
End: 2024-10-02

## 2024-10-07 NOTE — PATIENT PROFILE ADULT - NSPRESCRALCSCORE_GEN_A_NUR_CAL
"Return telephone call. She has not been taking Norvasc and takes midodrine 5 mg (TID) every day. Low BP on dialysis and non-dialysis days, fatigue.      Chart review with Yesy Arnold. She doesn't think it's cardiac; could be illness/virus and wants her to see primary care. Yesy offered to increase dose of midodrine to 10, but patient states she has already done that. Wants a new Rx, but doesn't want it from Yesy Arnold because \"she doesn't have time for me\".     Patient request to see another provider asap. Will schedule with next available.       Nena Soriano, RN, BSN  Lead Structural Heart Coordinator  [facility] [employer]        "
[facility] Call Center    Phone Message    May a detailed message be left on voicemail: yes     Reason for Call: Symptoms or Concerns     If patient has red-flag symptoms, warm transfer to triage line    Current symptom or concern: Low bp, this morning 91/58, yesterday 83/56, 80/45.  Fatigue, nausea    Symptoms have been present for:  2 week(s)    Has patient previously been seen for this? Yes    By : Yesy Arnold NP     Date: 2/26/24    Are there any new or worsening symptoms? Yes:     Action Taken: Other: cardio    Travel Screening: Not Applicable  Thank you!  [facility]      Date of Service:                                                                     
2

## 2024-10-16 ENCOUNTER — RESULT REVIEW (OUTPATIENT)
Age: 74
End: 2024-10-16

## 2024-10-16 ENCOUNTER — APPOINTMENT (OUTPATIENT)
Dept: INFUSION THERAPY | Facility: HOSPITAL | Age: 74
End: 2024-10-16

## 2024-10-16 ENCOUNTER — APPOINTMENT (OUTPATIENT)
Dept: HEMATOLOGY ONCOLOGY | Facility: CLINIC | Age: 74
End: 2024-10-16
Payer: MEDICARE

## 2024-10-16 VITALS
TEMPERATURE: 97.7 F | SYSTOLIC BLOOD PRESSURE: 147 MMHG | BODY MASS INDEX: 28.56 KG/M2 | RESPIRATION RATE: 16 BRPM | OXYGEN SATURATION: 98 % | DIASTOLIC BLOOD PRESSURE: 80 MMHG | WEIGHT: 216.49 LBS | HEART RATE: 61 BPM

## 2024-10-16 DIAGNOSIS — C65.9 MALIGNANT NEOPLASM OF UNSPECIFIED RENAL PELVIS: ICD-10-CM

## 2024-10-16 LAB
ALBUMIN SERPL ELPH-MCNC: 4 G/DL — SIGNIFICANT CHANGE UP (ref 3.3–5)
ALP SERPL-CCNC: 87 U/L — SIGNIFICANT CHANGE UP (ref 40–120)
ALT FLD-CCNC: 9 U/L — LOW (ref 10–45)
ANION GAP SERPL CALC-SCNC: 12 MMOL/L — SIGNIFICANT CHANGE UP (ref 5–17)
AST SERPL-CCNC: 33 U/L — SIGNIFICANT CHANGE UP (ref 10–40)
BASOPHILS # BLD AUTO: 0.06 K/UL — SIGNIFICANT CHANGE UP (ref 0–0.2)
BASOPHILS NFR BLD AUTO: 0.8 % — SIGNIFICANT CHANGE UP (ref 0–2)
BILIRUB SERPL-MCNC: 0.5 MG/DL — SIGNIFICANT CHANGE UP (ref 0.2–1.2)
BUN SERPL-MCNC: 20 MG/DL — SIGNIFICANT CHANGE UP (ref 7–23)
CALCIUM SERPL-MCNC: 9.4 MG/DL — SIGNIFICANT CHANGE UP (ref 8.4–10.5)
CHLORIDE SERPL-SCNC: 104 MMOL/L — SIGNIFICANT CHANGE UP (ref 96–108)
CO2 SERPL-SCNC: 24 MMOL/L — SIGNIFICANT CHANGE UP (ref 22–31)
CREAT SERPL-MCNC: 1.16 MG/DL — SIGNIFICANT CHANGE UP (ref 0.5–1.3)
EGFR: 65 ML/MIN/1.73M2 — SIGNIFICANT CHANGE UP
EGFR: 65 ML/MIN/1.73M2 — SIGNIFICANT CHANGE UP
EOSINOPHIL # BLD AUTO: 0.34 K/UL — SIGNIFICANT CHANGE UP (ref 0–0.5)
EOSINOPHIL NFR BLD AUTO: 4.3 % — SIGNIFICANT CHANGE UP (ref 0–6)
GLUCOSE SERPL-MCNC: 56 MG/DL — LOW (ref 70–99)
HCT VFR BLD CALC: 39.2 % — SIGNIFICANT CHANGE UP (ref 39–50)
HGB BLD-MCNC: 13.1 G/DL — SIGNIFICANT CHANGE UP (ref 13–17)
IMM GRANULOCYTES NFR BLD AUTO: 0.4 % — SIGNIFICANT CHANGE UP (ref 0–0.9)
LYMPHOCYTES # BLD AUTO: 1.81 K/UL — SIGNIFICANT CHANGE UP (ref 1–3.3)
LYMPHOCYTES # BLD AUTO: 22.6 % — SIGNIFICANT CHANGE UP (ref 13–44)
MCHC RBC-ENTMCNC: 32.3 PG — SIGNIFICANT CHANGE UP (ref 27–34)
MCHC RBC-ENTMCNC: 33.4 G/DL — SIGNIFICANT CHANGE UP (ref 32–36)
MCV RBC AUTO: 96.6 FL — SIGNIFICANT CHANGE UP (ref 80–100)
MONOCYTES # BLD AUTO: 0.77 K/UL — SIGNIFICANT CHANGE UP (ref 0–0.9)
MONOCYTES NFR BLD AUTO: 9.6 % — SIGNIFICANT CHANGE UP (ref 2–14)
NEUTROPHILS # BLD AUTO: 4.99 K/UL — SIGNIFICANT CHANGE UP (ref 1.8–7.4)
NEUTROPHILS NFR BLD AUTO: 62.3 % — SIGNIFICANT CHANGE UP (ref 43–77)
NRBC # BLD: 0 /100 WBCS — SIGNIFICANT CHANGE UP (ref 0–0)
NRBC BLD-RTO: 0 /100 WBCS — SIGNIFICANT CHANGE UP (ref 0–0)
PLATELET # BLD AUTO: 161 K/UL — SIGNIFICANT CHANGE UP (ref 150–400)
POTASSIUM SERPL-MCNC: 4.2 MMOL/L — SIGNIFICANT CHANGE UP (ref 3.5–5.3)
POTASSIUM SERPL-SCNC: 4.2 MMOL/L — SIGNIFICANT CHANGE UP (ref 3.5–5.3)
PROT SERPL-MCNC: 7.5 G/DL — SIGNIFICANT CHANGE UP (ref 6–8.3)
RBC # BLD: 4.06 M/UL — LOW (ref 4.2–5.8)
RBC # FLD: 12.7 % — SIGNIFICANT CHANGE UP (ref 10.3–14.5)
SODIUM SERPL-SCNC: 140 MMOL/L — SIGNIFICANT CHANGE UP (ref 135–145)
WBC # BLD: 8 K/UL — SIGNIFICANT CHANGE UP (ref 3.8–10.5)
WBC # FLD AUTO: 8 K/UL — SIGNIFICANT CHANGE UP (ref 3.8–10.5)

## 2024-10-16 PROCEDURE — 99214 OFFICE O/P EST MOD 30 MIN: CPT

## 2024-10-16 PROCEDURE — G2211 COMPLEX E/M VISIT ADD ON: CPT

## 2024-10-17 ENCOUNTER — APPOINTMENT (OUTPATIENT)
Dept: ENDOCRINOLOGY | Facility: CLINIC | Age: 74
End: 2024-10-17
Payer: MEDICARE

## 2024-10-17 VITALS
SYSTOLIC BLOOD PRESSURE: 133 MMHG | DIASTOLIC BLOOD PRESSURE: 81 MMHG | HEART RATE: 63 BPM | OXYGEN SATURATION: 98 % | WEIGHT: 213 LBS | BODY MASS INDEX: 28.23 KG/M2 | HEIGHT: 73 IN

## 2024-10-17 DIAGNOSIS — E11.9 TYPE 2 DIABETES MELLITUS W/OUT COMPLICATIONS: ICD-10-CM

## 2024-10-17 DIAGNOSIS — Z92.89 PERSONAL HISTORY OF OTHER MEDICAL TREATMENT: ICD-10-CM

## 2024-10-17 DIAGNOSIS — E78.5 HYPERLIPIDEMIA, UNSPECIFIED: ICD-10-CM

## 2024-10-17 DIAGNOSIS — E10.9 TYPE 1 DIABETES MELLITUS W/OUT COMPLICATIONS: ICD-10-CM

## 2024-10-17 DIAGNOSIS — E03.9 HYPOTHYROIDISM, UNSPECIFIED: ICD-10-CM

## 2024-10-17 LAB
T4 FREE SERPL-MCNC: 1.7 NG/DL — SIGNIFICANT CHANGE UP (ref 0.9–1.8)
TSH SERPL-MCNC: 1.08 UIU/ML — SIGNIFICANT CHANGE UP (ref 0.27–4.2)

## 2024-10-17 PROCEDURE — G2211 COMPLEX E/M VISIT ADD ON: CPT

## 2024-10-17 PROCEDURE — 99215 OFFICE O/P EST HI 40 MIN: CPT

## 2024-10-17 PROCEDURE — 83036 HEMOGLOBIN GLYCOSYLATED A1C: CPT | Mod: QW

## 2024-10-17 RX ORDER — DEXTROSE 4 G
4-6 TABLET,CHEWABLE ORAL
Qty: 1 | Refills: 0 | Status: ACTIVE | COMMUNITY
Start: 2024-10-17 | End: 1900-01-01

## 2024-10-18 LAB — HBA1C MFR BLD HPLC: 8.2

## 2024-10-22 ENCOUNTER — APPOINTMENT (OUTPATIENT)
Dept: MRI IMAGING | Facility: CLINIC | Age: 74
End: 2024-10-22
Payer: MEDICARE

## 2024-10-22 ENCOUNTER — APPOINTMENT (OUTPATIENT)
Dept: CT IMAGING | Facility: CLINIC | Age: 74
End: 2024-10-22

## 2024-10-22 ENCOUNTER — OUTPATIENT (OUTPATIENT)
Dept: OUTPATIENT SERVICES | Facility: HOSPITAL | Age: 74
LOS: 1 days | End: 2024-10-22
Payer: MEDICARE

## 2024-10-22 DIAGNOSIS — Z90.5 ACQUIRED ABSENCE OF KIDNEY: Chronic | ICD-10-CM

## 2024-10-22 DIAGNOSIS — Z98.890 OTHER SPECIFIED POSTPROCEDURAL STATES: Chronic | ICD-10-CM

## 2024-10-22 DIAGNOSIS — C67.9 MALIGNANT NEOPLASM OF BLADDER, UNSPECIFIED: ICD-10-CM

## 2024-10-22 DIAGNOSIS — Z90.2 ACQUIRED ABSENCE OF LUNG [PART OF]: Chronic | ICD-10-CM

## 2024-10-22 PROCEDURE — 74183 MRI ABD W/O CNTR FLWD CNTR: CPT

## 2024-10-22 PROCEDURE — 72197 MRI PELVIS W/O & W/DYE: CPT | Mod: 26

## 2024-10-22 PROCEDURE — 72197 MRI PELVIS W/O & W/DYE: CPT

## 2024-10-22 PROCEDURE — A9585: CPT

## 2024-10-22 PROCEDURE — 74183 MRI ABD W/O CNTR FLWD CNTR: CPT | Mod: 26

## 2024-10-22 PROCEDURE — 71250 CT THORAX DX C-: CPT | Mod: 26

## 2024-10-22 PROCEDURE — 71250 CT THORAX DX C-: CPT

## 2024-10-29 ENCOUNTER — APPOINTMENT (OUTPATIENT)
Dept: UROLOGY | Facility: CLINIC | Age: 74
End: 2024-10-29
Payer: MEDICARE

## 2024-10-29 VITALS — DIASTOLIC BLOOD PRESSURE: 77 MMHG | SYSTOLIC BLOOD PRESSURE: 143 MMHG | HEART RATE: 70 BPM | RESPIRATION RATE: 17 BRPM

## 2024-10-29 DIAGNOSIS — C65.9 MALIGNANT NEOPLASM OF UNSPECIFIED RENAL PELVIS: ICD-10-CM

## 2024-10-29 PROCEDURE — 99213 OFFICE O/P EST LOW 20 MIN: CPT

## 2024-11-03 ENCOUNTER — OUTPATIENT (OUTPATIENT)
Dept: OUTPATIENT SERVICES | Facility: HOSPITAL | Age: 74
LOS: 1 days | Discharge: HOME | End: 2024-11-03

## 2024-11-03 DIAGNOSIS — Z98.890 OTHER SPECIFIED POSTPROCEDURAL STATES: Chronic | ICD-10-CM

## 2024-11-03 DIAGNOSIS — Z90.2 ACQUIRED ABSENCE OF LUNG [PART OF]: Chronic | ICD-10-CM

## 2024-11-03 DIAGNOSIS — C80.1 MALIGNANT (PRIMARY) NEOPLASM, UNSPECIFIED: ICD-10-CM

## 2024-11-03 DIAGNOSIS — Z90.5 ACQUIRED ABSENCE OF KIDNEY: Chronic | ICD-10-CM

## 2024-11-06 ENCOUNTER — APPOINTMENT (OUTPATIENT)
Dept: HEMATOLOGY ONCOLOGY | Facility: CLINIC | Age: 74
End: 2024-11-06
Payer: MEDICARE

## 2024-11-06 ENCOUNTER — RESULT REVIEW (OUTPATIENT)
Age: 74
End: 2024-11-06

## 2024-11-06 ENCOUNTER — APPOINTMENT (OUTPATIENT)
Dept: INFUSION THERAPY | Facility: HOSPITAL | Age: 74
End: 2024-11-06

## 2024-11-06 DIAGNOSIS — B36.9 SUPERFICIAL MYCOSIS, UNSPECIFIED: ICD-10-CM

## 2024-11-06 DIAGNOSIS — C64.9 MALIGNANT NEOPLASM OF UNSPECIFIED KIDNEY, EXCEPT RENAL PELVIS: ICD-10-CM

## 2024-11-06 LAB
ALBUMIN SERPL ELPH-MCNC: 3.9 G/DL — SIGNIFICANT CHANGE UP (ref 3.3–5)
ALP SERPL-CCNC: 88 U/L — SIGNIFICANT CHANGE UP (ref 40–120)
ALT FLD-CCNC: 6 U/L — LOW (ref 10–45)
ANION GAP SERPL CALC-SCNC: 11 MMOL/L — SIGNIFICANT CHANGE UP (ref 5–17)
AST SERPL-CCNC: 25 U/L — SIGNIFICANT CHANGE UP (ref 10–40)
BASOPHILS # BLD AUTO: 0.04 K/UL — SIGNIFICANT CHANGE UP (ref 0–0.2)
BASOPHILS NFR BLD AUTO: 0.6 % — SIGNIFICANT CHANGE UP (ref 0–2)
BILIRUB SERPL-MCNC: 0.7 MG/DL — SIGNIFICANT CHANGE UP (ref 0.2–1.2)
BUN SERPL-MCNC: 24 MG/DL — HIGH (ref 7–23)
CALCIUM SERPL-MCNC: 9.6 MG/DL — SIGNIFICANT CHANGE UP (ref 8.4–10.5)
CHLORIDE SERPL-SCNC: 103 MMOL/L — SIGNIFICANT CHANGE UP (ref 96–108)
CO2 SERPL-SCNC: 25 MMOL/L — SIGNIFICANT CHANGE UP (ref 22–31)
CREAT SERPL-MCNC: 1.29 MG/DL — SIGNIFICANT CHANGE UP (ref 0.5–1.3)
EGFR: 57 ML/MIN/1.73M2 — LOW
EOSINOPHIL # BLD AUTO: 0.19 K/UL — SIGNIFICANT CHANGE UP (ref 0–0.5)
EOSINOPHIL NFR BLD AUTO: 3 % — SIGNIFICANT CHANGE UP (ref 0–6)
GLUCOSE SERPL-MCNC: 131 MG/DL — HIGH (ref 70–99)
HCT VFR BLD CALC: 39.9 % — SIGNIFICANT CHANGE UP (ref 39–50)
HGB BLD-MCNC: 13.3 G/DL — SIGNIFICANT CHANGE UP (ref 13–17)
IMM GRANULOCYTES NFR BLD AUTO: 0.3 % — SIGNIFICANT CHANGE UP (ref 0–0.9)
LYMPHOCYTES # BLD AUTO: 1.19 K/UL — SIGNIFICANT CHANGE UP (ref 1–3.3)
LYMPHOCYTES # BLD AUTO: 18.8 % — SIGNIFICANT CHANGE UP (ref 13–44)
MCHC RBC-ENTMCNC: 32.3 PG — SIGNIFICANT CHANGE UP (ref 27–34)
MCHC RBC-ENTMCNC: 33.3 G/DL — SIGNIFICANT CHANGE UP (ref 32–36)
MCV RBC AUTO: 96.8 FL — SIGNIFICANT CHANGE UP (ref 80–100)
MONOCYTES # BLD AUTO: 0.69 K/UL — SIGNIFICANT CHANGE UP (ref 0–0.9)
MONOCYTES NFR BLD AUTO: 10.9 % — SIGNIFICANT CHANGE UP (ref 2–14)
NEUTROPHILS # BLD AUTO: 4.21 K/UL — SIGNIFICANT CHANGE UP (ref 1.8–7.4)
NEUTROPHILS NFR BLD AUTO: 66.4 % — SIGNIFICANT CHANGE UP (ref 43–77)
NRBC # BLD: 0 /100 WBCS — SIGNIFICANT CHANGE UP (ref 0–0)
PLATELET # BLD AUTO: 175 K/UL — SIGNIFICANT CHANGE UP (ref 150–400)
POTASSIUM SERPL-MCNC: 4.5 MMOL/L — SIGNIFICANT CHANGE UP (ref 3.5–5.3)
POTASSIUM SERPL-SCNC: 4.5 MMOL/L — SIGNIFICANT CHANGE UP (ref 3.5–5.3)
PROT SERPL-MCNC: 7.3 G/DL — SIGNIFICANT CHANGE UP (ref 6–8.3)
RBC # BLD: 4.12 M/UL — LOW (ref 4.2–5.8)
RBC # FLD: 12.8 % — SIGNIFICANT CHANGE UP (ref 10.3–14.5)
SODIUM SERPL-SCNC: 139 MMOL/L — SIGNIFICANT CHANGE UP (ref 135–145)
T4 FREE SERPL-MCNC: 1.7 NG/DL — SIGNIFICANT CHANGE UP (ref 0.9–1.8)
TSH SERPL-MCNC: 1.23 UIU/ML — SIGNIFICANT CHANGE UP (ref 0.27–4.2)
WBC # BLD: 6.34 K/UL — SIGNIFICANT CHANGE UP (ref 3.8–10.5)
WBC # FLD AUTO: 6.34 K/UL — SIGNIFICANT CHANGE UP (ref 3.8–10.5)

## 2024-11-06 PROCEDURE — 99215 OFFICE O/P EST HI 40 MIN: CPT

## 2024-11-06 RX ORDER — CLOTRIMAZOLE 10 MG/G
1 CREAM TOPICAL 3 TIMES DAILY
Qty: 1 | Refills: 0 | Status: ACTIVE | COMMUNITY
Start: 2024-11-06 | End: 1900-01-01

## 2024-11-07 DIAGNOSIS — Z51.11 ENCOUNTER FOR ANTINEOPLASTIC CHEMOTHERAPY: ICD-10-CM

## 2024-11-26 ENCOUNTER — APPOINTMENT (OUTPATIENT)
Dept: THORACIC SURGERY | Facility: CLINIC | Age: 74
End: 2024-11-26
Payer: MEDICARE

## 2024-11-26 VITALS
HEART RATE: 68 BPM | OXYGEN SATURATION: 97 % | BODY MASS INDEX: 28.5 KG/M2 | RESPIRATION RATE: 17 BRPM | SYSTOLIC BLOOD PRESSURE: 143 MMHG | DIASTOLIC BLOOD PRESSURE: 79 MMHG | WEIGHT: 216 LBS

## 2024-11-26 DIAGNOSIS — C80.1 MALIGNANT (PRIMARY) NEOPLASM, UNSPECIFIED: ICD-10-CM

## 2024-11-26 PROCEDURE — 99213 OFFICE O/P EST LOW 20 MIN: CPT

## 2024-11-27 ENCOUNTER — RESULT REVIEW (OUTPATIENT)
Age: 74
End: 2024-11-27

## 2024-11-27 ENCOUNTER — APPOINTMENT (OUTPATIENT)
Dept: INFUSION THERAPY | Facility: HOSPITAL | Age: 74
End: 2024-11-27

## 2024-11-27 ENCOUNTER — APPOINTMENT (OUTPATIENT)
Dept: HEMATOLOGY ONCOLOGY | Facility: CLINIC | Age: 74
End: 2024-11-27
Payer: MEDICARE

## 2024-11-27 VITALS
SYSTOLIC BLOOD PRESSURE: 149 MMHG | RESPIRATION RATE: 18 BRPM | DIASTOLIC BLOOD PRESSURE: 82 MMHG | HEIGHT: 73 IN | WEIGHT: 216 LBS | BODY MASS INDEX: 28.63 KG/M2 | TEMPERATURE: 97.3 F | HEART RATE: 69 BPM | OXYGEN SATURATION: 98 %

## 2024-11-27 DIAGNOSIS — C65.9 MALIGNANT NEOPLASM OF UNSPECIFIED RENAL PELVIS: ICD-10-CM

## 2024-11-27 LAB
ALBUMIN SERPL ELPH-MCNC: 3.8 G/DL — SIGNIFICANT CHANGE UP (ref 3.3–5)
ALP SERPL-CCNC: 86 U/L — SIGNIFICANT CHANGE UP (ref 40–120)
ALT FLD-CCNC: 5 U/L — LOW (ref 10–45)
ANION GAP SERPL CALC-SCNC: 11 MMOL/L — SIGNIFICANT CHANGE UP (ref 5–17)
AST SERPL-CCNC: 19 U/L — SIGNIFICANT CHANGE UP (ref 10–40)
BASOPHILS # BLD AUTO: 0.06 K/UL — SIGNIFICANT CHANGE UP (ref 0–0.2)
BASOPHILS NFR BLD AUTO: 0.9 % — SIGNIFICANT CHANGE UP (ref 0–2)
BILIRUB SERPL-MCNC: 0.4 MG/DL — SIGNIFICANT CHANGE UP (ref 0.2–1.2)
BUN SERPL-MCNC: 28 MG/DL — HIGH (ref 7–23)
CALCIUM SERPL-MCNC: 9.2 MG/DL — SIGNIFICANT CHANGE UP (ref 8.4–10.5)
CHLORIDE SERPL-SCNC: 98 MMOL/L — SIGNIFICANT CHANGE UP (ref 96–108)
CO2 SERPL-SCNC: 23 MMOL/L — SIGNIFICANT CHANGE UP (ref 22–31)
CREAT SERPL-MCNC: 1.15 MG/DL — SIGNIFICANT CHANGE UP (ref 0.5–1.3)
EGFR: 66 ML/MIN/1.73M2 — SIGNIFICANT CHANGE UP
EOSINOPHIL # BLD AUTO: 0.28 K/UL — SIGNIFICANT CHANGE UP (ref 0–0.5)
EOSINOPHIL NFR BLD AUTO: 4.2 % — SIGNIFICANT CHANGE UP (ref 0–6)
GLUCOSE SERPL-MCNC: 269 MG/DL — HIGH (ref 70–99)
HCT VFR BLD CALC: 38 % — LOW (ref 39–50)
HGB BLD-MCNC: 12.6 G/DL — LOW (ref 13–17)
IMM GRANULOCYTES NFR BLD AUTO: 0.3 % — SIGNIFICANT CHANGE UP (ref 0–0.9)
LYMPHOCYTES # BLD AUTO: 1.13 K/UL — SIGNIFICANT CHANGE UP (ref 1–3.3)
LYMPHOCYTES # BLD AUTO: 16.8 % — SIGNIFICANT CHANGE UP (ref 13–44)
MCHC RBC-ENTMCNC: 32.6 PG — SIGNIFICANT CHANGE UP (ref 27–34)
MCHC RBC-ENTMCNC: 33.2 G/DL — SIGNIFICANT CHANGE UP (ref 32–36)
MCV RBC AUTO: 98.2 FL — SIGNIFICANT CHANGE UP (ref 80–100)
MONOCYTES # BLD AUTO: 0.62 K/UL — SIGNIFICANT CHANGE UP (ref 0–0.9)
MONOCYTES NFR BLD AUTO: 9.2 % — SIGNIFICANT CHANGE UP (ref 2–14)
NEUTROPHILS # BLD AUTO: 4.6 K/UL — SIGNIFICANT CHANGE UP (ref 1.8–7.4)
NEUTROPHILS NFR BLD AUTO: 68.6 % — SIGNIFICANT CHANGE UP (ref 43–77)
NRBC # BLD: 0 /100 WBCS — SIGNIFICANT CHANGE UP (ref 0–0)
PLATELET # BLD AUTO: 156 K/UL — SIGNIFICANT CHANGE UP (ref 150–400)
POTASSIUM SERPL-MCNC: 4.6 MMOL/L — SIGNIFICANT CHANGE UP (ref 3.5–5.3)
POTASSIUM SERPL-SCNC: 4.6 MMOL/L — SIGNIFICANT CHANGE UP (ref 3.5–5.3)
PROT SERPL-MCNC: 6.9 G/DL — SIGNIFICANT CHANGE UP (ref 6–8.3)
RBC # BLD: 3.87 M/UL — LOW (ref 4.2–5.8)
RBC # FLD: 12.1 % — SIGNIFICANT CHANGE UP (ref 10.3–14.5)
SODIUM SERPL-SCNC: 132 MMOL/L — LOW (ref 135–145)
T4 FREE SERPL-MCNC: 1.5 NG/DL — SIGNIFICANT CHANGE UP (ref 0.9–1.8)
TSH SERPL-MCNC: 0.76 UIU/ML — SIGNIFICANT CHANGE UP (ref 0.27–4.2)
WBC # BLD: 6.71 K/UL — SIGNIFICANT CHANGE UP (ref 3.8–10.5)
WBC # FLD AUTO: 6.71 K/UL — SIGNIFICANT CHANGE UP (ref 3.8–10.5)

## 2024-11-27 PROCEDURE — G2211 COMPLEX E/M VISIT ADD ON: CPT

## 2024-11-27 PROCEDURE — 99213 OFFICE O/P EST LOW 20 MIN: CPT

## 2024-11-27 RX ORDER — BETAMETHASONE VALERATE 1 MG/G
0.1 CREAM TOPICAL TWICE DAILY
Qty: 1 | Refills: 1 | Status: ACTIVE | COMMUNITY
Start: 2024-11-27 | End: 1900-01-01

## 2024-12-04 PROBLEM — Q25.43 ANEURYSM OF AORTIC ROOT: Status: ACTIVE | Noted: 2023-08-15

## 2024-12-05 ENCOUNTER — APPOINTMENT (OUTPATIENT)
Dept: CARDIOTHORACIC SURGERY | Facility: CLINIC | Age: 74
End: 2024-12-05
Payer: MEDICARE

## 2024-12-05 VITALS
BODY MASS INDEX: 27.72 KG/M2 | HEART RATE: 72 BPM | WEIGHT: 216 LBS | OXYGEN SATURATION: 99 % | DIASTOLIC BLOOD PRESSURE: 84 MMHG | SYSTOLIC BLOOD PRESSURE: 150 MMHG | TEMPERATURE: 98.2 F | HEIGHT: 74 IN | RESPIRATION RATE: 14 BRPM

## 2024-12-05 DIAGNOSIS — E03.9 HYPOTHYROIDISM, UNSPECIFIED: ICD-10-CM

## 2024-12-05 DIAGNOSIS — E78.5 HYPERLIPIDEMIA, UNSPECIFIED: ICD-10-CM

## 2024-12-05 DIAGNOSIS — R01.1 CARDIAC MURMUR, UNSPECIFIED: ICD-10-CM

## 2024-12-05 DIAGNOSIS — I77.819 AORTIC ECTASIA, UNSPECIFIED SITE: ICD-10-CM

## 2024-12-05 DIAGNOSIS — Q25.43 CONGENITAL ANEURYSM OF AORTA: ICD-10-CM

## 2024-12-05 DIAGNOSIS — I10 ESSENTIAL (PRIMARY) HYPERTENSION: ICD-10-CM

## 2024-12-05 DIAGNOSIS — E11.9 TYPE 2 DIABETES MELLITUS W/OUT COMPLICATIONS: ICD-10-CM

## 2024-12-05 PROCEDURE — 99214 OFFICE O/P EST MOD 30 MIN: CPT

## 2024-12-16 NOTE — CONSULT NOTE ADULT - PROBLEM SELECTOR PROBLEM 3
EMERGENCY DEPARTMENT ENCOUNTER      NAME: Juan R Uriostegui  AGE: 27 year old female  YOB: 1997  MRN: [id number]  EVALUATION DATE & TIME: 12/15/2024  8:28 PM    PCP: [facility]    ED PROVIDER: Olimpia Gonzalez M.D.      Chief Complaint   Patient presents with    Rectal/perineal Pain    Cyst         FINAL IMPRESSION:  1. Cellulitis, unspecified cellulitis site        MEDICAL DECISION MAKING:    Pertinent Labs & Imaging studies reviewed. (See chart for details)  ED Course as of 12/15/24 2313   Sun Dec 15, 2024   2134 Afebrile.  Vital signs here with tachycardia though patient quite uncomfortable.  She is coming in with pain to her rectum.  Says it started yesterday.  She says there is a fullness to the area.  She is having significant pain with bowel movement, wiping.  States she has not been able to sit.  Took Tylenol prior to coming in.  No trauma.  No other injury.  Radiate no fevers though she has had some chills at home secondary to the pain.    Physical exam for patient here tearful, anxious.  Abdomen soft nontender.  On her right inner gluteal fold she has a large area of redness with out fluctuance but there is induration in this area.  Does not seem to extend on the surface towards the rectum.  Very tender to palpation.  Warm.    Bedside ultrasound was performed that did not reveal any pocket that would be amenable to drainage.  Will get a scan to evaluate depth of the developing abscess.  Will start on antibiotics.   2203 CRP Inflammation(!): 40.80   2203 WBC(!): 17.1   2219 Lactic Acid: 0.9   2250 Tachycardia has resolved, heart rate down to 98   2310 Patient CT scan here reveals inflammation, no obvious abscess.  Does not extend to her rectum or perianally.  She has been started on antibiotics.  She states that she still having some pain but is better controlled.  Discharge patient home with antibiotics, instructed to warm packs on the area, she can use lidocaine.  She  can try warm sits baths.     Outside records reviewed emergency department visit 12/1/2024, midwife visit 10/2/2024,    Medical Decision Making  Obtained supplemental history:Supplemental history obtained?: No  Reviewed external records: External records reviewed?: Documented in chart  Care impacted by chronic illness:Documented in Chart, Chronic Lung Disease, Mental Health, and Other: Chronic Migraine, IBS, Morbid Obesity  Did you consider but not order tests?: Work up considered but not performed and documented in chart, if applicable  Did you interpret images independently?: Independent interpretation of ECG and images noted in documentation, when applicable.  Consultation discussion with other provider:Did you involve another provider (consultant, MH, pharmacy, etc.)?: No  Discharge. I prescribed additional prescription strength medication(s) as charted. See documentation for any additional details.    MIPS: Not Applicable        Critical care: 0 minutes excluding separately billable procedures.  Includes bedside management, time reviewing test results, review of records, discussing the case with staff, documenting the medical record and time spent with family members (or surrogate decision makers) discussing specific treatment issues.          ED COURSE:  9:15 PM I met with the patient to gather history and to perform my initial exam. We discussed plans for the ED course, including diagnostic testing and treatment.      The importance of close follow up was discussed. We reviewed warning signs and symptoms, and I instructed Ms. Uriostegui to return to the emergency department immediately if she develops any new or worsening symptoms. I provided additional verbal discharge instructions. Ms. Uriostegui expressed understanding and agreement with this plan of care, her questions were answered, and she was discharged in stable condition.     MEDICATIONS GIVEN IN THE EMERGENCY:  Medications    sulfamethoxazole-trimethoprim (BACTRIM DS) 800-160 MG per tablet 1 tablet (1 tablet Oral $Given 12/15/24 2153)   Lidocaine (LIDOCARE) 4 % Patch 1 patch (1 patch Transdermal $Patch/Med Applied 12/15/24 2202)   ketorolac (TORADOL) injection 30 mg (30 mg Intravenous $Given 12/15/24 2133)   cephALEXin (KEFLEX) capsule 500 mg (500 mg Oral $Given 12/15/24 2153)   iopamidol (ISOVUE-370) solution 100 mL (100 mLs Intravenous $Given 12/15/24 2211)   HYDROmorphone (PF) (DILAUDID) injection 0.5 mg (0.5 mg Intravenous $Given 12/15/24 2246)       NEW PRESCRIPTIONS STARTED AT TODAY'S ER VISIT:  New Prescriptions    CEPHALEXIN (KEFLEX) 500 MG CAPSULE    Take 1 capsule (500 mg) by mouth 3 times daily for 10 days.    LIDOCAINE (LIDODERM) 5 % PATCH    Place 1 patch over 12 hours onto the skin every 24 hours for 10 days.    OXYCODONE (ROXICODONE) 5 MG TABLET    Take 1 tablet (5 mg) by mouth every 6 hours as needed for pain.    SULFAMETHOXAZOLE-TRIMETHOPRIM (BACTRIM DS) 800-160 MG TABLET    Take 1 tablet by mouth 2 times daily for 10 days.          =================================================================    HPI    Patient information was obtained from: Patient    Use of : N/A         Juan R DENG Moody is a 27 year old female who presents with rectal pain.Afebrile.  Vital signs here with tachycardia though patient quite uncomfortable.  She is coming in with pain to her rectum.  Says it started yesterday.  She says there is a fullness to the area.  She is having significant pain with bowel movement, wiping.  States she has not been able to sit.  Took Tylenol prior to coming in.  No trauma.  No other injury.  Radiate no fevers though she has had some chills at home secondary to the pain.        REVIEW OF SYSTEMS   Review of Systems   Gastrointestinal:  Positive for rectal pain.   All other systems reviewed and are negative.        PAST MEDICAL HISTORY:  Past Medical History:   Diagnosis Date    Asthma     Bipolar 1  disorder (H)     Depression     IBS (irritable bowel syndrome)     Obesity        PAST SURGICAL HISTORY:  Past Surgical History:   Procedure Laterality Date    HYSTEROSALPINGOGRAM  2023    Blocked Fallopian Tubes    TN LAP,APPENDECTOMY N/A 02/15/2021    Procedure: APPENDECTOMY, LAPAROSCOPIC, Excision of epiploic appendagitis;  Surgeon: Bonnie Kaur MD;  Location: [facility] - [place], [place];  Service: General    TONSILLECTOMY         CURRENT MEDICATIONS:      Current Facility-Administered Medications:     Lidocaine (LIDOCARE) 4 % Patch 1 patch, 1 patch, Transdermal, Q24h, Olimpia Gonzalez MD, 1 patch at 12/15/24 2202    sulfamethoxazole-trimethoprim (BACTRIM DS) 800-160 MG per tablet 1 tablet, 1 tablet, Oral, BID, Olimpia Gonzalez MD, 1 tablet at 12/15/24 2153    Current Outpatient Medications:     cephALEXin (KEFLEX) 500 MG capsule, Take 1 capsule (500 mg) by mouth 3 times daily for 10 days., Disp: 30 capsule, Rfl: 0    lidocaine (LIDODERM) 5 % patch, Place 1 patch over 12 hours onto the skin every 24 hours for 10 days., Disp: 10 patch, Rfl: 0    oxyCODONE (ROXICODONE) 5 MG tablet, Take 1 tablet (5 mg) by mouth every 6 hours as needed for pain., Disp: 12 tablet, Rfl: 0    sulfamethoxazole-trimethoprim (BACTRIM DS) 800-160 MG tablet, Take 1 tablet by mouth 2 times daily for 10 days., Disp: 20 tablet, Rfl: 0    citalopram (CELEXA) 20 MG tablet, Take 1 tablet (20 mg) by mouth daily, Disp: 30 tablet, Rfl: 11    clonazePAM (KLONOPIN) 1 MG tablet, Take 1 tablet (1 mg) by mouth 2 times daily *Need appointment with Dr Donaldson for further refills, Disp: 60 tablet, Rfl: 0    dicyclomine (BENTYL) 20 MG tablet, Take 2 tablets (40 mg) by mouth 2 times daily, Disp: 100 tablet, Rfl: 11    hydrocortisone (ANUSOL-HC) 25 MG suppository, Place 1 suppository (25 mg) rectally 2 times daily as needed for hemorrhoids., Disp: 14 suppository, Rfl: 0    Lidocaine (LIDOCARE) 4 % Patch, Place 1 patch onto the skin every 24 hours. To prevent  lidocaine toxicity, patient should be patch free for 12 hrs daily., Disp: 5 patch, Rfl: 0    lithium (ESKALITH) 300 MG tablet, Take 600 mg in the morning , 300 mg in the afternoon  And 600 mg at nighttime, Disp: 150 tablet, Rfl: 11    methocarbamol (ROBAXIN) 500 MG tablet, Take 2 tablets (1,000 mg) by mouth 4 times daily as needed for muscle spasms., Disp: 20 tablet, Rfl: 0    norgestrel-ethinyl estradiol (LO/OVRAL) 0.3-30 MG-MCG tablet, Take 2 tablets by mouth. (Patient not taking: Reported on 10/23/2024), Disp: , Rfl:     omeprazole (PRILOSEC) 20 MG DR capsule, TAKE 1 CAPSULE(20 MG) BY MOUTH DAILY BEFORE AND BREAKFAST, Disp: 90 capsule, Rfl: 3    ondansetron (ZOFRAN ODT) 4 MG ODT tab, Take 1 tablet (4 mg) by mouth every 8 hours as needed for nausea, Disp: 30 tablet, Rfl: 1    ondansetron (ZOFRAN ODT) 4 MG ODT tab, Take 1 tablet (4 mg) by mouth every 8 hours as needed for nausea, Disp: 20 tablet, Rfl: 0    pregabalin (LYRICA) 75 MG capsule, Take 1 capsule (75 mg) by mouth 3 times daily, Disp: 90 capsule, Rfl: 2    tolterodine ER (DETROL LA) 2 MG 24 hr capsule, TAKE 1 CAPSULE(2 MG) BY MOUTH DAILY, Disp: 90 capsule, Rfl: 3    topiramate (TOPAMAX) 50 MG tablet, TAKE 1 TABLET(50 MG) BY MOUTH TWICE DAILY, Disp: 180 tablet, Rfl: 1    ALLERGIES:  No Known Allergies    FAMILY HISTORY:  Family History   Problem Relation Age of Onset    Colon Cancer Mother     Cancer Father     Cervical Cancer Maternal Grandmother        SOCIAL HISTORY:   Social History     Socioeconomic History    Marital status: Single    Number of children: 1   Tobacco Use    Smoking status: Never     Passive exposure: Never    Smokeless tobacco: Never   Vaping Use    Vaping status: Never Used   Substance and Sexual Activity    Alcohol use: No    Drug use: Never    Sexual activity: Yes     Partners: Male   Social History Narrative    Works at a nursing home.  Has 1 3-year-old son.     Social Drivers of Health     Financial Resource Strain: Low Risk   "(9/20/2023)    Financial Resource Strain     Within the past 12 months, have you or your family members you live with been unable to get utilities (heat, electricity) when it was really needed?: No   Food Insecurity: Low Risk  (9/20/2023)    Food Insecurity     Within the past 12 months, did you worry that your food would run out before you got money to buy more?: No     Within the past 12 months, did the food you bought just not last and you didn t have money to get more?: No   Transportation Needs: Low Risk  (9/20/2023)    Transportation Needs     Within the past 12 months, has lack of transportation kept you from medical appointments, getting your medicines, non-medical meetings or appointments, work, or from getting things that you need?: No   Interpersonal Safety: Unknown (9/5/2024)    Received from [facility]    Humiliation, Afraid, Rape, and Kick questionnaire     Emotionally Abused: No     Physically Abused: No     Sexually Abused: No   Housing Stability: Low Risk  (9/20/2023)    Housing Stability     Do you have housing? : Yes     Are you worried about losing your housing?: No       PHYSICAL EXAM:    Vitals: /59   Pulse 98   Temp 98.9  F (37.2  C) (Oral)   Resp 20   Ht 1.626 m (5' 4\")   Wt 113.4 kg (250 lb)   LMP  (LMP Unknown)   SpO2 98%   BMI 42.91 kg/m     General:.  Alert, anxious, moderately uncomfortable appearing  HENT: Oropharynx without erythema or exudates. MMM.  TMs clear bilaterally.  Eyes: Pupils mid-sized and equally reactive.   Neck: Full AROM.  No midline tenderness to palpation.  Cardiovascular: Regular rate and rhythm. Peripheral pulses 2+ bilaterally.  Chest/Pulmonary: Normal work of breathing. Lung sounds clear and equal throughout, no wheezes or crackles. No chest wall tenderness or deformities.  Abdomen: Abdomen soft nontender.  On her right inner gluteal fold she has a large area of redness with out fluctuance but there is induration in this area.  Does not seem to " Metabolic acidosis extend on the surface towards the rectum.  Very tender to palpation.  Warm.  Back/Spine: No CVA or midline tenderness.  Extremities: Normal ROM of all major joints. No lower extremity edema.   Skin: Warm and dry. Normal skin color.   Neuro: Speech clear. CNs grossly intact. Moves all extremities appropriately. Strength and sensation grossly intact to all extremities.   Psych: Normal affect/mood, cooperative, memory appropriate.     LAB:  All pertinent labs reviewed and interpreted.  Labs Ordered and Resulted from Time of ED Arrival to Time of ED Departure   COMPREHENSIVE METABOLIC PANEL - Abnormal       Result Value    Sodium 138      Potassium 3.4      Carbon Dioxide (CO2) 21 (*)     Anion Gap 12      Urea Nitrogen 7.6      Creatinine 0.97 (*)     GFR Estimate 81      Calcium 8.8      Chloride 105      Glucose 105 (*)     Alkaline Phosphatase 85      AST 16      ALT 14      Protein Total 7.3      Albumin 4.0      Bilirubin Total 0.2     CRP INFLAMMATION - Abnormal    CRP Inflammation 40.80 (*)    CBC WITH PLATELETS AND DIFFERENTIAL - Abnormal    WBC Count 17.1 (*)     RBC Count 4.53      Hemoglobin 10.4 (*)     Hematocrit 33.2 (*)     MCV 73 (*)     MCH 23.0 (*)     MCHC 31.3 (*)     RDW 17.7 (*)     Platelet Count 362      % Neutrophils 79      % Lymphocytes 14      % Monocytes 6      % Eosinophils 0      % Basophils 0      % Immature Granulocytes 0      NRBCs per 100 WBC 0      Absolute Neutrophils 13.5 (*)     Absolute Lymphocytes 2.4      Absolute Monocytes 1.0      Absolute Eosinophils 0.1      Absolute Basophils 0.1      Absolute Immature Granulocytes 0.1      Absolute NRBCs 0.0     LACTIC ACID WHOLE BLOOD WITH 1X REPEAT IN 2 HR WHEN >2 - Normal    Lactic Acid, Initial 0.9         RADIOLOGY:  CT Abdomen Pelvis w Contrast   Final Result   IMPRESSION:    1.  Inflammatory changes in the subcutaneous fat along the right gluteal cleft. No focal fluid collection to suggest abscess. No intrapelvic extension. No  perirectal abscess.   2.  Solitary mildly enlarged right external iliac chain lymph node may be reactive.            Olimpia Gonzalez M.D.  Emergency Medicine  [facility] EMERGENCY ROOM  [address] [facility] [address] [phone]  Dept: [phone]       Olimpia Gonzalez MD  12/15/24 7960

## 2024-12-18 ENCOUNTER — RESULT REVIEW (OUTPATIENT)
Age: 74
End: 2024-12-18

## 2024-12-18 ENCOUNTER — APPOINTMENT (OUTPATIENT)
Dept: INFUSION THERAPY | Facility: HOSPITAL | Age: 74
End: 2024-12-18

## 2024-12-18 ENCOUNTER — APPOINTMENT (OUTPATIENT)
Dept: HEMATOLOGY ONCOLOGY | Facility: CLINIC | Age: 74
End: 2024-12-18
Payer: MEDICARE

## 2024-12-18 VITALS
RESPIRATION RATE: 16 BRPM | SYSTOLIC BLOOD PRESSURE: 130 MMHG | HEART RATE: 65 BPM | BODY MASS INDEX: 27.94 KG/M2 | DIASTOLIC BLOOD PRESSURE: 80 MMHG | WEIGHT: 217.6 LBS | TEMPERATURE: 97.2 F | OXYGEN SATURATION: 98 %

## 2024-12-18 DIAGNOSIS — Z92.89 PERSONAL HISTORY OF OTHER MEDICAL TREATMENT: ICD-10-CM

## 2024-12-18 DIAGNOSIS — L30.9 DERMATITIS, UNSPECIFIED: ICD-10-CM

## 2024-12-18 DIAGNOSIS — C67.9 MALIGNANT NEOPLASM OF BLADDER, UNSPECIFIED: ICD-10-CM

## 2024-12-18 DIAGNOSIS — G62.9 POLYNEUROPATHY, UNSPECIFIED: ICD-10-CM

## 2024-12-18 LAB
ALBUMIN SERPL ELPH-MCNC: 3.9 G/DL — SIGNIFICANT CHANGE UP (ref 3.3–5)
ALP SERPL-CCNC: 83 U/L — SIGNIFICANT CHANGE UP (ref 40–120)
ALT FLD-CCNC: 5 U/L — LOW (ref 10–45)
ANION GAP SERPL CALC-SCNC: 12 MMOL/L — SIGNIFICANT CHANGE UP (ref 5–17)
AST SERPL-CCNC: 23 U/L — SIGNIFICANT CHANGE UP (ref 10–40)
BASOPHILS # BLD AUTO: 0.05 K/UL — SIGNIFICANT CHANGE UP (ref 0–0.2)
BASOPHILS NFR BLD AUTO: 0.8 % — SIGNIFICANT CHANGE UP (ref 0–2)
BILIRUB SERPL-MCNC: 0.5 MG/DL — SIGNIFICANT CHANGE UP (ref 0.2–1.2)
BUN SERPL-MCNC: 24 MG/DL — HIGH (ref 7–23)
CALCIUM SERPL-MCNC: 9.3 MG/DL — SIGNIFICANT CHANGE UP (ref 8.4–10.5)
CHLORIDE SERPL-SCNC: 102 MMOL/L — SIGNIFICANT CHANGE UP (ref 96–108)
CO2 SERPL-SCNC: 22 MMOL/L — SIGNIFICANT CHANGE UP (ref 22–31)
CREAT SERPL-MCNC: 1.26 MG/DL — SIGNIFICANT CHANGE UP (ref 0.5–1.3)
EGFR: 59 ML/MIN/1.73M2 — LOW
EOSINOPHIL # BLD AUTO: 0.13 K/UL — SIGNIFICANT CHANGE UP (ref 0–0.5)
EOSINOPHIL NFR BLD AUTO: 2 % — SIGNIFICANT CHANGE UP (ref 0–6)
GLUCOSE SERPL-MCNC: 118 MG/DL — HIGH (ref 70–99)
HCT VFR BLD CALC: 39 % — SIGNIFICANT CHANGE UP (ref 39–50)
HGB BLD-MCNC: 12.9 G/DL — LOW (ref 13–17)
IMM GRANULOCYTES NFR BLD AUTO: 0.3 % — SIGNIFICANT CHANGE UP (ref 0–0.9)
LYMPHOCYTES # BLD AUTO: 1.27 K/UL — SIGNIFICANT CHANGE UP (ref 1–3.3)
LYMPHOCYTES # BLD AUTO: 19.8 % — SIGNIFICANT CHANGE UP (ref 13–44)
MCHC RBC-ENTMCNC: 31.9 PG — SIGNIFICANT CHANGE UP (ref 27–34)
MCHC RBC-ENTMCNC: 33.1 G/DL — SIGNIFICANT CHANGE UP (ref 32–36)
MCV RBC AUTO: 96.5 FL — SIGNIFICANT CHANGE UP (ref 80–100)
MONOCYTES # BLD AUTO: 0.68 K/UL — SIGNIFICANT CHANGE UP (ref 0–0.9)
MONOCYTES NFR BLD AUTO: 10.6 % — SIGNIFICANT CHANGE UP (ref 2–14)
NEUTROPHILS # BLD AUTO: 4.27 K/UL — SIGNIFICANT CHANGE UP (ref 1.8–7.4)
NEUTROPHILS NFR BLD AUTO: 66.5 % — SIGNIFICANT CHANGE UP (ref 43–77)
NRBC # BLD: 0 /100 WBCS — SIGNIFICANT CHANGE UP (ref 0–0)
PLATELET # BLD AUTO: 194 K/UL — SIGNIFICANT CHANGE UP (ref 150–400)
POTASSIUM SERPL-MCNC: 4.3 MMOL/L — SIGNIFICANT CHANGE UP (ref 3.5–5.3)
POTASSIUM SERPL-SCNC: 4.3 MMOL/L — SIGNIFICANT CHANGE UP (ref 3.5–5.3)
PROT SERPL-MCNC: 7.3 G/DL — SIGNIFICANT CHANGE UP (ref 6–8.3)
RBC # BLD: 4.04 M/UL — LOW (ref 4.2–5.8)
RBC # FLD: 12.3 % — SIGNIFICANT CHANGE UP (ref 10.3–14.5)
SODIUM SERPL-SCNC: 136 MMOL/L — SIGNIFICANT CHANGE UP (ref 135–145)
WBC # BLD: 6.42 K/UL — SIGNIFICANT CHANGE UP (ref 3.8–10.5)
WBC # FLD AUTO: 6.42 K/UL — SIGNIFICANT CHANGE UP (ref 3.8–10.5)

## 2024-12-18 PROCEDURE — 99214 OFFICE O/P EST MOD 30 MIN: CPT

## 2024-12-18 PROCEDURE — G2211 COMPLEX E/M VISIT ADD ON: CPT

## 2024-12-19 LAB
T4 FREE SERPL-MCNC: 1.5 NG/DL — SIGNIFICANT CHANGE UP (ref 0.9–1.7)
TSH SERPL-MCNC: 1.15 UIU/ML — SIGNIFICANT CHANGE UP (ref 0.27–4.2)

## 2025-01-03 NOTE — ED PROVIDER NOTE - NSICDXPASTMEDICALHX_GEN_ALL_CORE_FT
PAST MEDICAL HISTORY:  Ascending aortic aneurysm     BPH without obstruction/lower urinary tract symptoms     COVID-19 12/2020 loss of smell no hosp    Hearing loss     History of low back pain     HTN (hypertension)     Hypothyroid     Malignant neoplasm of unspecified kidney, except renal pelvis     Obesity     Pulmonary nodule removed 8/4/2021 early stage no chemo VAT    Thoracic aortic aneurysm       used

## 2025-01-06 ENCOUNTER — APPOINTMENT (OUTPATIENT)
Dept: DERMATOLOGY | Facility: CLINIC | Age: 75
End: 2025-01-06
Payer: MEDICARE

## 2025-01-06 DIAGNOSIS — L30.9 DERMATITIS, UNSPECIFIED: ICD-10-CM

## 2025-01-06 PROCEDURE — 99204 OFFICE O/P NEW MOD 45 MIN: CPT

## 2025-01-06 PROCEDURE — G2211 COMPLEX E/M VISIT ADD ON: CPT

## 2025-01-06 RX ORDER — BETAMETHASONE DIPROPIONATE 0.5 MG/G
0.05 OINTMENT TOPICAL
Qty: 1 | Refills: 5 | Status: ACTIVE | COMMUNITY
Start: 2025-01-06 | End: 1900-01-01

## 2025-01-06 RX ORDER — TACROLIMUS 1 MG/G
0.1 OINTMENT TOPICAL
Qty: 1 | Refills: 6 | Status: ACTIVE | COMMUNITY
Start: 2025-01-06 | End: 1900-01-01

## 2025-01-07 ENCOUNTER — OUTPATIENT (OUTPATIENT)
Dept: OUTPATIENT SERVICES | Facility: HOSPITAL | Age: 75
LOS: 1 days | Discharge: HOME | End: 2025-01-07

## 2025-01-07 DIAGNOSIS — Z98.890 OTHER SPECIFIED POSTPROCEDURAL STATES: Chronic | ICD-10-CM

## 2025-01-07 DIAGNOSIS — C80.1 MALIGNANT (PRIMARY) NEOPLASM, UNSPECIFIED: ICD-10-CM

## 2025-01-07 DIAGNOSIS — Z90.2 ACQUIRED ABSENCE OF LUNG [PART OF]: Chronic | ICD-10-CM

## 2025-01-07 DIAGNOSIS — Z90.5 ACQUIRED ABSENCE OF KIDNEY: Chronic | ICD-10-CM

## 2025-01-08 ENCOUNTER — NON-APPOINTMENT (OUTPATIENT)
Age: 75
End: 2025-01-08

## 2025-01-08 ENCOUNTER — APPOINTMENT (OUTPATIENT)
Dept: INFUSION THERAPY | Facility: HOSPITAL | Age: 75
End: 2025-01-08

## 2025-01-08 ENCOUNTER — RESULT REVIEW (OUTPATIENT)
Age: 75
End: 2025-01-08

## 2025-01-08 ENCOUNTER — APPOINTMENT (OUTPATIENT)
Dept: HEMATOLOGY ONCOLOGY | Facility: CLINIC | Age: 75
End: 2025-01-08
Payer: MEDICARE

## 2025-01-08 VITALS
WEIGHT: 217 LBS | OXYGEN SATURATION: 97 % | RESPIRATION RATE: 16 BRPM | SYSTOLIC BLOOD PRESSURE: 149 MMHG | BODY MASS INDEX: 28.76 KG/M2 | TEMPERATURE: 97.3 F | HEIGHT: 73 IN | HEART RATE: 64 BPM | DIASTOLIC BLOOD PRESSURE: 84 MMHG

## 2025-01-08 DIAGNOSIS — C66.2 MALIGNANT NEOPLASM OF LEFT URETER: ICD-10-CM

## 2025-01-08 LAB
ALBUMIN SERPL ELPH-MCNC: 4 G/DL — SIGNIFICANT CHANGE UP (ref 3.3–5)
ALP SERPL-CCNC: 85 U/L — SIGNIFICANT CHANGE UP (ref 40–120)
ALT FLD-CCNC: 6 U/L — LOW (ref 10–45)
ANION GAP SERPL CALC-SCNC: 12 MMOL/L — SIGNIFICANT CHANGE UP (ref 5–17)
AST SERPL-CCNC: 20 U/L — SIGNIFICANT CHANGE UP (ref 10–40)
BASOPHILS # BLD AUTO: 0.07 K/UL — SIGNIFICANT CHANGE UP (ref 0–0.2)
BASOPHILS NFR BLD AUTO: 0.9 % — SIGNIFICANT CHANGE UP (ref 0–2)
BILIRUB SERPL-MCNC: 0.6 MG/DL — SIGNIFICANT CHANGE UP (ref 0.2–1.2)
BUN SERPL-MCNC: 23 MG/DL — SIGNIFICANT CHANGE UP (ref 7–23)
CALCIUM SERPL-MCNC: 9.2 MG/DL — SIGNIFICANT CHANGE UP (ref 8.4–10.5)
CHLORIDE SERPL-SCNC: 101 MMOL/L — SIGNIFICANT CHANGE UP (ref 96–108)
CO2 SERPL-SCNC: 23 MMOL/L — SIGNIFICANT CHANGE UP (ref 22–31)
CREAT SERPL-MCNC: 1.2 MG/DL — SIGNIFICANT CHANGE UP (ref 0.5–1.3)
EGFR: 63 ML/MIN/1.73M2 — SIGNIFICANT CHANGE UP
EOSINOPHIL # BLD AUTO: 0.33 K/UL — SIGNIFICANT CHANGE UP (ref 0–0.5)
EOSINOPHIL NFR BLD AUTO: 4.2 % — SIGNIFICANT CHANGE UP (ref 0–6)
GLUCOSE SERPL-MCNC: 194 MG/DL — HIGH (ref 70–99)
HCT VFR BLD CALC: 41 % — SIGNIFICANT CHANGE UP (ref 39–50)
HGB BLD-MCNC: 13.7 G/DL — SIGNIFICANT CHANGE UP (ref 13–17)
IMM GRANULOCYTES NFR BLD AUTO: 0.5 % — SIGNIFICANT CHANGE UP (ref 0–0.9)
LYMPHOCYTES # BLD AUTO: 1.49 K/UL — SIGNIFICANT CHANGE UP (ref 1–3.3)
LYMPHOCYTES # BLD AUTO: 18.9 % — SIGNIFICANT CHANGE UP (ref 13–44)
MCHC RBC-ENTMCNC: 32.3 PG — SIGNIFICANT CHANGE UP (ref 27–34)
MCHC RBC-ENTMCNC: 33.4 G/DL — SIGNIFICANT CHANGE UP (ref 32–36)
MCV RBC AUTO: 96.7 FL — SIGNIFICANT CHANGE UP (ref 80–100)
MONOCYTES # BLD AUTO: 0.72 K/UL — SIGNIFICANT CHANGE UP (ref 0–0.9)
MONOCYTES NFR BLD AUTO: 9.1 % — SIGNIFICANT CHANGE UP (ref 2–14)
NEUTROPHILS # BLD AUTO: 5.24 K/UL — SIGNIFICANT CHANGE UP (ref 1.8–7.4)
NEUTROPHILS NFR BLD AUTO: 66.4 % — SIGNIFICANT CHANGE UP (ref 43–77)
NRBC # BLD: 0 /100 WBCS — SIGNIFICANT CHANGE UP (ref 0–0)
NRBC BLD-RTO: 0 /100 WBCS — SIGNIFICANT CHANGE UP (ref 0–0)
PLATELET # BLD AUTO: 156 K/UL — SIGNIFICANT CHANGE UP (ref 150–400)
POTASSIUM SERPL-MCNC: 4.7 MMOL/L — SIGNIFICANT CHANGE UP (ref 3.5–5.3)
POTASSIUM SERPL-SCNC: 4.7 MMOL/L — SIGNIFICANT CHANGE UP (ref 3.5–5.3)
PROT SERPL-MCNC: 7.3 G/DL — SIGNIFICANT CHANGE UP (ref 6–8.3)
RBC # BLD: 4.24 M/UL — SIGNIFICANT CHANGE UP (ref 4.2–5.8)
RBC # FLD: 12.3 % — SIGNIFICANT CHANGE UP (ref 10.3–14.5)
SODIUM SERPL-SCNC: 136 MMOL/L — SIGNIFICANT CHANGE UP (ref 135–145)
WBC # BLD: 7.89 K/UL — SIGNIFICANT CHANGE UP (ref 3.8–10.5)
WBC # FLD AUTO: 7.89 K/UL — SIGNIFICANT CHANGE UP (ref 3.8–10.5)

## 2025-01-08 PROCEDURE — 99214 OFFICE O/P EST MOD 30 MIN: CPT

## 2025-01-08 PROCEDURE — G2211 COMPLEX E/M VISIT ADD ON: CPT

## 2025-01-09 DIAGNOSIS — Z51.11 ENCOUNTER FOR ANTINEOPLASTIC CHEMOTHERAPY: ICD-10-CM

## 2025-01-09 LAB
T4 FREE SERPL-MCNC: 1.6 NG/DL — SIGNIFICANT CHANGE UP (ref 0.9–1.7)
TSH SERPL-MCNC: 0.91 UIU/ML — SIGNIFICANT CHANGE UP (ref 0.27–4.2)

## 2025-01-17 NOTE — ASSESSMENT
After Anesthesia (Sleep Medicine)  What should I do after anesthesia?  You should rest and relax for the next 24 hours. Avoid risky or difficult (strenuous) activity. A responsible adult should stay with you overnight.  Don't drive or use any heavy equipment for 24 hours. Even if you feel normal, your reactions may be affected by the sleep medicine given to you.  Don't drink alcohol or make any important decisions for 24 hours.  Slowly get back to your regular diet, as you feel able.  How should I expect to feel?  It's normal to feel dizzy, light-headed, or faint for up to a full day after anesthesia or while taking pain medicine. If this happens:   Sit down for a few minutes before standing.  Have someone help you when you get up to walk or use the bathroom.  If you have nausea (feel sick to your stomach) or vomit (throw up):   Drink clear liquids (such as apple juice, ginger ale, broth, or 7UP) until you feel better.  If you feel sick to your stomach, or you keep vomiting for 24 hours, please call the doctor.  What else should I know?  You might have a dry mouth, sore throat, muscle aches, or trouble sleeping. These should go away after 24 hours.  Please contact your doctor if you have any other symptoms that concern you, such as fever, pain, bleeding, fluid drainage, swelling, or headache, or if it's been over 8 to 10 hours and you still aren't able to pee (urinate).  If you have a history of sleep apnea, it's very important to use your CPAP machine for the next 24 hours when you nap or sleep.   For informational purposes only. Not to replace the advice of your health care provider. Copyright   2023 [employer]. All rights reserved. Clinically reviewed by Jimi Bernal MD.     To contact a doctor, call [facility]: [phone]  or:  ' [phone] and ask for the Resident On Call for          Pediatric ENT (answered 24 hours a day)  '   Emergency  Department:  [facility] Children's Emergency Department:  [phone]      [High Risk Surgery - Intraperitoneal, Intrathoracic or Supringuinal Vascular Procedures] : High Risk Surgery - Intraperitoneal, Intrathoracic or Supringuinal Vascular Procedures - No (0) [Ischemic Heart Disease] : Ischemic Heart Disease - No (0) [Congestive Heart Failure] : Congestive Heart Failure - No (0) [Prior Cerebrovascular Accident or TIA] : Prior Cerebrovascular Accident or TIA - No (0) [Creatinine >= 2mg/dL (1 Point)] : Creatinine >= 2mg/dL - No (0) [Insulin-dependent Diabetic (1 Point)] : Insulin-dependent Diabetic - No (0) [0] : 0 , RCRI Class: I, Risk of Post-Op Cardiac Complications: 3.9%, 95% CI for Risk Estimate: 2.8% - 5.4% [Patient Requires Further Testing] : Patient requires further testing [Other: _____] : [unfilled] [As per surgery] : as per surgery [FreeTextEntry5] : NA [FreeTextEntry6] : ASA hold 1 week prior [FreeTextEntry7] : hold all supplement 7 days prior, cont rest of the meds

## 2025-01-29 ENCOUNTER — RESULT REVIEW (OUTPATIENT)
Age: 75
End: 2025-01-29

## 2025-01-29 ENCOUNTER — APPOINTMENT (OUTPATIENT)
Dept: HEMATOLOGY ONCOLOGY | Facility: CLINIC | Age: 75
End: 2025-01-29
Payer: MEDICARE

## 2025-01-29 ENCOUNTER — APPOINTMENT (OUTPATIENT)
Dept: INFUSION THERAPY | Facility: HOSPITAL | Age: 75
End: 2025-01-29

## 2025-01-29 ENCOUNTER — RX RENEWAL (OUTPATIENT)
Age: 75
End: 2025-01-29

## 2025-01-29 VITALS
WEIGHT: 218.7 LBS | BODY MASS INDEX: 28.85 KG/M2 | DIASTOLIC BLOOD PRESSURE: 69 MMHG | OXYGEN SATURATION: 98 % | SYSTOLIC BLOOD PRESSURE: 120 MMHG | TEMPERATURE: 97.3 F | RESPIRATION RATE: 16 BRPM | HEART RATE: 62 BPM

## 2025-01-29 DIAGNOSIS — C66.2 MALIGNANT NEOPLASM OF LEFT URETER: ICD-10-CM

## 2025-01-29 DIAGNOSIS — Z92.89 PERSONAL HISTORY OF OTHER MEDICAL TREATMENT: ICD-10-CM

## 2025-01-29 LAB
ALBUMIN SERPL ELPH-MCNC: 3.8 G/DL — SIGNIFICANT CHANGE UP (ref 3.3–5)
ALP SERPL-CCNC: 77 U/L — SIGNIFICANT CHANGE UP (ref 40–120)
ALT FLD-CCNC: 6 U/L — LOW (ref 10–45)
ANION GAP SERPL CALC-SCNC: 12 MMOL/L — SIGNIFICANT CHANGE UP (ref 5–17)
AST SERPL-CCNC: 25 U/L — SIGNIFICANT CHANGE UP (ref 10–40)
BASOPHILS # BLD AUTO: 0.07 K/UL — SIGNIFICANT CHANGE UP (ref 0–0.2)
BASOPHILS NFR BLD AUTO: 1 % — SIGNIFICANT CHANGE UP (ref 0–2)
BILIRUB SERPL-MCNC: 0.6 MG/DL — SIGNIFICANT CHANGE UP (ref 0.2–1.2)
BUN SERPL-MCNC: 20 MG/DL — SIGNIFICANT CHANGE UP (ref 7–23)
CALCIUM SERPL-MCNC: 8.9 MG/DL — SIGNIFICANT CHANGE UP (ref 8.4–10.5)
CHLORIDE SERPL-SCNC: 101 MMOL/L — SIGNIFICANT CHANGE UP (ref 96–108)
CO2 SERPL-SCNC: 24 MMOL/L — SIGNIFICANT CHANGE UP (ref 22–31)
CREAT SERPL-MCNC: 1.15 MG/DL — SIGNIFICANT CHANGE UP (ref 0.5–1.3)
EGFR: 66 ML/MIN/1.73M2 — SIGNIFICANT CHANGE UP
EOSINOPHIL # BLD AUTO: 0.2 K/UL — SIGNIFICANT CHANGE UP (ref 0–0.5)
EOSINOPHIL NFR BLD AUTO: 2.9 % — SIGNIFICANT CHANGE UP (ref 0–6)
GLUCOSE SERPL-MCNC: 155 MG/DL — HIGH (ref 70–99)
HCT VFR BLD CALC: 37.2 % — LOW (ref 39–50)
HGB BLD-MCNC: 12.8 G/DL — LOW (ref 13–17)
IMM GRANULOCYTES NFR BLD AUTO: 0.4 % — SIGNIFICANT CHANGE UP (ref 0–0.9)
LYMPHOCYTES # BLD AUTO: 1.44 K/UL — SIGNIFICANT CHANGE UP (ref 1–3.3)
LYMPHOCYTES # BLD AUTO: 21.2 % — SIGNIFICANT CHANGE UP (ref 13–44)
MCHC RBC-ENTMCNC: 33 PG — SIGNIFICANT CHANGE UP (ref 27–34)
MCHC RBC-ENTMCNC: 34.4 G/DL — SIGNIFICANT CHANGE UP (ref 32–36)
MCV RBC AUTO: 95.9 FL — SIGNIFICANT CHANGE UP (ref 80–100)
MONOCYTES # BLD AUTO: 0.7 K/UL — SIGNIFICANT CHANGE UP (ref 0–0.9)
MONOCYTES NFR BLD AUTO: 10.3 % — SIGNIFICANT CHANGE UP (ref 2–14)
NEUTROPHILS # BLD AUTO: 4.36 K/UL — SIGNIFICANT CHANGE UP (ref 1.8–7.4)
NEUTROPHILS NFR BLD AUTO: 64.2 % — SIGNIFICANT CHANGE UP (ref 43–77)
NRBC # BLD: 0 /100 WBCS — SIGNIFICANT CHANGE UP (ref 0–0)
NRBC BLD-RTO: 0 /100 WBCS — SIGNIFICANT CHANGE UP (ref 0–0)
PLATELET # BLD AUTO: 145 K/UL — LOW (ref 150–400)
POTASSIUM SERPL-MCNC: 4.3 MMOL/L — SIGNIFICANT CHANGE UP (ref 3.5–5.3)
POTASSIUM SERPL-SCNC: 4.3 MMOL/L — SIGNIFICANT CHANGE UP (ref 3.5–5.3)
PROT SERPL-MCNC: 6.8 G/DL — SIGNIFICANT CHANGE UP (ref 6–8.3)
RBC # BLD: 3.88 M/UL — LOW (ref 4.2–5.8)
RBC # FLD: 12.2 % — SIGNIFICANT CHANGE UP (ref 10.3–14.5)
SODIUM SERPL-SCNC: 137 MMOL/L — SIGNIFICANT CHANGE UP (ref 135–145)
WBC # BLD: 6.8 K/UL — SIGNIFICANT CHANGE UP (ref 3.8–10.5)
WBC # FLD AUTO: 6.8 K/UL — SIGNIFICANT CHANGE UP (ref 3.8–10.5)

## 2025-01-29 PROCEDURE — G2211 COMPLEX E/M VISIT ADD ON: CPT

## 2025-01-29 PROCEDURE — 99214 OFFICE O/P EST MOD 30 MIN: CPT

## 2025-01-30 LAB
T4 FREE SERPL-MCNC: 1.6 NG/DL — SIGNIFICANT CHANGE UP (ref 0.9–1.7)
TSH SERPL-MCNC: 0.87 UIU/ML — SIGNIFICANT CHANGE UP (ref 0.27–4.2)

## 2025-02-03 ENCOUNTER — RESULT REVIEW (OUTPATIENT)
Age: 75
End: 2025-02-03

## 2025-02-05 ENCOUNTER — APPOINTMENT (OUTPATIENT)
Dept: FAMILY MEDICINE | Facility: CLINIC | Age: 75
End: 2025-02-05
Payer: MEDICARE

## 2025-02-05 VITALS
OXYGEN SATURATION: 98 % | DIASTOLIC BLOOD PRESSURE: 76 MMHG | HEIGHT: 73 IN | SYSTOLIC BLOOD PRESSURE: 134 MMHG | HEART RATE: 73 BPM | WEIGHT: 220 LBS | BODY MASS INDEX: 29.16 KG/M2 | TEMPERATURE: 97.3 F | RESPIRATION RATE: 17 BRPM

## 2025-02-05 DIAGNOSIS — N18.32 CHRONIC KIDNEY DISEASE, STAGE 3B: ICD-10-CM

## 2025-02-05 DIAGNOSIS — E10.9 TYPE 1 DIABETES MELLITUS W/OUT COMPLICATIONS: ICD-10-CM

## 2025-02-05 DIAGNOSIS — Z01.818 ENCOUNTER FOR OTHER PREPROCEDURAL EXAMINATION: ICD-10-CM

## 2025-02-05 DIAGNOSIS — I77.819 AORTIC ECTASIA, UNSPECIFIED SITE: ICD-10-CM

## 2025-02-05 PROCEDURE — G2211 COMPLEX E/M VISIT ADD ON: CPT

## 2025-02-05 PROCEDURE — 99215 OFFICE O/P EST HI 40 MIN: CPT

## 2025-02-12 ENCOUNTER — APPOINTMENT (OUTPATIENT)
Dept: CT IMAGING | Facility: IMAGING CENTER | Age: 75
End: 2025-02-12

## 2025-02-12 ENCOUNTER — APPOINTMENT (OUTPATIENT)
Dept: MRI IMAGING | Facility: IMAGING CENTER | Age: 75
End: 2025-02-12

## 2025-02-12 ENCOUNTER — OUTPATIENT (OUTPATIENT)
Dept: OUTPATIENT SERVICES | Facility: HOSPITAL | Age: 75
LOS: 1 days | End: 2025-02-12
Payer: MEDICARE

## 2025-02-12 DIAGNOSIS — C65.9 MALIGNANT NEOPLASM OF UNSPECIFIED RENAL PELVIS: ICD-10-CM

## 2025-02-12 DIAGNOSIS — Z98.890 OTHER SPECIFIED POSTPROCEDURAL STATES: Chronic | ICD-10-CM

## 2025-02-12 DIAGNOSIS — C67.9 MALIGNANT NEOPLASM OF BLADDER, UNSPECIFIED: ICD-10-CM

## 2025-02-12 DIAGNOSIS — Z90.2 ACQUIRED ABSENCE OF LUNG [PART OF]: Chronic | ICD-10-CM

## 2025-02-12 DIAGNOSIS — Z90.5 ACQUIRED ABSENCE OF KIDNEY: Chronic | ICD-10-CM

## 2025-02-12 DIAGNOSIS — C66.2 MALIGNANT NEOPLASM OF LEFT URETER: ICD-10-CM

## 2025-02-12 PROCEDURE — 72197 MRI PELVIS W/O & W/DYE: CPT

## 2025-02-12 PROCEDURE — 74183 MRI ABD W/O CNTR FLWD CNTR: CPT

## 2025-02-12 PROCEDURE — 72197 MRI PELVIS W/O & W/DYE: CPT | Mod: 26

## 2025-02-12 PROCEDURE — 71260 CT THORAX DX C+: CPT | Mod: 26

## 2025-02-12 PROCEDURE — 74183 MRI ABD W/O CNTR FLWD CNTR: CPT | Mod: 26

## 2025-02-12 PROCEDURE — 71260 CT THORAX DX C+: CPT

## 2025-02-14 ENCOUNTER — OUTPATIENT (OUTPATIENT)
Dept: OUTPATIENT SERVICES | Facility: HOSPITAL | Age: 75
LOS: 1 days | End: 2025-02-14

## 2025-02-14 VITALS
WEIGHT: 216.05 LBS | SYSTOLIC BLOOD PRESSURE: 117 MMHG | RESPIRATION RATE: 16 BRPM | OXYGEN SATURATION: 97 % | HEIGHT: 73 IN | HEART RATE: 64 BPM | DIASTOLIC BLOOD PRESSURE: 78 MMHG | TEMPERATURE: 99 F

## 2025-02-14 DIAGNOSIS — E03.9 HYPOTHYROIDISM, UNSPECIFIED: ICD-10-CM

## 2025-02-14 DIAGNOSIS — Z98.890 OTHER SPECIFIED POSTPROCEDURAL STATES: Chronic | ICD-10-CM

## 2025-02-14 DIAGNOSIS — C65.9 MALIGNANT NEOPLASM OF UNSPECIFIED RENAL PELVIS: ICD-10-CM

## 2025-02-14 DIAGNOSIS — Z90.5 ACQUIRED ABSENCE OF KIDNEY: Chronic | ICD-10-CM

## 2025-02-14 DIAGNOSIS — Z90.2 ACQUIRED ABSENCE OF LUNG [PART OF]: Chronic | ICD-10-CM

## 2025-02-14 DIAGNOSIS — E11.9 TYPE 2 DIABETES MELLITUS WITHOUT COMPLICATIONS: ICD-10-CM

## 2025-02-14 RX ORDER — SODIUM CHLORIDE 9 G/1000ML
1000 INJECTION, SOLUTION INTRAVENOUS
Refills: 0 | Status: ACTIVE | OUTPATIENT
Start: 2025-02-20 | End: 2026-01-19

## 2025-02-14 RX ORDER — DEXTROSE 50 % IN WATER 50 %
25 SYRINGE (ML) INTRAVENOUS ONCE
Refills: 0 | Status: ACTIVE | OUTPATIENT
Start: 2025-02-20

## 2025-02-14 RX ORDER — DEXTROSE 50 % IN WATER 50 %
15 SYRINGE (ML) INTRAVENOUS ONCE
Refills: 0 | Status: ACTIVE | OUTPATIENT
Start: 2025-02-20

## 2025-02-14 RX ORDER — AMOXICILLIN AND CLAVULANATE POTASSIUM 875; 125 MG/1; MG/1
875-125 TABLET, COATED ORAL
Qty: 10 | Refills: 0 | Status: ACTIVE | COMMUNITY
Start: 2025-02-14 | End: 1900-01-01

## 2025-02-14 RX ORDER — GLUCAGON 3 MG/1
1 POWDER NASAL ONCE
Refills: 0 | Status: ACTIVE | OUTPATIENT
Start: 2025-02-20

## 2025-02-14 RX ORDER — DEXTROSE 50 % IN WATER 50 %
12.5 SYRINGE (ML) INTRAVENOUS ONCE
Refills: 0 | Status: ACTIVE | OUTPATIENT
Start: 2025-02-20

## 2025-02-14 NOTE — H&P PST ADULT - NSICDXPASTMEDICALHX_GEN_ALL_CORE_FT
PAST MEDICAL HISTORY:  GAMAL (acute kidney injury)     Ascending aortic aneurysm     BPH without obstruction/lower urinary tract symptoms     COVID-19 12/2020 loss of smell no hosp    Diabetes     Hearing loss     History of iron deficiency     History of low back pain     HTN (hypertension)     Hydronephrosis     Hypothyroid     Lumbar vertebral fracture     Lung cancer     Malignant neoplasm of bladder, unspecified     Malignant neoplasm of urinary organ     Obesity     Renal cancer     Thoracic aortic aneurysm

## 2025-02-14 NOTE — H&P PST ADULT - PROBLEM SELECTOR PLAN 4
Delirium Screening:    Pt eligible for alejandra risk screen >=76? YES  Health care proxy paperwork given to patient? Yes  Impaired mobility (ie: uses cane, walker, wheelchair, or assist device)? NO  Known dementia diagnosis? NO  Impaired functional status (METS<4)? NO  Malnutrition BMI <20? NO

## 2025-02-14 NOTE — H&P PST ADULT - HEART RATE (BEATS/MIN)
64 Complex Repair And Double Advancement Flap Text: The defect edges were debeveled with a #15 scalpel blade.  The primary defect was closed partially with a complex linear closure.  Given the location of the remaining defect, shape of the defect and the proximity to free margins a double advancement flap was deemed most appropriate for complete closure of the defect.  Using a sterile surgical marker, an appropriate advancement flap was drawn incorporating the defect and placing the expected incisions within the relaxed skin tension lines where possible.    The area thus outlined was incised deep to adipose tissue with a #15 scalpel blade.  The skin margins were undermined to an appropriate distance in all directions utilizing iris scissors.

## 2025-02-14 NOTE — H&P PST ADULT - LAST ECHOCARDIOGRAM
8/2023 - EF 60-65% The aortic root at the sinuses of Valsalva diameter is dilated, measuring 4.20 cm (indexed 1.96 cm/m²). The ascending aorta diameter is dilated, measuring 4.00 cm (indexed 1.87 cm/m²) ([facility])

## 2025-02-14 NOTE — H&P PST ADULT - NSICDXPASTSURGICALHX_GEN_ALL_CORE_FT
PAST SURGICAL HISTORY:  H/O arthroscopy of knee     H/O carpal tunnel repair     H/O cystoscopy     History of biopsy of bladder     History of removal of ureteral stent     S/p nephrectomy right;  05/2022    S/P partial lobectomy of lung left upper;  08/04/2021

## 2025-02-14 NOTE — H&P PST ADULT - ENDOCRINE COMMENTS
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

## 2025-02-14 NOTE — H&P PST ADULT - HISTORY OF PRESENT ILLNESS
74 year old male with hx of  h/o HTN, Hypothyroidism, Lung cancer s/p LORRIE lobectomy (2021), Renal cancer s/p right nephrectomy (2022),  Vertebral fracture, Thoracic aortic aneurysm, AAA ( last CT abd/pelvis 12/23 resulted 4.7cm, CT Chest resulted with  unchanged 4.7 cm ascending aorta) , bladder cancer had  CT scan May 2023 showed moderate to severe left hydroureteronephrosis and left ureter neoplasm, s/p stent placement in 09/2023 and removed few months after presents to PST today with pre op dx of malignant neoplasm of bladder, unspecified is scheduled for cystoscopy, left ureteroscopy, possible ablation of tumor, possible transurethral resection of bladder lesion.     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 Pt presnets today for presurgical evaluation for Cystoscopy, Right Ureteroscopy, Diagnostic, Possible Laser Ablation and Possible Stent Placement.    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 Pt presents today for presurgical evaluation for Cystoscopy, Right Ureteroscopy, Diagnostic, Possible Laser Ablation and Possible Stent Placement.

## 2025-02-14 NOTE — H&P PST ADULT - PROBLEM SELECTOR PLAN 3
Pt instructed to hold novolog the morning of surgery. Pt was instructed by PCP to take half dose of Tresiba the night before surgery (pt will take 9 units).

## 2025-02-14 NOTE — H&P PST ADULT - PROBLEM SELECTOR PLAN 1
Pre-op instructions provided. Pt verbalized understanding.   Pepcid provided for GI prophylaxis.   CBC, BMP from 1/29/25 in [facility] Pre-op instructions provided. Pt verbalized understanding.   Pepcid provided for GI prophylaxis.   CBC, BMP from 1/29/25 reviewed- in [place] [facility].  HgA1c from 10/2024 was 8.2.  Urine culture done at [facility].

## 2025-02-15 PROBLEM — C64.9 MALIGNANT NEOPLASM OF UNSPECIFIED KIDNEY, EXCEPT RENAL PELVIS: Chronic | Status: INACTIVE | Noted: 2022-05-09 | Resolved: 2025-02-14

## 2025-02-15 PROBLEM — R91.1 SOLITARY PULMONARY NODULE: Chronic | Status: INACTIVE | Noted: 2021-07-27 | Resolved: 2025-02-14

## 2025-02-15 LAB
CULTURE RESULTS: SIGNIFICANT CHANGE UP
SPECIMEN SOURCE: SIGNIFICANT CHANGE UP

## 2025-02-18 ENCOUNTER — APPOINTMENT (OUTPATIENT)
Dept: ENDOCRINOLOGY | Facility: CLINIC | Age: 75
End: 2025-02-18
Payer: MEDICARE

## 2025-02-18 VITALS
OXYGEN SATURATION: 97 % | TEMPERATURE: 98 F | HEART RATE: 59 BPM | DIASTOLIC BLOOD PRESSURE: 73 MMHG | SYSTOLIC BLOOD PRESSURE: 132 MMHG

## 2025-02-18 DIAGNOSIS — E03.9 HYPOTHYROIDISM, UNSPECIFIED: ICD-10-CM

## 2025-02-18 DIAGNOSIS — E11.9 TYPE 2 DIABETES MELLITUS W/OUT COMPLICATIONS: ICD-10-CM

## 2025-02-18 DIAGNOSIS — E10.9 TYPE 1 DIABETES MELLITUS W/OUT COMPLICATIONS: ICD-10-CM

## 2025-02-18 DIAGNOSIS — Z92.89 PERSONAL HISTORY OF OTHER MEDICAL TREATMENT: ICD-10-CM

## 2025-02-18 DIAGNOSIS — E78.5 HYPERLIPIDEMIA, UNSPECIFIED: ICD-10-CM

## 2025-02-18 PROCEDURE — 99215 OFFICE O/P EST HI 40 MIN: CPT

## 2025-02-18 PROCEDURE — 95251 CONT GLUC MNTR ANALYSIS I&R: CPT

## 2025-02-19 ENCOUNTER — APPOINTMENT (OUTPATIENT)
Dept: HEMATOLOGY ONCOLOGY | Facility: CLINIC | Age: 75
End: 2025-02-19
Payer: MEDICARE

## 2025-02-19 ENCOUNTER — RESULT REVIEW (OUTPATIENT)
Age: 75
End: 2025-02-19

## 2025-02-19 ENCOUNTER — APPOINTMENT (OUTPATIENT)
Dept: INFUSION THERAPY | Facility: HOSPITAL | Age: 75
End: 2025-02-19

## 2025-02-19 VITALS
BODY MASS INDEX: 28.89 KG/M2 | RESPIRATION RATE: 18 BRPM | WEIGHT: 217.99 LBS | SYSTOLIC BLOOD PRESSURE: 153 MMHG | HEIGHT: 73 IN | HEART RATE: 67 BPM | OXYGEN SATURATION: 99 % | TEMPERATURE: 97.3 F | DIASTOLIC BLOOD PRESSURE: 79 MMHG

## 2025-02-19 DIAGNOSIS — G62.9 POLYNEUROPATHY, UNSPECIFIED: ICD-10-CM

## 2025-02-19 DIAGNOSIS — C66.2 MALIGNANT NEOPLASM OF LEFT URETER: ICD-10-CM

## 2025-02-19 LAB
ALBUMIN SERPL ELPH-MCNC: 3.9 G/DL — SIGNIFICANT CHANGE UP (ref 3.3–5)
ALP SERPL-CCNC: 80 U/L — SIGNIFICANT CHANGE UP (ref 40–120)
ALT FLD-CCNC: 7 U/L — LOW (ref 10–45)
ANION GAP SERPL CALC-SCNC: 13 MMOL/L — SIGNIFICANT CHANGE UP (ref 5–17)
AST SERPL-CCNC: 24 U/L — SIGNIFICANT CHANGE UP (ref 10–40)
BASOPHILS # BLD AUTO: 0.06 K/UL — SIGNIFICANT CHANGE UP (ref 0–0.2)
BASOPHILS NFR BLD AUTO: 0.8 % — SIGNIFICANT CHANGE UP (ref 0–2)
BILIRUB SERPL-MCNC: 0.6 MG/DL — SIGNIFICANT CHANGE UP (ref 0.2–1.2)
BUN SERPL-MCNC: 19 MG/DL — SIGNIFICANT CHANGE UP (ref 7–23)
CALCIUM SERPL-MCNC: 9 MG/DL — SIGNIFICANT CHANGE UP (ref 8.4–10.5)
CHLORIDE SERPL-SCNC: 101 MMOL/L — SIGNIFICANT CHANGE UP (ref 96–108)
CO2 SERPL-SCNC: 22 MMOL/L — SIGNIFICANT CHANGE UP (ref 22–31)
CREAT SERPL-MCNC: 1.13 MG/DL — SIGNIFICANT CHANGE UP (ref 0.5–1.3)
EGFR: 67 ML/MIN/1.73M2 — SIGNIFICANT CHANGE UP
EOSINOPHIL # BLD AUTO: 0.27 K/UL — SIGNIFICANT CHANGE UP (ref 0–0.5)
EOSINOPHIL NFR BLD AUTO: 3.4 % — SIGNIFICANT CHANGE UP (ref 0–6)
GLUCOSE SERPL-MCNC: 173 MG/DL — HIGH (ref 70–99)
HCT VFR BLD CALC: 38.9 % — LOW (ref 39–50)
HGB BLD-MCNC: 13 G/DL — SIGNIFICANT CHANGE UP (ref 13–17)
IMM GRANULOCYTES NFR BLD AUTO: 0.3 % — SIGNIFICANT CHANGE UP (ref 0–0.9)
LYMPHOCYTES # BLD AUTO: 1.23 K/UL — SIGNIFICANT CHANGE UP (ref 1–3.3)
LYMPHOCYTES # BLD AUTO: 15.7 % — SIGNIFICANT CHANGE UP (ref 13–44)
MCHC RBC-ENTMCNC: 32.2 PG — SIGNIFICANT CHANGE UP (ref 27–34)
MCHC RBC-ENTMCNC: 33.4 G/DL — SIGNIFICANT CHANGE UP (ref 32–36)
MCV RBC AUTO: 96.3 FL — SIGNIFICANT CHANGE UP (ref 80–100)
MONOCYTES # BLD AUTO: 0.68 K/UL — SIGNIFICANT CHANGE UP (ref 0–0.9)
MONOCYTES NFR BLD AUTO: 8.7 % — SIGNIFICANT CHANGE UP (ref 2–14)
NEUTROPHILS # BLD AUTO: 5.58 K/UL — SIGNIFICANT CHANGE UP (ref 1.8–7.4)
NEUTROPHILS NFR BLD AUTO: 71.1 % — SIGNIFICANT CHANGE UP (ref 43–77)
NRBC BLD AUTO-RTO: 0 /100 WBCS — SIGNIFICANT CHANGE UP (ref 0–0)
PLATELET # BLD AUTO: 156 K/UL — SIGNIFICANT CHANGE UP (ref 150–400)
POTASSIUM SERPL-MCNC: 4.7 MMOL/L — SIGNIFICANT CHANGE UP (ref 3.5–5.3)
POTASSIUM SERPL-SCNC: 4.7 MMOL/L — SIGNIFICANT CHANGE UP (ref 3.5–5.3)
PROT SERPL-MCNC: 7.1 G/DL — SIGNIFICANT CHANGE UP (ref 6–8.3)
RBC # BLD: 4.04 M/UL — LOW (ref 4.2–5.8)
RBC # FLD: 11.9 % — SIGNIFICANT CHANGE UP (ref 10.3–14.5)
SODIUM SERPL-SCNC: 136 MMOL/L — SIGNIFICANT CHANGE UP (ref 135–145)
T4 FREE SERPL-MCNC: 1.7 NG/DL — SIGNIFICANT CHANGE UP (ref 0.9–1.7)
TSH SERPL-MCNC: 1.38 UIU/ML — SIGNIFICANT CHANGE UP (ref 0.27–4.2)
WBC # BLD: 7.84 K/UL — SIGNIFICANT CHANGE UP (ref 3.8–10.5)
WBC # FLD AUTO: 7.84 K/UL — SIGNIFICANT CHANGE UP (ref 3.8–10.5)

## 2025-02-19 PROCEDURE — 99214 OFFICE O/P EST MOD 30 MIN: CPT

## 2025-02-19 PROCEDURE — G2211 COMPLEX E/M VISIT ADD ON: CPT

## 2025-02-19 NOTE — ASU PATIENT PROFILE, ADULT - FALL HARM RISK - UNIVERSAL INTERVENTIONS
Bed in lowest position, wheels locked, appropriate side rails in place/Call bell, personal items and telephone in reach/Instruct patient to call for assistance before getting out of bed or chair/Non-slip footwear when patient is out of bed/[place] to call system/Physically safe environment - no spills, clutter or unnecessary equipment/Purposeful Proactive Rounding/Room/bathroom lighting operational, light cord in reach

## 2025-02-20 ENCOUNTER — APPOINTMENT (OUTPATIENT)
Dept: UROLOGY | Facility: HOSPITAL | Age: 75
End: 2025-02-20

## 2025-02-20 ENCOUNTER — OUTPATIENT (OUTPATIENT)
Dept: INPATIENT UNIT | Facility: HOSPITAL | Age: 75
LOS: 1 days | Discharge: HOME | End: 2025-02-20
Payer: MEDICARE

## 2025-02-20 ENCOUNTER — TRANSCRIPTION ENCOUNTER (OUTPATIENT)
Age: 75
End: 2025-02-20

## 2025-02-20 ENCOUNTER — RESULT REVIEW (OUTPATIENT)
Age: 75
End: 2025-02-20

## 2025-02-20 VITALS
HEART RATE: 63 BPM | RESPIRATION RATE: 16 BRPM | TEMPERATURE: 97 F | SYSTOLIC BLOOD PRESSURE: 150 MMHG | OXYGEN SATURATION: 97 % | WEIGHT: 216.05 LBS | HEIGHT: 73 IN | DIASTOLIC BLOOD PRESSURE: 70 MMHG

## 2025-02-20 VITALS
OXYGEN SATURATION: 95 % | SYSTOLIC BLOOD PRESSURE: 150 MMHG | DIASTOLIC BLOOD PRESSURE: 74 MMHG | RESPIRATION RATE: 16 BRPM | HEART RATE: 68 BPM

## 2025-02-20 DIAGNOSIS — Z90.5 ACQUIRED ABSENCE OF KIDNEY: Chronic | ICD-10-CM

## 2025-02-20 DIAGNOSIS — Z98.890 OTHER SPECIFIED POSTPROCEDURAL STATES: Chronic | ICD-10-CM

## 2025-02-20 DIAGNOSIS — C65.9 MALIGNANT NEOPLASM OF UNSPECIFIED RENAL PELVIS: ICD-10-CM

## 2025-02-20 DIAGNOSIS — Z90.2 ACQUIRED ABSENCE OF LUNG [PART OF]: Chronic | ICD-10-CM

## 2025-02-20 LAB
GLUCOSE BLDC GLUCOMTR-MCNC: 239 MG/DL — HIGH (ref 70–99)
GLUCOSE BLDC GLUCOMTR-MCNC: 288 MG/DL — HIGH (ref 70–99)
GLUCOSE BLDC GLUCOMTR-MCNC: 356 MG/DL — HIGH (ref 70–99)
GLUCOSE BLDC GLUCOMTR-MCNC: 385 MG/DL — HIGH (ref 70–99)

## 2025-02-20 PROCEDURE — 52332 CYSTOSCOPY AND TREATMENT: CPT | Mod: LT,59

## 2025-02-20 PROCEDURE — 88305 TISSUE EXAM BY PATHOLOGIST: CPT | Mod: 26

## 2025-02-20 PROCEDURE — 74420 UROGRAPHY RTRGR +-KUB: CPT | Mod: 26

## 2025-02-20 PROCEDURE — 52354 CYSTOURETERO W/BIOPSY: CPT | Mod: LT,59

## 2025-02-20 PROCEDURE — 88112 CYTOPATH CELL ENHANCE TECH: CPT | Mod: 26

## 2025-02-20 PROCEDURE — 52240 CYSTOSCOPY AND TREATMENT: CPT | Mod: 59

## 2025-02-20 DEVICE — URETERAL SHEATH NAVIGATOR HD 12/14FR X 36CM: Type: IMPLANTABLE DEVICE | Site: LEFT | Status: FUNCTIONAL

## 2025-02-20 DEVICE — URETERAL SHEATH NAVIGATOR HD 12/14FR X 46CM: Type: IMPLANTABLE DEVICE | Site: LEFT | Status: FUNCTIONAL

## 2025-02-20 DEVICE — LASER FIBER FLEXIVA TRACTIP 242: Type: IMPLANTABLE DEVICE | Site: LEFT | Status: FUNCTIONAL

## 2025-02-20 DEVICE — STENT URET 7FRX28: Type: IMPLANTABLE DEVICE | Site: LEFT | Status: FUNCTIONAL

## 2025-02-20 DEVICE — URETERAL CATH DUAL LUMEN 10FR: Type: IMPLANTABLE DEVICE | Site: LEFT | Status: FUNCTIONAL

## 2025-02-20 DEVICE — URETERAL CATH OPEN END 5FR 70CM: Type: IMPLANTABLE DEVICE | Site: LEFT | Status: FUNCTIONAL

## 2025-02-20 DEVICE — STONE BASKET ZEROTIP NITINOL 4-WIRE 1.9FR 120CM X 12MM: Type: IMPLANTABLE DEVICE | Site: LEFT | Status: FUNCTIONAL

## 2025-02-20 DEVICE — GUIDEWIRE SENSOR DUAL-FLEX NITINOL STRAIGHT .038" X 150CM: Type: IMPLANTABLE DEVICE | Site: LEFT | Status: FUNCTIONAL

## 2025-02-20 RX ORDER — LEVOTHYROXINE SODIUM 25 UG/1
1 TABLET ORAL
Refills: 0 | DISCHARGE

## 2025-02-20 RX ORDER — GEMCITABINE HYDROCHLORIDE 38 MG/ML
2000 INJECTION, SOLUTION INTRAVENOUS ONCE
Refills: 0 | Status: ACTIVE | OUTPATIENT
Start: 2025-02-20

## 2025-02-20 RX ORDER — SULFAMETHOXAZOLE/TRIMETHOPRIM 800-160 MG
1 TABLET ORAL
Qty: 6 | Refills: 0
Start: 2025-02-20 | End: 2025-02-22

## 2025-02-20 RX ORDER — FENTANYL CITRATE-0.9 % NACL/PF 100MCG/2ML
50 SYRINGE (ML) INTRAVENOUS
Refills: 0 | Status: DISCONTINUED | OUTPATIENT
Start: 2025-02-20 | End: 2025-02-20

## 2025-02-20 RX ORDER — SODIUM CHLORIDE 9 G/1000ML
1000 INJECTION, SOLUTION INTRAVENOUS
Refills: 0 | Status: ACTIVE | OUTPATIENT
Start: 2025-02-20 | End: 2026-01-19

## 2025-02-20 RX ORDER — FENTANYL CITRATE-0.9 % NACL/PF 100MCG/2ML
25 SYRINGE (ML) INTRAVENOUS
Refills: 0 | Status: DISCONTINUED | OUTPATIENT
Start: 2025-02-20 | End: 2025-02-20

## 2025-02-20 RX ORDER — TAMSULOSIN HYDROCHLORIDE 0.4 MG/1
1 CAPSULE ORAL
Qty: 30 | Refills: 0
Start: 2025-02-20 | End: 2025-03-21

## 2025-02-20 RX ORDER — ONDANSETRON HCL/PF 4 MG/2 ML
4 VIAL (ML) INJECTION ONCE
Refills: 0 | Status: ACTIVE | OUTPATIENT
Start: 2025-02-20 | End: 2026-01-19

## 2025-02-20 RX ORDER — SODIUM CHLORIDE 9 G/1000ML
1000 INJECTION, SOLUTION INTRAVENOUS
Refills: 0 | Status: DISCONTINUED | OUTPATIENT
Start: 2025-02-20 | End: 2025-02-20

## 2025-02-20 RX ORDER — OXYCODONE HYDROCHLORIDE 30 MG/1
2 TABLET ORAL
Refills: 0 | DISCHARGE

## 2025-02-20 RX ORDER — MAGNESIUM OXIDE 400 MG
1 TABLET ORAL
Refills: 0 | DISCHARGE

## 2025-02-20 RX ORDER — INSULIN ASPART 100 [IU]/ML
0 INJECTION, SOLUTION INTRAVENOUS; SUBCUTANEOUS
Refills: 0 | DISCHARGE

## 2025-02-20 RX ORDER — INSULIN DEGLUDEC INJECTION 100 U/ML
19 INJECTION, SOLUTION SUBCUTANEOUS
Refills: 0 | DISCHARGE

## 2025-02-20 NOTE — ASU DISCHARGE PLAN (ADULT/PEDIATRIC) - CARE PROVIDER_API CALL
Mohamud Starkey  Urology  [address] [place] [id number]  Phone: [phone]  Fax: [phone]  Follow Up Time:

## 2025-02-20 NOTE — ASU DISCHARGE PLAN (ADULT/PEDIATRIC) - FINANCIAL ASSISTANCE
[facility] provides services at a reduced cost to those who are determined to be eligible through [facility]’s financial assistance program. Information regarding [facility]’s financial assistance program can be found by going to [email or web address] or by calling [phone].

## 2025-02-20 NOTE — ASU DISCHARGE PLAN (ADULT/PEDIATRIC) - NURSING INSTRUCTIONS
DO NOT take any Tylenol (Acetaminophen) or narcotics containing Tylenol until after  7:30pm______ . You received Tylenol during your operation and it can cause damage to your liver if too much is taken within a 24 hour time period. DO NOT take any Tylenol (Acetaminophen) or narcotics containing Tylenol until after  5:30pm______ . You received Tylenol during your operation and it can cause damage to your liver if too much is taken within a 24 hour time period.

## 2025-02-20 NOTE — ASU DISCHARGE PLAN (ADULT/PEDIATRIC) - ASU DC SPECIAL INSTRUCTIONSFT
Discharge Instructions: TURBT/URS  •	General: It is common to have blood in the urine after your procedure. It may be pink or even red; inform your doctor if you have a significant amount of clot in the urine or if you are unable to void at all or if your catheter stops draining. It is not uncommon to have some burning when you urinate, this will gradually improve. You have a ureteral stent to help with tissue healing and drainage of the kidney. Take flomax daily for stent discomfort. The stent is temporary, and must be eventually removed or it may cause problems with infection and kidney damage if left in place greater than 3 months.  •	Bathing: You may shower or bathe. If going home with [facility], shower only until catheter is removed.  •	Diet: You may resume your regular diet and regular medication regimen.  •	Pain: You may take Tylenol (acetaminophen) 650-975mg and/or Motrin (ibuprofen) 400-600mg, available over the counter, for pain every 6 hours as needed. Do not exceed 4000 milligrams of Tylenol (acetaminophen) daily. You may alternate these medications such that you take either one every 3 hours.  •	Antibiotics: A prescription for an antibiotic has been sent to your pharmacy, please take this medication as instructed and be sure to complete entire course.  •	Stool softeners: Do not allow yourself to become constipated as straining will cause bleeding. Take stool softeners (ex. Colace) or a laxative (ex. Senekot, ExLax), available over the counter, if needed.  •	Activity: No heavy lifting or strenuous exercise until you are evaluated at your post-operative appointment. Otherwise, you may return to your usual level of activity.  •	Follow-up: Dr. Starkey's office will call you with a post-operative appointment.  •	Call your urologist if: You have any bleeding that does not stop, inability to void >8 hours, fever over 100.4 F, chills, persistent nausea/vomiting, or if your pain is not controlled on your discharge pain medications.

## 2025-02-20 NOTE — ASU DISCHARGE PLAN (ADULT/PEDIATRIC) - CALL YOUR DOCTOR IF YOU HAVE ANY OF THE FOLLOWING:
100.4/Fever greater than (need to indicate Fahrenheit or Celsius)/Nausea and vomiting that does not stop/Unable to urinate

## 2025-02-20 NOTE — ASU DISCHARGE PLAN (ADULT/PEDIATRIC) - NS MD DC FALL RISK RISK
For information on Fall & Injury Prevention, visit: [email or web address] OR  [email or web address] OR  https://www.cdc.gov/steadi/patient.html

## 2025-02-21 LAB — NON-GYNECOLOGICAL CYTOLOGY STUDY: SIGNIFICANT CHANGE UP

## 2025-02-26 PROBLEM — C64.9 MALIGNANT NEOPLASM OF UNSPECIFIED KIDNEY, EXCEPT RENAL PELVIS: Chronic | Status: ACTIVE | Noted: 2025-02-14

## 2025-02-26 PROBLEM — E11.9 TYPE 2 DIABETES MELLITUS WITHOUT COMPLICATIONS: Chronic | Status: ACTIVE | Noted: 2025-02-14

## 2025-02-27 ENCOUNTER — OUTPATIENT (OUTPATIENT)
Dept: OUTPATIENT SERVICES | Facility: HOSPITAL | Age: 75
LOS: 1 days | End: 2025-02-27
Payer: MEDICARE

## 2025-02-27 ENCOUNTER — APPOINTMENT (OUTPATIENT)
Dept: UROLOGY | Facility: CLINIC | Age: 75
End: 2025-02-27
Payer: MEDICARE

## 2025-02-27 VITALS — DIASTOLIC BLOOD PRESSURE: 79 MMHG | OXYGEN SATURATION: 100 % | SYSTOLIC BLOOD PRESSURE: 131 MMHG | HEART RATE: 81 BPM

## 2025-02-27 DIAGNOSIS — R35.0 FREQUENCY OF MICTURITION: ICD-10-CM

## 2025-02-27 DIAGNOSIS — Z98.890 OTHER SPECIFIED POSTPROCEDURAL STATES: Chronic | ICD-10-CM

## 2025-02-27 DIAGNOSIS — Z90.5 ACQUIRED ABSENCE OF KIDNEY: Chronic | ICD-10-CM

## 2025-02-27 DIAGNOSIS — Z90.2 ACQUIRED ABSENCE OF LUNG [PART OF]: Chronic | ICD-10-CM

## 2025-02-27 LAB — SURGICAL PATHOLOGY STUDY: SIGNIFICANT CHANGE UP

## 2025-02-27 PROCEDURE — 52310 CYSTOSCOPY AND TREATMENT: CPT

## 2025-02-28 DIAGNOSIS — C67.9 MALIGNANT NEOPLASM OF BLADDER, UNSPECIFIED: ICD-10-CM

## 2025-03-03 ENCOUNTER — APPOINTMENT (OUTPATIENT)
Dept: UROLOGY | Facility: CLINIC | Age: 75
End: 2025-03-03

## 2025-03-04 ENCOUNTER — RX RENEWAL (OUTPATIENT)
Age: 75
End: 2025-03-04

## 2025-03-05 ENCOUNTER — APPOINTMENT (OUTPATIENT)
Dept: UROLOGY | Facility: CLINIC | Age: 75
End: 2025-03-05
Payer: MEDICARE

## 2025-03-05 VITALS
SYSTOLIC BLOOD PRESSURE: 132 MMHG | HEIGHT: 73 IN | DIASTOLIC BLOOD PRESSURE: 76 MMHG | WEIGHT: 210 LBS | BODY MASS INDEX: 27.83 KG/M2 | OXYGEN SATURATION: 98 % | RESPIRATION RATE: 18 BRPM | HEART RATE: 72 BPM

## 2025-03-05 DIAGNOSIS — C66.2 MALIGNANT NEOPLASM OF LEFT URETER: ICD-10-CM

## 2025-03-05 DIAGNOSIS — R31.29 OTHER MICROSCOPIC HEMATURIA: ICD-10-CM

## 2025-03-05 DIAGNOSIS — Z87.448 PERSONAL HISTORY OF OTHER DISEASES OF URINARY SYSTEM: ICD-10-CM

## 2025-03-05 DIAGNOSIS — C64.9 MALIGNANT NEOPLASM OF UNSPECIFIED KIDNEY, EXCEPT RENAL PELVIS: ICD-10-CM

## 2025-03-05 DIAGNOSIS — N28.89 OTHER SPECIFIED DISORDERS OF KIDNEY AND URETER: ICD-10-CM

## 2025-03-05 DIAGNOSIS — D49.59 NEOPLASM UNSPECIFIED BEHAVIOR OF OTHER GENITOURINARY ORGAN: ICD-10-CM

## 2025-03-05 DIAGNOSIS — N13.8 BENIGN PROSTATIC HYPERPLASIA WITH LOWER URINARY TRACT SYMPMS: ICD-10-CM

## 2025-03-05 DIAGNOSIS — N40.1 BENIGN PROSTATIC HYPERPLASIA WITH LOWER URINARY TRACT SYMPMS: ICD-10-CM

## 2025-03-05 DIAGNOSIS — Z85.53 PERSONAL HISTORY OF MALIGNANT NEOPLASM OF RENAL PELVIS: ICD-10-CM

## 2025-03-05 DIAGNOSIS — Z86.03 PERSONAL HISTORY OF NEOPLASM OF UNCERTAIN BEHAVIOR: ICD-10-CM

## 2025-03-05 DIAGNOSIS — C65.9 MALIGNANT NEOPLASM OF UNSPECIFIED RENAL PELVIS: ICD-10-CM

## 2025-03-05 DIAGNOSIS — C67.9 MALIGNANT NEOPLASM OF BLADDER, UNSPECIFIED: ICD-10-CM

## 2025-03-05 PROBLEM — C65.2 MALIGNANT NEOPLASM OF LEFT RENAL PELVIS: Status: ACTIVE | Noted: 2025-03-05

## 2025-03-05 PROCEDURE — G2211 COMPLEX E/M VISIT ADD ON: CPT

## 2025-03-05 PROCEDURE — 99215 OFFICE O/P EST HI 40 MIN: CPT

## 2025-03-07 LAB — URINE CYTOLOGY: NORMAL

## 2025-03-10 ENCOUNTER — OUTPATIENT (OUTPATIENT)
Dept: OUTPATIENT SERVICES | Facility: HOSPITAL | Age: 75
LOS: 1 days | Discharge: HOME | End: 2025-03-10

## 2025-03-10 DIAGNOSIS — Z98.890 OTHER SPECIFIED POSTPROCEDURAL STATES: Chronic | ICD-10-CM

## 2025-03-10 DIAGNOSIS — Z90.5 ACQUIRED ABSENCE OF KIDNEY: Chronic | ICD-10-CM

## 2025-03-10 DIAGNOSIS — Z90.2 ACQUIRED ABSENCE OF LUNG [PART OF]: Chronic | ICD-10-CM

## 2025-03-10 DIAGNOSIS — C80.1 MALIGNANT (PRIMARY) NEOPLASM, UNSPECIFIED: ICD-10-CM

## 2025-03-12 ENCOUNTER — APPOINTMENT (OUTPATIENT)
Dept: HEMATOLOGY ONCOLOGY | Facility: CLINIC | Age: 75
End: 2025-03-12
Payer: MEDICARE

## 2025-03-12 ENCOUNTER — RESULT REVIEW (OUTPATIENT)
Age: 75
End: 2025-03-12

## 2025-03-12 ENCOUNTER — APPOINTMENT (OUTPATIENT)
Dept: INFUSION THERAPY | Facility: HOSPITAL | Age: 75
End: 2025-03-12

## 2025-03-12 VITALS
DIASTOLIC BLOOD PRESSURE: 77 MMHG | BODY MASS INDEX: 28.28 KG/M2 | HEART RATE: 68 BPM | SYSTOLIC BLOOD PRESSURE: 130 MMHG | HEIGHT: 73 IN | OXYGEN SATURATION: 99 % | RESPIRATION RATE: 18 BRPM | WEIGHT: 213.39 LBS | TEMPERATURE: 97.9 F

## 2025-03-12 DIAGNOSIS — C65.2 MALIGNANT NEOPLASM OF LEFT RENAL PELVIS: ICD-10-CM

## 2025-03-12 LAB
ALBUMIN SERPL ELPH-MCNC: 3.5 G/DL — SIGNIFICANT CHANGE UP (ref 3.3–5)
ALP SERPL-CCNC: 80 U/L — SIGNIFICANT CHANGE UP (ref 40–120)
ALT FLD-CCNC: 5 U/L — LOW (ref 10–45)
ANION GAP SERPL CALC-SCNC: 8 MMOL/L — SIGNIFICANT CHANGE UP (ref 5–17)
AST SERPL-CCNC: 24 U/L — SIGNIFICANT CHANGE UP (ref 10–40)
BASOPHILS # BLD AUTO: 0.08 K/UL — SIGNIFICANT CHANGE UP (ref 0–0.2)
BASOPHILS NFR BLD AUTO: 1.3 % — SIGNIFICANT CHANGE UP (ref 0–2)
BILIRUB SERPL-MCNC: 0.3 MG/DL — SIGNIFICANT CHANGE UP (ref 0.2–1.2)
BUN SERPL-MCNC: 17 MG/DL — SIGNIFICANT CHANGE UP (ref 7–23)
CALCIUM SERPL-MCNC: 8.8 MG/DL — SIGNIFICANT CHANGE UP (ref 8.4–10.5)
CHLORIDE SERPL-SCNC: 105 MMOL/L — SIGNIFICANT CHANGE UP (ref 96–108)
CO2 SERPL-SCNC: 24 MMOL/L — SIGNIFICANT CHANGE UP (ref 22–31)
CREAT SERPL-MCNC: 1.13 MG/DL — SIGNIFICANT CHANGE UP (ref 0.5–1.3)
EGFR: 67 ML/MIN/1.73M2 — SIGNIFICANT CHANGE UP
EGFR: 67 ML/MIN/1.73M2 — SIGNIFICANT CHANGE UP
EOSINOPHIL # BLD AUTO: 0.36 K/UL — SIGNIFICANT CHANGE UP (ref 0–0.5)
EOSINOPHIL NFR BLD AUTO: 5.8 % — SIGNIFICANT CHANGE UP (ref 0–6)
GLUCOSE SERPL-MCNC: 178 MG/DL — HIGH (ref 70–99)
HCT VFR BLD CALC: 35 % — LOW (ref 39–50)
HGB BLD-MCNC: 11.6 G/DL — LOW (ref 13–17)
IMM GRANULOCYTES NFR BLD AUTO: 0.6 % — SIGNIFICANT CHANGE UP (ref 0–0.9)
LYMPHOCYTES # BLD AUTO: 1.28 K/UL — SIGNIFICANT CHANGE UP (ref 1–3.3)
LYMPHOCYTES # BLD AUTO: 20.7 % — SIGNIFICANT CHANGE UP (ref 13–44)
MCHC RBC-ENTMCNC: 32.3 PG — SIGNIFICANT CHANGE UP (ref 27–34)
MCHC RBC-ENTMCNC: 33.1 G/DL — SIGNIFICANT CHANGE UP (ref 32–36)
MCV RBC AUTO: 97.5 FL — SIGNIFICANT CHANGE UP (ref 80–100)
MONOCYTES # BLD AUTO: 0.7 K/UL — SIGNIFICANT CHANGE UP (ref 0–0.9)
MONOCYTES NFR BLD AUTO: 11.3 % — SIGNIFICANT CHANGE UP (ref 2–14)
NEUTROPHILS # BLD AUTO: 3.71 K/UL — SIGNIFICANT CHANGE UP (ref 1.8–7.4)
NEUTROPHILS NFR BLD AUTO: 60.3 % — SIGNIFICANT CHANGE UP (ref 43–77)
NRBC BLD AUTO-RTO: 0 /100 WBCS — SIGNIFICANT CHANGE UP (ref 0–0)
PLATELET # BLD AUTO: 353 K/UL — SIGNIFICANT CHANGE UP (ref 150–400)
POTASSIUM SERPL-MCNC: 4.1 MMOL/L — SIGNIFICANT CHANGE UP (ref 3.5–5.3)
POTASSIUM SERPL-SCNC: 4.1 MMOL/L — SIGNIFICANT CHANGE UP (ref 3.5–5.3)
PROT SERPL-MCNC: 6.9 G/DL — SIGNIFICANT CHANGE UP (ref 6–8.3)
RBC # BLD: 3.59 M/UL — LOW (ref 4.2–5.8)
RBC # FLD: 12.3 % — SIGNIFICANT CHANGE UP (ref 10.3–14.5)
SODIUM SERPL-SCNC: 137 MMOL/L — SIGNIFICANT CHANGE UP (ref 135–145)
T4 FREE SERPL-MCNC: 1.7 NG/DL — SIGNIFICANT CHANGE UP (ref 0.9–1.7)
TSH SERPL-MCNC: 1.03 UIU/ML — SIGNIFICANT CHANGE UP (ref 0.27–4.2)
WBC # BLD: 6.17 K/UL — SIGNIFICANT CHANGE UP (ref 3.8–10.5)
WBC # FLD AUTO: 6.17 K/UL — SIGNIFICANT CHANGE UP (ref 3.8–10.5)

## 2025-03-12 PROCEDURE — 99214 OFFICE O/P EST MOD 30 MIN: CPT

## 2025-03-13 DIAGNOSIS — Z51.11 ENCOUNTER FOR ANTINEOPLASTIC CHEMOTHERAPY: ICD-10-CM

## 2025-03-13 DIAGNOSIS — C68.9 MALIGNANT NEOPLASM OF URINARY ORGAN, UNSPECIFIED: ICD-10-CM

## 2025-03-28 RX ORDER — INSULIN ASPART 100 [IU]/ML
100 INJECTION, SOLUTION INTRAVENOUS; SUBCUTANEOUS
Qty: 3 | Refills: 3 | Status: ACTIVE | COMMUNITY
Start: 2025-03-28 | End: 1900-01-01

## 2025-04-02 ENCOUNTER — RESULT REVIEW (OUTPATIENT)
Age: 75
End: 2025-04-02

## 2025-04-02 ENCOUNTER — APPOINTMENT (OUTPATIENT)
Dept: HEMATOLOGY ONCOLOGY | Facility: CLINIC | Age: 75
End: 2025-04-02
Payer: MEDICARE

## 2025-04-02 ENCOUNTER — APPOINTMENT (OUTPATIENT)
Dept: INFUSION THERAPY | Facility: HOSPITAL | Age: 75
End: 2025-04-02

## 2025-04-02 VITALS
HEART RATE: 66 BPM | TEMPERATURE: 98 F | HEIGHT: 73 IN | RESPIRATION RATE: 17 BRPM | OXYGEN SATURATION: 98 % | SYSTOLIC BLOOD PRESSURE: 136 MMHG | DIASTOLIC BLOOD PRESSURE: 67 MMHG

## 2025-04-02 DIAGNOSIS — C65.2 MALIGNANT NEOPLASM OF LEFT RENAL PELVIS: ICD-10-CM

## 2025-04-02 DIAGNOSIS — C67.9 MALIGNANT NEOPLASM OF BLADDER, UNSPECIFIED: ICD-10-CM

## 2025-04-02 DIAGNOSIS — Z92.89 PERSONAL HISTORY OF OTHER MEDICAL TREATMENT: ICD-10-CM

## 2025-04-02 LAB
ALBUMIN SERPL ELPH-MCNC: 4 G/DL — SIGNIFICANT CHANGE UP (ref 3.3–5)
ALP SERPL-CCNC: 84 U/L — SIGNIFICANT CHANGE UP (ref 40–120)
ALT FLD-CCNC: 6 U/L — LOW (ref 10–45)
ANION GAP SERPL CALC-SCNC: 10 MMOL/L — SIGNIFICANT CHANGE UP (ref 5–17)
AST SERPL-CCNC: 25 U/L — SIGNIFICANT CHANGE UP (ref 10–40)
BASOPHILS # BLD AUTO: 0.06 K/UL — SIGNIFICANT CHANGE UP (ref 0–0.2)
BASOPHILS NFR BLD AUTO: 0.7 % — SIGNIFICANT CHANGE UP (ref 0–2)
BILIRUB SERPL-MCNC: 0.7 MG/DL — SIGNIFICANT CHANGE UP (ref 0.2–1.2)
BUN SERPL-MCNC: 19 MG/DL — SIGNIFICANT CHANGE UP (ref 7–23)
CALCIUM SERPL-MCNC: 9.3 MG/DL — SIGNIFICANT CHANGE UP (ref 8.4–10.5)
CHLORIDE SERPL-SCNC: 104 MMOL/L — SIGNIFICANT CHANGE UP (ref 96–108)
CO2 SERPL-SCNC: 26 MMOL/L — SIGNIFICANT CHANGE UP (ref 22–31)
CREAT SERPL-MCNC: 1.14 MG/DL — SIGNIFICANT CHANGE UP (ref 0.5–1.3)
EGFR: 67 ML/MIN/1.73M2 — SIGNIFICANT CHANGE UP
EGFR: 67 ML/MIN/1.73M2 — SIGNIFICANT CHANGE UP
EOSINOPHIL # BLD AUTO: 0.64 K/UL — HIGH (ref 0–0.5)
EOSINOPHIL NFR BLD AUTO: 7.7 % — HIGH (ref 0–6)
GLUCOSE SERPL-MCNC: 75 MG/DL — SIGNIFICANT CHANGE UP (ref 70–99)
HCT VFR BLD CALC: 38.3 % — LOW (ref 39–50)
HGB BLD-MCNC: 12.9 G/DL — LOW (ref 13–17)
IMM GRANULOCYTES NFR BLD AUTO: 0.4 % — SIGNIFICANT CHANGE UP (ref 0–0.9)
LYMPHOCYTES # BLD AUTO: 1.47 K/UL — SIGNIFICANT CHANGE UP (ref 1–3.3)
LYMPHOCYTES # BLD AUTO: 17.8 % — SIGNIFICANT CHANGE UP (ref 13–44)
MCHC RBC-ENTMCNC: 32.8 PG — SIGNIFICANT CHANGE UP (ref 27–34)
MCHC RBC-ENTMCNC: 33.7 G/DL — SIGNIFICANT CHANGE UP (ref 32–36)
MCV RBC AUTO: 97.5 FL — SIGNIFICANT CHANGE UP (ref 80–100)
MONOCYTES # BLD AUTO: 0.74 K/UL — SIGNIFICANT CHANGE UP (ref 0–0.9)
MONOCYTES NFR BLD AUTO: 9 % — SIGNIFICANT CHANGE UP (ref 2–14)
NEUTROPHILS # BLD AUTO: 5.32 K/UL — SIGNIFICANT CHANGE UP (ref 1.8–7.4)
NEUTROPHILS NFR BLD AUTO: 64.4 % — SIGNIFICANT CHANGE UP (ref 43–77)
NRBC BLD AUTO-RTO: 0 /100 WBCS — SIGNIFICANT CHANGE UP (ref 0–0)
PLATELET # BLD AUTO: 197 K/UL — SIGNIFICANT CHANGE UP (ref 150–400)
POTASSIUM SERPL-MCNC: 4.1 MMOL/L — SIGNIFICANT CHANGE UP (ref 3.5–5.3)
POTASSIUM SERPL-SCNC: 4.1 MMOL/L — SIGNIFICANT CHANGE UP (ref 3.5–5.3)
PROT SERPL-MCNC: 7.2 G/DL — SIGNIFICANT CHANGE UP (ref 6–8.3)
RBC # BLD: 3.93 M/UL — LOW (ref 4.2–5.8)
RBC # FLD: 13.3 % — SIGNIFICANT CHANGE UP (ref 10.3–14.5)
SODIUM SERPL-SCNC: 140 MMOL/L — SIGNIFICANT CHANGE UP (ref 135–145)
WBC # BLD: 8.26 K/UL — SIGNIFICANT CHANGE UP (ref 3.8–10.5)
WBC # FLD AUTO: 8.26 K/UL — SIGNIFICANT CHANGE UP (ref 3.8–10.5)

## 2025-04-02 PROCEDURE — 99214 OFFICE O/P EST MOD 30 MIN: CPT

## 2025-04-02 PROCEDURE — G2211 COMPLEX E/M VISIT ADD ON: CPT

## 2025-04-03 LAB
T4 FREE SERPL-MCNC: 1.6 NG/DL — SIGNIFICANT CHANGE UP (ref 0.9–1.7)
TSH SERPL-MCNC: 0.88 UIU/ML — SIGNIFICANT CHANGE UP (ref 0.27–4.2)

## 2025-04-14 RX ORDER — INSULIN LISPRO 100 [IU]/ML
100 INJECTION, SOLUTION INTRAVENOUS; SUBCUTANEOUS
Qty: 3 | Refills: 2 | Status: ACTIVE | COMMUNITY
Start: 2025-04-11 | End: 1900-01-01

## 2025-04-18 ENCOUNTER — APPOINTMENT (OUTPATIENT)
Dept: ENDOCRINOLOGY | Facility: CLINIC | Age: 75
End: 2025-04-18

## 2025-04-18 VITALS
TEMPERATURE: 98.1 F | SYSTOLIC BLOOD PRESSURE: 112 MMHG | HEART RATE: 63 BPM | OXYGEN SATURATION: 97 % | DIASTOLIC BLOOD PRESSURE: 64 MMHG | BODY MASS INDEX: 28.23 KG/M2 | WEIGHT: 214 LBS

## 2025-04-18 DIAGNOSIS — E10.9 TYPE 1 DIABETES MELLITUS W/OUT COMPLICATIONS: ICD-10-CM

## 2025-04-18 DIAGNOSIS — E03.9 HYPOTHYROIDISM, UNSPECIFIED: ICD-10-CM

## 2025-04-18 PROCEDURE — G2211 COMPLEX E/M VISIT ADD ON: CPT

## 2025-04-18 PROCEDURE — 99215 OFFICE O/P EST HI 40 MIN: CPT

## 2025-04-18 PROCEDURE — 95251 CONT GLUC MNTR ANALYSIS I&R: CPT

## 2025-04-23 ENCOUNTER — RESULT REVIEW (OUTPATIENT)
Age: 75
End: 2025-04-23

## 2025-04-23 ENCOUNTER — APPOINTMENT (OUTPATIENT)
Dept: HEMATOLOGY ONCOLOGY | Facility: CLINIC | Age: 75
End: 2025-04-23
Payer: MEDICARE

## 2025-04-23 ENCOUNTER — APPOINTMENT (OUTPATIENT)
Dept: INFUSION THERAPY | Facility: HOSPITAL | Age: 75
End: 2025-04-23

## 2025-04-23 VITALS
DIASTOLIC BLOOD PRESSURE: 87 MMHG | HEART RATE: 65 BPM | TEMPERATURE: 97.5 F | SYSTOLIC BLOOD PRESSURE: 155 MMHG | WEIGHT: 220.9 LBS | RESPIRATION RATE: 16 BRPM | BODY MASS INDEX: 29.14 KG/M2 | OXYGEN SATURATION: 98 %

## 2025-04-23 DIAGNOSIS — Z92.89 PERSONAL HISTORY OF OTHER MEDICAL TREATMENT: ICD-10-CM

## 2025-04-23 DIAGNOSIS — C65.2 MALIGNANT NEOPLASM OF LEFT RENAL PELVIS: ICD-10-CM

## 2025-04-23 LAB
ALBUMIN SERPL ELPH-MCNC: 4 G/DL — SIGNIFICANT CHANGE UP (ref 3.3–5)
ALP SERPL-CCNC: 84 U/L — SIGNIFICANT CHANGE UP (ref 40–120)
ALT FLD-CCNC: 7 U/L — LOW (ref 10–45)
ANION GAP SERPL CALC-SCNC: 10 MMOL/L — SIGNIFICANT CHANGE UP (ref 5–17)
AST SERPL-CCNC: 21 U/L — SIGNIFICANT CHANGE UP (ref 10–40)
BASOPHILS # BLD AUTO: 0.06 K/UL — SIGNIFICANT CHANGE UP (ref 0–0.2)
BASOPHILS NFR BLD AUTO: 0.8 % — SIGNIFICANT CHANGE UP (ref 0–2)
BILIRUB SERPL-MCNC: 0.6 MG/DL — SIGNIFICANT CHANGE UP (ref 0.2–1.2)
BUN SERPL-MCNC: 18 MG/DL — SIGNIFICANT CHANGE UP (ref 7–23)
CALCIUM SERPL-MCNC: 8.9 MG/DL — SIGNIFICANT CHANGE UP (ref 8.4–10.5)
CHLORIDE SERPL-SCNC: 101 MMOL/L — SIGNIFICANT CHANGE UP (ref 96–108)
CO2 SERPL-SCNC: 25 MMOL/L — SIGNIFICANT CHANGE UP (ref 22–31)
CREAT SERPL-MCNC: 1.07 MG/DL — SIGNIFICANT CHANGE UP (ref 0.5–1.3)
EGFR: 72 ML/MIN/1.73M2 — SIGNIFICANT CHANGE UP
EGFR: 72 ML/MIN/1.73M2 — SIGNIFICANT CHANGE UP
EOSINOPHIL # BLD AUTO: 0.55 K/UL — HIGH (ref 0–0.5)
EOSINOPHIL NFR BLD AUTO: 7.2 % — HIGH (ref 0–6)
GLUCOSE SERPL-MCNC: 389 MG/DL — HIGH (ref 70–99)
HCT VFR BLD CALC: 36.4 % — LOW (ref 39–50)
HGB BLD-MCNC: 12.3 G/DL — LOW (ref 13–17)
IMM GRANULOCYTES NFR BLD AUTO: 0.5 % — SIGNIFICANT CHANGE UP (ref 0–0.9)
LYMPHOCYTES # BLD AUTO: 1.25 K/UL — SIGNIFICANT CHANGE UP (ref 1–3.3)
LYMPHOCYTES # BLD AUTO: 16.4 % — SIGNIFICANT CHANGE UP (ref 13–44)
MCHC RBC-ENTMCNC: 32.9 PG — SIGNIFICANT CHANGE UP (ref 27–34)
MCHC RBC-ENTMCNC: 33.8 G/DL — SIGNIFICANT CHANGE UP (ref 32–36)
MCV RBC AUTO: 97.3 FL — SIGNIFICANT CHANGE UP (ref 80–100)
MONOCYTES # BLD AUTO: 0.63 K/UL — SIGNIFICANT CHANGE UP (ref 0–0.9)
MONOCYTES NFR BLD AUTO: 8.3 % — SIGNIFICANT CHANGE UP (ref 2–14)
NEUTROPHILS # BLD AUTO: 5.09 K/UL — SIGNIFICANT CHANGE UP (ref 1.8–7.4)
NEUTROPHILS NFR BLD AUTO: 66.8 % — SIGNIFICANT CHANGE UP (ref 43–77)
NRBC BLD AUTO-RTO: 0 /100 WBCS — SIGNIFICANT CHANGE UP (ref 0–0)
PLATELET # BLD AUTO: 150 K/UL — SIGNIFICANT CHANGE UP (ref 150–400)
POTASSIUM SERPL-MCNC: 4.8 MMOL/L — SIGNIFICANT CHANGE UP (ref 3.5–5.3)
POTASSIUM SERPL-SCNC: 4.8 MMOL/L — SIGNIFICANT CHANGE UP (ref 3.5–5.3)
PROT SERPL-MCNC: 7 G/DL — SIGNIFICANT CHANGE UP (ref 6–8.3)
RBC # BLD: 3.74 M/UL — LOW (ref 4.2–5.8)
RBC # FLD: 12.7 % — SIGNIFICANT CHANGE UP (ref 10.3–14.5)
SODIUM SERPL-SCNC: 136 MMOL/L — SIGNIFICANT CHANGE UP (ref 135–145)
T4 FREE SERPL-MCNC: 1.5 NG/DL — SIGNIFICANT CHANGE UP (ref 0.9–1.8)
TSH SERPL-MCNC: 1.08 UIU/ML — SIGNIFICANT CHANGE UP (ref 0.27–4.2)
WBC # BLD: 7.62 K/UL — SIGNIFICANT CHANGE UP (ref 3.8–10.5)
WBC # FLD AUTO: 7.62 K/UL — SIGNIFICANT CHANGE UP (ref 3.8–10.5)

## 2025-04-23 PROCEDURE — G2211 COMPLEX E/M VISIT ADD ON: CPT

## 2025-04-23 PROCEDURE — 99214 OFFICE O/P EST MOD 30 MIN: CPT

## 2025-05-05 ENCOUNTER — APPOINTMENT (OUTPATIENT)
Dept: FAMILY MEDICINE | Facility: CLINIC | Age: 75
End: 2025-05-05
Payer: MEDICARE

## 2025-05-05 ENCOUNTER — RX RENEWAL (OUTPATIENT)
Age: 75
End: 2025-05-05

## 2025-05-05 VITALS
SYSTOLIC BLOOD PRESSURE: 154 MMHG | WEIGHT: 218 LBS | HEIGHT: 73 IN | OXYGEN SATURATION: 98 % | RESPIRATION RATE: 16 BRPM | HEART RATE: 74 BPM | TEMPERATURE: 97.2 F | BODY MASS INDEX: 28.89 KG/M2 | DIASTOLIC BLOOD PRESSURE: 83 MMHG

## 2025-05-05 DIAGNOSIS — C67.9 MALIGNANT NEOPLASM OF BLADDER, UNSPECIFIED: ICD-10-CM

## 2025-05-05 DIAGNOSIS — E11.9 TYPE 2 DIABETES MELLITUS W/OUT COMPLICATIONS: ICD-10-CM

## 2025-05-05 DIAGNOSIS — J30.2 OTHER SEASONAL ALLERGIC RHINITIS: ICD-10-CM

## 2025-05-05 DIAGNOSIS — N18.32 CHRONIC KIDNEY DISEASE, STAGE 3B: ICD-10-CM

## 2025-05-05 DIAGNOSIS — I77.819 AORTIC ECTASIA, UNSPECIFIED SITE: ICD-10-CM

## 2025-05-05 DIAGNOSIS — Z00.00 ENCOUNTER FOR GENERAL ADULT MEDICAL EXAMINATION W/OUT ABNORMAL FINDINGS: ICD-10-CM

## 2025-05-05 DIAGNOSIS — S32.059A UNSPECIFIED FRACTURE OF FIFTH LUMBAR VERTEBRA, INITIAL ENCOUNTER FOR CLOSED FRACTURE: ICD-10-CM

## 2025-05-05 PROCEDURE — G0439: CPT

## 2025-05-05 RX ORDER — FLUTICASONE PROPIONATE 50 UG/1
50 SPRAY, METERED NASAL DAILY
Qty: 1 | Refills: 4 | Status: ACTIVE | COMMUNITY
Start: 2025-05-05 | End: 1900-01-01

## 2025-05-09 ENCOUNTER — OUTPATIENT (OUTPATIENT)
Dept: OUTPATIENT SERVICES | Facility: HOSPITAL | Age: 75
LOS: 1 days | Discharge: HOME | End: 2025-05-09

## 2025-05-09 DIAGNOSIS — Z98.890 OTHER SPECIFIED POSTPROCEDURAL STATES: Chronic | ICD-10-CM

## 2025-05-09 DIAGNOSIS — C80.1 MALIGNANT (PRIMARY) NEOPLASM, UNSPECIFIED: ICD-10-CM

## 2025-05-09 DIAGNOSIS — Z90.5 ACQUIRED ABSENCE OF KIDNEY: Chronic | ICD-10-CM

## 2025-05-09 DIAGNOSIS — Z90.2 ACQUIRED ABSENCE OF LUNG [PART OF]: Chronic | ICD-10-CM

## 2025-05-13 ENCOUNTER — OUTPATIENT (OUTPATIENT)
Dept: OUTPATIENT SERVICES | Facility: HOSPITAL | Age: 75
LOS: 1 days | End: 2025-05-13
Payer: MEDICARE

## 2025-05-13 ENCOUNTER — APPOINTMENT (OUTPATIENT)
Dept: MRI IMAGING | Facility: CLINIC | Age: 75
End: 2025-05-13
Payer: MEDICARE

## 2025-05-13 ENCOUNTER — APPOINTMENT (OUTPATIENT)
Dept: CT IMAGING | Facility: CLINIC | Age: 75
End: 2025-05-13
Payer: MEDICARE

## 2025-05-13 DIAGNOSIS — Z98.890 OTHER SPECIFIED POSTPROCEDURAL STATES: Chronic | ICD-10-CM

## 2025-05-13 DIAGNOSIS — Z90.2 ACQUIRED ABSENCE OF LUNG [PART OF]: Chronic | ICD-10-CM

## 2025-05-13 DIAGNOSIS — C67.9 MALIGNANT NEOPLASM OF BLADDER, UNSPECIFIED: ICD-10-CM

## 2025-05-13 DIAGNOSIS — Z90.5 ACQUIRED ABSENCE OF KIDNEY: Chronic | ICD-10-CM

## 2025-05-13 PROCEDURE — 72197 MRI PELVIS W/O & W/DYE: CPT

## 2025-05-13 PROCEDURE — 72197 MRI PELVIS W/O & W/DYE: CPT | Mod: 26

## 2025-05-13 PROCEDURE — A9585: CPT

## 2025-05-13 PROCEDURE — 74183 MRI ABD W/O CNTR FLWD CNTR: CPT | Mod: 26

## 2025-05-13 PROCEDURE — 71250 CT THORAX DX C-: CPT | Mod: 26

## 2025-05-13 PROCEDURE — 71250 CT THORAX DX C-: CPT

## 2025-05-13 PROCEDURE — 74183 MRI ABD W/O CNTR FLWD CNTR: CPT

## 2025-05-14 ENCOUNTER — APPOINTMENT (OUTPATIENT)
Dept: INFUSION THERAPY | Facility: HOSPITAL | Age: 75
End: 2025-05-14

## 2025-05-14 ENCOUNTER — RESULT REVIEW (OUTPATIENT)
Age: 75
End: 2025-05-14

## 2025-05-14 ENCOUNTER — APPOINTMENT (OUTPATIENT)
Dept: HEMATOLOGY ONCOLOGY | Facility: CLINIC | Age: 75
End: 2025-05-14
Payer: MEDICARE

## 2025-05-14 VITALS
RESPIRATION RATE: 16 BRPM | HEART RATE: 71 BPM | WEIGHT: 217.13 LBS | HEIGHT: 73.74 IN | BODY MASS INDEX: 28.16 KG/M2 | SYSTOLIC BLOOD PRESSURE: 135 MMHG | DIASTOLIC BLOOD PRESSURE: 79 MMHG | OXYGEN SATURATION: 97 % | TEMPERATURE: 97.2 F

## 2025-05-14 DIAGNOSIS — Z51.11 ENCOUNTER FOR ANTINEOPLASTIC CHEMOTHERAPY: ICD-10-CM

## 2025-05-14 DIAGNOSIS — C65.2 MALIGNANT NEOPLASM OF LEFT RENAL PELVIS: ICD-10-CM

## 2025-05-14 LAB
ALBUMIN SERPL ELPH-MCNC: 4 G/DL — SIGNIFICANT CHANGE UP (ref 3.3–5)
ALP SERPL-CCNC: 88 U/L — SIGNIFICANT CHANGE UP (ref 40–120)
ALT FLD-CCNC: 6 U/L — LOW (ref 10–45)
ANION GAP SERPL CALC-SCNC: 13 MMOL/L — SIGNIFICANT CHANGE UP (ref 5–17)
AST SERPL-CCNC: 21 U/L — SIGNIFICANT CHANGE UP (ref 10–40)
BASOPHILS # BLD AUTO: 0.07 K/UL — SIGNIFICANT CHANGE UP (ref 0–0.2)
BASOPHILS NFR BLD AUTO: 0.9 % — SIGNIFICANT CHANGE UP (ref 0–2)
BILIRUB SERPL-MCNC: 0.6 MG/DL — SIGNIFICANT CHANGE UP (ref 0.2–1.2)
BUN SERPL-MCNC: 28 MG/DL — HIGH (ref 7–23)
CALCIUM SERPL-MCNC: 9.5 MG/DL — SIGNIFICANT CHANGE UP (ref 8.4–10.5)
CHLORIDE SERPL-SCNC: 99 MMOL/L — SIGNIFICANT CHANGE UP (ref 96–108)
CO2 SERPL-SCNC: 22 MMOL/L — SIGNIFICANT CHANGE UP (ref 22–31)
CREAT SERPL-MCNC: 1.16 MG/DL — SIGNIFICANT CHANGE UP (ref 0.5–1.3)
EGFR: 65 ML/MIN/1.73M2 — SIGNIFICANT CHANGE UP
EGFR: 65 ML/MIN/1.73M2 — SIGNIFICANT CHANGE UP
EOSINOPHIL # BLD AUTO: 0.39 K/UL — SIGNIFICANT CHANGE UP (ref 0–0.5)
EOSINOPHIL NFR BLD AUTO: 5.3 % — SIGNIFICANT CHANGE UP (ref 0–6)
GLUCOSE SERPL-MCNC: 233 MG/DL — HIGH (ref 70–99)
HCT VFR BLD CALC: 39.7 % — SIGNIFICANT CHANGE UP (ref 39–50)
HGB BLD-MCNC: 13.4 G/DL — SIGNIFICANT CHANGE UP (ref 13–17)
IMM GRANULOCYTES NFR BLD AUTO: 0.4 % — SIGNIFICANT CHANGE UP (ref 0–0.9)
LYMPHOCYTES # BLD AUTO: 1.23 K/UL — SIGNIFICANT CHANGE UP (ref 1–3.3)
LYMPHOCYTES # BLD AUTO: 16.6 % — SIGNIFICANT CHANGE UP (ref 13–44)
MCHC RBC-ENTMCNC: 32.9 PG — SIGNIFICANT CHANGE UP (ref 27–34)
MCHC RBC-ENTMCNC: 33.8 G/DL — SIGNIFICANT CHANGE UP (ref 32–36)
MCV RBC AUTO: 97.5 FL — SIGNIFICANT CHANGE UP (ref 80–100)
MONOCYTES # BLD AUTO: 0.8 K/UL — SIGNIFICANT CHANGE UP (ref 0–0.9)
MONOCYTES NFR BLD AUTO: 10.8 % — SIGNIFICANT CHANGE UP (ref 2–14)
NEUTROPHILS # BLD AUTO: 4.88 K/UL — SIGNIFICANT CHANGE UP (ref 1.8–7.4)
NEUTROPHILS NFR BLD AUTO: 66 % — SIGNIFICANT CHANGE UP (ref 43–77)
NRBC BLD AUTO-RTO: 0 /100 WBCS — SIGNIFICANT CHANGE UP (ref 0–0)
PLATELET # BLD AUTO: 141 K/UL — LOW (ref 150–400)
POTASSIUM SERPL-MCNC: 4.2 MMOL/L — SIGNIFICANT CHANGE UP (ref 3.5–5.3)
POTASSIUM SERPL-SCNC: 4.2 MMOL/L — SIGNIFICANT CHANGE UP (ref 3.5–5.3)
PROT SERPL-MCNC: 7.4 G/DL — SIGNIFICANT CHANGE UP (ref 6–8.3)
RBC # BLD: 4.07 M/UL — LOW (ref 4.2–5.8)
RBC # FLD: 12.6 % — SIGNIFICANT CHANGE UP (ref 10.3–14.5)
SODIUM SERPL-SCNC: 134 MMOL/L — LOW (ref 135–145)
WBC # BLD: 7.4 K/UL — SIGNIFICANT CHANGE UP (ref 3.8–10.5)
WBC # FLD AUTO: 7.4 K/UL — SIGNIFICANT CHANGE UP (ref 3.8–10.5)

## 2025-05-14 PROCEDURE — 99214 OFFICE O/P EST MOD 30 MIN: CPT

## 2025-05-15 LAB
T4 FREE SERPL-MCNC: 1.4 NG/DL — SIGNIFICANT CHANGE UP (ref 0.9–1.8)
TSH SERPL-MCNC: 0.8 UIU/ML — SIGNIFICANT CHANGE UP (ref 0.27–4.2)

## 2025-05-27 ENCOUNTER — APPOINTMENT (OUTPATIENT)
Dept: THORACIC SURGERY | Facility: CLINIC | Age: 75
End: 2025-05-27
Payer: MEDICARE

## 2025-05-27 DIAGNOSIS — R91.1 SOLITARY PULMONARY NODULE: ICD-10-CM

## 2025-05-27 DIAGNOSIS — C80.1 MALIGNANT (PRIMARY) NEOPLASM, UNSPECIFIED: ICD-10-CM

## 2025-05-27 PROCEDURE — 99213 OFFICE O/P EST LOW 20 MIN: CPT | Mod: 93

## 2025-05-29 ENCOUNTER — APPOINTMENT (OUTPATIENT)
Dept: UROLOGY | Facility: CLINIC | Age: 75
End: 2025-05-29
Payer: MEDICARE

## 2025-05-29 ENCOUNTER — OUTPATIENT (OUTPATIENT)
Dept: OUTPATIENT SERVICES | Facility: HOSPITAL | Age: 75
LOS: 1 days | End: 2025-05-29

## 2025-05-29 DIAGNOSIS — Z90.2 ACQUIRED ABSENCE OF LUNG [PART OF]: Chronic | ICD-10-CM

## 2025-05-29 DIAGNOSIS — Z98.890 OTHER SPECIFIED POSTPROCEDURAL STATES: Chronic | ICD-10-CM

## 2025-05-29 DIAGNOSIS — R35.0 FREQUENCY OF MICTURITION: ICD-10-CM

## 2025-05-29 DIAGNOSIS — C66.2 MALIGNANT NEOPLASM OF LEFT URETER: ICD-10-CM

## 2025-05-29 DIAGNOSIS — Z90.5 ACQUIRED ABSENCE OF KIDNEY: Chronic | ICD-10-CM

## 2025-05-29 DIAGNOSIS — C65.2 MALIGNANT NEOPLASM OF LEFT RENAL PELVIS: ICD-10-CM

## 2025-05-29 PROCEDURE — 99214 OFFICE O/P EST MOD 30 MIN: CPT

## 2025-05-29 PROCEDURE — G2211 COMPLEX E/M VISIT ADD ON: CPT

## 2025-06-02 LAB — URINE CYTOLOGY: NORMAL

## 2025-06-04 ENCOUNTER — RESULT REVIEW (OUTPATIENT)
Age: 75
End: 2025-06-04

## 2025-06-04 ENCOUNTER — APPOINTMENT (OUTPATIENT)
Dept: HEMATOLOGY ONCOLOGY | Facility: CLINIC | Age: 75
End: 2025-06-04
Payer: MEDICARE

## 2025-06-04 ENCOUNTER — APPOINTMENT (OUTPATIENT)
Dept: INFUSION THERAPY | Facility: HOSPITAL | Age: 75
End: 2025-06-04

## 2025-06-04 VITALS
TEMPERATURE: 97 F | OXYGEN SATURATION: 94 % | RESPIRATION RATE: 16 BRPM | DIASTOLIC BLOOD PRESSURE: 64 MMHG | SYSTOLIC BLOOD PRESSURE: 99 MMHG | HEART RATE: 90 BPM

## 2025-06-04 VITALS
WEIGHT: 220.46 LBS | SYSTOLIC BLOOD PRESSURE: 156 MMHG | BODY MASS INDEX: 28.51 KG/M2 | HEART RATE: 69 BPM | RESPIRATION RATE: 16 BRPM | DIASTOLIC BLOOD PRESSURE: 93 MMHG | TEMPERATURE: 97.5 F | OXYGEN SATURATION: 95 %

## 2025-06-04 DIAGNOSIS — Z92.89 PERSONAL HISTORY OF OTHER MEDICAL TREATMENT: ICD-10-CM

## 2025-06-04 DIAGNOSIS — R21 RASH AND OTHER NONSPECIFIC SKIN ERUPTION: ICD-10-CM

## 2025-06-04 LAB
ALBUMIN SERPL ELPH-MCNC: 4 G/DL — SIGNIFICANT CHANGE UP (ref 3.3–5)
ALP SERPL-CCNC: 86 U/L — SIGNIFICANT CHANGE UP (ref 40–120)
ALT FLD-CCNC: 5 U/L — LOW (ref 10–45)
ANION GAP SERPL CALC-SCNC: 11 MMOL/L — SIGNIFICANT CHANGE UP (ref 5–17)
AST SERPL-CCNC: 24 U/L — SIGNIFICANT CHANGE UP (ref 10–40)
BASOPHILS # BLD AUTO: 0.06 K/UL — SIGNIFICANT CHANGE UP (ref 0–0.2)
BASOPHILS NFR BLD AUTO: 0.9 % — SIGNIFICANT CHANGE UP (ref 0–2)
BILIRUB SERPL-MCNC: 0.7 MG/DL — SIGNIFICANT CHANGE UP (ref 0.2–1.2)
BUN SERPL-MCNC: 18 MG/DL — SIGNIFICANT CHANGE UP (ref 7–23)
CALCIUM SERPL-MCNC: 9.1 MG/DL — SIGNIFICANT CHANGE UP (ref 8.4–10.5)
CHLORIDE SERPL-SCNC: 103 MMOL/L — SIGNIFICANT CHANGE UP (ref 96–108)
CO2 SERPL-SCNC: 23 MMOL/L — SIGNIFICANT CHANGE UP (ref 22–31)
CREAT SERPL-MCNC: 1.18 MG/DL — SIGNIFICANT CHANGE UP (ref 0.5–1.3)
EGFR: 64 ML/MIN/1.73M2 — SIGNIFICANT CHANGE UP
EGFR: 64 ML/MIN/1.73M2 — SIGNIFICANT CHANGE UP
EOSINOPHIL # BLD AUTO: 0.35 K/UL — SIGNIFICANT CHANGE UP (ref 0–0.5)
EOSINOPHIL NFR BLD AUTO: 5.1 % — SIGNIFICANT CHANGE UP (ref 0–6)
GLUCOSE SERPL-MCNC: 152 MG/DL — HIGH (ref 70–99)
HCT VFR BLD CALC: 37.9 % — LOW (ref 39–50)
HGB BLD-MCNC: 12.9 G/DL — LOW (ref 13–17)
IMM GRANULOCYTES NFR BLD AUTO: 0.3 % — SIGNIFICANT CHANGE UP (ref 0–0.9)
LYMPHOCYTES # BLD AUTO: 1.11 K/UL — SIGNIFICANT CHANGE UP (ref 1–3.3)
LYMPHOCYTES # BLD AUTO: 16.3 % — SIGNIFICANT CHANGE UP (ref 13–44)
MCHC RBC-ENTMCNC: 32.7 PG — SIGNIFICANT CHANGE UP (ref 27–34)
MCHC RBC-ENTMCNC: 34 G/DL — SIGNIFICANT CHANGE UP (ref 32–36)
MCV RBC AUTO: 95.9 FL — SIGNIFICANT CHANGE UP (ref 80–100)
MONOCYTES # BLD AUTO: 0.68 K/UL — SIGNIFICANT CHANGE UP (ref 0–0.9)
MONOCYTES NFR BLD AUTO: 10 % — SIGNIFICANT CHANGE UP (ref 2–14)
NEUTROPHILS # BLD AUTO: 4.58 K/UL — SIGNIFICANT CHANGE UP (ref 1.8–7.4)
NEUTROPHILS NFR BLD AUTO: 67.4 % — SIGNIFICANT CHANGE UP (ref 43–77)
NRBC BLD AUTO-RTO: 0 /100 WBCS — SIGNIFICANT CHANGE UP (ref 0–0)
PLATELET # BLD AUTO: 153 K/UL — SIGNIFICANT CHANGE UP (ref 150–400)
POTASSIUM SERPL-MCNC: 4.2 MMOL/L — SIGNIFICANT CHANGE UP (ref 3.5–5.3)
POTASSIUM SERPL-SCNC: 4.2 MMOL/L — SIGNIFICANT CHANGE UP (ref 3.5–5.3)
PROT SERPL-MCNC: 7.3 G/DL — SIGNIFICANT CHANGE UP (ref 6–8.3)
RBC # BLD: 3.95 M/UL — LOW (ref 4.2–5.8)
RBC # FLD: 12 % — SIGNIFICANT CHANGE UP (ref 10.3–14.5)
SODIUM SERPL-SCNC: 136 MMOL/L — SIGNIFICANT CHANGE UP (ref 135–145)
T4 FREE SERPL-MCNC: 1.8 NG/DL — SIGNIFICANT CHANGE UP (ref 0.9–1.8)
TSH SERPL-MCNC: 1.03 UIU/ML — SIGNIFICANT CHANGE UP (ref 0.27–4.2)
WBC # BLD: 6.8 K/UL — SIGNIFICANT CHANGE UP (ref 3.8–10.5)
WBC # FLD AUTO: 6.8 K/UL — SIGNIFICANT CHANGE UP (ref 3.8–10.5)

## 2025-06-04 PROCEDURE — 99214 OFFICE O/P EST MOD 30 MIN: CPT

## 2025-06-04 PROCEDURE — G2211 COMPLEX E/M VISIT ADD ON: CPT

## 2025-06-05 ENCOUNTER — APPOINTMENT (OUTPATIENT)
Dept: CARDIOTHORACIC SURGERY | Facility: CLINIC | Age: 75
End: 2025-06-05
Payer: MEDICARE

## 2025-06-05 VITALS
TEMPERATURE: 98.4 F | SYSTOLIC BLOOD PRESSURE: 145 MMHG | WEIGHT: 220 LBS | BODY MASS INDEX: 29.16 KG/M2 | OXYGEN SATURATION: 98 % | RESPIRATION RATE: 16 BRPM | HEIGHT: 73 IN | HEART RATE: 70 BPM | DIASTOLIC BLOOD PRESSURE: 81 MMHG

## 2025-06-05 DIAGNOSIS — E78.5 HYPERLIPIDEMIA, UNSPECIFIED: ICD-10-CM

## 2025-06-05 DIAGNOSIS — E03.9 HYPOTHYROIDISM, UNSPECIFIED: ICD-10-CM

## 2025-06-05 DIAGNOSIS — I77.819 AORTIC ECTASIA, UNSPECIFIED SITE: ICD-10-CM

## 2025-06-05 PROCEDURE — 99214 OFFICE O/P EST MOD 30 MIN: CPT

## 2025-06-06 ENCOUNTER — OUTPATIENT (OUTPATIENT)
Dept: OUTPATIENT SERVICES | Facility: HOSPITAL | Age: 75
LOS: 1 days | End: 2025-06-06

## 2025-06-06 VITALS
HEART RATE: 65 BPM | WEIGHT: 218.04 LBS | DIASTOLIC BLOOD PRESSURE: 78 MMHG | RESPIRATION RATE: 16 BRPM | OXYGEN SATURATION: 98 % | SYSTOLIC BLOOD PRESSURE: 131 MMHG | TEMPERATURE: 97 F | HEIGHT: 72 IN

## 2025-06-06 DIAGNOSIS — I71.2 THORACIC AORTIC ANEURYSM, WITHOUT RUPTURE: ICD-10-CM

## 2025-06-06 DIAGNOSIS — Z98.890 OTHER SPECIFIED POSTPROCEDURAL STATES: Chronic | ICD-10-CM

## 2025-06-06 DIAGNOSIS — C65.2 MALIGNANT NEOPLASM OF LEFT RENAL PELVIS: ICD-10-CM

## 2025-06-06 DIAGNOSIS — E11.9 TYPE 2 DIABETES MELLITUS WITHOUT COMPLICATIONS: ICD-10-CM

## 2025-06-06 DIAGNOSIS — Z90.2 ACQUIRED ABSENCE OF LUNG [PART OF]: Chronic | ICD-10-CM

## 2025-06-06 DIAGNOSIS — Z90.5 ACQUIRED ABSENCE OF KIDNEY: Chronic | ICD-10-CM

## 2025-06-06 DIAGNOSIS — E03.9 HYPOTHYROIDISM, UNSPECIFIED: ICD-10-CM

## 2025-06-06 RX ORDER — LEVOTHYROXINE SODIUM 300 MCG
1 TABLET ORAL
Refills: 0 | DISCHARGE

## 2025-06-06 NOTE — H&P PST ADULT - PROBLEM SELECTOR PLAN 1
Patient tentatively scheduled for surgery on: 6/13/25  Provided with verbal and written presurgical instructions  Patient instructed to hold NSAIDs, multivitamins and herbal supplements one week prior to surgery    Lab specimen drawn at PST today: urine culture

## 2025-06-06 NOTE — H&P PST ADULT - HISTORY OF PRESENT ILLNESS
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 completed chemo in 5/2024, on keytruda.   5/13/2025 MRI abdomen/pelvis with and without IV contrast: Dilated left upper pole calyx with small focus of restricted diffusion, concerning for small urothelial lesion. Further evaluation recommended, he is scheduled cystoscopy, left ureteroscopy with possible biopsy, laser ablation and ureteral stent placement

## 2025-06-06 NOTE — H&P PST ADULT - NEGATIVE NEUROLOGICAL SYMPTOMS
no transient paralysis/no weakness/no generalized seizures/no focal seizures/no loss of consciousness/no hemiparesis/no confusion/no facial palsy

## 2025-06-06 NOTE — H&P PST ADULT - CARDIOVASCULAR SYMPTOMS
AAA- 49-50mm ascending aortic aneurysm. Per cardiac surgeon "Currently, he does not meet indications for prophylactic replacement of his ascending aorta, however he is very close to the border. He will f/u  in a year for a  CT contrast."

## 2025-06-06 NOTE — H&P PST ADULT - NSICDXPASTMEDICALHX_GEN_ALL_CORE_FT
PAST MEDICAL HISTORY:  GAMAL (acute kidney injury)     Ascending aortic aneurysm     BPH without obstruction/lower urinary tract symptoms     Chronic back pain     COVID-19 12/2020 loss of smell no hosp    Diabetes     Hearing loss     History of iron deficiency     History of low back pain     HTN (hypertension)     Hydronephrosis     Hypothyroid     Lumbar vertebral fracture     Lung cancer     Malignant neoplasm of bladder, unspecified     Malignant neoplasm of urinary organ     Obesity     Renal cancer     Thoracic aortic aneurysm

## 2025-06-06 NOTE — H&P PST ADULT - PROBLEM SELECTOR PLAN 3
Patient instructed on the following medications adjustments: Hold novolog on day of procedure and reduce tresiba to 13 units night before   POCT glucose testing ordered upon admission and Hypoglycemia protocol for history of insulin use    Instructed to remove glucose sensor on DOS

## 2025-06-06 NOTE — H&P PST ADULT - PROBLEM SELECTOR PLAN 2
2/2025 [facility] CT chest with IV contrast noted Ascending aortic aneurysm measuring approximately 4.8 cm, 2-66.   Per cardiac surgeon note : does not meet surgical intervention guideline at this time, will f/u with a CT in a year

## 2025-06-06 NOTE — H&P PST ADULT - LAST ECHOCARDIOGRAM
08/15/23 [employer]  Normal left ventricular cavity size. Left ventricular wall thickness is normal. Left ventricular systolic function is normal with an ejection fraction visually estimated at 60 to 65 %.,The aortic root at the sinuses of Valsalva diameter is dilated, measuring 4.20 cm (indexed 1.96 cm/m²). The ascending aorta diameter is dilated, measuring 4.00 cm (indexed 1.87 cm/m²). Normal pulmonary artery pressure.

## 2025-06-06 NOTE — H&P PST ADULT - GENITOURINARY COMMENTS
Pre op dx-Malignant neoplasm of left renal pelvis No CVA tenderness hx of  Malignant neoplasm of left renal pelvis

## 2025-06-06 NOTE — H&P PST ADULT - CARDIOVASCULAR
[Time Spent: ___ minutes] : I have spent [unfilled] minutes of time on the encounter. normal/regular rate and rhythm/S1 S2 present/no rub/no JVD/no pedal edema details…

## 2025-06-06 NOTE — H&P PST ADULT - ENDOCRINE COMMENTS
hx of hypothyroidism - on levothyroxine; Hx Diabetes - Hga1c 7.7% 2/2025, uses insulin and glucose sensor

## 2025-06-08 LAB
CULTURE RESULTS: NO GROWTH — SIGNIFICANT CHANGE UP
SPECIMEN SOURCE: SIGNIFICANT CHANGE UP

## 2025-06-10 NOTE — ED PROVIDER NOTE - NS ED MD DISPO ADMITTING SERVICE
-Continue aspirin 81 mg daily   -Continue plavix 75 mg   -Continue lipitor 80 mg nightly  -BP goal <130/80   -Continue to follow up with cardiology and cardiothoracic surgery as scheduled  -Recommend PT/Cardiac Rehab     MED

## 2025-06-11 ENCOUNTER — APPOINTMENT (OUTPATIENT)
Dept: FAMILY MEDICINE | Facility: CLINIC | Age: 75
End: 2025-06-11
Payer: MEDICARE

## 2025-06-11 VITALS
HEIGHT: 73 IN | OXYGEN SATURATION: 98 % | DIASTOLIC BLOOD PRESSURE: 86 MMHG | BODY MASS INDEX: 28.89 KG/M2 | WEIGHT: 218 LBS | HEART RATE: 68 BPM | SYSTOLIC BLOOD PRESSURE: 142 MMHG

## 2025-06-11 PROCEDURE — 99214 OFFICE O/P EST MOD 30 MIN: CPT

## 2025-06-11 PROCEDURE — G2211 COMPLEX E/M VISIT ADD ON: CPT

## 2025-06-13 ENCOUNTER — APPOINTMENT (OUTPATIENT)
Dept: UROLOGY | Facility: AMBULATORY SURGERY CENTER | Age: 75
End: 2025-06-13

## 2025-06-13 ENCOUNTER — TRANSCRIPTION ENCOUNTER (OUTPATIENT)
Age: 75
End: 2025-06-13

## 2025-06-13 ENCOUNTER — RESULT REVIEW (OUTPATIENT)
Age: 75
End: 2025-06-13

## 2025-06-13 ENCOUNTER — OUTPATIENT (OUTPATIENT)
Dept: OUTPATIENT SERVICES | Facility: HOSPITAL | Age: 75
LOS: 1 days | End: 2025-06-13
Payer: MEDICARE

## 2025-06-13 VITALS
WEIGHT: 218.04 LBS | HEIGHT: 72 IN | RESPIRATION RATE: 18 BRPM | TEMPERATURE: 98 F | DIASTOLIC BLOOD PRESSURE: 75 MMHG | SYSTOLIC BLOOD PRESSURE: 130 MMHG | OXYGEN SATURATION: 96 % | HEART RATE: 63 BPM

## 2025-06-13 VITALS — OXYGEN SATURATION: 99 % | HEART RATE: 73 BPM | RESPIRATION RATE: 17 BRPM

## 2025-06-13 DIAGNOSIS — Z98.890 OTHER SPECIFIED POSTPROCEDURAL STATES: Chronic | ICD-10-CM

## 2025-06-13 DIAGNOSIS — Z90.5 ACQUIRED ABSENCE OF KIDNEY: Chronic | ICD-10-CM

## 2025-06-13 DIAGNOSIS — Z90.2 ACQUIRED ABSENCE OF LUNG [PART OF]: Chronic | ICD-10-CM

## 2025-06-13 DIAGNOSIS — C65.2 MALIGNANT NEOPLASM OF LEFT RENAL PELVIS: ICD-10-CM

## 2025-06-13 LAB — GLUCOSE BLDC GLUCOMTR-MCNC: 122 MG/DL — HIGH (ref 70–99)

## 2025-06-13 PROCEDURE — 52234 CYSTOSCOPY AND TREATMENT: CPT | Mod: 59

## 2025-06-13 PROCEDURE — 52332 CYSTOSCOPY AND TREATMENT: CPT | Mod: LT

## 2025-06-13 PROCEDURE — 52355 CYSTOURETERO W/EXCISE TUMOR: CPT | Mod: LT

## 2025-06-13 PROCEDURE — 88307 TISSUE EXAM BY PATHOLOGIST: CPT | Mod: 26

## 2025-06-13 PROCEDURE — 74420 UROGRAPHY RTRGR +-KUB: CPT | Mod: 26

## 2025-06-13 DEVICE — LASER FIBER SOLTIVE 200: Type: IMPLANTABLE DEVICE | Status: FUNCTIONAL

## 2025-06-13 DEVICE — URETERAL STENT PERCUFLEX PLUS 6FR 26CM: Type: IMPLANTABLE DEVICE | Status: FUNCTIONAL

## 2025-06-13 DEVICE — URETERAL SHEATH NAVIGATOR HD 11/13FR X 46CM: Type: IMPLANTABLE DEVICE | Status: FUNCTIONAL

## 2025-06-13 DEVICE — URETEROSCOPE LITHOVUE DISP: Type: IMPLANTABLE DEVICE | Status: FUNCTIONAL

## 2025-06-13 DEVICE — STONE BASKET ZEROTIP NITINOL 4-WIRE 1.9FR 120CM X 12MM: Type: IMPLANTABLE DEVICE | Status: FUNCTIONAL

## 2025-06-13 DEVICE — GUIDEWIRE SENSOR DUAL-FLEX NITINOL STRAIGHT .038" X 150CM: Type: IMPLANTABLE DEVICE | Status: FUNCTIONAL

## 2025-06-13 DEVICE — URETERAL CATH OPEN END 5FR 70CM: Type: IMPLANTABLE DEVICE | Status: FUNCTIONAL

## 2025-06-13 RX ORDER — OXYCODONE HYDROCHLORIDE 30 MG/1
1 TABLET ORAL
Refills: 0 | DISCHARGE

## 2025-06-13 RX ORDER — LEVOTHYROXINE SODIUM 300 MCG
1 TABLET ORAL
Refills: 0 | DISCHARGE

## 2025-06-13 RX ORDER — INSULIN ASPART 100 [IU]/ML
12 INJECTION, SOLUTION INTRAVENOUS; SUBCUTANEOUS
Refills: 0 | DISCHARGE

## 2025-06-13 RX ORDER — INSULIN DEGLUDEC 100 U/ML
17 INJECTION, SOLUTION SUBCUTANEOUS
Refills: 0 | DISCHARGE

## 2025-06-13 NOTE — ASU DISCHARGE PLAN (ADULT/PEDIATRIC) - NS MD DC FALL RISK RISK
For information on Fall & Injury Prevention, visit: [email or web address] OR  [email or web address] OR  https://www.cdc.gov/steadi/patient.html

## 2025-06-13 NOTE — ASU DISCHARGE PLAN (ADULT/PEDIATRIC) - CARE PROVIDER_API CALL
Kojo Woodward.  Urology  [address] [place] [address], [address] [address]  Phone: [phone]  Fax: [phone]  Follow Up Time:

## 2025-06-13 NOTE — ASU DISCHARGE PLAN (ADULT/PEDIATRIC) - FINANCIAL ASSISTANCE
[facility] provides services at a reduced cost to those who are determined to be eligible through [facility]’s financial assistance program. Information regarding [facility]’s financial assistance program can be found by going to [email or web address] or by calling [phone].

## 2025-06-13 NOTE — BRIEF OPERATIVE NOTE - NSICDXBRIEFPREOP_GEN_ALL_CORE_FT
PRE-OP DIAGNOSIS:  Malignant neoplasm of left renal pelvis 13-Jun-2025 15:23:53  Kojo Woodward  Bladder cancer 13-Jun-2025 15:24:03  Kojo Woodward

## 2025-06-13 NOTE — BRIEF OPERATIVE NOTE - DISPOSITION
Occupational Therapy    Visit Type: initial evaluation and treatment    Relevant History/Co-morbidities: Presents with generalized weakness, SOB, and BLE swelling. Pt with recent admission 10/29-11/7/24 for COVID pneumonia.    SUBJECTIVE  Patient / Family Goal: return to previous functional status and maximize function    Pain   Patient denies pain.    OBJECTIVE     Cognitive Status   Level of Consciousness   - alert  Arousal Alertness   - appropriate responses to stimuli  Affect/Behavior    - calm, irritable and cooperative  Orientation    - Oriented to: person, place, time and situation  Functional Communication   - Overall Communication Status: within functional limits   - Forms of Communication: verbal  Following Direction   - follows multistep commands with increased time  Transition Between Tasks   - transitions with cues  [facility] Cognition Screen:    1. Following/understanding a 10 to 15 minute speech or presentation (e.g., lesson at a place of [facility], guest lecturer at a senior center?  None (4)    2. Understanding familiar people during ordinary conversations?  None (4)    3. Remembering to take medications at the appropriate time?  A little (3)    4. Remembering where things were placed or put away (e.g., keys)?  A little (3)    5. Remembering a list of 4 or 5 errands without writing it down?  A little (3)    6. Taking care of complicated tasks like managing a checking account or getting appliances fixed?  A little (3)    AM-PAC Cognition Screen:  Cognition Score: 19/24  Cognition Interpretation: current level of function is consistent with baseline     Range of Motion (ROM)   (degrees unless noted; active unless noted; norms in ( ); negative=lacking to 0, positive=beyond 0)  WFL: LUE, RUE    Strength  (out of 5 unless noted, standard test position unless noted)   WFL: LUE, RUE      Bed Mobility  - Supine to sit: minimal assist, with verbal cues (BLE management)  Transfers  Assistive devices: gait belt,  2-wheeled walker  - Sit to stand: minimal assist, with verbal cues, moderate assist  - Stand to sit: minimal assist, with verbal cues  - Stand pivot: minimal assist, with verbal cues  Initially MOD A for STS but progressed to MIN A for SPT from bed to chair with vc's for UE placement      Activities of Daily Living (ADLs)  Grooming/Oral Hygiene:   - Grooming assist: set up  - Position: chair  - Assist needed for: wash/dry face and teeth care  Toileting:   - Toilet transfer:        - Assist: minimal assist and with verbal cues (simulated BSC)       - Device: gait belt and 2-wheeled walker       - Equipment: bedside commode      Interventions    Treatment provided: ADL training, activity tolerance, balance retraining, bed mobility training, safety training, transfer training, functional ambulation, HEP training and body mechanics Pt educated regarding importance of OOB mobility. Pt encouraged to sit up in chair for all meals.   Skilled input: verbal instruction/cues  Verbal Consent: Writer verbally educated and received verbal consent for hand placement, positioning of patient, and techniques to be performed today from patient for clothing adjustments for techniques and therapist position for techniques as described above and how they are pertinent to the patient's plan of care.         Education:   - Present and ready to learn: patient  Education provided during session:  - Results of above outlined education: Verbalizes understanding    ASSESSMENT   Patient will benefit from inpatient skilled therapy to address current assessed functional limitations and impairments.  Interfering components: decreased activity tolerance and medical status limitations    Discharge needs based on today's assessment:  - Current level of function: slightly below baseline level of function  - Therapy needs at discharge: therapy 1-3 times per week  - Activities of daily living (ADLs) requiring support at discharge: bed mobility,  transfers, ambulation, dressing, grooming, bathing and toileting  - Instrumental activities of daily living (IADLs) requiring support at discharge: driving, home management, meal preparation, shopping and community mobility  - Impairments that require further therapy intervention: strength, activity tolerance, balance and pain  AM-PAC  - Prior Level of Function: Needs a little help ([facility] 12-21)       Key: MOD A=moderate assistance, IND/MOD I=independent/modified independent  - Generalized Current Level of Function     - Current Self-Cares: 14       Scoring Key= >21 Modified Independent; 20-21 Supervision; 18-19 Minimal assist; 13-17 Moderate assist; 9-12 Max assist; <9 Total assist       • Personal Occupations Profile Affected: bathing/showering, functional mobility/transfers, personal hygiene/grooming, toileting/toilet hygiene, lower body dressing, upper body dressing, community mobility, home establishment/managements, shopping, meal preparation/cleanup, driving     • Clinical decision making: Low - Patient has few limitations (1-3), comorbidities and/or complexities, as noted in problem focused assessment noted above, that impact their occupational profile.  Resulting in few treatment options and no task modification consistent with low clinical decision making complexity.    PLAN (while hospitalized)  Suggestions for next session as indicated:     OT Frequency: 3-5 x per week         Interventions: ADL retraining, upper extremity strengthening/ROM, patient/family training, balance, safety training, therapeutic exercise, patient education, activity tolerance training, bed mobility training, positioning, transfer training, IADL, therapeutic activity, HEP training and functional transfer training  Agreement to plan and goals: patient agrees with goals and treatment plan      GOALS  Review Date: 11/15/2024  Long Term Goals: (to be met by time of discharge from hospital)  Grooming: Patient will complete grooming  tasks in sitting and in standing stand by assist.  Toilet transfer: Patient will complete toilet transfer with gait belt and 2-wheeled walker, bedside commode, stand by assist.     Bed mobility: Pt will complete supine to sit with supervision.    Documented in the chart in the following areas: Prior Function. Assessment/Plan.    Patient at End of Session:   Location: in chair  Safety measures: alarm system in place/re-engaged, call light within reach, equipment intact and lines intact  Handoff to: nurse      I was in the immediate presence of the student and directed the student’s performance of the services. I am responsible for all treatment, assessment, documentation, and billing rendered for this patient.   Christin Rick OTR/L      Therapy procedure time and total treatment time can be found documented on the Time Entry flowsheet   PACU

## 2025-06-13 NOTE — BRIEF OPERATIVE NOTE - NSICDXBRIEFPOSTOP_GEN_ALL_CORE_FT
POST-OP DIAGNOSIS:  Malignant neoplasm of left renal pelvis 13-Jun-2025 15:24:37  Kojo Woodward  Bladder cancer 13-Jun-2025 15:24:47  Kojo Woodward

## 2025-06-13 NOTE — ASU DISCHARGE PLAN (ADULT/PEDIATRIC) - ASU DC SPECIAL INSTRUCTIONSFT
STENT: You may have an internal stent (a hollow tube that runs from the kidney to your bladder) after your procedure, which helps urine drain from the kidney to your bladder. Some patients experience urinary frequency, burning, or even back pain (especially with urination). These sensations will gradually get better. Increasing your fluid intake can also improve these symptoms. While the stent is in place, your urine may continue to be bloody. This stent is temporary and must be removed by your urologist as an outpatient with in 3 months unless otherwise specified. If your stent is on a string, it is secured to your leg or genitalia with an adhesive bandage. Do not pull on the string, do not remove the bandage, do not insert anything intravaginally/intraurethrally, and do not engage in sexual intercourse until after the stent is removed at your post-operative appointment.    GENERAL: It is common to have blood in your urine after your procedure. It may be pink or even red; inform your doctor if you have a significant amount of clot in the urine or if you are unable to void at all. The urine may clear and then become bloody again especially as you are more physically active.  BATHING: You may shower or bathe.    DIET: You may resume your regular diet and regular medication regimen.    PAIN: You may take Tylenol (acetaminophen) 650-975mg and/or Motrin (ibuprofen) 400-600mg, both available over the counter, for pain every 6 hours as needed. Do not exceed 4000mg of Tylenol (acetaminophen) daily. You may alternate these medications such that you take one or the other every 3 hours for around the clock pain coverage. If you have a stent, the following medications may have been sent to your pharmacy for stent related discomfort: Flomax (tamsulosin) 0.4mg at bedtime until stent removed, Ditropan (oxybutynin) 5mg every 8 hours as needed for bladder spasms, and Pyridium (phenazopyridine) 100mg every 8 hours as needed for kidney/bladder discomfort for max 3 days (Pyridium will make your urine orange).    ANTIBIOTICS: You may be given a prescription for an antibiotic, please take this medication as instructed and be sure to complete the entire course.    STOOL SOFTENERS: Do not allow yourself to become constipated as straining may cause bleeding. Take stool softeners or a laxative (ex. Miralax, Colace, Senokot, ExLax, etc), available over the counter, if needed.    ACTIVITY: No heavy lifting or strenuous exercise until you are evaluated at your post-operative appointment. Otherwise, you may return to your usual level of physical activity.    ANTICOAGULATION: If you are taking any blood thinning medications, please discuss with your urologist prior to restarting these medications unless otherwise specified.    FOLLOW-UP: Dr. Woodward's office will reach out regarding follow up once pathology results return    CALL YOUR UROLOGIST IF: You have any bleeding that does not stop, inability to void >8 hours, fever over 100.4 F, chills, persistent nausea/vomiting, changes in your incision concerning for infection, or if your pain is not controlled on your discharge pain medications.

## 2025-06-13 NOTE — BRIEF OPERATIVE NOTE - NSICDXBRIEFPROCEDURE_GEN_ALL_CORE_FT
PROCEDURES:  Cystoscopy with left ureteroscopy with biopsy 13-Jun-2025 15:22:51 Left retrograde pyelogram, laser ablation of renal pelvis tumor, left ureteral stent, resection of multiple bladder tumor with fulguration   Kojo Woodward

## 2025-06-17 LAB — SURGICAL PATHOLOGY STUDY: SIGNIFICANT CHANGE UP

## 2025-06-19 PROBLEM — M54.9 DORSALGIA, UNSPECIFIED: Chronic | Status: ACTIVE | Noted: 2025-06-06

## 2025-06-23 ENCOUNTER — APPOINTMENT (OUTPATIENT)
Dept: UROLOGY | Facility: CLINIC | Age: 75
End: 2025-06-23
Payer: MEDICARE

## 2025-06-23 ENCOUNTER — OUTPATIENT (OUTPATIENT)
Dept: OUTPATIENT SERVICES | Facility: HOSPITAL | Age: 75
LOS: 1 days | End: 2025-06-23
Payer: MEDICARE

## 2025-06-23 ENCOUNTER — NON-APPOINTMENT (OUTPATIENT)
Age: 75
End: 2025-06-23

## 2025-06-23 VITALS — HEART RATE: 72 BPM | DIASTOLIC BLOOD PRESSURE: 70 MMHG | SYSTOLIC BLOOD PRESSURE: 144 MMHG | TEMPERATURE: 98.2 F

## 2025-06-23 DIAGNOSIS — Z98.890 OTHER SPECIFIED POSTPROCEDURAL STATES: Chronic | ICD-10-CM

## 2025-06-23 PROCEDURE — 52310 CYSTOSCOPY AND TREATMENT: CPT

## 2025-06-25 ENCOUNTER — RESULT REVIEW (OUTPATIENT)
Age: 75
End: 2025-06-25

## 2025-06-25 ENCOUNTER — APPOINTMENT (OUTPATIENT)
Dept: HEMATOLOGY ONCOLOGY | Facility: CLINIC | Age: 75
End: 2025-06-25
Payer: MEDICARE

## 2025-06-25 ENCOUNTER — APPOINTMENT (OUTPATIENT)
Dept: INFUSION THERAPY | Facility: HOSPITAL | Age: 75
End: 2025-06-25

## 2025-06-25 VITALS
BODY MASS INDEX: 28.21 KG/M2 | SYSTOLIC BLOOD PRESSURE: 131 MMHG | OXYGEN SATURATION: 98 % | TEMPERATURE: 97.3 F | RESPIRATION RATE: 16 BRPM | WEIGHT: 213.83 LBS | DIASTOLIC BLOOD PRESSURE: 77 MMHG | HEART RATE: 69 BPM

## 2025-06-25 LAB
ALBUMIN SERPL ELPH-MCNC: 4.1 G/DL — SIGNIFICANT CHANGE UP (ref 3.3–5)
ALP SERPL-CCNC: 97 U/L — SIGNIFICANT CHANGE UP (ref 40–120)
ALT FLD-CCNC: 7 U/L — LOW (ref 10–45)
ANION GAP SERPL CALC-SCNC: 14 MMOL/L — SIGNIFICANT CHANGE UP (ref 5–17)
AST SERPL-CCNC: 23 U/L — SIGNIFICANT CHANGE UP (ref 10–40)
BASOPHILS # BLD AUTO: 0.07 K/UL — SIGNIFICANT CHANGE UP (ref 0–0.2)
BASOPHILS NFR BLD AUTO: 0.8 % — SIGNIFICANT CHANGE UP (ref 0–2)
BILIRUB SERPL-MCNC: 0.4 MG/DL — SIGNIFICANT CHANGE UP (ref 0.2–1.2)
BUN SERPL-MCNC: 30 MG/DL — HIGH (ref 7–23)
CALCIUM SERPL-MCNC: 9.5 MG/DL — SIGNIFICANT CHANGE UP (ref 8.4–10.5)
CHLORIDE SERPL-SCNC: 100 MMOL/L — SIGNIFICANT CHANGE UP (ref 96–108)
CO2 SERPL-SCNC: 22 MMOL/L — SIGNIFICANT CHANGE UP (ref 22–31)
CREAT SERPL-MCNC: 1.39 MG/DL — HIGH (ref 0.5–1.3)
EGFR: 53 ML/MIN/1.73M2 — LOW
EGFR: 53 ML/MIN/1.73M2 — LOW
EOSINOPHIL # BLD AUTO: 0.54 K/UL — HIGH (ref 0–0.5)
EOSINOPHIL NFR BLD AUTO: 5.9 % — SIGNIFICANT CHANGE UP (ref 0–6)
GLUCOSE SERPL-MCNC: 264 MG/DL — HIGH (ref 70–99)
HCT VFR BLD CALC: 39.9 % — SIGNIFICANT CHANGE UP (ref 39–50)
HGB BLD-MCNC: 13.5 G/DL — SIGNIFICANT CHANGE UP (ref 13–17)
IMM GRANULOCYTES NFR BLD AUTO: 0.3 % — SIGNIFICANT CHANGE UP (ref 0–0.9)
LYMPHOCYTES # BLD AUTO: 1.07 K/UL — SIGNIFICANT CHANGE UP (ref 1–3.3)
LYMPHOCYTES # BLD AUTO: 11.6 % — LOW (ref 13–44)
MCHC RBC-ENTMCNC: 32.1 PG — SIGNIFICANT CHANGE UP (ref 27–34)
MCHC RBC-ENTMCNC: 33.8 G/DL — SIGNIFICANT CHANGE UP (ref 32–36)
MCV RBC AUTO: 95 FL — SIGNIFICANT CHANGE UP (ref 80–100)
MONOCYTES # BLD AUTO: 0.79 K/UL — SIGNIFICANT CHANGE UP (ref 0–0.9)
MONOCYTES NFR BLD AUTO: 8.6 % — SIGNIFICANT CHANGE UP (ref 2–14)
NEUTROPHILS # BLD AUTO: 6.71 K/UL — SIGNIFICANT CHANGE UP (ref 1.8–7.4)
NEUTROPHILS NFR BLD AUTO: 72.8 % — SIGNIFICANT CHANGE UP (ref 43–77)
NRBC BLD AUTO-RTO: 0 /100 WBCS — SIGNIFICANT CHANGE UP (ref 0–0)
PLATELET # BLD AUTO: 181 K/UL — SIGNIFICANT CHANGE UP (ref 150–400)
POTASSIUM SERPL-MCNC: 4.6 MMOL/L — SIGNIFICANT CHANGE UP (ref 3.5–5.3)
POTASSIUM SERPL-SCNC: 4.6 MMOL/L — SIGNIFICANT CHANGE UP (ref 3.5–5.3)
PROT SERPL-MCNC: 7.6 G/DL — SIGNIFICANT CHANGE UP (ref 6–8.3)
RBC # BLD: 4.2 M/UL — SIGNIFICANT CHANGE UP (ref 4.2–5.8)
RBC # FLD: 11.7 % — SIGNIFICANT CHANGE UP (ref 10.3–14.5)
SODIUM SERPL-SCNC: 136 MMOL/L — SIGNIFICANT CHANGE UP (ref 135–145)
T4 FREE SERPL-MCNC: 1.6 NG/DL — SIGNIFICANT CHANGE UP (ref 0.9–1.8)
TSH SERPL-MCNC: 0.66 UIU/ML — SIGNIFICANT CHANGE UP (ref 0.27–4.2)
WBC # BLD: 9.21 K/UL — SIGNIFICANT CHANGE UP (ref 3.8–10.5)
WBC # FLD AUTO: 9.21 K/UL — SIGNIFICANT CHANGE UP (ref 3.8–10.5)

## 2025-06-25 PROCEDURE — 99213 OFFICE O/P EST LOW 20 MIN: CPT

## 2025-06-25 RX ORDER — PREDNISONE 5 MG/1
5 TABLET ORAL
Qty: 75 | Refills: 0 | Status: ACTIVE | COMMUNITY
Start: 2025-06-25 | End: 1900-01-01

## 2025-07-09 DIAGNOSIS — C65.2 MALIGNANT NEOPLASM OF LEFT RENAL PELVIS: ICD-10-CM

## 2025-08-01 ENCOUNTER — APPOINTMENT (OUTPATIENT)
Dept: ENDOCRINOLOGY | Facility: CLINIC | Age: 75
End: 2025-08-01

## 2025-08-01 ENCOUNTER — RESULT CHARGE (OUTPATIENT)
Age: 75
End: 2025-08-01

## 2025-08-01 DIAGNOSIS — E11.9 TYPE 2 DIABETES MELLITUS W/OUT COMPLICATIONS: ICD-10-CM

## 2025-08-01 LAB — HBA1C MFR BLD HPLC: 9

## 2025-08-01 PROCEDURE — G0108 DIAB MANAGE TRN  PER INDIV: CPT

## 2025-08-01 PROCEDURE — 95251 CONT GLUC MNTR ANALYSIS I&R: CPT

## 2025-08-04 ENCOUNTER — RESULT REVIEW (OUTPATIENT)
Age: 75
End: 2025-08-04

## 2025-08-04 ENCOUNTER — APPOINTMENT (OUTPATIENT)
Dept: HEMATOLOGY ONCOLOGY | Facility: CLINIC | Age: 75
End: 2025-08-04
Payer: MEDICARE

## 2025-08-04 ENCOUNTER — APPOINTMENT (OUTPATIENT)
Dept: INFUSION THERAPY | Facility: HOSPITAL | Age: 75
End: 2025-08-04

## 2025-08-04 VITALS
BODY MASS INDEX: 29.39 KG/M2 | TEMPERATURE: 97.2 F | HEIGHT: 73 IN | HEART RATE: 69 BPM | RESPIRATION RATE: 19 BRPM | SYSTOLIC BLOOD PRESSURE: 129 MMHG | OXYGEN SATURATION: 98 % | DIASTOLIC BLOOD PRESSURE: 79 MMHG | WEIGHT: 221.78 LBS

## 2025-08-04 DIAGNOSIS — C67.9 MALIGNANT NEOPLASM OF BLADDER, UNSPECIFIED: ICD-10-CM

## 2025-08-04 DIAGNOSIS — C66.2 MALIGNANT NEOPLASM OF LEFT URETER: ICD-10-CM

## 2025-08-04 DIAGNOSIS — R25.2 CRAMP AND SPASM: ICD-10-CM

## 2025-08-04 PROCEDURE — 99214 OFFICE O/P EST MOD 30 MIN: CPT

## 2025-08-18 ENCOUNTER — APPOINTMENT (OUTPATIENT)
Dept: DERMATOLOGY | Facility: CLINIC | Age: 75
End: 2025-08-18

## 2025-08-20 ENCOUNTER — APPOINTMENT (OUTPATIENT)
Dept: CT IMAGING | Facility: CLINIC | Age: 75
End: 2025-08-20
Payer: MEDICARE

## 2025-08-20 ENCOUNTER — APPOINTMENT (OUTPATIENT)
Dept: MRI IMAGING | Facility: CLINIC | Age: 75
End: 2025-08-20
Payer: MEDICARE

## 2025-08-20 ENCOUNTER — OUTPATIENT (OUTPATIENT)
Dept: OUTPATIENT SERVICES | Facility: HOSPITAL | Age: 75
LOS: 1 days | End: 2025-08-20
Payer: MEDICARE

## 2025-08-20 DIAGNOSIS — C67.9 MALIGNANT NEOPLASM OF BLADDER, UNSPECIFIED: ICD-10-CM

## 2025-08-20 DIAGNOSIS — Z90.5 ACQUIRED ABSENCE OF KIDNEY: Chronic | ICD-10-CM

## 2025-08-20 DIAGNOSIS — Z98.890 OTHER SPECIFIED POSTPROCEDURAL STATES: Chronic | ICD-10-CM

## 2025-08-20 DIAGNOSIS — C66.2 MALIGNANT NEOPLASM OF LEFT URETER: ICD-10-CM

## 2025-08-20 PROCEDURE — 71250 CT THORAX DX C-: CPT | Mod: 26

## 2025-08-20 PROCEDURE — 74183 MRI ABD W/O CNTR FLWD CNTR: CPT

## 2025-08-20 PROCEDURE — 71250 CT THORAX DX C-: CPT

## 2025-08-20 PROCEDURE — 72197 MRI PELVIS W/O & W/DYE: CPT | Mod: 26

## 2025-08-20 PROCEDURE — A9585: CPT

## 2025-08-20 PROCEDURE — 74183 MRI ABD W/O CNTR FLWD CNTR: CPT | Mod: 26

## 2025-08-20 PROCEDURE — 72197 MRI PELVIS W/O & W/DYE: CPT

## 2025-08-25 ENCOUNTER — RESULT REVIEW (OUTPATIENT)
Age: 75
End: 2025-08-25

## 2025-08-25 ENCOUNTER — APPOINTMENT (OUTPATIENT)
Dept: INFUSION THERAPY | Facility: HOSPITAL | Age: 75
End: 2025-08-25

## 2025-08-25 ENCOUNTER — APPOINTMENT (OUTPATIENT)
Dept: HEMATOLOGY ONCOLOGY | Facility: CLINIC | Age: 75
End: 2025-08-25
Payer: MEDICARE

## 2025-08-25 VITALS
HEIGHT: 73 IN | WEIGHT: 220 LBS | TEMPERATURE: 97.9 F | BODY MASS INDEX: 29.16 KG/M2 | SYSTOLIC BLOOD PRESSURE: 133 MMHG | DIASTOLIC BLOOD PRESSURE: 76 MMHG | RESPIRATION RATE: 18 BRPM | HEART RATE: 71 BPM | OXYGEN SATURATION: 96 %

## 2025-08-25 DIAGNOSIS — C65.2 MALIGNANT NEOPLASM OF LEFT RENAL PELVIS: ICD-10-CM

## 2025-08-25 DIAGNOSIS — Z92.89 PERSONAL HISTORY OF OTHER MEDICAL TREATMENT: ICD-10-CM

## 2025-08-25 DIAGNOSIS — C67.9 MALIGNANT NEOPLASM OF BLADDER, UNSPECIFIED: ICD-10-CM

## 2025-08-25 PROCEDURE — 99215 OFFICE O/P EST HI 40 MIN: CPT

## 2025-08-28 ENCOUNTER — RX RENEWAL (OUTPATIENT)
Age: 75
End: 2025-08-28

## 2025-08-28 RX ORDER — BLOOD-GLUCOSE SENSOR
EACH MISCELLANEOUS
Qty: 6 | Refills: 3 | Status: ACTIVE | COMMUNITY
Start: 2025-08-28 | End: 1900-01-01

## 2025-09-15 ENCOUNTER — RESULT REVIEW (OUTPATIENT)
Age: 75
End: 2025-09-15

## 2025-09-15 ENCOUNTER — APPOINTMENT (OUTPATIENT)
Dept: HEMATOLOGY ONCOLOGY | Facility: CLINIC | Age: 75
End: 2025-09-15
Payer: MEDICARE

## 2025-09-15 ENCOUNTER — APPOINTMENT (OUTPATIENT)
Dept: INFUSION THERAPY | Facility: HOSPITAL | Age: 75
End: 2025-09-15

## 2025-09-15 VITALS
TEMPERATURE: 98 F | BODY MASS INDEX: 28.85 KG/M2 | RESPIRATION RATE: 17 BRPM | WEIGHT: 218.69 LBS | SYSTOLIC BLOOD PRESSURE: 144 MMHG | OXYGEN SATURATION: 97 % | HEART RATE: 68 BPM | DIASTOLIC BLOOD PRESSURE: 83 MMHG

## 2025-09-15 DIAGNOSIS — C65.2 MALIGNANT NEOPLASM OF LEFT RENAL PELVIS: ICD-10-CM

## 2025-09-15 DIAGNOSIS — C67.9 MALIGNANT NEOPLASM OF BLADDER, UNSPECIFIED: ICD-10-CM

## 2025-09-15 PROCEDURE — 99214 OFFICE O/P EST MOD 30 MIN: CPT

## (undated) DEVICE — GLV 8 PROTEXIS (CREAM) MICRO

## (undated) DEVICE — SUT VLOC 90 3-0 6" CV-23 UNDYED

## (undated) DEVICE — SOL IRR BAG H2O 3000ML

## (undated) DEVICE — CANISTER DISPOSABLE THIN WALL 3000CC

## (undated) DEVICE — WARMING BLANKET UPPER ADULT

## (undated) DEVICE — SOL IRR BAG NS 0.9% 3000ML

## (undated) DEVICE — DRAPE 3/4 SHEET W REINFORCEMENT 56X77"

## (undated) DEVICE — XI ARM FORCEP PROGRASP 8MM

## (undated) DEVICE — DRAIN RESERVOIR FOR JACKSON PRATT 100CC CARDINAL

## (undated) DEVICE — ELCTR GROUNDING PAD ADULT COVIDIEN

## (undated) DEVICE — DRAPE DRAINAGE BAG RELAX & GEMINI

## (undated) DEVICE — SYR LUER LOK 10CC

## (undated) DEVICE — SOL IRR POUR NS 0.9% 1000ML

## (undated) DEVICE — XI VESSEL SEALER

## (undated) DEVICE — POSITIONER FOAM EGG CRATE ULNAR 2PCS (PINK)

## (undated) DEVICE — XI ARM NEEDLE DRIVER MEGA

## (undated) DEVICE — D HELP - CLEARVIEW CLEARIFY SYSTEM

## (undated) DEVICE — CABLE DAC ACTIVE CORD

## (undated) DEVICE — POSITIONER PINK PAD PIGAZZI SYSTEM

## (undated) DEVICE — POSITIONER FOAM HEADREST (PINK)

## (undated) DEVICE — SOL IRR POUR H2O 500ML

## (undated) DEVICE — RETRACTOR COVIDIEN ENDOPADDLE 12MM DISP

## (undated) DEVICE — GLV 8 PROTEXIS (WHITE)

## (undated) DEVICE — GLV 7 PROTEXIS (WHITE)

## (undated) DEVICE — SPECIMEN CONTAINER 100ML

## (undated) DEVICE — DRAPE EQUIPMENT COVER 27"

## (undated) DEVICE — PACK CYSTO

## (undated) DEVICE — VENODYNE/SCD SLEEVE CALF LARGE

## (undated) DEVICE — GLV 8 PROTEXIS (CREAM) NEU-THERA

## (undated) DEVICE — SUT VICRYL 2-0 27" SH UNDYED

## (undated) DEVICE — BASIN SET DOUBLE

## (undated) DEVICE — SOL IRR POUR H2O 1500ML

## (undated) DEVICE — DRAPE LINGEMAN TUR

## (undated) DEVICE — VENODYNE/SCD SLEEVE CALF MEDIUM

## (undated) DEVICE — FOLEY CATH 2-WAY 20F 5CC LATEX LUBRICATH

## (undated) DEVICE — ELCTR HF RESECTION LOOP 24FR 30 DEG 0.35 WIRE

## (undated) DEVICE — CLAMP BX CUP 3FRX115CM OVL 85CM

## (undated) DEVICE — SOL IRR GLYCINE 1.5% 3000L

## (undated) DEVICE — DRSG TEGADERM 6"X8"

## (undated) DEVICE — FOLEY CATH 2-WAY 20FR 5CC SILASTIC

## (undated) DEVICE — DRAPE EQUIPMENT BANDED BAG 30 X 30" (SHOWER CAP)

## (undated) DEVICE — DRAPE COVER SNAP 36X30"

## (undated) DEVICE — BAG URINE W METER 2L

## (undated) DEVICE — POSITIONER STRAP ARMBOARD VELCRO TS-30

## (undated) DEVICE — XI OBTURATOR OPTICAL BLADELESS 8MM

## (undated) DEVICE — DRSG STERISTRIPS 0.5 X 4"

## (undated) DEVICE — TROCAR SURGIQUEST AIRSEAL 12MMX100MM

## (undated) DEVICE — Device

## (undated) DEVICE — ACMI SELF-SEALING SEAL UP TO 7FR

## (undated) DEVICE — ADAPTER CHECK FLO 9FR STERILE

## (undated) DEVICE — GLV 7.5 PROTEXIS (WHITE)

## (undated) DEVICE — FOLEY HOLDER STATLOCK 2 WAY ADULT

## (undated) DEVICE — SUT ETHILON 2-0 18" FS

## (undated) DEVICE — TUBING SUCTION 20FT

## (undated) DEVICE — SYR ASEPTO

## (undated) DEVICE — FOLEY TRAY 16FR 5CC LTX UMETER CLOSED

## (undated) DEVICE — PRESSURE INFUSOR BAG 3000ML

## (undated) DEVICE — SOL IRR POUR NS 0.9% 500ML

## (undated) DEVICE — SOL IRR POUR NS 0.9% 1500ML

## (undated) DEVICE — PACK MAJOR ABDOMINAL WITH LAP

## (undated) DEVICE — ELCTR PLASMA LOOP MEDIUM ANGLED 24FR 12-30 DEG

## (undated) DEVICE — SUT VLOC 180 2-0 9" GS-21 GREEN

## (undated) DEVICE — XI ARM CLIP APPLIER MEDIUM-LARGE

## (undated) DEVICE — STAPLER SKIN MULTI DIRECTION W35

## (undated) DEVICE — DRAPE C ARM UNIVERSAL

## (undated) DEVICE — TUBING TUR 2 PRONG

## (undated) DEVICE — SYR LUER LOK 20CC

## (undated) DEVICE — GLV 7.5 PROTEXIS (CREAM) MICRO

## (undated) DEVICE — NDL COUNTER FOAM AND MAGNET 40-70

## (undated) DEVICE — SUT SILK 2-0 18" TIES

## (undated) DEVICE — TUBING THERMADX UROLOGY

## (undated) DEVICE — XI SEAL UNIV 5- 8 MM

## (undated) DEVICE — INSUFFLATION NDL COVIDIEN STEP 14G FOR STEP/VERSASTEP

## (undated) DEVICE — DRAPE INSTRUMENT POUCH 6.75" X 11"

## (undated) DEVICE — FOLEY CATH 2-WAY 16FR 5CC LATEX LUBRICATH

## (undated) DEVICE — DRAPE 1/2 SHEET 40X57"

## (undated) DEVICE — TUBING LEVEL ONE NORMOFLO SET

## (undated) DEVICE — ELCTR PLASMA LOOP LARGE 24FR 12-16 DEG

## (undated) DEVICE — DRAPE LEGGINGS XL

## (undated) DEVICE — LUBRICATING JELLY ONESHOT 1.25OZ

## (undated) DEVICE — XI ARM PERMANENT CAUTERY SPATULA

## (undated) DEVICE — XI ARM CLIP APPLIER LARGE

## (undated) DEVICE — XI ARM SCISSOR MONO CURVED

## (undated) DEVICE — PACK LAP CHOLE LAP APPENDECTOMY

## (undated) DEVICE — TUBING SUCTION ENDOMAT LC

## (undated) DEVICE — PREP BETADINE KIT

## (undated) DEVICE — DRAPE LAPAROTOMY W VELCRO CORD TABS

## (undated) DEVICE — DRSG TELFA 3 X 8

## (undated) DEVICE — DRAPE LIGHT HANDLE COVER (BLUE)

## (undated) DEVICE — SUT SILK 2-0 30" TIES

## (undated) DEVICE — GLV 6.5 PROTEXIS (WHITE)

## (undated) DEVICE — SYR CATH TIP 2 OZ

## (undated) DEVICE — IRR BULB PATHFINDER + 10"

## (undated) DEVICE — POSITIONER PATIENT SAFETY STRAP 3X60"

## (undated) DEVICE — GOWN TRIMAX LG

## (undated) DEVICE — XI DRAPE ARM

## (undated) DEVICE — DRSG CURITY GAUZE SPONGE 4 X 4" 12-PLY

## (undated) DEVICE — STAPLER COVIDIEN ENDO GIA STANDARD HANDLE

## (undated) DEVICE — TUBING RANGER FLUID IRRIGATION SET DISP

## (undated) DEVICE — SUT BIOSYN 4-0 18" P-12

## (undated) DEVICE — XI ARM FORCEP FENESTRATED BIPOLAR 8MM

## (undated) DEVICE — SUT CAPROSYN 4-0 30" P-12 UNDYED

## (undated) DEVICE — NSA-C-ARM: Type: DURABLE MEDICAL EQUIPMENT

## (undated) DEVICE — DRAIN JACKSON PRATT 10MM FLAT FULL NO TROCAR

## (undated) DEVICE — SOL IRR POUR H2O 250ML

## (undated) DEVICE — SUT VLOC 90 3-0 6" CV-23 VIOLET

## (undated) DEVICE — PACK GYN LAPAROSCOPY

## (undated) DEVICE — DRAPE MAYO STAND 30"

## (undated) DEVICE — SUT VICRYL 0 36" CT-1 UNDYED

## (undated) DEVICE — LABELS BLANK W PEN

## (undated) DEVICE — TUBING AIRSEAL TRI-LUMEN FILTERED

## (undated) DEVICE — SUT SILK 3-0 18" TIES

## (undated) DEVICE — TAPE SILK 3"

## (undated) DEVICE — XI ARM NEEDLE DRIVER LARGE

## (undated) DEVICE — XI ARM DISSECTOR CURVED BIPOLAR 8MM

## (undated) DEVICE — FOLEY CATH 2-WAY 16FR 5CC SILICONE

## (undated) DEVICE — BAR ROLLER FOR ELITE ELCTR

## (undated) DEVICE — DRAPE TOWEL BLUE 17" X 24"

## (undated) DEVICE — LIGASURE IMPACT

## (undated) DEVICE — SUT POLYSORB 0 36" GU-46

## (undated) DEVICE — SUT PDS II 1 27" CT

## (undated) DEVICE — XI ARM GRASPER TIP UP FENESTRATED

## (undated) DEVICE — VESSEL LOOP MAXI-YELLOW  0.120" X 16"

## (undated) DEVICE — XI ARM PERMANENT CAUTERY HOOK

## (undated) DEVICE — RETAINER VICERA FISH LG

## (undated) DEVICE — SUT SILK 0 18" TIES

## (undated) DEVICE — XI DRAPE COLUMN

## (undated) DEVICE — POSITIONER PURPLE ARM ONE STEP (LARGE)

## (undated) DEVICE — SUT SILK 2-0 30" SH

## (undated) DEVICE — DRAPE BACK TABLE COVER HEAVY DUTY 60"

## (undated) DEVICE — SUT POLYSORB 1 36" GS-21 UNDYED

## (undated) DEVICE — XI TIP COVER

## (undated) DEVICE — CLAMP ALLIGATOR (SINGLE JAW) 3FR X 65CM

## (undated) DEVICE — SUT CLIP LAPRA-TY ABSORBABLE SIZE 0.118 TO 0.12" VIOLET

## (undated) DEVICE — SUT VICRYL 1 36" CT-1 UNDYED

## (undated) DEVICE — PREP CHLORAPREP HI-LITE ORANGE 26ML

## (undated) DEVICE — SUT VICRYL 2-0 54" REEL UNDYED

## (undated) DEVICE — LUBRICANT INST ELECTROLUBE Z SOLUTION

## (undated) DEVICE — STAPLER SKIN VISI-STAT 35 WIDE

## (undated) DEVICE — MEDICATION LABELS W MARKER

## (undated) DEVICE — XI ARM FORCEP TENACULUM

## (undated) DEVICE — LAP PAD 4 X 18"

## (undated) DEVICE — SUT VICRYL 0 18" TIES UNDYED

## (undated) DEVICE — XI ARM FORCEP MARYLAND BIPOLAR